# Patient Record
Sex: FEMALE | Race: WHITE | NOT HISPANIC OR LATINO | Employment: OTHER | URBAN - METROPOLITAN AREA
[De-identification: names, ages, dates, MRNs, and addresses within clinical notes are randomized per-mention and may not be internally consistent; named-entity substitution may affect disease eponyms.]

---

## 2017-01-12 ENCOUNTER — TRANSCRIBE ORDERS (OUTPATIENT)
Dept: ADMINISTRATIVE | Facility: HOSPITAL | Age: 57
End: 2017-01-12

## 2017-01-12 DIAGNOSIS — M54.16 LUMBAR RADICULOPATHY: Primary | ICD-10-CM

## 2017-01-18 ENCOUNTER — HOSPITAL ENCOUNTER (OUTPATIENT)
Dept: RADIOLOGY | Facility: HOSPITAL | Age: 57
Discharge: HOME | End: 2017-01-18
Attending: ORTHOPAEDIC SURGERY
Payer: COMMERCIAL

## 2017-01-18 DIAGNOSIS — M54.16 LUMBAR RADICULOPATHY: ICD-10-CM

## 2017-01-18 PROCEDURE — 72158 MRI LUMBAR SPINE W/O & W/DYE: CPT

## 2017-01-18 PROCEDURE — A9585 GADOBUTROL INJECTION: HCPCS | Performed by: ORTHOPAEDIC SURGERY

## 2017-01-18 RX ADMIN — GADOBUTROL 7 ML: 604.72 INJECTION INTRAVENOUS at 17:51

## 2017-04-05 ENCOUNTER — TRANSCRIBE ORDERS (OUTPATIENT)
Dept: ADMINISTRATIVE | Facility: HOSPITAL | Age: 57
End: 2017-04-05

## 2017-04-05 ENCOUNTER — ALLSCRIPTS OFFICE VISIT (OUTPATIENT)
Dept: OTHER | Facility: OTHER | Age: 57
End: 2017-04-05

## 2017-04-05 DIAGNOSIS — G35 MULTIPLE SCLEROSIS (HCC): ICD-10-CM

## 2017-04-05 DIAGNOSIS — G35 MULTIPLE SCLEROSIS (HCC): Primary | ICD-10-CM

## 2017-07-18 ENCOUNTER — HOSPITAL ENCOUNTER (OUTPATIENT)
Dept: RADIOLOGY | Facility: HOSPITAL | Age: 57
Discharge: HOME | End: 2017-07-18
Payer: COMMERCIAL

## 2017-07-18 DIAGNOSIS — G35 MULTIPLE SCLEROSIS (HCC): ICD-10-CM

## 2017-07-18 PROCEDURE — A9585 GADOBUTROL INJECTION: HCPCS | Performed by: RADIOLOGY

## 2017-07-18 PROCEDURE — 70553 MRI BRAIN STEM W/O & W/DYE: CPT

## 2017-07-18 RX ADMIN — GADOBUTROL 7 ML: 604.72 INJECTION INTRAVENOUS at 17:20

## 2017-07-20 ENCOUNTER — GENERIC CONVERSION - ENCOUNTER (OUTPATIENT)
Dept: OTHER | Facility: OTHER | Age: 57
End: 2017-07-20

## 2017-08-10 ENCOUNTER — GENERIC CONVERSION - ENCOUNTER (OUTPATIENT)
Dept: OTHER | Facility: OTHER | Age: 57
End: 2017-08-10

## 2017-10-09 ENCOUNTER — GENERIC CONVERSION - ENCOUNTER (OUTPATIENT)
Dept: OTHER | Facility: OTHER | Age: 57
End: 2017-10-09

## 2017-10-19 ENCOUNTER — ALLSCRIPTS OFFICE VISIT (OUTPATIENT)
Dept: OTHER | Facility: OTHER | Age: 57
End: 2017-10-19

## 2017-10-19 ENCOUNTER — HOSPITAL ENCOUNTER (EMERGENCY)
Facility: HOSPITAL | Age: 57
Discharge: HOME | End: 2017-10-19
Attending: EMERGENCY MEDICINE | Admitting: EMERGENCY MEDICINE
Payer: COMMERCIAL

## 2017-10-19 VITALS
RESPIRATION RATE: 16 BRPM | WEIGHT: 155 LBS | HEIGHT: 66 IN | HEART RATE: 100 BPM | BODY MASS INDEX: 24.91 KG/M2 | TEMPERATURE: 99 F | OXYGEN SATURATION: 98 % | SYSTOLIC BLOOD PRESSURE: 180 MMHG | DIASTOLIC BLOOD PRESSURE: 108 MMHG

## 2017-10-19 DIAGNOSIS — R60.0 LOCALIZED EDEMA: ICD-10-CM

## 2017-10-19 DIAGNOSIS — R94.31 ABNORMAL EKG: ICD-10-CM

## 2017-10-19 DIAGNOSIS — I10 HTN (HYPERTENSION): Primary | ICD-10-CM

## 2017-10-19 DIAGNOSIS — G35 MULTIPLE SCLEROSIS (HCC): ICD-10-CM

## 2017-10-19 DIAGNOSIS — I10 ESSENTIAL (PRIMARY) HYPERTENSION: ICD-10-CM

## 2017-10-19 LAB
ANION GAP SERPL CALCULATED.3IONS-SCNC: 9 MMOL/L (ref 4–13)
APTT PPP: 27 SECONDS (ref 24–33)
BASOPHILS # BLD AUTO: 0 THOUSANDS/ΜL (ref 0–0.1)
BASOPHILS NFR BLD AUTO: 0 % (ref 0–1)
BUN SERPL-MCNC: 24 MG/DL (ref 5–25)
CALCIUM SERPL-MCNC: 9.2 MG/DL (ref 8.3–10.1)
CHLORIDE SERPL-SCNC: 105 MMOL/L (ref 100–108)
CO2 SERPL-SCNC: 27 MMOL/L (ref 21–32)
CREAT SERPL-MCNC: 0.66 MG/DL (ref 0.6–1.3)
EOSINOPHIL # BLD AUTO: 0 THOUSAND/ΜL (ref 0–0.61)
EOSINOPHIL NFR BLD AUTO: 0 % (ref 0–6)
ERYTHROCYTE [DISTWIDTH] IN BLOOD BY AUTOMATED COUNT: 13.5 % (ref 11.6–15.1)
GFR SERPL CREATININE-BSD FRML MDRD: 99 ML/MIN/1.73SQ M
GLUCOSE SERPL-MCNC: 158 MG/DL (ref 65–140)
HCT VFR BLD AUTO: 45.9 % (ref 37–47)
HGB BLD-MCNC: 15.2 G/DL (ref 12–16)
INR PPP: 1.02 (ref 0.86–1.16)
LG PLATELETS BLD QL SMEAR: PRESENT
LYMPHOCYTES # BLD AUTO: 1.4 THOUSANDS/ΜL (ref 0.6–4.47)
LYMPHOCYTES NFR BLD AUTO: 24 % (ref 14–44)
MCH RBC QN AUTO: 29.8 PG (ref 27–31)
MCHC RBC AUTO-ENTMCNC: 33.2 G/DL (ref 31.4–37.4)
MCV RBC AUTO: 90 FL (ref 82–98)
MONOCYTES # BLD AUTO: 0.5 THOUSAND/ΜL (ref 0.17–1.22)
MONOCYTES NFR BLD AUTO: 8 % (ref 4–12)
NEUTROPHILS # BLD AUTO: 4 THOUSANDS/ΜL (ref 1.85–7.62)
NEUTS SEG NFR BLD AUTO: 68 % (ref 43–75)
NRBC BLD AUTO-RTO: 0 /100 WBCS
PLATELET # BLD AUTO: 96 THOUSANDS/UL (ref 130–400)
PLATELET BLD QL SMEAR: ABNORMAL
PMV BLD AUTO: 11.1 FL (ref 8.9–12.7)
POTASSIUM SERPL-SCNC: 3.5 MMOL/L (ref 3.5–5.3)
PROTHROMBIN TIME: 10.7 SECONDS (ref 9.4–11.7)
RBC # BLD AUTO: 5.1 MILLION/UL (ref 4.2–5.4)
SODIUM SERPL-SCNC: 141 MMOL/L (ref 136–145)
TROPONIN I SERPL-MCNC: 0.02 NG/ML
WBC # BLD AUTO: 6 THOUSAND/UL (ref 4.8–10.8)

## 2017-10-19 PROCEDURE — 96361 HYDRATE IV INFUSION ADD-ON: CPT

## 2017-10-19 PROCEDURE — 84484 ASSAY OF TROPONIN QUANT: CPT | Performed by: EMERGENCY MEDICINE

## 2017-10-19 PROCEDURE — 99284 EMERGENCY DEPT VISIT MOD MDM: CPT

## 2017-10-19 PROCEDURE — 80048 BASIC METABOLIC PNL TOTAL CA: CPT | Performed by: EMERGENCY MEDICINE

## 2017-10-19 PROCEDURE — 85025 COMPLETE CBC W/AUTO DIFF WBC: CPT | Performed by: EMERGENCY MEDICINE

## 2017-10-19 PROCEDURE — 36415 COLL VENOUS BLD VENIPUNCTURE: CPT | Performed by: EMERGENCY MEDICINE

## 2017-10-19 PROCEDURE — 85610 PROTHROMBIN TIME: CPT | Performed by: EMERGENCY MEDICINE

## 2017-10-19 PROCEDURE — 93005 ELECTROCARDIOGRAM TRACING: CPT | Performed by: EMERGENCY MEDICINE

## 2017-10-19 PROCEDURE — 85730 THROMBOPLASTIN TIME PARTIAL: CPT | Performed by: EMERGENCY MEDICINE

## 2017-10-19 PROCEDURE — 96374 THER/PROPH/DIAG INJ IV PUSH: CPT

## 2017-10-19 RX ORDER — BACLOFEN 10 MG/1
20 TABLET ORAL 3 TIMES DAILY
COMMUNITY
End: 2018-06-07 | Stop reason: SDUPTHER

## 2017-10-19 RX ORDER — MORPHINE SULFATE 4 MG/ML
4 INJECTION, SOLUTION INTRAMUSCULAR; INTRAVENOUS ONCE
Status: DISCONTINUED | OUTPATIENT
Start: 2017-10-19 | End: 2017-10-19

## 2017-10-19 RX ORDER — MORPHINE SULFATE 2 MG/ML
2 INJECTION, SOLUTION INTRAMUSCULAR; INTRAVENOUS ONCE
Status: COMPLETED | OUTPATIENT
Start: 2017-10-19 | End: 2017-10-19

## 2017-10-19 RX ORDER — AMANTADINE HYDROCHLORIDE 100 MG/1
100 CAPSULE, GELATIN COATED ORAL 2 TIMES DAILY
COMMUNITY
End: 2018-06-07 | Stop reason: SDUPTHER

## 2017-10-19 RX ORDER — OXYCODONE AND ACETAMINOPHEN 10; 325 MG/1; MG/1
1 TABLET ORAL EVERY 8 HOURS PRN
Status: ON HOLD | COMMUNITY
End: 2019-12-13 | Stop reason: SDUPTHER

## 2017-10-19 RX ADMIN — METOPROLOL TARTRATE 25 MG: 25 TABLET ORAL at 17:36

## 2017-10-19 RX ADMIN — SODIUM CHLORIDE 1000 ML: 0.9 INJECTION, SOLUTION INTRAVENOUS at 16:43

## 2017-10-19 RX ADMIN — MORPHINE SULFATE 2 MG: 2 INJECTION, SOLUTION INTRAMUSCULAR; INTRAVENOUS at 16:44

## 2017-10-19 NOTE — DISCHARGE INSTRUCTIONS
Chronic Hypertension, Ambulatory Care   GENERAL INFORMATION:   Chronic hypertension  is a long-term condition in which your blood pressure (BP) is higher than normal  Your BP is the force of your blood moving against the walls of your arteries  Hypertension is a BP of 140/90 or higher  Common symptoms include the following:   · Headache     · Blurred vision    · Chest pain     · Dizziness or weakness     · Trouble breathing     · Nosebleeds  Seek immediate care for the following symptoms:   · Severe headache or vision loss    · Weakness in an arm or leg    · Confusion or difficulty speaking    · Discomfort in your chest that feels like squeezing, pressure, fullness, or pain    · Suddenly feeling lightheaded or trouble breathing    · Pain or discomfort in your back, neck, jaw, stomach, or arm  Treatment for chronic hypertension  may include medicine to lower your BP  You may also need to make lifestyle changes  Take your medicine exactly as directed  Manage chronic hypertension:   · Take your BP at home  Sit and rest for 5 minutes before you take your BP  Extend your arm and support it on a flat surface  Your arm should be at the same level as your heart  Follow the directions that came with your BP monitor  If possible, take at least 2 BP readings each time  Take your BP at least twice a day at the same times each day, such as morning and evening  Keep a log of your BP readings and bring it to your follow-up visits  · Eat less sodium (salt)  Do not add sodium to your food  Limit foods that are high in sodium, such as canned foods, potato chips, and cold cuts  Your healthcare provider may suggest that you follow the [address] [place] [address] Street  The plan is low in sodium, unhealthy fats, and total fat  It is high in potassium, calcium, and fiber  · Exercise regularly  Exercise at least 30 minutes per day, on most days of the week  This will help decrease your BP   Ask your healthcare provider about the best exercise plan for you  · Limit alcohol  Women should limit alcohol to 1 drink a day  Men should limit alcohol to 2 drinks a day  A drink of alcohol is 12 ounces of beer, 5 ounces of wine, or 1½ ounces of liquor  · Do not smoke  If you smoke, it is never too late to quit  Smoking can increase your BP  Smoking also worsens other health conditions you may have that can increase your risk for hypertension  Ask your healthcare provider for information if you need help quitting  Follow up with your healthcare provider as directed: You will need to return to have your BP checked and to have other lab tests done  Write down your questions so you remember to ask them during your visits  CARE AGREEMENT:   You have the right to help plan your care  Learn about your health condition and how it may be treated  Discuss treatment options with your caregivers to decide what care you want to receive  You always have the right to refuse treatment  The above information is an  only  It is not intended as medical advice for individual conditions or treatments  Talk to your doctor, nurse or pharmacist before following any medical regimen to see if it is safe and effective for you  © 2014 [address] is for End User's use only and may not be sold, redistributed or otherwise used for commercial purposes  All illustrations and images included in CareNotes® are the copyrighted property of A D [employer] M [employer]  or Neymar Barrera  Chronic Hypertension   WHAT YOU NEED TO KNOW:   Hypertension is high blood pressure (BP)  Your BP is the force of your blood moving against the walls of your arteries  Normal BP is less than 120/80  Prehypertension is between 120/80 and 139/89  Hypertension is 140/90 or higher  Hypertension causes your BP to get so high that your heart has to work much harder than normal  This can damage your heart   Chronic hypertension is a long-term condition that you can control with a healthy lifestyle or medicines  A controlled blood pressure helps protect your organs, such as your heart, lungs, brain, and kidneys  DISCHARGE INSTRUCTIONS:   Call 911 for any of the following:   · You have discomfort in your chest that feels like squeezing, pressure, fullness, or pain  · You become confused or have difficulty speaking  · You suddenly feel lightheaded or have trouble breathing  · You have pain or discomfort in your back, neck, jaw, stomach, or arm  Return to the emergency department if:   · You have a severe headache or vision loss  · You have weakness in an arm or leg  Contact your healthcare provider if:   · You feel faint, dizzy, confused, or drowsy  · You have been taking your BP medicine and your BP is still higher than your healthcare provider says it should be  · You have questions or concerns about your condition or care  Medicines: You may need any of the following:  · Medicine  may be used to help lower your BP  You may need more than one type of medicine  Take the medicine exactly as directed  · Diuretics  help decrease extra fluid that collects in your body  This will help lower your BP  You may urinate more often while you take this medicine  · Cholesterol medicine  helps lower your cholesterol level  A low cholesterol level helps prevent heart disease and makes it easier to control your blood pressure  · Take your medicine as directed  Contact your healthcare provider if you think your medicine is not helping or if you have side effects  Tell him or her if you are allergic to any medicine  Keep a list of the medicines, vitamins, and herbs you take  Include the amounts, and when and why you take them  Bring the list or the pill bottles to follow-up visits  Carry your medicine list with you in case of an emergency  Follow up with your healthcare provider as directed:   You will need to return to have your blood pressure checked and to have other lab tests done  Write down your questions so you remember to ask them during your visits  Manage chronic hypertension:  Talk with your healthcare provider about these and other ways to manage hypertension:  · Take your BP at home  Sit and rest for 5 minutes before you take your BP  Extend your arm and support it on a flat surface  Your arm should be at the same level as your heart  Follow the directions that came with your BP monitor  If possible, take at least 2 BP readings each time  Take your BP at least twice a day at the same times each day, such as morning and evening  Keep a record of your BP readings and bring it to your follow-up visits  Ask your healthcare provider what your blood pressure should be  · Limit sodium (salt) as directed  Too much sodium can affect your fluid balance  Check labels to find low-sodium or no-salt-added foods  Some low-sodium foods use potassium salts for flavor  Too much potassium can also cause health problems  Your healthcare provider will tell you how much sodium and potassium are safe for you to have in a day  He or she may recommend that you limit sodium to 2,300 mg a day  · Follow the meal plan recommended by your healthcare provider  A dietitian or your provider can give you more information on low-sodium plans or the DASH (Dietary Approaches to Stop Hypertension) eating plan  The DASH plan is low in sodium, unhealthy fats, and total fat  It is high in potassium, calcium, and fiber  · Exercise to maintain a healthy weight  Exercise at least 30 minutes per day, on most days of the week  This will help decrease your blood pressure  Ask about the best exercise plan for you  · Decrease stress  This may help lower your BP  Learn ways to relax, such as deep breathing or listening to music  · Limit alcohol  Women should limit alcohol to 1 drink a day  Men should limit alcohol to 2 drinks a day   A drink of alcohol is 12 ounces of beer, 5 ounces of wine, or 1½ ounces of liquor  · Do not smoke  Nicotine and other chemicals in cigarettes and cigars can increase your BP and also cause lung damage  Ask your healthcare provider for information if you currently smoke and need help to quit  E-cigarettes or smokeless tobacco still contain nicotine  Talk to your healthcare provider before you use these products  © 2017 [address] Information is for End User's use only and may not be sold, redistributed or otherwise used for commercial purposes  All illustrations and images included in CareNotes® are the copyrighted property of [employer]  or Neymar Barrera  The above information is an  only  It is not intended as medical advice for individual conditions or treatments  Talk to your doctor, nurse or pharmacist before following any medical regimen to see if it is safe and effective for you

## 2017-10-19 NOTE — ED PROVIDER NOTES
History  Chief Complaint   Patient presents with    High Blood Pressure     Was at the neurologist office and blood pressure was 198/112 so neurologist sent patient to the ED       History provided by:  Patient  Hypertension   Severity:  Moderate  Onset quality:  Gradual  Timing:  Constant  Progression:  Worsening  Chronicity:  New  Relieved by:  Nothing  Worsened by:  Nothing  Ineffective treatments:  None tried  Associated symptoms: no abdominal pain, no anxiety, no chest pain, no confusion, no dizziness, no fever, no headaches, no nausea, no peripheral edema, no shortness of breath, no syncope, no tinnitus and no weakness    Associated symptoms comment:  Patient under significant amount of stress over the past several weeks duration  Complaints of pain in the right leg and sciatic area  The patient not on any htn meds  Prior to Admission Medications   Prescriptions Last Dose Informant Patient Reported? Taking? Teriflunomide (AUBAGIO) 14 MG TABS 10/18/2017 at Unknown time  Yes Yes   Sig: Take 14 mg by mouth daily   amantadine (SYMMETREL) 100 mg capsule 10/19/2017 at Unknown time  Yes Yes   Sig: Take 100 mg by mouth 2 (two) times a day   baclofen 10 mg tablet 10/19/2017 at Unknown time  Yes Yes   Sig: Take 20 mg by mouth 3 (three) times a day   oxyCODONE-acetaminophen (PERCOCET)  mg per tablet 10/19/2017 at Unknown time  Yes Yes   Sig: Take 1 tablet by mouth every 8 (eight) hours as needed for moderate pain      Facility-Administered Medications: None       Past Medical History:   Diagnosis Date    Chronic back pain     MS (multiple sclerosis) ([facility] [address] )        Past Surgical History:   Procedure Laterality Date    BACK SURGERY       SECTION      ORTHOPEDIC SURGERY         No family history on file  I have reviewed and agree with the history as documented      Social History   Substance Use Topics    Smoking status: Current Every Day Smoker     Packs/day: 0 50    Smokeless tobacco: Never Used    Alcohol use Yes      Comment: rare        Review of Systems   Constitutional: Negative for chills and fever  HENT: Negative for rhinorrhea, sore throat, tinnitus and trouble swallowing  Eyes: Negative for pain  Respiratory: Negative for cough, shortness of breath, wheezing and stridor  Cardiovascular: Negative for chest pain, leg swelling and syncope  Gastrointestinal: Negative for abdominal pain, diarrhea and nausea  Endocrine: Negative for polyuria  Genitourinary: Negative for dysuria, flank pain and urgency  Musculoskeletal: Negative for joint swelling, myalgias and neck stiffness  Skin: Negative for rash  Allergic/Immunologic: Negative for immunocompromised state  Neurological: Negative for dizziness, syncope, weakness, numbness and headaches  Psychiatric/Behavioral: Negative for confusion and suicidal ideas  The patient is not nervous/anxious  All other systems reviewed and are negative  Physical Exam  ED Triage Vitals [10/19/17 1522]   Temperature Pulse Respirations Blood Pressure SpO2   99 °F (37 2 °C) (!) 110 16 (!) 182/111 98 %      Temp Source Heart Rate Source Patient Position - Orthostatic VS BP Location FiO2 (%)   Tympanic Monitor Sitting Left arm --      Pain Score       Worst Possible Pain           Physical Exam   Constitutional: She is oriented to person, place, and time  She appears well-developed and well-nourished  HENT:   Head: Normocephalic and atraumatic  Eyes: EOM are normal  Pupils are equal, round, and reactive to light  Neck: Normal range of motion  Neck supple  Cardiovascular: Normal rate and regular rhythm  Exam reveals no friction rub  No murmur heard  Pulmonary/Chest: Breath sounds normal  No respiratory distress  She has no wheezes  She has no rales  Abdominal: Soft  Bowel sounds are normal  She exhibits no distension  There is no tenderness  Musculoskeletal: Normal range of motion  She exhibits no edema or tenderness  Neurological: She is alert and oriented to person, place, and time  Skin: Skin is warm  No rash noted  Psychiatric: She has a normal mood and affect  Nursing note and vitals reviewed  ED Medications  Medications   morphine injection 2 mg (2 mg Intravenous Given 10/19/17 1644)   sodium chloride 0 9 % bolus 1,000 mL (0 mL Intravenous Stopped 10/19/17 1741)   metoprolol tartrate (LOPRESSOR) tablet 25 mg (25 mg Oral Given 10/19/17 1736)       Diagnostic Studies  Labs Reviewed   BASIC METABOLIC PANEL - Abnormal        Result Value Ref Range Status    Glucose 158 (*) 65 - 140 mg/dL Final    Comment:   If the patient is fasting, the ADA then defines impaired fasting glucose as > 100 mg/dL and diabetes as > or equal to 123 mg/dL  Specimen collection should occur prior to Sulfasalazine administration due to the potential for falsely depressed results  Specimen collection should occur prior to Sulfapyridine administration due to the potential for falsely elevated results  Sodium 141  136 - 145 mmol/L Final    Potassium 3 5  3 5 - 5 3 mmol/L Final    Chloride 105  100 - 108 mmol/L Final    CO2 27  21 - 32 mmol/L Final    Anion Gap 9  4 - 13 mmol/L Final    BUN 24  5 - 25 mg/dL Final    Creatinine 0 66  0 60 - 1 30 mg/dL Final    Comment: Standardized to IDMS reference method    Calcium 9 2  8 3 - 10 1 mg/dL Final    eGFR 99  ml/min/1 73sq m Final    Narrative:     National Kidney Disease Education Program recommendations are as follows:  GFR calculation is accurate only with a steady state creatinine  Chronic Kidney disease less than 60 ml/min/1 73 sq  meters  Kidney failure less than 15 ml/min/1 73 sq  meters     CBC AND DIFFERENTIAL - Abnormal     Platelets 96 (*) 582 - 400 Thousands/uL Final    WBC 6 00  4 80 - 10 80 Thousand/uL Final    RBC 5 10  4 20 - 5 40 Million/uL Final    Hemoglobin 15 2  12 0 - 16 0 g/dL Final    Hematocrit 45 9  37 0 - 47 0 % Final    MCV 90  82 - 98 fL Final    MCH 29 8  27 0 - 31 0 pg Final    MCHC 33 2  31 4 - 37 4 g/dL Final    RDW 13 5  11 6 - 15 1 % Final    MPV 11 1  8 9 - 12 7 fL Final    nRBC 0  /100 WBCs Final    Neutrophils Relative 68  43 - 75 % Final    Lymphocytes Relative 24  14 - 44 % Final    Monocytes Relative 8  4 - 12 % Final    Eosinophils Relative 0  0 - 6 % Final    Basophils Relative 0  0 - 1 % Final    Neutrophils Absolute 4 00  1 85 - 7 62 Thousands/µL Final    Lymphocytes Absolute 1 40  0 60 - 4 47 Thousands/µL Final    Monocytes Absolute 0 50  0 17 - 1 22 Thousand/µL Final    Eosinophils Absolute 0 00  0 00 - 0 61 Thousand/µL Final    Basophils Absolute 0 00  0 00 - 0 10 Thousands/µL Final   SMEAR REVIEW(PHLEBS DO NOT ORDER) - Abnormal     Platelet Estimate Decreased (*) Adequate Final    Large Platelet Present   Final    Narrative: Few reactive lymphs   PROTIME-INR - Normal    Protime 10 7  9 4 - 11 7 seconds Final    INR 1 02  0 86 - 1 16 Final   APTT - Normal    PTT 27  24 - 33 seconds Final    Narrative: Therapeutic Heparin Range = 60-90 seconds   TROPONIN I - Normal    Troponin I 0 02  <=0 04 ng/mL Final    Narrative:     [employer] Chemistry analyzer 99% cutoff is > 0 04 ng/mL in network labs    o cTnI 99% cutoff is useful only when applied to patients in the clinical setting of myocardial ischemia  o cTnI 99% cutoff should be interpreted in the context of clinical history, ECG findings and possibly cardiac imaging to establish correct diagnosis  o cTnI 99% cutoff may be suggestive but clearly not indicative of a coronary event without the clinical setting of myocardial ischemia  No orders to display       Procedures  ECG 12 Lead Documentation  Date/Time: 10/19/2017 4:13 PM  Performed by: Vidhi Heath  Authorized by: Vidhi Heath     Comments:      Sinus tachycardia possible left atrial enlargement left ventricle hypertrophy were blue coloration abnormality    Abnormal ECG with noted inverted T-waves in 2-3 AVF as well as V4 V5 V6 no ST segment elevation noted  Phone Contacts  ED Phone Contact    ED Course  ED Course                                MDM  Number of Diagnoses or Management Options  Abnormal EKG: new and requires workup  HTN (hypertension): new and requires workup  Diagnosis management comments: This is a 80-year-old female who presents to the emergency department with noted hypertension with no acute signs or symptoms  Her EKG shows abnormalities with ischemia which has been change with old  There is no chest pain no shortness of breath  A troponin was performed which was unremarkable patient also had mother rest of the blood work which was essentially unremarkable as well here in the emergency department the patient has no signs or symptoms except for some mild sciatica discomfort and pain which was treated with opiates  She is stable for outpatient management I would hold off on treating her medications at this point in time and needed follow-up with the family physician however she did have a history of taking metoprolol the past will start her on a low dose of metoprolol and given strict instructions when to return back to the emergency department  Pt re-examined and evaluated after testing and treatment  Spoke with the patient and feeling improved and sxs have resolved  Will discharge home with close f/u with pcp and instructed to return to the ED if sxs worsen or continue  Pt agrees with the plan for discharge and feels comfortable to go home with proper f/u  Advised to return for worsening or additional problems  Diagnostic tests were reviewed and questions answered  Diagnosis, care plan and treatment options were discussed  The patient understand instructions and will follow up as directed           Amount and/or Complexity of Data Reviewed  Clinical lab tests: reviewed and ordered  Tests in the radiology section of CPT®: ordered and reviewed      CritCare Time    Disposition  Final diagnoses:   HTN (hypertension) Abnormal EKG     ED Disposition     ED Disposition Condition Comment    Discharge  Skylar Mobley discharge to home/self care  Condition at discharge: Stable        Follow-up Information     Follow up With Specialties Details Why [address] Family Medicine Call in 2 days If symptoms worsen [address]  [phone]          Discharge Medication List as of 10/19/2017  5:26 PM      START taking these medications    Details   metoprolol tartrate (LOPRESSOR) 25 mg tablet Take 1 tablet by mouth every 12 (twelve) hours for 20 days, Starting Thu 10/19/2017, Until Wed 11/8/2017, Print         CONTINUE these medications which have NOT CHANGED    Details   amantadine (SYMMETREL) 100 mg capsule Take 100 mg by mouth 2 (two) times a day, Historical Med      baclofen 10 mg tablet Take 20 mg by mouth 3 (three) times a day, Historical Med      oxyCODONE-acetaminophen (PERCOCET)  mg per tablet Take 1 tablet by mouth every 8 (eight) hours as needed for moderate pain, Historical Med      Teriflunomide (AUBAGIO) 14 MG TABS Take 14 mg by mouth daily, Historical Med           No discharge procedures on file      ED Provider  Electronically Signed by       Christine Croft DO  10/19/17 8373

## 2017-10-20 NOTE — PROGRESS NOTES
Assessment  1  Multiple sclerosis (340) (G35)   2  Fatigue (780 79) (R53 83)   3  Lumbar spinal stenosis (724 02) (M48 061)   4  Hypertension (401 9) (I10)    Plan  Multiple sclerosis    · LORazepam 0 5 MG Oral Tablet; TAKE 1 TABLET Other 1 TAB PO 30 MIN PRIOR  TO MRI , MAY REPEAT TIMES 1   Rx By: Chanel Jacobo; Dispense: 1 Days ; #:1 Tablet; Refill: 0;For: Multiple sclerosis; UGO = N; Print Rx   · (1) BUN; Status:Active; Requested HARMEET:[id number];    Perform:[facility]; CAV:[id number]; Ordered; For:Multiple sclerosis; Ordered By:Ben Aly;   · (1) CREATININE; Status:Active; Requested BHD:[id number];    Perform:[facility]; LJE:[id number]; Ordered; For:Multiple sclerosis; Ordered By:Ben Aly;   · * MRI CERVICAL SPINE W WO CONTRAST; Status:Need Information - Financial  Authorization; Requested FOP:[id number];    Perform:[facility]; BQZ:[id number]; Last Updated By:Sheela Minor; 10/19/2017 1:55:01 PM;Ordered; For:Multiple sclerosis; Ordered By:Ben Aly;   · * MRI CERVICAL SPINE WO CONTRAST; Status:Voided;    Perform:[facility]; Due:19Oct2018; Ordered; For:Multiple sclerosis; Ordered By:Ben Aly;    Discussion/Summary  Discussion Summary:   Pt here for ms follow uplast seen in aprilwith elevated bp, dbp over 100with no recent primary care eval ie over 3 yrshad updated mri head in july 2017 and comp to nov 2015 with 2 new non enh lesionsalso had updated labs in sept and stable note rev new sxs with increased heaviness of the upper extmri c spine from one yr ago, myelomalacia of c3/4 and cord atrophyupdated mri c spine with and withoutadvised pt to go to ER due to accelerated bp, [place] 3 times [place] any bp meds at this timeconcerns for ich and cva with this bpto go to [facility]  and will be driven by her sister in law     Counseling Documentation With Imm: The patient was counseled regarding diagnostic results,-- instructions for management,-- risk factor reductions,-- prognosis,-- patient and family education,-- risks and benefits of treatment options  total time of encounter was 55 minutes-- and-- greater than 50% minutes was spent counseling  Goals and Barriers: The patient has the current Goals: Go to [facility] for bp evalfor any new sxs  Patient's Capacity to Self-Care: Patient agrees and allows to involve family/caregiver in development of care plan:   Medication SE Review and Pt Understands Tx: Possible side effects of new medications were reviewed with the patient/guardian today  Patient Guardian understands agrees: The treatment plan was reviewed with the patient/guardian  The patient/guardian understands and agrees with the treatment plan      Chief Complaint  Chief Complaint Free Text Note Form: Patient presents today for a neurological follow up for MS  History of Present Illness  HPI: 64year old female here for MS follow up, last seen in April 2017  Patient was diagnosed with MS in 2010 with overall progressive decline since time of diagnosis, particularly affecting patients walking  Patient previously very healthy and active in ADLs  Patient was originally diagnosed and followed by Dr [facility]  Patient has been on Ampyra and Baclofen to help with her spasticity and walking  No meds have seemed to be very beneficial  She is off Ampyra  Patient had been on Betaseron since 2010  Patient initially started walking with a cane, then a rolling walker, and now needs a wheelchair for any distance and for the majority of the day  Patient worked up until 2011  Patient started Aubagio in July 2015  She self-discontinued Betaseron due to tired of injections  She has overall tolerated Aubagio well, no issues  Pt notes no new issues with the med  pt notes no hair thinning  pt with elevated bp, dbp over 100  initial dbp 112  pt notes no ha or fever or chills  no n or v  no change in speech or swallowing  biggest issue with fatigue and the heat   also big issue with increased heaviness of the upper arms  with no recent primary care eval ie over 3 yrs  pt had updated mri head in july 2017 and comp to nov 2015 with 2 new non enh lesions  rev imaging in detail with pt at appt  pt also had updated labs in sept and stable  note rev new sxs with increased heaviness of the upper ext  rev mri c spine from one yr ago, myelomalacia of c3/4 and cord atrophy  pt notes worsening of the arm heaviness any as the day goes on  pt needs updated mri c spine with and without  due to elevated bp, pt was strongly advised pt to go to ER due to accelerated bp, checked 3 times here in office  pt not on any bp meds at this time  per family practice notes from 2015, pt was being followed for htn  rev concerns for ich and cva with this bp  pt was very adamant about not going to the er  however i again encouraged her as I have no pcp to call and not sure how long her pressure has been elevated  to go to [facility] er and will be driven by her sister in law  family in agreement about pt going to the er  will have nursing call pt after appt to see how she is doing  also would hold on provigil till seen by a pcp  This med has overall been helpful, but she is still tired later in the afternoon  Her biggest complaint continues to be weakness and spasticity  Her arms get extremely tired, especially later in the day, and she can barely lift them  re rev MRI c-spine 11/19/16 Chronic myelomalacia and severe cord atrophy at C3-C4 possibly likely to the history of multiple sclerosis  No new signal abnormality or abnormal enhancement  No findings to suggest active demyelination at this time  Moderate multilevel spondylosis most pronounced at C5-C6, stable  No new disc herniation  discussed the significance of myelomalacia  also discussed the degree of atrophy noted  Patient also continues to see ortho/pain management (Dr Susan Lennon) for lower back pain and had updated MRI lumbar spine in January 2017   New lumbar degenerative disc disease at L2-3 with a broad-based left foraminal and lateral disc protrusion resulting in mild compression and displacement of the exiting nerve  Moderate left foraminal narrowing  Stable degenerative disc disease at L3-4 and L4-5  Moderate bilateral foraminal narrowing and canal stenosis at L3-4 is stable  Moderately severe right foraminal narrowing at L4-5 is stable  Patient has undergone previous surgery at these levels with posterior decompression and mild epidural fibrosis posterior to the thecal sac  Her last labs were done on 3/6/17  Normal CBC and hepatic function panel  Denies changes in bowel or bladder, speech or swallowing  No vision changes, vertigo, falls  extneded appt due to review of new ms sxs as well as addressing accelerated bp  Review of Systems  ros rev with pt at appt   Neurological ROS:   Constitutional: no fever, no chills, no recent weight gain, no recent weight loss, no complaints of feeling tired, no changes in appetite  HEENT:  no sinus problems, not feeling congested, no blurred vision, no dryness of the eyes, no eye pain, no hearing loss, no tinnitus, no mouth sores, no sore throat, no hoarseness, no dysphagia, no masses, no bleeding  Cardiovascular:  no chest pain or pressure, no palpitations present, the heart rate was not rapid or irregular, no swelling in the arms or legs, no poor circulation  Respiratory:  no unusual or persistant cough, no shortness of breath with or without exertion  Gastrointestinal:  no nausea, no vomiting, no diarrhea, no abdominal pain, no changes in bowel habits, no melena, no loss of bowel control  Genitourinary: feelings of urinary urgency  Musculoskeletal: immobility or loss of function--   no arthralgias, no myalgias, no immobility or loss of function, no head/neck/back pain, no pain while walking  Integumentary  no masses, no rash, no skin lesions, no livedo reticularis     Psychiatric:  no anxiety, no depression, no mood swings, no psychiatric hospitalizations, no sleep problems  Endocrine  no unusual weight loss or gain, no excessive urination, no excessive thirst, no hair loss or gain, no hot or cold intolerance, no menstrual period change or irregularity, no loss of sexual ability or drive, no erection difficulty, no nipple discharge  Hematologic/Lymphatic:  no unusual bleeding, no tendency for easy bruising, no clotting skin or lumps  Neurological General:  no headache, no nausea or vomiting, no lightheadedness, no convulsions, no blackouts, no syncope, no trauma, no photopsia, no increased sleepiness, no trouble falling asleep, no snoring, no awakening at night  Neurological Mental Status:  no confusion, no mood swings, no alteration or loss of consciousness, no difficulty expressing/understanding speech, no memory problems  Neurological Cranial Nerves:  no blurry or double vision, no loss of vision, no face drooping, no facial numbness or weakness, no taste or smell loss/changes, no hearing loss or ringing, no vertigo or dizziness, no dysphagia, no slurred speech  Neurological Motor findings include: cramping(pre/post exercise)  Neurological Coordination: unsteadiness  Neurological Sensory: numbness  Neurological Gait: difficulty walking  Active Problems  1  Abnormal cytology (792 9) (R89 6)   2  Depression (311) (F32 9)   3  Encounter for screening for cardiovascular disorders (V81 2) (Z13 6)   4  Encounter for screening mammogram for malignant neoplasm of breast (V76 12)   (Z12 31)   5  Fatigue (780 79) (R53 83)   6  Follow-up exam (V67 9) (Z09)   7  Hypertension (401 9) (I10)   8  Lumbar spinal stenosis (724 02) (M48 061)   9  Multiple sclerosis (340) (G35)   10  Multiple thyroid nodules (241 1) (E04 2)   11  Muscle spasm (728 85) (M62 838)   12  Need for 23-polyvalent pneumococcal polysaccharide vaccine (V03 82) (Z23)   13  Screen for colon cancer (V76 51) (Z12 11)   14   Screening for malignant neoplasm of breast (V76 10) (Z12 31)   15  Skin abnormality (757 9) (L98 9)   16  Thyroid nodule (241 0) (E04 1)   17  Vitamin D deficiency (268 9) (E55 9)   18  Well adult on routine health check (V70 0) (Z00 00)    Past Medical History  1  Depression (311) (F32 9)   2  History of H/O laminectomy (V45 89) (Z98 890)   3  History of High cholesterol (272 0) (E78 00)   4  History of abscess of lung (V12 69) (Z87 09)   5  Hypertension (401 9) (I10)   6  Multiple sclerosis (340) (G35)   7  History of Right lower lobe pneumonia (486) (J18 1)   8  History of S/P discectomy (V45 89) (T38 285)  Active Problems And Past Medical History Reviewed: The active problems and past medical history were reviewed and updated today  Surgical History  1  History of Back Surgery   2  History of  Section   3  History of Knee Arthroscopy (Therapeutic)   4  History of Treatment Of The Right Ankle  Surgical History Reviewed: The surgical history was reviewed and updated today  Family History  Mother    1  Family history of osteoporosis (V17 81) (Z82 62)  Father    2  Family history of    3  Family history of myocardial infarction (V17 3) (Z82 49)  Family History Reviewed: The family history was reviewed and updated today  Social History   · Denied: History of Alcohol use   · Caffeine use (V49 89) (F15 90)   · Current every day smoker (305 1) (F17 200)  Social History Reviewed: The social history was reviewed and updated today  Current Meds   1  Amantadine HCl - 100 MG Oral Capsule; TAKE 1 CAPSULE TWICE DAILY; Therapy: 88ZRU0452 to (Evaluate:30Lql2804)  Requested for: 20ZRM1587; Last   Rx:90Lmj1216 Ordered   2  Aubagio 14 MG Oral Tablet; 1 tab by mouth daily; Therapy: 72YJT9161 to (Last Rx:2017)  Requested for: 70OJL5106 Ordered   3  Baclofen 10 MG Oral Tablet; Take 2 tablets three times a day; Therapy: 20RCB2299 to (Last Rx:52Cyt9648)  Requested for: 04Iuw7441 Ordered   4  LORazepam 0 5 MG Oral Tablet; TAKE 1 TABLET Other 1 TAB PO 30 MIN PRIOR TO MRI ,   MAY REPEAT TIMES 1;   Therapy: 79IQF3448 to (Evaluate:74Kbr4441); Last Rx:03Cpn6656 Ordered   5  LORazepam 0 5 MG Oral Tablet; TAKE 1 TABLET Other 1 TAB PO 30 MIN PRIOR TO MRI ,   MAY REPEAT TIMES 1;   Therapy: 85HBL3055 to (Evaluate:65Ljl5065); Last Rx:27Fgs8620 Ordered   6  LORazepam 0 5 MG Oral Tablet; TAKE 1 TABLET Other 1 TAB PO 30 MIN PRIOR TO MRI ,   MAY REPEAT TIMES 1;   Therapy: 65DMO5734 to (Evaluate:19Esc2380); Last Rx:17Plt8753 Ordered   7  Modafinil 200 MG Oral Tablet; TAKE 1 TABLET valentina bid; Therapy: 75NVG9248 to (Evaluate:97Qvw8129); Last Rx:20Hcm3111 Ordered  Medication List Reviewed: The medication list was reviewed and updated today  Allergies  1  No Known Drug Allergies    Vitals  Signs   Recorded: 73VHM1239 01:08PM   Heart Rate: 96  Respiration: 16  Height Unobtainable: Yes  Weight Unobtainable: Yes    Physical Exam    Constitutional   General appearance: No acute distress, well appearing and well nourished  -- pos distal pulses lillian  Eyes   Ophthalmoscopic examination: Vision is grossly normal  Gross visual field testing by confrontation shows no abnormalities  EOMI in both eyes  Conjunctivae clear  Eyelids normal palpebral fissures equal  Orbits exhibit normal position  No discharge from the eyes  PERRL  Musculoskeletal   Gait and station: Abnormal  -- in wheelchair  Muscle strength: Abnormal     Motor Strength:  the patient is left hand dominant  Strength examination:   Biceps strength was 4+/5 on the right side-- and-- 4+/5 on the left side  Triceps strength was 4+/5 on the right side-- and-- 4+/5 on the left side  Shoulder flexion was 4+/5 on the right side-- and-- 4+/5 on the left side  Foot Plantar Flexion: 1/5 on the right side and 1/5 on the left side  Foot Dorsiflexion: 1/5 on the right side and 1/5 on the left side  Knee Flexion: 3/5 on the right side and 3/5 on the left side  Hip Flexion: 1/5 on the right side and 1/5 on the left side  -- easy fatigability of the arms with MMT  Muscle tone: No atrophy, abnormal movements, flaccidity, cogwheeling or spasticity  Neurologic   Orientation to person, place, and time: Normal     Recent and remote memory: Demonstrates normal memory  Attention span and concentration: Normal thought process and attention span  Language: Names objects, able to repeat phrases and speaks spontaneously  Fund of knowledge: Normal vocabulary with appropriate knowledge of current events and past history  2nd cranial nerve: Normal   Visual Fields: peripheral vision was full to confrontation bilaterally  pupils equal in size, round, reactive to light, with normal accommodation-- disc flat    3rd, 4th, and 6th cranial nerves: Normal   extraocular movements intact   5th cranial nerve: Normal     7th cranial nerve: Normal     8th cranial nerve: Normal     9th cranial nerve: Normal     11th cranial nerve: Normal     12th cranial nerve: Normal     Sensation: Abnormal  -- decreased vibrations bilateral lowers  Reflexes: Abnormal  -- increased lowers  Coordination: Abnormal   Coordination: past-pointing present  Judgment and insight: Normal     Mood and affect: Normal   Mood and Affect: appropriate mood-- and-- appropriate affect  Results/Data  Diagnostic Studies Reviewed: I personally reviewed the films/images/results in the office today  My interpretation follows  Diagnostic Review see hpi          Signatures   Electronically signed by : MALISSA Acuna ; Oct 19 2017  3:49PM EST                       (Co-author)

## 2017-10-26 ENCOUNTER — GENERIC CONVERSION - ENCOUNTER (OUTPATIENT)
Dept: OTHER | Facility: OTHER | Age: 57
End: 2017-10-26

## 2017-10-27 LAB
ATRIAL RATE: 107 BPM
P AXIS: 65 DEGREES
PR INTERVAL: 146 MS
QRS AXIS: 84 DEGREES
QRSD INTERVAL: 96 MS
QT INTERVAL: 380 MS
QTC INTERVAL: 507 MS
T WAVE AXIS: 263 DEGREES
VENTRICULAR RATE: 107 BPM

## 2017-11-10 ENCOUNTER — GENERIC CONVERSION - ENCOUNTER (OUTPATIENT)
Dept: OTHER | Facility: OTHER | Age: 57
End: 2017-11-10

## 2017-11-30 ENCOUNTER — GENERIC CONVERSION - ENCOUNTER (OUTPATIENT)
Dept: OTHER | Facility: OTHER | Age: 57
End: 2017-11-30

## 2017-11-30 LAB
BASOPHILS # BLD AUTO: 0 %
BASOPHILS # BLD AUTO: 0 X10E3/UL (ref 0–0.2)
DEPRECATED RDW RBC AUTO: 13.9 % (ref 12.3–15.4)
EOSINOPHIL # BLD AUTO: 0.1 X10E3/UL (ref 0–0.4)
EOSINOPHIL # BLD AUTO: 2 %
HCT VFR BLD AUTO: 46.4 % (ref 34–46.6)
HGB BLD-MCNC: 15.1 G/DL (ref 11.1–15.9)
IMM.GRANULOCYTES (CD4/8) (HISTORICAL): 0 %
IMM.GRANULOCYTES (CD4/8) (HISTORICAL): 0 X10E3/UL (ref 0–0.1)
LYMPHOCYTES # BLD AUTO: 1.8 X10E3/UL (ref 0.7–3.1)
LYMPHOCYTES # BLD AUTO: 36 %
MCH RBC QN AUTO: 29.5 PG (ref 26.6–33)
MCHC RBC AUTO-ENTMCNC: 32.5 G/DL (ref 31.5–35.7)
MCV RBC AUTO: 91 FL (ref 79–97)
MONOCYTES # BLD AUTO: 0.5 X10E3/UL (ref 0.1–0.9)
MONOCYTES (HISTORICAL): 9 %
NEUTROPHILS # BLD AUTO: 2.7 X10E3/UL (ref 1.4–7)
NEUTROPHILS # BLD AUTO: 53 %
PLATELET # BLD AUTO: 123 X10E3/UL (ref 150–379)
RBC (HISTORICAL): 5.12 X10E6/UL (ref 3.77–5.28)
WBC # BLD AUTO: 5.1 X10E3/UL (ref 3.4–10.8)

## 2017-12-01 ENCOUNTER — GENERIC CONVERSION - ENCOUNTER (OUTPATIENT)
Dept: OTHER | Facility: OTHER | Age: 57
End: 2017-12-01

## 2017-12-01 LAB
A/G RATIO (HISTORICAL): 1.4 (ref 1.2–2.2)
ALBUMIN SERPL BCP-MCNC: 4.1 G/DL (ref 3.5–5.5)
ALP SERPL-CCNC: 103 IU/L (ref 39–117)
ALT SERPL W P-5'-P-CCNC: 11 IU/L (ref 0–32)
AST SERPL W P-5'-P-CCNC: 15 IU/L (ref 0–40)
BILIRUB SERPL-MCNC: 0.2 MG/DL (ref 0–1.2)
BUN SERPL-MCNC: 18 MG/DL (ref 6–24)
BUN/CREA RATIO (HISTORICAL): 28 (ref 9–23)
CALCIUM SERPL-MCNC: 9.2 MG/DL (ref 8.7–10.2)
CHLORIDE SERPL-SCNC: 96 MMOL/L (ref 96–106)
CO2 SERPL-SCNC: 30 MMOL/L (ref 18–29)
CREAT SERPL-MCNC: 0.65 MG/DL (ref 0.57–1)
EGFR AFRICAN AMERICAN (HISTORICAL): 114 ML/MIN/1.73
EGFR-AMERICAN CALC (HISTORICAL): 99 ML/MIN/1.73
GLUCOSE SERPL-MCNC: 66 MG/DL (ref 65–99)
POTASSIUM SERPL-SCNC: 4 MMOL/L (ref 3.5–5.2)
SODIUM SERPL-SCNC: 139 MMOL/L (ref 134–144)
TOT. GLOBULIN, SERUM (HISTORICAL): 3 G/DL (ref 1.5–4.5)
TOTAL PROTEIN (HISTORICAL): 7.1 G/DL (ref 6–8.5)
TSH SERPL DL<=0.05 MIU/L-ACNC: 2.03 UIU/ML (ref 0.45–4.5)

## 2017-12-08 ENCOUNTER — ALLSCRIPTS OFFICE VISIT (OUTPATIENT)
Dept: OTHER | Facility: OTHER | Age: 57
End: 2017-12-08

## 2017-12-08 DIAGNOSIS — Z12.31 ENCOUNTER FOR SCREENING MAMMOGRAM FOR MALIGNANT NEOPLASM OF BREAST: ICD-10-CM

## 2017-12-08 DIAGNOSIS — I51.7 CARDIOMEGALY: ICD-10-CM

## 2017-12-08 DIAGNOSIS — G35 MULTIPLE SCLEROSIS (HCC): ICD-10-CM

## 2017-12-08 DIAGNOSIS — I10 ESSENTIAL (PRIMARY) HYPERTENSION: ICD-10-CM

## 2017-12-08 DIAGNOSIS — R60.0 LOCALIZED EDEMA: ICD-10-CM

## 2017-12-08 DIAGNOSIS — D69.6 THROMBOCYTOPENIA (HCC): ICD-10-CM

## 2018-01-13 VITALS — DIASTOLIC BLOOD PRESSURE: 111 MMHG | HEART RATE: 114 BPM | RESPIRATION RATE: 16 BRPM | SYSTOLIC BLOOD PRESSURE: 179 MMHG

## 2018-01-13 NOTE — RESULT NOTES
Verified Results  * MRI BRAIN MS WO AND W CONTRAST [id number] 04:54PM Gabe Picking Order Number: [id number]    - Patient Instructions: To schedule this appointment, please contact Central Scheduling at [phone]  Test Name Result Flag Reference   MRI BRAIN MS WO AND W CONTRAST (Report)     This is a summary report  The complete report is available in the patient's medical record  If you cannot access the medical record, please contact the sending organization for a detailed fax or copy  MRI BRAIN WITH AND WITHOUT CONTRAST     INDICATION: Six-month follow-up for multiple sclerosis  COMPARISON: November 3, 2015     TECHNIQUE: Sagittal T1, axial T2, axial FLAIR, axial T1  [place], Sagittal FLAIR CUBE   Axial and sagittal [id number]            IV Contrast: 7 mL of Gadobutrol injection (SINGLE-DOSE)      IMAGE QUALITY: Diagnostic  FINDINGS:     BRAIN PARENCHYMA: T2 and FLAIR foci subcortical, cortical, juxtacortical and periventricular regions compatible with the provided history of multiple sclerosis are again noted  There are several new lesions identified which are isointense on the T1 and   nonenhancing likely subacute  There are several hypointense T1 lesions indicating chronicity also noted which are similar to the prior study  No diffusion-positive lesions  There is no discrete mass, mass effect or midline shift  Brainstem and cerebellum demonstrate normal signal  There is no intracranial hemorrhage  There is no evidence of acute infarction  Postcontrast imaging of the brain demonstrates no abnormal enhancement  VENTRICLES: Normal      SELLA AND PITUITARY GLAND: Normal      ORBITS: Normal      PARANASAL SINUSES: Normal      VASCULATURE: Evaluation of the major intracranial vasculature demonstrates appropriate flow voids       CALVARIUM AND SKULL BASE: Normal      EXTRACRANIAL SOFT TISSUES: Normal        IMPRESSION:     There are several new demyelinating lesions identified in the known setting of multiple sclerosis located in the subcortical right frontoparietal junction, and right basal ganglia/external capsule region  No enhancing or diffusion-positive lesions    noted         Workstation performed: [id number]     Signed by:   Philly Hinojosa DO   7/20/17

## 2018-01-14 VITALS — RESPIRATION RATE: 16 BRPM | HEART RATE: 96 BPM

## 2018-01-22 VITALS
TEMPERATURE: 96.8 F | RESPIRATION RATE: 16 BRPM | DIASTOLIC BLOOD PRESSURE: 110 MMHG | HEART RATE: 86 BPM | SYSTOLIC BLOOD PRESSURE: 194 MMHG

## 2018-01-23 VITALS
SYSTOLIC BLOOD PRESSURE: 124 MMHG | BODY MASS INDEX: 25.71 KG/M2 | TEMPERATURE: 97.2 F | WEIGHT: 160 LBS | DIASTOLIC BLOOD PRESSURE: 80 MMHG | HEART RATE: 84 BPM | HEIGHT: 66 IN | RESPIRATION RATE: 18 BRPM

## 2018-01-23 NOTE — RESULT NOTES
Discussion/Summary   platelet count slightly low will follow with a cbc in three months     Verified Results  (1) COMPREHENSIVE METABOLIC PANEL [id number] 12:07MT Sintia Killer courtesy copy of this report has been sent to  [phone]  Test Name Result Flag Reference   Glucose, Serum 66 mg/dL  65-99   BUN 18 mg/dL  6-24   Creatinine, Serum 0 65 mg/dL  0 57-1 00   BUN/Creatinine Ratio 28 H 9-23   Sodium, Serum 139 mmol/L  134-144   Potassium, Serum 4 0 mmol/L  3 5-5 2   Chloride, Serum 96 mmol/L     Carbon Dioxide, Total 30 mmol/L H 18-29   Calcium, Serum 9 2 mg/dL  8 7-10 2   Protein, Total, Serum 7 1 g/dL  6 0-8 5   Albumin, Serum 4 1 g/dL  3 5-5 5   Globulin, Total 3 0 g/dL  1 5-4 5   A/G Ratio 1 4  1 2-2 2   Bilirubin, Total 0 2 mg/dL  0 0-1 2   Alkaline Phosphatase, S 103 IU/L     AST (SGOT) 15 IU/L  0-40   ALT (SGPT) 11 IU/L  0-32   eGFR If NonAfricn Am 99 mL/min/1 73  >59   eGFR If Africn Am 114 mL/min/1 73  >59     (1) TSH [id number] 11:30AM [facility]     Test Name Result Flag Reference   TSH 2 030 uIU/mL  0 450-4 500     (1) CBC/PLT/DIFF [id number] 11:30AM [facility]     Test Name Result Flag Reference   WBC 5 1 x10E3/uL  3 4-10 8   RBC 5 12 x10E6/uL  3 77-5 28   Hemoglobin 15 1 g/dL  11 1-15 9   **Effective December 4, 2017 the reference interval**                   for Hemoglobin MALES only will be changing to: Males 13-15 years: 12 6 - 17 7                                         Males   >15 years: 13 0 - 17 7   Hematocrit 46 4 %  34 0-46  6   MCV 91 fL  79-97   MCH 29 5 pg  26 6-33 0   MCHC 32 5 g/dL  31 5-35 7   RDW 13 9 %  12 3-15 4   Platelets 771 O85Z9/LY L 150-379   Neutrophils 53 %  Not Estab  Lymphs 36 %  Not Estab  Monocytes 9 %  Not Estab  Eos 2 %  Not Estab  Basos 0 %  Not Estab     Neutrophils (Absolute) 2 7 x10E3/uL  1 4-7 0   Lymphs (Absolute) 1 8 x10E3/uL  0 7-3 1   Monocytes(Absolute) 0 5 x10E3/uL  0 1-0 9 Eos (Absolute) 0 1 x10E3/uL  0 0-0 4   Baso (Absolute) 0 0 x10E3/uL  0 0-0 2   Immature Granulocytes 0 %  Not Estab  Immature Grans (Abs) 0 0 x10E3/uL  0 0-0 1       Plan  Acquired thrombocytopenia    · (1) CBC/PLT/DIFF; Status:Active;  Requested YTF:[id number];

## 2018-02-01 DIAGNOSIS — G35 MULTIPLE SCLEROSIS (HCC): Primary | ICD-10-CM

## 2018-02-01 RX ORDER — BACLOFEN 10 MG/1
20 TABLET ORAL 3 TIMES DAILY
Qty: 540 TABLET | Refills: 0 | Status: SHIPPED | OUTPATIENT
Start: 2018-02-01 | End: 2018-06-14 | Stop reason: SDUPTHER

## 2018-03-01 DIAGNOSIS — D69.6 THROMBOCYTOPENIA (HCC): ICD-10-CM

## 2018-03-14 DIAGNOSIS — G35 MULTIPLE SCLEROSIS (HCC): Primary | ICD-10-CM

## 2018-03-14 NOTE — TELEPHONE ENCOUNTER
[address] called, stated they need a new Rx because patient is new to them      [address] [facility]  [phone] phone  [phone] fax

## 2018-05-02 DIAGNOSIS — G35 MULTIPLE SCLEROSIS (HCC): Primary | ICD-10-CM

## 2018-05-02 RX ORDER — AMANTADINE HYDROCHLORIDE 100 MG/1
100 CAPSULE, GELATIN COATED ORAL 2 TIMES DAILY
Qty: 60 CAPSULE | Refills: 3 | Status: SHIPPED | OUTPATIENT
Start: 2018-05-02 | End: 2018-07-30 | Stop reason: SDUPTHER

## 2018-05-10 DIAGNOSIS — I10 ESSENTIAL HYPERTENSION: Primary | ICD-10-CM

## 2018-05-10 PROBLEM — I51.7 VENTRICULAR HYPERTROPHY: Status: ACTIVE | Noted: 2017-12-08

## 2018-05-10 PROBLEM — D69.6 THROMBOCYTOPENIA (HCC): Status: ACTIVE | Noted: 2017-12-08

## 2018-06-06 DIAGNOSIS — G35 MULTIPLE SCLEROSIS (HCC): ICD-10-CM

## 2018-06-06 DIAGNOSIS — I10 ESSENTIAL HYPERTENSION: ICD-10-CM

## 2018-06-07 ENCOUNTER — OFFICE VISIT (OUTPATIENT)
Dept: NEUROLOGY | Facility: CLINIC | Age: 58
End: 2018-06-07
Payer: COMMERCIAL

## 2018-06-07 VITALS — HEART RATE: 76 BPM | DIASTOLIC BLOOD PRESSURE: 90 MMHG | SYSTOLIC BLOOD PRESSURE: 142 MMHG | RESPIRATION RATE: 15 BRPM

## 2018-06-07 DIAGNOSIS — I10 ESSENTIAL HYPERTENSION: Primary | ICD-10-CM

## 2018-06-07 DIAGNOSIS — G35 MULTIPLE SCLEROSIS (HCC): Primary | ICD-10-CM

## 2018-06-07 PROCEDURE — 99214 OFFICE O/P EST MOD 30 MIN: CPT | Performed by: PSYCHIATRY & NEUROLOGY

## 2018-06-07 RX ORDER — TELMISARTAN AND HYDROCHLORTHIAZIDE 80; 12.5 MG/1; MG/1
TABLET ORAL DAILY
COMMUNITY
Start: 2017-11-10 | End: 2018-06-07 | Stop reason: SDUPTHER

## 2018-06-07 RX ORDER — LORAZEPAM 0.5 MG/1
TABLET ORAL
COMMUNITY
Start: 2016-10-19 | End: 2019-06-22 | Stop reason: HOSPADM

## 2018-06-07 RX ORDER — TELMISARTAN AND HYDROCHLORTHIAZIDE 80; 12.5 MG/1; MG/1
1 TABLET ORAL DAILY
Qty: 90 TABLET | Refills: 1 | Status: SHIPPED | OUTPATIENT
Start: 2018-06-07 | End: 2018-09-06 | Stop reason: SDUPTHER

## 2018-06-07 NOTE — PROGRESS NOTES
Patient ID: Carmela Gary is a 62 y o  female  Assessment/Plan:    Multiple sclerosis ([address] )  Pt here for neuro follow up  Pt did go to the er at timing of our last visit due to accelerated htn  Pt did have still elevated bp at time of presentation and treated with bp medicaiton, ekg etc  Pt is now on metoprolol and seeing her pcp  bp today ok  Pt ms wise overall going well  Biggest issue at this time is that pts insurance changed last month and now [place] is not on formulary per this plan  I will notify our nursing team to call [facility] [place] for an appeal of this decision  Pt was only given a temporary supply for one month and letter is dated may 23    Will ask traci to investigate  Pt on med due to known jcv positivity and previously on betaseron  Pt wishes an oral medication,  And [place] has had no cases of pml  Pt has done very well both clinically and radiographically on the aubagio since she started 3 yrs ago  It is against medical advice for pt to be changed off her current imd med due to her stability  Changing med in this scenario would subject her to risk of breakthru lesions  Pt will also file an appeal on her own behalf as well  No hospitalizations  No fever or chills  Pt notes she is getting home lab draws set up by [facility]  However I am not getting the labs sent here to [facility]  Last home lab draw was may 24th 2018  I did find thru [facility] labs from feb 2018 with slightly elevated alt 51 but nl ast of 33  Will need to have nursing check where the labs are going and why not receiving here at [facility] since I am the rxer  Pt gets called night before her labs  Pt thinks it is thru [facility] and pt has had this set up for yrs  Concern about no interchange to epic  Will update lfts labs this month  Pt is interested in going back on modafanil, stopped by insurance  However I have not renewed due to her bp at last visit  If pt gets pcp clearance, I will re initiate the modafanil       Diagnoses and all orders for this visit:    Multiple sclerosis ([facility] [address] )  -     CBC and differential; Future  -     Hepatic function panel; Future    Other orders  -     Discontinue: telmisartan-hydrochlorothiazide (MICARDIS HCT) 80-12 5 MG per tablet; Take by mouth daily  -     LORazepam (ATIVAN) 0 5 mg tablet; Take by mouth           Subjective:    HPI  HPI: 62year old female here for MS follow up, last seen in Oct 2017  Patient was diagnosed with MS in 2010 with overall progressive decline since time of diagnosis, particularly affecting patients walking  Patient previously very healthy and active in ADLs  Patient was originally diagnosed and followed by Dr Sunni Villagomez  Patient has been on Ampyra and Baclofen to help with her spasticity and walking  No meds have seemed to be very beneficial  She is off Ampyra  Patient had been on Betaseron since 2010  Patient initially started walking with a cane, then a rolling walker, and now needs a wheelchair for any distance and for the majority of the day  Patient worked up until 2011  Patient started Aubagio in July 2015  She self-discontinued Betaseron due to tired of injections  She has overall tolerated Aubagio well, no issues  Pt notes no new issues with the med  pt notes no hair thinning      At timing of last visit, pt was sent to er due to accelerated htn  Pt did have still elevated bp at time of presentation and treated with bp medicaiton, ekg etc   [facility]  Pt is now on metoprolol and seeing her pcp  Pt is following closely with her pcp  bp today ok,  Since last visit overall pt is doing well  No new ms sxs while on aubagio  no ms hospitalizations  Biggest issue at this time is that pts insurance changed last month and now [place] is not on formulary per this plan,  I will notify our nursing team to call [employer] for an appeal of this decision  Pt was only given a temporary supply for one month and letter is dated may 23 w ill ask traci to investigate  Pt on med due to known jcv positivity and previously on betaseron  Pt not able to tolerate injections with very little sq fat and diff with self admininstration of med  Pt wishes to remains on oral medication,  And [place] has had no cases of pml  Pt has done very well both clinically and radiographically on the aubagio since she started 3 yrs ago  It is against medical advice for pt to be changed off her current imd med due to her stability  Changing med in this scenario would subject her to risk of breakthru lesions  Pt will also file an appeal on her own behalf as well  No hospitalizations  No fever or chills  Pt notes she is getting home lab draws set up by [facility]  However I am not getting the labs sent here to [facility]  Last home lab draw was may 24th 2018  I did find thru [facility] labs from feb 2018 with slightly elevated alt 51 but nl ast of 33  Will need to have nursing check where the labs are going and why not receiving here at [facility] since I am the rxer  Pt gets called night before her labs  Pt thinks it is thru [employer] felicia and pt has had this set up for yrs  Concern about no interconnection to epic  Will update lfts labs this month  Pt is interested in going back on modafanil, stopped by insurance  However I have not renewed due to her bp at last visit  If pt gets pcp clearance, I will re initiate the modafanil  No falls or trips  No change in bowel or bladder  No change in vision  No loss of vision  No diplopia  no change in speech or swallowing  biggest issue with fatigue and the heat  also big issue with increased heaviness of the upper arms  with no recent primary care eval ie over 3 yrs  re rev last mri head in july 2017 and comp to nov 2015 with 2 new non enh lesions  rev imaging in detail with pt at appt  pt also had updated labs in sept and stable  note rev new sxs with increased heaviness of the upper ext   rev mri c spine from one yr ago, myelomalacia of c3/4 and cord atrophy  re rev MRI c-spine 11/19/16 Chronic myelomalacia and severe cord atrophy at C3-C4 possibly likely to the history of multiple sclerosis  No new signal abnormality or abnormal enhancement  No findings to suggest active demyelination at this time  Moderate multilevel spondylosis most pronounced at C5-C6, stable  No new disc herniation  discussed the significance of myelomalacia  also discussed the degree of atrophy noted  Patient also continues to see ortho/pain management (Dr Tom Elizalde) for lower back pain and had updated MRI lumbar spine in January 2017  New lumbar degenerative disc disease at L2-3 with a broad-based left foraminal and lateral disc protrusion resulting in mild compression and displacement of the exiting nerve  Moderate left foraminal narrowing  Stable degenerative disc disease at L3-4 and L4-5  Moderate bilateral foraminal narrowing and canal stenosis at L3-4 is stable  Moderately severe right foraminal narrowing at L4-5 is stable  Patient has undergone previous surgery at these levels with posterior decompression and mild epidural fibrosis posterior to the thecal sac       Total time spent today 35 minutes  Greater than 50% of total time was spent with the patient and / or family counseling and / or coordination of care        The following portions of the patient's history were reviewed and updated as appropriate: allergies, current medications, past family history, past medical history, past social history, past surgical history and problem list          Objective:    Blood pressure 142/90, pulse 76, resp  rate 15, not currently breastfeeding  Physical Exam   Constitutional: She appears well-developed and well-nourished  Eyes: EOM are normal  Pupils are equal, round, and reactive to light  Cardiovascular: Intact distal pulses  Psychiatric: Her speech is normal        Neurological Exam    Mental Status  The patient is alert and oriented to person, place, time, and situation   Her recent and remote memory are normal  Her speech is normal  Her language is fluent with no aphasia  She has normal attention span and concentration  She has a normal fund of knowledge  Cranial Nerves    CN II: The patient's visual acuity and visual fields are normal   CN III, IV, VI: The patient's pupils are equally round and reactive to light and ocular movements are normal   CN V: The patient has normal facial sensation  CN VII:  The patient has symmetric facial movement  CN VIII:  The patient's hearing is normal   CN IX, X: The patient has symmetric palate movement and normal gag reflex  CN XI: The patient's shoulder shrug strength is normal   CN XII: The patient's tongue is midline without atrophy or fasciculations  Motor  The patient has normal muscle bulk throughout  Her overall muscle tone is increased throughout  Specifically, the tone is normal in the right arm, normal in the left arm, increased in the right leg, increased in the left leg  Biceps strength was 4+/5 on the right side-- and-- 4+/5 on the left side  Triceps strength was 4+/5 on the right side-- and-- 4+/5 on the left side  Shoulder flexion was 4+/5 on the right side-- and-- 4+/5 on the left side  Foot Plantar Flexion: 1/5 on the right side and 1/5 on the left side  Foot Dorsiflexion: 1/5 on the right side and 1/5 on the left side  Knee Flexion: 3/5 on the right side and 3/5 on the left side  Hip Flexion: 1/5 on the right side and 1/5 on the left side  -- easy fatigability of the arms      Sensory    Dec vib lillian lowers     Reflexes  Increased in the lowers lillian     Gait and Coordination   She has abnormal right finger to nose and abnormal left finger to nose coordination  Wheelchair bound         ROS:    Review of Systems   Constitutional: Positive for fatigue  Negative for appetite change and fever  HENT: Negative  Negative for hearing loss, tinnitus, trouble swallowing and voice change  Eyes: Negative    Negative for photophobia and pain  Respiratory: Negative  Negative for shortness of breath  Cardiovascular: Negative  Negative for palpitations  Gastrointestinal: Negative  Negative for nausea and vomiting  Endocrine: Negative  Negative for cold intolerance and heat intolerance  Genitourinary: Negative  Negative for dysuria, frequency and urgency  Musculoskeletal: Positive for back pain  Negative for myalgias and neck pain  Skin: Negative  Negative for rash  Neurological: Positive for weakness  Negative for dizziness, tremors, seizures, syncope, facial asymmetry, speech difficulty, light-headedness, numbness and headaches  Hematological: Negative  Does not bruise/bleed easily  Psychiatric/Behavioral: Negative  Negative for confusion, hallucinations and sleep disturbance

## 2018-06-07 NOTE — PATIENT INSTRUCTIONS
Multiple sclerosis ([facility] [address] )  Pt here for neuro follow up  Pt did go to the er at timing of our last visit due to accelerated htn  Pt did have still elevated bp at time of presentation and treated with bp medicaiton, ekg etc  Pt is now on metoprolol and seeing her pcp  bp today ok  Pt ms wise overall going well  Biggest issue at this time is that pts insurance changed last month and now [place] is not on formulary per this plan  I will notify our nursing team to call [employer] for an appeal of this decision  Pt was only given a temporary supply for one month and letter is dated may 23    Will ask traci to investigate  Pt on med due to known jcv positivity and previously on betaseron  Pt wishes an oral medication,  And [place] has had no cases of pml  Pt has done very well both clinically and radiographically on the aubagio since she started 3 yrs ago  It is against medical advice for pt to be changed off her current imd med due to her stability  Changing med in this scenario would subject her to risk of breakthru lesions  Pt will also file an appeal on her own behalf as well  No hospitalizations  No fever or chills  Pt notes she is getting home lab draws set up by Don[facility] Brun[facility]  However I am not getting the labs sent here to [facility]  Last home lab draw was may 24th 2018  I did find thru [facility] labs from feb 2018 with slightly elevated alt 51 but nl ast of 33  Will need to have nursing check where the labs are going and why not receiving here at [facility] since I am the rxer  Pt gets called night before her labs  Pt thinks it is thru [facility] and pt has had this set up for yrs  Concern about no interchange to epic  Will update lfts labs this month  Pt is interested in going back on modafanil, stopped by insurance  However I have not renewed due to her bp at last visit  If pt gets pcp clearance, I will re initiate the modafanil

## 2018-06-08 ENCOUNTER — TELEPHONE (OUTPATIENT)
Dept: NEUROLOGY | Facility: CLINIC | Age: 58
End: 2018-06-08

## 2018-06-08 DIAGNOSIS — I10 ESSENTIAL HYPERTENSION: ICD-10-CM

## 2018-06-08 NOTE — TELEPHONE ENCOUNTER
traci, 2 things ( see below)     Pt in office yesterday  Biggest issue at this time is that pts insurance changed last month and now [place] is not on formulary per this plan  Can you please call [employer] for an appeal of this decision  Pt was only given a temporary supply for one month and letter is dated may 23  Severiano Graves Pt on aubagio due to known jcv positivity and previously on betaseron  Pt wishes an oral medication,  And [place] has had no cases of pml  She has very little sq fat and unable to give herself injections due to decreased dexterity of the upper ext  Pt has done very well both clinically and radiographically on the aubagio since she started 3 yrs agoIt is against medical advice for pt to be changed off her current imd med due to her stability  Changing med in this scenario would subject to breakthru lesions in a setting of stability  ALSO, Pt notes she has been   getting home lab draws set up by [facility] for several yrs as pt is home bound  Professional technicians at  come out to do her labs  However I am not getting the labs sent here to [facility]  Last home lab draw was may 24th 2018  No report in computer  I did find thru [employer] sect[facility] labs from feb 2018 with slightly elevated alt 51 but nl ast of 33  Will need to have nursing check where the labs are going and why not receiving here at [facility] since I am the rxer  Pt gets called night before her labs  Pt thinks it is thru [employer] felicia and pt has had this set up for yrs  Concern about no connection to epic  Please see where labs are going and get these results for us to review and correct this process  Pt is happy with the home draw and agency, but need the results sent to us to make sure she is safe on med  I am on pto today  If any rxs needed today, send to tiesha

## 2018-06-11 DIAGNOSIS — G35 MULTIPLE SCLEROSIS (HCC): ICD-10-CM

## 2018-06-11 NOTE — TELEPHONE ENCOUNTER
Received notification via [facility] that Payam Brown has been approved and that we will be receiving faxed letter  Looks like you already sent Rx to [facility] earlier today  Lab results for 5/24 are now in media folder

## 2018-06-12 DIAGNOSIS — I10 ESSENTIAL HYPERTENSION: ICD-10-CM

## 2018-06-14 DIAGNOSIS — G35 MULTIPLE SCLEROSIS (HCC): ICD-10-CM

## 2018-06-14 RX ORDER — BACLOFEN 10 MG/1
20 TABLET ORAL 3 TIMES DAILY
Qty: 540 TABLET | Refills: 0 | Status: SHIPPED | OUTPATIENT
Start: 2018-06-14 | End: 2018-09-10 | Stop reason: SDUPTHER

## 2018-07-30 DIAGNOSIS — G35 MULTIPLE SCLEROSIS (HCC): ICD-10-CM

## 2018-07-30 RX ORDER — AMANTADINE HYDROCHLORIDE 100 MG/1
CAPSULE, GELATIN COATED ORAL
Qty: 60 CAPSULE | Refills: 3 | Status: SHIPPED | OUTPATIENT
Start: 2018-07-30 | End: 2018-11-27 | Stop reason: SDUPTHER

## 2018-08-30 ENCOUNTER — TELEPHONE (OUTPATIENT)
Dept: NEUROLOGY | Facility: CLINIC | Age: 58
End: 2018-08-30

## 2018-08-30 NOTE — TELEPHONE ENCOUNTER
Pt calling for a refill of amantadine  Pt made aware that there is refills of the medication  She states her label says no refills  I spoke with pharmacy to confirm that there are refills  Made pt aware

## 2018-09-06 DIAGNOSIS — I10 ESSENTIAL HYPERTENSION: ICD-10-CM

## 2018-09-07 RX ORDER — TELMISARTAN AND HYDROCHLORTHIAZIDE 80; 12.5 MG/1; MG/1
1 TABLET ORAL DAILY
Qty: 90 TABLET | Refills: 1 | Status: SHIPPED | OUTPATIENT
Start: 2018-09-07 | End: 2019-03-05 | Stop reason: SDUPTHER

## 2018-09-10 DIAGNOSIS — G35 MULTIPLE SCLEROSIS (HCC): ICD-10-CM

## 2018-09-10 DIAGNOSIS — I10 ESSENTIAL HYPERTENSION: ICD-10-CM

## 2018-09-10 RX ORDER — BACLOFEN 10 MG/1
20 TABLET ORAL 3 TIMES DAILY
Qty: 540 TABLET | Refills: 0 | Status: SHIPPED | OUTPATIENT
Start: 2018-09-10 | End: 2018-12-05 | Stop reason: SDUPTHER

## 2018-09-25 DIAGNOSIS — G35 MULTIPLE SCLEROSIS (HCC): ICD-10-CM

## 2018-09-26 RX ORDER — RENAGEL 800 MG/1
TABLET ORAL
Qty: 28 TABLET | Refills: 2 | Status: SHIPPED | OUTPATIENT
Start: 2018-09-26 | End: 2018-12-15 | Stop reason: SDUPTHER

## 2018-10-15 ENCOUNTER — TELEPHONE (OUTPATIENT)
Dept: NEUROLOGY | Facility: CLINIC | Age: 58
End: 2018-10-15

## 2018-10-16 ENCOUNTER — TELEPHONE (OUTPATIENT)
Dept: NEUROLOGY | Facility: CLINIC | Age: 58
End: 2018-10-16

## 2018-10-17 ENCOUNTER — TELEPHONE (OUTPATIENT)
Dept: NEUROLOGY | Facility: CLINIC | Age: 58
End: 2018-10-17

## 2018-10-18 ENCOUNTER — TELEPHONE (OUTPATIENT)
Dept: NEUROLOGY | Facility: CLINIC | Age: 58
End: 2018-10-18

## 2018-10-29 ENCOUNTER — OFFICE VISIT (OUTPATIENT)
Dept: NEUROLOGY | Facility: CLINIC | Age: 58
End: 2018-10-29
Payer: COMMERCIAL

## 2018-10-29 VITALS
DIASTOLIC BLOOD PRESSURE: 100 MMHG | WEIGHT: 165 LBS | HEART RATE: 68 BPM | SYSTOLIC BLOOD PRESSURE: 150 MMHG | BODY MASS INDEX: 26.52 KG/M2 | HEIGHT: 66 IN

## 2018-10-29 DIAGNOSIS — G35 MULTIPLE SCLEROSIS (HCC): Primary | ICD-10-CM

## 2018-10-29 DIAGNOSIS — I10 ESSENTIAL HYPERTENSION: ICD-10-CM

## 2018-10-29 PROCEDURE — 99214 OFFICE O/P EST MOD 30 MIN: CPT | Performed by: PHYSICIAN ASSISTANT

## 2018-10-29 RX ORDER — MELOXICAM 15 MG/1
15 TABLET ORAL DAILY
Refills: 0 | COMMUNITY
Start: 2018-10-26 | End: 2020-05-21 | Stop reason: HOSPADM

## 2018-10-29 NOTE — ASSESSMENT & PLAN NOTE
Patient remains on Aubagio and tolerating well  She gets in home lab draws every other month  Last labs done 9/21/18 with normal CBC and LFTs  She had last MRI brain done in July 2017 which did show 2 new, but non-enhancing lesions since 2015  She had just started the Aubagio in 2015, so possible those lesions came in transition from [facility] to [place]  Will update MRI brain to ensure stability at this time  No new symptoms to report  She remains on amantadine for fatigue  She remains on baclofen for spasticity  Exam is stable today  She will follow up in 6 months or sooner if needed  She was encouraged to call for any new or worsening symptoms

## 2018-10-29 NOTE — ASSESSMENT & PLAN NOTE
Blood pressure elevated today  She says she gets nervous when someone in the office takes her BP  She is taking her meds  No headache, vision changes, etc     Advised she call PCP today when she gets home and make an appt  She will do this

## 2018-10-29 NOTE — PATIENT INSTRUCTIONS
Continue Aubagio  Continue getting in-home lab draws every other month  Will update MRI brain before next visit  Follow up in April or sooner if needed    Call for any new or worsening symptoms  Make sure you schedule an appt with your PCP and get a flu shot

## 2018-10-29 NOTE — PROGRESS NOTES
Patient ID: Marita Barajas is a 62 y o  female  Assessment/Plan:    Multiple sclerosis ([facility] )  Patient remains on Aubagio and tolerating well  She gets in home lab draws every other month  Last labs done 9/21/18 with normal CBC and LFTs  She had last MRI brain done in July 2017 which did show 2 new, but non-enhancing lesions since 2015  She had just started the Aubagio in 2015, so possible those lesions came in transition from [facility] to [place]  Will update MRI brain to ensure stability at this time  No new symptoms to report  She remains on amantadine for fatigue  She remains on baclofen for spasticity  Exam is stable today  She will follow up in 6 months or sooner if needed  She was encouraged to call for any new or worsening symptoms  Essential hypertension  Blood pressure elevated today  She says she gets nervous when someone in the office takes her BP  She is taking her meds  No headache, vision changes, etc     Advised she call PCP today when she gets home and make an appt  She will do this  Diagnoses and all orders for this visit:    Multiple sclerosis ([facility] )  -     MRI brain MS wo and w contrast; Future    Essential hypertension    Other orders  -     meloxicam (MOBIC) 15 mg tablet; Take 15 mg by mouth daily             Subjective:    HPI    62year old female here for MS follow up, last seen in June 2018  Patient was diagnosed with MS in 2010 with overall progressive decline since time of diagnosis, particularly affecting patients walking  Patient previously very healthy and active in ADLs  Patient was originally diagnosed and followed by Dr Brien Tariq  Patient has been on Ampyra and Baclofen to help with her spasticity and walking  No meds have seemed to be very beneficial  She is off Ampyra  Patient had been on Betaseron since 2010  Patient initially started walking with a cane, then a rolling walker, and now needs a wheelchair for any distance and for the majority of the day  Patient worked up until 2011  Patient started Aubagio in July 2015  She self-discontinued Betaseron due to tired of injections  She has overall tolerated Aubagio well, no issues  Last MRI brain completed 7/2017 with 2 new, non-enh lesions since 2015  Today, patient reports she is overall doing well  She remains on Aubagio, no issues  Last labs completed 9/21/18 with normal CBC and LFTs  She gets labs draws in-home every other month  She denies any new symptoms today  Patient denies vision changes, vertigo  No changes in bowel or bladder, speech or swallowing  No trips or falls  The following portions of the patient's history were reviewed and updated as appropriate: current medications, past family history, past medical history, past social history, past surgical history and problem list          Objective:    Blood pressure (!) 180/94, pulse 68, height 5' 6" (1 676 m), weight 74 8 kg (165 lb)    Physical Exam   Constitutional: She appears well-developed and well-nourished  HENT:   Head: Normocephalic and atraumatic  Eyes: Pupils are equal, round, and reactive to light  EOM are normal    Cardiovascular: Intact distal pulses  Neurological:   Reflex Scores:       Patellar reflexes are 3+ on the right side and 3+ on the left side  Skin: Skin is warm and dry  Psychiatric: She has a normal mood and affect  Her speech is normal        Neurological Exam  Mental Status   Oriented to person, place, time and situation  Recent and remote memory are intact  Speech is normal  Language is fluent with no aphasia  Attention and concentration are normal     Cranial Nerves  CN II: Visual fields full to confrontation  CN III, IV, VI: Extraocular movements intact bilaterally  Pupils equal round and reactive to light bilaterally  CN V: Facial sensation is normal   CN VII: Full and symmetric facial movement  CN VIII: Hearing is normal   CN IX, X: Palate elevates symmetrically  Normal gag reflex    CN XI: Shoulder shrug strength is normal   CN XII: Tongue midline without atrophy or fasciculations  Motor   Normal muscle tone  Right                     Left   Shoulder abduction               4+                          4+  Elbow flexion                         5-                          5-  Elbow extension                    5-                          5-  Hip flexion                              1                          1  Dorsiflexion                            1                          1    Sensory  Decreased vibratory sensation in lower extremities bilaterally   Reflexes  Deep tendon reflexes are 2+ and symmetric except as noted  Right                      Left  Patellar                                3+                         3+    Coordination  Dysmetria on FTN bilaterally  Gait  Wheelchair bound  ROS:    Review of Systems   Constitutional: Positive for fatigue  Negative for appetite change and fever  HENT: Negative  Negative for hearing loss, tinnitus, trouble swallowing and voice change  Eyes: Negative  Negative for photophobia and pain  Respiratory: Negative  Negative for shortness of breath  Cardiovascular: Negative  Negative for palpitations  Gastrointestinal: Negative  Negative for nausea and vomiting  Endocrine: Negative  Negative for cold intolerance and heat intolerance  Genitourinary: Negative  Negative for dysuria, frequency and urgency  Musculoskeletal: Positive for back pain  Negative for myalgias and neck pain  Skin: Negative  Negative for rash  Allergic/Immunologic: Negative  Neurological: Negative  Negative for dizziness, tremors, seizures, syncope, facial asymmetry, speech difficulty, weakness, light-headedness, numbness and headaches  Clumsiness, difficulty walking   Hematological: Negative  Does not bruise/bleed easily     Psychiatric/Behavioral: Negative  Negative for confusion, hallucinations and sleep disturbance       I personally reviewed the ROS

## 2018-11-21 ENCOUNTER — TELEPHONE (OUTPATIENT)
Dept: NEUROLOGY | Facility: CLINIC | Age: 58
End: 2018-11-21

## 2018-11-27 DIAGNOSIS — G35 MULTIPLE SCLEROSIS (HCC): ICD-10-CM

## 2018-11-27 RX ORDER — AMANTADINE HYDROCHLORIDE 100 MG/1
CAPSULE, GELATIN COATED ORAL
Qty: 60 CAPSULE | Refills: 3 | Status: SHIPPED | OUTPATIENT
Start: 2018-11-27 | End: 2019-03-27 | Stop reason: SDUPTHER

## 2018-12-05 DIAGNOSIS — G35 MULTIPLE SCLEROSIS (HCC): ICD-10-CM

## 2018-12-05 RX ORDER — BACLOFEN 10 MG/1
20 TABLET ORAL 3 TIMES DAILY
Qty: 540 TABLET | Refills: 0 | Status: SHIPPED | OUTPATIENT
Start: 2018-12-05 | End: 2019-03-05 | Stop reason: SDUPTHER

## 2018-12-06 DIAGNOSIS — I10 ESSENTIAL HYPERTENSION: ICD-10-CM

## 2018-12-15 DIAGNOSIS — G35 MULTIPLE SCLEROSIS (HCC): ICD-10-CM

## 2018-12-16 RX ORDER — RENAGEL 800 MG/1
TABLET ORAL
Qty: 28 TABLET | Refills: 5 | Status: SHIPPED | OUTPATIENT
Start: 2018-12-16 | End: 2019-06-04 | Stop reason: SDUPTHER

## 2018-12-28 ENCOUNTER — TELEPHONE (OUTPATIENT)
Dept: NEUROLOGY | Facility: CLINIC | Age: 58
End: 2018-12-28

## 2018-12-28 NOTE — TELEPHONE ENCOUNTER
Patient called requesting refill for Amantadine  I informed patient that she should still have refills available at [address] [place] [address] Drive  She states she will call pharmacy

## 2019-01-21 ENCOUNTER — DOCUMENTATION (OUTPATIENT)
Dept: NEUROLOGY | Facility: CLINIC | Age: 59
End: 2019-01-21

## 2019-01-21 NOTE — PROGRESS NOTES
Received fax from [employer] to renew patient's lab standing order  Placed in Dr Everett Snider folder to be signed

## 2019-01-28 ENCOUNTER — TELEPHONE (OUTPATIENT)
Dept: NEUROLOGY | Facility: CLINIC | Age: 59
End: 2019-01-28

## 2019-01-28 NOTE — TELEPHONE ENCOUNTER
LMOM for patient to return call     ----- Message from Sabrina Cagle MD sent at 1/25/2019  9:55 AM EST -----  Let pt know labs ok except for low platlets of 129  Not sure why she would have this, as not related to her ms meds   Please send labs to pcp and rec follow up with her pcp

## 2019-03-05 DIAGNOSIS — I10 ESSENTIAL HYPERTENSION: ICD-10-CM

## 2019-03-05 DIAGNOSIS — G35 MULTIPLE SCLEROSIS (HCC): ICD-10-CM

## 2019-03-05 RX ORDER — TELMISARTAN AND HYDROCHLORTHIAZIDE 80; 12.5 MG/1; MG/1
1 TABLET ORAL DAILY
Qty: 90 TABLET | Refills: 1 | Status: SHIPPED | OUTPATIENT
Start: 2019-03-05 | End: 2019-05-29 | Stop reason: SDUPTHER

## 2019-03-05 RX ORDER — BACLOFEN 10 MG/1
20 TABLET ORAL 3 TIMES DAILY
Qty: 540 TABLET | Refills: 0 | Status: SHIPPED | OUTPATIENT
Start: 2019-03-05 | End: 2019-05-23 | Stop reason: SDUPTHER

## 2019-03-27 ENCOUNTER — TELEPHONE (OUTPATIENT)
Dept: NEUROLOGY | Facility: CLINIC | Age: 59
End: 2019-03-27

## 2019-03-27 DIAGNOSIS — G35 MULTIPLE SCLEROSIS (HCC): ICD-10-CM

## 2019-03-27 RX ORDER — AMANTADINE HYDROCHLORIDE 100 MG/1
CAPSULE, GELATIN COATED ORAL
Qty: 60 CAPSULE | Refills: 3 | Status: SHIPPED | OUTPATIENT
Start: 2019-03-27 | End: 2019-04-29 | Stop reason: SDUPTHER

## 2019-03-27 NOTE — TELEPHONE ENCOUNTER
LMOM regarding blood work results  Did also state that patient needs to follow up with PCP in regards to platelets being low  Will also mail lab work results to address on file

## 2019-03-27 NOTE — TELEPHONE ENCOUNTER
Notes recorded by Peng Hough PA-C on 3/22/2019 at 12:05 PM EDT  Pls let patient know platelets are low again   We had called 2 months ago with the same   Not related to her MS meds   Please ensure she has followed up with PCP for this and make sure PCP will have a copy of labs   Thanks

## 2019-03-27 NOTE — TELEPHONE ENCOUNTER
[facility] @[phone] and spoke to Jacqueline Almonte [address]  62  in regards to Riley Talley   I informed Margie to please reach out to Riley Talley to schedule appointment due to blood work results  I also informed her that results are in patient chart

## 2019-04-08 ENCOUNTER — HOSPITAL ENCOUNTER (OUTPATIENT)
Dept: RADIOLOGY | Facility: HOSPITAL | Age: 59
Discharge: HOME | End: 2019-04-08
Payer: COMMERCIAL

## 2019-04-08 DIAGNOSIS — G35 MULTIPLE SCLEROSIS (HCC): ICD-10-CM

## 2019-04-08 PROCEDURE — 70553 MRI BRAIN STEM W/O & W/DYE: CPT

## 2019-04-08 PROCEDURE — A9577 INJ MULTIHANCE: HCPCS | Performed by: PHYSICIAN ASSISTANT

## 2019-04-08 RX ADMIN — GADOBENATE DIMEGLUMINE 15 ML: 529 INJECTION, SOLUTION INTRAVENOUS at 15:55

## 2019-04-22 ENCOUNTER — OFFICE VISIT (OUTPATIENT)
Dept: FAMILY MEDICINE CLINIC | Facility: CLINIC | Age: 59
End: 2019-04-22
Payer: COMMERCIAL

## 2019-04-22 VITALS
SYSTOLIC BLOOD PRESSURE: 128 MMHG | TEMPERATURE: 96.8 F | RESPIRATION RATE: 16 BRPM | DIASTOLIC BLOOD PRESSURE: 82 MMHG | HEART RATE: 76 BPM

## 2019-04-22 DIAGNOSIS — I10 ESSENTIAL HYPERTENSION: ICD-10-CM

## 2019-04-22 DIAGNOSIS — Z12.11 SCREEN FOR COLON CANCER: ICD-10-CM

## 2019-04-22 DIAGNOSIS — Z12.39 SCREENING FOR BREAST CANCER: ICD-10-CM

## 2019-04-22 DIAGNOSIS — D69.6 THROMBOCYTOPENIA (HCC): Primary | ICD-10-CM

## 2019-04-22 DIAGNOSIS — G35 MULTIPLE SCLEROSIS (HCC): ICD-10-CM

## 2019-04-22 PROCEDURE — 3074F SYST BP LT 130 MM HG: CPT | Performed by: FAMILY MEDICINE

## 2019-04-22 PROCEDURE — 3079F DIAST BP 80-89 MM HG: CPT | Performed by: FAMILY MEDICINE

## 2019-04-22 PROCEDURE — 3725F SCREEN DEPRESSION PERFORMED: CPT | Performed by: FAMILY MEDICINE

## 2019-04-22 PROCEDURE — 99213 OFFICE O/P EST LOW 20 MIN: CPT | Performed by: FAMILY MEDICINE

## 2019-04-29 ENCOUNTER — OFFICE VISIT (OUTPATIENT)
Dept: NEUROLOGY | Facility: CLINIC | Age: 59
End: 2019-04-29
Payer: COMMERCIAL

## 2019-04-29 VITALS — HEART RATE: 73 BPM | DIASTOLIC BLOOD PRESSURE: 96 MMHG | SYSTOLIC BLOOD PRESSURE: 156 MMHG | RESPIRATION RATE: 12 BRPM

## 2019-04-29 DIAGNOSIS — G35 MULTIPLE SCLEROSIS (HCC): Primary | ICD-10-CM

## 2019-04-29 PROCEDURE — 99214 OFFICE O/P EST MOD 30 MIN: CPT | Performed by: PHYSICIAN ASSISTANT

## 2019-04-29 RX ORDER — AMANTADINE HYDROCHLORIDE 100 MG/1
100 CAPSULE, GELATIN COATED ORAL 2 TIMES DAILY
Qty: 60 CAPSULE | Refills: 5 | Status: SHIPPED | OUTPATIENT
Start: 2019-04-29 | End: 2019-10-07 | Stop reason: SDUPTHER

## 2019-05-21 ENCOUNTER — PATIENT MESSAGE (OUTPATIENT)
Dept: NEUROLOGY | Facility: CLINIC | Age: 59
End: 2019-05-21

## 2019-05-23 DIAGNOSIS — G35 MULTIPLE SCLEROSIS (HCC): ICD-10-CM

## 2019-05-23 RX ORDER — BACLOFEN 10 MG/1
20 TABLET ORAL 3 TIMES DAILY
Qty: 540 TABLET | Refills: 0 | Status: SHIPPED | OUTPATIENT
Start: 2019-05-23 | End: 2019-08-26 | Stop reason: SDUPTHER

## 2019-05-28 ENCOUNTER — TELEPHONE (OUTPATIENT)
Dept: NEUROLOGY | Facility: CLINIC | Age: 59
End: 2019-05-28

## 2019-05-29 DIAGNOSIS — I10 ESSENTIAL HYPERTENSION: ICD-10-CM

## 2019-05-29 RX ORDER — TELMISARTAN AND HYDROCHLORTHIAZIDE 80; 12.5 MG/1; MG/1
1 TABLET ORAL DAILY
Qty: 90 TABLET | Refills: 1 | Status: SHIPPED | OUTPATIENT
Start: 2019-05-29 | End: 2019-09-03 | Stop reason: SDUPTHER

## 2019-06-04 DIAGNOSIS — G35 MULTIPLE SCLEROSIS (HCC): ICD-10-CM

## 2019-06-04 RX ORDER — RENAGEL 800 MG/1
TABLET ORAL
Qty: 28 TABLET | Refills: 3 | Status: SHIPPED | OUTPATIENT
Start: 2019-06-04 | End: 2019-07-11 | Stop reason: SDUPTHER

## 2019-06-06 ENCOUNTER — TELEPHONE (OUTPATIENT)
Dept: NEUROLOGY | Facility: CLINIC | Age: 59
End: 2019-06-06

## 2019-06-16 ENCOUNTER — HOSPITAL ENCOUNTER (INPATIENT)
Facility: HOSPITAL | Age: 59
LOS: 6 days | Discharge: HOME HEALTH | DRG: 058 | End: 2019-06-22
Attending: EMERGENCY MEDICINE | Admitting: FAMILY MEDICINE
Payer: COMMERCIAL

## 2019-06-16 DIAGNOSIS — I10 ESSENTIAL HYPERTENSION: ICD-10-CM

## 2019-06-16 DIAGNOSIS — N17.9 AKI (ACUTE KIDNEY INJURY) (HCC): ICD-10-CM

## 2019-06-16 DIAGNOSIS — R44.3 HALLUCINATIONS: ICD-10-CM

## 2019-06-16 DIAGNOSIS — R52 GENERALIZED BODY ACHES: Primary | ICD-10-CM

## 2019-06-16 DIAGNOSIS — E86.0 DEHYDRATION: ICD-10-CM

## 2019-06-16 DIAGNOSIS — G35 MULTIPLE SCLEROSIS EXACERBATION (HCC): ICD-10-CM

## 2019-06-16 PROBLEM — F17.200 SMOKER: Status: ACTIVE | Noted: 2019-06-16

## 2019-06-16 PROBLEM — M25.552 BILATERAL HIP PAIN: Status: ACTIVE | Noted: 2019-06-16

## 2019-06-16 PROBLEM — M25.551 BILATERAL HIP PAIN: Status: ACTIVE | Noted: 2019-06-16

## 2019-06-16 PROBLEM — D72.829 LEUKOCYTOSIS: Status: ACTIVE | Noted: 2019-06-16

## 2019-06-16 LAB
ALBUMIN SERPL BCP-MCNC: 3.1 G/DL (ref 3.5–5)
ALP SERPL-CCNC: 116 U/L (ref 46–116)
ALT SERPL W P-5'-P-CCNC: 14 U/L (ref 12–78)
ANION GAP SERPL CALCULATED.3IONS-SCNC: 14 MMOL/L (ref 4–13)
APTT PPP: 27 SECONDS (ref 26–38)
AST SERPL W P-5'-P-CCNC: 7 U/L (ref 5–45)
BACTERIA UR QL AUTO: ABNORMAL /HPF
BASOPHILS # BLD AUTO: 0.05 THOUSANDS/ΜL (ref 0–0.1)
BASOPHILS NFR BLD AUTO: 0 % (ref 0–1)
BILIRUB SERPL-MCNC: 0.5 MG/DL (ref 0.2–1)
BILIRUB UR QL STRIP: ABNORMAL
BUN SERPL-MCNC: 58 MG/DL (ref 5–25)
CALCIUM SERPL-MCNC: 9.3 MG/DL (ref 8.3–10.1)
CHLORIDE SERPL-SCNC: 97 MMOL/L (ref 100–108)
CK MB SERPL-MCNC: 1.5 NG/ML (ref 0–5)
CK MB SERPL-MCNC: <1 % (ref 0–2.5)
CK SERPL-CCNC: 177 U/L (ref 26–192)
CLARITY UR: CLEAR
CO2 SERPL-SCNC: 26 MMOL/L (ref 21–32)
COLOR UR: ABNORMAL
CREAT SERPL-MCNC: 2.11 MG/DL (ref 0.6–1.3)
EOSINOPHIL # BLD AUTO: 0.03 THOUSAND/ΜL (ref 0–0.61)
EOSINOPHIL NFR BLD AUTO: 0 % (ref 0–6)
ERYTHROCYTE [DISTWIDTH] IN BLOOD BY AUTOMATED COUNT: 13.1 % (ref 11.6–15.1)
GFR SERPL CREATININE-BSD FRML MDRD: 25 ML/MIN/1.73SQ M
GLUCOSE SERPL-MCNC: 265 MG/DL (ref 65–140)
GLUCOSE UR STRIP-MCNC: NEGATIVE MG/DL
HCT VFR BLD AUTO: 42.8 % (ref 34.8–46.1)
HGB BLD-MCNC: 14 G/DL (ref 11.5–15.4)
HGB UR QL STRIP.AUTO: NEGATIVE
HYALINE CASTS #/AREA URNS LPF: ABNORMAL /LPF
IMM GRANULOCYTES # BLD AUTO: 0.05 THOUSAND/UL (ref 0–0.2)
IMM GRANULOCYTES NFR BLD AUTO: 0 % (ref 0–2)
INR PPP: 0.97 (ref 0.86–1.16)
KETONES UR STRIP-MCNC: ABNORMAL MG/DL
LEUKOCYTE ESTERASE UR QL STRIP: NEGATIVE
LYMPHOCYTES # BLD AUTO: 1.54 THOUSANDS/ΜL (ref 0.6–4.47)
LYMPHOCYTES NFR BLD AUTO: 13 % (ref 14–44)
MAGNESIUM SERPL-MCNC: 2.3 MG/DL (ref 1.6–2.6)
MCH RBC QN AUTO: 29.4 PG (ref 26.8–34.3)
MCHC RBC AUTO-ENTMCNC: 32.7 G/DL (ref 31.4–37.4)
MCV RBC AUTO: 90 FL (ref 82–98)
MONOCYTES # BLD AUTO: 0.8 THOUSAND/ΜL (ref 0.17–1.22)
MONOCYTES NFR BLD AUTO: 7 % (ref 4–12)
NEUTROPHILS # BLD AUTO: 9.74 THOUSANDS/ΜL (ref 1.85–7.62)
NEUTS SEG NFR BLD AUTO: 80 % (ref 43–75)
NITRITE UR QL STRIP: NEGATIVE
NON-SQ EPI CELLS URNS QL MICRO: ABNORMAL /HPF
NRBC BLD AUTO-RTO: 0 /100 WBCS
PH UR STRIP.AUTO: 5.5 [PH]
PHOSPHATE SERPL-MCNC: 4.2 MG/DL (ref 2.7–4.5)
PLATELET # BLD AUTO: 200 THOUSANDS/UL (ref 149–390)
PMV BLD AUTO: 13.5 FL (ref 8.9–12.7)
POTASSIUM SERPL-SCNC: 3.6 MMOL/L (ref 3.5–5.3)
PROT SERPL-MCNC: 8.4 G/DL (ref 6.4–8.2)
PROT UR STRIP-MCNC: ABNORMAL MG/DL
PROTHROMBIN TIME: 10.2 SECONDS (ref 9.4–11.7)
RBC # BLD AUTO: 4.77 MILLION/UL (ref 3.81–5.12)
RBC #/AREA URNS AUTO: ABNORMAL /HPF
SODIUM SERPL-SCNC: 137 MMOL/L (ref 136–145)
SP GR UR STRIP.AUTO: 1.02 (ref 1–1.03)
UROBILINOGEN UR QL STRIP.AUTO: 0.2 E.U./DL
WBC # BLD AUTO: 12.21 THOUSAND/UL (ref 4.31–10.16)
WBC #/AREA URNS AUTO: ABNORMAL /HPF

## 2019-06-16 PROCEDURE — 82553 CREATINE MB FRACTION: CPT | Performed by: EMERGENCY MEDICINE

## 2019-06-16 PROCEDURE — 87081 CULTURE SCREEN ONLY: CPT | Performed by: FAMILY MEDICINE

## 2019-06-16 PROCEDURE — 36415 COLL VENOUS BLD VENIPUNCTURE: CPT | Performed by: EMERGENCY MEDICINE

## 2019-06-16 PROCEDURE — 85025 COMPLETE CBC W/AUTO DIFF WBC: CPT | Performed by: EMERGENCY MEDICINE

## 2019-06-16 PROCEDURE — 83735 ASSAY OF MAGNESIUM: CPT | Performed by: EMERGENCY MEDICINE

## 2019-06-16 PROCEDURE — 96361 HYDRATE IV INFUSION ADD-ON: CPT

## 2019-06-16 PROCEDURE — 85730 THROMBOPLASTIN TIME PARTIAL: CPT | Performed by: EMERGENCY MEDICINE

## 2019-06-16 PROCEDURE — 96374 THER/PROPH/DIAG INJ IV PUSH: CPT

## 2019-06-16 PROCEDURE — 80053 COMPREHEN METABOLIC PANEL: CPT | Performed by: EMERGENCY MEDICINE

## 2019-06-16 PROCEDURE — 99223 1ST HOSP IP/OBS HIGH 75: CPT | Performed by: FAMILY MEDICINE

## 2019-06-16 PROCEDURE — 99284 EMERGENCY DEPT VISIT MOD MDM: CPT

## 2019-06-16 PROCEDURE — 85610 PROTHROMBIN TIME: CPT | Performed by: EMERGENCY MEDICINE

## 2019-06-16 PROCEDURE — 81001 URINALYSIS AUTO W/SCOPE: CPT | Performed by: EMERGENCY MEDICINE

## 2019-06-16 PROCEDURE — 84100 ASSAY OF PHOSPHORUS: CPT | Performed by: EMERGENCY MEDICINE

## 2019-06-16 PROCEDURE — 82550 ASSAY OF CK (CPK): CPT | Performed by: EMERGENCY MEDICINE

## 2019-06-16 RX ORDER — METHYLPREDNISOLONE SODIUM SUCCINATE 125 MG/2ML
125 INJECTION, POWDER, LYOPHILIZED, FOR SOLUTION INTRAMUSCULAR; INTRAVENOUS ONCE
Status: COMPLETED | OUTPATIENT
Start: 2019-06-16 | End: 2019-06-16

## 2019-06-16 RX ORDER — HEPARIN SODIUM 5000 [USP'U]/ML
5000 INJECTION, SOLUTION INTRAVENOUS; SUBCUTANEOUS EVERY 8 HOURS SCHEDULED
Status: DISCONTINUED | OUTPATIENT
Start: 2019-06-16 | End: 2019-06-22 | Stop reason: HOSPADM

## 2019-06-16 RX ORDER — SODIUM CHLORIDE 9 MG/ML
100 INJECTION, SOLUTION INTRAVENOUS CONTINUOUS
Status: DISCONTINUED | OUTPATIENT
Start: 2019-06-16 | End: 2019-06-18

## 2019-06-16 RX ORDER — AMANTADINE HYDROCHLORIDE 100 MG/1
100 CAPSULE, GELATIN COATED ORAL 2 TIMES DAILY
Status: DISCONTINUED | OUTPATIENT
Start: 2019-06-16 | End: 2019-06-22 | Stop reason: HOSPADM

## 2019-06-16 RX ORDER — ACETAMINOPHEN 325 MG/1
650 TABLET ORAL EVERY 6 HOURS SCHEDULED
Status: DISCONTINUED | OUTPATIENT
Start: 2019-06-16 | End: 2019-06-20

## 2019-06-16 RX ORDER — ONDANSETRON 2 MG/ML
4 INJECTION INTRAMUSCULAR; INTRAVENOUS EVERY 6 HOURS PRN
Status: DISCONTINUED | OUTPATIENT
Start: 2019-06-16 | End: 2019-06-22 | Stop reason: HOSPADM

## 2019-06-16 RX ADMIN — SODIUM CHLORIDE 1000 ML: 0.9 INJECTION, SOLUTION INTRAVENOUS at 18:45

## 2019-06-16 RX ADMIN — HEPARIN SODIUM 5000 UNITS: 5000 INJECTION INTRAVENOUS; SUBCUTANEOUS at 23:13

## 2019-06-16 RX ADMIN — AMANTADINE 100 MG: 100 CAPSULE ORAL at 23:12

## 2019-06-16 RX ADMIN — ACETAMINOPHEN 650 MG: 325 TABLET ORAL at 23:11

## 2019-06-16 RX ADMIN — METHYLPREDNISOLONE SODIUM SUCCINATE 125 MG: 125 INJECTION, POWDER, FOR SOLUTION INTRAMUSCULAR; INTRAVENOUS at 18:45

## 2019-06-16 RX ADMIN — SODIUM CHLORIDE 875 MG: 0.9 INJECTION, SOLUTION INTRAVENOUS at 23:14

## 2019-06-17 ENCOUNTER — APPOINTMENT (INPATIENT)
Dept: RADIOLOGY | Facility: HOSPITAL | Age: 59
DRG: 058 | End: 2019-06-17
Payer: COMMERCIAL

## 2019-06-17 LAB
ANION GAP SERPL CALCULATED.3IONS-SCNC: 13 MMOL/L (ref 4–13)
BUN SERPL-MCNC: 51 MG/DL (ref 5–25)
CALCIUM SERPL-MCNC: 8.8 MG/DL (ref 8.3–10.1)
CHLORIDE SERPL-SCNC: 102 MMOL/L (ref 100–108)
CO2 SERPL-SCNC: 23 MMOL/L (ref 21–32)
CREAT SERPL-MCNC: 1.26 MG/DL (ref 0.6–1.3)
ERYTHROCYTE [DISTWIDTH] IN BLOOD BY AUTOMATED COUNT: 13 % (ref 11.6–15.1)
GFR SERPL CREATININE-BSD FRML MDRD: 47 ML/MIN/1.73SQ M
GLUCOSE SERPL-MCNC: 185 MG/DL (ref 65–140)
HCT VFR BLD AUTO: 38.2 % (ref 34.8–46.1)
HGB BLD-MCNC: 12.2 G/DL (ref 11.5–15.4)
MCH RBC QN AUTO: 28.7 PG (ref 26.8–34.3)
MCHC RBC AUTO-ENTMCNC: 31.9 G/DL (ref 31.4–37.4)
MCV RBC AUTO: 90 FL (ref 82–98)
PLATELET # BLD AUTO: 154 THOUSANDS/UL (ref 149–390)
PMV BLD AUTO: 13.5 FL (ref 8.9–12.7)
POTASSIUM SERPL-SCNC: 3.8 MMOL/L (ref 3.5–5.3)
RBC # BLD AUTO: 4.25 MILLION/UL (ref 3.81–5.12)
SODIUM SERPL-SCNC: 138 MMOL/L (ref 136–145)
WBC # BLD AUTO: 7.7 THOUSAND/UL (ref 4.31–10.16)

## 2019-06-17 PROCEDURE — G8988 SELF CARE GOAL STATUS: HCPCS

## 2019-06-17 PROCEDURE — 97535 SELF CARE MNGMENT TRAINING: CPT

## 2019-06-17 PROCEDURE — 99232 SBSQ HOSP IP/OBS MODERATE 35: CPT | Performed by: NURSE PRACTITIONER

## 2019-06-17 PROCEDURE — 97530 THERAPEUTIC ACTIVITIES: CPT

## 2019-06-17 PROCEDURE — 97163 PT EVAL HIGH COMPLEX 45 MIN: CPT

## 2019-06-17 PROCEDURE — 94664 DEMO&/EVAL PT USE INHALER: CPT

## 2019-06-17 PROCEDURE — 97167 OT EVAL HIGH COMPLEX 60 MIN: CPT

## 2019-06-17 PROCEDURE — G8987 SELF CARE CURRENT STATUS: HCPCS

## 2019-06-17 PROCEDURE — G8978 MOBILITY CURRENT STATUS: HCPCS

## 2019-06-17 PROCEDURE — 73521 X-RAY EXAM HIPS BI 2 VIEWS: CPT

## 2019-06-17 PROCEDURE — 99222 1ST HOSP IP/OBS MODERATE 55: CPT | Performed by: PSYCHIATRY & NEUROLOGY

## 2019-06-17 PROCEDURE — 94668 MNPJ CHEST WALL SBSQ: CPT

## 2019-06-17 PROCEDURE — 85027 COMPLETE CBC AUTOMATED: CPT | Performed by: FAMILY MEDICINE

## 2019-06-17 PROCEDURE — G8979 MOBILITY GOAL STATUS: HCPCS

## 2019-06-17 PROCEDURE — 80048 BASIC METABOLIC PNL TOTAL CA: CPT | Performed by: FAMILY MEDICINE

## 2019-06-17 PROCEDURE — 71045 X-RAY EXAM CHEST 1 VIEW: CPT

## 2019-06-17 RX ORDER — GUAIFENESIN/DEXTROMETHORPHAN 100-10MG/5
10 SYRUP ORAL EVERY 4 HOURS PRN
Status: DISCONTINUED | OUTPATIENT
Start: 2019-06-17 | End: 2019-06-22 | Stop reason: HOSPADM

## 2019-06-17 RX ADMIN — METOPROLOL TARTRATE 25 MG: 25 TABLET, FILM COATED ORAL at 21:55

## 2019-06-17 RX ADMIN — SODIUM CHLORIDE 1000 MG: 0.9 INJECTION, SOLUTION INTRAVENOUS at 10:12

## 2019-06-17 RX ADMIN — AMANTADINE 100 MG: 100 CAPSULE ORAL at 17:31

## 2019-06-17 RX ADMIN — SODIUM CHLORIDE 100 ML/HR: 0.9 INJECTION, SOLUTION INTRAVENOUS at 00:41

## 2019-06-17 RX ADMIN — SODIUM CHLORIDE 100 ML/HR: 0.9 INJECTION, SOLUTION INTRAVENOUS at 23:48

## 2019-06-17 RX ADMIN — ACETAMINOPHEN 650 MG: 325 TABLET ORAL at 06:18

## 2019-06-17 RX ADMIN — AMANTADINE 100 MG: 100 CAPSULE ORAL at 09:11

## 2019-06-17 RX ADMIN — ACETAMINOPHEN 650 MG: 325 TABLET ORAL at 17:31

## 2019-06-17 RX ADMIN — HEPARIN SODIUM 5000 UNITS: 5000 INJECTION INTRAVENOUS; SUBCUTANEOUS at 14:58

## 2019-06-17 RX ADMIN — ACETAMINOPHEN 650 MG: 325 TABLET ORAL at 12:20

## 2019-06-17 RX ADMIN — ACETAMINOPHEN 650 MG: 325 TABLET ORAL at 23:51

## 2019-06-17 RX ADMIN — HEPARIN SODIUM 5000 UNITS: 5000 INJECTION INTRAVENOUS; SUBCUTANEOUS at 21:55

## 2019-06-17 RX ADMIN — HEPARIN SODIUM 5000 UNITS: 5000 INJECTION INTRAVENOUS; SUBCUTANEOUS at 06:18

## 2019-06-17 RX ADMIN — METOPROLOL TARTRATE 25 MG: 25 TABLET, FILM COATED ORAL at 09:14

## 2019-06-18 LAB
ANION GAP SERPL CALCULATED.3IONS-SCNC: 12 MMOL/L (ref 4–13)
BASOPHILS # BLD AUTO: 0.01 THOUSANDS/ΜL (ref 0–0.1)
BASOPHILS NFR BLD AUTO: 0 % (ref 0–1)
BUN SERPL-MCNC: 41 MG/DL (ref 5–25)
CALCIUM SERPL-MCNC: 8.6 MG/DL (ref 8.3–10.1)
CHLORIDE SERPL-SCNC: 106 MMOL/L (ref 100–108)
CO2 SERPL-SCNC: 23 MMOL/L (ref 21–32)
CREAT SERPL-MCNC: 1.1 MG/DL (ref 0.6–1.3)
EOSINOPHIL # BLD AUTO: 0 THOUSAND/ΜL (ref 0–0.61)
EOSINOPHIL NFR BLD AUTO: 0 % (ref 0–6)
ERYTHROCYTE [DISTWIDTH] IN BLOOD BY AUTOMATED COUNT: 13.2 % (ref 11.6–15.1)
GFR SERPL CREATININE-BSD FRML MDRD: 55 ML/MIN/1.73SQ M
GLUCOSE SERPL-MCNC: 134 MG/DL (ref 65–140)
HCT VFR BLD AUTO: 34 % (ref 34.8–46.1)
HGB BLD-MCNC: 10.9 G/DL (ref 11.5–15.4)
IMM GRANULOCYTES # BLD AUTO: 0.11 THOUSAND/UL (ref 0–0.2)
IMM GRANULOCYTES NFR BLD AUTO: 1 % (ref 0–2)
LYMPHOCYTES # BLD AUTO: 0.94 THOUSANDS/ΜL (ref 0.6–4.47)
LYMPHOCYTES NFR BLD AUTO: 9 % (ref 14–44)
MCH RBC QN AUTO: 29 PG (ref 26.8–34.3)
MCHC RBC AUTO-ENTMCNC: 32.1 G/DL (ref 31.4–37.4)
MCV RBC AUTO: 90 FL (ref 82–98)
MONOCYTES # BLD AUTO: 0.34 THOUSAND/ΜL (ref 0.17–1.22)
MONOCYTES NFR BLD AUTO: 3 % (ref 4–12)
MRSA NOSE QL CULT: NORMAL
NEUTROPHILS # BLD AUTO: 9.36 THOUSANDS/ΜL (ref 1.85–7.62)
NEUTS SEG NFR BLD AUTO: 87 % (ref 43–75)
NRBC BLD AUTO-RTO: 0 /100 WBCS
PLATELET # BLD AUTO: 157 THOUSANDS/UL (ref 149–390)
PMV BLD AUTO: 13 FL (ref 8.9–12.7)
POTASSIUM SERPL-SCNC: 3.9 MMOL/L (ref 3.5–5.3)
RBC # BLD AUTO: 3.76 MILLION/UL (ref 3.81–5.12)
SODIUM SERPL-SCNC: 141 MMOL/L (ref 136–145)
WBC # BLD AUTO: 10.76 THOUSAND/UL (ref 4.31–10.16)

## 2019-06-18 PROCEDURE — 80048 BASIC METABOLIC PNL TOTAL CA: CPT | Performed by: NURSE PRACTITIONER

## 2019-06-18 PROCEDURE — 85025 COMPLETE CBC W/AUTO DIFF WBC: CPT | Performed by: NURSE PRACTITIONER

## 2019-06-18 PROCEDURE — 99232 SBSQ HOSP IP/OBS MODERATE 35: CPT | Performed by: NURSE PRACTITIONER

## 2019-06-18 RX ORDER — METOPROLOL TARTRATE 5 MG/5ML
5 INJECTION INTRAVENOUS ONCE AS NEEDED
Status: COMPLETED | OUTPATIENT
Start: 2019-06-18 | End: 2019-06-18

## 2019-06-18 RX ORDER — BACLOFEN 10 MG/1
20 TABLET ORAL 3 TIMES DAILY
Status: DISCONTINUED | OUTPATIENT
Start: 2019-06-18 | End: 2019-06-22 | Stop reason: HOSPADM

## 2019-06-18 RX ORDER — HYDROCHLOROTHIAZIDE 12.5 MG/1
12.5 TABLET ORAL DAILY
Status: DISCONTINUED | OUTPATIENT
Start: 2019-06-18 | End: 2019-06-20

## 2019-06-18 RX ORDER — OXYCODONE HYDROCHLORIDE AND ACETAMINOPHEN 5; 325 MG/1; MG/1
2 TABLET ORAL ONCE AS NEEDED
Status: COMPLETED | OUTPATIENT
Start: 2019-06-18 | End: 2019-06-18

## 2019-06-18 RX ORDER — LOSARTAN POTASSIUM 50 MG/1
100 TABLET ORAL DAILY
Status: DISCONTINUED | OUTPATIENT
Start: 2019-06-18 | End: 2019-06-22 | Stop reason: HOSPADM

## 2019-06-18 RX ORDER — HYDRALAZINE HYDROCHLORIDE 20 MG/ML
5 INJECTION INTRAMUSCULAR; INTRAVENOUS ONCE AS NEEDED
Status: COMPLETED | OUTPATIENT
Start: 2019-06-18 | End: 2019-06-18

## 2019-06-18 RX ADMIN — HEPARIN SODIUM 5000 UNITS: 5000 INJECTION INTRAVENOUS; SUBCUTANEOUS at 21:07

## 2019-06-18 RX ADMIN — HYDRALAZINE HYDROCHLORIDE 5 MG: 20 INJECTION INTRAMUSCULAR; INTRAVENOUS at 05:57

## 2019-06-18 RX ADMIN — HEPARIN SODIUM 5000 UNITS: 5000 INJECTION INTRAVENOUS; SUBCUTANEOUS at 16:41

## 2019-06-18 RX ADMIN — BACLOFEN 20 MG: 10 TABLET ORAL at 12:02

## 2019-06-18 RX ADMIN — BACLOFEN 20 MG: 10 TABLET ORAL at 16:41

## 2019-06-18 RX ADMIN — METOPROLOL TARTRATE 5 MG: 5 INJECTION, SOLUTION INTRAVENOUS at 23:30

## 2019-06-18 RX ADMIN — AMANTADINE 100 MG: 100 CAPSULE ORAL at 09:40

## 2019-06-18 RX ADMIN — HYDROCHLOROTHIAZIDE 12.5 MG: 12.5 TABLET ORAL at 09:39

## 2019-06-18 RX ADMIN — METOPROLOL TARTRATE 5 MG: 5 INJECTION, SOLUTION INTRAVENOUS at 03:57

## 2019-06-18 RX ADMIN — LOSARTAN POTASSIUM 100 MG: 50 TABLET, FILM COATED ORAL at 12:02

## 2019-06-18 RX ADMIN — ACETAMINOPHEN 650 MG: 325 TABLET ORAL at 17:40

## 2019-06-18 RX ADMIN — ACETAMINOPHEN 650 MG: 325 TABLET ORAL at 12:02

## 2019-06-18 RX ADMIN — OXYCODONE HYDROCHLORIDE AND ACETAMINOPHEN 2 TABLET: 5; 325 TABLET ORAL at 02:24

## 2019-06-18 RX ADMIN — BACLOFEN 20 MG: 10 TABLET ORAL at 21:07

## 2019-06-18 RX ADMIN — AMANTADINE 100 MG: 100 CAPSULE ORAL at 17:40

## 2019-06-18 RX ADMIN — METOPROLOL TARTRATE 25 MG: 25 TABLET, FILM COATED ORAL at 09:39

## 2019-06-18 RX ADMIN — METOPROLOL TARTRATE 25 MG: 25 TABLET, FILM COATED ORAL at 21:07

## 2019-06-18 RX ADMIN — GUAIFENESIN AND DEXTROMETHORPHAN 10 ML: 100; 10 SYRUP ORAL at 03:57

## 2019-06-18 RX ADMIN — HEPARIN SODIUM 5000 UNITS: 5000 INJECTION INTRAVENOUS; SUBCUTANEOUS at 05:56

## 2019-06-18 RX ADMIN — ACETAMINOPHEN 650 MG: 325 TABLET ORAL at 23:30

## 2019-06-18 RX ADMIN — ACETAMINOPHEN 650 MG: 325 TABLET ORAL at 05:56

## 2019-06-19 PROBLEM — R44.3 HALLUCINATIONS: Status: ACTIVE | Noted: 2019-06-19

## 2019-06-19 LAB
ANION GAP SERPL CALCULATED.3IONS-SCNC: 11 MMOL/L (ref 4–13)
BASOPHILS # BLD AUTO: 0 THOUSANDS/ΜL (ref 0–0.1)
BASOPHILS NFR BLD AUTO: 0 % (ref 0–1)
BUN SERPL-MCNC: 26 MG/DL (ref 5–25)
CALCIUM SERPL-MCNC: 9 MG/DL (ref 8.3–10.1)
CHLORIDE SERPL-SCNC: 104 MMOL/L (ref 100–108)
CO2 SERPL-SCNC: 26 MMOL/L (ref 21–32)
CREAT SERPL-MCNC: 0.86 MG/DL (ref 0.6–1.3)
EOSINOPHIL # BLD AUTO: 0 THOUSAND/ΜL (ref 0–0.61)
EOSINOPHIL NFR BLD AUTO: 0 % (ref 0–6)
ERYTHROCYTE [DISTWIDTH] IN BLOOD BY AUTOMATED COUNT: 13.2 % (ref 11.6–15.1)
GFR SERPL CREATININE-BSD FRML MDRD: 75 ML/MIN/1.73SQ M
GLUCOSE SERPL-MCNC: 94 MG/DL (ref 65–140)
HCT VFR BLD AUTO: 38 % (ref 34.8–46.1)
HGB BLD-MCNC: 12.5 G/DL (ref 11.5–15.4)
IMM GRANULOCYTES # BLD AUTO: 0.1 THOUSAND/UL (ref 0–0.2)
IMM GRANULOCYTES NFR BLD AUTO: 1 % (ref 0–2)
LYMPHOCYTES # BLD AUTO: 2.19 THOUSANDS/ΜL (ref 0.6–4.47)
LYMPHOCYTES NFR BLD AUTO: 18 % (ref 14–44)
MCH RBC QN AUTO: 29.2 PG (ref 26.8–34.3)
MCHC RBC AUTO-ENTMCNC: 32.9 G/DL (ref 31.4–37.4)
MCV RBC AUTO: 89 FL (ref 82–98)
MONOCYTES # BLD AUTO: 0.74 THOUSAND/ΜL (ref 0.17–1.22)
MONOCYTES NFR BLD AUTO: 6 % (ref 4–12)
NEUTROPHILS # BLD AUTO: 9.11 THOUSANDS/ΜL (ref 1.85–7.62)
NEUTS SEG NFR BLD AUTO: 75 % (ref 43–75)
NRBC BLD AUTO-RTO: 0 /100 WBCS
PLATELET # BLD AUTO: 159 THOUSANDS/UL (ref 149–390)
PMV BLD AUTO: 13.3 FL (ref 8.9–12.7)
POTASSIUM SERPL-SCNC: 3.5 MMOL/L (ref 3.5–5.3)
RBC # BLD AUTO: 4.28 MILLION/UL (ref 3.81–5.12)
SODIUM SERPL-SCNC: 141 MMOL/L (ref 136–145)
WBC # BLD AUTO: 12.14 THOUSAND/UL (ref 4.31–10.16)

## 2019-06-19 PROCEDURE — 97110 THERAPEUTIC EXERCISES: CPT

## 2019-06-19 PROCEDURE — 97535 SELF CARE MNGMENT TRAINING: CPT

## 2019-06-19 PROCEDURE — 99222 1ST HOSP IP/OBS MODERATE 55: CPT | Performed by: PSYCHIATRY & NEUROLOGY

## 2019-06-19 PROCEDURE — 80048 BASIC METABOLIC PNL TOTAL CA: CPT | Performed by: NURSE PRACTITIONER

## 2019-06-19 PROCEDURE — 85025 COMPLETE CBC W/AUTO DIFF WBC: CPT | Performed by: NURSE PRACTITIONER

## 2019-06-19 PROCEDURE — 99232 SBSQ HOSP IP/OBS MODERATE 35: CPT | Performed by: NURSE PRACTITIONER

## 2019-06-19 PROCEDURE — 97530 THERAPEUTIC ACTIVITIES: CPT

## 2019-06-19 RX ORDER — RISPERIDONE 1 MG/ML
1 SOLUTION ORAL EVERY 4 HOURS PRN
Status: DISCONTINUED | OUTPATIENT
Start: 2019-06-19 | End: 2019-06-19 | Stop reason: CLARIF

## 2019-06-19 RX ORDER — HYDRALAZINE HYDROCHLORIDE 20 MG/ML
10 INJECTION INTRAMUSCULAR; INTRAVENOUS ONCE AS NEEDED
Status: COMPLETED | OUTPATIENT
Start: 2019-06-19 | End: 2019-06-19

## 2019-06-19 RX ORDER — RISPERIDONE 1 MG/1
1 TABLET, FILM COATED ORAL EVERY 4 HOURS PRN
Status: DISCONTINUED | OUTPATIENT
Start: 2019-06-19 | End: 2019-06-22 | Stop reason: HOSPADM

## 2019-06-19 RX ADMIN — BACLOFEN 20 MG: 10 TABLET ORAL at 17:15

## 2019-06-19 RX ADMIN — METOPROLOL TARTRATE 25 MG: 25 TABLET, FILM COATED ORAL at 10:26

## 2019-06-19 RX ADMIN — BACLOFEN 20 MG: 10 TABLET ORAL at 10:26

## 2019-06-19 RX ADMIN — ACETAMINOPHEN 650 MG: 325 TABLET ORAL at 12:58

## 2019-06-19 RX ADMIN — LOSARTAN POTASSIUM 100 MG: 50 TABLET, FILM COATED ORAL at 10:25

## 2019-06-19 RX ADMIN — RISPERIDONE 1 MG: 1 TABLET ORAL at 21:46

## 2019-06-19 RX ADMIN — HEPARIN SODIUM 5000 UNITS: 5000 INJECTION INTRAVENOUS; SUBCUTANEOUS at 05:30

## 2019-06-19 RX ADMIN — BACLOFEN 20 MG: 10 TABLET ORAL at 21:46

## 2019-06-19 RX ADMIN — ACETAMINOPHEN 650 MG: 325 TABLET ORAL at 05:28

## 2019-06-19 RX ADMIN — HEPARIN SODIUM 5000 UNITS: 5000 INJECTION INTRAVENOUS; SUBCUTANEOUS at 21:46

## 2019-06-19 RX ADMIN — HEPARIN SODIUM 5000 UNITS: 5000 INJECTION INTRAVENOUS; SUBCUTANEOUS at 15:00

## 2019-06-19 RX ADMIN — METOPROLOL TARTRATE 25 MG: 25 TABLET, FILM COATED ORAL at 21:46

## 2019-06-19 RX ADMIN — ACETAMINOPHEN 650 MG: 325 TABLET ORAL at 17:15

## 2019-06-19 RX ADMIN — AMANTADINE 100 MG: 100 CAPSULE ORAL at 10:27

## 2019-06-19 RX ADMIN — HYDROCHLOROTHIAZIDE 12.5 MG: 12.5 TABLET ORAL at 10:26

## 2019-06-19 RX ADMIN — AMANTADINE 100 MG: 100 CAPSULE ORAL at 17:15

## 2019-06-19 RX ADMIN — HYDRALAZINE HYDROCHLORIDE 10 MG: 20 INJECTION INTRAMUSCULAR; INTRAVENOUS at 00:57

## 2019-06-20 ENCOUNTER — APPOINTMENT (INPATIENT)
Dept: NON INVASIVE DIAGNOSTICS | Facility: HOSPITAL | Age: 59
DRG: 058 | End: 2019-06-20
Payer: COMMERCIAL

## 2019-06-20 PROBLEM — G93.40 ENCEPHALOPATHY ACUTE: Status: ACTIVE | Noted: 2019-06-19

## 2019-06-20 PROBLEM — R00.0 SINUS TACHYCARDIA: Status: ACTIVE | Noted: 2019-06-20

## 2019-06-20 LAB
ANION GAP SERPL CALCULATED.3IONS-SCNC: 9 MMOL/L (ref 4–13)
ATRIAL RATE: 160 BPM
ATRIAL RATE: 161 BPM
BACTERIA UR QL AUTO: ABNORMAL /HPF
BASOPHILS # BLD AUTO: 0.01 THOUSANDS/ΜL (ref 0–0.1)
BASOPHILS NFR BLD AUTO: 0 % (ref 0–1)
BILIRUB UR QL STRIP: NEGATIVE
BUN SERPL-MCNC: 23 MG/DL (ref 5–25)
CALCIUM SERPL-MCNC: 9.1 MG/DL (ref 8.3–10.1)
CHLORIDE SERPL-SCNC: 103 MMOL/L (ref 100–108)
CLARITY UR: CLEAR
CO2 SERPL-SCNC: 26 MMOL/L (ref 21–32)
COLOR UR: YELLOW
CREAT SERPL-MCNC: 0.81 MG/DL (ref 0.6–1.3)
EOSINOPHIL # BLD AUTO: 0.01 THOUSAND/ΜL (ref 0–0.61)
EOSINOPHIL NFR BLD AUTO: 0 % (ref 0–6)
ERYTHROCYTE [DISTWIDTH] IN BLOOD BY AUTOMATED COUNT: 13.4 % (ref 11.6–15.1)
GFR SERPL CREATININE-BSD FRML MDRD: 80 ML/MIN/1.73SQ M
GLUCOSE SERPL-MCNC: 80 MG/DL (ref 65–140)
GLUCOSE UR STRIP-MCNC: NEGATIVE MG/DL
HCT VFR BLD AUTO: 39.4 % (ref 34.8–46.1)
HGB BLD-MCNC: 12.8 G/DL (ref 11.5–15.4)
HGB UR QL STRIP.AUTO: NEGATIVE
IMM GRANULOCYTES # BLD AUTO: 0.06 THOUSAND/UL (ref 0–0.2)
IMM GRANULOCYTES NFR BLD AUTO: 1 % (ref 0–2)
KETONES UR STRIP-MCNC: NEGATIVE MG/DL
LACTATE SERPL-SCNC: 1.7 MMOL/L (ref 0.5–2)
LACTATE SERPL-SCNC: 2.8 MMOL/L (ref 0.5–2)
LEUKOCYTE ESTERASE UR QL STRIP: NEGATIVE
LYMPHOCYTES # BLD AUTO: 3.08 THOUSANDS/ΜL (ref 0.6–4.47)
LYMPHOCYTES NFR BLD AUTO: 38 % (ref 14–44)
MAGNESIUM SERPL-MCNC: 1.9 MG/DL (ref 1.6–2.6)
MCH RBC QN AUTO: 29.1 PG (ref 26.8–34.3)
MCHC RBC AUTO-ENTMCNC: 32.5 G/DL (ref 31.4–37.4)
MCV RBC AUTO: 90 FL (ref 82–98)
MONOCYTES # BLD AUTO: 0.46 THOUSAND/ΜL (ref 0.17–1.22)
MONOCYTES NFR BLD AUTO: 6 % (ref 4–12)
NEUTROPHILS # BLD AUTO: 4.5 THOUSANDS/ΜL (ref 1.85–7.62)
NEUTS SEG NFR BLD AUTO: 55 % (ref 43–75)
NITRITE UR QL STRIP: NEGATIVE
NON-SQ EPI CELLS URNS QL MICRO: ABNORMAL /HPF
NRBC BLD AUTO-RTO: 0 /100 WBCS
P AXIS: 48 DEGREES
P AXIS: 55 DEGREES
PH UR STRIP.AUTO: 8 [PH]
PLATELET # BLD AUTO: 132 THOUSANDS/UL (ref 149–390)
PMV BLD AUTO: 13.4 FL (ref 8.9–12.7)
POTASSIUM SERPL-SCNC: 3.5 MMOL/L (ref 3.5–5.3)
PR INTERVAL: 112 MS
PR INTERVAL: 124 MS
PROCALCITONIN SERPL-MCNC: 0.17 NG/ML
PROT UR STRIP-MCNC: NEGATIVE MG/DL
QRS AXIS: 65 DEGREES
QRS AXIS: 67 DEGREES
QRSD INTERVAL: 68 MS
QRSD INTERVAL: 70 MS
QT INTERVAL: 252 MS
QT INTERVAL: 254 MS
QTC INTERVAL: 411 MS
QTC INTERVAL: 415 MS
RBC # BLD AUTO: 4.4 MILLION/UL (ref 3.81–5.12)
RBC #/AREA URNS AUTO: ABNORMAL /HPF
SODIUM SERPL-SCNC: 138 MMOL/L (ref 136–145)
SP GR UR STRIP.AUTO: 1.01 (ref 1–1.03)
T WAVE AXIS: 88 DEGREES
T WAVE AXIS: 88 DEGREES
UROBILINOGEN UR QL STRIP.AUTO: 0.2 E.U./DL
VENTRICULAR RATE: 160 BPM
VENTRICULAR RATE: 161 BPM
WBC # BLD AUTO: 8.12 THOUSAND/UL (ref 4.31–10.16)
WBC #/AREA URNS AUTO: ABNORMAL /HPF

## 2019-06-20 PROCEDURE — 93010 ELECTROCARDIOGRAM REPORT: CPT | Performed by: INTERNAL MEDICINE

## 2019-06-20 PROCEDURE — 81001 URINALYSIS AUTO W/SCOPE: CPT | Performed by: NURSE PRACTITIONER

## 2019-06-20 PROCEDURE — 93005 ELECTROCARDIOGRAM TRACING: CPT

## 2019-06-20 PROCEDURE — 85025 COMPLETE CBC W/AUTO DIFF WBC: CPT | Performed by: NURSE PRACTITIONER

## 2019-06-20 PROCEDURE — 83605 ASSAY OF LACTIC ACID: CPT | Performed by: NURSE PRACTITIONER

## 2019-06-20 PROCEDURE — 99232 SBSQ HOSP IP/OBS MODERATE 35: CPT | Performed by: NURSE PRACTITIONER

## 2019-06-20 PROCEDURE — 84145 PROCALCITONIN (PCT): CPT | Performed by: NURSE PRACTITIONER

## 2019-06-20 PROCEDURE — 93306 TTE W/DOPPLER COMPLETE: CPT

## 2019-06-20 PROCEDURE — 83735 ASSAY OF MAGNESIUM: CPT | Performed by: NURSE PRACTITIONER

## 2019-06-20 PROCEDURE — 80048 BASIC METABOLIC PNL TOTAL CA: CPT | Performed by: NURSE PRACTITIONER

## 2019-06-20 PROCEDURE — 94668 MNPJ CHEST WALL SBSQ: CPT

## 2019-06-20 RX ORDER — POTASSIUM CHLORIDE 20 MEQ/1
40 TABLET, EXTENDED RELEASE ORAL ONCE
Status: COMPLETED | OUTPATIENT
Start: 2019-06-20 | End: 2019-06-20

## 2019-06-20 RX ORDER — SODIUM CHLORIDE, SODIUM LACTATE, POTASSIUM CHLORIDE, CALCIUM CHLORIDE 600; 310; 30; 20 MG/100ML; MG/100ML; MG/100ML; MG/100ML
50 INJECTION, SOLUTION INTRAVENOUS CONTINUOUS
Status: DISCONTINUED | OUTPATIENT
Start: 2019-06-20 | End: 2019-06-21

## 2019-06-20 RX ORDER — MAGNESIUM SULFATE 1 G/100ML
1 INJECTION INTRAVENOUS ONCE
Status: COMPLETED | OUTPATIENT
Start: 2019-06-20 | End: 2019-06-20

## 2019-06-20 RX ORDER — ACETAMINOPHEN 325 MG/1
650 TABLET ORAL EVERY 6 HOURS PRN
Status: DISCONTINUED | OUTPATIENT
Start: 2019-06-20 | End: 2019-06-21

## 2019-06-20 RX ORDER — LORAZEPAM 0.5 MG/1
0.5 TABLET ORAL ONCE
Status: COMPLETED | OUTPATIENT
Start: 2019-06-20 | End: 2019-06-20

## 2019-06-20 RX ADMIN — ACETAMINOPHEN 650 MG: 325 TABLET ORAL at 00:11

## 2019-06-20 RX ADMIN — HEPARIN SODIUM 5000 UNITS: 5000 INJECTION INTRAVENOUS; SUBCUTANEOUS at 06:23

## 2019-06-20 RX ADMIN — SODIUM CHLORIDE, SODIUM LACTATE, POTASSIUM CHLORIDE, AND CALCIUM CHLORIDE 75 ML/HR: .6; .31; .03; .02 INJECTION, SOLUTION INTRAVENOUS at 16:00

## 2019-06-20 RX ADMIN — HYDROCHLOROTHIAZIDE 12.5 MG: 12.5 TABLET ORAL at 08:57

## 2019-06-20 RX ADMIN — SODIUM CHLORIDE 500 ML: 0.9 INJECTION, SOLUTION INTRAVENOUS at 09:42

## 2019-06-20 RX ADMIN — METOPROLOL TARTRATE 25 MG: 25 TABLET, FILM COATED ORAL at 22:45

## 2019-06-20 RX ADMIN — SODIUM CHLORIDE, SODIUM LACTATE, POTASSIUM CHLORIDE, AND CALCIUM CHLORIDE 500 ML: .6; .31; .03; .02 INJECTION, SOLUTION INTRAVENOUS at 11:30

## 2019-06-20 RX ADMIN — POTASSIUM CHLORIDE 40 MEQ: 1500 TABLET, EXTENDED RELEASE ORAL at 09:43

## 2019-06-20 RX ADMIN — HEPARIN SODIUM 5000 UNITS: 5000 INJECTION INTRAVENOUS; SUBCUTANEOUS at 14:46

## 2019-06-20 RX ADMIN — ACETAMINOPHEN 650 MG: 325 TABLET ORAL at 06:22

## 2019-06-20 RX ADMIN — BACLOFEN 20 MG: 10 TABLET ORAL at 16:00

## 2019-06-20 RX ADMIN — METOPROLOL TARTRATE 25 MG: 25 TABLET, FILM COATED ORAL at 08:58

## 2019-06-20 RX ADMIN — BACLOFEN 20 MG: 10 TABLET ORAL at 08:58

## 2019-06-20 RX ADMIN — LORAZEPAM 0.5 MG: 0.5 TABLET ORAL at 10:37

## 2019-06-20 RX ADMIN — AMANTADINE 100 MG: 100 CAPSULE ORAL at 08:58

## 2019-06-20 RX ADMIN — HEPARIN SODIUM 5000 UNITS: 5000 INJECTION INTRAVENOUS; SUBCUTANEOUS at 22:45

## 2019-06-20 RX ADMIN — SODIUM CHLORIDE, SODIUM LACTATE, POTASSIUM CHLORIDE, AND CALCIUM CHLORIDE 75 ML/HR: .6; .31; .03; .02 INJECTION, SOLUTION INTRAVENOUS at 21:42

## 2019-06-20 RX ADMIN — ACETAMINOPHEN 650 MG: 325 TABLET ORAL at 12:31

## 2019-06-20 RX ADMIN — BACLOFEN 20 MG: 10 TABLET ORAL at 22:45

## 2019-06-20 RX ADMIN — MAGNESIUM SULFATE HEPTAHYDRATE 1 G: 1 INJECTION, SOLUTION INTRAVENOUS at 09:45

## 2019-06-20 RX ADMIN — LOSARTAN POTASSIUM 100 MG: 50 TABLET, FILM COATED ORAL at 08:58

## 2019-06-20 RX ADMIN — AMANTADINE 100 MG: 100 CAPSULE ORAL at 17:21

## 2019-06-21 PROBLEM — R79.89 ELEVATED LFTS: Status: ACTIVE | Noted: 2019-06-21

## 2019-06-21 PROBLEM — F11.20 OPIATE DEPENDENCE (HCC): Status: ACTIVE | Noted: 2019-06-21

## 2019-06-21 LAB
ALBUMIN SERPL BCP-MCNC: 2.5 G/DL (ref 3.5–5)
ALP SERPL-CCNC: 98 U/L (ref 46–116)
ALT SERPL W P-5'-P-CCNC: 107 U/L (ref 12–78)
ANION GAP SERPL CALCULATED.3IONS-SCNC: 10 MMOL/L (ref 4–13)
APAP SERPL-MCNC: <2 UG/ML (ref 10–20)
AST SERPL W P-5'-P-CCNC: 86 U/L (ref 5–45)
BILIRUB SERPL-MCNC: 0.3 MG/DL (ref 0.2–1)
BUN SERPL-MCNC: 25 MG/DL (ref 5–25)
CALCIUM SERPL-MCNC: 8.7 MG/DL (ref 8.3–10.1)
CHLORIDE SERPL-SCNC: 107 MMOL/L (ref 100–108)
CO2 SERPL-SCNC: 24 MMOL/L (ref 21–32)
CREAT SERPL-MCNC: 0.72 MG/DL (ref 0.6–1.3)
ERYTHROCYTE [DISTWIDTH] IN BLOOD BY AUTOMATED COUNT: 13.6 % (ref 11.6–15.1)
GFR SERPL CREATININE-BSD FRML MDRD: 93 ML/MIN/1.73SQ M
GLUCOSE SERPL-MCNC: 87 MG/DL (ref 65–140)
HCT VFR BLD AUTO: 37.2 % (ref 34.8–46.1)
HGB BLD-MCNC: 12 G/DL (ref 11.5–15.4)
MAGNESIUM SERPL-MCNC: 1.9 MG/DL (ref 1.6–2.6)
MCH RBC QN AUTO: 29.1 PG (ref 26.8–34.3)
MCHC RBC AUTO-ENTMCNC: 32.3 G/DL (ref 31.4–37.4)
MCV RBC AUTO: 90 FL (ref 82–98)
PLATELET # BLD AUTO: 119 THOUSANDS/UL (ref 149–390)
PMV BLD AUTO: 13.2 FL (ref 8.9–12.7)
POTASSIUM SERPL-SCNC: 4.3 MMOL/L (ref 3.5–5.3)
PROCALCITONIN SERPL-MCNC: 0.12 NG/ML
PROT SERPL-MCNC: 6.6 G/DL (ref 6.4–8.2)
RBC # BLD AUTO: 4.13 MILLION/UL (ref 3.81–5.12)
SODIUM SERPL-SCNC: 141 MMOL/L (ref 136–145)
WBC # BLD AUTO: 7.98 THOUSAND/UL (ref 4.31–10.16)

## 2019-06-21 PROCEDURE — 80053 COMPREHEN METABOLIC PANEL: CPT | Performed by: NURSE PRACTITIONER

## 2019-06-21 PROCEDURE — 97530 THERAPEUTIC ACTIVITIES: CPT

## 2019-06-21 PROCEDURE — 97110 THERAPEUTIC EXERCISES: CPT

## 2019-06-21 PROCEDURE — 80329 ANALGESICS NON-OPIOID 1 OR 2: CPT | Performed by: NURSE PRACTITIONER

## 2019-06-21 PROCEDURE — G0480 DRUG TEST DEF 1-7 CLASSES: HCPCS | Performed by: NURSE PRACTITIONER

## 2019-06-21 PROCEDURE — 93306 TTE W/DOPPLER COMPLETE: CPT | Performed by: INTERNAL MEDICINE

## 2019-06-21 PROCEDURE — 85027 COMPLETE CBC AUTOMATED: CPT | Performed by: NURSE PRACTITIONER

## 2019-06-21 PROCEDURE — 84145 PROCALCITONIN (PCT): CPT | Performed by: NURSE PRACTITIONER

## 2019-06-21 PROCEDURE — 83735 ASSAY OF MAGNESIUM: CPT | Performed by: NURSE PRACTITIONER

## 2019-06-21 PROCEDURE — 99232 SBSQ HOSP IP/OBS MODERATE 35: CPT | Performed by: NURSE PRACTITIONER

## 2019-06-21 PROCEDURE — 97535 SELF CARE MNGMENT TRAINING: CPT

## 2019-06-21 RX ORDER — OXYCODONE HYDROCHLORIDE AND ACETAMINOPHEN 5; 325 MG/1; MG/1
1 TABLET ORAL 3 TIMES DAILY
Status: DISCONTINUED | OUTPATIENT
Start: 2019-06-21 | End: 2019-06-21

## 2019-06-21 RX ORDER — OXYCODONE HYDROCHLORIDE 10 MG/1
10 TABLET ORAL ONCE
Status: COMPLETED | OUTPATIENT
Start: 2019-06-21 | End: 2019-06-21

## 2019-06-21 RX ORDER — OXYCODONE HYDROCHLORIDE 5 MG/1
5 TABLET ORAL 3 TIMES DAILY
Status: DISCONTINUED | OUTPATIENT
Start: 2019-06-21 | End: 2019-06-22 | Stop reason: HOSPADM

## 2019-06-21 RX ORDER — METOPROLOL TARTRATE 50 MG/1
50 TABLET, FILM COATED ORAL EVERY 12 HOURS SCHEDULED
Status: DISCONTINUED | OUTPATIENT
Start: 2019-06-21 | End: 2019-06-22 | Stop reason: HOSPADM

## 2019-06-21 RX ADMIN — OXYCODONE HYDROCHLORIDE 5 MG: 5 TABLET ORAL at 21:33

## 2019-06-21 RX ADMIN — HEPARIN SODIUM 5000 UNITS: 5000 INJECTION INTRAVENOUS; SUBCUTANEOUS at 21:33

## 2019-06-21 RX ADMIN — OXYCODONE HYDROCHLORIDE 10 MG: 10 TABLET ORAL at 10:08

## 2019-06-21 RX ADMIN — BACLOFEN 20 MG: 10 TABLET ORAL at 20:19

## 2019-06-21 RX ADMIN — AMANTADINE 100 MG: 100 CAPSULE ORAL at 08:58

## 2019-06-21 RX ADMIN — HEPARIN SODIUM 5000 UNITS: 5000 INJECTION INTRAVENOUS; SUBCUTANEOUS at 15:26

## 2019-06-21 RX ADMIN — METOPROLOL TARTRATE 50 MG: 50 TABLET, FILM COATED ORAL at 20:19

## 2019-06-21 RX ADMIN — BACLOFEN 20 MG: 10 TABLET ORAL at 08:58

## 2019-06-21 RX ADMIN — BACLOFEN 20 MG: 10 TABLET ORAL at 15:35

## 2019-06-21 RX ADMIN — METOPROLOL TARTRATE 25 MG: 25 TABLET, FILM COATED ORAL at 08:57

## 2019-06-21 RX ADMIN — AMANTADINE 100 MG: 100 CAPSULE ORAL at 17:09

## 2019-06-21 RX ADMIN — HEPARIN SODIUM 5000 UNITS: 5000 INJECTION INTRAVENOUS; SUBCUTANEOUS at 06:18

## 2019-06-21 RX ADMIN — OXYCODONE HYDROCHLORIDE 5 MG: 5 TABLET ORAL at 17:07

## 2019-06-21 RX ADMIN — LOSARTAN POTASSIUM 100 MG: 50 TABLET, FILM COATED ORAL at 08:57

## 2019-06-22 VITALS
HEIGHT: 66 IN | RESPIRATION RATE: 18 BRPM | HEART RATE: 97 BPM | TEMPERATURE: 98.1 F | OXYGEN SATURATION: 95 % | BODY MASS INDEX: 24.06 KG/M2 | DIASTOLIC BLOOD PRESSURE: 90 MMHG | SYSTOLIC BLOOD PRESSURE: 150 MMHG | WEIGHT: 149.69 LBS

## 2019-06-22 LAB
ALBUMIN SERPL BCP-MCNC: 2.6 G/DL (ref 3.5–5)
ALP SERPL-CCNC: 93 U/L (ref 46–116)
ALT SERPL W P-5'-P-CCNC: 111 U/L (ref 12–78)
ANION GAP SERPL CALCULATED.3IONS-SCNC: 10 MMOL/L (ref 4–13)
AST SERPL W P-5'-P-CCNC: 46 U/L (ref 5–45)
BILIRUB SERPL-MCNC: 0.3 MG/DL (ref 0.2–1)
BUN SERPL-MCNC: 24 MG/DL (ref 5–25)
CALCIUM SERPL-MCNC: 8.9 MG/DL (ref 8.3–10.1)
CHLORIDE SERPL-SCNC: 106 MMOL/L (ref 100–108)
CO2 SERPL-SCNC: 24 MMOL/L (ref 21–32)
CREAT SERPL-MCNC: 0.7 MG/DL (ref 0.6–1.3)
ERYTHROCYTE [DISTWIDTH] IN BLOOD BY AUTOMATED COUNT: 13.6 % (ref 11.6–15.1)
GFR SERPL CREATININE-BSD FRML MDRD: 96 ML/MIN/1.73SQ M
GLUCOSE SERPL-MCNC: 84 MG/DL (ref 65–140)
HCT VFR BLD AUTO: 36.4 % (ref 34.8–46.1)
HGB BLD-MCNC: 11.7 G/DL (ref 11.5–15.4)
MCH RBC QN AUTO: 29.3 PG (ref 26.8–34.3)
MCHC RBC AUTO-ENTMCNC: 32.1 G/DL (ref 31.4–37.4)
MCV RBC AUTO: 91 FL (ref 82–98)
PLATELET # BLD AUTO: 118 THOUSANDS/UL (ref 149–390)
PMV BLD AUTO: 13.5 FL (ref 8.9–12.7)
POTASSIUM SERPL-SCNC: 4.2 MMOL/L (ref 3.5–5.3)
PROT SERPL-MCNC: 6.4 G/DL (ref 6.4–8.2)
RBC # BLD AUTO: 4 MILLION/UL (ref 3.81–5.12)
SODIUM SERPL-SCNC: 140 MMOL/L (ref 136–145)
WBC # BLD AUTO: 9.24 THOUSAND/UL (ref 4.31–10.16)

## 2019-06-22 PROCEDURE — 99239 HOSP IP/OBS DSCHRG MGMT >30: CPT | Performed by: NURSE PRACTITIONER

## 2019-06-22 PROCEDURE — 85027 COMPLETE CBC AUTOMATED: CPT | Performed by: NURSE PRACTITIONER

## 2019-06-22 PROCEDURE — 80053 COMPREHEN METABOLIC PANEL: CPT | Performed by: NURSE PRACTITIONER

## 2019-06-22 RX ORDER — METOPROLOL TARTRATE 50 MG/1
50 TABLET, FILM COATED ORAL EVERY 12 HOURS SCHEDULED
Qty: 60 TABLET | Refills: 0 | Status: SHIPPED | OUTPATIENT
Start: 2019-06-22 | End: 2019-08-01 | Stop reason: SDUPTHER

## 2019-06-22 RX ADMIN — BACLOFEN 20 MG: 10 TABLET ORAL at 09:25

## 2019-06-22 RX ADMIN — METOPROLOL TARTRATE 50 MG: 50 TABLET, FILM COATED ORAL at 09:25

## 2019-06-22 RX ADMIN — LOSARTAN POTASSIUM 100 MG: 50 TABLET, FILM COATED ORAL at 09:25

## 2019-06-22 RX ADMIN — OXYCODONE HYDROCHLORIDE 5 MG: 5 TABLET ORAL at 09:32

## 2019-06-22 RX ADMIN — AMANTADINE 100 MG: 100 CAPSULE ORAL at 09:25

## 2019-06-22 RX ADMIN — HEPARIN SODIUM 5000 UNITS: 5000 INJECTION INTRAVENOUS; SUBCUTANEOUS at 05:00

## 2019-06-24 ENCOUNTER — TRANSITIONAL CARE MANAGEMENT (OUTPATIENT)
Dept: FAMILY MEDICINE CLINIC | Facility: CLINIC | Age: 59
End: 2019-06-24

## 2019-07-05 ENCOUNTER — TELEPHONE (OUTPATIENT)
Dept: FAMILY MEDICINE CLINIC | Facility: CLINIC | Age: 59
End: 2019-07-05

## 2019-07-05 ENCOUNTER — OFFICE VISIT (OUTPATIENT)
Dept: FAMILY MEDICINE CLINIC | Facility: CLINIC | Age: 59
End: 2019-07-05
Payer: COMMERCIAL

## 2019-07-05 VITALS
TEMPERATURE: 97.9 F | BODY MASS INDEX: 24.16 KG/M2 | SYSTOLIC BLOOD PRESSURE: 112 MMHG | DIASTOLIC BLOOD PRESSURE: 82 MMHG | HEART RATE: 72 BPM | RESPIRATION RATE: 16 BRPM | HEIGHT: 66 IN

## 2019-07-05 DIAGNOSIS — R11.0 NAUSEA: ICD-10-CM

## 2019-07-05 DIAGNOSIS — I10 ESSENTIAL HYPERTENSION: Primary | ICD-10-CM

## 2019-07-05 DIAGNOSIS — R79.89 ELEVATED LFTS: ICD-10-CM

## 2019-07-05 DIAGNOSIS — D72.829 LEUKOCYTOSIS, UNSPECIFIED TYPE: ICD-10-CM

## 2019-07-05 DIAGNOSIS — G35 MULTIPLE SCLEROSIS EXACERBATION (HCC): ICD-10-CM

## 2019-07-05 DIAGNOSIS — F11.20 UNCOMPLICATED OPIOID DEPENDENCE (HCC): ICD-10-CM

## 2019-07-05 DIAGNOSIS — Z11.59 NEED FOR HEPATITIS C SCREENING TEST: ICD-10-CM

## 2019-07-05 DIAGNOSIS — D69.6 THROMBOCYTOPENIA (HCC): ICD-10-CM

## 2019-07-05 DIAGNOSIS — N17.9 AKI (ACUTE KIDNEY INJURY) (HCC): ICD-10-CM

## 2019-07-05 PROCEDURE — 99495 TRANSJ CARE MGMT MOD F2F 14D: CPT | Performed by: FAMILY MEDICINE

## 2019-07-05 RX ORDER — ESOMEPRAZOLE MAGNESIUM 40 MG/1
40 CAPSULE, DELAYED RELEASE ORAL
Qty: 30 CAPSULE | Refills: 5 | Status: SHIPPED | OUTPATIENT
Start: 2019-07-05 | End: 2020-04-07

## 2019-07-05 NOTE — PROGRESS NOTES
Assessment/Plan:    Problem List Items Addressed This Visit        Cardiovascular and Mediastinum    Essential hypertension - Primary    Relevant Orders    Comprehensive metabolic panel    Lipid Panel with Direct LDL reflex    CBC       Nervous and Auditory    Multiple sclerosis exacerbation (HCC)    Relevant Orders    Comprehensive metabolic panel    Lipid Panel with Direct LDL reflex    CBC       Genitourinary    RESOLVED: ARBEN (acute kidney injury) ([facility] [address] )    Relevant Orders    Comprehensive metabolic panel    Lipid Panel with Direct LDL reflex    CBC       Other    Thrombocytopenia (HCC)    Relevant Orders    Comprehensive metabolic panel    Lipid Panel with Direct LDL reflex    CBC    Leukocytosis    Relevant Orders    Comprehensive metabolic panel    Lipid Panel with Direct LDL reflex    CBC    Opiate dependence (HCC)    Relevant Orders    Comprehensive metabolic panel    Lipid Panel with Direct LDL reflex    CBC    Elevated LFTs    Relevant Orders    Comprehensive metabolic panel    Lipid Panel with Direct LDL reflex    CBC      Other Visit Diagnoses     Nausea        Relevant Medications    esomeprazole (NEXIUM) 40 MG capsule    Other Relevant Orders    Comprehensive metabolic panel    Lipid Panel with Direct LDL reflex    CBC    Need for hepatitis C screening test        Relevant Orders    Hepatitis C antibody      The nausea sounds more to me like reflux than affect of the BB  Pt will call in 2-3 weeks to inform me how this is working for her  Seems like GERD  but given history gastroparesis,     BMI Counseling: Body mass index is 24 16 kg/m²  Discussed the patient's BMI with her  The BMI is above average  BMI counseling and education was provided to the patient  Nutrition recommendations include reducing portion sizes  There are no Patient Instructions on file for this visit  Return in about 6 months (around 1/5/2020) for Awv  Subjective:      Patient ID: Katlyn Newman is a 62 y o  female  Chief Complaint   Patient presents with    Transition of Care Management     Jayro Suarez       Pt is here for a TCM for a hospital stay  Nurses TCm note appreciated    Pt states she is feeling well since discharge  Pt states her metoprolol was upped from 25 to 50 and states that dose is making her have nausea and vomiting  Pt states this am she had light breakfast and then it came up 4 hrs later  Pt has a burning sensation in her stomach  This started since discharge - and the BB was the only change    The admission was for M S  And things are improving  Opioids were not chnaged        The following portions of the patient's history were reviewed and updated as appropriate: allergies, current medications, past family history, past medical history, past social history, past surgical history and problem list     Review of Systems   Constitutional: Negative  Negative for activity change, appetite change, chills, diaphoresis and fatigue  HENT: Negative  Negative for dental problem, ear pain, sinus pressure and sore throat  Eyes: Negative  Negative for photophobia, pain, discharge, redness, itching and visual disturbance  Respiratory: Negative for apnea and chest tightness  Cardiovascular: Negative  Negative for chest pain, palpitations and leg swelling  Gastrointestinal: Negative  Negative for abdominal distention, abdominal pain, constipation and diarrhea  Endocrine: Negative  Negative for cold intolerance and heat intolerance  Genitourinary: Negative  Negative for difficulty urinating and dyspareunia  Musculoskeletal: Positive for myalgias  Negative for arthralgias and back pain  Skin: Negative  Allergic/Immunologic: Negative for environmental allergies  Neurological: Negative  Negative for dizziness  Muscle weakness   Psychiatric/Behavioral: Negative  Negative for agitation           Current Outpatient Medications   Medication Sig Dispense Refill    amantadine (SYMMETREL) 100 mg capsule Take 1 capsule (100 mg total) by mouth 2 (two) times a day 60 capsule 5    AUBAGIO 14 MG TABS TAKE 1 TABLET BY MOUTH EVERY DAY 28 tablet 3    baclofen 10 mg tablet Take 2 tablets (20 mg total) by mouth 3 (three) times a day 540 tablet 0    meloxicam (MOBIC) 15 mg tablet Take 15 mg by mouth daily    0    metoprolol tartrate (LOPRESSOR) 50 mg tablet Take 1 tablet (50 mg total) by mouth every 12 (twelve) hours 60 tablet 0    oxyCODONE-acetaminophen (PERCOCET)  mg per tablet Take 1 tablet by mouth every 8 (eight) hours as needed for moderate pain      telmisartan-hydrochlorothiazide (MICARDIS HCT) 80-12 5 MG per tablet TAKE 1 TABLET BY MOUTH DAILY 90 tablet 1    esomeprazole (NEXIUM) 40 MG capsule Take 1 capsule (40 mg total) by mouth daily before breakfast 30 capsule 5     No current facility-administered medications for this visit  Objective:    /82   Pulse 72   Temp 97 9 °F (36 6 °C)   Resp 16   Ht 5' 6" (1 676 m)   BMI 24 16 kg/m²        Physical Exam   Constitutional: She appears well-developed and well-nourished  No distress  HENT:   Head: Normocephalic and atraumatic  Right Ear: External ear normal    Left Ear: External ear normal    Nose: Nose normal    Mouth/Throat: Oropharynx is clear and moist  No oropharyngeal exudate  Eyes: Pupils are equal, round, and reactive to light  EOM are normal  Right eye exhibits no discharge  Left eye exhibits no discharge  No scleral icterus  Neck: No thyromegaly present  Cardiovascular: Normal rate and normal heart sounds  No murmur heard  Pulmonary/Chest: Effort normal and breath sounds normal  No respiratory distress  She has no wheezes  Abdominal: Soft  Bowel sounds are normal  She exhibits no distension and no mass  There is no tenderness  There is no rebound and no guarding  Musculoskeletal: Normal range of motion  In wheelchair   Neurological: She is alert  She displays normal reflexes  Coordination normal    Skin: Skin is warm and dry  No rash noted  She is not diaphoretic  No erythema  Psychiatric: She has a normal mood and affect  Her behavior is normal    Nursing note and vitals reviewed  TCM Call (since 6/4/2019)     Date and time call was made  6/24/2019 10:36 AM    Hospital care reviewed  Records reviewed    Patient was hospitialized at  [address] [place] [address]    Date of Admission  06/16/19    Date of discharge  06/22/19    Diagnosis  Multiple sclerosis exacerbation     Disposition  Home    Were the patients medications reviewed and updated  Yes    Current Symptoms  Fatigue States that she has been tired since being hospitalized and not sleeping great- she knows to call us any time with any questions or concerns and any worsening fatigue    Fatigue severity  Moderate      TCM Call (since 6/4/2019)     Post hospital issues  None    Should patient be enrolled in anticoag monitoring? No    Scheduled for follow up? Yes    Patients specialists  Neurologist    Neurologist name  Dr Eleanor Hussein- she will call to make her appt- Manages M S     Did you obtain your prescribed medications  Yes    Do you need help managing your prescriptions or medications  No    Is transportation to your appointment needed  Yes    Specify why  She is w/c bound    I have advised the patient to call PCP with any new or worsening symptoms  [place] or Significiant other    Support System  Family    The type of support provided  Physical; Emotional    Do you have social support  Yes, as much as I need    Are you recieving any outpatient services  No    Are you recieving home care services  Yes    Types of home care services  Nurse visit; Home PT OT at home    Have you fallen in the last 12 months  No    Interperter language line needed  No    Counseling  Patient    Counseling topics  Activities of daily living;  Importance of RX compliance; patient and family education; instructions for management; Risk factor reduction; Home health agency benefits    Comments  Yifan Bernal is doing ok  She is feeling tired and states that she did not sleep well in the hospital and not at home either since she came home  She will make her appt with Neurology  She knows to call us at any time with any questions or concerns/any worsening fatigue, etc  She is aware to stop the Ativan as per discharge instructions JMoylDayne Sanchez Noss, DO

## 2019-07-05 NOTE — TELEPHONE ENCOUNTER
Faxed lab orders to professional technicians as per patients requset for home draw    Phone- [phone]  E-fax- [phone]  Fax [phone]

## 2019-07-11 DIAGNOSIS — G35 MULTIPLE SCLEROSIS (HCC): ICD-10-CM

## 2019-07-11 NOTE — TELEPHONE ENCOUNTER
Patient stated her insurance just changed  Now has [facility] and is required to use new specialty pharmacy, [employer] [facility]  [facility] is [facility]  Patient reports she only has enough medication until Tuesday  Patient unsure if it requires a PA      [employer]:  ID [id number]  Bin [id number]  [facility] [address] [facility] [place]

## 2019-07-16 DIAGNOSIS — G35 MULTIPLE SCLEROSIS (HCC): ICD-10-CM

## 2019-07-16 NOTE — TELEPHONE ENCOUNTER
Received a fax form [employer] stating that they have a new ndc for 30 tabs of aubagio  They are requesting a new script with qty of 30    Script entered for [address] [facility] [place] [address]

## 2019-07-19 LAB
ALBUMIN SERPL-MCNC: 4.6 G/DL (ref 3.5–5.5)
ALBUMIN/GLOB SERPL: 1.5 {RATIO} (ref 1.2–2.2)
ALP SERPL-CCNC: 105 IU/L (ref 39–117)
ALT SERPL-CCNC: 17 IU/L (ref 0–32)
AST SERPL-CCNC: 14 IU/L (ref 0–40)
BILIRUB SERPL-MCNC: 0.2 MG/DL (ref 0–1.2)
BUN SERPL-MCNC: 25 MG/DL (ref 6–24)
BUN/CREAT SERPL: 25 (ref 9–23)
CALCIUM SERPL-MCNC: 9.9 MG/DL (ref 8.7–10.2)
CHLORIDE SERPL-SCNC: 100 MMOL/L (ref 96–106)
CO2 SERPL-SCNC: 24 MMOL/L (ref 20–29)
CREAT SERPL-MCNC: 1.02 MG/DL (ref 0.57–1)
GLOBULIN SER-MCNC: 3 G/DL (ref 1.5–4.5)
GLUCOSE SERPL-MCNC: 85 MG/DL (ref 65–99)
HCV AB S/CO SERPL IA: <0.1 S/CO RATIO (ref 0–0.9)
POTASSIUM SERPL-SCNC: 4.4 MMOL/L (ref 3.5–5.2)
PROT SERPL-MCNC: 7.6 G/DL (ref 6–8.5)
SL AMB EGFR AFRICAN AMERICAN: 70 ML/MIN/1.73
SL AMB EGFR NON AFRICAN AMERICAN: 61 ML/MIN/1.73
SODIUM SERPL-SCNC: 141 MMOL/L (ref 134–144)

## 2019-07-29 ENCOUNTER — TELEPHONE (OUTPATIENT)
Dept: NEUROLOGY | Facility: CLINIC | Age: 59
End: 2019-07-29

## 2019-07-29 NOTE — TELEPHONE ENCOUNTER
----- Message from Dallin Staley MD sent at 7/29/2019  6:18 AM EDT -----  Please let pt know her platlets still on low side with most recent labs on 7/25  Pt with plts of 134  This was noted on rachels last note and pcp had referred pt to heme  Please send most recent labs to pcp and heme and please make sure pt has followed up with heme consutlation recommendations

## 2019-08-01 DIAGNOSIS — I10 ESSENTIAL HYPERTENSION: ICD-10-CM

## 2019-08-01 RX ORDER — METOPROLOL TARTRATE 50 MG/1
50 TABLET, FILM COATED ORAL EVERY 12 HOURS SCHEDULED
Qty: 60 TABLET | Refills: 0 | Status: SHIPPED | OUTPATIENT
Start: 2019-08-01 | End: 2019-09-03 | Stop reason: SDUPTHER

## 2019-08-26 DIAGNOSIS — G35 MULTIPLE SCLEROSIS (HCC): ICD-10-CM

## 2019-08-26 RX ORDER — BACLOFEN 10 MG/1
20 TABLET ORAL 3 TIMES DAILY
Qty: 540 TABLET | Refills: 0 | Status: SHIPPED | OUTPATIENT
Start: 2019-08-26 | End: 2019-11-19 | Stop reason: SDUPTHER

## 2019-09-03 DIAGNOSIS — I10 ESSENTIAL HYPERTENSION: ICD-10-CM

## 2019-09-03 RX ORDER — METOPROLOL TARTRATE 50 MG/1
50 TABLET, FILM COATED ORAL EVERY 12 HOURS SCHEDULED
Qty: 60 TABLET | Refills: 0 | Status: SHIPPED | OUTPATIENT
Start: 2019-09-03 | End: 2019-10-07 | Stop reason: SDUPTHER

## 2019-09-03 RX ORDER — TELMISARTAN AND HYDROCHLORTHIAZIDE 80; 12.5 MG/1; MG/1
1 TABLET ORAL DAILY
Qty: 90 TABLET | Refills: 0 | Status: SHIPPED | OUTPATIENT
Start: 2019-09-03 | End: 2020-01-06

## 2019-09-27 ENCOUNTER — DOCUMENTATION (OUTPATIENT)
Dept: OTHER | Facility: HOSPITAL | Age: 59
End: 2019-09-27

## 2019-10-07 DIAGNOSIS — I10 ESSENTIAL HYPERTENSION: ICD-10-CM

## 2019-10-07 DIAGNOSIS — G35 MULTIPLE SCLEROSIS (HCC): ICD-10-CM

## 2019-10-07 RX ORDER — AMANTADINE HYDROCHLORIDE 100 MG/1
CAPSULE, GELATIN COATED ORAL
Qty: 60 CAPSULE | Refills: 5 | Status: SHIPPED | OUTPATIENT
Start: 2019-10-07 | End: 2019-11-08 | Stop reason: SDUPTHER

## 2019-10-07 RX ORDER — METOPROLOL TARTRATE 50 MG/1
50 TABLET, FILM COATED ORAL EVERY 12 HOURS SCHEDULED
Qty: 60 TABLET | Refills: 0 | Status: SHIPPED | OUTPATIENT
Start: 2019-10-07 | End: 2019-11-08 | Stop reason: SDUPTHER

## 2019-11-08 DIAGNOSIS — I10 ESSENTIAL HYPERTENSION: ICD-10-CM

## 2019-11-08 DIAGNOSIS — G35 MULTIPLE SCLEROSIS (HCC): ICD-10-CM

## 2019-11-08 RX ORDER — METOPROLOL TARTRATE 50 MG/1
50 TABLET, FILM COATED ORAL EVERY 12 HOURS SCHEDULED
Qty: 60 TABLET | Refills: 1 | Status: SHIPPED | OUTPATIENT
Start: 2019-11-08 | End: 2020-03-02 | Stop reason: SDUPTHER

## 2019-11-08 RX ORDER — AMANTADINE HYDROCHLORIDE 100 MG/1
100 CAPSULE, GELATIN COATED ORAL 2 TIMES DAILY
Qty: 60 CAPSULE | Refills: 3 | Status: SHIPPED | OUTPATIENT
Start: 2019-11-08 | End: 2020-03-06 | Stop reason: SDUPTHER

## 2019-11-11 ENCOUNTER — TELEPHONE (OUTPATIENT)
Dept: NEUROLOGY | Facility: CLINIC | Age: 59
End: 2019-11-11

## 2019-11-11 NOTE — TELEPHONE ENCOUNTER
Left message for patient to call the office to reschedule the appointment with Dr Rolan Coleman on 12/3/19  Patient can be scheduled with Edie Vega on 12/2/19

## 2019-11-19 DIAGNOSIS — G35 MULTIPLE SCLEROSIS (HCC): ICD-10-CM

## 2019-11-19 RX ORDER — BACLOFEN 10 MG/1
20 TABLET ORAL 3 TIMES DAILY
Qty: 540 TABLET | Refills: 0 | Status: SHIPPED | OUTPATIENT
Start: 2019-11-19 | End: 2020-04-22 | Stop reason: SDUPTHER

## 2019-11-19 NOTE — TELEPHONE ENCOUNTER
pt's mom called and states that pt needs refill of aubagio sent to [facility] specialty pharm and [facility]aclofen to [facility]    scripts entered for auth

## 2019-12-08 ENCOUNTER — HOSPITAL ENCOUNTER (INPATIENT)
Facility: HOSPITAL | Age: 59
LOS: 5 days | Discharge: NURSING, REHAB OR LONG-TERM CARE | DRG: 682 | End: 2019-12-13
Attending: EMERGENCY MEDICINE | Admitting: FAMILY MEDICINE
Payer: COMMERCIAL

## 2019-12-08 DIAGNOSIS — N17.9 ACUTE KIDNEY INJURY (HCC): ICD-10-CM

## 2019-12-08 DIAGNOSIS — R53.1 WEAKNESS: Primary | ICD-10-CM

## 2019-12-08 DIAGNOSIS — G35 MULTIPLE SCLEROSIS (HCC): ICD-10-CM

## 2019-12-08 DIAGNOSIS — R77.8 ELEVATED TROPONIN: ICD-10-CM

## 2019-12-08 DIAGNOSIS — I21.4 NSTEMI (NON-ST ELEVATED MYOCARDIAL INFARCTION) (HCC): ICD-10-CM

## 2019-12-08 DIAGNOSIS — R41.82 ALTERED MENTAL STATUS, UNSPECIFIED ALTERED MENTAL STATUS TYPE: ICD-10-CM

## 2019-12-08 DIAGNOSIS — F11.20 UNCOMPLICATED OPIOID DEPENDENCE (HCC): ICD-10-CM

## 2019-12-08 DIAGNOSIS — R26.2 AMBULATORY DYSFUNCTION: ICD-10-CM

## 2019-12-08 DIAGNOSIS — E86.0 DEHYDRATION: ICD-10-CM

## 2019-12-08 PROBLEM — R79.89 ELEVATED TROPONIN: Status: ACTIVE | Noted: 2019-12-08

## 2019-12-08 LAB
ALBUMIN SERPL BCP-MCNC: 3.5 G/DL (ref 3.5–5)
ALP SERPL-CCNC: 106 U/L (ref 46–116)
ALT SERPL W P-5'-P-CCNC: 32 U/L (ref 12–78)
ANION GAP SERPL CALCULATED.3IONS-SCNC: 9 MMOL/L (ref 4–13)
APTT PPP: 27 SECONDS (ref 23–37)
AST SERPL W P-5'-P-CCNC: 21 U/L (ref 5–45)
BACTERIA UR QL AUTO: ABNORMAL /HPF
BASOPHILS # BLD AUTO: 0.03 THOUSANDS/ΜL (ref 0–0.1)
BASOPHILS NFR BLD AUTO: 1 % (ref 0–1)
BILIRUB SERPL-MCNC: 0.4 MG/DL (ref 0.2–1)
BILIRUB UR QL STRIP: NEGATIVE
BUN SERPL-MCNC: 54 MG/DL (ref 5–25)
CALCIUM SERPL-MCNC: 9.9 MG/DL (ref 8.3–10.1)
CHLORIDE SERPL-SCNC: 103 MMOL/L (ref 100–108)
CK MB SERPL-MCNC: 1.4 % (ref 0–2.5)
CK MB SERPL-MCNC: 5 NG/ML (ref 0–5)
CK SERPL-CCNC: 359 U/L (ref 26–192)
CLARITY UR: CLEAR
CO2 SERPL-SCNC: 30 MMOL/L (ref 21–32)
COLOR UR: YELLOW
CREAT SERPL-MCNC: 2.02 MG/DL (ref 0.6–1.3)
EOSINOPHIL # BLD AUTO: 0.1 THOUSAND/ΜL (ref 0–0.61)
EOSINOPHIL NFR BLD AUTO: 2 % (ref 0–6)
ERYTHROCYTE [DISTWIDTH] IN BLOOD BY AUTOMATED COUNT: 14.6 % (ref 11.6–15.1)
GFR SERPL CREATININE-BSD FRML MDRD: 26 ML/MIN/1.73SQ M
GLUCOSE SERPL-MCNC: 107 MG/DL (ref 65–140)
GLUCOSE UR STRIP-MCNC: NEGATIVE MG/DL
HCT VFR BLD AUTO: 35.9 % (ref 34.8–46.1)
HGB BLD-MCNC: 11.4 G/DL (ref 11.5–15.4)
HGB UR QL STRIP.AUTO: ABNORMAL
HYALINE CASTS #/AREA URNS LPF: ABNORMAL /LPF
IMM GRANULOCYTES # BLD AUTO: 0.01 THOUSAND/UL (ref 0–0.2)
IMM GRANULOCYTES NFR BLD AUTO: 0 % (ref 0–2)
INR PPP: 1.02 (ref 0.91–1.09)
KETONES UR STRIP-MCNC: NEGATIVE MG/DL
LACTATE SERPL-SCNC: 1 MMOL/L (ref 0.5–2)
LEUKOCYTE ESTERASE UR QL STRIP: NEGATIVE
LYMPHOCYTES # BLD AUTO: 1.49 THOUSANDS/ΜL (ref 0.6–4.47)
LYMPHOCYTES NFR BLD AUTO: 28 % (ref 14–44)
MCH RBC QN AUTO: 28.4 PG (ref 26.8–34.3)
MCHC RBC AUTO-ENTMCNC: 31.8 G/DL (ref 31.4–37.4)
MCV RBC AUTO: 89 FL (ref 82–98)
MONOCYTES # BLD AUTO: 0.56 THOUSAND/ΜL (ref 0.17–1.22)
MONOCYTES NFR BLD AUTO: 11 % (ref 4–12)
NEUTROPHILS # BLD AUTO: 3.06 THOUSANDS/ΜL (ref 1.85–7.62)
NEUTS SEG NFR BLD AUTO: 58 % (ref 43–75)
NITRITE UR QL STRIP: NEGATIVE
NON-SQ EPI CELLS URNS QL MICRO: ABNORMAL /HPF
NRBC BLD AUTO-RTO: 0 /100 WBCS
PH UR STRIP.AUTO: 5.5 [PH]
PLATELET # BLD AUTO: 112 THOUSANDS/UL (ref 149–390)
PLATELET # BLD AUTO: 127 THOUSANDS/UL (ref 149–390)
PMV BLD AUTO: 14.1 FL (ref 8.9–12.7)
POTASSIUM SERPL-SCNC: 3.6 MMOL/L (ref 3.5–5.3)
PROT SERPL-MCNC: 8.1 G/DL (ref 6.4–8.2)
PROT UR STRIP-MCNC: ABNORMAL MG/DL
PROTHROMBIN TIME: 11 SECONDS (ref 9.8–12)
RBC # BLD AUTO: 4.02 MILLION/UL (ref 3.81–5.12)
RBC #/AREA URNS AUTO: ABNORMAL /HPF
SODIUM SERPL-SCNC: 142 MMOL/L (ref 136–145)
SP GR UR STRIP.AUTO: 1.02 (ref 1–1.03)
TROPONIN I SERPL-MCNC: 0.07 NG/ML
TROPONIN I SERPL-MCNC: 0.09 NG/ML
UROBILINOGEN UR QL STRIP.AUTO: 0.2 E.U./DL
WBC # BLD AUTO: 5.25 THOUSAND/UL (ref 4.31–10.16)
WBC #/AREA URNS AUTO: ABNORMAL /HPF

## 2019-12-08 PROCEDURE — 85730 THROMBOPLASTIN TIME PARTIAL: CPT | Performed by: EMERGENCY MEDICINE

## 2019-12-08 PROCEDURE — 36415 COLL VENOUS BLD VENIPUNCTURE: CPT | Performed by: EMERGENCY MEDICINE

## 2019-12-08 PROCEDURE — 81001 URINALYSIS AUTO W/SCOPE: CPT | Performed by: EMERGENCY MEDICINE

## 2019-12-08 PROCEDURE — G0008 ADMIN INFLUENZA VIRUS VAC: HCPCS | Performed by: FAMILY MEDICINE

## 2019-12-08 PROCEDURE — 85049 AUTOMATED PLATELET COUNT: CPT | Performed by: FAMILY MEDICINE

## 2019-12-08 PROCEDURE — 84484 ASSAY OF TROPONIN QUANT: CPT | Performed by: FAMILY MEDICINE

## 2019-12-08 PROCEDURE — 87086 URINE CULTURE/COLONY COUNT: CPT | Performed by: EMERGENCY MEDICINE

## 2019-12-08 PROCEDURE — 96360 HYDRATION IV INFUSION INIT: CPT

## 2019-12-08 PROCEDURE — 99223 1ST HOSP IP/OBS HIGH 75: CPT | Performed by: FAMILY MEDICINE

## 2019-12-08 PROCEDURE — 87040 BLOOD CULTURE FOR BACTERIA: CPT | Performed by: EMERGENCY MEDICINE

## 2019-12-08 PROCEDURE — 82550 ASSAY OF CK (CPK): CPT | Performed by: FAMILY MEDICINE

## 2019-12-08 PROCEDURE — 93005 ELECTROCARDIOGRAM TRACING: CPT

## 2019-12-08 PROCEDURE — 90682 RIV4 VACC RECOMBINANT DNA IM: CPT | Performed by: FAMILY MEDICINE

## 2019-12-08 PROCEDURE — 99285 EMERGENCY DEPT VISIT HI MDM: CPT

## 2019-12-08 PROCEDURE — 83605 ASSAY OF LACTIC ACID: CPT | Performed by: EMERGENCY MEDICINE

## 2019-12-08 PROCEDURE — 87081 CULTURE SCREEN ONLY: CPT | Performed by: FAMILY MEDICINE

## 2019-12-08 PROCEDURE — 82553 CREATINE MB FRACTION: CPT | Performed by: FAMILY MEDICINE

## 2019-12-08 PROCEDURE — 85025 COMPLETE CBC W/AUTO DIFF WBC: CPT | Performed by: EMERGENCY MEDICINE

## 2019-12-08 PROCEDURE — 80053 COMPREHEN METABOLIC PANEL: CPT | Performed by: EMERGENCY MEDICINE

## 2019-12-08 PROCEDURE — 85610 PROTHROMBIN TIME: CPT | Performed by: EMERGENCY MEDICINE

## 2019-12-08 PROCEDURE — 84484 ASSAY OF TROPONIN QUANT: CPT | Performed by: EMERGENCY MEDICINE

## 2019-12-08 RX ORDER — PANTOPRAZOLE SODIUM 40 MG/1
40 TABLET, DELAYED RELEASE ORAL
Status: DISCONTINUED | OUTPATIENT
Start: 2019-12-09 | End: 2019-12-13 | Stop reason: HOSPADM

## 2019-12-08 RX ORDER — HEPARIN SODIUM 5000 [USP'U]/ML
5000 INJECTION, SOLUTION INTRAVENOUS; SUBCUTANEOUS EVERY 8 HOURS SCHEDULED
Status: DISCONTINUED | OUTPATIENT
Start: 2019-12-08 | End: 2019-12-13 | Stop reason: HOSPADM

## 2019-12-08 RX ORDER — AMANTADINE HYDROCHLORIDE 100 MG/1
100 CAPSULE, GELATIN COATED ORAL 2 TIMES DAILY
Status: DISCONTINUED | OUTPATIENT
Start: 2019-12-08 | End: 2019-12-08 | Stop reason: SDUPTHER

## 2019-12-08 RX ORDER — BACLOFEN 10 MG/1
20 TABLET ORAL 3 TIMES DAILY
Status: DISCONTINUED | OUTPATIENT
Start: 2019-12-08 | End: 2019-12-13 | Stop reason: HOSPADM

## 2019-12-08 RX ORDER — AMANTADINE HYDROCHLORIDE 100 MG/1
100 CAPSULE, GELATIN COATED ORAL EVERY OTHER DAY
Status: DISCONTINUED | OUTPATIENT
Start: 2019-12-09 | End: 2019-12-13 | Stop reason: HOSPADM

## 2019-12-08 RX ORDER — OXYCODONE HYDROCHLORIDE AND ACETAMINOPHEN 5; 325 MG/1; MG/1
2 TABLET ORAL EVERY 8 HOURS PRN
Status: DISCONTINUED | OUTPATIENT
Start: 2019-12-08 | End: 2019-12-13 | Stop reason: HOSPADM

## 2019-12-08 RX ORDER — OXYCODONE HYDROCHLORIDE AND ACETAMINOPHEN 5; 325 MG/1; MG/1
2 TABLET ORAL EVERY 8 HOURS PRN
Status: DISCONTINUED | OUTPATIENT
Start: 2019-12-08 | End: 2019-12-08

## 2019-12-08 RX ORDER — ACETAMINOPHEN 325 MG/1
650 TABLET ORAL EVERY 6 HOURS PRN
Status: DISCONTINUED | OUTPATIENT
Start: 2019-12-08 | End: 2019-12-13 | Stop reason: HOSPADM

## 2019-12-08 RX ORDER — ONDANSETRON 2 MG/ML
4 INJECTION INTRAMUSCULAR; INTRAVENOUS EVERY 6 HOURS PRN
Status: DISCONTINUED | OUTPATIENT
Start: 2019-12-08 | End: 2019-12-13 | Stop reason: HOSPADM

## 2019-12-08 RX ORDER — SODIUM CHLORIDE 450 MG/100ML
75 INJECTION, SOLUTION INTRAVENOUS CONTINUOUS
Status: DISCONTINUED | OUTPATIENT
Start: 2019-12-08 | End: 2019-12-13 | Stop reason: HOSPADM

## 2019-12-08 RX ORDER — METOPROLOL TARTRATE 50 MG/1
50 TABLET, FILM COATED ORAL EVERY 12 HOURS SCHEDULED
Status: DISCONTINUED | OUTPATIENT
Start: 2019-12-08 | End: 2019-12-13 | Stop reason: HOSPADM

## 2019-12-08 RX ADMIN — OXYCODONE HYDROCHLORIDE AND ACETAMINOPHEN 2 TABLET: 5; 325 TABLET ORAL at 20:19

## 2019-12-08 RX ADMIN — SODIUM CHLORIDE 75 ML/HR: 0.45 INJECTION, SOLUTION INTRAVENOUS at 20:01

## 2019-12-08 RX ADMIN — INFLUENZA A VIRUS A/BRISBANE/02/2018 (H1N1) RECOMBINANT HEMAGGLUTININ ANTIGEN, INFLUENZA A VIRUS A/KANSAS/14/2017 (H3N2) RECOMBINANT HEMAGGLUTININ ANTIGEN, INFLUENZA B VIRUS B/PHUKET/3073/2013 RECOMBINANT HEMAGGLUTININ ANTIGEN, AND INFLUENZA B VIRUS B/MARYLAND/15/2016 RECOMBINANT HEMAGGLUTININ ANTIGEN 0.5 ML: 45; 45; 45; 45 INJECTION INTRAMUSCULAR at 20:26

## 2019-12-08 RX ADMIN — METOPROLOL TARTRATE 50 MG: 50 TABLET, FILM COATED ORAL at 20:20

## 2019-12-08 RX ADMIN — HEPARIN SODIUM 5000 UNITS: 5000 INJECTION INTRAVENOUS; SUBCUTANEOUS at 23:00

## 2019-12-08 RX ADMIN — SODIUM CHLORIDE 1000 ML: 0.9 INJECTION, SOLUTION INTRAVENOUS at 16:27

## 2019-12-08 RX ADMIN — BACLOFEN 20 MG: 10 TABLET ORAL at 20:20

## 2019-12-08 NOTE — ED PROVIDER NOTES
History  Chief Complaint   Patient presents with    Weakness - Generalized     patient c/o generalized weakness x about one month, not getting out of bed, family states decreased oral intake, and severe right hip pain that patient states is her chronic sciatica   Hip Pain     62 yo female with h/o MS c/o generalized weakness x one month  C/o severe right hip pain with h/o sciatica, has been a chronic problem  No fall or injury  Having trouble getting out of bed due to weakness  Usually can get herself into wheelchair and get around her house but she hasn't really been out of bed for a month   + not eating or drinking as well as usual   No chest pain, sob, vomiting, diarrhea, cough, cold symptoms  Stools not dark or discolored  History provided by:  Patient   used: No        Prior to Admission Medications   Prescriptions Last Dose Informant Patient Reported? Taking?    Teriflunomide (AUBAGIO) 14 MG TABS   No Yes   Sig: Take 1 tablet (14 mg total) by mouth daily   amantadine (SYMMETREL) 100 mg capsule   No Yes   Sig: Take 1 capsule (100 mg total) by mouth 2 (two) times a day   baclofen 10 mg tablet   No Yes   Sig: Take 2 tablets (20 mg total) by mouth 3 (three) times a day   esomeprazole (NEXIUM) 40 MG capsule   No Yes   Sig: Take 1 capsule (40 mg total) by mouth daily before breakfast   meloxicam (MOBIC) 15 mg tablet  Self Yes Yes   Sig: Take 15 mg by mouth daily     metoprolol tartrate (LOPRESSOR) 50 mg tablet   No Yes   Sig: Take 1 tablet (50 mg total) by mouth every 12 (twelve) hours   oxyCODONE-acetaminophen (PERCOCET)  mg per tablet  Self Yes Yes   Sig: Take 1 tablet by mouth every 8 (eight) hours as needed for moderate pain   telmisartan-hydrochlorothiazide (MICARDIS HCT) 80-12 5 MG per tablet   No Yes   Sig: Take 1 tablet by mouth daily      Facility-Administered Medications: None       Past Medical History:   Diagnosis Date    Chronic back pain     Depression     last assessed 06/10/14    High cholesterol     Hypertension     last assessed 17    MS (multiple sclerosis) ([facility] [address] )     last assessed 17    RLL pneumonia ([facility] )     last assessed 06/10/14       Past Surgical History:   Procedure Laterality Date    ANKLE SURGERY Right     Treatment of Ankle  last assessed 06/10/14   [place] [facility] BACK SURGERY      for spinal stenosis and sciatica     SECTION      last assessed 06/10/14    KNEE ARTHROSCOPY      last assessed 06/10/14    LAMINECTOMY      last assessed 06/10/14    ORTHOPEDIC SURGERY      OTHER SURGICAL HISTORY      discectomy       Family History   Problem Relation Age of Onset    Osteoporosis Mother     Heart attack Father     Mental illness Neg Hx      I have reviewed and agree with the history as documented  Social History     Tobacco Use    Smoking status: Former Smoker     Packs/day: 0 00    Smokeless tobacco: Never Used    Tobacco comment: about 4 cigs daily as per allscripts   Substance Use Topics    Alcohol use: Yes     Comment: rare // no alcohol use as per allscripts    Drug use: No        Review of Systems   Constitutional: Positive for activity change and appetite change  Negative for fever  HENT: Negative  Negative for congestion  Eyes: Negative  Respiratory: Negative  Negative for cough and shortness of breath  Cardiovascular: Negative  Negative for chest pain  Gastrointestinal: Negative  Negative for abdominal pain, diarrhea, nausea and vomiting  Genitourinary: Negative  Negative for dysuria and flank pain  Musculoskeletal: Negative  Negative for back pain and myalgias  Skin: Negative  Negative for rash and wound  Neurological: Positive for weakness  Negative for dizziness and headaches  Hematological: Does not bruise/bleed easily  Psychiatric/Behavioral: Negative  All other systems reviewed and are negative        Physical Exam  Physical Exam   Constitutional: She is oriented to person, place, and time  She appears well-developed  No distress  Hypothermic tympanic, recheck oral 97 7; appears weak and ill   HENT:   Head: Normocephalic and atraumatic  Mm dry   Eyes: Pupils are equal, round, and reactive to light  Conjunctivae are normal    Neck: Normal range of motion  Neck supple  Cardiovascular: Normal rate, regular rhythm, normal heart sounds and intact distal pulses  No murmur heard  Pulmonary/Chest: Effort normal and breath sounds normal  No respiratory distress  Abdominal: Soft  Bowel sounds are normal  She exhibits no distension  There is no tenderness  Musculoskeletal: Normal range of motion  She exhibits edema  She exhibits no deformity  No motor function lower ext, can  and hold up arms to some degree   Neurological: She is alert and oriented to person, place, and time  No cranial nerve deficit  She exhibits abnormal muscle tone  Skin: Skin is warm and dry  No rash noted  She is not diaphoretic  No erythema  There is pallor  Psychiatric: She has a normal mood and affect  Her behavior is normal    Nursing note and vitals reviewed        Vital Signs  ED Triage Vitals [12/08/19 1612]   Temperature Pulse Respirations Blood Pressure SpO2   (!) 96 5 °F (35 8 °C) 82 18 151/78 95 %      Temp Source Heart Rate Source Patient Position - Orthostatic VS BP Location FiO2 (%)   Tympanic Monitor Lying Right arm --      Pain Score       Worst Possible Pain           Vitals:    12/08/19 1612   BP: 151/78   Pulse: 82   Patient Position - Orthostatic VS: Lying         Visual Acuity      ED Medications  Medications   sodium chloride 0 9 % bolus 1,000 mL (1,000 mL Intravenous New Bag 12/8/19 1627)       Diagnostic Studies  Results Reviewed     Procedure Component Value Units Date/Time    UA (URINE) with reflex to Scope [[id number]]  (Abnormal) Collected:  12/08/19 1707    Lab Status:  Final result Specimen:  Urine, Straight Cath Updated:  12/08/19 1714     Color, UA Yellow     Clarity, UA Clear Specific Gravity, UA 1 025     pH, UA 5 5     Leukocytes, UA Negative     Nitrite, UA Negative     Protein, UA Trace mg/dl      Glucose, UA Negative mg/dl      Ketones, UA Negative mg/dl      Urobilinogen, UA 0 2 E U /dl      Bilirubin, UA Negative     Blood, UA Trace-Intact    Urine Microscopic [[id number]] Collected:  12/08/19 1707    Lab Status: In process Specimen:  Urine, Straight Cath Updated:  12/08/19 1714    Urine culture [[id number]] Collected:  12/08/19 1707    Lab Status: In process Specimen:  Urine, Other Updated:  12/08/19 1710    Lactic acid, plasma [[id number]]  (Normal) Collected:  12/08/19 1619    Lab Status:  Final result Specimen:  Blood from Arm, Right Updated:  12/08/19 1653     LACTIC ACID 1 0 mmol/L     Narrative:       Result may be elevated if tourniquet was used during collection      Troponin I [[id number]]  (Abnormal) Collected:  12/08/19 1619    Lab Status:  Final result Specimen:  Blood from Arm, Right Updated:  12/08/19 1652     Troponin I 0 07 ng/mL     Comprehensive metabolic panel [[id number]]  (Abnormal) Collected:  12/08/19 1619    Lab Status:  Final result Specimen:  Blood from Arm, Right Updated:  12/08/19 1647     Sodium 142 mmol/L      Potassium 3 6 mmol/L      Chloride 103 mmol/L      CO2 30 mmol/L      ANION GAP 9 mmol/L      BUN 54 mg/dL      Creatinine 2 02 mg/dL      Glucose 107 mg/dL      Calcium 9 9 mg/dL      AST 21 U/L      ALT 32 U/L      Alkaline Phosphatase 106 U/L      Total Protein 8 1 g/dL      Albumin 3 5 g/dL      Total Bilirubin 0 40 mg/dL      eGFR 26 ml/min/1 73sq m     Narrative:       Stephanie guidelines for Chronic Kidney Disease (CKD):     Stage 1 with normal or high GFR (GFR > 90 mL/min/1 73 square meters)    Stage 2 Mild CKD (GFR = 60-89 mL/min/1 73 square meters)    Stage 3A Moderate CKD (GFR = 45-59 mL/min/1 73 square meters)    Stage 3B Moderate CKD (GFR = 30-44 mL/min/1 73 square meters)    Stage 4 Severe CKD (GFR = 15-29 mL/min/1 73 square meters)    Stage 5 End Stage CKD (GFR <15 mL/min/1 73 square meters)  Note: GFR calculation is accurate only with a steady state creatinine    Protime-INR [[id number]]  (Normal) Collected:  12/08/19 1619    Lab Status:  Final result Specimen:  Blood from Arm, Right Updated:  12/08/19 1644     Protime 11 0 seconds      INR 1 02    APTT [[id number]]  (Normal) Collected:  12/08/19 1619    Lab Status:  Final result Specimen:  Blood from Arm, Right Updated:  12/08/19 1644     PTT 27 seconds     CBC and differential [[id number]]  (Abnormal) Collected:  12/08/19 1619    Lab Status:  Final result Specimen:  Blood from Arm, Right Updated:  12/08/19 1634     WBC 5 25 Thousand/uL      RBC 4 02 Million/uL      Hemoglobin 11 4 g/dL      Hematocrit 35 9 %      MCV 89 fL      MCH 28 4 pg      MCHC 31 8 g/dL      RDW 14 6 %      Platelets 475 Thousands/uL      nRBC 0 /100 WBCs      Neutrophils Relative 58 %      Immat GRANS % 0 %      Lymphocytes Relative 28 %      Monocytes Relative 11 %      Eosinophils Relative 2 %      Basophils Relative 1 %      Neutrophils Absolute 3 06 Thousands/µL      Immature Grans Absolute 0 01 Thousand/uL      Lymphocytes Absolute 1 49 Thousands/µL      Monocytes Absolute 0 56 Thousand/µL      Eosinophils Absolute 0 10 Thousand/µL      Basophils Absolute 0 03 Thousands/µL     Narrative: This is an appended report  These results have been appended to a previously verified report  Blood culture #2 [[id number]] Collected:  12/08/19 1625    Lab Status: In process Specimen:  Blood from Arm, Left Updated:  12/08/19 1629    Blood culture #1 [[id number]] Collected:  12/08/19 1619    Lab Status:   In process Specimen:  Blood from Arm, Right Updated:  12/08/19 1626                 No orders to display              Procedures  ECG 12 Lead Documentation Only  Date/Time: 12/8/2019 4:38 PM  Performed by: Kelly Vazquez MD  Authorized by: Kelly Vazquez MD     Indications / Diagnosis: Weakness  ECG reviewed by me, the ED Provider: yes    Patient location:  ED  Previous ECG:     Previous ECG:  Compared to current    Similarity:  Changes noted  Interpretation:     Interpretation: abnormal    Rate:     ECG rate:  70    ECG rate assessment: normal    Rhythm:     Rhythm: sinus rhythm    Ectopy:     Ectopy: none    QRS:     QRS axis:  Normal  Conduction:     Conduction: normal    ST segments:     ST segments:  Abnormal    Depression:  V4, V5 and V6  T waves:     T waves: inverted      Inverted:  I and aVL             ED Course                               MDM  Number of Diagnoses or Management Options  Acute kidney injury [facility]):   Dehydration:   Elevated troponin:   Weakness:   Diagnosis management comments: Will admit for ARBEN, elevated troponin with nonspecific EKG changes  Getting IVF  Disposition  Final diagnoses:   Weakness   Dehydration   Acute kidney injury (HCC)   Elevated troponin   Ambulatory dysfunction     Time reflects when diagnosis was documented in both MDM as applicable and the Disposition within this note     Time User Action Codes Description Comment    34/8/7199  0:86 PM Cleveland Jose A Add [E02 1] Weakness     93/4/2959  9:98 PM Samuel Climes Add [Q51 6] Dehydration     59/7/1218  8:92 PM Gera Jose A Add [F49 2] Acute kidney injury ([facility] [address] )     20/7/8227  5:40 PM Samuel Climes Add [Y86 77] Elevated troponin     31/3/7118  8:68 PM Samuel Climes Add [L98 9] Ambulatory dysfunction       ED Disposition     ED Disposition Condition Date/Time Comment    Admit Stable Sun Dec 8, 2019  5:21 PM Case was discussed with **Dr Robinson Fitch* and the patient's admission status was agreed to be Admission Status: inpatient status to the service of Dr Phylicia Marks*   Follow-up Information    None         Patient's Medications   Discharge Prescriptions    No medications on file     No discharge procedures on file      ED Provider  Electronically Signed by           Hortensia Dawson MD  12/08/19 2900

## 2019-12-09 ENCOUNTER — TELEPHONE (OUTPATIENT)
Dept: NEUROLOGY | Facility: CLINIC | Age: 59
End: 2019-12-09

## 2019-12-09 PROBLEM — I21.4 NSTEMI (NON-ST ELEVATED MYOCARDIAL INFARCTION) (HCC): Status: ACTIVE | Noted: 2019-12-08

## 2019-12-09 LAB
ANION GAP SERPL CALCULATED.3IONS-SCNC: 9 MMOL/L (ref 4–13)
ATRIAL RATE: 70 BPM
BACTERIA UR CULT: NORMAL
BUN SERPL-MCNC: 46 MG/DL (ref 5–25)
CALCIUM SERPL-MCNC: 8.5 MG/DL (ref 8.3–10.1)
CHLORIDE SERPL-SCNC: 106 MMOL/L (ref 100–108)
CHOLEST SERPL-MCNC: 207 MG/DL (ref 50–200)
CO2 SERPL-SCNC: 25 MMOL/L (ref 21–32)
CREAT SERPL-MCNC: 1.7 MG/DL (ref 0.6–1.3)
ERYTHROCYTE [DISTWIDTH] IN BLOOD BY AUTOMATED COUNT: 15.1 % (ref 11.6–15.1)
GFR SERPL CREATININE-BSD FRML MDRD: 33 ML/MIN/1.73SQ M
GLUCOSE SERPL-MCNC: 68 MG/DL (ref 65–140)
HCT VFR BLD AUTO: 32.2 % (ref 34.8–46.1)
HDLC SERPL-MCNC: 37 MG/DL
HGB BLD-MCNC: 9.6 G/DL (ref 11.5–15.4)
LDLC SERPL CALC-MCNC: 141 MG/DL (ref 0–100)
MAGNESIUM SERPL-MCNC: 1.7 MG/DL (ref 1.6–2.6)
MCH RBC QN AUTO: 28.7 PG (ref 26.8–34.3)
MCHC RBC AUTO-ENTMCNC: 29.8 G/DL (ref 31.4–37.4)
MCV RBC AUTO: 93 FL (ref 82–98)
P AXIS: 67 DEGREES
PLATELET # BLD AUTO: 109 THOUSANDS/UL (ref 149–390)
POTASSIUM SERPL-SCNC: 3.5 MMOL/L (ref 3.5–5.3)
PR INTERVAL: 154 MS
QRS AXIS: 85 DEGREES
QRSD INTERVAL: 76 MS
QT INTERVAL: 418 MS
QTC INTERVAL: 451 MS
RBC # BLD AUTO: 3.34 MILLION/UL (ref 3.81–5.12)
SODIUM SERPL-SCNC: 140 MMOL/L (ref 136–145)
T WAVE AXIS: 107 DEGREES
TRIGL SERPL-MCNC: 144 MG/DL
TROPONIN I SERPL-MCNC: 0.11 NG/ML
VENTRICULAR RATE: 70 BPM
WBC # BLD AUTO: 5.45 THOUSAND/UL (ref 4.31–10.16)

## 2019-12-09 PROCEDURE — 84484 ASSAY OF TROPONIN QUANT: CPT | Performed by: FAMILY MEDICINE

## 2019-12-09 PROCEDURE — 97167 OT EVAL HIGH COMPLEX 60 MIN: CPT

## 2019-12-09 PROCEDURE — G8988 SELF CARE GOAL STATUS: HCPCS

## 2019-12-09 PROCEDURE — G8987 SELF CARE CURRENT STATUS: HCPCS

## 2019-12-09 PROCEDURE — 99222 1ST HOSP IP/OBS MODERATE 55: CPT | Performed by: INTERNAL MEDICINE

## 2019-12-09 PROCEDURE — 99232 SBSQ HOSP IP/OBS MODERATE 35: CPT | Performed by: FAMILY MEDICINE

## 2019-12-09 PROCEDURE — 80061 LIPID PANEL: CPT | Performed by: STUDENT IN AN ORGANIZED HEALTH CARE EDUCATION/TRAINING PROGRAM

## 2019-12-09 PROCEDURE — 93010 ELECTROCARDIOGRAM REPORT: CPT | Performed by: INTERNAL MEDICINE

## 2019-12-09 PROCEDURE — G8979 MOBILITY GOAL STATUS: HCPCS

## 2019-12-09 PROCEDURE — G8978 MOBILITY CURRENT STATUS: HCPCS

## 2019-12-09 PROCEDURE — 80048 BASIC METABOLIC PNL TOTAL CA: CPT | Performed by: FAMILY MEDICINE

## 2019-12-09 PROCEDURE — 84484 ASSAY OF TROPONIN QUANT: CPT | Performed by: NURSE PRACTITIONER

## 2019-12-09 PROCEDURE — 85027 COMPLETE CBC AUTOMATED: CPT | Performed by: FAMILY MEDICINE

## 2019-12-09 PROCEDURE — 97110 THERAPEUTIC EXERCISES: CPT

## 2019-12-09 PROCEDURE — 83735 ASSAY OF MAGNESIUM: CPT | Performed by: FAMILY MEDICINE

## 2019-12-09 PROCEDURE — 97163 PT EVAL HIGH COMPLEX 45 MIN: CPT

## 2019-12-09 RX ORDER — POTASSIUM CHLORIDE 20 MEQ/1
20 TABLET, EXTENDED RELEASE ORAL ONCE
Status: COMPLETED | OUTPATIENT
Start: 2019-12-09 | End: 2019-12-09

## 2019-12-09 RX ADMIN — BACLOFEN 20 MG: 10 TABLET ORAL at 15:27

## 2019-12-09 RX ADMIN — PANTOPRAZOLE SODIUM 40 MG: 40 TABLET, DELAYED RELEASE ORAL at 05:33

## 2019-12-09 RX ADMIN — BACLOFEN 20 MG: 10 TABLET ORAL at 21:32

## 2019-12-09 RX ADMIN — SODIUM CHLORIDE 75 ML/HR: 0.45 INJECTION, SOLUTION INTRAVENOUS at 10:17

## 2019-12-09 RX ADMIN — HEPARIN SODIUM 5000 UNITS: 5000 INJECTION INTRAVENOUS; SUBCUTANEOUS at 21:32

## 2019-12-09 RX ADMIN — POTASSIUM CHLORIDE 20 MEQ: 1500 TABLET, EXTENDED RELEASE ORAL at 10:09

## 2019-12-09 RX ADMIN — BACLOFEN 20 MG: 10 TABLET ORAL at 10:01

## 2019-12-09 RX ADMIN — METOPROLOL TARTRATE 50 MG: 50 TABLET, FILM COATED ORAL at 10:02

## 2019-12-09 RX ADMIN — METOPROLOL TARTRATE 50 MG: 50 TABLET, FILM COATED ORAL at 21:32

## 2019-12-09 RX ADMIN — OXYCODONE HYDROCHLORIDE AND ACETAMINOPHEN 2 TABLET: 5; 325 TABLET ORAL at 10:12

## 2019-12-09 RX ADMIN — HEPARIN SODIUM 5000 UNITS: 5000 INJECTION INTRAVENOUS; SUBCUTANEOUS at 05:33

## 2019-12-09 RX ADMIN — HEPARIN SODIUM 5000 UNITS: 5000 INJECTION INTRAVENOUS; SUBCUTANEOUS at 15:00

## 2019-12-09 RX ADMIN — AMANTADINE 100 MG: 100 CAPSULE ORAL at 11:20

## 2019-12-09 NOTE — CONSULTS
Consultation - Cardiology   [facility]     Lorenzo Mobley 61 y o  female MRN: [id number]  : 1960  Unit/Bed#: [address] [place] [address] Encounter: [id number]      Assessment & Plan     1  NSTEMI- troponin 0 09> 0 11> 0 11>0  11  EKG with T-wave inversions  Will obtain stress test   2  Grade 1 diastolic dysfunction - 15/43/8295 echo revealed ejection fraction 55%, mild concentric hypertrophy, grade 1 diastolic dysfunction, mild mitral valve regurgitation, mild pulmonic valve regurgitation  3  Hypertension- over last 24 hours, systolic blood pressures ranging from 303-256, diastolic blood pressures ranging from 65-86  Lopressor 50 mg p o  q 12h   4  ARBEN-  creatinine upon admission 2 02, down trending to 1 70  Baseline creatinine appears to be around 0 8-1   1/2 NS at 75 cc/hr  Patient has had recent decreased appetite /intake, component of dehydration  Avoid nephrotoxic agents  5  Hyperlipidemia - not currently on statin, follow-up lipid panel  6  Multiple sclerosis - teriflunomide 40 mg p o  q d , baclofen 20 mg p o  t i d , amantadine 100 mg p o  q every other day  Patient last hospitalized 19 to 19 for MS flare  Sinus tachycardia noted during that hospital stay  Patient upcoming appointment with neurologist on   Patient states she has not yet communicated recent increased weakness to neurologist   PT/OT  7  Thrombocytopenia- platelets today [phone], downtrending from yesterday 112,000  Patient currently receiving subcu heparin, continue to closely monitor platelet count and discontinue as appropriate  8  Chronic back pain- status post back surgery, Percocet 5-325 mg 2 tablets p o  q 8h p r n  Thank you for your consultation      Physician Requesting Consult: Jimmye Boast, DO    Reason for Consult / Principal Problem: NSTMEI          Inpatient consult to Cardiology     Performed by  Carolyn Jacobs DO     Authorized by Jimmye Boast, DO              HPI: Lorenzo Galvanashly Mobley is a 61y o  year old female with past medical history of multiple sclerosis, grade 1 diastolic dysfunction, hypertension, hyperlipidemia, and chronic back pain who presents with increased generalized weakness progressively worsening x1 month  Patient lives with 2 sons, younger son is caregiver  Patient was diagnosed with multiple sclerosis in 2009  Patient states she has been primarily wheelchair-bound since of about 60 years ago  Patient states she has not attempted to take steps "in a while"  Patient reports she is able to rise into chair lift at home and "all of a sudden I couldn't"  Patient's aunt is present with patient who also contributes to history  And reports that son stated patient seemed "loopy"  Aunt states patient was "probably delirious because she was dehydrated"  Patient has had decreased appetite in the last month as well  She denies any difficulty swallowing  Patient denies any current chest pain  Patient denies any chest pain at home with exertion or rest   Patient denies any shortness of breath  Patient denies abdominal pain, fever, chills, dysuria, hematochezia, melena, lightheadedness, dizziness, syncope, headache, or visual disturbance  She admits to weakness especially in bilateral upper and lower extremities  She admits to chronic back pain with sciatica  Patient reports that father had MI at 52years old, passed away at 80years old  Admits to tobacco use during teenage years, about 1 pack per day, quit last year       Review of Systems     As noted in HPI    Historical Information   Past Medical History:   Diagnosis Date    Chronic back pain     Depression     last assessed 06/10/14    High cholesterol     Hypertension     last assessed 12/08/17    [facility] )     last assessed 12/08/17    [facility]     last assessed 06/10/14     Past Surgical History:   Procedure Laterality Date    ANKLE SURGERY Right     Treatment of Ankle  last assessed 06/10/14    BACK SURGERY      for spinal stenosis and sciatica     SECTION      last assessed 06/10/14    KNEE ARTHROSCOPY      last assessed 06/10/14    LAMINECTOMY      last assessed 06/10/14    ORTHOPEDIC SURGERY      OTHER SURGICAL HISTORY      discectomy     Social History     Substance and Sexual Activity   Alcohol Use Yes    Comment: rare // no alcohol use as per allscripts     Social History     Substance and Sexual Activity   Drug Use No     Social History     Tobacco Use   Smoking Status Former Smoker    Packs/day: 0 00   Smokeless Tobacco Never Used   Tobacco Comment    about 4 cigs daily as per allscripts     Family History:   Family History   Problem Relation Age of Onset    Osteoporosis Mother     Heart attack Father     Mental illness Neg Hx        Meds/Allergies    PTA meds:    Medications Prior to Admission   Medication    amantadine (SYMMETREL) 100 mg capsule    baclofen 10 mg tablet    esomeprazole (NEXIUM) 40 MG capsule    meloxicam (MOBIC) 15 mg tablet    metoprolol tartrate (LOPRESSOR) 50 mg tablet    oxyCODONE-acetaminophen (PERCOCET)  mg per tablet    telmisartan-hydrochlorothiazide (MICARDIS HCT) 80-12 5 MG per tablet    Teriflunomide (AUBAGIO) 14 MG TABS      No Known Allergies    Current Facility-Administered Medications:     acetaminophen (TYLENOL) tablet 650 mg, 650 mg, Oral, Q6H PRN, Mary Anne Revankar, DO    amantadine (SYMMETREL) capsule 100 mg, 100 mg, Oral, Every Other Day, RACHAEL Pepper    baclofen tablet 20 mg, 20 mg, Oral, TID, Mary Anne Revankar, DO, 20 mg at 19 1001    heparin (porcine) subcutaneous injection 5,000 Units, 5,000 Units, Subcutaneous, Q8H [facility], 5,000 Units at 19 0533 **AND** [COMPLETED] Platelet count, , , Once, Mary Anne Revankar, DO    metoprolol tartrate (LOPRESSOR) tablet 50 mg, 50 mg, Oral, Q12H [facility], Mary Anne Revankar, DO, 50 mg at 19 1002    ondansetron (ZOFRAN) injection 4 mg, 4 mg, Intravenous, Q6H PRN, Mary Anne Revankar, DO    oxyCODONE-acetaminophen (PERCOCET) 5-325 mg per tablet 2 tablet, 2 tablet, Oral, Q8H PRN, RACHAEL Preciado, 2 tablet at 12/08/19 2019    pantoprazole (PROTONIX) EC tablet 40 mg, 40 mg, Oral, Early Morning, Mary Anne Revankar, DO, 40 mg at 12/09/19 0533    potassium chloride (K-DUR,KLOR-CON) CR tablet 20 mEq, 20 mEq, Oral, Once, Mary Anne Revankar, DO    sodium chloride infusion 0 45 %, 75 mL/hr, Intravenous, Continuous, Mary Anne Revankar, DO, Last Rate: 75 mL/hr at 12/08/19 2001, 75 mL/hr at 12/08/19 2001    Teriflunomide TABS 14 mg, 1 tablet, Oral, Daily, Mary Anne Revankar, DO    VTE Pharmacologic Prophylaxis:   Heparin    Objective:   Vitals: Blood pressure 117/65, pulse 88, temperature 97 6 °F (36 4 °C), temperature source Temporal, resp  rate 20, height 5' 6" (1 676 m), weight 60 3 kg (133 lb), SpO2 96 %, not currently breastfeeding  Body mass index is 21 47 kg/m²  BP Readings from Last 3 Encounters:   12/09/19 117/65   07/05/19 112/82   06/22/19 150/90     Orthostatic Blood Pressures      Most Recent Value   Blood Pressure  117/65 filed at 12/09/2019 0800   Patient Position - Orthostatic VS  Lying filed at 12/09/2019 0800          Intake/Output Summary (Last 24 hours) at 12/9/2019 1005  Last data filed at 12/9/2019 0410  Gross per 24 hour   Intake 1000 ml   Output 720 ml   Net 280 ml       Invasive Devices     Peripheral Intravenous Line            Peripheral IV 12/08/19 Right Antecubital less than 1 day                  Physical Exam:   Physical Exam   Constitutional: She is oriented to person, place, and time  She is cooperative  No distress  HENT:   Head: Normocephalic and atraumatic  Mouth/Throat: Oropharynx is clear and moist    Eyes: Pupils are equal, round, and reactive to light  Conjunctivae and EOM are normal    Neck: Neck supple  Cardiovascular: Normal rate, regular rhythm, normal heart sounds and intact distal pulses     Pulmonary/Chest: Effort normal and breath sounds normal  No respiratory distress  Abdominal: Soft  Bowel sounds are normal  There is no tenderness  Musculoskeletal:   Decreased muscle strength bilateral upper and lower extremities  Notable decreased muscle strength left upper extremity compared to right upper extremity  Lymphadenopathy:     She has no cervical adenopathy  Neurological: She is alert and oriented to person, place, and time  Skin: Skin is warm and dry  She is not diaphoretic  There is pallor  Psychiatric: Her behavior is normal  Thought content normal        Labs:   Troponins:   Results from last 7 days   Lab Units 12/09/19  0814 12/09/19  0422 12/09/19  0016  12/08/19  1619   CK TOTAL U/L  --   --   --   --  359*   TROPONIN I ng/mL 0 11* 0 11* 0 11*   < > 0 07*   CK MB INDEX %  --   --   --   --  1 4    < > = values in this interval not displayed         CBC with diff:   Results from last 7 days   Lab Units 12/09/19 0422 12/08/19  2039 12/08/19  1619   WBC Thousand/uL 5 45  --  5 25   HEMOGLOBIN g/dL 9 6*  --  11 4*   HEMATOCRIT % 32 2*  --  35 9   MCV fL 93  --  89   PLATELETS Thousands/uL 109* 112* 127*   MCH pg 28 7  --  28 4   MCHC g/dL 29 8*  --  31 8   RDW % 15 1  --  14 6   MPV fL  --  14 1*  --    NRBC AUTO /100 WBCs  --   --  0       CMP:   Results from last 7 days   Lab Units 12/09/19 0422 12/08/19  1619   SODIUM mmol/L 140 142   POTASSIUM mmol/L 3 5 3 6   CHLORIDE mmol/L 106 103   CO2 mmol/L 25 30   ANION GAP mmol/L 9 9   BUN mg/dL 46* 54*   CREATININE mg/dL 1 70* 2 02*   CALCIUM mg/dL 8 5 9 9   AST U/L  --  21   ALT U/L  --  32   ALK PHOS U/L  --  106   TOTAL PROTEIN g/dL  --  8 1   ALBUMIN g/dL  --  3 5   TOTAL BILIRUBIN mg/dL  --  0 40   EGFR ml/min/1 73sq m 33 26   GLUCOSE RANDOM mg/dL 68 107       Magnesium:   Results from last 7 days   Lab Units 12/09/19  0422   MAGNESIUM mg/dL 1 7     Coags:   Results from last 7 days   Lab Units 12/08/19  1619   PTT seconds 27   INR  1 02     TSH:      Lipid Profile:     Lipid Profile:   No results found for: CHOLESTEROL, HDL, LDLCALC, TRIG  Hgb A1c:     NT-proBNP: No results for input(s): NTBNP in the last 72 hours  Imaging & Testing   Cardiac testing:   Results for orders placed during the hospital encounter of 19   Echo complete with contrast if indicated    Jimmy [address]  [place] [address]  [phone]    Transthoracic Echocardiogram  2D, M-mode, Doppler, and Color Doppler    Study date:  2019    Patient: Esvin Irizarry  MR number: [id number]  Account number: [de-identified]  : 1960  Age: 62 years  Gender: Female  Status: Inpatient  Location: Bedside  Height: 66 in  Weight: 148 7 lb  BP: 135/ 77 mmHg    Indications: Tachycardia    Diagnoses: R00 0 - Tachycardia, unspecified    Sonographer:  Valentin Mortimer, RCS  Primary Physician:  Janey Ng DO  Referring Physician:  Ashli Jernigan MD  Group:  [facility]  Interpreting Physician:  Vamsi Horta DO    SUMMARY    LEFT VENTRICLE:  Systolic function was normal by visual assessment  Ejection fraction was estimated to be 55 %  There were no regional wall motion abnormalities  There was mild concentric hypertrophy  Doppler parameters were consistent with abnormal left ventricular relaxation (grade 1 diastolic dysfunction)  MITRAL VALVE:  There was mild regurgitation  AORTIC VALVE:  There was no evidence for stenosis  There was no regurgitation  TRICUSPID VALVE:  There was no significant regurgitation  The tricuspid jet envelope definition was inadequate for estimation of RV systolic pressure  PULMONIC VALVE:  There was mild regurgitation  HISTORY: PRIOR HISTORY: HTN, Multiple Sclerosis, Hypercholesterol, Depression, Back Pain    PROCEDURE: The procedure was performed at the bedside  This was a routine study  The transthoracic approach was used   The study included complete 2D imaging, M-mode, complete spectral Doppler, and color Doppler  The heart rate was 110 bpm,  at the start of the study  Echocardiographic views were limited due to restricted patient mobility, poor patient compliance, poor acoustic window availability, and lung interference  This was a technically difficult study  LEFT VENTRICLE: Size was normal  Systolic function was normal by visual assessment  Ejection fraction was estimated to be 55 %  There were no regional wall motion abnormalities  There was mild concentric hypertrophy  No evidence of apical  thrombus  DOPPLER: Doppler parameters were consistent with abnormal left ventricular relaxation (grade 1 diastolic dysfunction)  RIGHT VENTRICLE: The size was normal  Systolic function was normal  Wall thickness was normal  DOPPLER: Systolic pressure was within the normal range  LEFT ATRIUM: The atrium was mildly dilated  RIGHT ATRIUM: Size was normal     MITRAL VALVE: Valve structure was normal  There was normal leaflet separation  No echocardiographic evidence for prolapse  Not well visualized  DOPPLER: The transmitral velocity was within the normal range  There was no evidence for  stenosis  There was mild regurgitation  AORTIC VALVE: Leaflets exhibited normal thickness, normal cuspal separation, and sclerosis  The valve was not well visualized  DOPPLER: Transaortic velocity was within the normal range  There was no evidence for stenosis  There was no  regurgitation  TRICUSPID VALVE: Not well visualized  The valve structure was normal  There was normal leaflet separation  DOPPLER: The transtricuspid velocity was within the normal range  There was no evidence for stenosis  There was no significant  regurgitation  The tricuspid jet envelope definition was inadequate for estimation of RV systolic pressure  PULMONIC VALVE: Leaflets exhibited normal thickness, no calcification, and normal cuspal separation  DOPPLER: The transpulmonic velocity was within the normal range   There was mild regurgitation  PERICARDIUM: There was no thickening  There was no pericardial effusion  The pericardium was normal in appearance  AORTA: The root exhibited normal size  SYSTEMIC VEINS: IVC: The inferior vena cava was normal in size  PULMONARY ARTERY: The size was normal  The morphology appeared normal     SYSTEM MEASUREMENT TABLES    2D mode  AoR Diam 2D: 3 2 cm  LA Diam (2D): 2 1 cm  LA/Ao (2D): 0 66  FS (2D Teich): 30 5 %  IVSd (2D): 1 01 cm  LVDEV: 40 3 cmï¾³  LVESV: 16 4 cmï¾³  LVIDd(2D): 3 18 cm  LVISd (2D): 2 21 cm  LVPWd (2D): 1 cm  SV (Teich): 23 9 cmï¾³    Apical four chamber  LVEF A4C: 56 %    Unspecified Scan Mode  MV Peak A Luis: 970 mm/s  MV Peak E Luis  Mean: 754 mm/s  MVA (PHT): 3 24 cmï¾²  PHT: 68 ms  RA Area: 13 6 cmï¾²  RA Volume: 30 3 cmï¾³  TAPSE: 1 9 cm    [employer] Accredited Echocardiography [facility]    Prepared and electronically signed by    Skylar Rocha DO  Signed 21-Jun-2019 15:39:15       No results found for this or any previous visit  Imaging: I have personally reviewed pertinent reports  No results found  EKG/ Monitor: Personally reviewed  Code Status: Level 1 - Full Code  Advance Directive and Living Will:      POLST:        Valerie Gambino DO  PGY-2      "This note has been constructed using a voice recognition system  Therefore there may be syntax, spelling, and/or grammatical errors   Please call if you have any questions  "

## 2019-12-09 NOTE — H&P
History and Physical - [facility]    Patient Information: Norberto Cuevas 61 y o  female MRN: [id number]  Unit/Bed#: [address] [facility] Encounter: [id number]  Admitting Physician: Ofelia Monique DO  PCP: Jeromy Taveras DO  Date of Admission:  12/08/19        Hospital Problem List:     Principal Problem:    ARBEN (acute kidney injury) ([facility] )  Active Problems:    Elevated troponin    Multiple sclerosis ([place] [address] )    Generalized weakness    Essential hypertension    Thrombocytopenia ([facility] )    Opiate dependence ([place] [address] )      Assessment/Plan:    * ARBEN (acute kidney injury) ([place] [address] )  Assessment & Plan  Likely pre renal in nature as patient with decreased p o  Intake and not taking enough medications + Micardis-HCT  Admit patient for further management  Place patient on IV hydration  Repeat lab work in a m  Check PVR x1  Avoid hypotension  Check total CK level  Creatinine fails to improve continues to worsen, consider Nephrology eval    Elevated troponin  Assessment & Plan  Possibly from kidney dysfunction  Trend troponins  Monitor on tele  Patient denies any chest pain    Generalized weakness  Assessment & Plan  Likely due to dehydration, underlying MS, deconditioning  PT/OT while here  UA not convincing for UTI    Multiple sclerosis [facility])  Assessment & Plan  Continue amantadine, baclofen,Teriflunomide (patient made aware that family needs to bring this medication)  PT/OT while here  Patient has an appointment scheduled with her neurologist on December 12    Opiate dependence [facility])  Assessment & Plan  Patient with chronic bilateral hip pain  Continue Percocet p r n  Thrombocytopenia [facility])  Assessment & Plan  Patient with chronic thrombocytopenia  Monitor counts    Essential hypertension  Assessment & Plan  Continue Lopressor    Monitor blood pressures  Holding Micardis-HCT due to ARBEN          VTE Prophylaxis: Heparin  / sequential compression device   Code Status: Level 1 - Full Code    Anticipated Length of Stay: Patient will be admitted on an Inpatient basis with an anticipated length of stay of atleast 2 midnights  Justification for Hospital Stay:  Acute kidney injury, elevated troponin    Total Time for Visit, including Counseling / Coordination of Care: 45 minutes  Greater than 50% of this total time spent on direct patient counseling and coordination of care  Chief Complaint:     Weakness - Generalized (patient c/o generalized weakness x about one month, not getting out of bed, family states decreased oral intake, and severe right hip pain that patient states is her chronic sciatica ) and Hip Pain    of Present Illness:    Elle Rangel is a 61 y o  female who presents with complaints of worsening generalized weakness over the last month  Patient has history of MS  As per and who was present at bedside patient has not been eating much and does not drink enough fluids  Patient has had difficulty ambulating due to weakness  In the ER patient was noted to have Duane  Review of Systems:    Review of Systems   Constitutional: Positive for appetite change  Negative for chills and fever  HENT: Negative for congestion, sore throat and trouble swallowing  Eyes: Negative for photophobia and visual disturbance  Respiratory: Negative for chest tightness and shortness of breath  Cardiovascular: Positive for leg swelling  Negative for chest pain  Gastrointestinal: Negative for abdominal pain, blood in stool, diarrhea, nausea and vomiting  Genitourinary: Negative for dysuria, frequency and hematuria  Musculoskeletal:        (+) hip pain   Skin: Negative for wound  Neurological: Positive for weakness  Negative for dizziness, syncope, speech difficulty, light-headedness and headaches  Hematological: Does not bruise/bleed easily  Psychiatric/Behavioral: Negative for agitation         Past Medical and Surgical History:     Past Medical History:   Diagnosis Date    Chronic back pain     Depression     last assessed 06/10/14    High cholesterol     Hypertension     last assessed 17    [facility]) [facility] )     last assessed 17    RLL pneumonia ([facility] )     last assessed 06/10/14       Past Surgical History:   Procedure Laterality Date    ANKLE SURGERY Right     Treatment of Ankle  last assessed 06/10/14   Junius Ruiz BACK SURGERY      for spinal stenosis and sciatica     SECTION      last assessed 06/10/14    KNEE ARTHROSCOPY      last assessed 06/10/14    LAMINECTOMY      last assessed 06/10/14    ORTHOPEDIC SURGERY      OTHER SURGICAL HISTORY      discectomy       Meds/Allergies:    PTA meds:   Prior to Admission Medications   Prescriptions Last Dose Informant Patient Reported? Taking?    Teriflunomide (AUBAGIO) 14 MG TABS   No Yes   Sig: Take 1 tablet (14 mg total) by mouth daily   amantadine (SYMMETREL) 100 mg capsule   No Yes   Sig: Take 1 capsule (100 mg total) by mouth 2 (two) times a day   baclofen 10 mg tablet   No Yes   Sig: Take 2 tablets (20 mg total) by mouth 3 (three) times a day   esomeprazole (NEXIUM) 40 MG capsule   No Yes   Sig: Take 1 capsule (40 mg total) by mouth daily before breakfast   meloxicam (MOBIC) 15 mg tablet  Self Yes Yes   Sig: Take 15 mg by mouth daily     metoprolol tartrate (LOPRESSOR) 50 mg tablet   No Yes   Sig: Take 1 tablet (50 mg total) by mouth every 12 (twelve) hours   oxyCODONE-acetaminophen (PERCOCET)  mg per tablet  Self Yes Yes   Sig: Take 1 tablet by mouth every 8 (eight) hours as needed for moderate pain   telmisartan-hydrochlorothiazide (MICARDIS HCT) 80-12 5 MG per tablet   No Yes   Sig: Take 1 tablet by mouth daily      Facility-Administered Medications: None       Allergies: No Known Allergies  History:     Marital Status:      Substance Use History:   Social History     Substance and Sexual Activity   Alcohol Use Yes    Comment: rare // no alcohol use as per allscripts     Social History     Tobacco Use   Smoking Status Former Smoker    Packs/day: 0 00   Smokeless Tobacco Never Used   Tobacco Comment    about 4 cigs daily as per allscripts     Social History     Substance and Sexual Activity   Drug Use No       Family History:    Family History   Problem Relation Age of Onset    Osteoporosis Mother     Heart attack Father     Mental illness Neg Hx        Physical Exam:     Vitals:   Blood Pressure: 138/86 (12/08/19 1832)  Pulse: 83 (12/08/19 1832)  Temperature: 97 5 °F (36 4 °C) (12/08/19 1832)  Temp Source: Tympanic (12/08/19 1832)  Respirations: 18 (12/08/19 1832)  Height: 5' 6" (167 6 cm) (12/08/19 1832)  Weight - Scale: 60 3 kg (133 lb) (12/08/19 1832)  SpO2: 96 % (12/08/19 1832)    Physical Exam   Constitutional: She is oriented to person, place, and time  No distress  HENT:   Head: Normocephalic and atraumatic  Eyes: EOM are normal  Right eye exhibits no discharge  Left eye exhibits no discharge  No scleral icterus  Cardiovascular: Normal rate and regular rhythm  Pulmonary/Chest: Effort normal and breath sounds normal  No respiratory distress  She has no wheezes  She has no rales  Abdominal: Soft  Bowel sounds are normal  She exhibits no distension  There is no tenderness  Musculoskeletal: She exhibits edema (B/L L  E - at baseline as per patient)  Neurological: She is alert and oriented to person, place, and time  Unable to lift her lower extremities off the bed bilaterally   Skin: She is not diaphoretic  Lab Results: I have personally reviewed pertinent reports        Results from last 7 days   Lab Units 12/08/19  1619   WBC Thousand/uL 5 25   HEMOGLOBIN g/dL 11 4*   HEMATOCRIT % 35 9   PLATELETS Thousands/uL 127*   NEUTROS PCT % 58   LYMPHS PCT % 28   MONOS PCT % 11   EOS PCT % 2     Results from last 7 days   Lab Units 12/08/19  1619   POTASSIUM mmol/L 3 6   CHLORIDE mmol/L 103   CO2 mmol/L 30   BUN mg/dL 54*   CREATININE mg/dL 2 02*   CALCIUM mg/dL 9 9   ALK PHOS U/L 106   ALT U/L 32   AST U/L 21 Results from last 7 days   Lab Units 12/08/19  1619   INR  1 02       Imaging: I have personally reviewed pertinent reports  No results found  No orders to display       EKG, Pathology, and Other Studies Reviewed on Admission:   · EKG shows sinus rhythm with ST depression V4-V6 and inverted T-wave    Allscripts/EPIC Records Reviewed: Yes     ** Please Note: "This note has been constructed using a voice recognition system  Therefore there may be syntax, spelling, and/or grammatical errors   Please call if you have any questions  "**

## 2019-12-09 NOTE — OCCUPATIONAL THERAPY NOTE
OT Evaluation       12/09/19 1430   Note Type   Note type Eval only   Restrictions/Precautions   Other Precautions Bed Alarm; Fall Risk; Chair Alarm;Pain   Pain Assessment   Pain Assessment 0-10   Pain Score 8   Pain Type Chronic pain   Pain Location Back; Hip   Pain Orientation Right  (sciatica)   Home Living   Type of Home House   Home Layout Two level;Bed/bath upstairs; Ramped entrance  (Chair lift to second floor)   Bathroom Shower/Tub Walk-in shower   Bathroom Toilet Standard   Bathroom Equipment Grab bars in shower; Shower chair   Home Equipment Wheelchair-manual;Walker;Cane  (Rollator)   Prior Function   Level of [place] Needs assistance with ADLs and functional mobility; Needs assistance with IADLs   Lives With Family  (2 sons)   Receives Help From Family   ADL Assistance Needs assistance   IADLs Needs assistance   Falls in the last 6 months 1 to 4   Comments Patient reports she has needed assist for all ADLs and transfers to/from wheelchair, bathroom from her son  Prior to that patient was independent at wheelchair level, able to transfer indepndently to/from chair/bed/toilet without assist  Patient has her groceries delivered   ADL   Eating Assistance 3  Moderate Assistance   Grooming Assistance 3  Moderate Assistance   [address] 1  Total Assistance   [facility] [address] 1  Total Assistance   [address] 1  Total Assistance    [facility] 1  Total [address]  1  Total Assistance   Bed Mobility   Rolling R 2  Maximal assistance   Additional items Assist x 1   Rolling L 2  Maximal assistance   Additional items Assist x 1   Transfers   Sit to Stand Unable to assess   Functional Mobility   Additional items   (Not assessed; patient is non-ambulatory at baseline)   Activity Tolerance   Activity Tolerance Patient limited by pain; Patient limited by fatigue  (Limited by significant weakness)   RUE Assessment   RUE Assessment X   RUE Overall AROM   R Shoulder Flexion Minimally limited, AROM 0-100*  (Significant weakness; approx  3+/5 elbow/wrist/hand)   LUE Assessment   LUE Assessment X  (Significant weakness; approx  3+/5 elbow/wrist/hand)   LUE Overall AROM   L Shoulder Flexion AROM significantly impaired, AROM approx  0-45*   Cognition   Overall Cognitive Status WFL   Arousal/Participation Alert; Cooperative   Orientation Level Oriented X4   Following Commands Follows all commands and directions without difficulty   Assessment   Limitation Decreased ADL status; Decreased UE ROM; Decreased UE strength;Decreased endurance;Decreased self-care trans;Decreased high-level ADLs   Prognosis Good   Assessment Patient evaluated by Occupational Therapy  Patient admitted with ARBEN (acute kidney injury) ([facility] [address] )  The patients occupational profile, medical and therapy history includes a extensive additional review of physical, cognitive, or psychosocial history related to current functional performance  Comorbidities affecting functional mobility and ADLS include: MS, HTN, opiate dependence, NSTEMI, lumbar spinal stenosis  Prior to admission, patient was requiring assist for functional mobility with wheelchair, requiring assist for ADLS and requiring assist for IADLS  The evaluation identifies the following performance deficits: weakness, impaired balance, decreased endurance, increased fall risk, new onset of impairment of functional mobility, decreased ADLS, decreased IADLS, pain, decreased activity tolerance, decreased safety awareness and decreased strength, that result in activity limitations and/or participation restrictions  This evaluation requires clinical decision making of high complexity, because the patient presents with comorbidites that affect occupational performance and required significant modification of tasks or assistance with consideration of multiple treatment options    The Barthel Index was used as a functional outcome tool presenting with a score of 25, indicating marked limitations of functional mobility and ADLS  Patient will benefit from skilled Occupational Therapy services to address above deficits and facilitate a safe return to prior level of function  Goals   Patient Goals To go back home   STG Time Frame   (1-7 days)   Short Term Goal  Goals established to promote patient goal of going home:  Patient will increase bed mobility to max assist in preparation for ADLS and transfers; Patient will increase stand pivot transfer to and from bedside commode with no assistive device with mod assist of 2 to increase performance with ADLS and to use a toilet; Patient will tolerate 5 minutes of UE ROM/strengthening to increase general activity tolerance and performance in ADLS/IADLS; Patient will improve functional activity tolerance to 5 minutes of sustained functional tasks to increase participation in basic self-care and decrease assistance level;  Patient will be able to to verbalize understanding and perform energy conservation/proper body mechanics during ADLS and functional mobility at least 50% of the time with moderate cueing to decrease signs of fatigue and increase stamina to return to prior level of function; Patient will increase static/dynamic sitting balance to poor+ to improve the ability to sit at edge of bed or on a chair for ADLS;     LTG Time Frame   (8-14 days)   Long Term Goal Patient will increase bed mobility to mod assist in preparation for ADLS and transfers;  Patient will increase stand pivot transfer to and from bedside commode with no assistive device with mod assist to increase performance with ADLS and to use a toilet; Patient will tolerate 10 minutes of UE ROM/strengthening to increase general activity tolerance and performance in ADLS/IADLS; Patient will improve functional activity tolerance to 10 minutes of sustained functional tasks to increase participation in basic self-care and decrease assistance level;  Patient will be able to to verbalize understanding and perform energy conservation/proper body mechanics during ADLS and functional mobility at least 75% of the time with minimalcueing to decrease signs of fatigue and increase stamina to return to prior level of function; Patient will increase static/dynamic sitting balance to fair- to improve the ability to sit at edge of bed or on a chair for ADLS   Functional Transfer Goals   Pt Will Transfer To Bedside Commode   (STG mod assist x 2, LTG mod assist)   ADL Goals   Pt Will Perform Eating   (STG min assist, LTG supervision)   Pt Will Perform Grooming   (STG min assist, LTG supervision)   Pt Will Perform Bathing   (STG max assist, LTG mod assist)   Pt Will Perform UE Dressing   (STG max assist, LTG mod assist)   Pt Will Perform LE Dressing   (STG max assist, LTG mod assist)   Pt Will Perform Toileting   (STG max assist, LTG mod assist)   Plan   Treatment Interventions ADL retraining;Functional transfer training;UE strengthening/ROM; Endurance training;Patient/family training;Equipment evaluation/education; Compensatory technique education;Continued evaluation; Energy conservation   Goal Expiration Date 12/23/19   Recommendation   OT Discharge Recommendation Short Term Rehab   Barthel Index   Feeding 5   Bathing 0   Grooming Score 0   Dressing Score 0   Bladder Score 10   Bowels Score 10   Toilet Use Score 0   Transfers (Bed/Chair) Score 0   Mobility (Level Surface) Score 0   Stairs Score 0   Barthel Index Score 22   Licensure   [place] License Number  Esperanza Cal, OTR/L [id number]

## 2019-12-09 NOTE — ASSESSMENT & PLAN NOTE
Possibly from kidney dysfunction  Troponins peaked at 0 11  Patient underwent nuclear stress test which was normal  Echo from June 2019 showed EF of 55% with grade 1 diastolic dysfunction

## 2019-12-09 NOTE — UTILIZATION REVIEW
Initial Clinical Review    Admission: Date/Time/Statement: Inpatient Admission Orders (From admission, onward)     Ordered        12/08/19 1723  Inpatient Admission (expected length of stay for this patient Order details is greater than two midnights)  Once                   Orders Placed This Encounter   Procedures    Inpatient Admission (expected length of stay for this patient Order details is greater than two midnights)     Standing Status:   Standing     Number of Occurrences:   1     Order Specific Question:   Admitting Physician     Answer:   Chante Wilkins [[id number]]     Order Specific Question:   Level of Care     Answer:   Med Surg [[id number]]     Order Specific Question:   Estimated length of stay     Answer:   More than 2 Midnights     Order Specific Question:   Certification     Answer:   I certify that inpatient services are medically necessary for this patient for a duration of greater than two midnights  See H&P and MD Progress Notes for additional information about the patient's course of treatment  ED Arrival Information     Expected Arrival Acuity Means of Arrival Escorted By Service Admission Type    - 12/8/2019 16:01 Urgent Ambulance [facility] Urgent    Arrival Complaint    Weakness, hip pain        Chief Complaint   Patient presents with    Weakness - Generalized     patient c/o generalized weakness x about one month, not getting out of bed, family states decreased oral intake, and severe right hip pain that patient states is her chronic sciatica   Hip Pain     Assessment/Plan: 60 yo to ED from home with h/o MS generalized weakness x 1 month and positive for hip pain  Per family pt not getting out of the bed and decreased oral intake   Pt has difficulty ambulating because of weakness  In ED noted edema b/l lower extremity , baseline as per patient  Unable to lift her lower extremities off the bed b/l   Workup found ARBEN with bun/cr 54/2 02 ,thrombocytopenia plt 127 EKG shows sinus rhythm with ST depression V4-V6 and inverted T-wave  tyler likely pre renal in nature as pt  With decreased po intake and not taking enough medication + micardis-HCT  Pt  Admitted inpatient admission for tyler elevated tropon in ,MS flare hold off on steroids for now and continue ivf  Chronic thrombocytopenia monitor counts  Continue ivf ,check post void residual ,ck level ,monitor on tele   Per Cardiology consult  NSTEMI troponin highest 0 11 with t wave inversions  Will obtain stress tet  Grade 1 diastolic dysfunction on echo 6/19 ef 55%     ED Triage Vitals [12/08/19 1612]   Temperature Pulse Respirations Blood Pressure SpO2   97 7 °F (36 5 °C) 82 18 151/78 95 %      Temp Source Heart Rate Source Patient Position - Orthostatic VS BP Location FiO2 (%)   Oral Monitor Lying Right arm --      Pain Score       Worst Possible Pain        Wt Readings from Last 1 Encounters:   12/08/19 60 3 kg (133 lb)     Additional Vital Signs:   12/09/19 0424  97 6 °F (36 4 °C)  66  18  141/86  94 %  None (Room air)  Lying   12/09/19 0044  97 7 °F (36 5 °C)  64  16  119/77  94 %    Lying   12/08/19 1832  97 5 °F (36 4 °C)  83  18  138/86  96 %           Pertinent Labs/Diagnostic Test Results:   Results from last 7 days   Lab Units 12/09/19  0422 12/08/19  2039 12/08/19  1619   WBC Thousand/uL 5 45  --  5 25   HEMOGLOBIN g/dL 9 6*  --  11 4*   HEMATOCRIT % 32 2*  --  35 9   PLATELETS Thousands/uL 109* 112* 127*   NEUTROS ABS Thousands/µL  --   --  3 06     Results from last 7 days   Lab Units 12/09/19  0422 12/08/19  1619   SODIUM mmol/L 140 142   POTASSIUM mmol/L 3 5 3 6   CHLORIDE mmol/L 106 103   CO2 mmol/L 25 30   ANION GAP mmol/L 9 9   BUN mg/dL 46* 54*   CREATININE mg/dL 1 70* 2 02*   EGFR ml/min/1 73sq m 33 26   CALCIUM mg/dL 8 5 9 9   MAGNESIUM mg/dL 1 7  --      Results from last 7 days   Lab Units 12/08/19  1619   AST U/L 21   ALT U/L 32   ALK PHOS U/L 106   TOTAL PROTEIN g/dL 8 1   ALBUMIN g/dL 3 5   TOTAL BILIRUBIN mg/dL 0 40     Results from last 7 days   Lab Units 12/09/19  0422 12/08/19  1619   GLUCOSE RANDOM mg/dL 68 107     Results from last 7 days   Lab Units 12/08/19  1619   CK TOTAL U/L 359*   CK MB INDEX % 1 4   CK MB ng/mL 5 0     Results from last 7 days   Lab Units 12/09/19  0814 12/09/19  0422 12/09/19  0016 12/08/19  2039 12/08/19  1619   TROPONIN I ng/mL 0 11* 0 11* 0 11* 0 09* 0 07*     Results from last 7 days   Lab Units 12/08/19  1619   PROTIME seconds 11 0   INR  1 02   PTT seconds 27     Results from last 7 days   Lab Units 12/08/19  1619   LACTIC ACID mmol/L 1 0     Results from last 7 days   Lab Units 12/08/19  1707   CLARITY UA  Clear   COLOR UA  Yellow   SPEC GRAV UA  1 025   PH UA  5 5   GLUCOSE UA mg/dl Negative   KETONES UA mg/dl Negative   BLOOD UA  Trace-Intact*   PROTEIN UA mg/dl Trace*   NITRITE UA  Negative   BILIRUBIN UA  Negative   UROBILINOGEN UA E U /dl 0 2   LEUKOCYTES UA  Negative   WBC UA /hpf 2-4*   RBC UA /hpf 0-1*   BACTERIA UA /hpf Occasional   EPITHELIAL CELLS WET PREP /hpf Occasional     Results from last 7 days   Lab Units 12/08/19  1625 12/08/19  1619   BLOOD CULTURE  Received in Microbiology Lab  Culture in Progress  Received in Microbiology Lab  Culture in Progress         ED Treatment:   Medication Administration from 12/08/2019 1601 to 12/08/2019 1832       Date/Time Order Dose Route Action Action by Comments     12/08/2019 1821 sodium chloride 0 9 % bolus 1,000 mL 0 mL Intravenous Stopped Trent Sim RN      12/08/2019 1627 sodium chloride 0 9 % bolus 1,000 mL 1,000 mL Intravenous New Bag Bharath Pacheco RN         Past Medical History:   Diagnosis Date    Chronic back pain     Depression     last assessed 06/10/14    High cholesterol     Hypertension     last assessed 12/08/17    MS (multiple sclerosis) ([facility] [address] )     last assessed 12/08/17    RLL pneumonia ([facility] [address] )     last assessed 06/10/14     Present on Admission:   ARBEN (acute kidney injury) ([facility] [address] )   Essential hypertension   Opiate dependence (HCC)   Thrombocytopenia (HCC)   Multiple sclerosis ([facility] [address] )   NSTEMI (non-ST elevated myocardial infarction) (HCC)   Generalized weakness      Admitting Diagnosis: Dehydration [E86 0]  Hip pain [M25 559]  Weakness [R53 1]  Elevated troponin [R79 89]  Acute kidney injury ([facility] [address] ) [N17 9]  Ambulatory dysfunction [R26 2]  Age/Sex: 61 y o  female  Admission Orders:  Inpatient admit  Tele mon  pvr  Straight cath    Scheduled Medications:    Medications:  amantadine 100 mg Oral Every Other Day   baclofen 20 mg Oral TID   heparin (porcine) 5,000 Units Subcutaneous Q8H [address]   metoprolol tartrate 50 mg Oral Q12H HOLLI   pantoprazole 40 mg Oral Early Morning   Teriflunomide 1 tablet Oral Daily     Continuous IV Infusions:    sodium chloride 75 mL/hr Intravenous Continuous     PRN Meds:    acetaminophen 650 mg Oral Q6H PRN   ondansetron 4 mg Intravenous Q6H PRN   oxyCODONE-acetaminophen 2 tablet Oral Q8H PRN       IP CONSULT TO CARDIOLOGY    Network Utilization Review Department  [email or web address]  ATTENTION: Please call with any questions or concerns to [phone] and carefully listen to the prompts so that you are directed to the right person  All voicemails are confidential   Leonard Birch all requests for admission clinical reviews, approved or denied determinations and any other requests to dedicated fax number below belonging to the campus where the patient is receiving treatment   List of dedicated fax numbers for the Facilities:  FACILITY NAME UR FAX NUMBER   ADMISSION DENIALS (Administrative/Medical Necessity) [phone]   PARENT CHILD HEALTH (Maternity/NICU/Pediatrics) [phone]   Deneise Notice [phone]   Rosetta Hunter [address] Antonio Dickinson [place] [address] [phone]   [address] [phone]   [address] [place] [address] [phone]

## 2019-12-09 NOTE — ASSESSMENT & PLAN NOTE
Likely pre renal in nature as patient with decreased p o  Intake and not taking enough fluids + Micardis-HCT use at home  Continue patient on IV hydration  Creatinine is slowly trending down  Repeat lab work in a m   ml  Avoid hypotension  total CK level 359     Await BMP and CK

## 2019-12-09 NOTE — ASSESSMENT & PLAN NOTE
Continue Lopressor  Monitor blood pressures  Holding Micardis-HCT due to ARBEN  Patient's blood pressure is slightly elevated this afternoon

## 2019-12-09 NOTE — ASSESSMENT & PLAN NOTE
Continue amantadine, baclofen,Teriflunomide (patient made aware that family needs to bring this medication)  PT/OT while here  Patient has an appointment scheduled with her neurologist on December 12    Doubtful MS flare

## 2019-12-09 NOTE — PROGRESS NOTES
[facility] Progress Note  Patient: Altagracia Cooper 61 y o  female   MRN: [id number]  PCP: Skylar Ibarra DO  Unit/Bed#: [address] [place] [address] Encounter: [id number]  Date Of Visit: 12/09/19    Problem List:    Principal Problem:    ARBEN (acute kidney injury) ([facility] )  Active Problems:    NSTEMI (non-ST elevated myocardial infarction) ([place] [address] )    Multiple sclerosis ([place] [address] )    Generalized weakness    Essential hypertension    Thrombocytopenia ([place] [address] )    Opiate dependence ([place] [address] )      Assessment & Plan:    * ARBEN (acute kidney injury) ([place] [address] )  Assessment & Plan  Likely pre renal in nature as patient with decreased p o  Intake and not taking enough fluids + Micardis-HCT use at home  Continue patient on IV hydration  Creatinine is slowly trending down  Repeat lab work in a m   ml  Avoid hypotension  total CK level 359  Repeat total CK in a m  If Creatinine fails to improve or continues to worsen, consider Nephrology eval    NSTEMI (non-ST elevated myocardial infarction) [facility])  Assessment & Plan  Possibly from kidney dysfunction  Troponins peaked at 0 11  Monitor on tele  Patient denies any chest pain  Echo from June 2019 showed EF of 55% with grade 1 diastolic dysfunction  Patient has never had a stress test before  Cardiology input as patient may need further workup    Generalized weakness  Assessment & Plan  Likely due to dehydration, underlying MS, deconditioning  PT/OT while here  UA not convincing for UTI  On prior admission patient and weakness as well and initially there was concern for MS exacerbation but patient was seen by Neurology and Solu-Medrol was discontinued and patient improved with IV fluids  Hold off on steroids for now    Multiple sclerosis [facility])  Assessment & Plan  Continue amantadine, baclofen,Teriflunomide (patient made aware that family needs to bring this medication)  PT/OT while here  Patient has an appointment scheduled with her neurologist on December 12      Opiate dependence [facility])  Assessment & Plan  Patient with chronic bilateral hip pain  Continue Percocet p r n  Thrombocytopenia [facility])  Assessment & Plan  Patient with chronic thrombocytopenia  Monitor counts  If platelet count continues to worsen, consider discontinuing heparin    Essential hypertension  Assessment & Plan  Continue Lopressor  Monitor blood pressures  Holding Micardis-HCT due to ARBEN  VTE Pharmacologic Prophylaxis:   Pharmacologic: Heparin  Mechanical VTE Prophylaxis in Place: Yes    Patient Centered Rounds: I have performed bedside rounds with nursing staff today  Discussions with Specialists or Other Care Team Provider: yes    Education and Discussions with Family / Patient: yes    Time Spent for Care: 30 minutes  More than 50% of total time spent on counseling and coordination of care as described above  Current Length of Stay: 1 day(s)    Current Patient Status: Inpatient   Certification Statement: The patient will continue to require additional inpatient hospital stay due to Kidney injury requiring IV fluids, elevated troponin requiring Cardiology input    Discharge Plan: Pending    Code Status: Level 1 - Full Code      Subjective:   Patient states she feels somewhat better today  Still feels weak    Objective:     Vitals:   Temp (24hrs), Av 6 °F (36 4 °C), Min:97 5 °F (36 4 °C), Max:97 7 °F (36 5 °C)    Temp:  [97 5 °F (36 4 °C)-97 7 °F (36 5 °C)] 97 6 °F (36 4 °C)  HR:  [64-88] 88  Resp:  [16-20] 20  BP: (117-151)/(65-86) 117/65  SpO2:  [94 %-96 %] 96 %  Body mass index is 21 47 kg/m²  Input and Output Summary (last 24 hours): Intake/Output Summary (Last 24 hours) at 2019 1005  Last data filed at 2019 0410  Gross per 24 hour   Intake 1000 ml   Output 720 ml   Net 280 ml       Physical Exam:     Physical Exam   Constitutional: She is oriented to person, place, and time  No distress  HENT:   Head: Normocephalic and atraumatic     Eyes: EOM are normal  Right eye exhibits no discharge  Left eye exhibits no discharge  No scleral icterus  Cardiovascular: Normal rate and regular rhythm  Pulmonary/Chest: Effort normal and breath sounds normal  No respiratory distress  She has no wheezes  She has no rales  Abdominal: Soft  Bowel sounds are normal  She exhibits no distension  There is no tenderness  Musculoskeletal: She exhibits edema (B/L L  E - at baseline as per patient)  Neurological: She is alert and oriented to person, place, and time  No cranial nerve deficit  Weakness noted in bilateral lower extremities  Patient unable to lift her legs off the bed   Skin: She is not diaphoretic  There is pallor  Additional Data:     Labs:    Results from last 7 days   Lab Units 12/09/19  0422  12/08/19  1619   WBC Thousand/uL 5 45  --  5 25   HEMOGLOBIN g/dL 9 6*  --  11 4*   HEMATOCRIT % 32 2*  --  35 9   PLATELETS Thousands/uL 109*   < > 127*   NEUTROS PCT %  --   --  58   LYMPHS PCT %  --   --  28   MONOS PCT %  --   --  11   EOS PCT %  --   --  2    < > = values in this interval not displayed  Results from last 7 days   Lab Units 12/09/19  0422 12/08/19  1619   POTASSIUM mmol/L 3 5 3 6   CHLORIDE mmol/L 106 103   CO2 mmol/L 25 30   BUN mg/dL 46* 54*   CREATININE mg/dL 1 70* 2 02*   CALCIUM mg/dL 8 5 9 9   ALK PHOS U/L  --  106   ALT U/L  --  32   AST U/L  --  21     Results from last 7 days   Lab Units 12/08/19  1619   INR  1 02       * I Have Reviewed All Lab Data Listed Above  * Additional Pertinent Lab Tests Reviewed: [address] Admission Reviewed      Imaging:  Imaging Reports Reviewed Today Include: N/A  Imaging Personally Reviewed by Myself Includes:  N/A    Recent Cultures (last 7 days):     Results from last 7 days   Lab Units 12/08/19  1625 12/08/19  1619   BLOOD CULTURE  Received in Microbiology Lab  Culture in Progress  Received in Microbiology Lab  Culture in Progress         Last 24 Hours Medication List:     Current Facility-Administered Medications:  acetaminophen 650 mg Oral Q6H PRN Mary Anne Revankar, DO    amantadine 100 mg Oral Every Other Day Stormy Remington, CRNP    baclofen 20 mg Oral TID Mary Anne Revankar, DO    heparin (porcine) 5,000 Units Subcutaneous Q8H [address] Mary Anne Revankar, DO    metoprolol tartrate 50 mg Oral Q12H [address] Mary Anne Revankar, DO    ondansetron 4 mg Intravenous Q6H PRN Mary Anne Revankar, DO    oxyCODONE-acetaminophen 2 tablet Oral Q8H PRN Stormy Remington, CRNP    pantoprazole 40 mg Oral Early Morning Mary Anne Revankar, DO    potassium chloride 20 mEq Oral Once Mary Anne Revankar, DO    sodium chloride 75 mL/hr Intravenous Continuous Mary Anne Revankar, DO Last Rate: 75 mL/hr (12/08/19 2001)   Teriflunomide 1 tablet Oral Daily Mary Anne Revankar, DO           Today, Patient Was Seen By: Jimmye Boast, DO    ** Please Note: "This note has been constructed using a voice recognition system  Therefore there may be syntax, spelling, and/or grammatical errors   Please call if you have any questions  "**

## 2019-12-09 NOTE — PLAN OF CARE
Problem: Potential for Falls  Goal: Patient will remain free of falls  Description  INTERVENTIONS:  - Assess patient frequently for physical needs  -  Identify cognitive and physical deficits and behaviors that affect risk of falls  -  [place] fall precautions as indicated by assessment   - Educate patient/family on patient safety including physical limitations  - Instruct patient to call for assistance with activity based on assessment  - Modify environment to reduce risk of injury  - Consider OT/PT consult to assist with strengthening/mobility  Outcome: Progressing  Note:   Using call bell PRN     Problem: Prexisting or High Potential for Compromised Skin Integrity  Goal: Skin integrity is maintained or improved  Description  INTERVENTIONS:  - Identify patients at risk for skin breakdown  - Assess and monitor skin integrity  - Assess and monitor nutrition and hydration status  - Monitor labs   - Assess for incontinence   - Turn and reposition patient  - Assist with mobility/ambulation  - Relieve pressure over bony prominences  - Avoid friction and shearing  - Provide appropriate hygiene as needed including keeping skin clean and dry  - Evaluate need for skin moisturizer/barrier cream  - Collaborate with interdisciplinary team   - Patient/family teaching  - Consider wound care consult   Outcome: Progressing  Note:   Skin integrity maintained, turning regular intervals       Problem: GENITOURINARY - ADULT  Goal: Maintains or returns to baseline urinary function  Description  INTERVENTIONS:  - Assess urinary function  - Encourage oral fluids to ensure adequate hydration if ordered  - Administer IV fluids as ordered to ensure adequate hydration  - Administer ordered medications as needed  - Offer frequent toileting  - Follow urinary retention protocol if ordered  Outcome: Progressing  Note:   Using bedpan - continent     Problem: Nutrition/Hydration-ADULT  Goal: Nutrient/Hydration intake appropriate for improving, restoring or maintaining nutritional needs  Description  Monitor and assess patient's nutrition/hydration status for malnutrition  Collaborate with interdisciplinary team and initiate plan and interventions as ordered  Monitor patient's weight and dietary intake as ordered or per policy  Utilize nutrition screening tool and intervene as necessary  Determine patient's food preferences and provide high-protein, high-caloric foods as appropriate       INTERVENTIONS:  - Monitor oral intake, urinary output, labs, and treatment plans  - Assess nutrition and hydration status and recommend course of action  - Evaluate amount of meals eaten  - Assist patient with eating if necessary   - Allow adequate time for meals  - Recommend/ encourage appropriate diets, oral nutritional supplements, and vitamin/mineral supplements  - Order, calculate, and assess calorie counts as needed  - Recommend, monitor, and adjust tube feedings and TPN/PPN based on assessed needs  - Assess need for intravenous fluids  - Provide specific nutrition/hydration education as appropriate  - Include patient/family/caregiver in decisions related to nutrition  Outcome: Progressing  Note:   Dec appetite, well hydrated

## 2019-12-09 NOTE — PLAN OF CARE
Problem: Potential for Falls  Goal: Patient will remain free of falls  Description  INTERVENTIONS:  - Assess patient frequently for physical needs  -  Identify cognitive and physical deficits and behaviors that affect risk of falls    -  [place] fall precautions as indicated by assessment   - Educate patient/family on patient safety including physical limitations  - Instruct patient to call for assistance with activity based on assessment  - Modify environment to reduce risk of injury  - Consider OT/PT consult to assist with strengthening/mobility  Outcome: Progressing     Problem: Prexisting or High Potential for Compromised Skin Integrity  Goal: Skin integrity is maintained or improved  Description  INTERVENTIONS:  - Identify patients at risk for skin breakdown  - Assess and monitor skin integrity  - Assess and monitor nutrition and hydration status  - Monitor labs   - Assess for incontinence   - Turn and reposition patient  - Assist with mobility/ambulation  - Relieve pressure over bony prominences  - Avoid friction and shearing  - Provide appropriate hygiene as needed including keeping skin clean and dry  - Evaluate need for skin moisturizer/barrier cream  - Collaborate with interdisciplinary team   - Patient/family teaching  - Consider wound care consult   Outcome: Progressing     Problem: GENITOURINARY - ADULT  Goal: Maintains or returns to baseline urinary function  Description  INTERVENTIONS:  - Assess urinary function  - Encourage oral fluids to ensure adequate hydration if ordered  - Administer IV fluids as ordered to ensure adequate hydration  - Administer ordered medications as needed  - Offer frequent toileting  - Follow urinary retention protocol if ordered  Outcome: Progressing     Problem: Nutrition/Hydration-ADULT  Goal: Nutrient/Hydration intake appropriate for improving, restoring or maintaining nutritional needs  Description  Monitor and assess patient's nutrition/hydration status for malnutrition  Collaborate with interdisciplinary team and initiate plan and interventions as ordered  Monitor patient's weight and dietary intake as ordered or per policy  Utilize nutrition screening tool and intervene as necessary  Determine patient's food preferences and provide high-protein, high-caloric foods as appropriate       INTERVENTIONS:  - Monitor oral intake, urinary output, labs, and treatment plans  - Assess nutrition and hydration status and recommend course of action  - Evaluate amount of meals eaten  - Assist patient with eating if necessary   - Allow adequate time for meals  - Recommend/ encourage appropriate diets, oral nutritional supplements, and vitamin/mineral supplements  - Order, calculate, and assess calorie counts as needed  - Recommend, monitor, and adjust tube feedings and TPN/PPN based on assessed needs  - Assess need for intravenous fluids  - Provide specific nutrition/hydration education as appropriate  - Include patient/family/caregiver in decisions related to nutrition  Outcome: Progressing

## 2019-12-09 NOTE — PROGRESS NOTES
12/09/19 [address] of Residence [place]    Patient Information   Mental Status Alert   Primary Caregiver Family   Support System Immediate family   Legal Information   Advance Directives Living will;Power of  for health care   Advance Directives Status Copy on chart   Activities of Daily Living Prior to Admission   Functional Status Minimum assistance   Assistive Device Wheelchair   Living Arrangement House   Ambulation Minimum assistance   Means of Transportation   Means of Transport to Appts: Family     LOS/Readmit/MBP: LOS is 1 day, pt is not a MBP or a readmission  Lives with her sons  in single level and has a ramp to enter  BR on same level  Pt has a WC for use in the home for DME and has no oxygen or neb  Pt has no HHC at this time, has had [facility] in the past and would like them back  PCP: Dr Genna Ng  Pt has advanced directives, copy provided during last admit  Pt does not drive, family is involved in her care  Pharmacy is [facility], pt notes no issues with prescription plan    Pt would like [facility] services upon DC to work with her on transfers and strengthening  Found value in in the last time but thinks she should have had them just a bit longer  [facility] referred for DC needs

## 2019-12-09 NOTE — PHYSICAL THERAPY NOTE
PT EVALUATION       12/09/19 1059   Note Type   Note type Eval/Treat   Pain Assessment   Pain Assessment 0-10   Pain Score Worst Possible Pain   Pain Type Chronic pain   Pain Location Back;Buttocks; Hip   Pain Orientation Right  ("sciatic pain")   Home Living   Type of Home House   Home Layout Two level;Bed/bath upstairs; Ramped entrance  (chair lift to 2nd floor)   Bathroom Shower/Tub Walk-in shower   Bathroom Equipment Shower chair;Grab bars in [facility] 19  (RW;rollator)   Prior Function   Level of [place] Needs assistance with ADLs and functional mobility; Needs assistance with IADLs   Lives With Family  (2 sons;one son available to assist pt)   Receives Help From Family   ADL Assistance Needs assistance   IADLs Needs assistance   Falls in the last 6 months 1 to 4   Comments For the last one month, pt has required increased assist from her son;prior to the last month, pt could trans independent and function from a w/c level;pt is non-ambulatory  Pt normally has her groceries delivered  Pt does not drive  Restrictions/Precautions   Other Precautions Fall Risk;Bed Alarm; Chair Alarm;Pain   General   Additional Pertinent History Pt adm with worsening generalized weakness x 1 month     Family/Caregiver Present Yes  (pt's aunt)   Cognition   Overall Cognitive Status WFL   Arousal/Participation Cooperative   Orientation Level Oriented X4   Following Commands Follows all commands and directions without difficulty   RLE Assessment   RLE Assessment WFL  (prom)   Strength RLE   R Hip Flexion 1/5   R Hip Extension 1/5   R Hip ABduction 1/5   R Hip ADduction 2-/5   R Knee Flexion 1/5   R Knee Extension 1/5   R Ankle Dorsiflexion 1/5   R Ankle Plantar Flexion 1/5   LLE Assessment   LLE Assessment WFL  (prom)   Strength LLE   L Hip Flexion 1/5   L Hip Extension 1/5   L Hip ABduction 1/5   L Hip ADduction 2-/5   L Knee Flexion 1/5   L Knee Extension 1/5   L Ankle Dorsiflexion 0/5   L Ankle Plantar Flexion 0/5   Light Touch   RLE Light Touch Grossly intact   LLE Light Touch Grossly intact   Bed Mobility   Rolling R 2  Maximal assistance   Additional items Assist x 1;Verbal cues   Rolling L 2  Maximal assistance   Additional items Assist x 1;Verbal cues   Transfers   Sit to Stand Unable to assess   Stand to Sit Unable to assess   Stand pivot Unable to assess   Ambulation/Elevation   Gait pattern   (pt non-ambulatory)   Activity Tolerance   Activity Tolerance Patient limited by fatigue;Patient limited by pain  (significant weakness)   Assessment   Prognosis Good   Problem List Decreased strength;Decreased range of motion;Decreased endurance; Impaired balance;Decreased mobility; Decreased coordination;Decreased safety awareness;Pain   Assessment Patient seen for Physical Therapy evaluation  Patient admitted with ARBEN (acute kidney injury) ([facility] [address] )  Comorbidities affecting patient's physical performance include: MS, HTN, opiate dependence, NSTEMI, lumbar spinal stenosis  Personal factors affecting patient at time of initial evaluation include: lives in two story house, inability to perform dynamic tasks in community, limited home support, positive fall history, inability to perform ADLS, inability to perform IADLS  and inability to live alone  Prior to admission, patient was requiring assist for functional mobility with w/c, requiring assist for ADLS, requiring assist for IADLS, living with sons in a two level home with ramp steps to enter and is wheelchair bound  Please find objective findings from Physical Therapy assessment regarding body systems outlined above with impairments and limitations including weakness, decreased ROM, impaired balance, decreased endurance, impaired coordination, gait deviations, pain, decreased activity tolerance, decreased functional mobility tolerance, decreased safety awareness and fall risk    The Barthel Index was used as a functional outcome tool presenting with a score of 25 today indicating marked limitations of functional mobility and ADLS  Patient's clinical presentation is currently unstable/unpredictable as seen in patient's presentation of vital sign response, changing level of pain, increased fall risk, new onset of impairment of functional mobility, decreased endurance and new onset of weakness  Pt would benefit from continued Physical Therapy treatment to address deficits as defined above and maximize level of functional mobility  As demonstrated by objective findings, the assigned level of complexity for this evaluation is high  Goals   Patient Goals "to go home"   STG Expiration Date 12/16/19   Short Term Goal #1 rolling L & R - max/mod A; SPT - max/mod A + 2; bed mob - mod A   Short Term Goal #2 pt will sit OOB in w/c x 1-2 hrs; (S) sitting balance - F   LTG Expiration Date 12/23/19   Long Term Goal #1 bed mob - I; SPT - I   Long Term Goal #2 pt will sit OOB in w/c x 4 hrs; (S) & (D) sitting balance - F+ for safe mobility/ADLs   Plan   Treatment/Interventions ADL retraining;Functional transfer training;LE strengthening/ROM; Therapeutic exercise; Endurance training;Patient/family training;Equipment eval/education; Bed mobility;Gait training; Compensatory technique education   PT Frequency 5x/wk   Recommendation   Recommendation Short-term skilled PT  (pt refusing - "I'm not going to rehab")   Equipment Recommended   (pt has a w/c, chair lift, RW/Rollator, cane)   Barthel Index   Feeding 5   Bathing 0   Grooming Score 0   Dressing Score 0   Bladder Score 10   Bowels Score 10   Toilet Use Score 0   Transfers (Bed/Chair) Score 0   Mobility (Level Surface) Score 0   Stairs Score 0   Barthel Index Score 22   Licensure   [place] License Number  [id number] [place] [address] [id number]     Time In:1041  Time [id number]:[id number]  Total Time: 18 mins      S:  Pt reports RLE "sciatica" pain  O:  Pt is max assist for supine to sit and reverse   Pt sat on EOB x 6 mins with max/mod A to brief periods of S for sitting balance EOB  Pt returned to supine with max A and is dependent of two for reposition in bed  A:  Pt will benefit from cont skilled PT services to increase pt's strength, balance, endurance and mobility  P:  Cont per PT POC  DCP - short term skilled PT recommended   Pt refusing STR - "I'm not going to rehab "    Nicolas Coronado, [place]   [id number]

## 2019-12-10 ENCOUNTER — TELEPHONE (OUTPATIENT)
Dept: NEUROLOGY | Facility: CLINIC | Age: 59
End: 2019-12-10

## 2019-12-10 ENCOUNTER — APPOINTMENT (INPATIENT)
Dept: NON INVASIVE DIAGNOSTICS | Facility: HOSPITAL | Age: 59
DRG: 682 | End: 2019-12-10
Payer: COMMERCIAL

## 2019-12-10 ENCOUNTER — APPOINTMENT (INPATIENT)
Dept: RADIOLOGY | Facility: HOSPITAL | Age: 59
DRG: 682 | End: 2019-12-10
Payer: COMMERCIAL

## 2019-12-10 PROBLEM — I21.A1 TYPE 2 MYOCARDIAL INFARCTION (HCC): Status: ACTIVE | Noted: 2019-12-08

## 2019-12-10 PROBLEM — R41.82 ALTERED MENTAL STATUS: Status: ACTIVE | Noted: 2019-12-10

## 2019-12-10 LAB
ANION GAP SERPL CALCULATED.3IONS-SCNC: 10 MMOL/L (ref 4–13)
BUN SERPL-MCNC: 31 MG/DL (ref 5–25)
CALCIUM SERPL-MCNC: 8.6 MG/DL (ref 8.3–10.1)
CHLORIDE SERPL-SCNC: 106 MMOL/L (ref 100–108)
CO2 SERPL-SCNC: 25 MMOL/L (ref 21–32)
CREAT SERPL-MCNC: 1.28 MG/DL (ref 0.6–1.3)
GFR SERPL CREATININE-BSD FRML MDRD: 46 ML/MIN/1.73SQ M
GLUCOSE SERPL-MCNC: 128 MG/DL (ref 65–140)
MRSA NOSE QL CULT: NORMAL
POTASSIUM SERPL-SCNC: 4.2 MMOL/L (ref 3.5–5.3)
SODIUM SERPL-SCNC: 141 MMOL/L (ref 136–145)

## 2019-12-10 PROCEDURE — A9502 TC99M TETROFOSMIN: HCPCS

## 2019-12-10 PROCEDURE — 80048 BASIC METABOLIC PNL TOTAL CA: CPT | Performed by: INTERNAL MEDICINE

## 2019-12-10 PROCEDURE — 93017 CV STRESS TEST TRACING ONLY: CPT

## 2019-12-10 PROCEDURE — 78452 HT MUSCLE IMAGE SPECT MULT: CPT | Performed by: INTERNAL MEDICINE

## 2019-12-10 PROCEDURE — 93018 CV STRESS TEST I&R ONLY: CPT | Performed by: INTERNAL MEDICINE

## 2019-12-10 PROCEDURE — 99232 SBSQ HOSP IP/OBS MODERATE 35: CPT | Performed by: INTERNAL MEDICINE

## 2019-12-10 PROCEDURE — 78452 HT MUSCLE IMAGE SPECT MULT: CPT

## 2019-12-10 PROCEDURE — 93016 CV STRESS TEST SUPVJ ONLY: CPT | Performed by: INTERNAL MEDICINE

## 2019-12-10 RX ADMIN — HEPARIN SODIUM 5000 UNITS: 5000 INJECTION INTRAVENOUS; SUBCUTANEOUS at 05:27

## 2019-12-10 RX ADMIN — REGADENOSON 0.4 MG: 0.08 INJECTION, SOLUTION INTRAVENOUS at 10:06

## 2019-12-10 RX ADMIN — SODIUM CHLORIDE 75 ML/HR: 0.45 INJECTION, SOLUTION INTRAVENOUS at 23:31

## 2019-12-10 RX ADMIN — HEPARIN SODIUM 5000 UNITS: 5000 INJECTION INTRAVENOUS; SUBCUTANEOUS at 17:27

## 2019-12-10 RX ADMIN — HEPARIN SODIUM 5000 UNITS: 5000 INJECTION INTRAVENOUS; SUBCUTANEOUS at 22:53

## 2019-12-10 RX ADMIN — BACLOFEN 20 MG: 10 TABLET ORAL at 17:27

## 2019-12-10 RX ADMIN — METOPROLOL TARTRATE 50 MG: 50 TABLET, FILM COATED ORAL at 08:58

## 2019-12-10 RX ADMIN — BACLOFEN 20 MG: 10 TABLET ORAL at 08:58

## 2019-12-10 RX ADMIN — OXYCODONE HYDROCHLORIDE AND ACETAMINOPHEN 2 TABLET: 5; 325 TABLET ORAL at 09:30

## 2019-12-10 RX ADMIN — OXYCODONE HYDROCHLORIDE AND ACETAMINOPHEN 2 TABLET: 5; 325 TABLET ORAL at 01:34

## 2019-12-10 RX ADMIN — SODIUM CHLORIDE 75 ML/HR: 0.45 INJECTION, SOLUTION INTRAVENOUS at 01:42

## 2019-12-10 RX ADMIN — METOPROLOL TARTRATE 50 MG: 50 TABLET, FILM COATED ORAL at 22:52

## 2019-12-10 RX ADMIN — PANTOPRAZOLE SODIUM 40 MG: 40 TABLET, DELAYED RELEASE ORAL at 05:27

## 2019-12-10 RX ADMIN — BACLOFEN 20 MG: 10 TABLET ORAL at 22:53

## 2019-12-10 NOTE — TELEPHONE ENCOUNTER
----- Message from Darvin Barker sent at 12/10/2019  2:05 PM EST -----  Good Afternoon Henrietta Chau Cheng's Sister would like for you to call her  Elder Xiaoman is in the hospital and she would like to discuss some issues with you #[phone]  Please return call      Thank Bridger 3  Neurology [address] [place] [address]

## 2019-12-10 NOTE — PROGRESS NOTES
Progress Note - Cardiology   Anjana Mobley 61 y o  female MRN: [id number]  Unit/Bed#: [address] [place] [address] Encounter: [id number]    Assessment/Plan:  Troponin elevation secondary to dehydration elevated kidney function  Her nuclear stress test shows no evidence of inducible ischemia  Hypertension/ARF/dehydration- continue with home blood pressure management  Discontinue hydrochlorothiazide  Continue telmisartan + metoprolol 50 mg    Subjective/Objective   Agitated overnight  Denied having chest pain    Objective:     Vitals: BP (!) 176/87 (BP Location: Left arm)   Pulse 84   Temp 98 2 °F (36 8 °C) (Tympanic)   Resp 18   Ht 5' 6" (1 676 m)   Wt 60 3 kg (133 lb)   SpO2 95%   BMI 21 47 kg/m²   Vitals:    12/08/19 1832   Weight: 60 3 kg (133 lb)     Orthostatic Blood Pressures      Most Recent Value   Blood Pressure  (!) 176/87 filed at 12/10/2019 1546   Patient Position - Orthostatic VS  Lying filed at 12/10/2019 1546            Intake/Output Summary (Last 24 hours) at 12/10/2019 1604  Last data filed at 12/10/2019 0142  Gross per 24 hour   Intake 1000 ml   Output    Net 1000 ml       Invasive Devices     Peripheral Intravenous Line            Peripheral IV 12/08/19 Right Antecubital 1 day                Review of Systems: reviewed    Physical Exam   Constitutional: She is oriented to person, place, and time  No distress  HENT:   Head: Normocephalic and atraumatic  Right Ear: External ear normal    Left Ear: External ear normal    Nose: Nose normal    Mouth/Throat: No oropharyngeal exudate  Eyes: Pupils are equal, round, and reactive to light  Conjunctivae are normal  Right eye exhibits no discharge  Left eye exhibits no discharge  No scleral icterus  Neck: Normal range of motion  No JVD present  No tracheal deviation present  No thyromegaly present  Cardiovascular: Normal rate, regular rhythm and intact distal pulses  Exam reveals gallop  Exam reveals no friction rub     No murmur heard   Pulmonary/Chest: Effort normal and breath sounds normal  No stridor  No respiratory distress  She has no wheezes  She has no rales  She exhibits no tenderness  Abdominal: Soft  Bowel sounds are normal  She exhibits distension  She exhibits no mass  There is no tenderness  There is no rebound and no guarding  Genitourinary:   Genitourinary Comments: No CVA tenderness   Musculoskeletal: She exhibits deformity  She exhibits no edema or tenderness  Neurological: She is alert and oriented to person, place, and time  She displays normal reflexes  She exhibits normal muscle tone  Skin: Skin is warm and dry  No rash noted  She is not diaphoretic  No erythema  Psychiatric: She has a normal mood and affect  Her behavior is normal  Judgment and thought content normal    Nursing note and vitals reviewed  Lab Results:   I have personally reviewed pertinent lab results      CBC with diff:   Results from last 7 days   Lab Units 12/09/19 0422 12/08/19 2039   WBC Thousand/uL 5 45  --    RBC Million/uL 3 34*  --    HEMOGLOBIN g/dL 9 6*  --    HEMATOCRIT % 32 2*  --    MCV fL 93  --    MCH pg 28 7  --    MCHC g/dL 29 8*  --    RDW % 15 1  --    MPV fL  --  14 1*   PLATELETS Thousands/uL 109* 112*     CMP:   Results from last 7 days   Lab Units 12/09/19  0422 12/08/19  1619   SODIUM mmol/L 140 142   POTASSIUM mmol/L 3 5 3 6   CHLORIDE mmol/L 106 103   CO2 mmol/L 25 30   BUN mg/dL 46* 54*   CREATININE mg/dL 1 70* 2 02*   CALCIUM mg/dL 8 5 9 9   AST U/L  --  21   ALT U/L  --  32   ALK PHOS U/L  --  106   EGFR ml/min/1 73sq m 33 26     Troponin:   0   Lab Value Date/Time    TROPONINI 0 11 (H) 12/09/2019 0814    TROPONINI 0 11 (H) 12/09/2019 0422    TROPONINI 0 11 (H) 12/09/2019 0016    TROPONINI 0 09 (H) 12/08/2019 2039    TROPONINI 0 07 (H) 12/08/2019 1619    TROPONINI 0 02 10/19/2017 1642     BNP:   Results from last 7 days   Lab Units 12/09/19  0422   POTASSIUM mmol/L 3 5   CHLORIDE mmol/L 106   CO2 mmol/L 25 BUN mg/dL 46*   CREATININE mg/dL 1 70*   CALCIUM mg/dL 8 5   EGFR ml/min/1 73sq m 33     Coags:   Results from last 7 days   Lab Units 12/08/19  1619   PTT seconds 27   INR  1 02     TSH:     Imaging: I have personally reviewed pertinent reports  EKG: reviewed  VTE Pharmacologic Prophylaxis: Sequential compression device (Venodyne)   VTE Mechanical Prophylaxis: sequential compression device    Counseling / Coordination of Care  Total time spent today 40 minutes  Greater than 50% of total time was spent with the patient and / or family counseling and / or coordination of care   A description of the counseling / coordination of care: htn

## 2019-12-10 NOTE — ASSESSMENT & PLAN NOTE
Patient was having hallucinations on was seeing people in the room  Vital signs were all stable  Follow-up BMP  Likely secondary to toxic metabolic encephalopathy from acute kidney injury and dehydration  Patient had similar symptoms during last hospitalization

## 2019-12-10 NOTE — PROGRESS NOTES
Progress Note - Rosy Lynn 1960, 61 y o  female MRN: [id number]    Unit/Bed#: [address] [place] [address] Encounter: [id number]    Primary Care Provider: Chas Hutchinson DO   Date and time admitted to hospital: 12/8/2019  4:08 PM        * ARBEN (acute kidney injury) [facility])  Assessment & Plan  Likely pre renal in nature as patient with decreased p o  Intake and not taking enough fluids + Micardis-HCT use at home  Continue patient on IV hydration  Creatinine is slowly trending down  Repeat lab work in a m   ml  Avoid hypotension  total CK level 359  Await BMP and CK    Generalized weakness  Assessment & Plan  Likely due to dehydration, underlying MS, deconditioning  PT/OT while here  UA not convincing for UTI  On prior admission patient and weakness as well and initially there was concern for MS exacerbation but patient was seen by Neurology and Solu-Medrol was discontinued and patient improved with IV fluids  Hold off on steroids for now    Altered mental status  Assessment & Plan  Patient was having hallucinations on was seeing people in the room  Vital signs were all stable  Follow-up BMP  Likely secondary to toxic metabolic encephalopathy from acute kidney injury and dehydration  Patient had similar symptoms during last hospitalization    Type 2 myocardial infarction [facility])  Assessment & Plan  Possibly from kidney dysfunction  Troponins peaked at 0 11  Patient underwent nuclear stress test which was normal  Echo from June 2019 showed EF of 55% with grade 1 diastolic dysfunction  Essential hypertension  Assessment & Plan  Continue Lopressor  Monitor blood pressures  Holding Micardis-HCT due to ARBEN  Patient's blood pressure is slightly elevated this afternoon      Multiple sclerosis ([facility] [address] )  Assessment & Plan  Continue amantadine, baclofen,Teriflunomide (patient made aware that family needs to bring this medication)  PT/OT while here  Patient has an appointment scheduled with her neurologist on December 12   Doubtful MS flare    Thrombocytopenia ([facility] [address] )  Assessment & Plan  Patient with chronic thrombocytopenia  Monitor counts  Continue to monitor platelet count    Opiate dependence ([facility] [address] )  Assessment & Plan  Patient with chronic bilateral hip pain  Continue Percocet p r n  VTE Pharmacologic Prophylaxis:   Pharmacologic: Heparin  Mechanical VTE Prophylaxis in Place: Yes    Patient Centered Rounds: I have performed bedside rounds with nursing staff today  Discussions with Specialists or Other Care Team Provider: No  Education and Discussions with Family / Patient:Yes  Time Spent for Care: 45 minutes  More than 50% of total time spent on counseling and coordination of care as described above  Current Length of Stay: 2 day(s)  Current Patient Status: Inpatient     Discharge Plan: Home    Code Status: Level 1 - Full Code      Subjective:   Patient denies any chest pain, shortness of breath, abdominal pain  Patient has improved weakness  As per RN, patient was seen people in the room last night  This morning patient was fine and again this afternoon patient is confused and is having hallucinations      Objective:     Vitals:   Temp (24hrs), Av 7 °F (36 5 °C), Min:97 °F (36 1 °C), Max:98 2 °F (36 8 °C)    Temp:  [97 °F (36 1 °C)-98 2 °F (36 8 °C)] 98 2 °F (36 8 °C)  HR:  [66-84] 84  Resp:  [18-20] 18  BP: (114-176)/() 176/87  SpO2:  [95 %] 95 %  Body mass index is 21 47 kg/m²  Input and Output Summary (last 24 hours): Intake/Output Summary (Last 24 hours) at 12/10/2019 1610  Last data filed at 12/10/2019 0142  Gross per 24 hour   Intake 1000 ml   Output    Net 1000 ml        Physical Exam:     Physical Exam   Constitutional: No distress  HENT:   Head: Normocephalic and atraumatic  Nose: Nose normal    Eyes: Pupils are equal, round, and reactive to light  Conjunctivae are normal    Neck: Normal range of motion  Neck supple     Cardiovascular: Normal rate, regular rhythm and normal heart sounds  Pulmonary/Chest: Effort normal and breath sounds normal  No respiratory distress  She has no wheezes  She has no rales  Abdominal: Soft  Bowel sounds are normal  She exhibits no distension  There is no tenderness  There is no rebound and no guarding  Musculoskeletal: She exhibits edema  Neurological: She is alert  No cranial nerve deficit  Skin: Skin is warm and dry  No rash noted  Additional Data:     Labs:    Results from last 7 days   Lab Units 12/09/19  0422 12/08/19  2039 12/08/19  1619   WBC Thousand/uL 5 45  --  5 25   HEMOGLOBIN g/dL 9 6*  --  11 4*   HEMATOCRIT % 32 2*  --  35 9   PLATELETS Thousands/uL 109* 112* 127*   NEUTROS PCT %  --   --  58     Results from last 7 days   Lab Units 12/09/19  0422 12/08/19  1619   SODIUM mmol/L 140 142   POTASSIUM mmol/L 3 5 3 6   CHLORIDE mmol/L 106 103   CO2 mmol/L 25 30   BUN mg/dL 46* 54*   CREATININE mg/dL 1 70* 2 02*   CALCIUM mg/dL 8 5 9 9   TOTAL BILIRUBIN mg/dL  --  0 40   ALK PHOS U/L  --  106   ALT U/L  --  32   AST U/L  --  21     Results from last 7 days   Lab Units 12/08/19  1619   INR  1 02     Results from last 7 days   Lab Units 12/09/19  0814 12/09/19  0422 12/09/19  0016   TROPONIN I ng/mL 0 11* 0 11* 0 11*     No results found for: HGBA1C      Results from last 7 days   Lab Units 12/08/19  1619   LACTIC ACID mmol/L 1 0       * I Have Reviewed All Lab Data Listed Above  * Additional Pertinent Lab Tests Reviewed:  [facility]    Imaging:     No orders to display     Imaging Reports Reviewed by myself    Cultures:   Blood Culture:   Lab Results   Component Value Date    BLOODCX No Growth at 24 hrs  12/08/2019    BLOODCX No Growth at 24 hrs  12/08/2019     Urine Culture:   Lab Results   Component Value Date    URINECX No Growth <1000 cfu/mL 12/08/2019     Sputum Culture: No components found for: SPUTUMCX  Wound Culture: No results found for: WOUNDCULT    Last 24 Hours Medication List: Current Facility-Administered Medications:  acetaminophen 650 mg Oral Q6H PRN Mary Anne Revankar, DO    amantadine 100 mg Oral Every Other Day RACHAEL Cadet    baclofen 20 mg Oral TID Mary Anne Revankar, DO    heparin (porcine) 5,000 Units Subcutaneous Q8H [address] Mary Anne Revankar, DO    metoprolol tartrate 50 mg Oral Q12H [address] Mary Anne Revankar, DO    ondansetron 4 mg Intravenous Q6H PRN Mary Anne Revankar, DO    oxyCODONE-acetaminophen 2 tablet Oral Q8H PRN RACHAEL Cadet    pantoprazole 40 mg Oral Early Morning Mary Anne Revankar, DO    sodium chloride 75 mL/hr Intravenous Continuous Mary Anne Revankar, DO Last Rate: 75 mL/hr (12/10/19 0142)   Teriflunomide 1 tablet Oral Daily Mary Anne Revankar, DO         Today, Patient Was Seen By: Cristy Montes MD    ** Please Note: Dragon 360 Dictation voice to text software may have been used in the creation of this document   **

## 2019-12-10 NOTE — TELEPHONE ENCOUNTER
nicki called back  made her aware  verbalized understanding  she states that she just has general questions regarding servies after she is discharged and for the next 20 years  she is not looking to get information  states that she can't even bring food to her mouth  i did advise that she speak to the  at the hospital also regarding her concerns  she states that she will but would still like to speak to you directly

## 2019-12-10 NOTE — TELEPHONE ENCOUNTER
I cannot speak with her due to not on communication consent  Patient had appt scheduled with me tomorrow but obviously cancelled due to inpatient status  Best would be to schedule appt after she is discharged    Agree with discussing services with  in the hospital

## 2019-12-10 NOTE — PLAN OF CARE
Problem: Potential for Falls  Goal: Patient will remain free of falls  Description  INTERVENTIONS:  - Assess patient frequently for physical needs  -  Identify cognitive and physical deficits and behaviors that affect risk of falls    -  [place] fall precautions as indicated by assessment   - Educate patient/family on patient safety including physical limitations  - Instruct patient to call for assistance with activity based on assessment  - Modify environment to reduce risk of injury  - Consider OT/PT consult to assist with strengthening/mobility  Outcome: Progressing     Problem: Prexisting or High Potential for Compromised Skin Integrity  Goal: Skin integrity is maintained or improved  Description  INTERVENTIONS:  - Identify patients at risk for skin breakdown  - Assess and monitor skin integrity  - Assess and monitor nutrition and hydration status  - Monitor labs   - Assess for incontinence   - Turn and reposition patient  - Assist with mobility/ambulation  - Relieve pressure over bony prominences  - Avoid friction and shearing  - Provide appropriate hygiene as needed including keeping skin clean and dry  - Evaluate need for skin moisturizer/barrier cream  - Collaborate with interdisciplinary team   - Patient/family teaching  - Consider wound care consult   Outcome: Progressing     Problem: GENITOURINARY - ADULT  Goal: Maintains or returns to baseline urinary function  Description  INTERVENTIONS:  - Assess urinary function  - Encourage oral fluids to ensure adequate hydration if ordered  - Administer IV fluids as ordered to ensure adequate hydration  - Administer ordered medications as needed  - Offer frequent toileting  - Follow urinary retention protocol if ordered  Outcome: Progressing     Problem: Nutrition/Hydration-ADULT  Goal: Nutrient/Hydration intake appropriate for improving, restoring or maintaining nutritional needs  Description  Monitor and assess patient's nutrition/hydration status for malnutrition  Collaborate with interdisciplinary team and initiate plan and interventions as ordered  Monitor patient's weight and dietary intake as ordered or per policy  Utilize nutrition screening tool and intervene as necessary  Determine patient's food preferences and provide high-protein, high-caloric foods as appropriate       INTERVENTIONS:  - Monitor oral intake, urinary output, labs, and treatment plans  - Assess nutrition and hydration status and recommend course of action  - Evaluate amount of meals eaten  - Assist patient with eating if necessary   - Allow adequate time for meals  - Recommend/ encourage appropriate diets, oral nutritional supplements, and vitamin/mineral supplements  - Order, calculate, and assess calorie counts as needed  - Assess need for intravenous fluids  - Provide specific nutrition/hydration education as appropriate  - Include patient/family/caregiver in decisions related to nutrition   Outcome: Progressing

## 2019-12-10 NOTE — PLAN OF CARE
Problem: Potential for Falls  Goal: Patient will remain free of falls  Description  INTERVENTIONS:  - Assess patient frequently for physical needs  -  Identify cognitive and physical deficits and behaviors that affect risk of falls  -  [place] fall precautions as indicated by assessment   - Educate patient/family on patient safety including physical limitations  - Instruct patient to call for assistance with activity based on assessment  - Modify environment to reduce risk of injury  - Consider OT/PT consult to assist with strengthening/mobility  12/9/2019 1937 by Ashley Calderon RN  Note:   Pt had remained free of falls, using call bell when able - d/t MS unable to find larkin if hidden under blankets  12/9/2019 1936 by Ashley Calderon RN  Outcome: Progressing  12/9/2019 1056 by Ashley Calderon RN  Outcome: Progressing  Note:   Using call bell PRN     Problem: Prexisting or High Potential for Compromised Skin Integrity  Goal: Skin integrity is maintained or improved  Description  INTERVENTIONS:  - Identify patients at risk for skin breakdown  - Assess and monitor skin integrity  - Assess and monitor nutrition and hydration status  - Monitor labs   - Assess for incontinence   - Turn and reposition patient  - Assist with mobility/ambulation  - Relieve pressure over bony prominences  - Avoid friction and shearing  - Provide appropriate hygiene as needed including keeping skin clean and dry  - Evaluate need for skin moisturizer/barrier cream  - Collaborate with interdisciplinary team   - Patient/family teaching  - Consider wound care consult   12/9/2019 1937 by Ashley Calderon RN  Note:   Skin integrity maintained  12/9/2019 1936 by Ashley Calderon RN  Outcome: Progressing  12/9/2019 1056 by Ashley Calderon RN  Outcome: Progressing  Note:   Skin integrity maintained, turning regular intervals       Problem: GENITOURINARY - ADULT  Goal: Maintains or returns to baseline urinary function  Description  INTERVENTIONS:  - Assess urinary function  - Encourage oral fluids to ensure adequate hydration if ordered  - Administer IV fluids as ordered to ensure adequate hydration  - Administer ordered medications as needed  - Offer frequent toileting  - Follow urinary retention protocol if ordered  12/9/2019 1937 by Crescencio Sharma RN  Note:   Maintaining current urinary status  No improvement/no progression  12/9/2019 1936 by Crescencio Sharma RN  Outcome: Progressing  12/9/2019 1056 by Crescencio Sharma RN  Outcome: Progressing  Note:   Using bedpan - continent     Problem: Nutrition/Hydration-ADULT  Goal: Nutrient/Hydration intake appropriate for improving, restoring or maintaining nutritional needs  Description  Monitor and assess patient's nutrition/hydration status for malnutrition  Collaborate with interdisciplinary team and initiate plan and interventions as ordered  Monitor patient's weight and dietary intake as ordered or per policy  Utilize nutrition screening tool and intervene as necessary  Determine patient's food preferences and provide high-protein, high-caloric foods as appropriate       INTERVENTIONS:  - Monitor oral intake, urinary output, labs, and treatment plans  - Assess nutrition and hydration status and recommend course of action  - Evaluate amount of meals eaten  - Assist patient with eating if necessary   - Allow adequate time for meals  - Recommend/ encourage appropriate diets, oral nutritional supplements, and vitamin/mineral supplements  - Order, calculate, and assess calorie counts as needed  - Assess need for intravenous fluids  - Provide specific nutrition/hydration education as appropriate  - Include patient/family/caregiver in decisions related to nutrition   12/9/2019 1936 by Crescencio Sharma RN  Outcome: Progressing  12/9/2019 1056 by Crescencio Sharma RN  Outcome: Progressing  Note:   Dec appetite, well hydrated

## 2019-12-10 NOTE — PLAN OF CARE
Problem: Potential for Falls  Goal: Patient will remain free of falls  Description  INTERVENTIONS:  - Assess patient frequently for physical needs  -  Identify cognitive and physical deficits and behaviors that affect risk of falls  -  [place] fall precautions as indicated by assessment   - Educate patient/family on patient safety including physical limitations  - Instruct patient to call for assistance with activity based on assessment  - Modify environment to reduce risk of injury  - Consider OT/PT consult to assist with strengthening/mobility  Outcome: Progressing  Note:   Bed rest     Problem: Prexisting or High Potential for Compromised Skin Integrity  Goal: Skin integrity is maintained or improved  Description  INTERVENTIONS:  - Identify patients at risk for skin breakdown  - Assess and monitor skin integrity  - Assess and monitor nutrition and hydration status  - Monitor labs   - Assess for incontinence   - Turn and reposition patient  - Assist with mobility/ambulation  - Relieve pressure over bony prominences  - Avoid friction and shearing  - Provide appropriate hygiene as needed including keeping skin clean and dry  - Evaluate need for skin moisturizer/barrier cream  - Collaborate with interdisciplinary team   - Patient/family teaching  - Consider wound care consult   Outcome: Progressing  Note:   No new or worsening redness to sacral area     Problem: GENITOURINARY - ADULT  Goal: Maintains or returns to baseline urinary function  Description  INTERVENTIONS:  - Assess urinary function  - Encourage oral fluids to ensure adequate hydration if ordered  - Administer IV fluids as ordered to ensure adequate hydration  - Administer ordered medications as needed  - Offer frequent toileting  - Follow urinary retention protocol if ordered  Outcome: Progressing  Note:   Pt remains incontinent of urine and stool   Frequent assessment of changing pt     Problem: Nutrition/Hydration-ADULT  Goal: Nutrient/Hydration intake appropriate for improving, restoring or maintaining nutritional needs  Description  Monitor and assess patient's nutrition/hydration status for malnutrition  Collaborate with interdisciplinary team and initiate plan and interventions as ordered  Monitor patient's weight and dietary intake as ordered or per policy  Utilize nutrition screening tool and intervene as necessary  Determine patient's food preferences and provide high-protein, high-caloric foods as appropriate       INTERVENTIONS:  - Monitor oral intake, urinary output, labs, and treatment plans  - Assess nutrition and hydration status and recommend course of action  - Evaluate amount of meals eaten  - Assist patient with eating if necessary   - Allow adequate time for meals  - Recommend/ encourage appropriate diets, oral nutritional supplements, and vitamin/mineral supplements  - Order, calculate, and assess calorie counts as needed  - Assess need for intravenous fluids  - Provide specific nutrition/hydration education as appropriate  - Include patient/family/caregiver in decisions related to nutrition   Outcome: Progressing  Note:   Pt has fair to adequate appetite

## 2019-12-10 NOTE — TELEPHONE ENCOUNTER
Please look into this    There is no name for patient's sister, I am unsure if she is on communication consent to speak with her etc

## 2019-12-10 NOTE — TELEPHONE ENCOUNTER
Called patient - I was going to inform her we received a call from sister Mariposa Mike and she would like us to call her back, but since her name was not on consent we needed her permission  No answer  Left voicemail requesting a return call  Jose Carlos Peguero back with intent to inform her her name is not on communication consent, we can listen to what she had to say but not provide any information regarding patient  No answer  Message left   Voicemail states it is the mailbox of "Dalia Chávez"

## 2019-12-11 ENCOUNTER — APPOINTMENT (INPATIENT)
Dept: RADIOLOGY | Facility: HOSPITAL | Age: 59
DRG: 682 | End: 2019-12-11
Payer: COMMERCIAL

## 2019-12-11 LAB
AMMONIA PLAS-SCNC: <10 UMOL/L (ref 11–35)
ARTERIAL PATENCY WRIST A: YES
BASE EXCESS BLDA CALC-SCNC: 0.5 MMOL/L
BODY TEMPERATURE: 98.8 DEGREES FEHRENHEIT
CK SERPL-CCNC: 109 U/L (ref 26–192)
HCO3 BLDA-SCNC: 23.6 MMOL/L (ref 22–28)
NON VENT ROOM AIR: ABNORMAL %
O2 CT BLDA-SCNC: 14.3 ML/DL (ref 16–23)
OXYHGB MFR BLDA: 97 % (ref 94–97)
PCO2 BLDA: 32.7 MM HG (ref 36–44)
PCO2 TEMP ADJ BLDA: 32.8 MM HG (ref 36–44)
PH BLD: 7.47 [PH] (ref 7.35–7.45)
PH BLDA: 7.48 [PH] (ref 7.35–7.45)
PO2 BLD: 101.2 MM HG (ref 75–129)
PO2 BLDA: 100.6 MM HG (ref 75–129)
SPECIMEN SOURCE: ABNORMAL

## 2019-12-11 PROCEDURE — 99222 1ST HOSP IP/OBS MODERATE 55: CPT | Performed by: PSYCHIATRY & NEUROLOGY

## 2019-12-11 PROCEDURE — 82140 ASSAY OF AMMONIA: CPT | Performed by: INTERNAL MEDICINE

## 2019-12-11 PROCEDURE — 99232 SBSQ HOSP IP/OBS MODERATE 35: CPT | Performed by: INTERNAL MEDICINE

## 2019-12-11 PROCEDURE — 36600 WITHDRAWAL OF ARTERIAL BLOOD: CPT

## 2019-12-11 PROCEDURE — 70450 CT HEAD/BRAIN W/O DYE: CPT

## 2019-12-11 PROCEDURE — 82550 ASSAY OF CK (CPK): CPT | Performed by: INTERNAL MEDICINE

## 2019-12-11 PROCEDURE — 82805 BLOOD GASES W/O2 SATURATION: CPT | Performed by: INTERNAL MEDICINE

## 2019-12-11 RX ORDER — ALPRAZOLAM 0.25 MG/1
0.25 TABLET ORAL ONCE
Status: COMPLETED | OUTPATIENT
Start: 2019-12-11 | End: 2019-12-11

## 2019-12-11 RX ADMIN — AMANTADINE 100 MG: 100 CAPSULE ORAL at 10:21

## 2019-12-11 RX ADMIN — METOPROLOL TARTRATE 50 MG: 50 TABLET, FILM COATED ORAL at 22:06

## 2019-12-11 RX ADMIN — METOPROLOL TARTRATE 50 MG: 50 TABLET, FILM COATED ORAL at 10:21

## 2019-12-11 RX ADMIN — BACLOFEN 20 MG: 10 TABLET ORAL at 10:20

## 2019-12-11 RX ADMIN — SODIUM CHLORIDE 75 ML/HR: 0.45 INJECTION, SOLUTION INTRAVENOUS at 16:24

## 2019-12-11 RX ADMIN — BACLOFEN 20 MG: 10 TABLET ORAL at 22:06

## 2019-12-11 RX ADMIN — OXYCODONE HYDROCHLORIDE AND ACETAMINOPHEN 2 TABLET: 5; 325 TABLET ORAL at 16:23

## 2019-12-11 RX ADMIN — OXYCODONE HYDROCHLORIDE AND ACETAMINOPHEN 2 TABLET: 5; 325 TABLET ORAL at 01:44

## 2019-12-11 RX ADMIN — ALPRAZOLAM 0.25 MG: 0.25 TABLET ORAL at 00:41

## 2019-12-11 RX ADMIN — BACLOFEN 20 MG: 10 TABLET ORAL at 15:13

## 2019-12-11 RX ADMIN — HEPARIN SODIUM 5000 UNITS: 5000 INJECTION INTRAVENOUS; SUBCUTANEOUS at 22:06

## 2019-12-11 RX ADMIN — HEPARIN SODIUM 5000 UNITS: 5000 INJECTION INTRAVENOUS; SUBCUTANEOUS at 15:13

## 2019-12-11 NOTE — ASSESSMENT & PLAN NOTE
Continue Lopressor  Monitor blood pressures  Holding Micardis-HCT due to ARBEN    Blood pressure is moderately controlled

## 2019-12-11 NOTE — PHYSICAL THERAPY NOTE
Attempted to see pt for PT, however CNA trying to draw blood   Will follow  Moose Hopper PT  [id number]

## 2019-12-11 NOTE — CONSULTS
[facility]   Neurology Initial Consult    Skylar Espinosa is a 61 y o  female  [address] [place]          Information obtained from:   Chief Complaint   Patient presents with    Weakness - Generalized     patient c/o generalized weakness x about one month, not getting out of bed, family states decreased oral intake, and severe right hip pain that patient states is her chronic sciatica   Hip Pain         Assessment/Plan:    1  ARBEN  2  Visual hallucinations  3  Dehydration  4  Multiple Sclerosis     Patient did have similar presentation in June when she was dehydrated and had ARBEN  Would supportively manage  She doesn't need solumedrol  It can make her hallucinations worse  Discussed that these could be side effects of amantadine or psychiatric symptom that can be seen in MS patients  Will defer use of amantadine upto her MS specialist, Dr Carlito Gamino and her team    Can consider psych consult   F/u with her neurologist           HPI:  Elle Rangel is a 60 yo F with PMH of HTN, MS presents with cc of generalized weakness  She presented 3 days ago with cc of right hip pain, weakness and visual hallucinations  She says there have been people at her home that she doesn't want around  She was oriented during my encounter with him  She hasn't been eating or drinking well due to being bed bound recently  She's wheelchair bound due to [address]  Patient had similar presentation in June from dehydration, ARBEN  She also stated that amantadine is not helping anymore for her fatigue  Modafinil was helping previously, she states            Past Medical History:   Diagnosis Date    Chronic back pain     Depression     last assessed 06/10/14    High cholesterol     Hypertension     last assessed 12/08/17    MS (multiple sclerosis) ([facility] [address] )     last assessed 12/08/17    RLL pneumonia ([facility] [address] )     last assessed 06/10/14       Past Surgical History:   Procedure Laterality Date    ANKLE SURGERY Right     Treatment of Ankle  last assessed 06/10/14   [facility]ERY      for spinal stenosis and sciatica     SECTION      last assessed 06/10/14    KNEE ARTHROSCOPY      last assessed 06/10/14    LAMINECTOMY      last assessed 06/10/14    ORTHOPEDIC SURGERY      OTHER SURGICAL HISTORY      discectomy       No Known Allergies      Current Facility-Administered Medications:     acetaminophen (TYLENOL) tablet 650 mg, 650 mg, Oral, Q6H PRN, Mary Anne Revankar, DO    amantadine (SYMMETREL) capsule 100 mg, 100 mg, Oral, Every Other Day, Richie Mcgregor, CRNP, 100 mg at 19 1021    baclofen tablet 20 mg, 20 mg, Oral, TID, Mary Anne Revankar, DO, 20 mg at 19 1513    heparin (porcine) subcutaneous injection 5,000 Units, 5,000 Units, Subcutaneous, Q8H [address], 5,000 Units at 19 1513 **AND** [COMPLETED] Platelet count, , , Once, Mary Anne Revankar, DO    metoprolol tartrate (LOPRESSOR) tablet 50 mg, 50 mg, Oral, Q12H [address], Mary Anne Revankar, DO, 50 mg at 19 1021    ondansetron (ZOFRAN) injection 4 mg, 4 mg, Intravenous, Q6H PRN, Mary Anne Revankar, DO    oxyCODONE-acetaminophen (PERCOCET) 5-325 mg per tablet 2 tablet, 2 tablet, Oral, Q8H PRN, Richie Mcgregor, CRNP, 2 tablet at 19 1623    pantoprazole (PROTONIX) EC tablet 40 mg, 40 mg, Oral, Early Morning, Mary Anne Revankar, DO, 40 mg at 12/10/19 0527    sodium chloride infusion 0 45 %, 75 mL/hr, Intravenous, Continuous, Mary Anne Revankar, DO, Last Rate: 75 mL/hr at 19 1624, 75 mL/hr at 19 1624    Teriflunomide TABS 14 mg, 1 tablet, Oral, Daily, Mary Anne Revankar, DO, 14 mg at 19 1020    Social History     Socioeconomic History    Marital status:      Spouse name: Not on file    Number of children: Not on file    Years of education: Not on file    Highest education level: Not on file   Occupational History    Not on file   Social Needs    Financial resource strain: Not on file    Food insecurity:     Worry: Not on file     Inability: Not on file    Transportation needs:     Medical: Not on file     Non-medical: Not on file   Tobacco Use    Smoking status: Former Smoker     Packs/day: 0 00    Smokeless tobacco: Never Used    Tobacco comment: about 4 cigs daily as per allscripts   Substance and Sexual Activity    Alcohol use: Yes     Comment: rare // no alcohol use as per allscripts    Drug use: No    Sexual activity: Not on file   Lifestyle    Physical activity:     Days per week: Not on file     Minutes per session: Not on file    Stress: Not on file   Relationships    Social connections:     Talks on phone: Not on file     Gets together: Not on file     Attends Buddhist service: Not on file     Active member of club or organization: Not on file     Attends meetings of clubs or organizations: Not on file     Relationship status: Not on file    Intimate partner violence:     Fear of current or ex partner: Not on file     Emotionally abused: Not on file     Physically abused: Not on file     Forced sexual activity: Not on file   Other Topics Concern    Not on file   Social History Narrative    Caffeine use       Family History   Problem Relation Age of Onset    Osteoporosis Mother     Heart attack Father     Mental illness Neg Hx          Review of systems:  Please see HPI for positive symptoms  No fever, no chills, no weight change  Ocular: No drainage, no blurred vision  HEENT:  No sore throat, earache, or congestion  No neck pain  COR:  No chest pain  No palpitations  Lungs:  no sob, wheezing,  GI:  no  nausea, no vomiting, no diarrhea, no constipation, no anorexia  :  No dysuria, frequency, or urgency  No hematuria  Musculoskeletal:  No joint pain or swelling or edema  Skin:  No rash or itching  Psychiatric:  + visual hallucinations, + depression  Endocrine:  No polyuria or polydipsia      Physical examination:  Vitals:    12/11/19 1628   BP: 163/95   Pulse: 80   Resp: 20   Temp: 98 8 °F (37 1 °C)   SpO2: 96%       GENERAL APPEARANCE:  The patient is alert, oriented  He is in no acute distress  HEENT:  Head is normocephalic  The sinuses are otherwise nontender  Pupils are equal and reactive  NECK:  Supple without lymphadenopathy  HEART:  Regular rate and rhythm  LUNGS:  clear to auscultation  No crackles or wheezes are heard  ABDOMEN:  Soft, nontender, nondistended with good bowel sounds heard  EXTREMITIES:  Without cyanosis, clubbing or edema  Mental status: The patient is alert, attentive, and oriented  Speech is clear and fluent, good repetition, comprehension, and naming  she recalls 1/3 objects at 5 minutes  Cranial nerves:  CN II: Visual fields are full to confrontation  Fundoscopic exam is normal with sharp discs and no vascular changes    Pupils are 3 mm and  reactive to light  CN III, IV, VI: At primary gaze, there is no eye deviation  CN V: Facial sensation is intact to pinprick in all 3 divisions bilaterally  Corneal responses are intact  CN VII: Face is symmetric with normal eye closure and smile  CN VIII: Hearing is normal to rubbing fingers  CN IX, X: Palate elevates symmetrically  Phonation is normal   CN XI: Head turning and shoulder shrug are intact  CN XII: Tongue is midline with normal movements and no atrophy  Motor: There is no pronator drift of out-stretched arms  Muscle bulk and tone are normal      Muscle exam:  LE: proximal 1/5, distal 0/5  UE: 4/5      Reflexes   RJ BJ TJ KJ AJ Plantars [place]   Right 2+ 2+ 2+ 2+ 0 Downgoing Not present   Left 2+ 2+ 2+ 2+ 0 Downgoing Not present     Sensory:  Grossly normal    Coordination:  Rapid alternating movements and fine finger movements are intact  There is no dysmetria on finger-to-nose and heel-knee-shin  There are no abnormal or extraneous movements  Romberg randi    Gait/Stance:  randi    Lab Results   Component Value Date    WBC 5 45 12/09/2019    HGB 9 6 (L) 12/09/2019    HCT 32 2 (L) 12/09/2019    MCV 93 12/09/2019     (L) 12/09/2019     No results found for: HGBA1C  Lab Results   Component Value Date    ALT 32 12/08/2019    AST 21 12/08/2019    ALKPHOS 106 12/08/2019    BILITOT 0 2 11/30/2017     Lab Results   Component Value Date    GLUCOSE 66 11/30/2017    CALCIUM 8 6 12/10/2019     11/30/2017    K 4 2 12/10/2019    CO2 25 12/10/2019     12/10/2019    BUN 31 (H) 12/10/2019    CREATININE 1 28 12/10/2019         Radiology          Review of reports and notes reveal:    Independent Interpretation of images or specimens:  Ct Head Wo Contrast    Result Date: 12/11/2019  No acute intracranial abnormality  Thank you for this consult  Total time of encounter: 60 min  More than 50% of time was spent in counseling and coordination of care of patient  MALISSA Loya    [facility]  [address]  [address]

## 2019-12-11 NOTE — ASSESSMENT & PLAN NOTE
Likely pre renal in nature as patient with decreased p o  Intake and not taking enough fluids + Micardis-HCT use at home  Continue patient on IV hydration  Creatinine is slowly trending down  Repeat lab work in a m   ml  Avoid hypotension  total CK level 359 which normalized to 109  Creatinine improved and is close to baseline

## 2019-12-11 NOTE — ASSESSMENT & PLAN NOTE
Patient was having hallucinations on was seeing people in the room  Vital signs were all stable  Patient continues to have hallucinations-patient reported that her neighbor killed the CT and she watched the neighbor kill her catheterization    As per my discussion with PT and other nursing who took care of her before, patient did have hallucinations during prior hospitalization to  Ammonia level was normal and ABG showed a pH of 7 47 with pCO2 of 32, PO2 of 100 and bicarbonate level of 23 on room air  Follow-up CT scan of the brain  Neurology was consulted

## 2019-12-11 NOTE — PROGRESS NOTES
Progress Note - Carolina Voss 1960, 61 y o  female MRN: [id number]    Unit/Bed#: [address] [place] [address] Encounter: [id number]    Primary Care Provider: Gilberto Tidwell DO   Date and time admitted to hospital: 12/8/2019  4:08 PM        * ARBEN (acute kidney injury) [facility])  Assessment & Plan  Likely pre renal in nature as patient with decreased p o  Intake and not taking enough fluids + Micardis-HCT use at home  Continue patient on IV hydration  Creatinine is slowly trending down  Repeat lab work in a m   ml  Avoid hypotension  total CK level 359 which normalized to 109  Creatinine improved and is close to baseline  Altered mental status  Assessment & Plan  Patient was having hallucinations on was seeing people in the room  Vital signs were all stable  Patient continues to have hallucinations-patient reported that her neighbor killed the CT and she watched the neighbor kill her catheterization  As per my discussion with PT and other nursing who took care of her before, patient did have hallucinations during prior hospitalization to  Ammonia level was normal and ABG showed a pH of 7 47 with pCO2 of 32, PO2 of 100 and bicarbonate level of 23 on room air  Follow-up CT scan of the brain  Neurology was consulted    Type 2 myocardial infarction [facility])  Assessment & Plan  Possibly from kidney dysfunction  Troponins peaked at 0 11  Patient underwent nuclear stress test which was normal  Echo from June 2019 showed EF of 55% with grade 1 diastolic dysfunction  Essential hypertension  Assessment & Plan  Continue Lopressor  Monitor blood pressures  Holding Micardis-HCT due to ARBEN  Blood pressure is moderately controlled    Multiple sclerosis (HCC)  Assessment & Plan  Continue amantadine, baclofen,Teriflunomide (patient made aware that family needs to bring this medication)  PT/OT while here  Neurology was consulted here    Thrombocytopenia [facility])  Assessment & Plan  Patient with chronic thrombocytopenia  Monitor counts  Continue to monitor platelet count    Opiate dependence ([facility] [address] )  Assessment & Plan  Patient with chronic bilateral hip pain  Continue Percocet p r n  VTE Pharmacologic Prophylaxis:   Pharmacologic: Heparin  Mechanical VTE Prophylaxis in Place: Yes    Patient Centered Rounds: I have performed bedside rounds with nursing staff today  Discussions with Specialists or Other Care Team Provider: Yes  Education and Discussions with Family / Patient:Yes  Time Spent for Care: 45 minutes  More than 50% of total time spent on counseling and coordination of care as described above  Current Length of Stay: 3 day(s)  Current Patient Status: Inpatient     Discharge Plan:  Pending    Code Status: Level 1 - Full Code      Subjective:   Patient was very confused and was hallucinating per R N  Patient denies any chest pain, shortness of breath, abdominal pain and says states that she has improved weakness  Patient does say that she is upset because her neighbor killed her cat and she watched it herself  Objective:     Vitals:   Temp (24hrs), Av 8 °F (37 1 °C), Min:98 2 °F (36 8 °C), Max:99 7 °F (37 6 °C)    Temp:  [98 2 °F (36 8 °C)-99 7 °F (37 6 °C)] 98 8 °F (37 1 °C)  HR:  [84-96] 89  Resp:  [18] 18  BP: (145-176)/(78-92) 162/92  SpO2:  [95 %-96 %] 96 %  Body mass index is 21 47 kg/m²  Input and Output Summary (last 24 hours): Intake/Output Summary (Last 24 hours) at 2019 1533  Last data filed at 2019 1501  Gross per 24 hour   Intake 1490 ml   Output 600 ml   Net 890 ml        Physical Exam:     Physical Exam   Constitutional: No distress  HENT:   Head: Normocephalic and atraumatic  Nose: Nose normal    Eyes: Pupils are equal, round, and reactive to light  Conjunctivae are normal    Neck: Normal range of motion  Neck supple  Cardiovascular: Normal rate, regular rhythm and normal heart sounds     Pulmonary/Chest: Effort normal and breath sounds normal  No respiratory distress  She has no wheezes  She has no rales  Abdominal: Soft  Bowel sounds are normal  She exhibits no distension  There is no tenderness  There is no rebound and no guarding  Musculoskeletal: She exhibits no edema  Neurological: She is alert  No cranial nerve deficit  Skin: Skin is warm and dry  No rash noted  Additional Data:     Labs:    Results from last 7 days   Lab Units 12/09/19  0422 12/08/19  2039 12/08/19  1619   WBC Thousand/uL 5 45  --  5 25   HEMOGLOBIN g/dL 9 6*  --  11 4*   HEMATOCRIT % 32 2*  --  35 9   PLATELETS Thousands/uL 109* 112* 127*   NEUTROS PCT %  --   --  58     Results from last 7 days   Lab Units 12/10/19  1619 12/09/19  0422 12/08/19  1619   SODIUM mmol/L 141 140 142   POTASSIUM mmol/L 4 2 3 5 3 6   CHLORIDE mmol/L 106 106 103   CO2 mmol/L 25 25 30   BUN mg/dL 31* 46* 54*   CREATININE mg/dL 1 28 1 70* 2 02*   CALCIUM mg/dL 8 6 8 5 9 9   TOTAL BILIRUBIN mg/dL  --   --  0 40   ALK PHOS U/L  --   --  106   ALT U/L  --   --  32   AST U/L  --   --  21     Results from last 7 days   Lab Units 12/08/19  1619   INR  1 02     Results from last 7 days   Lab Units 12/09/19  0814 12/09/19  0422 12/09/19  0016   TROPONIN I ng/mL 0 11* 0 11* 0 11*     No results found for: HGBA1C      Results from last 7 days   Lab Units 12/08/19  1619   LACTIC ACID mmol/L 1 0       * I Have Reviewed All Lab Data Listed Above  * Additional Pertinent Lab Tests Reviewed: [facility]    Imaging:     CT head wo contrast   Final Result by Deanna Escamilla MD (12/11 1223)      No acute intracranial abnormality                    Workstation performed: [id number]           Imaging Reports Reviewed by myself    Cultures:   Blood Culture:   Lab Results   Component Value Date    BLOODCX No Growth at 48 hrs  12/08/2019    BLOODCX No Growth at 48 hrs  12/08/2019     Urine Culture:   Lab Results   Component Value Date    URINECX No Growth <1000 cfu/mL 12/08/2019     Sputum Culture: No components found for: SPUTUMCX  Wound Culture: No results found for: WOUNDCULT    Last 24 Hours Medication List:     Current Facility-Administered Medications:  acetaminophen 650 mg Oral Q6H PRN Mary Anne Revankar, DO    amantadine 100 mg Oral Every Other Day RACHAEL Bo    baclofen 20 mg Oral TID Mary Anne Revankar, DO    heparin (porcine) 5,000 Units Subcutaneous Q8H [address] Mary Anne Revankar, DO    metoprolol tartrate 50 mg Oral Q12H [address] Mary Anne Revankar, DO    ondansetron 4 mg Intravenous Q6H PRN Mary Anne Revankar, DO    oxyCODONE-acetaminophen 2 tablet Oral Q8H PRN RACHAEL Bo    pantoprazole 40 mg Oral Early Morning Mary Anne Revankar, DO    sodium chloride 75 mL/hr Intravenous Continuous Mary Anne Revankar, DO Last Rate: 75 mL/hr (12/10/19 2331)   Teriflunomide 1 tablet Oral Daily Mary Anne Revankar, DO         Today, Patient Was Seen By: Oni Grossman MD    ** Please Note: Dragon 360 Dictation voice to text software may have been used in the creation of this document   **

## 2019-12-11 NOTE — ASSESSMENT & PLAN NOTE
Continue amantadine, baclofen,Teriflunomide (patient made aware that family needs to bring this medication)  PT/OT while here  Neurology was consulted here

## 2019-12-12 LAB
ANION GAP SERPL CALCULATED.3IONS-SCNC: 13 MMOL/L (ref 4–13)
BUN SERPL-MCNC: 17 MG/DL (ref 5–25)
CALCIUM SERPL-MCNC: 9.4 MG/DL (ref 8.3–10.1)
CHEST PAIN STATEMENT: NORMAL
CHLORIDE SERPL-SCNC: 105 MMOL/L (ref 100–108)
CO2 SERPL-SCNC: 21 MMOL/L (ref 21–32)
CREAT SERPL-MCNC: 1.08 MG/DL (ref 0.6–1.3)
ERYTHROCYTE [DISTWIDTH] IN BLOOD BY AUTOMATED COUNT: 15.2 % (ref 11.6–15.1)
GFR SERPL CREATININE-BSD FRML MDRD: 56 ML/MIN/1.73SQ M
GLUCOSE SERPL-MCNC: 81 MG/DL (ref 65–140)
HCT VFR BLD AUTO: 33.7 % (ref 34.8–46.1)
HGB BLD-MCNC: 10.9 G/DL (ref 11.5–15.4)
MAX DIASTOLIC BP: 82 MMHG
MAX HEART RATE: 126 BPM
MAX PREDICTED HEART RATE: 161 BPM
MAX. SYSTOLIC BP: 140 MMHG
MCH RBC QN AUTO: 29.5 PG (ref 26.8–34.3)
MCHC RBC AUTO-ENTMCNC: 32.3 G/DL (ref 31.4–37.4)
MCV RBC AUTO: 91 FL (ref 82–98)
PLATELET # BLD AUTO: 109 THOUSANDS/UL (ref 149–390)
POTASSIUM SERPL-SCNC: 4 MMOL/L (ref 3.5–5.3)
PROTOCOL NAME: NORMAL
RBC # BLD AUTO: 3.7 MILLION/UL (ref 3.81–5.12)
REASON FOR TERMINATION: NORMAL
SODIUM SERPL-SCNC: 139 MMOL/L (ref 136–145)
TARGET HR FORMULA: NORMAL
TEST INDICATION: NORMAL
TIME IN EXERCISE PHASE: NORMAL
WBC # BLD AUTO: 6.64 THOUSAND/UL (ref 4.31–10.16)

## 2019-12-12 PROCEDURE — 99232 SBSQ HOSP IP/OBS MODERATE 35: CPT | Performed by: INTERNAL MEDICINE

## 2019-12-12 PROCEDURE — 97535 SELF CARE MNGMENT TRAINING: CPT

## 2019-12-12 PROCEDURE — 85027 COMPLETE CBC AUTOMATED: CPT | Performed by: INTERNAL MEDICINE

## 2019-12-12 PROCEDURE — 97110 THERAPEUTIC EXERCISES: CPT

## 2019-12-12 PROCEDURE — 80048 BASIC METABOLIC PNL TOTAL CA: CPT | Performed by: INTERNAL MEDICINE

## 2019-12-12 RX ORDER — ESCITALOPRAM OXALATE 10 MG/1
10 TABLET ORAL DAILY
Status: DISCONTINUED | OUTPATIENT
Start: 2019-12-12 | End: 2019-12-13 | Stop reason: HOSPADM

## 2019-12-12 RX ORDER — RISPERIDONE 0.25 MG/1
0.25 TABLET, FILM COATED ORAL 2 TIMES DAILY
Status: DISCONTINUED | OUTPATIENT
Start: 2019-12-12 | End: 2019-12-13

## 2019-12-12 RX ADMIN — PANTOPRAZOLE SODIUM 40 MG: 40 TABLET, DELAYED RELEASE ORAL at 06:21

## 2019-12-12 RX ADMIN — BACLOFEN 20 MG: 10 TABLET ORAL at 09:05

## 2019-12-12 RX ADMIN — BACLOFEN 20 MG: 10 TABLET ORAL at 17:31

## 2019-12-12 RX ADMIN — HEPARIN SODIUM 5000 UNITS: 5000 INJECTION INTRAVENOUS; SUBCUTANEOUS at 21:37

## 2019-12-12 RX ADMIN — RISPERIDONE 0.25 MG: 0.25 TABLET ORAL at 21:37

## 2019-12-12 RX ADMIN — HEPARIN SODIUM 5000 UNITS: 5000 INJECTION INTRAVENOUS; SUBCUTANEOUS at 06:21

## 2019-12-12 RX ADMIN — SODIUM CHLORIDE 75 ML/HR: 0.45 INJECTION, SOLUTION INTRAVENOUS at 04:52

## 2019-12-12 RX ADMIN — BACLOFEN 20 MG: 10 TABLET ORAL at 21:37

## 2019-12-12 RX ADMIN — METOPROLOL TARTRATE 50 MG: 50 TABLET, FILM COATED ORAL at 09:03

## 2019-12-12 RX ADMIN — OXYCODONE HYDROCHLORIDE AND ACETAMINOPHEN 2 TABLET: 5; 325 TABLET ORAL at 21:51

## 2019-12-12 RX ADMIN — OXYCODONE HYDROCHLORIDE AND ACETAMINOPHEN 2 TABLET: 5; 325 TABLET ORAL at 09:57

## 2019-12-12 RX ADMIN — RISPERIDONE 0.25 MG: 0.25 TABLET ORAL at 14:03

## 2019-12-12 RX ADMIN — HEPARIN SODIUM 5000 UNITS: 5000 INJECTION INTRAVENOUS; SUBCUTANEOUS at 14:03

## 2019-12-12 RX ADMIN — OXYCODONE HYDROCHLORIDE AND ACETAMINOPHEN 2 TABLET: 5; 325 TABLET ORAL at 01:41

## 2019-12-12 RX ADMIN — ESCITALOPRAM OXALATE 10 MG: 10 TABLET ORAL at 14:03

## 2019-12-12 RX ADMIN — SODIUM CHLORIDE 75 ML/HR: 0.45 INJECTION, SOLUTION INTRAVENOUS at 19:33

## 2019-12-12 RX ADMIN — METOPROLOL TARTRATE 50 MG: 50 TABLET, FILM COATED ORAL at 21:37

## 2019-12-12 NOTE — PHYSICAL THERAPY NOTE
PT TREATMENT     12/12/19 1150   Pain Assessment   Pain Assessment Dhaliwal-Baker FACES   Dhaliwal-Baker FACES Pain Rating 4   Pain Location Hip   Pain Orientation Right   Restrictions/Precautions   Other Precautions Chair Alarm; Bed Alarm;Pain; Fall Risk   General   Chart Reviewed Yes   Cognition   Overall Cognitive Status WFL   Subjective   Subjective "My legs are so weak"   Bed Mobility   Rolling R 2  Maximal assistance   Rolling L 2  Maximal assistance   Transfers   Additional Comments Pt declined sitting at the edge of the bed  Exercises   Neuro re-ed x10 each UE/LE AAROM/PROM;  shoulder elevation, elbow flex/ext, pron/sup, wrist ext, gripping, thumb circumduction, hip and knee flex/ext, hip abd/add, hip IR/ER, quad sets, glut sets, DF/PF   Assessment   Prognosis Good   Problem List Decreased strength;Decreased range of motion; Impaired balance;Decreased mobility;Pain   Assessment Pt is profoundly weak LE>UE  Encouraged pt to perform quad and glut sets every hour and perform UE ROM as able  Discussed STR and its benefits  Plan   Treatment/Interventions ADL retraining;Functional transfer training;LE strengthening/ROM; Therapeutic exercise; Endurance training;Bed mobility; Equipment eval/education;Patient/family training   Progress Progressing toward goals   PT Frequency 5x/wk   Recommendation   Recommendation Short-term skilled PT   Licensure   [place] License Number  Yanci Willoughby PT [id number]

## 2019-12-12 NOTE — ASSESSMENT & PLAN NOTE
Continue amantadine, baclofen,Teriflunomide (patient made aware that family needs to bring this medication)  PT/OT  Patient to be discharged to short-term rehabilitation for continued PT  No need for IV Solu-Medrol at the current time

## 2019-12-12 NOTE — OCCUPATIONAL THERAPY NOTE
OT Treatment Note       12/12/19 4715   Restrictions/Precautions   Other Precautions Chair Alarm; Bed Alarm; Fall Risk   Pain Assessment   Pain Assessment 0-10   Pain Score 4   Pain Location Hip   Pain Orientation Right   Hospital Pain Intervention(s) Repositioned   ADL   Where Assessed Supine, bed   Eating Assistance 4  Minimal Assistance   Eating Deficit   (Eating ice cream from cup with spoon)   Grooming Assistance 2  Maximal Assistance   Grooming Deficit Wash/dry face; Teeth care;Brushing hair   Grooming Comments Hair combing: able to grasp comb and bring to head, increased assist needed to reach back of head and for thoroughness  Face washing, able to bring hands to face to wash with washcloth, increased time required  Tooth brushing: required assist to put toothpaste on brush and to hold cup to rinse/spit, completed with max assist  Increased time needed to complete grooming tasks due to fatigue  Patient with decreased active shoulder flexion limiting able to reach top of head during hair combing task, also limited by diffuse weakness   UB Bathing Assistance 2  Maximal Assistance   LB Bathing Assistance 1  Total Assistance   UB Dressing Assistance 2  Maximal Assistance   LB Dressing Assistance 1  Total [address]  1  Total Assistance   Bed Mobility   Additional Comments Repositioned in bed with total assist   Activity Tolerance   Activity Tolerance Patient limited by fatigue   Assessment   Assessment Patient agreeable to participate in OT treatment and motivated to increase independence in ADL tasks  Patient continues to require max to total assist for ADLs, min assist for self feeding  Patient is limited by diffuse weakness and fatigue due to MS  Patient will benefit from continued skilled inpatient OT services to maximize functional independence and prepare for safe discharge   Continue OT per POC   Recommendation   OT Discharge Recommendation [facility] Number  JEANETH RiosR/L [id number]

## 2019-12-12 NOTE — ASSESSMENT & PLAN NOTE
Patient was having hallucinations on was seeing people in the room  Vital signs were all stable  Patient continues to have hallucinations  As per my discussion with PT and other nursing who took care of her before, patient did have hallucinations during prior hospitalization too  Ammonia level was normal and ABG showed a pH of 7 47 with pCO2 of 32, PO2 of 100 and bicarbonate level of 23 on room air  CT scan of the brain was unremarkable  Neurology was consulted-amantadine can cause hallucinations  Patient to follow up her primary MS specialist regarding the medication    Psychiatry was consulted and patient was started on Lexapro and Risperdal

## 2019-12-12 NOTE — SOCIAL WORK
Pt was to be ready for d/c today  Spoke to pt, is now willing to d/c to STR  Was at [facility] in the past, now chooses [facility]  Referral made  Will follow

## 2019-12-12 NOTE — PLAN OF CARE
Problem: OCCUPATIONAL THERAPY ADULT  Goal: Performs self-care activities at highest level of function for planned discharge setting  See evaluation for individualized goals  Outcome: Progressing  Note:   Limitation: Decreased ADL status, Decreased UE ROM, Decreased UE strength, Decreased endurance, Decreased self-care trans, Decreased high-level ADLs  Prognosis: Good  Assessment: Patient agreeable to participate in OT treatment and motivated to increase independence in ADL tasks  Patient continues to require max to total assist for ADLs, min assist for self feeding  Patient is limited by diffuse weakness and fatigue due to MS  Patient will benefit from continued skilled inpatient OT services to maximize functional independence and prepare for safe discharge   Continue OT per POC     OT Discharge Recommendation: Short Term Rehab

## 2019-12-12 NOTE — ASSESSMENT & PLAN NOTE
Likely pre renal in nature as patient with decreased p o  Intake and not taking enough fluids + Micardis-HCT use at home  Continue patient on IV hydration  Creatinine is slowly trending down  Repeat lab work in a m   ml  Avoid hypotension    total CK level 359 which normalized to 109  Creatinine is normal and is at baseline

## 2019-12-12 NOTE — ASSESSMENT & PLAN NOTE
Likely due to dehydration, underlying MS, deconditioning  PT/OT while here  UA not convincing for UTI  On prior admission patient and weakness as well and initially there was concern for MS exacerbation but patient was seen by Neurology and Solu-Medrol was discontinued and patient improved with IV fluids  Patient was seen in consultation with Neurology as patient had some visual hallucination  Patient does not need IV Solu-Medrol at the current time  As per Neurology, amantadine can cause hallucinations    Follow up outpatient with primary neurologist

## 2019-12-12 NOTE — PLAN OF CARE
Problem: Potential for Falls  Goal: Patient will remain free of falls  Description  INTERVENTIONS:  - Assess patient frequently for physical needs  -  Identify cognitive and physical deficits and behaviors that affect risk of falls    -  [place] fall precautions as indicated by assessment   - Educate patient/family on patient safety including physical limitations  - Instruct patient to call for assistance with activity based on assessment  - Modify environment to reduce risk of injury  - Consider OT/PT consult to assist with strengthening/mobility  Outcome: Progressing     Problem: Prexisting or High Potential for Compromised Skin Integrity  Goal: Skin integrity is maintained or improved  Description  INTERVENTIONS:  - Identify patients at risk for skin breakdown  - Assess and monitor skin integrity  - Assess and monitor nutrition and hydration status  - Monitor labs   - Assess for incontinence   - Turn and reposition patient  - Assist with mobility/ambulation  - Relieve pressure over bony prominences  - Avoid friction and shearing  - Provide appropriate hygiene as needed including keeping skin clean and dry  - Evaluate need for skin moisturizer/barrier cream  - Collaborate with interdisciplinary team   - Patient/family teaching  - Consider wound care consult   Outcome: Progressing     Problem: GENITOURINARY - ADULT  Goal: Maintains or returns to baseline urinary function  Description  INTERVENTIONS:  - Assess urinary function  - Encourage oral fluids to ensure adequate hydration if ordered  - Administer IV fluids as ordered to ensure adequate hydration  - Administer ordered medications as needed  - Offer frequent toileting  - Follow urinary retention protocol if ordered  Outcome: Progressing     Problem: Nutrition/Hydration-ADULT  Goal: Nutrient/Hydration intake appropriate for improving, restoring or maintaining nutritional needs  Description  Monitor and assess patient's nutrition/hydration status for malnutrition  Collaborate with interdisciplinary team and initiate plan and interventions as ordered  Monitor patient's weight and dietary intake as ordered or per policy  Utilize nutrition screening tool and intervene as necessary  Determine patient's food preferences and provide high-protein, high-caloric foods as appropriate       INTERVENTIONS:  - Monitor oral intake, urinary output, labs, and treatment plans  - Assess nutrition and hydration status and recommend course of action  - Evaluate amount of meals eaten  - Assist patient with eating if necessary   - Allow adequate time for meals  - Recommend/ encourage appropriate diets, oral nutritional supplements, and vitamin/mineral supplements  - Order, calculate, and assess calorie counts as needed  - Assess need for intravenous fluids  - Provide specific nutrition/hydration education as appropriate  - Include patient/family/caregiver in decisions related to nutrition   Outcome: Progressing

## 2019-12-12 NOTE — SOCIAL WORK
Called to [facility] for pre-cert  Ref#[id number]  Faxed requested documentation to [facility], await call back

## 2019-12-12 NOTE — CONSULTS
Consultation - Jv Bergman 61 y o  female MRN: [id number]    Unit/Bed#: [address] [place] [address] Encounter: [id number]      Identifying Data:  61years old white female is admitted at [facility] on December the 08/2019 with complaining of generalized weakness psychiatric consultation is asked for depression and hallucinations  Patient examined spoke with the nurse history physical medications labs reviewed and noted  Drugs and alcohol level was not done and patient denies abusing drugs or alcohol  Patient is cooperative and she was able to give out the basic background information regarding her hallucinations  Patient reports that she started hallucinating since June 2019 she described that people are talking in the next room about her and they are putting her down like''she is stupid''patient became paranoid and she felt like they are trying to hurt her at 1 point she had to call 911 in June after the hallucination  She feels like they are real and it is scary so far patient has no treatment with the hallucination  Patient is under the treatment for multiple sclerosis she has been disabled since 8 years since she was diagnosed having a multiple sclerosis  Patient also reports that she is  she has 2 sons and she had longstanding relationship with a boyfriend for 16 years but he broke up in February 2019 she took it hard and she has been depressed she reports that she is sleeping a lot she has no energy she lost her appetite and she lost 10-15 lb since June 2019 she cannot relax and she is stressed and anxious hallucination is bothering her  After the discussion she agreed to take medications for the depression and hallucinations  Patient lives with son   Both sons are supportive to her    Patient used to smoke patient denies abusing alcohol or drugs she has no history of drug and alcohol abuse patient was working as a  for [employer] but she has been disabled since 6 years  Patient is suffering from chronic back pain depression hypertension high cholesterol multiple sclerosis history of pneumonia she had history of right ankle surgery back surgery  section knee arthroscopic surgery laminectomy I reviewed her medications at home  No known allergies    Chief Complaints:  Hallucination and depression    Family History:  Patient denies  Family History   Problem Relation Age of Onset    Osteoporosis Mother     Heart attack Father     Mental illness Neg Hx        Legal History:  Patient denies    Mental Status Exam:  61years old white female is alert awake oriented x3 cooperative affect flat withdrawn psychomotor retardation speech slow and low in tone she looks older than stated age  Memory intact  Patient is hallucinating as described above she feels depressed sad sleeps too much poor appetite she lost weight  Patient denies suicidal or homicidal ideations  Paranoid guarded she feels scared and fearful she feels like people are talking about her and they are trying to hurt her at 1 time she had to call 911 in 2019 in response to the hallucination and paranoia  Poor concentration insight and judgments are adequate no delusion elucidated  History of Present Illness     HPI: Norma Colorado is a 61y o  year old female who presents with generalized weakness    Consults      Historical Information   Past Psychiatric History:  Patient reports being depressed since boyfriend broke up with her in 2019 then she became sick and she was in the hospital in 2019 she started hallucinating she has been hearing voices as described above off and on but she never went for help she has not tried any medications she has not seen a psychiatrist   Patient has no history before this  Patient denies psychiatric admissions  Patient denies suicide attempts in the past   Patient denies history of drug and alcohol abuse    Patient denies legal problems in the past  Currently patient is not under psychiatric care and she is not on psychotropic medications    Past Medical History:   Diagnosis Date    Chronic back pain     Depression     last assessed 06/10/14    High cholesterol     Hypertension     last assessed 17    MS (multiple sclerosis) ([facility] [address] )     last assessed 17    RLL pneumonia ([facility] [address] )     last assessed 06/10/14     Past Surgical History:   Procedure Laterality Date    ANKLE SURGERY Right     Treatment of Ankle  last assessed 06/10/14   [facility] BACK SURGERY      for spinal stenosis and sciatica     SECTION      last assessed 06/10/14    KNEE ARTHROSCOPY      last assessed 06/10/14    LAMINECTOMY      last assessed 06/10/14    ORTHOPEDIC SURGERY      OTHER SURGICAL HISTORY      discectomy     Social History   Social History     Substance and Sexual Activity   Alcohol Use Yes    Comment: rare // no alcohol use as per allscripts     Social History     Substance and Sexual Activity   Drug Use No     Social History     Tobacco Use   Smoking Status Former Smoker    Packs/day: 0 00   Smokeless Tobacco Never Used   Tobacco Comment    about 4 cigs daily as per allscripts       Meds/Allergies   current meds:   Current Facility-Administered Medications   Medication Dose Route Frequency    acetaminophen (TYLENOL) tablet 650 mg  650 mg Oral Q6H PRN    amantadine (SYMMETREL) capsule 100 mg  100 mg Oral Every Other Day    baclofen tablet 20 mg  20 mg Oral TID    heparin (porcine) subcutaneous injection 5,000 Units  5,000 Units Subcutaneous Q8H [facility]    metoprolol tartrate (LOPRESSOR) tablet 50 mg  50 mg Oral Q12H [facility]    ondansetron (ZOFRAN) injection 4 mg  4 mg Intravenous Q6H PRN    oxyCODONE-acetaminophen (PERCOCET) 5-325 mg per tablet 2 tablet  2 tablet Oral Q8H PRN    pantoprazole (PROTONIX) EC tablet 40 mg  40 mg Oral Early Morning    sodium chloride infusion 0 45 %  75 mL/hr Intravenous Continuous    Teriflunomide TABS 14 mg  1 tablet Oral Daily and PTA meds:    Medications Prior to Admission   Medication    amantadine (SYMMETREL) 100 mg capsule    baclofen 10 mg tablet    esomeprazole (NEXIUM) 40 MG capsule    meloxicam (MOBIC) 15 mg tablet    metoprolol tartrate (LOPRESSOR) 50 mg tablet    oxyCODONE-acetaminophen (PERCOCET)  mg per tablet    telmisartan-hydrochlorothiazide (MICARDIS HCT) 80-12 5 MG per tablet    Teriflunomide (AUBAGIO) 14 MG TABS     No Known Allergies    Objective   Vitals: Blood pressure (!) 182/98, pulse 77, temperature 98 5 °F (36 9 °C), temperature source Oral, resp  rate 20, height 5' 6" (1 676 m), weight 60 3 kg (133 lb), SpO2 95 %, not currently breastfeeding  Routine Lab Results:   Admission on 12/08/2019   Component Date Value Ref Range Status    Sodium 12/08/2019 142  136 - 145 mmol/L Final    Potassium 12/08/2019 3 6  3 5 - 5 3 mmol/L Final    Chloride 12/08/2019 103  100 - 108 mmol/L Final    CO2 12/08/2019 30  21 - 32 mmol/L Final    ANION GAP 12/08/2019 9  4 - 13 mmol/L Final    BUN 12/08/2019 54* 5 - 25 mg/dL Final    Creatinine 12/08/2019 2 02* 0 60 - 1 30 mg/dL Final    Standardized to IDMS reference method    Glucose 12/08/2019 107  65 - 140 mg/dL Final      If the patient is fasting, the ADA then defines impaired fasting glucose as > 100 mg/dL and diabetes as > or equal to 123 mg/dL  Specimen collection should occur prior to Sulfasalazine administration due to the potential for falsely depressed results  Specimen collection should occur prior to Sulfapyridine administration due to the potential for falsely elevated results   Calcium 12/08/2019 9 9  8 3 - 10 1 mg/dL Final    AST 12/08/2019 21  5 - 45 U/L Final      Specimen collection should occur prior to Sulfasalazine administration due to the potential for falsely depressed results       ALT 12/08/2019 32  12 - 78 U/L Final      Specimen collection should occur prior to Sulfasalazine administration due to the potential for falsely depressed results       Alkaline Phosphatase 12/08/2019 106  46 - 116 U/L Final    Total Protein 12/08/2019 8 1  6 4 - 8 2 g/dL Final    Albumin 12/08/2019 3 5  3 5 - 5 0 g/dL Final    Total Bilirubin 12/08/2019 0 40  0 20 - 1 00 mg/dL Final    eGFR 12/08/2019 26  ml/min/1 73sq m Final    WBC 12/08/2019 5 25  4 31 - 10 16 Thousand/uL Final    RBC 12/08/2019 4 02  3 81 - 5 12 Million/uL Final    Hemoglobin 12/08/2019 11 4* 11 5 - 15 4 g/dL Final    Hematocrit 12/08/2019 35 9  34 8 - 46 1 % Final    MCV 12/08/2019 89  82 - 98 fL Final    MCH 12/08/2019 28 4  26 8 - 34 3 pg Final    MCHC 12/08/2019 31 8  31 4 - 37 4 g/dL Final    RDW 12/08/2019 14 6  11 6 - 15 1 % Final    Platelets 34/09/9030 127* 149 - 390 Thousands/uL Final    nRBC 12/08/2019 0  /100 WBCs Final    Neutrophils Relative 12/08/2019 58  43 - 75 % Final    Immat GRANS % 12/08/2019 0  0 - 2 % Final    Lymphocytes Relative 12/08/2019 28  14 - 44 % Final    Monocytes Relative 12/08/2019 11  4 - 12 % Final    Eosinophils Relative 12/08/2019 2  0 - 6 % Final    Basophils Relative 12/08/2019 1  0 - 1 % Final    Neutrophils Absolute 12/08/2019 3 06  1 85 - 7 62 Thousands/µL Final    Immature Grans Absolute 12/08/2019 0 01  0 00 - 0 20 Thousand/uL Final    Lymphocytes Absolute 12/08/2019 1 49  0 60 - 4 47 Thousands/µL Final    Monocytes Absolute 12/08/2019 0 56  0 17 - 1 22 Thousand/µL Final    Eosinophils Absolute 12/08/2019 0 10  0 00 - 0 61 Thousand/µL Final    Basophils Absolute 12/08/2019 0 03  0 00 - 0 10 Thousands/µL Final    Protime 12/08/2019 11 0  9 8 - 12 0 seconds Final    INR 12/08/2019 1 02  0 91 - 1 09 Final    PTT 12/08/2019 27  23 - 37 seconds Final    Therapeutic Heparin Range =  60-90 seconds    Troponin I 12/08/2019 0 07* <=0 04 ng/mL Final      [employer] Chemistry analyzer 99% cutoff is > 0 04 ng/mL in network labs     o cTnI 99% cutoff is useful only when applied to patients in the clinical setting of myocardial ischemia   o cTnI 99% cutoff should be interpreted in the context of clinical history, ECG findings and possibly cardiac imaging to establish correct diagnosis  o cTnI 99% cutoff may be suggestive but clearly not indicative of a coronary event without the clinical setting of myocardial ischemia  Results indicate test should be repeated on new specimen collected within 4-6 hours of the original    Color, UA 12/08/2019 Yellow   Final    Clarity, UA 12/08/2019 Clear   Final    Specific Gravity, UA 12/08/2019 1 025  1 000 - 1 030 Final    pH, UA 12/08/2019 5 5  5 0, 5 5, 6 0, 6 5, 7 0, 7 5, 8 0, 8 5, 9 0 Final    Leukocytes, UA 12/08/2019 Negative  Negative Final    Nitrite, UA 12/08/2019 Negative  Negative Final    Protein, UA 12/08/2019 Trace* Negative mg/dl Final    Glucose, UA 12/08/2019 Negative  Negative mg/dl Final    Ketones, UA 12/08/2019 Negative  Negative mg/dl Final    Urobilinogen, UA 12/08/2019 0 2  0 2, 1 0 E U /dl E U /dl Final    Bilirubin, UA 12/08/2019 Negative  Negative Final    Blood, UA 12/08/2019 Trace-Intact* Negative Final    Urine Culture 12/08/2019 No Growth <1000 cfu/mL   Final    Blood Culture 12/08/2019 No Growth at 72 hrs  Preliminary    Blood Culture 12/08/2019 No Growth at 72 hrs     Preliminary    LACTIC ACID 12/08/2019 1 0  0 5 - 2 0 mmol/L Final    RBC, UA 12/08/2019 0-1* None Seen, 0-5 /hpf Final    WBC, UA 12/08/2019 2-4* None Seen, 0-5, 5-55, 5-65 /hpf Final    Epithelial Cells 12/08/2019 Occasional  None Seen, Occasional /hpf Final    Bacteria, UA 12/08/2019 Occasional  None Seen, Occasional /hpf Final    Hyaline Casts, UA 12/08/2019 1-2* (none) /lpf Final    MRSA Culture Only 12/08/2019 No Methicillin Resistant Staphlyococcus aureus (MRSA) isolated   Final    Platelets 97/80/9322 112* 149 - 390 Thousands/uL Final    MPV 12/08/2019 14 1* 8 9 - 12 7 fL Final    Total CK 12/08/2019 359* 26 - 192 U/L Final    Troponin I 12/08/2019 0 09* <=0 04 ng/mL Final      [employer] Chemistry analyzer 99% cutoff is > 0 04 ng/mL in network labs     o cTnI 99% cutoff is useful only when applied to patients in the clinical setting of myocardial ischemia   o cTnI 99% cutoff should be interpreted in the context of clinical history, ECG findings and possibly cardiac imaging to establish correct diagnosis  o cTnI 99% cutoff may be suggestive but clearly not indicative of a coronary event without the clinical setting of myocardial ischemia  Results indicate test should be repeated on new specimen collected within 4-6 hours of the original    Troponin I 12/09/2019 0 11* <=0 04 ng/mL Final      [employer] Chemistry analyzer 99% cutoff is > 0 04 ng/mL in network labs     o cTnI 99% cutoff is useful only when applied to patients in the clinical setting of myocardial ischemia   o cTnI 99% cutoff should be interpreted in the context of clinical history, ECG findings and possibly cardiac imaging to establish correct diagnosis  o cTnI 99% cutoff may be suggestive but clearly not indicative of a coronary event without the clinical setting of myocardial ischemia  Results indicate test should be repeated on new specimen collected within 4-6 hours of the original    CK-MB Index 12/08/2019 1 4  0 0 - 2 5 % Final    CK-MB 12/08/2019 5 0  0 0 - 5 0 ng/mL Final    Troponin I 12/09/2019 0 11* <=0 04 ng/mL Final      [employer] Chemistry analyzer 99% cutoff is > 0 04 ng/mL in network labs     o cTnI 99% cutoff is useful only when applied to patients in the clinical setting of myocardial ischemia   o cTnI 99% cutoff should be interpreted in the context of clinical history, ECG findings and possibly cardiac imaging to establish correct diagnosis  o cTnI 99% cutoff may be suggestive but clearly not indicative of a coronary event without the clinical setting of myocardial ischemia      Results indicate test should be repeated on new specimen collected within 4-6 hours of the original    Sodium 12/09/2019 140  136 - 145 mmol/L Final    Potassium 12/09/2019 3 5  3 5 - 5 3 mmol/L Final    Chloride 12/09/2019 106  100 - 108 mmol/L Final    CO2 12/09/2019 25  21 - 32 mmol/L Final    ANION GAP 12/09/2019 9  4 - 13 mmol/L Final    BUN 12/09/2019 46* 5 - 25 mg/dL Final    Creatinine 12/09/2019 1 70* 0 60 - 1 30 mg/dL Final    Standardized to IDMS reference method    Glucose 12/09/2019 68  65 - 140 mg/dL Final      If the patient is fasting, the ADA then defines impaired fasting glucose as > 100 mg/dL and diabetes as > or equal to 123 mg/dL  Specimen collection should occur prior to Sulfasalazine administration due to the potential for falsely depressed results  Specimen collection should occur prior to Sulfapyridine administration due to the potential for falsely elevated results   Calcium 12/09/2019 8 5  8 3 - 10 1 mg/dL Final    eGFR 12/09/2019 33  ml/min/1 73sq m Final    Magnesium 12/09/2019 1 7  1 6 - 2 6 mg/dL Final    WBC 12/09/2019 5 45  4 31 - 10 16 Thousand/uL Final    RBC 12/09/2019 3 34* 3 81 - 5 12 Million/uL Final    Hemoglobin 12/09/2019 9 6* 11 5 - 15 4 g/dL Final    Hematocrit 12/09/2019 32 2* 34 8 - 46 1 % Final    MCV 12/09/2019 93  82 - 98 fL Final    Comment: Results verified by repeat                             This is an appended report  These results have been appended to a previously preliminary verified report      MCH 12/09/2019 28 7  26 8 - 34 3 pg Final    MCHC 12/09/2019 29 8* 31 4 - 37 4 g/dL Final    RDW 12/09/2019 15 1  11 6 - 15 1 % Final    Platelets 62/01/3592 109* 149 - 390 Thousands/uL Final    Results verified by repeat    Troponin I 12/09/2019 0 11* <=0 04 ng/mL Final      [employer] Chemistry analyzer 99% cutoff is > 0 04 ng/mL in network labs     o cTnI 99% cutoff is useful only when applied to patients in the clinical setting of myocardial ischemia   o cTnI 99% cutoff should be interpreted in the context of clinical history, ECG findings and possibly cardiac imaging to establish correct diagnosis  o cTnI 99% cutoff may be suggestive but clearly not indicative of a coronary event without the clinical setting of myocardial ischemia  Results indicate test should be repeated on new specimen collected within 4-6 hours of the original    Ventricular Rate 12/08/2019 70  BPM Final    Atrial Rate 12/08/2019 70  BPM Final    MA Interval 12/08/2019 154  ms Final    QRSD Interval 12/08/2019 76  ms Final    QT Interval 12/08/2019 418  ms Final    QTC Interval 12/08/2019 451  ms Final    P Axis 12/08/2019 67  degrees Final    QRS Axis 12/08/2019 85  degrees Final    T Wave [place] 12/08/2019 107  degrees Final    Cholesterol 12/09/2019 207* 50 - 200 mg/dL Final      Cholesterol:       Desirable         <200 mg/dl       Borderline         200-239 mg/dl       High              >239           Triglycerides 12/09/2019 144  <=150 mg/dL Final      Triglyceride:     Normal          <150 mg/dl     Borderline High 150-199 mg/dl     High            200-499 mg/dl        Very High       >499 mg/dl    Specimen collection should occur prior to N-Acetylcysteine or Metamizole administration due to the potential for falsely depressed results   HDL, Direct 12/09/2019 37* >=40 mg/dL Final      HDL Cholesterol:       Low     <41 mg/dL  Specimen collection should occur prior to Metamizole administration due to the potential for falsley depressed results   LDL Calculated 12/09/2019 141* 0 - 100 mg/dL Final      LDL Cholesterol:     Optimal           <100 mg/dl     Near Optimal      100-129 mg/dl     Above Optimal       Borderline High 130-159 mg/dl       High            160-189 mg/dl       Very High       >189 mg/dl         This screening LDL is a calculated result  It does not have the accuracy of the Direct Measured LDL in the monitoring of patients with hyperlipidemia and/or statin therapy  Direct Measure LDL (JSG461) must be ordered separately in these patients      Sodium 12/10/2019 141  136 - 145 mmol/L Final    Potassium 12/10/2019 4 2  3 5 - 5 3 mmol/L Final    Chloride 12/10/2019 106  100 - 108 mmol/L Final    CO2 12/10/2019 25  21 - 32 mmol/L Final    ANION GAP 12/10/2019 10  4 - 13 mmol/L Final    BUN 12/10/2019 31* 5 - 25 mg/dL Final    Creatinine 12/10/2019 1 28  0 60 - 1 30 mg/dL Final    Standardized to IDMS reference method    Glucose 12/10/2019 128  65 - 140 mg/dL Final      If the patient is fasting, the ADA then defines impaired fasting glucose as > 100 mg/dL and diabetes as > or equal to 123 mg/dL  Specimen collection should occur prior to Sulfasalazine administration due to the potential for falsely depressed results  Specimen collection should occur prior to Sulfapyridine administration due to the potential for falsely elevated results   Calcium 12/10/2019 8 6  8 3 - 10 1 mg/dL Final    eGFR 12/10/2019 46  ml/min/1 73sq m Final    Total CK 12/11/2019 109  26 - 192 U/L Final    pH, Arterial 12/11/2019 7 477* 7 350 - 7 450 Final    PH ART TC 12/11/2019 7 475* 7 350 - 7 450 Final    pCO2, Arterial 12/11/2019 32 7* 36 0 - 44 0 mm Hg Final    PCO2 (TC) Arterial 12/11/2019 32 8* 36 0 - 44 0 mm Hg Final    pO2, Arterial 12/11/2019 100 6  75 0 - 129 0 mm Hg Final    PO2 (TC) Arterial 12/11/2019 101 2  75 0 - 129 0 mm Hg Final    HCO3, Arterial 12/11/2019 23 6  22 0 - 28 0 mmol/L Final    Base Excess, Arterial 12/11/2019 0 5  mmol/L Final    O2 Content, Arterial 12/11/2019 14 3* 16 0 - 23 0 mL/dL Final    O2 HGB,Arterial  12/11/2019 97 0  94 0 - 97 0 % Final    SOURCE 12/11/2019 Radial, Right   Final    RADHA TEST 12/11/2019 Yes   Final    Temperature 12/11/2019 98 8  Degrees Fehrenheit Final    ROOM AIR FIO2 12/11/2019 21%  % Final    Ammonia 12/11/2019 <10* 11 - 35 umol/L Final      Specimen collection should occur prior to Sulfapyridine administration due to the potential for falsely depressed results       WBC 12/12/2019 6 64  4 31 - 10 16 Thousand/uL Final    RBC 12/12/2019 3 70* 3 81 - 5 12 Million/uL Final    Hemoglobin 12/12/2019 10 9* 11 5 - 15 4 g/dL Final    Hematocrit 12/12/2019 33 7* 34 8 - 46 1 % Final    MCV 12/12/2019 91  82 - 98 fL Final    MCH 12/12/2019 29 5  26 8 - 34 3 pg Final    MCHC 12/12/2019 32 3  31 4 - 37 4 g/dL Final    RDW 12/12/2019 15 2* 11 6 - 15 1 % Final    Platelets 38/20/2188 109* 149 - 390 Thousands/uL Final    Sodium 12/12/2019 139  136 - 145 mmol/L Final    Potassium 12/12/2019 4 0  3 5 - 5 3 mmol/L Final    Chloride 12/12/2019 105  100 - 108 mmol/L Final    CO2 12/12/2019 21  21 - 32 mmol/L Final    ANION GAP 12/12/2019 13  4 - 13 mmol/L Final    BUN 12/12/2019 17  5 - 25 mg/dL Final    Creatinine 12/12/2019 1 08  0 60 - 1 30 mg/dL Final    Standardized to IDMS reference method    Glucose 12/12/2019 81  65 - 140 mg/dL Final      If the patient is fasting, the ADA then defines impaired fasting glucose as > 100 mg/dL and diabetes as > or equal to 123 mg/dL  Specimen collection should occur prior to Sulfasalazine administration due to the potential for falsely depressed results  Specimen collection should occur prior to Sulfapyridine administration due to the potential for falsely elevated results   Calcium 12/12/2019 9 4  8 3 - 10 1 mg/dL Final    eGFR 12/12/2019 56  ml/min/1 73sq m Final    Protocol Name 12/10/2019 [facility]   Final    Time In Exercise Phase 12/10/2019 00:01:00   Final    MAX   SYSTOLIC BP 75/94/2006 365  mmHg Final    Max Diastolic Bp 21/72/7911 82  mmHg Final    Max Heart Rate 12/10/2019 126  BPM Final    Max Predicted Heart Rate 12/10/2019 161  BPM Final    Reason for Termination 12/10/2019 Protocol Complete   Final    Test Indication 12/10/2019 hcc   Final    Target Hr Formular 12/10/2019 (220 - Age)*100%   Final    Chest Pain Statement 12/10/2019 none   Final         Diagnosis:  Major depression single episode with psychosis  Anxiety    Plan:  Lexapro 10 mg daily  Risperdal 0 25 mg p  O  B i d  Psychotherapy  Psychiatric follow-up recommended after the discharge  Discussed with the nurse  I will follow up during the hospital stay        Araceli Burns MD

## 2019-12-12 NOTE — PROGRESS NOTES
Progress Note - Jhoana Harry 1960, 61 y o  female MRN: [id number]    Unit/Bed#: [address] [place] [address] Encounter: [id number]    Primary Care Provider: Maria Alejandra Schultz DO   Date and time admitted to hospital: 12/8/2019  4:08 PM        * ARBEN (acute kidney injury) [facility])  Assessment & Plan  Likely pre renal in nature as patient with decreased p o  Intake and not taking enough fluids + Micardis-HCT use at home  Continue patient on IV hydration  Creatinine is slowly trending down  Repeat lab work in a m   ml  Avoid hypotension  total CK level 359 which normalized to 109  Creatinine is normal and is at baseline    Generalized weakness  Assessment & Plan  Likely due to dehydration, underlying MS, deconditioning  PT/OT while here  UA not convincing for UTI  On prior admission patient and weakness as well and initially there was concern for MS exacerbation but patient was seen by Neurology and Solu-Medrol was discontinued and patient improved with IV fluids  Patient was seen in consultation with Neurology as patient had some visual hallucination  Patient does not need IV Solu-Medrol at the current time  As per Neurology, amantadine can cause hallucinations  Follow up outpatient with primary neurologist      Altered mental status  Assessment & Plan  Patient was having hallucinations on was seeing people in the room  Vital signs were all stable  Patient continues to have hallucinations  As per my discussion with PT and other nursing who took care of her before, patient did have hallucinations during prior hospitalization too  Ammonia level was normal and ABG showed a pH of 7 47 with pCO2 of 32, PO2 of 100 and bicarbonate level of 23 on room air  CT scan of the brain was unremarkable  Neurology was consulted-amantadine can cause hallucinations  Patient to follow up her primary MS specialist regarding the medication    Psychiatry was consulted and patient was started on Lexapro and Risperdal    Type 2 myocardial infarction [facility])  Assessment & Plan  Possibly from kidney dysfunction  Troponins peaked at 0 11  Patient underwent nuclear stress test which was normal  Echo from 2019 showed EF of 55% with grade 1 diastolic dysfunction  Essential hypertension  Assessment & Plan  Continue Lopressor  Monitor blood pressures  Holding Micardis-HCT due to ARBEN  Blood pressure is moderately controlled    Multiple sclerosis (HCC)  Assessment & Plan  Continue amantadine, baclofen,Teriflunomide (patient made aware that family needs to bring this medication)  PT/OT while here  No need for IV Solu-Medrol at the current time  Thrombocytopenia [facility])  Assessment & Plan  Patient with chronic thrombocytopenia  Monitor counts  Continue to monitor platelet count    Opiate dependence ([address] )  Assessment & Plan  Patient with chronic bilateral hip pain  Continue Percocet p r n  VTE Pharmacologic Prophylaxis:   Pharmacologic: Heparin  Mechanical VTE Prophylaxis in Place: No    Patient Centered Rounds: I have performed bedside rounds with nursing staff today  Discussions with Specialists or Other Care Team Provider: Yes  Education and Discussions with Family / Patient:Yes  Time Spent for Care: 30 minutes  More than 50% of total time spent on counseling and coordination of care as described above  Current Length of Stay: 4 day(s)  Current Patient Status: Inpatient     Discharge Plan: STR    Code Status: Level 1 - Full Code      Subjective:   Patient is feeling better today  Patient does not have any hallucinations today  Patient's weakness is slightly better  Objective:     Vitals:   Temp (24hrs), Av 3 °F (36 8 °C), Min:97 2 °F (36 2 °C), Max:98 9 °F (37 2 °C)    Temp:  [97 2 °F (36 2 °C)-98 9 °F (37 2 °C)] 98 4 °F (36 9 °C)  HR:  [77-85] 80  Resp:  [20] 20  BP: (114-182)/() 114/68  SpO2:  [95 %-96 %] 95 %  Body mass index is 21 47 kg/m²  Input and Output Summary (last 24 hours):      Intake/Output Summary (Last 24 hours) at 12/12/2019 1745  Last data filed at 12/12/2019 0900  Gross per 24 hour   Intake 1385 ml   Output 300 ml   Net 1085 ml        Physical Exam:     Physical Exam   Constitutional: No distress  HENT:   Head: Normocephalic and atraumatic  Nose: Nose normal    Eyes: Pupils are equal, round, and reactive to light  Conjunctivae are normal    Neck: Normal range of motion  Neck supple  Cardiovascular: Normal rate, regular rhythm and normal heart sounds  Pulmonary/Chest: Effort normal and breath sounds normal  No respiratory distress  She has no wheezes  She has no rales  Abdominal: Soft  Bowel sounds are normal  She exhibits no distension  There is no tenderness  There is no rebound and no guarding  Musculoskeletal: She exhibits no edema  Neurological: She is alert  No cranial nerve deficit  Skin: Skin is warm and dry  No rash noted         Additional Data:     Labs:    Results from last 7 days   Lab Units 12/12/19  0451 12/09/19  0422 12/08/19  2039 12/08/19  1619   WBC Thousand/uL 6 64 5 45  --  5 25   HEMOGLOBIN g/dL 10 9* 9 6*  --  11 4*   HEMATOCRIT % 33 7* 32 2*  --  35 9   PLATELETS Thousands/uL 109* 109* 112* 127*   NEUTROS PCT %  --   --   --  58     Results from last 7 days   Lab Units 12/12/19  0451 12/10/19  1619 12/09/19 0422 12/08/19  1619   SODIUM mmol/L 139 141 140 142   POTASSIUM mmol/L 4 0 4 2 3 5 3 6   CHLORIDE mmol/L 105 106 106 103   CO2 mmol/L 21 25 25 30   BUN mg/dL 17 31* 46* 54*   CREATININE mg/dL 1 08 1 28 1 70* 2 02*   CALCIUM mg/dL 9 4 8 6 8 5 9 9   TOTAL BILIRUBIN mg/dL  --   --   --  0 40   ALK PHOS U/L  --   --   --  106   ALT U/L  --   --   --  32   AST U/L  --   --   --  21     Results from last 7 days   Lab Units 12/08/19  1619   INR  1 02     Results from last 7 days   Lab Units 12/09/19  0814 12/09/19 0422 12/09/19  0016   TROPONIN I ng/mL 0 11* 0 11* 0 11*     No results found for: HGBA1C      Results from last 7 days   Lab Units 12/08/19  1619   LACTIC ACID mmol/L 1 0       * I Have Reviewed All Lab Data Listed Above  * Additional Pertinent Lab Tests Reviewed: [address] Admission Reviewed    Imaging:     CT head wo contrast   Final Result by Justin Aldana MD (12/11 1223)      No acute intracranial abnormality  Workstation performed: [id number]           Imaging Reports Reviewed by myself    Cultures:   Blood Culture:   Lab Results   Component Value Date    BLOODCX No Growth at 72 hrs  12/08/2019    BLOODCX No Growth at 72 hrs  12/08/2019     Urine Culture:   Lab Results   Component Value Date    URINECX No Growth <1000 cfu/mL 12/08/2019     Sputum Culture: No components found for: SPUTUMCX  Wound Culture: No results found for: WOUNDCULT    Last 24 Hours Medication List:     Current Facility-Administered Medications:  acetaminophen 650 mg Oral Q6H PRN Mary Anne Revankar, DO    amantadine 100 mg Oral Every Other Day Areatha Mullet, CRNP    baclofen 20 mg Oral TID Mary Anne Revankar, DO    escitalopram 10 mg Oral Daily Zuri Brady MD    heparin (porcine) 5,000 Units Subcutaneous Q8H [address] Mary Anne Revankar, DO    metoprolol tartrate 50 mg Oral Q12H [address] Mary Anne Revankar, DO    ondansetron 4 mg Intravenous Q6H PRN Mary Anne Revankar, DO    oxyCODONE-acetaminophen 2 tablet Oral Q8H PRN Areatha Mullet, CRNP    pantoprazole 40 mg Oral Early Morning Mary Anne Revankar, DO    risperiDONE 0 25 mg Oral BID Zuri Brady MD    sodium chloride 75 mL/hr Intravenous Continuous Mary Anne Revankar, DO Last Rate: 75 mL/hr (12/12/19 0452)   Teriflunomide 1 tablet Oral Daily Mary Anne Revankar, DO         Today, Patient Was Seen By: Adriana Jones MD    ** Please Note: Dragon 360 Dictation voice to text software may have been used in the creation of this document   **

## 2019-12-13 VITALS
WEIGHT: 133 LBS | SYSTOLIC BLOOD PRESSURE: 182 MMHG | HEIGHT: 66 IN | TEMPERATURE: 98.8 F | OXYGEN SATURATION: 95 % | BODY MASS INDEX: 21.38 KG/M2 | RESPIRATION RATE: 18 BRPM | DIASTOLIC BLOOD PRESSURE: 90 MMHG | HEART RATE: 100 BPM

## 2019-12-13 LAB
ANION GAP SERPL CALCULATED.3IONS-SCNC: 9 MMOL/L (ref 4–13)
BACTERIA BLD CULT: NORMAL
BACTERIA BLD CULT: NORMAL
BUN SERPL-MCNC: 20 MG/DL (ref 5–25)
CALCIUM SERPL-MCNC: 9.6 MG/DL (ref 8.3–10.1)
CHLORIDE SERPL-SCNC: 105 MMOL/L (ref 100–108)
CO2 SERPL-SCNC: 24 MMOL/L (ref 21–32)
CREAT SERPL-MCNC: 1.16 MG/DL (ref 0.6–1.3)
GFR SERPL CREATININE-BSD FRML MDRD: 52 ML/MIN/1.73SQ M
GLUCOSE SERPL-MCNC: 113 MG/DL (ref 65–140)
POTASSIUM SERPL-SCNC: 4.1 MMOL/L (ref 3.5–5.3)
SODIUM SERPL-SCNC: 138 MMOL/L (ref 136–145)

## 2019-12-13 PROCEDURE — 97110 THERAPEUTIC EXERCISES: CPT

## 2019-12-13 PROCEDURE — 80048 BASIC METABOLIC PNL TOTAL CA: CPT | Performed by: FAMILY MEDICINE

## 2019-12-13 PROCEDURE — 99239 HOSP IP/OBS DSCHRG MGMT >30: CPT | Performed by: INTERNAL MEDICINE

## 2019-12-13 RX ORDER — ESCITALOPRAM OXALATE 10 MG/1
10 TABLET ORAL DAILY
Refills: 0
Start: 2019-12-14 | End: 2020-04-07

## 2019-12-13 RX ORDER — OXYCODONE AND ACETAMINOPHEN 10; 325 MG/1; MG/1
1 TABLET ORAL EVERY 8 HOURS PRN
Qty: 10 TABLET | Refills: 0 | Status: SHIPPED | OUTPATIENT
Start: 2019-12-13 | End: 2019-12-23

## 2019-12-13 RX ORDER — RISPERIDONE 0.25 MG/1
0.5 TABLET, FILM COATED ORAL 2 TIMES DAILY
Status: DISCONTINUED | OUTPATIENT
Start: 2019-12-13 | End: 2019-12-13 | Stop reason: HOSPADM

## 2019-12-13 RX ORDER — RISPERIDONE 0.5 MG/1
0.5 TABLET, FILM COATED ORAL 2 TIMES DAILY
Refills: 0
Start: 2019-12-13 | End: 2020-04-07

## 2019-12-13 RX ADMIN — BACLOFEN 20 MG: 10 TABLET ORAL at 09:28

## 2019-12-13 RX ADMIN — HEPARIN SODIUM 5000 UNITS: 5000 INJECTION INTRAVENOUS; SUBCUTANEOUS at 06:22

## 2019-12-13 RX ADMIN — OXYCODONE HYDROCHLORIDE AND ACETAMINOPHEN 2 TABLET: 5; 325 TABLET ORAL at 06:27

## 2019-12-13 RX ADMIN — RISPERIDONE 0.5 MG: 0.25 TABLET ORAL at 17:13

## 2019-12-13 RX ADMIN — BACLOFEN 20 MG: 10 TABLET ORAL at 16:25

## 2019-12-13 RX ADMIN — RISPERIDONE 0.25 MG: 0.25 TABLET ORAL at 09:28

## 2019-12-13 RX ADMIN — ESCITALOPRAM OXALATE 10 MG: 10 TABLET ORAL at 09:28

## 2019-12-13 RX ADMIN — SODIUM CHLORIDE 75 ML/HR: 0.45 INJECTION, SOLUTION INTRAVENOUS at 09:40

## 2019-12-13 RX ADMIN — HEPARIN SODIUM 5000 UNITS: 5000 INJECTION INTRAVENOUS; SUBCUTANEOUS at 13:55

## 2019-12-13 RX ADMIN — AMANTADINE 100 MG: 100 CAPSULE ORAL at 09:28

## 2019-12-13 RX ADMIN — METOPROLOL TARTRATE 50 MG: 50 TABLET, FILM COATED ORAL at 09:28

## 2019-12-13 RX ADMIN — PANTOPRAZOLE SODIUM 40 MG: 40 TABLET, DELAYED RELEASE ORAL at 06:22

## 2019-12-13 NOTE — SOCIAL WORK
Rec'd call from [facility] dispatch, [address] [facility] to  pt around 6PM today  No other d/c needs  Pt will notify her family  Nurse aware

## 2019-12-13 NOTE — PROGRESS NOTES
Patient examined spoke with the nurse patient is alert awake cooperative happy to see me patient reports that she still had an episode of hallucinations last night she is happy with the medications no side effects of medications noted  Patient is depressed but not suicidal   Patient gets anxiety and become fearful  After the discussion she agreed to increase her Risperdal to help with the hallucinations  I will continue Lexapro the same  No behavior problem reported or noted  Patient denies suicidal ideations  Therapy done with good response  I will increase Risperdal to 0 5 mg p o  B i d  And follow-up

## 2019-12-13 NOTE — OCCUPATIONAL THERAPY NOTE
OT Evaluation       12/13/19 2580   Restrictions/Precautions   Other Precautions Bed Alarm; Chair Alarm; Fall Risk   Pain Assessment   Pain Assessment No/denies pain   Pain Score No Pain   ADL   Grooming Comments Pt declined grooming tasks at this time   Bed Mobility   Additional Comments Repositioning in bed with dependent assist; pt deferred bed mobility/transfers due to fatigue   Therapeutic Exercise - ROM   UE-ROM Yes   ROM- Right Upper Extremities   R Shoulder AAROM; Flexion;ABduction; External rotation; Internal rotation   R Elbow AROM;AAROM;Elbow flexion;Elbow extension   R Wrist AROM; Wrist flexion;Wrist extension   R Hand AROM; Thumb; Index finger; Long finger;Ring finger;Little finger   R Position Supine  (Hob Elevated approx 60 degrees)   R Weight/Reps/Sets 10 reps x 3   ROM - Left Upper Extremities    L Shoulder AAROM; Flexion;ABduction; External rotation; Internal rotation   L Elbow PROM;AROM;Elbow flexion;Elbow extension   L Wrist AROM; Wrist flexion;Wrist extension   L Hand AROM; Thumb; Index finger; Long finger;Ring finger;Little finger   L Position Supine  (Hob Elevated approx 60 degrees)   L Weight/Reps/Sets 10 reps x 3   Cognition   Overall Cognitive Status WFL   Activity Tolerance   Activity Tolerance Patient limited by fatigue   Assessment   Assessment Patient received supine in bed and agreeable to OT tx  Patient deferred ADLs and mobility due to fatigue, agreeable to supine ther-ex  Complete BUE ther-ex as described above  At end of session patient repositioned in bed to comfort, all needs met   Continue OT per POC   Recommendation   OT Discharge Recommendation [id number] [facility] License Number  [place], [place] [id number]

## 2019-12-13 NOTE — SOCIAL WORK
Rec'd several calls from [place] today  One call was a physician denial that was called by Dr Dex Varner  Auth then approved  Auth number is [id number] for 7 days starting today  Next review date is 12/20  Is for level 2 rehab  Called to [place] dispatch to set up bls transport

## 2019-12-13 NOTE — [place] UNIVERSAL TRANSFER FORM
[place] UN[place] FOR[place]  (ALL ITEMS MUST BE COMPLETED)    1  TRANSFER FROM: [address] [place] [address] Road    2  DATE OF TRANSFER: 12/13/2019                        TIME OF TRANSFER: 1800    3  PATIENT NAME: Skylar Mobley L      YOB: 1960                             GENDER: female    4  LANGUAGE:   English    5  PHYSICIAN NAME:  Yashira Ulrich MD                   PHONE: [phone]    6  CODE STATUS: Level 1 - Full Code        Out of Hospital DNR Attached: No    7  :                                      :  Extended Emergency Contact Information  Primary Emergency Contact: Emily Priest  Home Phone: [phone]  Relation: Mother  Secondary Emergency Contact: Foster Estella  Mobile Phone: [phone]  Relation: Sister In-Law           Health Care Representative/Proxy:  No           Legal Guardian:  No             NAME OF:           HEALTH CARE REPRESENTATIVE/PROXY:                                         OR           LEGAL GUARDIAN, IF NOT :                                               PHONE:  (Day)           (Night)                        (Cell)    8  REASON FOR TRANSFER: (Must include brief medical history and recent changes in physical function or cognition ) stable for discharge to [facility]            V/S: BP (!) 182/90 (BP Location: Right arm)   Pulse 100   Temp 98 8 °F (37 1 °C) (Oral)   Resp 18   Ht 5' 6" (1 676 m)   Wt 60 3 kg (133 lb)   SpO2 95%   BMI 21 47 kg/m²           PAIN: None    9  PRIMARY DIAGNOSIS: ARBEN (acute kidney injury) ([facility] [address] )      Secondary Diagnosis:         Pacemaker: No      Internal Defib: No          Mental Health Diagnosis (if Applicable):    10  RESTRAINTS: No     11  RESPIRATORY NEEDS: None    12  ISOLATION/PRECAUTION: None    13  ALLERGY: Patient has no known allergies  14  SENSORY:       Vision Glasses, Hearing Good  and Speech Clear    15   SKIN CONDITION: No Wounds    16  DIET: Special (describe)Cardiac    17  IV ACCESS: None    18  PERSONAL ITEMS SENT WITH PATIENT: Glasses and OtherMedications, cell phone Clothes    23  ATTACHED DOCUMENTS: MUST ATTACH CURRENT MEDICATION INFORMATION Face Sheet, Medication Reconciliation, Diagnostic Studies, Labs, Discharge Summary, PT Note, OT Note and HX/PE    20  AT RISK ALERTS:Falls        HARM TO: N/A    21  WEIGHT BEARING STATUS:         Left Leg: None        Right Leg: None    22  MENTAL STATUS:Alert, Oriented and Forgetful    23  FUNCTION:        Walk: Not Able        Transfer: Not Able        Toilet: Not Able        Feed: With Help    24  IMMUNIZATIONS/SCREENING:     Immunization History   Administered Date(s) Administered    Influenza, recombinant, quadrivalent,injectable, preservative free 12/08/2019    Pneumococcal Polysaccharide PPV23 06/10/2014       25  BOWEL: Continent and Date Last BM12/12/19    32  BLADDER: Incontinent    27   SENDING FACILITY CONTACT: Khadra Nolen RN                  Title: RN        Unit: 4N        Phone: [phone]          REC'G FACILITY CONTACT (if known):        Title:        Unit:         Phone:         FORM PREFILLED BY (if applicable)       Title:       Unit:        Phone:         FORM COMPLETED BY Trace Baer RN      Title:       Phone:

## 2019-12-13 NOTE — PHYSICAL THERAPY NOTE
PT TREATMENT     12/13/19 0376   Pain Assessment   Pain Assessment 0-10   Pain Score Worst Possible Pain   Pain Type Chronic pain   Pain Location   ("sciatica")   Pain Orientation Right   Restrictions/Precautions   Other Precautions Fall Risk;Pain;Bed Alarm; Chair Alarm   General   Chart Reviewed Yes   Family/Caregiver Present No   Subjective   Subjective "The PT worked me out good yesterday, today I just want to stretch"   Activity Tolerance   Activity Tolerance Patient limited by pain; Patient limited by fatigue  (weakness)   Exercises   Hamstring Stretch 5 reps; Supine  (gentle, prolonged stretching)   Heel Cord Stretch 5 reps  (gentle, prolonged stretching)   Assessment   Assessment Pt with fair tolerance to LE hamstring and gastroc stretching due to pain  Encouraged pt to sit EOB with PT but pt declined due to sciatica pain  Pt will cont to benefit from skilled PT services to increase pt's strength and mobility  Plan   Treatment/Interventions ADL retraining;Functional transfer training;LE strengthening/ROM; Therapeutic exercise; Endurance training;Patient/family training;Equipment eval/education; Bed mobility;Gait training; Compensatory technique education   PT Frequency 5x/wk   Recommendation   Recommendation Short-term skilled PT   Licensure   [place] License Number  [address] [place] [address] [id number]

## 2019-12-13 NOTE — PROGRESS NOTES
Pt's family members are visiting at the bedside, the patient is prepared for discharge to [facility]  The patient is seen having epistaxis and was given a cool wet washcloth to achieve hemostasis  The patient stated that this "happens when the air is dry"  Hemostasis achieved, pt offers no other complaints

## 2019-12-13 NOTE — TRANSPORTATION MEDICAL NECESSITY
Section I - General Information    Name of Patient: Megan Howard                 : 1960    Medicare #: [id number]  Transport Date: 19 (PCS is valid for round trips on this date and for all repetitive trips in the 60-day range as noted below )  Origin: [address] [place] [address] [address]: [address]  Is the pt's stay covered under Medicare Part A (PPS/DRG)   [x]     Closest appropriate facility? If no, why is transport to more distant facility required? Yes  If hospice pt, is this transport related to pt's terminal illness? NA       Section II - Medical Necessity Questionnaire  Ambulance transportation is medically necessary only if other means of transport are contraindicated or would be potentially harmful to the patient  To meet this requirement, the patient must either be "bed confined" or suffer from a condition such that transport by means other than ambulance is contraindicated by the patient's condition  The following questions must be answered by the medical professional signing below for this form to be valid:    1)  Describe the MEDICAL CONDITION (physical and/or mental) of this patient AT [address] [place] [address] that requires the patient to be transported in an ambulance and why transport by other means is contraindicated by the patient's condition: Pt has been bed bound  Unable to tolerate ride in w/c unattended  2) Is the patient "bed confined" as defined below? Yes  To be "be confined" the patient must satisfy all three of the following conditions: (1) unable to get up from bed without Assistance; AND (2) unable to ambulate; AND (3) unable to sit in a chair or wheelchair  3) Can this patient safely be transported by car or wheelchair van (i e , seated during transport without a medical attendant or monitoring)?    No    4) In addition to completing questions 1-3 above, please check any of the following conditions that apply*:   *Note: supporting documentation for any boxes checked must be maintained in the patient's medical records  If hosp-hosp transfer, describe services needed at 2nd facility not available at 1st facility? Moderate/severe pain on movement   Medical attendant required   Unable to tolerate seated position for time needed to transport       Section III - Signature of Physician or Healthcare Professional  I certify that the above information is true and correct based on my evaluation of this patient, and represent that the patient requires transport by ambulance and that other forms of transport are contraindicated  I understand that this information will be used by the Centers for Medicare and Medicaid Services (CMS) to support the determination of medical necessity for ambulance services, and I represent that I have personal knowledge of the patient's condition at time of transport  []  If this box is checked, I also certify that the patient is physically or mentally incapable of signing the ambulance service's claim and that the institution with which I am affiliated has furnished care, services, or assistance to the patient  My signature below is made on behalf of the patient pursuant to 42 CFR §424 36(b)(4)  In accordance with 42 CFR §424 37, the specific reason(s) that the patient is physically or mentally incapable of signing the claim form is as follows: [facility] of Physician* or Healthcare Professional______________________________________________________________  Signature Date 12/13/19 (For scheduled repetitive transports, this form is not valid for transports performed more than 60 days after this date)    Printed Name & Credentials of Physician or Healthcare Professional (MD, , RN, etc )_David Stacy RN_____________________  *Form must be signed by patient's attending physician for scheduled, repetitive transports   For non-repetitive, unscheduled ambulance transports, if unable to obtain the signature of the attending physician, any of the following may sign (choose appropriate option below)  [] Physician Assistant []  Clinical Nurse Specialist [x]  Registered Nurse  []  Nurse Practitioner  [x] Discharge Planner

## 2019-12-14 NOTE — DISCHARGE SUMMARY
Discharge- Alecia Mclain 1960, 61 y o  female MRN: [id number]    Unit/Bed#: [address] [place] [address] Encounter: [id number]    Primary Care Provider: Bean Mccauley DO   Date and time admitted to hospital: 12/8/2019  4:08 PM        * ARBEN (acute kidney injury) [facility])  Assessment & Plan  Likely pre renal in nature as patient with decreased p o  Intake and not taking enough fluids + Micardis-HCT use at home  Continue patient on IV hydration  Creatinine is slowly trending down  Repeat lab work in a m   ml  Avoid hypotension  total CK level 359 which normalized to 109  Creatinine is normal and is at baseline    Generalized weakness  Assessment & Plan  Likely due to dehydration, underlying MS, deconditioning  PT/OT while here  UA not convincing for UTI  On prior admission patient and weakness as well and initially there was concern for MS exacerbation but patient was seen by Neurology and Solu-Medrol was discontinued and patient improved with IV fluids  Patient was seen in consultation with Neurology as patient had some visual hallucination  Patient does not need IV Solu-Medrol at the current time  As per Neurology, amantadine can cause hallucinations  Follow up outpatient with primary neurologist   Patient will be discharged to short-term rehabilitation      Altered mental status  Assessment & Plan  Patient was having hallucinations on was seeing people in the room  Vital signs were all stable  Patient continues to have hallucinations  As per my discussion with PT and other nursing who took care of her before, patient did have hallucinations during prior hospitalization too  Ammonia level was normal and ABG showed a pH of 7 47 with pCO2 of 32, PO2 of 100 and bicarbonate level of 23 on room air  CT scan of the brain was unremarkable  Neurology was consulted-amantadine can cause hallucinations  Patient to follow up her primary MS specialist regarding the medication    Psychiatry was consulted and patient was started on Lexapro and Risperdal which will be continued for now    Type 2 myocardial infarction [facility])  Assessment & Plan  Possibly from kidney dysfunction  Troponins peaked at 0 11  Patient underwent nuclear stress test which was normal  Echo from June 2019 showed EF of 55% with grade 1 diastolic dysfunction  Essential hypertension  Assessment & Plan  Continue Lopressor  Monitor blood pressures  Resume Micardis/hydrochlorothiazide which was on hold initially due to acute kidney injury      Thrombocytopenia [facility])  Assessment & Plan  Patient with chronic thrombocytopenia  Monitor counts  Platelet count has been stable    Opiate dependence [facility])  Assessment & Plan  Patient with chronic bilateral hip pain  Continue Percocet p r n        Discharging Physician / Practitioner: Gregor Castellanos MD  PCP: Jorge Martinez DO  Admission Date: 12/8/2019  Discharge Date: 12/13/19    Reason for Admission: Weakness - Generalized (patient c/o generalized weakness x about one month, not getting out of bed, family states decreased oral intake, and severe right hip pain that patient states is her chronic sciatica ) and Hip Pain        Resolved Problems  Date Reviewed: 12/13/2019    None          Consultations During Hospital Stay:  IP CONSULT TO CARDIOLOGY  IP CONSULT TO NEUROLOGY  IP CONSULT TO PSYCHIATRY    Procedures Performed:     · Nuclear stress test was normal    Significant Findings / Test Results:     ·   Results from last 7 days   Lab Units 12/12/19  0451 12/09/19  0422 12/08/19  2039 12/08/19  1619   WBC Thousand/uL 6 64 5 45  --  5 25   HEMOGLOBIN g/dL 10 9* 9 6*  --  11 4*   PLATELETS Thousands/uL 109* 109* 112* 127*     Results from last 7 days   Lab Units 12/13/19  0632 12/12/19  0451 12/10/19  1619  12/08/19  1619   SODIUM mmol/L 138 139 141   < > 142   POTASSIUM mmol/L 4 1 4 0 4 2   < > 3 6   CHLORIDE mmol/L 105 105 106   < > 103   CO2 mmol/L 24 21 25   < > 30   BUN mg/dL 20 17 31*   < > 54*   CREATININE mg/dL 1  16 1 08 1 28   < > 2 02*   CALCIUM mg/dL 9 6 9 4 8 6   < > 9 9   TOTAL BILIRUBIN mg/dL  --   --   --   --  0 40   ALK PHOS U/L  --   --   --   --  106   ALT U/L  --   --   --   --  32   AST U/L  --   --   --   --  21    < > = values in this interval not displayed  Results from last 7 days   Lab Units 12/08/19  1619   INR  1 02     Results from last 7 days   Lab Units 12/09/19  0814 12/09/19  0422 12/09/19  0016   TROPONIN I ng/mL 0 11* 0 11* 0 11*     No results found for: HGBA1C      Results from last 7 days   Lab Units 12/08/19  1619   LACTIC ACID mmol/L 1 0     Blood Culture:   Lab Results   Component Value Date    BLOODCX No Growth After 4 Days  12/08/2019    BLOODCX No Growth After 4 Days  12/08/2019     Urine Culture:   Lab Results   Component Value Date    URINECX No Growth <1000 cfu/mL 12/08/2019     Sputum Culture: No components found for: SPUTUMCX  Wound Culture: No results found for: WOUNDCULT     CT head wo contrast   Final Result by Rose Quarles MD (12/11 1223)      No acute intracranial abnormality  Workstation performed: [id number]                Outpatient Tests Requested:  · Follow-up with primary neurologist    Complications:  None    Reason for Admission:   Chief Complaint   Patient presents with    Weakness - Generalized     patient c/o generalized weakness x about one month, not getting out of bed, family states decreased oral intake, and severe right hip pain that patient states is her chronic sciatica   Hip Pain       Hospital Course:     Per HPI: Hamida Jimenez is a 61 y o  female patient with a PMH of multiple sclerosis, hypertension, hyperlipidemia, depression who originally presented to the hospital on 12/8/2019 due to generalized weakness  In the ED patient was noted to have acute kidney injury  Patient was started on IV fluids and her Arb/hydrochlorothiazide combination was held  Patient's creatinine continued to improve    Patient also noted to have mildly elevated troponin which continued remained flat  Later patient underwent nuclear stress test which was normal   Patient's creatinine normalized with IV hydration and patient's weakness has improved  Patient also started having hallucinations and patient was seen in consultation with Neurology and Psychiatry  As per Neurology amantadine can cause hallucination but did not want to discontinue the same as it was prescribed by her primary neurologist for her MS  Patient was also seen by Psychiatry and was started on Lexapro and Risperdal   Patient was also seen by physical therapy who recommended short-term rehabilitation and patient will be discharged to short-term rehabilitation    Hospital Course:    Please see above list of diagnoses and related plan for additional information  Condition at Discharge: stable       Discharge Day Visit / Exam:     Subjective:  Patient denies any chest pain, shortness of breath, abdominal pain, nausea or vomiting  Patient did have a bowel movement yesterday  Patient otherwise denies any headache or dizziness  Weakness is improving  Vitals: Blood Pressure: (!) 182/90 (12/13/19 1613)  Pulse: 100 (12/13/19 1613)  Temperature: 98 8 °F (37 1 °C) (12/13/19 1613)  Temp Source: Oral (12/13/19 1613)  Respirations: 18 (12/13/19 1613)  Height: 5' 6" (167 6 cm) (12/08/19 1832)  Weight - Scale: 60 3 kg (133 lb) (12/08/19 1832)  SpO2: 95 % (12/13/19 1613)  Exam:   Physical Exam   Constitutional: No distress  HENT:   Head: Normocephalic and atraumatic  Nose: Nose normal    Eyes: Pupils are equal, round, and reactive to light  Conjunctivae are normal    Neck: Normal range of motion  Neck supple  Cardiovascular: Normal rate, regular rhythm and normal heart sounds  Pulmonary/Chest: Effort normal and breath sounds normal  No respiratory distress  She has no wheezes  She has no rales  Abdominal: Soft  Bowel sounds are normal  She exhibits no distension  There is no tenderness   There is no rebound and no guarding  Musculoskeletal: She exhibits no edema  Neurological: She is alert  No cranial nerve deficit  Skin: Skin is warm and dry  No rash noted  Discharge instructions/Information to patient and family:   See after visit summary for information provided to patient and family  Provisions for Follow-Up Care:  See after visit summary for information related to follow-up care and any pertinent home health orders  Disposition:     [facility] at [facility]    Planned Readmission: No     Discharge Statement:  I spent 35 minutes discharging the patient  This time was spent on the day of discharge  I had direct contact with the patient on the day of discharge  Greater than 50% of the total time was spent examining patient, answering all patient questions, arranging and discussing plan of care with patient as well as directly providing post-discharge instructions  Additional time then spent on discharge activities  Discharge Medications:  See after visit summary for reconciled discharge medications provided to patient and family        ** Please Note: This note has been constructed using a voice recognition system **

## 2019-12-14 NOTE — NURSING NOTE
The patient is discharged to [facility] for STR, accompanied by [facility]  The patient is in no distress, condition is stable, pt's family members are seen visiting at the bedside and aware

## 2019-12-14 NOTE — ASSESSMENT & PLAN NOTE
Likely due to dehydration, underlying MS, deconditioning  PT/OT while here  UA not convincing for UTI  On prior admission patient and weakness as well and initially there was concern for MS exacerbation but patient was seen by Neurology and Solu-Medrol was discontinued and patient improved with IV fluids  Patient was seen in consultation with Neurology as patient had some visual hallucination  Patient does not need IV Solu-Medrol at the current time  As per Neurology, amantadine can cause hallucinations    Follow up outpatient with primary neurologist   Patient will be discharged to short-term rehabilitation

## 2019-12-14 NOTE — NURSING NOTE
[facility] arrived to  patient  Pt 's family at bedside to follow  Paperwork given to transporter

## 2019-12-14 NOTE — ASSESSMENT & PLAN NOTE
Patient was having hallucinations on was seeing people in the room  Vital signs were all stable  Patient continues to have hallucinations  As per my discussion with PT and other nursing who took care of her before, patient did have hallucinations during prior hospitalization too  Ammonia level was normal and ABG showed a pH of 7 47 with pCO2 of 32, PO2 of 100 and bicarbonate level of 23 on room air  CT scan of the brain was unremarkable  Neurology was consulted-amantadine can cause hallucinations  Patient to follow up her primary MS specialist regarding the medication    Psychiatry was consulted and patient was started on Lexapro and Risperdal which will be continued for now

## 2019-12-16 NOTE — UTILIZATION REVIEW
Notification of Discharge  This is a Notification of Discharge from our facility [address]  Please be advised that this patient has been discharge from our facility  Below you will find the admission and discharge date and time including the patients disposition  PRESENTATION DATE: 12/8/2019  4:08 PM  OBS ADMISSION DATE:   IP ADMISSION DATE: 12/8/19 1723   DISCHARGE DATE: 12/13/2019  7:13 PM  DISPOSITION: Non [facility] SN[facility]U/SNU Non [facility] SNF/TCU/SNU   Admission Orders listed below:  Admission Orders (From admission, onward)     Ordered        12/08/19 1723  Inpatient Admission (expected length of stay for this patient Order details is greater than two midnights)  Once                   Please contact the UR Department if additional information is required to close this patient's authorization/case  Ana Looneymari  [facility] Utilization Review Department  Main: [phone] x carefully listen to the prompts  All voicemails are confidential   [email or web address]  Send all requests for admission clinical reviews, approved or denied determinations and any other requests to dedicated fax number below belonging to the campus where the patient is receiving treatment   List of dedicated fax numbers:  [address] DENIALS (Administrative/Medical Necessity) [phone]   [address] (Maternity/NICU/Pediatrics) [phone]   Ivto Congress [phone]   Tino Okrosemarie [phone]   Manasa Vizcaino [phone]   [address] [facility] [address] [facility] [phone]   Nishant Crawford [phone] [address] [facility]  [address] [facility] [address] [facility] [phone]

## 2020-01-06 ENCOUNTER — OFFICE VISIT (OUTPATIENT)
Dept: FAMILY MEDICINE CLINIC | Facility: CLINIC | Age: 60
End: 2020-01-06
Payer: COMMERCIAL

## 2020-01-06 VITALS
HEART RATE: 64 BPM | TEMPERATURE: 97.8 F | SYSTOLIC BLOOD PRESSURE: 100 MMHG | DIASTOLIC BLOOD PRESSURE: 60 MMHG | RESPIRATION RATE: 16 BRPM

## 2020-01-06 DIAGNOSIS — R53.1 GENERALIZED WEAKNESS: ICD-10-CM

## 2020-01-06 DIAGNOSIS — D69.6 THROMBOCYTOPENIA (HCC): ICD-10-CM

## 2020-01-06 DIAGNOSIS — G35 MULTIPLE SCLEROSIS (HCC): ICD-10-CM

## 2020-01-06 DIAGNOSIS — N17.9 AKI (ACUTE KIDNEY INJURY) (HCC): ICD-10-CM

## 2020-01-06 DIAGNOSIS — R11.0 NAUSEA: ICD-10-CM

## 2020-01-06 DIAGNOSIS — F11.20 UNCOMPLICATED OPIOID DEPENDENCE (HCC): ICD-10-CM

## 2020-01-06 DIAGNOSIS — I10 ESSENTIAL HYPERTENSION: Primary | ICD-10-CM

## 2020-01-06 DIAGNOSIS — R79.89 ELEVATED LFTS: ICD-10-CM

## 2020-01-06 DIAGNOSIS — D72.829 LEUKOCYTOSIS, UNSPECIFIED TYPE: ICD-10-CM

## 2020-01-06 PROBLEM — D64.9 ANEMIA: Status: ACTIVE | Noted: 2020-01-06

## 2020-01-06 PROBLEM — G93.40 ENCEPHALOPATHY ACUTE: Status: RESOLVED | Noted: 2019-06-19 | Resolved: 2020-01-06

## 2020-01-06 PROBLEM — R41.82 ALTERED MENTAL STATUS: Status: RESOLVED | Noted: 2019-12-10 | Resolved: 2020-01-06

## 2020-01-06 PROCEDURE — 99214 OFFICE O/P EST MOD 30 MIN: CPT | Performed by: FAMILY MEDICINE

## 2020-01-06 PROCEDURE — 3725F SCREEN DEPRESSION PERFORMED: CPT | Performed by: FAMILY MEDICINE

## 2020-01-06 PROCEDURE — 3066F NEPHROPATHY DOC TX: CPT | Performed by: FAMILY MEDICINE

## 2020-01-06 PROCEDURE — 3078F DIAST BP <80 MM HG: CPT | Performed by: FAMILY MEDICINE

## 2020-01-06 PROCEDURE — 3074F SYST BP LT 130 MM HG: CPT | Performed by: FAMILY MEDICINE

## 2020-01-06 RX ORDER — TELMISARTAN 80 MG/1
80 TABLET ORAL DAILY
Start: 2020-01-06 | End: 2020-04-07

## 2020-01-06 NOTE — ASSESSMENT & PLAN NOTE
Pt sees neurology - last appt as per pt was July    Had appt in dec but she went to hospital, has not been resched yet    They will make the follow up appt

## 2020-01-06 NOTE — PROGRESS NOTES
Assessment/Plan:    1  Essential hypertension  Assessment & Plan:  D/c diuretic    Orders:  -     CBC; Future  -     Comprehensive metabolic panel; Future  -     Lipid Panel with Direct LDL reflex; Future  -     TSH, 3rd generation; Future  -     telmisartan (MICARDIS) 80 MG tablet; Take 1 tablet (80 mg total) by mouth daily    2  ARBEN (acute kidney injury) [facility])  Assessment & Plan:  Resolved on last labs    Orders:  -     CBC; Future  -     Comprehensive metabolic panel; Future  -     Lipid Panel with Direct LDL reflex; Future  -     TSH, 3rd generation; Future    3  Elevated LFTs  -     CBC; Future  -     Comprehensive metabolic panel; Future  -     Lipid Panel with Direct LDL reflex; Future  -     TSH, 3rd generation; Future    4  Generalized weakness  -     CBC; Future  -     Comprehensive metabolic panel; Future  -     Lipid Panel with Direct LDL reflex; Future  -     TSH, 3rd generation; Future    5  Leukocytosis, unspecified type  Assessment & Plan:  Normalized on last CBC    Orders:  -     CBC; Future  -     Comprehensive metabolic panel; Future  -     Lipid Panel with Direct LDL reflex; Future  -     TSH, 3rd generation; Future    6  Thrombocytopenia ([facility] [address] )  -     Ambulatory referral to Hematology / Oncology; Future  -     CBC; Future  -     Comprehensive metabolic panel; Future  -     Lipid Panel with Direct LDL reflex; Future  -     TSH, 3rd generation; Future    7  Multiple sclerosis [facility])  Assessment & Plan:  Pt sees neurology - last appt as per pt was July  Had appt in dec but she went to hospital, has not been resched yet    They will make the follow up appt    Orders:  -     CBC; Future  -     Comprehensive metabolic panel; Future  -     Lipid Panel with Direct LDL reflex; Future  -     TSH, 3rd generation; Future    8  Uncomplicated opioid dependence ([facility] [address] )  -     CBC; Future  -     Comprehensive metabolic panel; Future  -     Lipid Panel with Direct LDL reflex;  Future  -     TSH, 3rd generation; Future    9  Nausea        Seems like we have a month left on nexium and then pt will stop    There are no Patient Instructions on file for this visit  Return in about 3 months (around 4/6/2020) for f/u bp  Subjective:      Patient ID: Bertha Croft is a 61 y o  female  Chief Complaint   Patient presents with    Follow-up     Sara Swenson        Pt is here today for a 6 month follow up  Pt was recently in the hospital - dehydration - ARBEN  Pt was sent to the care center after discharge - is still at [facility] - pt unsure when she will be released    Pt's altereed mental status as well as dehydration has cleared    Pt's sister is asking if the pt can be on physical therapy on an out pt basis forever  Pt does her best when she is doing physical therapy and they would like to limit the pt's chnaces of being readmitted  PT has generalized weakness, is in a wheel chair all the time and is home bound other than medical appt    Pt states the lexapro seems to be working fine for her      The following portions of the patient's history were reviewed and updated as appropriate: allergies, current medications, past family history, past medical history, past social history, past surgical history and problem list     Review of Systems   Constitutional: Negative  Negative for activity change, appetite change, chills, diaphoresis and fatigue  HENT: Negative  Negative for dental problem, ear pain, sinus pressure and sore throat  Eyes: Negative  Negative for photophobia, pain, discharge, redness, itching and visual disturbance  Respiratory: Negative for apnea and chest tightness  Cardiovascular: Negative  Negative for chest pain, palpitations and leg swelling  Gastrointestinal: Negative  Negative for abdominal distention, abdominal pain, constipation and diarrhea  Endocrine: Negative  Negative for cold intolerance and heat intolerance  Genitourinary: Negative    Negative for difficulty urinating and dyspareunia  Musculoskeletal: Positive for back pain, gait problem and myalgias  Negative for arthralgias  Skin: Negative  Allergic/Immunologic: Negative for environmental allergies  Neurological: Negative for dizziness  Psychiatric/Behavioral: Negative  Negative for agitation  Current Outpatient Medications   Medication Sig Dispense Refill    amantadine (SYMMETREL) 100 mg capsule Take 1 capsule (100 mg total) by mouth 2 (two) times a day (Patient taking differently: Take 100 mg by mouth daily at bedtime ) 60 capsule 3    baclofen 10 mg tablet Take 2 tablets (20 mg total) by mouth 3 (three) times a day 540 tablet 0    escitalopram (LEXAPRO) 10 mg tablet Take 1 tablet (10 mg total) by mouth daily  0    esomeprazole (NEXIUM) 40 MG capsule Take 1 capsule (40 mg total) by mouth daily before breakfast 30 capsule 5    meloxicam (MOBIC) 15 mg tablet Take 15 mg by mouth daily    0    metoprolol tartrate (LOPRESSOR) 50 mg tablet Take 1 tablet (50 mg total) by mouth every 12 (twelve) hours 60 tablet 1    risperiDONE (RisperDAL) 0 5 mg tablet Take 1 tablet (0 5 mg total) by mouth 2 (two) times a day  0    Teriflunomide (AUBAGIO) 14 MG TABS Take 1 tablet (14 mg total) by mouth daily 30 tablet 3    telmisartan (MICARDIS) 80 MG tablet Take 1 tablet (80 mg total) by mouth daily       No current facility-administered medications for this visit  Objective:    /60   Pulse 64   Temp 97 8 °F (36 6 °C)   Resp 16        Physical Exam   Constitutional: She appears well-developed and well-nourished  No distress  HENT:   Head: Normocephalic and atraumatic  Right Ear: External ear normal    Left Ear: External ear normal    Nose: Nose normal    Mouth/Throat: Oropharynx is clear and moist  No oropharyngeal exudate  Eyes: Pupils are equal, round, and reactive to light  EOM are normal  Right eye exhibits no discharge  Left eye exhibits no discharge  No scleral icterus     Neck: No thyromegaly present  Cardiovascular: Normal rate and normal heart sounds  No murmur heard  Pulmonary/Chest: Effort normal and breath sounds normal  No respiratory distress  She has no wheezes  Abdominal: Soft  Bowel sounds are normal  She exhibits no distension and no mass  There is no tenderness  There is no rebound and no guarding  Musculoskeletal: Normal range of motion  Generalized weakness any lower ext  Wheelchair bound   Neurological: She is alert  She displays normal reflexes  Coordination normal    Skin: Skin is warm and dry  No rash noted  She is not diaphoretic  No erythema  Psychiatric: She has a normal mood and affect  Her behavior is normal    Nursing note and vitals reviewed             Recent Results (from the past 2016 hour(s))   Comprehensive metabolic panel    Collection Time: 12/08/19  4:19 PM   Result Value Ref Range    Sodium 142 136 - 145 mmol/L    Potassium 3 6 3 5 - 5 3 mmol/L    Chloride 103 100 - 108 mmol/L    CO2 30 21 - 32 mmol/L    ANION GAP 9 4 - 13 mmol/L    BUN 54 (H) 5 - 25 mg/dL    Creatinine 2 02 (H) 0 60 - 1 30 mg/dL    Glucose 107 65 - 140 mg/dL    Calcium 9 9 8 3 - 10 1 mg/dL    AST 21 5 - 45 U/L    ALT 32 12 - 78 U/L    Alkaline Phosphatase 106 46 - 116 U/L    Total Protein 8 1 6 4 - 8 2 g/dL    Albumin 3 5 3 5 - 5 0 g/dL    Total Bilirubin 0 40 0 20 - 1 00 mg/dL    eGFR 26 ml/min/1 73sq m   CBC and differential    Collection Time: 12/08/19  4:19 PM   Result Value Ref Range    WBC 5 25 4 31 - 10 16 Thousand/uL    RBC 4 02 3 81 - 5 12 Million/uL    Hemoglobin 11 4 (L) 11 5 - 15 4 g/dL    Hematocrit 35 9 34 8 - 46 1 %    MCV 89 82 - 98 fL    MCH 28 4 26 8 - 34 3 pg    MCHC 31 8 31 4 - 37 4 g/dL    RDW 14 6 11 6 - 15 1 %    Platelets 892 (L) 846 - 390 Thousands/uL    nRBC 0 /100 WBCs    Neutrophils Relative 58 43 - 75 %    Immat GRANS % 0 0 - 2 %    Lymphocytes Relative 28 14 - 44 %    Monocytes Relative 11 4 - 12 %    Eosinophils Relative 2 0 - 6 %    Basophils Relative 1 0 - 1 %    Neutrophils Absolute 3 06 1 85 - 7 62 Thousands/µL    Immature Grans Absolute 0 01 0 00 - 0 20 Thousand/uL    Lymphocytes Absolute 1 49 0 60 - 4 47 Thousands/µL    Monocytes Absolute 0 56 0 17 - 1 22 Thousand/µL    Eosinophils Absolute 0 10 0 00 - 0 61 Thousand/µL    Basophils Absolute 0 03 0 00 - 0 10 Thousands/µL   Protime-INR    Collection Time: 12/08/19  4:19 PM   Result Value Ref Range    Protime 11 0 9 8 - 12 0 seconds    INR 1 02 0 91 - 1 09   APTT    Collection Time: 12/08/19  4:19 PM   Result Value Ref Range    PTT 27 23 - 37 seconds   Troponin I    Collection Time: 12/08/19  4:19 PM   Result Value Ref Range    Troponin I 0 07 (H) <=0 04 ng/mL   Blood culture #1    Collection Time: 12/08/19  4:19 PM   Result Value Ref Range    Blood Culture No Growth After 5 Days  Lactic acid, plasma    Collection Time: 12/08/19  4:19 PM   Result Value Ref Range    LACTIC ACID 1 0 0 5 - 2 0 mmol/L   CK    Collection Time: 12/08/19  4:19 PM   Result Value Ref Range    Total  (H) 26 - 192 U/L   CKMB    Collection Time: 12/08/19  4:19 PM   Result Value Ref Range    CK-MB Index 1 4 0 0 - 2 5 %    CK-MB 5 0 0 0 - 5 0 ng/mL   Blood culture #2    Collection Time: 12/08/19  4:25 PM   Result Value Ref Range    Blood Culture No Growth After 5 Days      ECG 12 lead    Collection Time: 12/08/19  4:39 PM   Result Value Ref Range    Ventricular Rate 70 BPM    Atrial Rate 70 BPM    KY Interval 154 ms    QRSD Interval 76 ms    QT Interval 418 ms    QTC Interval 451 ms    P Halls 67 degrees    QRS Axis 85 degrees    T Wave Axis 107 degrees   UA (URINE) with reflex to Scope    Collection Time: 12/08/19  5:07 PM   Result Value Ref Range    Color, UA Yellow     Clarity, UA Clear     Specific Gravity, UA 1 025 1 000 - 1 030    pH, UA 5 5 5 0, 5 5, 6 0, 6 5, 7 0, 7 5, 8 0, 8 5, 9 0    Leukocytes, UA Negative Negative    Nitrite, UA Negative Negative    Protein, UA Trace (A) Negative mg/dl    Glucose, UA Negative Negative mg/dl Ketones, UA Negative Negative mg/dl    Urobilinogen, UA 0 2 0 2, 1 0 E U /dl E U /dl    Bilirubin, UA Negative Negative    Blood, UA Trace-Intact (A) Negative   Urine culture    Collection Time: 12/08/19  5:07 PM   Result Value Ref Range    Urine Culture No Growth <1000 cfu/mL    Urine Microscopic    Collection Time: 12/08/19  5:07 PM   Result Value Ref Range    RBC, UA 0-1 (A) None Seen, 0-5 /hpf    WBC, UA 2-4 (A) None Seen, 0-5, 5-55, 5-65 /hpf    Epithelial Cells Occasional None Seen, Occasional /hpf    Bacteria, UA Occasional None Seen, Occasional /hpf    Hyaline Casts, UA 1-2 (A) (none) /lpf   Platelet count    Collection Time: 12/08/19  8:39 PM   Result Value Ref Range    Platelets 912 (L) 001 - 390 Thousands/uL    MPV 14 1 (H) 8 9 - 12 7 fL   Troponin I    Collection Time: 12/08/19  8:39 PM   Result Value Ref Range    Troponin I 0 09 (H) <=0 04 ng/mL   MRSA culture    Collection Time: 12/08/19  8:40 PM   Result Value Ref Range    MRSA Culture Only       No Methicillin Resistant Staphlyococcus aureus (MRSA) isolated   Troponin I    Collection Time: 12/09/19 12:16 AM   Result Value Ref Range    Troponin I 0 11 (H) <=0 04 ng/mL   Troponin I    Collection Time: 12/09/19  4:22 AM   Result Value Ref Range    Troponin I 0 11 (H) <=0 04 ng/mL   Basic metabolic panel    Collection Time: 12/09/19  4:22 AM   Result Value Ref Range    Sodium 140 136 - 145 mmol/L    Potassium 3 5 3 5 - 5 3 mmol/L    Chloride 106 100 - 108 mmol/L    CO2 25 21 - 32 mmol/L    ANION GAP 9 4 - 13 mmol/L    BUN 46 (H) 5 - 25 mg/dL    Creatinine 1 70 (H) 0 60 - 1 30 mg/dL    Glucose 68 65 - 140 mg/dL    Calcium 8 5 8 3 - 10 1 mg/dL    eGFR 33 ml/min/1 73sq m   Magnesium    Collection Time: 12/09/19  4:22 AM   Result Value Ref Range    Magnesium 1 7 1 6 - 2 6 mg/dL   CBC (With Platelets)    Collection Time: 12/09/19  4:22 AM   Result Value Ref Range    WBC 5 45 4 31 - 10 16 Thousand/uL    RBC 3 34 (L) 3 81 - 5 12 Million/uL    Hemoglobin 9 6 (L) 11 5 - 15 4 g/dL    Hematocrit 32 2 (L) 34 8 - 46 1 %    MCV 93 82 - 98 fL    MCH 28 7 26 8 - 34 3 pg    MCHC 29 8 (L) 31 4 - 37 4 g/dL    RDW 15 1 11 6 - 15 1 %    Platelets 925 (L) 617 - 390 Thousands/uL   Lipid Panel with Direct LDL reflex    Collection Time: 12/09/19  4:22 AM   Result Value Ref Range    Cholesterol 207 (H) 50 - 200 mg/dL    Triglycerides 144 <=150 mg/dL    HDL, Direct 37 (L) >=40 mg/dL    LDL Calculated 141 (H) 0 - 100 mg/dL   Troponin I    Collection Time: 12/09/19  8:14 AM   Result Value Ref Range    Troponin I 0 11 (H) <=0 04 ng/mL   Stress strip    Collection Time: 12/10/19 10:05 AM   Result Value Ref Range    Protocol Name LEXISCAN     Time In Exercise Phase 00:01:00     MAX   SYSTOLIC  mmHg    Max Diastolic Bp 82 mmHg    Max Heart Rate 126 BPM    Max Predicted Heart Rate 161 BPM    Reason for Termination Protocol Complete     Test Indication hcc     Target Hr Formular (220 - Age)*100%     Arrhy During Ex      ECG Interp Before Ex      ECG Interp during Ex      Ex Summary Comment      Chest Pain Statement none     Overall Hr Response To Exercise      Overall BP Response To Exercise     Basic metabolic panel    Collection Time: 12/10/19  4:19 PM   Result Value Ref Range    Sodium 141 136 - 145 mmol/L    Potassium 4 2 3 5 - 5 3 mmol/L    Chloride 106 100 - 108 mmol/L    CO2 25 21 - 32 mmol/L    ANION GAP 10 4 - 13 mmol/L    BUN 31 (H) 5 - 25 mg/dL    Creatinine 1 28 0 60 - 1 30 mg/dL    Glucose 128 65 - 140 mg/dL    Calcium 8 6 8 3 - 10 1 mg/dL    eGFR 46 ml/min/1 73sq m   CK    Collection Time: 12/11/19  1:53 PM   Result Value Ref Range    Total  26 - 192 U/L   Ammonia    Collection Time: 12/11/19  1:53 PM   Result Value Ref Range    Ammonia <10 (L) 11 - 35 umol/L   Blood gas, arterial    Collection Time: 12/11/19  2:12 PM   Result Value Ref Range    pH, Arterial 7 477 (H) 7 350 - 7 450    PH ART TC 7 475 (H) 7 350 - 7 450    pCO2, Arterial 32 7 (L) 36 0 - 44 0 mm Hg    PCO2 (TC) Arterial 32 8 (L) 36 0 - 44 0 mm Hg    pO2, Arterial 100 6 75 0 - 129 0 mm Hg    PO2 (TC) Arterial 101 2 75 0 - 129 0 mm Hg    HCO3, Arterial 23 6 22 0 - 28 0 mmol/L    Base Excess, Arterial 0 5 mmol/L    O2 Content, Arterial 14 3 (L) 16 0 - 23 0 mL/dL    O2 HGB,Arterial  97 0 94 0 - 97 0 %    SOURCE Radial, Right     RADHA TEST Yes     Temperature 98 8 Degrees Fehrenheit    ROOM AIR FIO2 21% %   CBC    Collection Time: 12/12/19  4:51 AM   Result Value Ref Range    WBC 6 64 4 31 - 10 16 Thousand/uL    RBC 3 70 (L) 3 81 - 5 12 Million/uL    Hemoglobin 10 9 (L) 11 5 - 15 4 g/dL    Hematocrit 33 7 (L) 34 8 - 46 1 %    MCV 91 82 - 98 fL    MCH 29 5 26 8 - 34 3 pg    MCHC 32 3 31 4 - 37 4 g/dL    RDW 15 2 (H) 11 6 - 15 1 %    Platelets 327 (L) 838 - 390 Thousands/uL   Basic metabolic panel    Collection Time: 12/12/19  4:51 AM   Result Value Ref Range    Sodium 139 136 - 145 mmol/L    Potassium 4 0 3 5 - 5 3 mmol/L    Chloride 105 100 - 108 mmol/L    CO2 21 21 - 32 mmol/L    ANION GAP 13 4 - 13 mmol/L    BUN 17 5 - 25 mg/dL    Creatinine 1 08 0 60 - 1 30 mg/dL    Glucose 81 65 - 140 mg/dL    Calcium 9 4 8 3 - 10 1 mg/dL    eGFR 56 ml/min/1 73sq m   Basic metabolic panel    Collection Time: 12/13/19  6:32 AM   Result Value Ref Range    Sodium 138 136 - 145 mmol/L    Potassium 4 1 3 5 - 5 3 mmol/L    Chloride 105 100 - 108 mmol/L    CO2 24 21 - 32 mmol/L    ANION GAP 9 4 - 13 mmol/L    BUN 20 5 - 25 mg/dL    Creatinine 1 16 0 60 - 1 30 mg/dL    Glucose 113 65 - 140 mg/dL    Calcium 9 6 8 3 - 10 1 mg/dL    eGFR 52 ml/min/1 73sq m         Pillo Leija DO

## 2020-01-13 ENCOUNTER — DOCUMENTATION (OUTPATIENT)
Dept: NEUROLOGY | Facility: CLINIC | Age: 60
End: 2020-01-13

## 2020-01-14 ENCOUNTER — HOSPITAL ENCOUNTER (OUTPATIENT)
Dept: RADIOLOGY | Facility: HOSPITAL | Age: 60
Discharge: HOME | End: 2020-01-14
Payer: COMMERCIAL

## 2020-01-14 ENCOUNTER — TRANSCRIBE ORDERS (OUTPATIENT)
Dept: ADMINISTRATIVE | Facility: HOSPITAL | Age: 60
End: 2020-01-14

## 2020-01-14 DIAGNOSIS — R41.82 ALTERED MENTAL STATUS, UNSPECIFIED ALTERED MENTAL STATUS TYPE: ICD-10-CM

## 2020-01-14 DIAGNOSIS — R41.82 ALTERED MENTAL STATUS, UNSPECIFIED ALTERED MENTAL STATUS TYPE: Primary | ICD-10-CM

## 2020-01-14 PROCEDURE — 70450 CT HEAD/BRAIN W/O DYE: CPT

## 2020-01-14 NOTE — PROGRESS NOTES
PA Case: [id number], Status: Approved, Coverage Starts on: 12/30/2019, Coverage Ends on: 12/31/2020

## 2020-01-17 ENCOUNTER — OFFICE VISIT (OUTPATIENT)
Dept: NEUROLOGY | Facility: CLINIC | Age: 60
End: 2020-01-17
Payer: COMMERCIAL

## 2020-01-17 ENCOUNTER — TELEPHONE (OUTPATIENT)
Dept: NEUROLOGY | Facility: CLINIC | Age: 60
End: 2020-01-17

## 2020-01-17 VITALS — HEART RATE: 76 BPM | SYSTOLIC BLOOD PRESSURE: 138 MMHG | DIASTOLIC BLOOD PRESSURE: 93 MMHG

## 2020-01-17 DIAGNOSIS — G35 MULTIPLE SCLEROSIS (HCC): ICD-10-CM

## 2020-01-17 DIAGNOSIS — R53.1 GENERALIZED WEAKNESS: Primary | ICD-10-CM

## 2020-01-17 DIAGNOSIS — I10 ESSENTIAL HYPERTENSION: ICD-10-CM

## 2020-01-17 DIAGNOSIS — D69.6 THROMBOCYTOPENIA (HCC): ICD-10-CM

## 2020-01-17 PROCEDURE — 1036F TOBACCO NON-USER: CPT | Performed by: PSYCHIATRY & NEUROLOGY

## 2020-01-17 PROCEDURE — 3080F DIAST BP >= 90 MM HG: CPT | Performed by: PSYCHIATRY & NEUROLOGY

## 2020-01-17 PROCEDURE — 1111F DSCHRG MED/CURRENT MED MERGE: CPT | Performed by: PSYCHIATRY & NEUROLOGY

## 2020-01-17 PROCEDURE — 3075F SYST BP GE 130 - 139MM HG: CPT | Performed by: PSYCHIATRY & NEUROLOGY

## 2020-01-17 PROCEDURE — 99214 OFFICE O/P EST MOD 30 MIN: CPT | Performed by: PSYCHIATRY & NEUROLOGY

## 2020-01-17 RX ORDER — BACLOFEN 5 MG/1
2.5 TABLET ORAL 3 TIMES DAILY
COMMUNITY
End: 2020-04-22 | Stop reason: ALTCHOICE

## 2020-01-17 RX ORDER — LOSARTAN POTASSIUM 100 MG/1
100 TABLET ORAL DAILY
COMMUNITY
End: 2020-03-02

## 2020-01-17 RX ORDER — OMEPRAZOLE 20 MG/1
20 CAPSULE, DELAYED RELEASE ORAL DAILY
COMMUNITY
End: 2020-04-07

## 2020-01-17 RX ORDER — SACCHAROMYCES BOULARDII 250 MG
250 CAPSULE ORAL 2 TIMES DAILY
COMMUNITY
End: 2020-04-07

## 2020-01-17 NOTE — PROGRESS NOTES
Patient ID: Elle Rangel is a 61 y o  female  Assessment/Plan:     Diagnoses and all orders for this visit:    Generalized weakness    Multiple sclerosis (HCC)    Essential hypertension    Thrombocytopenia (HCC)    Other orders  -     Baclofen 5 MG TABS; Take 2 5 mg by mouth 3 (three) times a day  -     losartan (COZAAR) 100 MG tablet; Take 100 mg by mouth daily  -     omeprazole (PriLOSEC) 20 mg delayed release capsule; Take 20 mg by mouth daily  -     saccharomyces boulardii (FLORASTOR) 250 mg capsule; Take 250 mg by mouth 2 (two) times a day FOR 7 DAYS     The patient is here as a hospital follow-up for generalized weakness secondary to ARBEN, dehydration, and NOT thought to be a pseudo flare of MS  She had a similar presentation in June  It was noted that amantadine may be contributing to her encephalopathic episodes, however low suspicion that amantadine is doing this because she has been on this for a long time without side effects  We had a discussion about how to prevent this in the future including adequately hydrating with water and electrolytes throughout the day, we also decreased amantadine to q h s  Only  Moving forward if she has this symptom again without notable dehydration or ARBEN, we could discontinue amantadine  Also consider psychiatric evaluation  She has an appointment in April with Paulo Garcia PA-C  The patient would like to discuss going back on modafinil as this worked wonderful in the past for fatigue  Platelets have been trending down over time  Will monitor while on Aubagio per MS team discretion  The patient should not hesitate to call me prior to her follow up with any questions or concerns  The patient was instructed to urgently call 911 or present to the nearest emergency room with any new or worsening neurological deficits  Subjective:    HPI    Ms  Elle Rangel is here for neurological follow-up after hospital admission for generalized weakness        The patient would like her first contact to be: Kathryn Spencer ([phone] home; [phone] cell)  The patient was seen by Dr Sierra Marin at [facility] on 12/11/2019  She had a chief complaint of generalized weakness   Also noted to have hallucinations at home  "She presented 3 days ago with cc of right hip pain, weakness and visual hallucinations  She says there have been people at her home that she doesn't want around " At home she states she was not eating or drinking well and having a hard time moving around, basically bed-bound  She is currently wheelchair-bound due to weakness with MS  When she was seen in the hospital she was noted to be oriented per Dr Sierra Marin  Per Dr Sierra Marin, the "patient had similar presentation in June from dehydration, ARBEN  She also stated that amantadine is not helping anymore for her fatigue  Modafinil was helping previously, she states "    The patient reports to me today that in May or June she had auditory hallucinations  She heard a voice in the domingo in her home  She thought she heard people talking in the walls  Around the time of Thanksgiving 2019 she started seeing people outside her window in her yd doing "weird things" such as a group of people doing yd work, but they were all doing the same thing at once  There was a group of people singing like a chorus  There was a group of people mowing the lawn in sync  She was not fearful of the hallucinations as long as she did not look out the window  At the time she knew it was bizarre  In December while she was in the hospital she called her sister-in-law from the hospital because she thought she was in a high school  She also thought she was in a hotel at 1 point when she went to [facility] rehab  During therapy she was told by a therapist that she started picking "Cristhian lights" up off the floor but she does not have memory of that  She was told that she "blacked out" for a second or 2      A running seem seems to be dehydration as the cause for her auditory and visual hallucinations, per the patient's opinion  She was taken off a diuretic recently    She continues at rehab currently and does not know when she is supposed to go back home  The following portions of the patient's history were reviewed and updated as appropriate:   She  has a past medical history of Chronic back pain, Depression, High cholesterol, Hypertension, MS (multiple sclerosis) ([facility] ), and RLL pneumonia ([place] [address] )  She   Patient Active Problem List    Diagnosis Date Noted    Anemia 2020    Type 2 myocardial infarction ([place] [address] ) 2019    Generalized weakness 2019    Opiate dependence ([place] [address] ) 2019    Elevated LFTs 2019    Sinus tachycardia 2019    Bilateral hip pain 2019    Leukocytosis 2019    Smoker 2019    Thrombocytopenia ([place] [address] ) 2017    Ventricular hypertrophy 2017    Vitamin D deficiency 2015    Multiple thyroid nodules 2015    Essential hypertension 06/10/2014    Lumbar spinal stenosis 06/10/2014    Multiple sclerosis ([place] [address] ) 06/10/2014     She  has a past surgical history that includes orthopedic surgery; Back surgery;  section; Laminectomy; Other surgical history; Knee arthroscopy; and Ankle surgery (Right)  Her family history includes Heart attack in her father; Osteoporosis in her mother  She  reports that she has quit smoking  She smoked 0 00 packs per day  She has never used smokeless tobacco  She reports that she drinks alcohol  She reports that she does not use drugs    Current Outpatient Medications   Medication Sig Dispense Refill    amantadine (SYMMETREL) 100 mg capsule Take 1 capsule (100 mg total) by mouth 2 (two) times a day (Patient taking differently: Take 100 mg by mouth daily at bedtime ) 60 capsule 3    Baclofen 5 MG TABS Take 2 5 mg by mouth 3 (three) times a day      escitalopram (LEXAPRO) 10 mg tablet Take 1 tablet (10 mg total) by mouth daily  0    losartan (COZAAR) 100 MG tablet Take 100 mg by mouth daily      metoprolol tartrate (LOPRESSOR) 50 mg tablet Take 1 tablet (50 mg total) by mouth every 12 (twelve) hours 60 tablet 1    omeprazole (PriLOSEC) 20 mg delayed release capsule Take 20 mg by mouth daily      risperiDONE (RisperDAL) 0 5 mg tablet Take 1 tablet (0 5 mg total) by mouth 2 (two) times a day  0    saccharomyces boulardii (FLORASTOR) 250 mg capsule Take 250 mg by mouth 2 (two) times a day FOR 7 DAYS      telmisartan (MICARDIS) 80 MG tablet Take 1 tablet (80 mg total) by mouth daily      Teriflunomide (AUBAGIO) 14 MG TABS Take 1 tablet (14 mg total) by mouth daily 30 tablet 3    baclofen 10 mg tablet Take 2 tablets (20 mg total) by mouth 3 (three) times a day (Patient not taking: Reported on 1/17/2020) 540 tablet 0    esomeprazole (NEXIUM) 40 MG capsule Take 1 capsule (40 mg total) by mouth daily before breakfast (Patient not taking: Reported on 1/17/2020) 30 capsule 5    meloxicam (MOBIC) 15 mg tablet Take 15 mg by mouth daily   0     No current facility-administered medications for this visit  She has No Known Allergies  Winter Given amntadine-   I will discuss with Dr Tiara Golden the modafinil    Order home health/ or care giver         Objective:    Blood pressure 138/93, pulse 76, not currently breastfeeding  Physical Exam    Neurological Exam  Vital signs reviewed  Well developed, well nourished  The patient is pleasant, and she is oriented x4  She has adequate knowledge insight  No dysarthria  Head: Normocephalic, atraumatic  Neck: Neck flexors 5/5  CN 2-12: intact and symmetric, including EOMs which are normal b/l and PERRL  MSK:   Generalized weakness throughout, nonfocal   Hip flexors and DF are 1-2/5 bilaterally, shoulders are 4/5 bilaterally  She has more pain in the right lower extremity radiating from the back  ROM normal x all 4 extr  No pronator drift    Reflexes: brisk t/o but non focal   Coordination: Nml x4 extr  Gait:   Deferred due to weakness  She is resting in a wheelchair  She does have low back pain for which we placed ice under her right buttock and this provided relief  ROS:    Review of Systems   Constitutional: Negative  Negative for appetite change and fever  HENT: Negative  Negative for hearing loss, tinnitus, trouble swallowing and voice change  Eyes: Negative  Negative for photophobia and pain  Respiratory: Negative  Negative for shortness of breath  Cardiovascular: Negative  Negative for palpitations  Gastrointestinal: Negative  Negative for nausea and vomiting  Endocrine: Negative  Negative for cold intolerance and heat intolerance  Genitourinary: Negative  Negative for dysuria, frequency and urgency  Musculoskeletal: Negative  Negative for myalgias and neck pain  Skin: Negative  Negative for rash  Neurological: Positive for weakness  Negative for dizziness, tremors, seizures, syncope, facial asymmetry, speech difficulty, light-headedness, numbness and headaches  Hematological: Negative  Does not bruise/bleed easily  Psychiatric/Behavioral: Negative  Negative for confusion, hallucinations and sleep disturbance  The following portions of the patient's history were reviewed and updated as appropriate: allergies, current medications/ medication history, past family history, past medical history, past social history, past surgical history and problem list     Review of systems was reviewed and otherwise unremarkable from a neurological perspective

## 2020-01-17 NOTE — TELEPHONE ENCOUNTER
Edwin Moran from [facility] calling to inform you that the patient recently has been confused and hallucinating  States that this generally happens at night, but just wanted to make you aware  Will be sending an up to date med list with patient for review

## 2020-02-28 ENCOUNTER — TELEPHONE (OUTPATIENT)
Dept: FAMILY MEDICINE CLINIC | Facility: CLINIC | Age: 60
End: 2020-02-28

## 2020-02-29 ENCOUNTER — TELEPHONE (OUTPATIENT)
Dept: FAMILY MEDICINE CLINIC | Facility: CLINIC | Age: 60
End: 2020-02-29

## 2020-02-29 NOTE — TELEPHONE ENCOUNTER
Martín Payan calling stating patient's blood pressure is still 180/100 but no symptoms  He stated that he is going to recheck the blood pressure in an hour and give us a call back        Martín Payan number is [phone]    Thank You

## 2020-02-29 NOTE — TELEPHONE ENCOUNTER
John Chisholm stated that patient has BP of 180/100 time taken was 8:30am however at this time patient has not taken her medication       patient took medication around 8:40  Patient is asymptomatic   patient is homebound     Was told by ramírez to recheck after 15 min to make sure that blood pressure has come down  Destiny Calderon MA

## 2020-02-29 NOTE — TELEPHONE ENCOUNTER
Called alvin and there was no answer [facility] requesting a call back  Called patient and she stated that Xiomara Alas was going back in one hr to check her BP  I told patient that when he does go back if he does not call us back infront of her I stated that she should call us with the reading    Javier Thapa MA

## 2020-03-01 ENCOUNTER — TELEPHONE (OUTPATIENT)
Dept: OTHER | Facility: OTHER | Age: 60
End: 2020-03-01

## 2020-03-01 NOTE — TELEPHONE ENCOUNTER
Dr Vishnu Palacios , patient bp is elevated  There is some confusion about medications as your last note states telmisartan but patient taking losartan and metoprolol  I advised to give extra dose of metoprolol today and she will be following up with you in office tomorrow at 2:15 pm  I advised her to bring medications with her to clarify  Advised to go to ER for worsening today and if noticed any stroke like symptoms  I personally talked to the patient on the phone too and was alert, awake, oriented X 3    RACHAEL Field

## 2020-03-01 NOTE — TELEPHONE ENCOUNTER
PT called and stated that 190/110 and heart rate 68  Denies any headche, chest pain, dizziness and stroke like symptoms  PT rechecked BP again and was 180/110 and pulse manual for one minute is 70  Patient already took losartan 100 mg today and metoprolol 50 mg and advised to give metoprolol 50 mg extra now  Advised patient to follow up with Dr Jareth Freitas tomorrow at 2:15 pm and advised to bring all medication bottles with to  e office   RACHAEL Velasquez

## 2020-03-02 ENCOUNTER — OFFICE VISIT (OUTPATIENT)
Dept: FAMILY MEDICINE CLINIC | Facility: CLINIC | Age: 60
End: 2020-03-02
Payer: COMMERCIAL

## 2020-03-02 VITALS
TEMPERATURE: 98 F | SYSTOLIC BLOOD PRESSURE: 110 MMHG | RESPIRATION RATE: 18 BRPM | HEART RATE: 74 BPM | DIASTOLIC BLOOD PRESSURE: 70 MMHG | OXYGEN SATURATION: 98 %

## 2020-03-02 DIAGNOSIS — I10 ESSENTIAL HYPERTENSION: Primary | ICD-10-CM

## 2020-03-02 PROCEDURE — 3074F SYST BP LT 130 MM HG: CPT | Performed by: FAMILY MEDICINE

## 2020-03-02 PROCEDURE — 1036F TOBACCO NON-USER: CPT | Performed by: FAMILY MEDICINE

## 2020-03-02 PROCEDURE — 3078F DIAST BP <80 MM HG: CPT | Performed by: FAMILY MEDICINE

## 2020-03-02 PROCEDURE — 99213 OFFICE O/P EST LOW 20 MIN: CPT | Performed by: FAMILY MEDICINE

## 2020-03-02 RX ORDER — METOPROLOL TARTRATE 50 MG/1
50 TABLET, FILM COATED ORAL EVERY 12 HOURS SCHEDULED
Qty: 180 TABLET | Refills: 1 | Status: SHIPPED | OUTPATIENT
Start: 2020-03-02 | End: 2020-05-21 | Stop reason: HOSPADM

## 2020-03-02 RX ORDER — TELMISARTAN AND HYDROCHLORTHIAZIDE 40; 12.5 MG/1; MG/1
1 TABLET ORAL DAILY
Qty: 90 TABLET | Refills: 1 | Status: SHIPPED | OUTPATIENT
Start: 2020-03-02 | End: 2020-05-21 | Stop reason: HOSPADM

## 2020-03-02 NOTE — ASSESSMENT & PLAN NOTE
BP is very good in the office and all her meds are on board  With dominik ramos further it seems that she is supposed to be on an Arb  Juan Calderon - it does not sound like she is on it  When I retook her BP it was 160/90    Does not look like she is on Losartan from the [facility] or the micards we had her on prior

## 2020-03-02 NOTE — PROGRESS NOTES
Assessment/Plan:    1  Essential hypertension  Assessment & Plan:  BP is very good in the office and all her meds are on board  With dominik pt further it seems that she is supposed to be on an Arb  Marla Curiel - it does not sound like she is on it  When I retook her BP it was 160/90  Does not look like she is on Losartan from the [facility] or the micards we had her on prior    Orders:  -     telmisartan-hydrochlorothiazide (MICARDIS HCT) 40-12 5 MG per tablet; Take 1 tablet by mouth daily  -     metoprolol tartrate (LOPRESSOR) 50 mg tablet; Take 1 tablet (50 mg total) by mouth every 12 (twelve) hours          There are no Patient Instructions on file for this visit  Return in about 4 weeks (around 3/30/2020) for 4 weeks AWV and bp check  Subjective:      Patient ID: Torres Fung is a 61 y o  female  Chief Complaint   Patient presents with    Follow-up     Blood pressure check  [id number]       Pt is for a same day appt for BP follow up    Pt states she has about a week out of the care center  Pt states PT/OT told her her BP was off over the weekend  Pt states she was told it was 170/110 and 190/110  Pt states she takes her metoprolol at 9 the PT/Ot took the BP at 830 - 30 min prior to taking her meds    Pt has all her emds on board as we speak    Pt's feet are also swollen      The following portions of the patient's history were reviewed and updated as appropriate: allergies, current medications, past family history, past medical history, past social history, past surgical history and problem list     Review of Systems   Constitutional: Negative  Negative for activity change, appetite change, chills, diaphoresis and fatigue  HENT: Negative  Negative for dental problem, ear pain, sinus pressure and sore throat  Eyes: Negative  Negative for photophobia, pain, discharge, redness, itching and visual disturbance  Respiratory: Negative for apnea and chest tightness  Cardiovascular: Negative  Negative for chest pain, palpitations and leg swelling  Gastrointestinal: Negative  Negative for abdominal distention, abdominal pain, constipation and diarrhea  Endocrine: Negative  Negative for cold intolerance and heat intolerance  Genitourinary: Negative  Negative for difficulty urinating and dyspareunia  Musculoskeletal: Negative  Negative for arthralgias and back pain  Skin: Negative  Allergic/Immunologic: Negative for environmental allergies  Neurological: Positive for weakness  Negative for dizziness  In wheelchair   Psychiatric/Behavioral: Negative  Negative for agitation           Current Outpatient Medications   Medication Sig Dispense Refill    amantadine (SYMMETREL) 100 mg capsule Take 1 capsule (100 mg total) by mouth 2 (two) times a day (Patient taking differently: Take 100 mg by mouth daily at bedtime ) 60 capsule 3    baclofen 10 mg tablet Take 2 tablets (20 mg total) by mouth 3 (three) times a day 540 tablet 0    escitalopram (LEXAPRO) 10 mg tablet Take 1 tablet (10 mg total) by mouth daily  0    metoprolol tartrate (LOPRESSOR) 50 mg tablet Take 1 tablet (50 mg total) by mouth every 12 (twelve) hours 180 tablet 1    omeprazole (PriLOSEC) 20 mg delayed release capsule Take 20 mg by mouth daily      risperiDONE (RisperDAL) 0 5 mg tablet Take 1 tablet (0 5 mg total) by mouth 2 (two) times a day  0    saccharomyces boulardii (FLORASTOR) 250 mg capsule Take 250 mg by mouth 2 (two) times a day FOR 7 DAYS      telmisartan (MICARDIS) 80 MG tablet Take 1 tablet (80 mg total) by mouth daily      Teriflunomide (AUBAGIO) 14 MG TABS Take 1 tablet (14 mg total) by mouth daily 30 tablet 3    Baclofen 5 MG TABS Take 2 5 mg by mouth 3 (three) times a day      esomeprazole (NEXIUM) 40 MG capsule Take 1 capsule (40 mg total) by mouth daily before breakfast (Patient not taking: Reported on 1/17/2020) 30 capsule 5    meloxicam (MOBIC) 15 mg tablet Take 15 mg by mouth daily   0    telmisartan-hydrochlorothiazide (MICARDIS HCT) 40-12 5 MG per tablet Take 1 tablet by mouth daily 90 tablet 1     No current facility-administered medications for this visit  Objective:    /70   Pulse 74   Temp 98 °F (36 7 °C)   Resp 18   SpO2 98%        Physical Exam   Constitutional: She appears well-developed and well-nourished  No distress  HENT:   Head: Normocephalic and atraumatic  Right Ear: External ear normal    Left Ear: External ear normal    Nose: Nose normal    Mouth/Throat: Oropharynx is clear and moist  No oropharyngeal exudate  Eyes: Pupils are equal, round, and reactive to light  EOM are normal  Right eye exhibits no discharge  Left eye exhibits no discharge  No scleral icterus  Neck: No thyromegaly present  Cardiovascular: Normal rate and normal heart sounds  No murmur heard  Pulmonary/Chest: Effort normal and breath sounds normal  No respiratory distress  She has no wheezes  Abdominal: Soft  Bowel sounds are normal  She exhibits no distension and no mass  There is no tenderness  There is no rebound and no guarding  Musculoskeletal: Normal range of motion  She exhibits edema  In wheelchair   Neurological: She is alert  She displays normal reflexes  Coordination normal    Skin: Skin is warm and dry  No rash noted  She is not diaphoretic  No erythema  Psychiatric: She has a normal mood and affect  Her behavior is normal    Nursing note and vitals reviewed               Joaquina Caopne DO

## 2020-03-03 ENCOUNTER — TELEPHONE (OUTPATIENT)
Dept: FAMILY MEDICINE CLINIC | Facility: CLINIC | Age: 60
End: 2020-03-03

## 2020-03-06 ENCOUNTER — TELEPHONE (OUTPATIENT)
Dept: FAMILY MEDICINE CLINIC | Facility: CLINIC | Age: 60
End: 2020-03-06

## 2020-03-06 DIAGNOSIS — G35 MULTIPLE SCLEROSIS (HCC): ICD-10-CM

## 2020-03-06 RX ORDER — AMANTADINE HYDROCHLORIDE 100 MG/1
100 CAPSULE, GELATIN COATED ORAL 2 TIMES DAILY
Qty: 60 CAPSULE | Refills: 0 | Status: SHIPPED | OUTPATIENT
Start: 2020-03-06 | End: 2020-04-05 | Stop reason: SDUPTHER

## 2020-03-06 NOTE — TELEPHONE ENCOUNTER
[facility] and [facility] requesting a call back from [facility] [id number]  [place], [place]

## 2020-03-06 NOTE — TELEPHONE ENCOUNTER
----- Message from Rosalee Barker DO sent at 3/2/2020  2:26 PM EST -----  Regarding: labs  Pt did get labs at [facility] that I ordered before she was discharged    We need for computer can we try to get

## 2020-04-02 DIAGNOSIS — G35 MULTIPLE SCLEROSIS (HCC): ICD-10-CM

## 2020-04-05 DIAGNOSIS — G35 MULTIPLE SCLEROSIS (HCC): ICD-10-CM

## 2020-04-06 RX ORDER — AMANTADINE HYDROCHLORIDE 100 MG/1
100 CAPSULE, GELATIN COATED ORAL 2 TIMES DAILY
Qty: 60 CAPSULE | Refills: 0 | Status: SHIPPED | OUTPATIENT
Start: 2020-04-06 | End: 2020-04-22 | Stop reason: SDUPTHER

## 2020-04-07 ENCOUNTER — TELEMEDICINE (OUTPATIENT)
Dept: FAMILY MEDICINE CLINIC | Facility: CLINIC | Age: 60
End: 2020-04-07
Payer: COMMERCIAL

## 2020-04-07 VITALS
DIASTOLIC BLOOD PRESSURE: 70 MMHG | WEIGHT: 127 LBS | BODY MASS INDEX: 20.41 KG/M2 | SYSTOLIC BLOOD PRESSURE: 110 MMHG | HEIGHT: 66 IN

## 2020-04-07 DIAGNOSIS — I10 ESSENTIAL HYPERTENSION: Primary | ICD-10-CM

## 2020-04-07 PROCEDURE — G2012 BRIEF CHECK IN BY MD/QHP: HCPCS | Performed by: FAMILY MEDICINE

## 2020-04-07 RX ORDER — TELMISARTAN AND HYDROCHLORTHIAZIDE 40; 12.5 MG/1; MG/1
1 TABLET ORAL DAILY
COMMUNITY
Start: 2020-03-02 | End: 2020-04-07

## 2020-04-20 ENCOUNTER — TELEPHONE (OUTPATIENT)
Dept: NEUROLOGY | Facility: CLINIC | Age: 60
End: 2020-04-20

## 2020-04-20 ENCOUNTER — TELEPHONE (OUTPATIENT)
Dept: FAMILY MEDICINE CLINIC | Facility: CLINIC | Age: 60
End: 2020-04-20

## 2020-04-22 ENCOUNTER — TELEMEDICINE (OUTPATIENT)
Dept: NEUROLOGY | Facility: CLINIC | Age: 60
End: 2020-04-22
Payer: COMMERCIAL

## 2020-04-22 DIAGNOSIS — E55.9 VITAMIN D DEFICIENCY: Primary | ICD-10-CM

## 2020-04-22 DIAGNOSIS — G35 MULTIPLE SCLEROSIS (HCC): ICD-10-CM

## 2020-04-22 PROCEDURE — 99442 PR PHYS/QHP TELEPHONE EVALUATION 11-20 MIN: CPT | Performed by: PHYSICIAN ASSISTANT

## 2020-04-22 RX ORDER — AMANTADINE HYDROCHLORIDE 100 MG/1
100 CAPSULE, GELATIN COATED ORAL 2 TIMES DAILY
Qty: 60 CAPSULE | Refills: 3 | Status: SHIPPED | OUTPATIENT
Start: 2020-04-22 | End: 2021-02-17

## 2020-04-22 RX ORDER — BACLOFEN 10 MG/1
20 TABLET ORAL 3 TIMES DAILY
Qty: 540 TABLET | Refills: 0 | Status: SHIPPED | OUTPATIENT
Start: 2020-04-22 | End: 2020-11-19 | Stop reason: SDUPTHER

## 2020-04-22 RX ORDER — GABAPENTIN 300 MG/1
300 CAPSULE ORAL
Qty: 30 CAPSULE | Refills: 1 | Status: SHIPPED | OUTPATIENT
Start: 2020-04-22 | End: 2020-05-21 | Stop reason: HOSPADM

## 2020-04-23 RX ORDER — LORAZEPAM 0.5 MG/1
TABLET ORAL
Qty: 4 TABLET | Refills: 0 | Status: SHIPPED | OUTPATIENT
Start: 2020-04-23

## 2020-04-28 ENCOUNTER — TELEPHONE (OUTPATIENT)
Dept: FAMILY MEDICINE CLINIC | Facility: CLINIC | Age: 60
End: 2020-04-28

## 2020-05-04 ENCOUNTER — HOSPITAL ENCOUNTER (INPATIENT)
Facility: HOSPITAL | Age: 60
LOS: 17 days | Discharge: NURSING, REHAB OR LONG-TERM CARE | DRG: 239 | End: 2020-05-21
Attending: INTERNAL MEDICINE | Admitting: INTERNAL MEDICINE
Payer: COMMERCIAL

## 2020-05-04 ENCOUNTER — TELEPHONE (OUTPATIENT)
Dept: FAMILY MEDICINE CLINIC | Facility: CLINIC | Age: 60
End: 2020-05-04

## 2020-05-04 ENCOUNTER — HOSPITAL ENCOUNTER (EMERGENCY)
Facility: HOSPITAL | Age: 60
End: 2020-05-04
Attending: EMERGENCY MEDICINE
Payer: COMMERCIAL

## 2020-05-04 ENCOUNTER — APPOINTMENT (EMERGENCY)
Dept: RADIOLOGY | Facility: HOSPITAL | Age: 60
End: 2020-05-04
Attending: EMERGENCY MEDICINE
Payer: COMMERCIAL

## 2020-05-04 VITALS
OXYGEN SATURATION: 94 % | TEMPERATURE: 99.4 F | BODY MASS INDEX: 23.48 KG/M2 | DIASTOLIC BLOOD PRESSURE: 74 MMHG | RESPIRATION RATE: 18 BRPM | HEART RATE: 91 BPM | WEIGHT: 145.5 LBS | SYSTOLIC BLOOD PRESSURE: 117 MMHG

## 2020-05-04 DIAGNOSIS — I99.8 ISCHEMIC LEG: Primary | ICD-10-CM

## 2020-05-04 DIAGNOSIS — Z89.619 S/P AKA (ABOVE KNEE AMPUTATION) (HCC): ICD-10-CM

## 2020-05-04 DIAGNOSIS — I99.8 LIMB ISCHEMIA: ICD-10-CM

## 2020-05-04 DIAGNOSIS — I70.201 POPLITEAL ARTERY STENOSIS, RIGHT (HCC): Primary | ICD-10-CM

## 2020-05-04 DIAGNOSIS — R50.9 FEVER: ICD-10-CM

## 2020-05-04 DIAGNOSIS — I21.A1 TYPE 2 MYOCARDIAL INFARCTION (HCC): ICD-10-CM

## 2020-05-04 LAB
ALBUMIN SERPL BCP-MCNC: 2.7 G/DL (ref 3.5–5)
ALP SERPL-CCNC: 150 U/L (ref 46–116)
ALT SERPL W P-5'-P-CCNC: 32 U/L (ref 12–78)
ANION GAP SERPL CALCULATED.3IONS-SCNC: 13 MMOL/L (ref 4–13)
APTT PPP: 20 SECONDS (ref 23–37)
APTT PPP: 61 SECONDS (ref 23–37)
AST SERPL W P-5'-P-CCNC: 31 U/L (ref 5–45)
ATRIAL RATE: 95 BPM
BASOPHILS # BLD AUTO: 0.03 THOUSANDS/ΜL (ref 0–0.1)
BASOPHILS NFR BLD AUTO: 0 % (ref 0–1)
BILIRUB SERPL-MCNC: 0.4 MG/DL (ref 0.2–1)
BUN SERPL-MCNC: 25 MG/DL (ref 5–25)
CALCIUM SERPL-MCNC: 10 MG/DL (ref 8.3–10.1)
CHLORIDE SERPL-SCNC: 95 MMOL/L (ref 100–108)
CO2 SERPL-SCNC: 28 MMOL/L (ref 21–32)
CREAT SERPL-MCNC: 1.41 MG/DL (ref 0.6–1.3)
EOSINOPHIL # BLD AUTO: 0.04 THOUSAND/ΜL (ref 0–0.61)
EOSINOPHIL NFR BLD AUTO: 0 % (ref 0–6)
ERYTHROCYTE [DISTWIDTH] IN BLOOD BY AUTOMATED COUNT: 13.9 % (ref 11.6–15.1)
GFR SERPL CREATININE-BSD FRML MDRD: 41 ML/MIN/1.73SQ M
GLUCOSE SERPL-MCNC: 146 MG/DL (ref 65–140)
HCT VFR BLD AUTO: 32.6 % (ref 34.8–46.1)
HGB BLD-MCNC: 10.4 G/DL (ref 11.5–15.4)
IMM GRANULOCYTES # BLD AUTO: 0.04 THOUSAND/UL (ref 0–0.2)
IMM GRANULOCYTES NFR BLD AUTO: 0 % (ref 0–2)
INR PPP: 1.05 (ref 0.84–1.19)
LACTATE SERPL-SCNC: 1.1 MMOL/L (ref 0.5–2)
LACTATE SERPL-SCNC: 2.1 MMOL/L (ref 0.5–2)
LYMPHOCYTES # BLD AUTO: 1.54 THOUSANDS/ΜL (ref 0.6–4.47)
LYMPHOCYTES NFR BLD AUTO: 16 % (ref 14–44)
MCH RBC QN AUTO: 28.7 PG (ref 26.8–34.3)
MCHC RBC AUTO-ENTMCNC: 31.9 G/DL (ref 31.4–37.4)
MCV RBC AUTO: 90 FL (ref 82–98)
MONOCYTES # BLD AUTO: 0.61 THOUSAND/ΜL (ref 0.17–1.22)
MONOCYTES NFR BLD AUTO: 6 % (ref 4–12)
NEUTROPHILS # BLD AUTO: 7.44 THOUSANDS/ΜL (ref 1.85–7.62)
NEUTS SEG NFR BLD AUTO: 78 % (ref 43–75)
NRBC BLD AUTO-RTO: 0 /100 WBCS
P AXIS: 56 DEGREES
PLATELET # BLD AUTO: 280 THOUSANDS/UL (ref 149–390)
PMV BLD AUTO: 12.3 FL (ref 8.9–12.7)
POTASSIUM SERPL-SCNC: 4.7 MMOL/L (ref 3.5–5.3)
PR INTERVAL: 144 MS
PROT SERPL-MCNC: 9.1 G/DL (ref 6.4–8.2)
PROTHROMBIN TIME: 14.1 SECONDS (ref 11.6–14.5)
QRS AXIS: 71 DEGREES
QRSD INTERVAL: 78 MS
QT INTERVAL: 382 MS
QTC INTERVAL: 480 MS
RBC # BLD AUTO: 3.62 MILLION/UL (ref 3.81–5.12)
SODIUM SERPL-SCNC: 136 MMOL/L (ref 136–145)
T WAVE AXIS: 90 DEGREES
VENTRICULAR RATE: 95 BPM
WBC # BLD AUTO: 9.7 THOUSAND/UL (ref 4.31–10.16)

## 2020-05-04 PROCEDURE — NC001 PR NO CHARGE: Performed by: PHYSICIAN ASSISTANT

## 2020-05-04 PROCEDURE — 93010 ELECTROCARDIOGRAM REPORT: CPT | Performed by: INTERNAL MEDICINE

## 2020-05-04 PROCEDURE — 85730 THROMBOPLASTIN TIME PARTIAL: CPT | Performed by: INTERNAL MEDICINE

## 2020-05-04 PROCEDURE — 93926 LOWER EXTREMITY STUDY: CPT | Performed by: SURGERY

## 2020-05-04 PROCEDURE — 99223 1ST HOSP IP/OBS HIGH 75: CPT | Performed by: INTERNAL MEDICINE

## 2020-05-04 PROCEDURE — 99291 CRITICAL CARE FIRST HOUR: CPT

## 2020-05-04 PROCEDURE — 96366 THER/PROPH/DIAG IV INF ADDON: CPT

## 2020-05-04 PROCEDURE — 96374 THER/PROPH/DIAG INJ IV PUSH: CPT

## 2020-05-04 PROCEDURE — 85730 THROMBOPLASTIN TIME PARTIAL: CPT | Performed by: EMERGENCY MEDICINE

## 2020-05-04 PROCEDURE — 85025 COMPLETE CBC W/AUTO DIFF WBC: CPT | Performed by: EMERGENCY MEDICINE

## 2020-05-04 PROCEDURE — 36415 COLL VENOUS BLD VENIPUNCTURE: CPT | Performed by: EMERGENCY MEDICINE

## 2020-05-04 PROCEDURE — 96361 HYDRATE IV INFUSION ADD-ON: CPT

## 2020-05-04 PROCEDURE — 93922 UPR/L XTREMITY ART 2 LEVELS: CPT | Performed by: SURGERY

## 2020-05-04 PROCEDURE — 93005 ELECTROCARDIOGRAM TRACING: CPT

## 2020-05-04 PROCEDURE — 96365 THER/PROPH/DIAG IV INF INIT: CPT

## 2020-05-04 PROCEDURE — 96375 TX/PRO/DX INJ NEW DRUG ADDON: CPT

## 2020-05-04 PROCEDURE — 93926 LOWER EXTREMITY STUDY: CPT

## 2020-05-04 PROCEDURE — 83605 ASSAY OF LACTIC ACID: CPT | Performed by: EMERGENCY MEDICINE

## 2020-05-04 PROCEDURE — 80053 COMPREHEN METABOLIC PANEL: CPT | Performed by: EMERGENCY MEDICINE

## 2020-05-04 PROCEDURE — 99285 EMERGENCY DEPT VISIT HI MDM: CPT | Performed by: EMERGENCY MEDICINE

## 2020-05-04 PROCEDURE — 85610 PROTHROMBIN TIME: CPT | Performed by: EMERGENCY MEDICINE

## 2020-05-04 RX ORDER — HYDROMORPHONE HCL/PF 1 MG/ML
0.5 SYRINGE (ML) INJECTION EVERY 4 HOURS PRN
Status: DISCONTINUED | OUTPATIENT
Start: 2020-05-04 | End: 2020-05-05

## 2020-05-04 RX ORDER — BACLOFEN 10 MG/1
20 TABLET ORAL 3 TIMES DAILY
Status: DISCONTINUED | OUTPATIENT
Start: 2020-05-04 | End: 2020-05-21 | Stop reason: HOSPADM

## 2020-05-04 RX ORDER — BACLOFEN 10 MG/1
10 TABLET ORAL ONCE
Status: COMPLETED | OUTPATIENT
Start: 2020-05-04 | End: 2020-05-04

## 2020-05-04 RX ORDER — METOPROLOL TARTRATE 50 MG/1
50 TABLET, FILM COATED ORAL ONCE
Status: COMPLETED | OUTPATIENT
Start: 2020-05-04 | End: 2020-05-04

## 2020-05-04 RX ORDER — HEPARIN SODIUM 10000 [USP'U]/100ML
3-30 INJECTION, SOLUTION INTRAVENOUS
Status: DISCONTINUED | OUTPATIENT
Start: 2020-05-04 | End: 2020-05-05

## 2020-05-04 RX ORDER — METOPROLOL TARTRATE 50 MG/1
50 TABLET, FILM COATED ORAL EVERY 12 HOURS SCHEDULED
Status: DISCONTINUED | OUTPATIENT
Start: 2020-05-05 | End: 2020-05-05

## 2020-05-04 RX ORDER — OXYCODONE HYDROCHLORIDE 10 MG/1
10 TABLET ORAL EVERY 4 HOURS PRN
Status: DISCONTINUED | OUTPATIENT
Start: 2020-05-04 | End: 2020-05-08

## 2020-05-04 RX ORDER — HEPARIN SODIUM 1000 [USP'U]/ML
4400 INJECTION, SOLUTION INTRAVENOUS; SUBCUTANEOUS ONCE
Status: COMPLETED | OUTPATIENT
Start: 2020-05-04 | End: 2020-05-04

## 2020-05-04 RX ORDER — HEPARIN SODIUM 1000 [USP'U]/ML
2600 INJECTION, SOLUTION INTRAVENOUS; SUBCUTANEOUS
Status: DISCONTINUED | OUTPATIENT
Start: 2020-05-04 | End: 2020-05-05

## 2020-05-04 RX ORDER — AMANTADINE HYDROCHLORIDE 100 MG/1
100 CAPSULE, GELATIN COATED ORAL 2 TIMES DAILY
Status: DISCONTINUED | OUTPATIENT
Start: 2020-05-05 | End: 2020-05-21 | Stop reason: HOSPADM

## 2020-05-04 RX ORDER — HEPARIN SODIUM 1000 [USP'U]/ML
4400 INJECTION, SOLUTION INTRAVENOUS; SUBCUTANEOUS
Status: DISCONTINUED | OUTPATIENT
Start: 2020-05-04 | End: 2020-05-04 | Stop reason: HOSPADM

## 2020-05-04 RX ORDER — ACETAMINOPHEN 325 MG/1
650 TABLET ORAL EVERY 6 HOURS PRN
Status: DISCONTINUED | OUTPATIENT
Start: 2020-05-04 | End: 2020-05-08

## 2020-05-04 RX ORDER — OXYCODONE HYDROCHLORIDE 5 MG/1
5 TABLET ORAL EVERY 4 HOURS PRN
Status: DISCONTINUED | OUTPATIENT
Start: 2020-05-04 | End: 2020-05-08

## 2020-05-04 RX ORDER — HEPARIN SODIUM 1000 [USP'U]/ML
2200 INJECTION, SOLUTION INTRAVENOUS; SUBCUTANEOUS
Status: DISCONTINUED | OUTPATIENT
Start: 2020-05-04 | End: 2020-05-04 | Stop reason: HOSPADM

## 2020-05-04 RX ORDER — HEPARIN SODIUM 10000 [USP'U]/100ML
3-30 INJECTION, SOLUTION INTRAVENOUS
Status: DISCONTINUED | OUTPATIENT
Start: 2020-05-04 | End: 2020-05-04 | Stop reason: HOSPADM

## 2020-05-04 RX ORDER — AMANTADINE HYDROCHLORIDE 100 MG/1
100 CAPSULE, GELATIN COATED ORAL ONCE
Status: COMPLETED | OUTPATIENT
Start: 2020-05-04 | End: 2020-05-04

## 2020-05-04 RX ORDER — SODIUM CHLORIDE 9 MG/ML
75 INJECTION, SOLUTION INTRAVENOUS CONTINUOUS
Status: DISCONTINUED | OUTPATIENT
Start: 2020-05-04 | End: 2020-05-05

## 2020-05-04 RX ORDER — GABAPENTIN 300 MG/1
300 CAPSULE ORAL ONCE
Status: COMPLETED | OUTPATIENT
Start: 2020-05-04 | End: 2020-05-04

## 2020-05-04 RX ORDER — HYDROMORPHONE HCL/PF 1 MG/ML
1 SYRINGE (ML) INJECTION ONCE
Status: COMPLETED | OUTPATIENT
Start: 2020-05-04 | End: 2020-05-04

## 2020-05-04 RX ORDER — HEPARIN SODIUM 1000 [USP'U]/ML
5200 INJECTION, SOLUTION INTRAVENOUS; SUBCUTANEOUS
Status: DISCONTINUED | OUTPATIENT
Start: 2020-05-04 | End: 2020-05-05

## 2020-05-04 RX ORDER — GABAPENTIN 300 MG/1
300 CAPSULE ORAL
Status: DISCONTINUED | OUTPATIENT
Start: 2020-05-04 | End: 2020-05-06

## 2020-05-04 RX ADMIN — GABAPENTIN 300 MG: 300 CAPSULE ORAL at 21:51

## 2020-05-04 RX ADMIN — HEPARIN SODIUM 4400 UNITS: 1000 INJECTION, SOLUTION INTRAVENOUS; SUBCUTANEOUS at 16:39

## 2020-05-04 RX ADMIN — BACLOFEN 20 MG: 10 TABLET ORAL at 21:51

## 2020-05-04 RX ADMIN — AMANTADINE 100 MG: 100 CAPSULE ORAL at 17:40

## 2020-05-04 RX ADMIN — METOPROLOL TARTRATE 50 MG: 50 TABLET, FILM COATED ORAL at 17:52

## 2020-05-04 RX ADMIN — SODIUM CHLORIDE 1000 ML: 0.9 INJECTION, SOLUTION INTRAVENOUS at 14:42

## 2020-05-04 RX ADMIN — HYDROMORPHONE HYDROCHLORIDE 1 MG: 1 INJECTION, SOLUTION INTRAMUSCULAR; INTRAVENOUS; SUBCUTANEOUS at 18:30

## 2020-05-04 RX ADMIN — SODIUM CHLORIDE 100 ML/HR: 0.9 INJECTION, SOLUTION INTRAVENOUS at 21:51

## 2020-05-04 RX ADMIN — MORPHINE SULFATE 2 MG: 2 INJECTION, SOLUTION INTRAMUSCULAR; INTRAVENOUS at 16:56

## 2020-05-04 RX ADMIN — HEPARIN SODIUM AND DEXTROSE 18 UNITS/KG/HR: 10000; 5 INJECTION INTRAVENOUS at 16:46

## 2020-05-04 RX ADMIN — BACLOFEN 10 MG: 10 TABLET ORAL at 17:40

## 2020-05-04 RX ADMIN — HEPARIN SODIUM AND DEXTROSE 18 UNITS/KG/HR: 10000; 5 INJECTION INTRAVENOUS at 21:54

## 2020-05-04 RX ADMIN — GABAPENTIN 300 MG: 300 CAPSULE ORAL at 17:52

## 2020-05-05 ENCOUNTER — ANESTHESIA (INPATIENT)
Dept: PERIOP | Facility: HOSPITAL | Age: 60
DRG: 239 | End: 2020-05-05
Payer: COMMERCIAL

## 2020-05-05 ENCOUNTER — ANESTHESIA EVENT (INPATIENT)
Dept: PERIOP | Facility: HOSPITAL | Age: 60
DRG: 239 | End: 2020-05-05
Payer: COMMERCIAL

## 2020-05-05 PROBLEM — R50.9 FEVER: Status: ACTIVE | Noted: 2020-05-05

## 2020-05-05 LAB
ABO GROUP BLD: NORMAL
ABO GROUP BLD: NORMAL
ANION GAP SERPL CALCULATED.3IONS-SCNC: 7 MMOL/L (ref 4–13)
APTT PPP: 26 SECONDS (ref 23–37)
APTT PPP: 30 SECONDS (ref 23–37)
APTT PPP: 48 SECONDS (ref 23–37)
BLD GP AB SCN SERPL QL: NEGATIVE
BUN SERPL-MCNC: 21 MG/DL (ref 5–25)
CALCIUM SERPL-MCNC: 9.5 MG/DL (ref 8.3–10.1)
CHLORIDE SERPL-SCNC: 107 MMOL/L (ref 100–108)
CO2 SERPL-SCNC: 22 MMOL/L (ref 21–32)
CREAT SERPL-MCNC: 0.86 MG/DL (ref 0.6–1.3)
ERYTHROCYTE [DISTWIDTH] IN BLOOD BY AUTOMATED COUNT: 14.2 % (ref 11.6–15.1)
ERYTHROCYTE [DISTWIDTH] IN BLOOD BY AUTOMATED COUNT: 14.3 % (ref 11.6–15.1)
GFR SERPL CREATININE-BSD FRML MDRD: 74 ML/MIN/1.73SQ M
GLUCOSE SERPL-MCNC: 82 MG/DL (ref 65–140)
HCT VFR BLD AUTO: 25.8 % (ref 34.8–46.1)
HCT VFR BLD AUTO: 27.6 % (ref 34.8–46.1)
HGB BLD-MCNC: 8 G/DL (ref 11.5–15.4)
HGB BLD-MCNC: 8.5 G/DL (ref 11.5–15.4)
INR PPP: 1.08 (ref 0.84–1.19)
MCH RBC QN AUTO: 28 PG (ref 26.8–34.3)
MCH RBC QN AUTO: 28.5 PG (ref 26.8–34.3)
MCHC RBC AUTO-ENTMCNC: 30.8 G/DL (ref 31.4–37.4)
MCHC RBC AUTO-ENTMCNC: 31 G/DL (ref 31.4–37.4)
MCV RBC AUTO: 91 FL (ref 82–98)
MCV RBC AUTO: 92 FL (ref 82–98)
PLATELET # BLD AUTO: 231 THOUSANDS/UL (ref 149–390)
PLATELET # BLD AUTO: 234 THOUSANDS/UL (ref 149–390)
PMV BLD AUTO: 11.8 FL (ref 8.9–12.7)
PMV BLD AUTO: 12.1 FL (ref 8.9–12.7)
POTASSIUM SERPL-SCNC: 3.6 MMOL/L (ref 3.5–5.3)
PROTHROMBIN TIME: 13.6 SECONDS (ref 11.6–14.5)
RBC # BLD AUTO: 2.81 MILLION/UL (ref 3.81–5.12)
RBC # BLD AUTO: 3.04 MILLION/UL (ref 3.81–5.12)
RH BLD: POSITIVE
RH BLD: POSITIVE
SODIUM SERPL-SCNC: 136 MMOL/L (ref 136–145)
SPECIMEN EXPIRATION DATE: NORMAL
WBC # BLD AUTO: 8.03 THOUSAND/UL (ref 4.31–10.16)
WBC # BLD AUTO: 8.31 THOUSAND/UL (ref 4.31–10.16)

## 2020-05-05 PROCEDURE — 86850 RBC ANTIBODY SCREEN: CPT | Performed by: SURGERY

## 2020-05-05 PROCEDURE — 0Y6C0Z1 DETACHMENT AT RIGHT UPPER LEG, HIGH, OPEN APPROACH: ICD-10-PCS | Performed by: SURGERY

## 2020-05-05 PROCEDURE — 85027 COMPLETE CBC AUTOMATED: CPT | Performed by: PHYSICIAN ASSISTANT

## 2020-05-05 PROCEDURE — 99232 SBSQ HOSP IP/OBS MODERATE 35: CPT | Performed by: PHYSICIAN ASSISTANT

## 2020-05-05 PROCEDURE — 86920 COMPATIBILITY TEST SPIN: CPT

## 2020-05-05 PROCEDURE — 27590 AMPUTATE LEG AT THIGH: CPT | Performed by: SURGERY

## 2020-05-05 PROCEDURE — 86901 BLOOD TYPING SEROLOGIC RH(D): CPT | Performed by: SURGERY

## 2020-05-05 PROCEDURE — 80048 BASIC METABOLIC PNL TOTAL CA: CPT | Performed by: PHYSICIAN ASSISTANT

## 2020-05-05 PROCEDURE — 99223 1ST HOSP IP/OBS HIGH 75: CPT | Performed by: INTERNAL MEDICINE

## 2020-05-05 PROCEDURE — 99223 1ST HOSP IP/OBS HIGH 75: CPT | Performed by: SURGERY

## 2020-05-05 PROCEDURE — NC001 PR NO CHARGE: Performed by: SURGERY

## 2020-05-05 PROCEDURE — 86900 BLOOD TYPING SEROLOGIC ABO: CPT | Performed by: SURGERY

## 2020-05-05 PROCEDURE — 85027 COMPLETE CBC AUTOMATED: CPT | Performed by: STUDENT IN AN ORGANIZED HEALTH CARE EDUCATION/TRAINING PROGRAM

## 2020-05-05 PROCEDURE — 85730 THROMBOPLASTIN TIME PARTIAL: CPT | Performed by: INTERNAL MEDICINE

## 2020-05-05 PROCEDURE — 88300 SURGICAL PATH GROSS: CPT | Performed by: PATHOLOGY

## 2020-05-05 PROCEDURE — 85610 PROTHROMBIN TIME: CPT | Performed by: STUDENT IN AN ORGANIZED HEALTH CARE EDUCATION/TRAINING PROGRAM

## 2020-05-05 PROCEDURE — G9197 ORDER FOR CEPH: HCPCS | Performed by: SURGERY

## 2020-05-05 PROCEDURE — 85730 THROMBOPLASTIN TIME PARTIAL: CPT | Performed by: STUDENT IN AN ORGANIZED HEALTH CARE EDUCATION/TRAINING PROGRAM

## 2020-05-05 RX ORDER — ALBUMIN, HUMAN INJ 5% 5 %
SOLUTION INTRAVENOUS CONTINUOUS PRN
Status: DISCONTINUED | OUTPATIENT
Start: 2020-05-05 | End: 2020-05-05 | Stop reason: SURG

## 2020-05-05 RX ORDER — DEXAMETHASONE SODIUM PHOSPHATE 10 MG/ML
INJECTION, SOLUTION INTRAMUSCULAR; INTRAVENOUS AS NEEDED
Status: DISCONTINUED | OUTPATIENT
Start: 2020-05-05 | End: 2020-05-05 | Stop reason: SURG

## 2020-05-05 RX ORDER — ONDANSETRON 2 MG/ML
INJECTION INTRAMUSCULAR; INTRAVENOUS AS NEEDED
Status: DISCONTINUED | OUTPATIENT
Start: 2020-05-05 | End: 2020-05-05 | Stop reason: SURG

## 2020-05-05 RX ORDER — KETAMINE HCL IN NACL, ISO-OSM 100MG/10ML
SYRINGE (ML) INJECTION AS NEEDED
Status: DISCONTINUED | OUTPATIENT
Start: 2020-05-05 | End: 2020-05-05 | Stop reason: SURG

## 2020-05-05 RX ORDER — PROPOFOL 10 MG/ML
INJECTION, EMULSION INTRAVENOUS AS NEEDED
Status: DISCONTINUED | OUTPATIENT
Start: 2020-05-05 | End: 2020-05-05 | Stop reason: SURG

## 2020-05-05 RX ORDER — SODIUM CHLORIDE 9 MG/ML
50 INJECTION, SOLUTION INTRAVENOUS CONTINUOUS
Status: DISCONTINUED | OUTPATIENT
Start: 2020-05-05 | End: 2020-05-06

## 2020-05-05 RX ORDER — HYDROMORPHONE HCL/PF 1 MG/ML
0.5 SYRINGE (ML) INJECTION
Status: DISCONTINUED | OUTPATIENT
Start: 2020-05-05 | End: 2020-05-05

## 2020-05-05 RX ORDER — BUPIVACAINE HYDROCHLORIDE 5 MG/ML
INJECTION, SOLUTION PERINEURAL AS NEEDED
Status: DISCONTINUED | OUTPATIENT
Start: 2020-05-05 | End: 2020-05-05 | Stop reason: HOSPADM

## 2020-05-05 RX ORDER — DOCUSATE SODIUM 100 MG/1
100 CAPSULE, LIQUID FILLED ORAL 2 TIMES DAILY
Status: DISCONTINUED | OUTPATIENT
Start: 2020-05-05 | End: 2020-05-21 | Stop reason: HOSPADM

## 2020-05-05 RX ORDER — FENTANYL CITRATE/PF 50 MCG/ML
25 SYRINGE (ML) INJECTION
Status: DISCONTINUED | OUTPATIENT
Start: 2020-05-05 | End: 2020-05-05

## 2020-05-05 RX ORDER — SODIUM CHLORIDE 9 MG/ML
INJECTION, SOLUTION INTRAVENOUS CONTINUOUS PRN
Status: DISCONTINUED | OUTPATIENT
Start: 2020-05-05 | End: 2020-05-05 | Stop reason: SURG

## 2020-05-05 RX ORDER — HEPARIN SODIUM 10000 [USP'U]/100ML
3-30 INJECTION, SOLUTION INTRAVENOUS
Status: DISCONTINUED | OUTPATIENT
Start: 2020-05-05 | End: 2020-05-10

## 2020-05-05 RX ORDER — HEPARIN SODIUM 10000 [USP'U]/100ML
3-30 INJECTION, SOLUTION INTRAVENOUS
Status: DISCONTINUED | OUTPATIENT
Start: 2020-05-05 | End: 2020-05-05

## 2020-05-05 RX ORDER — CEFAZOLIN SODIUM 1 G/3ML
INJECTION, POWDER, FOR SOLUTION INTRAMUSCULAR; INTRAVENOUS AS NEEDED
Status: DISCONTINUED | OUTPATIENT
Start: 2020-05-05 | End: 2020-05-05 | Stop reason: SURG

## 2020-05-05 RX ORDER — HEPARIN SODIUM 1000 [USP'U]/ML
5200 INJECTION, SOLUTION INTRAVENOUS; SUBCUTANEOUS
Status: DISCONTINUED | OUTPATIENT
Start: 2020-05-05 | End: 2020-05-10

## 2020-05-05 RX ORDER — HEPARIN SODIUM 1000 [USP'U]/ML
5200 INJECTION, SOLUTION INTRAVENOUS; SUBCUTANEOUS
Status: DISCONTINUED | OUTPATIENT
Start: 2020-05-05 | End: 2020-05-05

## 2020-05-05 RX ORDER — HYDROMORPHONE HCL/PF 1 MG/ML
1 SYRINGE (ML) INJECTION EVERY 4 HOURS PRN
Status: DISCONTINUED | OUTPATIENT
Start: 2020-05-05 | End: 2020-05-08

## 2020-05-05 RX ORDER — SODIUM CHLORIDE, SODIUM LACTATE, POTASSIUM CHLORIDE, CALCIUM CHLORIDE 600; 310; 30; 20 MG/100ML; MG/100ML; MG/100ML; MG/100ML
100 INJECTION, SOLUTION INTRAVENOUS CONTINUOUS
Status: DISCONTINUED | OUTPATIENT
Start: 2020-05-05 | End: 2020-05-06

## 2020-05-05 RX ORDER — HEPARIN SODIUM 1000 [USP'U]/ML
2600 INJECTION, SOLUTION INTRAVENOUS; SUBCUTANEOUS
Status: DISCONTINUED | OUTPATIENT
Start: 2020-05-05 | End: 2020-05-05

## 2020-05-05 RX ORDER — LIDOCAINE HYDROCHLORIDE 10 MG/ML
INJECTION, SOLUTION EPIDURAL; INFILTRATION; INTRACAUDAL; PERINEURAL AS NEEDED
Status: DISCONTINUED | OUTPATIENT
Start: 2020-05-05 | End: 2020-05-05 | Stop reason: SURG

## 2020-05-05 RX ORDER — ONDANSETRON 2 MG/ML
4 INJECTION INTRAMUSCULAR; INTRAVENOUS ONCE AS NEEDED
Status: DISCONTINUED | OUTPATIENT
Start: 2020-05-05 | End: 2020-05-05

## 2020-05-05 RX ORDER — ALBUTEROL SULFATE 2.5 MG/3ML
2.5 SOLUTION RESPIRATORY (INHALATION) EVERY 4 HOURS PRN
Status: DISCONTINUED | OUTPATIENT
Start: 2020-05-05 | End: 2020-05-05

## 2020-05-05 RX ORDER — FENTANYL CITRATE 50 UG/ML
INJECTION, SOLUTION INTRAMUSCULAR; INTRAVENOUS AS NEEDED
Status: DISCONTINUED | OUTPATIENT
Start: 2020-05-05 | End: 2020-05-05 | Stop reason: SURG

## 2020-05-05 RX ORDER — SUCCINYLCHOLINE/SOD CL,ISO/PF 100 MG/5ML
SYRINGE (ML) INTRAVENOUS AS NEEDED
Status: DISCONTINUED | OUTPATIENT
Start: 2020-05-05 | End: 2020-05-05 | Stop reason: SURG

## 2020-05-05 RX ORDER — MIDAZOLAM HYDROCHLORIDE 2 MG/2ML
INJECTION, SOLUTION INTRAMUSCULAR; INTRAVENOUS AS NEEDED
Status: DISCONTINUED | OUTPATIENT
Start: 2020-05-05 | End: 2020-05-05 | Stop reason: SURG

## 2020-05-05 RX ORDER — HEPARIN SODIUM 1000 [USP'U]/ML
2600 INJECTION, SOLUTION INTRAVENOUS; SUBCUTANEOUS
Status: DISCONTINUED | OUTPATIENT
Start: 2020-05-05 | End: 2020-05-10

## 2020-05-05 RX ORDER — SODIUM CHLORIDE, SODIUM LACTATE, POTASSIUM CHLORIDE, CALCIUM CHLORIDE 600; 310; 30; 20 MG/100ML; MG/100ML; MG/100ML; MG/100ML
INJECTION, SOLUTION INTRAVENOUS CONTINUOUS PRN
Status: DISCONTINUED | OUTPATIENT
Start: 2020-05-05 | End: 2020-05-05

## 2020-05-05 RX ORDER — METOPROLOL TARTRATE 50 MG/1
50 TABLET, FILM COATED ORAL EVERY 12 HOURS SCHEDULED
Status: DISCONTINUED | OUTPATIENT
Start: 2020-05-05 | End: 2020-05-11

## 2020-05-05 RX ADMIN — PHENYLEPHRINE HYDROCHLORIDE 200 MCG: 10 INJECTION INTRAVENOUS at 14:08

## 2020-05-05 RX ADMIN — OXYCODONE HYDROCHLORIDE 10 MG: 10 TABLET ORAL at 21:01

## 2020-05-05 RX ADMIN — HEPARIN SODIUM AND DEXTROSE 18 UNITS/KG/HR: 10000; 5 INJECTION INTRAVENOUS at 20:57

## 2020-05-05 RX ADMIN — BACLOFEN 20 MG: 10 TABLET ORAL at 21:00

## 2020-05-05 RX ADMIN — PHENYLEPHRINE HYDROCHLORIDE 100 MCG: 10 INJECTION INTRAVENOUS at 14:05

## 2020-05-05 RX ADMIN — CEFAZOLIN 1000 MG: 1 INJECTION, POWDER, FOR SOLUTION INTRAVENOUS at 14:05

## 2020-05-05 RX ADMIN — FENTANYL CITRATE 25 MCG: 50 INJECTION INTRAMUSCULAR; INTRAVENOUS at 15:45

## 2020-05-05 RX ADMIN — BACLOFEN 20 MG: 10 TABLET ORAL at 16:51

## 2020-05-05 RX ADMIN — Medication 100 MG: at 14:02

## 2020-05-05 RX ADMIN — FENTANYL CITRATE 25 MCG: 50 INJECTION INTRAMUSCULAR; INTRAVENOUS at 15:41

## 2020-05-05 RX ADMIN — DEXAMETHASONE SODIUM PHOSPHATE 10 MG: 10 INJECTION, SOLUTION INTRAMUSCULAR; INTRAVENOUS at 14:19

## 2020-05-05 RX ADMIN — HYDROMORPHONE HYDROCHLORIDE 0.5 MG: 1 INJECTION, SOLUTION INTRAMUSCULAR; INTRAVENOUS; SUBCUTANEOUS at 10:21

## 2020-05-05 RX ADMIN — OXYCODONE HYDROCHLORIDE 10 MG: 10 TABLET ORAL at 06:21

## 2020-05-05 RX ADMIN — OXYCODONE HYDROCHLORIDE 10 MG: 10 TABLET ORAL at 16:43

## 2020-05-05 RX ADMIN — FENTANYL CITRATE 50 MCG: 50 INJECTION, SOLUTION INTRAMUSCULAR; INTRAVENOUS at 14:16

## 2020-05-05 RX ADMIN — SODIUM CHLORIDE 50 ML/HR: 0.9 INJECTION, SOLUTION INTRAVENOUS at 16:04

## 2020-05-05 RX ADMIN — HYDROMORPHONE HYDROCHLORIDE 1 MG: 1 INJECTION, SOLUTION INTRAMUSCULAR; INTRAVENOUS; SUBCUTANEOUS at 17:17

## 2020-05-05 RX ADMIN — PROPOFOL 50 MG: 10 INJECTION, EMULSION INTRAVENOUS at 14:43

## 2020-05-05 RX ADMIN — FENTANYL CITRATE 50 MCG: 50 INJECTION, SOLUTION INTRAMUSCULAR; INTRAVENOUS at 15:27

## 2020-05-05 RX ADMIN — FENTANYL CITRATE 50 MCG: 50 INJECTION, SOLUTION INTRAMUSCULAR; INTRAVENOUS at 14:43

## 2020-05-05 RX ADMIN — OXYCODONE HYDROCHLORIDE 5 MG: 5 TABLET ORAL at 02:16

## 2020-05-05 RX ADMIN — SODIUM CHLORIDE: 9 INJECTION, SOLUTION INTRAVENOUS at 14:03

## 2020-05-05 RX ADMIN — Medication 25 MG: at 14:37

## 2020-05-05 RX ADMIN — FENTANYL CITRATE 25 MCG: 50 INJECTION INTRAMUSCULAR; INTRAVENOUS at 15:48

## 2020-05-05 RX ADMIN — MIDAZOLAM 2 MG: 1 INJECTION INTRAMUSCULAR; INTRAVENOUS at 13:47

## 2020-05-05 RX ADMIN — FENTANYL CITRATE 25 MCG: 50 INJECTION INTRAMUSCULAR; INTRAVENOUS at 15:52

## 2020-05-05 RX ADMIN — SODIUM CHLORIDE, SODIUM LACTATE, POTASSIUM CHLORIDE, AND CALCIUM CHLORIDE: .6; .31; .03; .02 INJECTION, SOLUTION INTRAVENOUS at 13:31

## 2020-05-05 RX ADMIN — AMANTADINE HYDROCHLORIDE 100 MG: 100 CAPSULE, LIQUID FILLED ORAL at 17:18

## 2020-05-05 RX ADMIN — ALBUMIN (HUMAN): 12.5 SOLUTION INTRAVENOUS at 14:30

## 2020-05-05 RX ADMIN — PROPOFOL 150 MG: 10 INJECTION, EMULSION INTRAVENOUS at 14:02

## 2020-05-05 RX ADMIN — ALBUMIN (HUMAN): 12.5 SOLUTION INTRAVENOUS at 14:05

## 2020-05-05 RX ADMIN — FENTANYL CITRATE 50 MCG: 50 INJECTION, SOLUTION INTRAMUSCULAR; INTRAVENOUS at 14:00

## 2020-05-05 RX ADMIN — GABAPENTIN 300 MG: 300 CAPSULE ORAL at 21:00

## 2020-05-05 RX ADMIN — PHENYLEPHRINE HYDROCHLORIDE 100 MCG: 10 INJECTION INTRAVENOUS at 14:01

## 2020-05-05 RX ADMIN — ONDANSETRON 4 MG: 2 INJECTION INTRAMUSCULAR; INTRAVENOUS at 15:12

## 2020-05-05 RX ADMIN — PHENYLEPHRINE HYDROCHLORIDE 50 MCG/MIN: 10 INJECTION INTRAVENOUS at 14:08

## 2020-05-05 RX ADMIN — LIDOCAINE HYDROCHLORIDE 50 MG: 10 INJECTION, SOLUTION EPIDURAL; INFILTRATION; INTRACAUDAL; PERINEURAL at 14:02

## 2020-05-05 RX ADMIN — PHENYLEPHRINE HYDROCHLORIDE 100 MCG: 10 INJECTION INTRAVENOUS at 15:00

## 2020-05-06 ENCOUNTER — APPOINTMENT (INPATIENT)
Dept: NON INVASIVE DIAGNOSTICS | Facility: HOSPITAL | Age: 60
DRG: 239 | End: 2020-05-06
Payer: COMMERCIAL

## 2020-05-06 PROBLEM — R50.9 FEVER: Status: RESOLVED | Noted: 2020-05-05 | Resolved: 2020-05-06

## 2020-05-06 LAB
ABO GROUP BLD BPU: NORMAL
ABO GROUP BLD BPU: NORMAL
ANION GAP SERPL CALCULATED.3IONS-SCNC: 6 MMOL/L (ref 4–13)
APTT PPP: 57 SECONDS (ref 23–37)
APTT PPP: 59 SECONDS (ref 23–37)
BPU ID: NORMAL
BPU ID: NORMAL
BUN SERPL-MCNC: 19 MG/DL (ref 5–25)
CALCIUM SERPL-MCNC: 8.7 MG/DL (ref 8.3–10.1)
CHLORIDE SERPL-SCNC: 109 MMOL/L (ref 100–108)
CO2 SERPL-SCNC: 24 MMOL/L (ref 21–32)
CREAT SERPL-MCNC: 0.79 MG/DL (ref 0.6–1.3)
CROSSMATCH: NORMAL
CROSSMATCH: NORMAL
ERYTHROCYTE [DISTWIDTH] IN BLOOD BY AUTOMATED COUNT: 14.2 % (ref 11.6–15.1)
GFR SERPL CREATININE-BSD FRML MDRD: 82 ML/MIN/1.73SQ M
GLUCOSE SERPL-MCNC: 186 MG/DL (ref 65–140)
HCT VFR BLD AUTO: 26.2 % (ref 34.8–46.1)
HGB BLD-MCNC: 8.2 G/DL (ref 11.5–15.4)
MCH RBC QN AUTO: 28.1 PG (ref 26.8–34.3)
MCHC RBC AUTO-ENTMCNC: 31.3 G/DL (ref 31.4–37.4)
MCV RBC AUTO: 90 FL (ref 82–98)
PLATELET # BLD AUTO: 243 THOUSANDS/UL (ref 149–390)
PMV BLD AUTO: 12.3 FL (ref 8.9–12.7)
POTASSIUM SERPL-SCNC: 4.4 MMOL/L (ref 3.5–5.3)
RBC # BLD AUTO: 2.92 MILLION/UL (ref 3.81–5.12)
SODIUM SERPL-SCNC: 139 MMOL/L (ref 136–145)
UNIT DISPENSE STATUS: NORMAL
UNIT DISPENSE STATUS: NORMAL
UNIT PRODUCT CODE: NORMAL
UNIT PRODUCT CODE: NORMAL
UNIT RH: NORMAL
UNIT RH: NORMAL
WBC # BLD AUTO: 6.51 THOUSAND/UL (ref 4.31–10.16)

## 2020-05-06 PROCEDURE — 85027 COMPLETE CBC AUTOMATED: CPT | Performed by: INTERNAL MEDICINE

## 2020-05-06 PROCEDURE — 99232 SBSQ HOSP IP/OBS MODERATE 35: CPT | Performed by: INTERNAL MEDICINE

## 2020-05-06 PROCEDURE — 93306 TTE W/DOPPLER COMPLETE: CPT

## 2020-05-06 PROCEDURE — 99232 SBSQ HOSP IP/OBS MODERATE 35: CPT | Performed by: PHYSICIAN ASSISTANT

## 2020-05-06 PROCEDURE — 99024 POSTOP FOLLOW-UP VISIT: CPT | Performed by: SURGERY

## 2020-05-06 PROCEDURE — 99221 1ST HOSP IP/OBS SF/LOW 40: CPT | Performed by: INTERNAL MEDICINE

## 2020-05-06 PROCEDURE — 93306 TTE W/DOPPLER COMPLETE: CPT | Performed by: INTERNAL MEDICINE

## 2020-05-06 PROCEDURE — 85730 THROMBOPLASTIN TIME PARTIAL: CPT | Performed by: SURGERY

## 2020-05-06 PROCEDURE — 80048 BASIC METABOLIC PNL TOTAL CA: CPT | Performed by: INTERNAL MEDICINE

## 2020-05-06 RX ORDER — GABAPENTIN 100 MG/1
100 CAPSULE ORAL 2 TIMES DAILY
Status: DISCONTINUED | OUTPATIENT
Start: 2020-05-06 | End: 2020-05-09

## 2020-05-06 RX ORDER — GABAPENTIN 300 MG/1
300 CAPSULE ORAL
Status: DISCONTINUED | OUTPATIENT
Start: 2020-05-06 | End: 2020-05-09

## 2020-05-06 RX ORDER — GABAPENTIN 300 MG/1
300 CAPSULE ORAL 2 TIMES DAILY
Status: DISCONTINUED | OUTPATIENT
Start: 2020-05-06 | End: 2020-05-06

## 2020-05-06 RX ADMIN — HEPARIN SODIUM 2600 UNITS: 1000 INJECTION INTRAVENOUS; SUBCUTANEOUS at 05:15

## 2020-05-06 RX ADMIN — BACLOFEN 20 MG: 10 TABLET ORAL at 21:17

## 2020-05-06 RX ADMIN — METOPROLOL TARTRATE 50 MG: 50 TABLET, FILM COATED ORAL at 21:17

## 2020-05-06 RX ADMIN — HEPARIN SODIUM AND DEXTROSE 20 UNITS/KG/HR: 10000; 5 INJECTION INTRAVENOUS at 16:44

## 2020-05-06 RX ADMIN — HYDROMORPHONE HYDROCHLORIDE 1 MG: 1 INJECTION, SOLUTION INTRAMUSCULAR; INTRAVENOUS; SUBCUTANEOUS at 10:15

## 2020-05-06 RX ADMIN — GABAPENTIN 100 MG: 100 CAPSULE ORAL at 17:23

## 2020-05-06 RX ADMIN — OXYCODONE HYDROCHLORIDE 10 MG: 10 TABLET ORAL at 07:59

## 2020-05-06 RX ADMIN — AMANTADINE HYDROCHLORIDE 100 MG: 100 CAPSULE, LIQUID FILLED ORAL at 17:23

## 2020-05-06 RX ADMIN — DOCUSATE SODIUM 100 MG: 100 CAPSULE, LIQUID FILLED ORAL at 17:23

## 2020-05-06 RX ADMIN — AMANTADINE HYDROCHLORIDE 100 MG: 100 CAPSULE, LIQUID FILLED ORAL at 08:01

## 2020-05-06 RX ADMIN — OXYCODONE HYDROCHLORIDE 10 MG: 10 TABLET ORAL at 23:42

## 2020-05-06 RX ADMIN — DOCUSATE SODIUM 100 MG: 100 CAPSULE, LIQUID FILLED ORAL at 08:00

## 2020-05-06 RX ADMIN — GABAPENTIN 300 MG: 300 CAPSULE ORAL at 21:17

## 2020-05-06 RX ADMIN — BACLOFEN 20 MG: 10 TABLET ORAL at 08:00

## 2020-05-06 RX ADMIN — METOPROLOL TARTRATE 50 MG: 50 TABLET, FILM COATED ORAL at 08:00

## 2020-05-06 RX ADMIN — BACLOFEN 20 MG: 10 TABLET ORAL at 17:23

## 2020-05-07 ENCOUNTER — APPOINTMENT (INPATIENT)
Dept: RADIOLOGY | Facility: HOSPITAL | Age: 60
DRG: 239 | End: 2020-05-07
Payer: COMMERCIAL

## 2020-05-07 LAB
ANION GAP SERPL CALCULATED.3IONS-SCNC: 7 MMOL/L (ref 4–13)
APTT PPP: 57 SECONDS (ref 23–37)
APTT PPP: 67 SECONDS (ref 23–37)
APTT PPP: 71 SECONDS (ref 23–37)
BUN SERPL-MCNC: 17 MG/DL (ref 5–25)
CALCIUM SERPL-MCNC: 9.2 MG/DL (ref 8.3–10.1)
CHLORIDE SERPL-SCNC: 108 MMOL/L (ref 100–108)
CO2 SERPL-SCNC: 24 MMOL/L (ref 21–32)
CREAT SERPL-MCNC: 0.75 MG/DL (ref 0.6–1.3)
ERYTHROCYTE [DISTWIDTH] IN BLOOD BY AUTOMATED COUNT: 14.2 % (ref 11.6–15.1)
GFR SERPL CREATININE-BSD FRML MDRD: 88 ML/MIN/1.73SQ M
GLUCOSE SERPL-MCNC: 96 MG/DL (ref 65–140)
HCT VFR BLD AUTO: 25.6 % (ref 34.8–46.1)
HGB BLD-MCNC: 7.9 G/DL (ref 11.5–15.4)
MCH RBC QN AUTO: 27.4 PG (ref 26.8–34.3)
MCHC RBC AUTO-ENTMCNC: 30.9 G/DL (ref 31.4–37.4)
MCV RBC AUTO: 89 FL (ref 82–98)
PLATELET # BLD AUTO: 261 THOUSANDS/UL (ref 149–390)
PMV BLD AUTO: 11.9 FL (ref 8.9–12.7)
POTASSIUM SERPL-SCNC: 4 MMOL/L (ref 3.5–5.3)
RBC # BLD AUTO: 2.88 MILLION/UL (ref 3.81–5.12)
SODIUM SERPL-SCNC: 139 MMOL/L (ref 136–145)
WBC # BLD AUTO: 9.36 THOUSAND/UL (ref 4.31–10.16)

## 2020-05-07 PROCEDURE — 99232 SBSQ HOSP IP/OBS MODERATE 35: CPT | Performed by: PHYSICIAN ASSISTANT

## 2020-05-07 PROCEDURE — 85730 THROMBOPLASTIN TIME PARTIAL: CPT | Performed by: INTERNAL MEDICINE

## 2020-05-07 PROCEDURE — 80048 BASIC METABOLIC PNL TOTAL CA: CPT | Performed by: PHYSICIAN ASSISTANT

## 2020-05-07 PROCEDURE — 74174 CTA ABD&PLVS W/CONTRAST: CPT

## 2020-05-07 PROCEDURE — 71275 CT ANGIOGRAPHY CHEST: CPT

## 2020-05-07 PROCEDURE — 85027 COMPLETE CBC AUTOMATED: CPT | Performed by: PHYSICIAN ASSISTANT

## 2020-05-07 PROCEDURE — 99024 POSTOP FOLLOW-UP VISIT: CPT | Performed by: PHYSICIAN ASSISTANT

## 2020-05-07 PROCEDURE — 99232 SBSQ HOSP IP/OBS MODERATE 35: CPT | Performed by: INTERNAL MEDICINE

## 2020-05-07 RX ORDER — HYDROCHLOROTHIAZIDE 12.5 MG/1
12.5 TABLET ORAL DAILY
Status: DISCONTINUED | OUTPATIENT
Start: 2020-05-07 | End: 2020-05-17

## 2020-05-07 RX ADMIN — GABAPENTIN 100 MG: 100 CAPSULE ORAL at 08:12

## 2020-05-07 RX ADMIN — IOHEXOL 100 ML: 350 INJECTION, SOLUTION INTRAVENOUS at 12:11

## 2020-05-07 RX ADMIN — METOPROLOL TARTRATE 50 MG: 50 TABLET, FILM COATED ORAL at 08:13

## 2020-05-07 RX ADMIN — OXYCODONE HYDROCHLORIDE 10 MG: 10 TABLET ORAL at 22:47

## 2020-05-07 RX ADMIN — GABAPENTIN 100 MG: 100 CAPSULE ORAL at 17:16

## 2020-05-07 RX ADMIN — GABAPENTIN 300 MG: 300 CAPSULE ORAL at 22:03

## 2020-05-07 RX ADMIN — OXYCODONE HYDROCHLORIDE 10 MG: 10 TABLET ORAL at 07:41

## 2020-05-07 RX ADMIN — HYDROMORPHONE HYDROCHLORIDE 1 MG: 1 INJECTION, SOLUTION INTRAMUSCULAR; INTRAVENOUS; SUBCUTANEOUS at 23:48

## 2020-05-07 RX ADMIN — BACLOFEN 20 MG: 10 TABLET ORAL at 17:16

## 2020-05-07 RX ADMIN — DOCUSATE SODIUM 100 MG: 100 CAPSULE, LIQUID FILLED ORAL at 17:17

## 2020-05-07 RX ADMIN — AMANTADINE HYDROCHLORIDE 100 MG: 100 CAPSULE, LIQUID FILLED ORAL at 08:13

## 2020-05-07 RX ADMIN — HYDROCHLOROTHIAZIDE 12.5 MG: 12.5 TABLET ORAL at 12:22

## 2020-05-07 RX ADMIN — OXYCODONE HYDROCHLORIDE 10 MG: 10 TABLET ORAL at 12:24

## 2020-05-07 RX ADMIN — OXYCODONE HYDROCHLORIDE 10 MG: 10 TABLET ORAL at 18:17

## 2020-05-07 RX ADMIN — DOCUSATE SODIUM 100 MG: 100 CAPSULE, LIQUID FILLED ORAL at 08:12

## 2020-05-07 RX ADMIN — AMANTADINE HYDROCHLORIDE 100 MG: 100 CAPSULE, LIQUID FILLED ORAL at 17:17

## 2020-05-07 RX ADMIN — HEPARIN SODIUM 2600 UNITS: 1000 INJECTION INTRAVENOUS; SUBCUTANEOUS at 13:20

## 2020-05-07 RX ADMIN — BACLOFEN 20 MG: 10 TABLET ORAL at 22:02

## 2020-05-07 RX ADMIN — BACLOFEN 20 MG: 10 TABLET ORAL at 08:12

## 2020-05-08 PROBLEM — Z89.611 S/P AKA (ABOVE KNEE AMPUTATION) UNILATERAL, RIGHT (HCC): Status: ACTIVE | Noted: 2020-05-08

## 2020-05-08 LAB
ANION GAP SERPL CALCULATED.3IONS-SCNC: 5 MMOL/L (ref 4–13)
APTT PPP: 64 SECONDS (ref 23–37)
APTT PPP: 65 SECONDS (ref 23–37)
BASOPHILS # BLD AUTO: 0.04 THOUSANDS/ΜL (ref 0–0.1)
BASOPHILS NFR BLD AUTO: 0 % (ref 0–1)
BUN SERPL-MCNC: 16 MG/DL (ref 5–25)
CALCIUM SERPL-MCNC: 9.4 MG/DL (ref 8.3–10.1)
CHLORIDE SERPL-SCNC: 102 MMOL/L (ref 100–108)
CO2 SERPL-SCNC: 26 MMOL/L (ref 21–32)
CREAT SERPL-MCNC: 0.8 MG/DL (ref 0.6–1.3)
EOSINOPHIL # BLD AUTO: 0.04 THOUSAND/ΜL (ref 0–0.61)
EOSINOPHIL NFR BLD AUTO: 0 % (ref 0–6)
ERYTHROCYTE [DISTWIDTH] IN BLOOD BY AUTOMATED COUNT: 14.5 % (ref 11.6–15.1)
GFR SERPL CREATININE-BSD FRML MDRD: 81 ML/MIN/1.73SQ M
GLUCOSE SERPL-MCNC: 102 MG/DL (ref 65–140)
HCT VFR BLD AUTO: 27.1 % (ref 34.8–46.1)
HGB BLD-MCNC: 8.2 G/DL (ref 11.5–15.4)
IMM GRANULOCYTES # BLD AUTO: 0.06 THOUSAND/UL (ref 0–0.2)
IMM GRANULOCYTES NFR BLD AUTO: 1 % (ref 0–2)
LYMPHOCYTES # BLD AUTO: 3.05 THOUSANDS/ΜL (ref 0.6–4.47)
LYMPHOCYTES NFR BLD AUTO: 34 % (ref 14–44)
MCH RBC QN AUTO: 27.2 PG (ref 26.8–34.3)
MCHC RBC AUTO-ENTMCNC: 30.3 G/DL (ref 31.4–37.4)
MCV RBC AUTO: 90 FL (ref 82–98)
MONOCYTES # BLD AUTO: 0.75 THOUSAND/ΜL (ref 0.17–1.22)
MONOCYTES NFR BLD AUTO: 8 % (ref 4–12)
NEUTROPHILS # BLD AUTO: 4.99 THOUSANDS/ΜL (ref 1.85–7.62)
NEUTS SEG NFR BLD AUTO: 57 % (ref 43–75)
NRBC BLD AUTO-RTO: 0 /100 WBCS
PLATELET # BLD AUTO: 283 THOUSANDS/UL (ref 149–390)
PMV BLD AUTO: 11.6 FL (ref 8.9–12.7)
POTASSIUM SERPL-SCNC: 4.3 MMOL/L (ref 3.5–5.3)
RBC # BLD AUTO: 3.02 MILLION/UL (ref 3.81–5.12)
SODIUM SERPL-SCNC: 133 MMOL/L (ref 136–145)
WBC # BLD AUTO: 8.93 THOUSAND/UL (ref 4.31–10.16)

## 2020-05-08 PROCEDURE — 99232 SBSQ HOSP IP/OBS MODERATE 35: CPT | Performed by: PHYSICIAN ASSISTANT

## 2020-05-08 PROCEDURE — 80048 BASIC METABOLIC PNL TOTAL CA: CPT | Performed by: PHYSICIAN ASSISTANT

## 2020-05-08 PROCEDURE — 99024 POSTOP FOLLOW-UP VISIT: CPT | Performed by: SURGERY

## 2020-05-08 PROCEDURE — 99223 1ST HOSP IP/OBS HIGH 75: CPT | Performed by: PHYSICAL MEDICINE & REHABILITATION

## 2020-05-08 PROCEDURE — 97163 PT EVAL HIGH COMPLEX 45 MIN: CPT

## 2020-05-08 PROCEDURE — 97167 OT EVAL HIGH COMPLEX 60 MIN: CPT

## 2020-05-08 PROCEDURE — 85025 COMPLETE CBC W/AUTO DIFF WBC: CPT | Performed by: PHYSICIAN ASSISTANT

## 2020-05-08 PROCEDURE — 85730 THROMBOPLASTIN TIME PARTIAL: CPT | Performed by: INTERNAL MEDICINE

## 2020-05-08 RX ORDER — SODIUM CHLORIDE, SODIUM GLUCONATE, SODIUM ACETATE, POTASSIUM CHLORIDE, MAGNESIUM CHLORIDE, SODIUM PHOSPHATE, DIBASIC, AND POTASSIUM PHOSPHATE .53; .5; .37; .037; .03; .012; .00082 G/100ML; G/100ML; G/100ML; G/100ML; G/100ML; G/100ML; G/100ML
500 INJECTION, SOLUTION INTRAVENOUS ONCE
Status: COMPLETED | OUTPATIENT
Start: 2020-05-08 | End: 2020-05-09

## 2020-05-08 RX ORDER — HYDROMORPHONE HCL/PF 1 MG/ML
0.5 SYRINGE (ML) INJECTION EVERY 4 HOURS PRN
Status: DISCONTINUED | OUTPATIENT
Start: 2020-05-08 | End: 2020-05-10

## 2020-05-08 RX ORDER — OXYCODONE HYDROCHLORIDE 5 MG/1
5 TABLET ORAL EVERY 6 HOURS PRN
Status: DISCONTINUED | OUTPATIENT
Start: 2020-05-08 | End: 2020-05-21 | Stop reason: HOSPADM

## 2020-05-08 RX ORDER — ACETAMINOPHEN 325 MG/1
975 TABLET ORAL EVERY 8 HOURS SCHEDULED
Status: DISCONTINUED | OUTPATIENT
Start: 2020-05-08 | End: 2020-05-21 | Stop reason: HOSPADM

## 2020-05-08 RX ORDER — OXYCODONE HYDROCHLORIDE 10 MG/1
10 TABLET ORAL EVERY 6 HOURS PRN
Status: DISCONTINUED | OUTPATIENT
Start: 2020-05-08 | End: 2020-05-21 | Stop reason: HOSPADM

## 2020-05-08 RX ADMIN — AMANTADINE HYDROCHLORIDE 100 MG: 100 CAPSULE, LIQUID FILLED ORAL at 17:35

## 2020-05-08 RX ADMIN — BACLOFEN 20 MG: 10 TABLET ORAL at 09:45

## 2020-05-08 RX ADMIN — ACETAMINOPHEN 975 MG: 325 TABLET ORAL at 22:03

## 2020-05-08 RX ADMIN — ACETAMINOPHEN 650 MG: 325 TABLET ORAL at 12:04

## 2020-05-08 RX ADMIN — HYDROMORPHONE HYDROCHLORIDE 1 MG: 1 INJECTION, SOLUTION INTRAMUSCULAR; INTRAVENOUS; SUBCUTANEOUS at 03:58

## 2020-05-08 RX ADMIN — SODIUM CHLORIDE, SODIUM GLUCONATE, SODIUM ACETATE, POTASSIUM CHLORIDE, MAGNESIUM CHLORIDE, SODIUM PHOSPHATE, DIBASIC, AND POTASSIUM PHOSPHATE 500 ML: .53; .5; .37; .037; .03; .012; .00082 INJECTION, SOLUTION INTRAVENOUS at 22:40

## 2020-05-08 RX ADMIN — BACLOFEN 20 MG: 10 TABLET ORAL at 22:03

## 2020-05-08 RX ADMIN — OXYCODONE HYDROCHLORIDE 10 MG: 10 TABLET ORAL at 18:25

## 2020-05-08 RX ADMIN — GABAPENTIN 300 MG: 300 CAPSULE ORAL at 22:03

## 2020-05-08 RX ADMIN — OXYCODONE HYDROCHLORIDE 10 MG: 10 TABLET ORAL at 06:45

## 2020-05-08 RX ADMIN — BACLOFEN 20 MG: 10 TABLET ORAL at 17:34

## 2020-05-08 RX ADMIN — OXYCODONE HYDROCHLORIDE 5 MG: 5 TABLET ORAL at 17:41

## 2020-05-08 RX ADMIN — DOCUSATE SODIUM 100 MG: 100 CAPSULE, LIQUID FILLED ORAL at 17:34

## 2020-05-08 RX ADMIN — DOCUSATE SODIUM 100 MG: 100 CAPSULE, LIQUID FILLED ORAL at 09:45

## 2020-05-08 RX ADMIN — HEPARIN SODIUM AND DEXTROSE 24 UNITS/KG/HR: 10000; 5 INJECTION INTRAVENOUS at 23:13

## 2020-05-08 RX ADMIN — GABAPENTIN 100 MG: 100 CAPSULE ORAL at 17:34

## 2020-05-08 RX ADMIN — AMANTADINE HYDROCHLORIDE 100 MG: 100 CAPSULE, LIQUID FILLED ORAL at 09:46

## 2020-05-08 RX ADMIN — OXYCODONE HYDROCHLORIDE 10 MG: 10 TABLET ORAL at 02:51

## 2020-05-08 RX ADMIN — HEPARIN SODIUM AND DEXTROSE 24 UNITS/KG/HR: 10000; 5 INJECTION INTRAVENOUS at 05:58

## 2020-05-08 RX ADMIN — GABAPENTIN 100 MG: 100 CAPSULE ORAL at 09:45

## 2020-05-09 LAB
ANION GAP SERPL CALCULATED.3IONS-SCNC: 7 MMOL/L (ref 4–13)
APTT PPP: 50 SECONDS (ref 23–37)
APTT PPP: 62 SECONDS (ref 23–37)
APTT PPP: >210 SECONDS (ref 23–37)
BASOPHILS # BLD AUTO: 0.04 THOUSANDS/ΜL (ref 0–0.1)
BASOPHILS NFR BLD AUTO: 1 % (ref 0–1)
BUN SERPL-MCNC: 21 MG/DL (ref 5–25)
CALCIUM SERPL-MCNC: 9.3 MG/DL (ref 8.3–10.1)
CHLORIDE SERPL-SCNC: 100 MMOL/L (ref 100–108)
CO2 SERPL-SCNC: 26 MMOL/L (ref 21–32)
CREAT SERPL-MCNC: 0.87 MG/DL (ref 0.6–1.3)
EOSINOPHIL # BLD AUTO: 0.09 THOUSAND/ΜL (ref 0–0.61)
EOSINOPHIL NFR BLD AUTO: 1 % (ref 0–6)
ERYTHROCYTE [DISTWIDTH] IN BLOOD BY AUTOMATED COUNT: 14.3 % (ref 11.6–15.1)
GFR SERPL CREATININE-BSD FRML MDRD: 73 ML/MIN/1.73SQ M
GLUCOSE SERPL-MCNC: 205 MG/DL (ref 65–140)
HCT VFR BLD AUTO: 26.6 % (ref 34.8–46.1)
HGB BLD-MCNC: 8.1 G/DL (ref 11.5–15.4)
IMM GRANULOCYTES # BLD AUTO: 0.12 THOUSAND/UL (ref 0–0.2)
IMM GRANULOCYTES NFR BLD AUTO: 1 % (ref 0–2)
LYMPHOCYTES # BLD AUTO: 2.55 THOUSANDS/ΜL (ref 0.6–4.47)
LYMPHOCYTES NFR BLD AUTO: 29 % (ref 14–44)
MCH RBC QN AUTO: 27.7 PG (ref 26.8–34.3)
MCHC RBC AUTO-ENTMCNC: 30.5 G/DL (ref 31.4–37.4)
MCV RBC AUTO: 91 FL (ref 82–98)
MONOCYTES # BLD AUTO: 0.71 THOUSAND/ΜL (ref 0.17–1.22)
MONOCYTES NFR BLD AUTO: 8 % (ref 4–12)
NEUTROPHILS # BLD AUTO: 5.3 THOUSANDS/ΜL (ref 1.85–7.62)
NEUTS SEG NFR BLD AUTO: 60 % (ref 43–75)
NRBC BLD AUTO-RTO: 0 /100 WBCS
PLATELET # BLD AUTO: 250 THOUSANDS/UL (ref 149–390)
PMV BLD AUTO: 11.9 FL (ref 8.9–12.7)
POTASSIUM SERPL-SCNC: 3.8 MMOL/L (ref 3.5–5.3)
RBC # BLD AUTO: 2.92 MILLION/UL (ref 3.81–5.12)
SODIUM SERPL-SCNC: 133 MMOL/L (ref 136–145)
WBC # BLD AUTO: 8.81 THOUSAND/UL (ref 4.31–10.16)

## 2020-05-09 PROCEDURE — 99232 SBSQ HOSP IP/OBS MODERATE 35: CPT | Performed by: PHYSICIAN ASSISTANT

## 2020-05-09 PROCEDURE — 80048 BASIC METABOLIC PNL TOTAL CA: CPT | Performed by: PHYSICIAN ASSISTANT

## 2020-05-09 PROCEDURE — 85025 COMPLETE CBC W/AUTO DIFF WBC: CPT | Performed by: PHYSICIAN ASSISTANT

## 2020-05-09 PROCEDURE — 85730 THROMBOPLASTIN TIME PARTIAL: CPT | Performed by: PHYSICIAN ASSISTANT

## 2020-05-09 PROCEDURE — 85730 THROMBOPLASTIN TIME PARTIAL: CPT | Performed by: INTERNAL MEDICINE

## 2020-05-09 RX ORDER — GABAPENTIN 300 MG/1
300 CAPSULE ORAL 3 TIMES DAILY
Status: DISCONTINUED | OUTPATIENT
Start: 2020-05-09 | End: 2020-05-21 | Stop reason: HOSPADM

## 2020-05-09 RX ADMIN — GABAPENTIN 100 MG: 100 CAPSULE ORAL at 09:27

## 2020-05-09 RX ADMIN — HYDROMORPHONE HYDROCHLORIDE 0.5 MG: 1 INJECTION, SOLUTION INTRAMUSCULAR; INTRAVENOUS; SUBCUTANEOUS at 19:50

## 2020-05-09 RX ADMIN — GABAPENTIN 300 MG: 300 CAPSULE ORAL at 21:28

## 2020-05-09 RX ADMIN — ACETAMINOPHEN 975 MG: 325 TABLET ORAL at 21:28

## 2020-05-09 RX ADMIN — ACETAMINOPHEN 975 MG: 325 TABLET ORAL at 05:35

## 2020-05-09 RX ADMIN — BACLOFEN 20 MG: 10 TABLET ORAL at 09:28

## 2020-05-09 RX ADMIN — OXYCODONE HYDROCHLORIDE 10 MG: 10 TABLET ORAL at 18:34

## 2020-05-09 RX ADMIN — BACLOFEN 20 MG: 10 TABLET ORAL at 21:27

## 2020-05-09 RX ADMIN — HYDROCHLOROTHIAZIDE 12.5 MG: 12.5 TABLET ORAL at 09:31

## 2020-05-09 RX ADMIN — OXYCODONE HYDROCHLORIDE 10 MG: 10 TABLET ORAL at 11:50

## 2020-05-09 RX ADMIN — HEPARIN SODIUM AND DEXTROSE 21 UNITS/KG/HR: 10000; 5 INJECTION INTRAVENOUS at 19:34

## 2020-05-09 RX ADMIN — HEPARIN SODIUM 2600 UNITS: 1000 INJECTION INTRAVENOUS; SUBCUTANEOUS at 22:52

## 2020-05-09 RX ADMIN — GABAPENTIN 300 MG: 300 CAPSULE ORAL at 18:34

## 2020-05-09 RX ADMIN — AMANTADINE HYDROCHLORIDE 100 MG: 100 CAPSULE, LIQUID FILLED ORAL at 18:35

## 2020-05-09 RX ADMIN — AMANTADINE HYDROCHLORIDE 100 MG: 100 CAPSULE, LIQUID FILLED ORAL at 09:33

## 2020-05-09 RX ADMIN — BACLOFEN 20 MG: 10 TABLET ORAL at 18:34

## 2020-05-10 LAB
ANION GAP SERPL CALCULATED.3IONS-SCNC: 6 MMOL/L (ref 4–13)
APTT PPP: 70 SECONDS (ref 23–37)
APTT PPP: 82 SECONDS (ref 23–37)
BASOPHILS # BLD AUTO: 0.03 THOUSANDS/ΜL (ref 0–0.1)
BASOPHILS NFR BLD AUTO: 0 % (ref 0–1)
BUN SERPL-MCNC: 23 MG/DL (ref 5–25)
CALCIUM SERPL-MCNC: 9.6 MG/DL (ref 8.3–10.1)
CHLORIDE SERPL-SCNC: 104 MMOL/L (ref 100–108)
CO2 SERPL-SCNC: 27 MMOL/L (ref 21–32)
CREAT SERPL-MCNC: 0.92 MG/DL (ref 0.6–1.3)
EOSINOPHIL # BLD AUTO: 0.2 THOUSAND/ΜL (ref 0–0.61)
EOSINOPHIL NFR BLD AUTO: 3 % (ref 0–6)
ERYTHROCYTE [DISTWIDTH] IN BLOOD BY AUTOMATED COUNT: 14.5 % (ref 11.6–15.1)
GFR SERPL CREATININE-BSD FRML MDRD: 68 ML/MIN/1.73SQ M
GLUCOSE SERPL-MCNC: 98 MG/DL (ref 65–140)
HCT VFR BLD AUTO: 25.9 % (ref 34.8–46.1)
HGB BLD-MCNC: 8 G/DL (ref 11.5–15.4)
IMM GRANULOCYTES # BLD AUTO: 0.09 THOUSAND/UL (ref 0–0.2)
IMM GRANULOCYTES NFR BLD AUTO: 1 % (ref 0–2)
LYMPHOCYTES # BLD AUTO: 2.44 THOUSANDS/ΜL (ref 0.6–4.47)
LYMPHOCYTES NFR BLD AUTO: 30 % (ref 14–44)
MCH RBC QN AUTO: 27.5 PG (ref 26.8–34.3)
MCHC RBC AUTO-ENTMCNC: 30.9 G/DL (ref 31.4–37.4)
MCV RBC AUTO: 89 FL (ref 82–98)
MONOCYTES # BLD AUTO: 0.64 THOUSAND/ΜL (ref 0.17–1.22)
MONOCYTES NFR BLD AUTO: 8 % (ref 4–12)
NEUTROPHILS # BLD AUTO: 4.73 THOUSANDS/ΜL (ref 1.85–7.62)
NEUTS SEG NFR BLD AUTO: 58 % (ref 43–75)
NRBC BLD AUTO-RTO: 0 /100 WBCS
PLATELET # BLD AUTO: 248 THOUSANDS/UL (ref 149–390)
PMV BLD AUTO: 11.5 FL (ref 8.9–12.7)
POTASSIUM SERPL-SCNC: 3.9 MMOL/L (ref 3.5–5.3)
RBC # BLD AUTO: 2.91 MILLION/UL (ref 3.81–5.12)
SODIUM SERPL-SCNC: 137 MMOL/L (ref 136–145)
WBC # BLD AUTO: 8.13 THOUSAND/UL (ref 4.31–10.16)

## 2020-05-10 PROCEDURE — 99232 SBSQ HOSP IP/OBS MODERATE 35: CPT | Performed by: PHYSICIAN ASSISTANT

## 2020-05-10 PROCEDURE — 80048 BASIC METABOLIC PNL TOTAL CA: CPT | Performed by: PHYSICIAN ASSISTANT

## 2020-05-10 PROCEDURE — 85730 THROMBOPLASTIN TIME PARTIAL: CPT | Performed by: INTERNAL MEDICINE

## 2020-05-10 PROCEDURE — 85025 COMPLETE CBC W/AUTO DIFF WBC: CPT | Performed by: PHYSICIAN ASSISTANT

## 2020-05-10 RX ORDER — HYDROMORPHONE HCL/PF 1 MG/ML
0.2 SYRINGE (ML) INJECTION EVERY 4 HOURS PRN
Status: DISCONTINUED | OUTPATIENT
Start: 2020-05-10 | End: 2020-05-14

## 2020-05-10 RX ADMIN — ACETAMINOPHEN 975 MG: 325 TABLET ORAL at 05:42

## 2020-05-10 RX ADMIN — RIVAROXABAN 15 MG: 15 TABLET, FILM COATED ORAL at 17:40

## 2020-05-10 RX ADMIN — GABAPENTIN 300 MG: 300 CAPSULE ORAL at 21:30

## 2020-05-10 RX ADMIN — GABAPENTIN 300 MG: 300 CAPSULE ORAL at 08:37

## 2020-05-10 RX ADMIN — ACETAMINOPHEN 975 MG: 325 TABLET ORAL at 21:30

## 2020-05-10 RX ADMIN — OXYCODONE HYDROCHLORIDE 10 MG: 10 TABLET ORAL at 08:39

## 2020-05-10 RX ADMIN — AMANTADINE HYDROCHLORIDE 100 MG: 100 CAPSULE, LIQUID FILLED ORAL at 08:39

## 2020-05-10 RX ADMIN — AMANTADINE HYDROCHLORIDE 100 MG: 100 CAPSULE, LIQUID FILLED ORAL at 18:08

## 2020-05-10 RX ADMIN — OXYCODONE HYDROCHLORIDE 10 MG: 10 TABLET ORAL at 17:39

## 2020-05-10 RX ADMIN — BACLOFEN 20 MG: 10 TABLET ORAL at 17:39

## 2020-05-10 RX ADMIN — OXYCODONE HYDROCHLORIDE 10 MG: 10 TABLET ORAL at 21:30

## 2020-05-10 RX ADMIN — BACLOFEN 20 MG: 10 TABLET ORAL at 08:47

## 2020-05-10 RX ADMIN — GABAPENTIN 300 MG: 300 CAPSULE ORAL at 17:39

## 2020-05-10 RX ADMIN — HYDROMORPHONE HYDROCHLORIDE 0.5 MG: 1 INJECTION, SOLUTION INTRAMUSCULAR; INTRAVENOUS; SUBCUTANEOUS at 11:24

## 2020-05-10 RX ADMIN — BACLOFEN 20 MG: 10 TABLET ORAL at 21:30

## 2020-05-11 ENCOUNTER — ANESTHESIA EVENT (INPATIENT)
Dept: ANESTHESIOLOGY | Facility: HOSPITAL | Age: 60
DRG: 239 | End: 2020-05-11
Payer: COMMERCIAL

## 2020-05-11 ENCOUNTER — ANESTHESIA (INPATIENT)
Dept: ANESTHESIOLOGY | Facility: HOSPITAL | Age: 60
DRG: 239 | End: 2020-05-11
Payer: COMMERCIAL

## 2020-05-11 ENCOUNTER — APPOINTMENT (INPATIENT)
Dept: SURGERY | Facility: HOSPITAL | Age: 60
DRG: 239 | End: 2020-05-11
Payer: COMMERCIAL

## 2020-05-11 LAB
ANION GAP SERPL CALCULATED.3IONS-SCNC: 7 MMOL/L (ref 4–13)
BUN SERPL-MCNC: 29 MG/DL (ref 5–25)
CALCIUM SERPL-MCNC: 9.6 MG/DL (ref 8.3–10.1)
CHLORIDE SERPL-SCNC: 105 MMOL/L (ref 100–108)
CO2 SERPL-SCNC: 25 MMOL/L (ref 21–32)
CREAT SERPL-MCNC: 1.03 MG/DL (ref 0.6–1.3)
ERYTHROCYTE [DISTWIDTH] IN BLOOD BY AUTOMATED COUNT: 14.7 % (ref 11.6–15.1)
GFR SERPL CREATININE-BSD FRML MDRD: 60 ML/MIN/1.73SQ M
GLUCOSE SERPL-MCNC: 88 MG/DL (ref 65–140)
HCT VFR BLD AUTO: 27.5 % (ref 34.8–46.1)
HGB BLD-MCNC: 8.4 G/DL (ref 11.5–15.4)
MCH RBC QN AUTO: 27.8 PG (ref 26.8–34.3)
MCHC RBC AUTO-ENTMCNC: 30.5 G/DL (ref 31.4–37.4)
MCV RBC AUTO: 91 FL (ref 82–98)
PLATELET # BLD AUTO: 250 THOUSANDS/UL (ref 149–390)
PMV BLD AUTO: 12.3 FL (ref 8.9–12.7)
POTASSIUM SERPL-SCNC: 4.2 MMOL/L (ref 3.5–5.3)
RBC # BLD AUTO: 3.02 MILLION/UL (ref 3.81–5.12)
SODIUM SERPL-SCNC: 137 MMOL/L (ref 136–145)
WBC # BLD AUTO: 7.83 THOUSAND/UL (ref 4.31–10.16)

## 2020-05-11 PROCEDURE — 99233 SBSQ HOSP IP/OBS HIGH 50: CPT | Performed by: NURSE PRACTITIONER

## 2020-05-11 PROCEDURE — C9290 INJ, BUPIVACAINE LIPOSOME: HCPCS | Performed by: ANESTHESIOLOGY

## 2020-05-11 PROCEDURE — 99223 1ST HOSP IP/OBS HIGH 75: CPT | Performed by: ANESTHESIOLOGY

## 2020-05-11 PROCEDURE — 83550 IRON BINDING TEST: CPT | Performed by: PHYSICIAN ASSISTANT

## 2020-05-11 PROCEDURE — 99024 POSTOP FOLLOW-UP VISIT: CPT | Performed by: SURGERY

## 2020-05-11 PROCEDURE — 82746 ASSAY OF FOLIC ACID SERUM: CPT | Performed by: PHYSICIAN ASSISTANT

## 2020-05-11 PROCEDURE — 85027 COMPLETE CBC AUTOMATED: CPT | Performed by: PHYSICIAN ASSISTANT

## 2020-05-11 PROCEDURE — 82728 ASSAY OF FERRITIN: CPT | Performed by: PHYSICIAN ASSISTANT

## 2020-05-11 PROCEDURE — 83540 ASSAY OF IRON: CPT | Performed by: PHYSICIAN ASSISTANT

## 2020-05-11 PROCEDURE — 80048 BASIC METABOLIC PNL TOTAL CA: CPT | Performed by: PHYSICIAN ASSISTANT

## 2020-05-11 RX ORDER — BUPIVACAINE HYDROCHLORIDE 2.5 MG/ML
INJECTION, SOLUTION EPIDURAL; INFILTRATION; INTRACAUDAL
Status: COMPLETED | OUTPATIENT
Start: 2020-05-11 | End: 2020-05-11

## 2020-05-11 RX ORDER — FENTANYL CITRATE 50 UG/ML
INJECTION, SOLUTION INTRAMUSCULAR; INTRAVENOUS AS NEEDED
Status: DISCONTINUED | OUTPATIENT
Start: 2020-05-11 | End: 2020-05-11 | Stop reason: HOSPADM

## 2020-05-11 RX ORDER — MIDAZOLAM HYDROCHLORIDE 2 MG/2ML
INJECTION, SOLUTION INTRAMUSCULAR; INTRAVENOUS AS NEEDED
Status: DISCONTINUED | OUTPATIENT
Start: 2020-05-11 | End: 2020-05-11 | Stop reason: HOSPADM

## 2020-05-11 RX ADMIN — DOCUSATE SODIUM 100 MG: 100 CAPSULE, LIQUID FILLED ORAL at 08:43

## 2020-05-11 RX ADMIN — HYDROMORPHONE HYDROCHLORIDE 0.2 MG: 1 INJECTION, SOLUTION INTRAMUSCULAR; INTRAVENOUS; SUBCUTANEOUS at 12:17

## 2020-05-11 RX ADMIN — BUPIVACAINE HYDROCHLORIDE 15 ML: 2.5 INJECTION, SOLUTION EPIDURAL; INFILTRATION; INTRACAUDAL at 15:03

## 2020-05-11 RX ADMIN — OXYCODONE HYDROCHLORIDE 10 MG: 10 TABLET ORAL at 16:45

## 2020-05-11 RX ADMIN — GABAPENTIN 300 MG: 300 CAPSULE ORAL at 16:45

## 2020-05-11 RX ADMIN — RIVAROXABAN 15 MG: 15 TABLET, FILM COATED ORAL at 12:18

## 2020-05-11 RX ADMIN — GABAPENTIN 300 MG: 300 CAPSULE ORAL at 08:45

## 2020-05-11 RX ADMIN — FENTANYL CITRATE 25 MCG: 50 INJECTION, SOLUTION INTRAMUSCULAR; INTRAVENOUS at 14:49

## 2020-05-11 RX ADMIN — ACETAMINOPHEN 975 MG: 325 TABLET ORAL at 16:45

## 2020-05-11 RX ADMIN — BUPIVACAINE 10 ML: 13.3 INJECTION, SUSPENSION, LIPOSOMAL INFILTRATION at 15:03

## 2020-05-11 RX ADMIN — BUPIVACAINE 10 ML: 13.3 INJECTION, SUSPENSION, LIPOSOMAL INFILTRATION at 14:53

## 2020-05-11 RX ADMIN — BACLOFEN 20 MG: 10 TABLET ORAL at 16:45

## 2020-05-11 RX ADMIN — ACETAMINOPHEN 975 MG: 325 TABLET ORAL at 06:38

## 2020-05-11 RX ADMIN — MIDAZOLAM 1 MG: 1 INJECTION INTRAMUSCULAR; INTRAVENOUS at 14:49

## 2020-05-11 RX ADMIN — BACLOFEN 20 MG: 10 TABLET ORAL at 21:20

## 2020-05-11 RX ADMIN — AMANTADINE HYDROCHLORIDE 100 MG: 100 CAPSULE, LIQUID FILLED ORAL at 08:51

## 2020-05-11 RX ADMIN — BACLOFEN 20 MG: 10 TABLET ORAL at 08:43

## 2020-05-11 RX ADMIN — BUPIVACAINE HYDROCHLORIDE 10 ML: 2.5 INJECTION, SOLUTION EPIDURAL; INFILTRATION; INTRACAUDAL at 14:53

## 2020-05-11 RX ADMIN — OXYCODONE HYDROCHLORIDE 10 MG: 10 TABLET ORAL at 09:09

## 2020-05-11 RX ADMIN — RIVAROXABAN 15 MG: 15 TABLET, FILM COATED ORAL at 21:19

## 2020-05-11 RX ADMIN — GABAPENTIN 300 MG: 300 CAPSULE ORAL at 21:20

## 2020-05-11 RX ADMIN — ACETAMINOPHEN 975 MG: 325 TABLET ORAL at 23:34

## 2020-05-11 RX ADMIN — DOCUSATE SODIUM 100 MG: 100 CAPSULE, LIQUID FILLED ORAL at 17:05

## 2020-05-11 RX ADMIN — AMANTADINE HYDROCHLORIDE 100 MG: 100 CAPSULE, LIQUID FILLED ORAL at 17:05

## 2020-05-12 LAB
FERRITIN SERPL-MCNC: 227 NG/ML (ref 8–388)
FOLATE SERPL-MCNC: 7.3 NG/ML (ref 3.1–17.5)
IRON SATN MFR SERPL: 10 %
IRON SERPL-MCNC: 36 UG/DL (ref 50–170)
TIBC SERPL-MCNC: 373 UG/DL (ref 250–450)
VIT B12 SERPL-MCNC: 270 PG/ML (ref 100–900)

## 2020-05-12 PROCEDURE — 99232 SBSQ HOSP IP/OBS MODERATE 35: CPT | Performed by: PHYSICIAN ASSISTANT

## 2020-05-12 PROCEDURE — 82607 VITAMIN B-12: CPT | Performed by: PHYSICIAN ASSISTANT

## 2020-05-12 PROCEDURE — 99232 SBSQ HOSP IP/OBS MODERATE 35: CPT | Performed by: ANESTHESIOLOGY

## 2020-05-12 RX ADMIN — GABAPENTIN 300 MG: 300 CAPSULE ORAL at 21:19

## 2020-05-12 RX ADMIN — DOCUSATE SODIUM 100 MG: 100 CAPSULE, LIQUID FILLED ORAL at 17:34

## 2020-05-12 RX ADMIN — BACLOFEN 20 MG: 10 TABLET ORAL at 17:00

## 2020-05-12 RX ADMIN — OXYCODONE HYDROCHLORIDE 10 MG: 10 TABLET ORAL at 20:05

## 2020-05-12 RX ADMIN — HYDROCHLOROTHIAZIDE 12.5 MG: 12.5 TABLET ORAL at 09:50

## 2020-05-12 RX ADMIN — METOPROLOL TARTRATE 25 MG: 25 TABLET, FILM COATED ORAL at 09:50

## 2020-05-12 RX ADMIN — BACLOFEN 20 MG: 10 TABLET ORAL at 21:19

## 2020-05-12 RX ADMIN — AMANTADINE HYDROCHLORIDE 100 MG: 100 CAPSULE, LIQUID FILLED ORAL at 11:39

## 2020-05-12 RX ADMIN — GABAPENTIN 300 MG: 300 CAPSULE ORAL at 17:00

## 2020-05-12 RX ADMIN — BACLOFEN 20 MG: 10 TABLET ORAL at 09:45

## 2020-05-12 RX ADMIN — RIVAROXABAN 15 MG: 15 TABLET, FILM COATED ORAL at 09:57

## 2020-05-12 RX ADMIN — ACETAMINOPHEN 975 MG: 325 TABLET ORAL at 14:49

## 2020-05-12 RX ADMIN — GABAPENTIN 300 MG: 300 CAPSULE ORAL at 09:46

## 2020-05-12 RX ADMIN — HYDROMORPHONE HYDROCHLORIDE 0.2 MG: 1 INJECTION, SOLUTION INTRAMUSCULAR; INTRAVENOUS; SUBCUTANEOUS at 09:45

## 2020-05-12 RX ADMIN — ACETAMINOPHEN 975 MG: 325 TABLET ORAL at 21:19

## 2020-05-12 RX ADMIN — AMANTADINE HYDROCHLORIDE 100 MG: 100 CAPSULE, LIQUID FILLED ORAL at 17:33

## 2020-05-12 RX ADMIN — ACETAMINOPHEN 975 MG: 325 TABLET ORAL at 05:38

## 2020-05-12 RX ADMIN — OXYCODONE HYDROCHLORIDE 10 MG: 10 TABLET ORAL at 05:38

## 2020-05-12 RX ADMIN — RIVAROXABAN 15 MG: 15 TABLET, FILM COATED ORAL at 17:00

## 2020-05-12 RX ADMIN — DOCUSATE SODIUM 100 MG: 100 CAPSULE, LIQUID FILLED ORAL at 09:45

## 2020-05-13 LAB
ANION GAP SERPL CALCULATED.3IONS-SCNC: 7 MMOL/L (ref 4–13)
BASOPHILS # BLD AUTO: 0.03 THOUSANDS/ΜL (ref 0–0.1)
BASOPHILS NFR BLD AUTO: 0 % (ref 0–1)
BUN SERPL-MCNC: 25 MG/DL (ref 5–25)
CALCIUM SERPL-MCNC: 9.7 MG/DL (ref 8.3–10.1)
CHLORIDE SERPL-SCNC: 103 MMOL/L (ref 100–108)
CO2 SERPL-SCNC: 26 MMOL/L (ref 21–32)
CREAT SERPL-MCNC: 0.9 MG/DL (ref 0.6–1.3)
EOSINOPHIL # BLD AUTO: 0.12 THOUSAND/ΜL (ref 0–0.61)
EOSINOPHIL NFR BLD AUTO: 1 % (ref 0–6)
ERYTHROCYTE [DISTWIDTH] IN BLOOD BY AUTOMATED COUNT: 14.7 % (ref 11.6–15.1)
GFR SERPL CREATININE-BSD FRML MDRD: 70 ML/MIN/1.73SQ M
GLUCOSE SERPL-MCNC: 80 MG/DL (ref 65–140)
HCT VFR BLD AUTO: 27 % (ref 34.8–46.1)
HGB BLD-MCNC: 8.3 G/DL (ref 11.5–15.4)
IMM GRANULOCYTES # BLD AUTO: 0.11 THOUSAND/UL (ref 0–0.2)
IMM GRANULOCYTES NFR BLD AUTO: 1 % (ref 0–2)
LYMPHOCYTES # BLD AUTO: 1.89 THOUSANDS/ΜL (ref 0.6–4.47)
LYMPHOCYTES NFR BLD AUTO: 23 % (ref 14–44)
MCH RBC QN AUTO: 27.8 PG (ref 26.8–34.3)
MCHC RBC AUTO-ENTMCNC: 30.7 G/DL (ref 31.4–37.4)
MCV RBC AUTO: 90 FL (ref 82–98)
MONOCYTES # BLD AUTO: 0.8 THOUSAND/ΜL (ref 0.17–1.22)
MONOCYTES NFR BLD AUTO: 10 % (ref 4–12)
NEUTROPHILS # BLD AUTO: 5.4 THOUSANDS/ΜL (ref 1.85–7.62)
NEUTS SEG NFR BLD AUTO: 65 % (ref 43–75)
NRBC BLD AUTO-RTO: 0 /100 WBCS
PLATELET # BLD AUTO: 219 THOUSANDS/UL (ref 149–390)
PMV BLD AUTO: 12.1 FL (ref 8.9–12.7)
POTASSIUM SERPL-SCNC: 4 MMOL/L (ref 3.5–5.3)
RBC # BLD AUTO: 2.99 MILLION/UL (ref 3.81–5.12)
SODIUM SERPL-SCNC: 136 MMOL/L (ref 136–145)
WBC # BLD AUTO: 8.35 THOUSAND/UL (ref 4.31–10.16)

## 2020-05-13 PROCEDURE — 99232 SBSQ HOSP IP/OBS MODERATE 35: CPT | Performed by: PHYSICIAN ASSISTANT

## 2020-05-13 PROCEDURE — 97530 THERAPEUTIC ACTIVITIES: CPT

## 2020-05-13 PROCEDURE — 80048 BASIC METABOLIC PNL TOTAL CA: CPT | Performed by: PHYSICIAN ASSISTANT

## 2020-05-13 PROCEDURE — 85025 COMPLETE CBC W/AUTO DIFF WBC: CPT | Performed by: PHYSICIAN ASSISTANT

## 2020-05-13 RX ADMIN — RIVAROXABAN 15 MG: 15 TABLET, FILM COATED ORAL at 17:41

## 2020-05-13 RX ADMIN — DOCUSATE SODIUM 100 MG: 100 CAPSULE, LIQUID FILLED ORAL at 17:40

## 2020-05-13 RX ADMIN — GABAPENTIN 300 MG: 300 CAPSULE ORAL at 22:38

## 2020-05-13 RX ADMIN — OXYCODONE HYDROCHLORIDE 10 MG: 10 TABLET ORAL at 19:48

## 2020-05-13 RX ADMIN — BACLOFEN 20 MG: 10 TABLET ORAL at 17:40

## 2020-05-13 RX ADMIN — RIVAROXABAN 15 MG: 15 TABLET, FILM COATED ORAL at 08:11

## 2020-05-13 RX ADMIN — ACETAMINOPHEN 975 MG: 325 TABLET ORAL at 22:35

## 2020-05-13 RX ADMIN — BACLOFEN 20 MG: 10 TABLET ORAL at 08:10

## 2020-05-13 RX ADMIN — GABAPENTIN 300 MG: 300 CAPSULE ORAL at 08:10

## 2020-05-13 RX ADMIN — GABAPENTIN 300 MG: 300 CAPSULE ORAL at 17:40

## 2020-05-13 RX ADMIN — OXYCODONE HYDROCHLORIDE 10 MG: 10 TABLET ORAL at 11:22

## 2020-05-13 RX ADMIN — BACLOFEN 20 MG: 10 TABLET ORAL at 22:35

## 2020-05-13 RX ADMIN — AMANTADINE HYDROCHLORIDE 100 MG: 100 CAPSULE, LIQUID FILLED ORAL at 08:11

## 2020-05-13 RX ADMIN — ACETAMINOPHEN 975 MG: 325 TABLET ORAL at 05:25

## 2020-05-13 RX ADMIN — AMANTADINE HYDROCHLORIDE 100 MG: 100 CAPSULE, LIQUID FILLED ORAL at 17:41

## 2020-05-13 RX ADMIN — METOPROLOL TARTRATE 25 MG: 25 TABLET, FILM COATED ORAL at 08:10

## 2020-05-13 RX ADMIN — HYDROCHLOROTHIAZIDE 12.5 MG: 12.5 TABLET ORAL at 08:10

## 2020-05-13 RX ADMIN — ACETAMINOPHEN 975 MG: 325 TABLET ORAL at 13:21

## 2020-05-13 RX ADMIN — DOCUSATE SODIUM 100 MG: 100 CAPSULE, LIQUID FILLED ORAL at 08:10

## 2020-05-14 PROBLEM — G92.9 TOXIC ENCEPHALOPATHY: Status: ACTIVE | Noted: 2020-05-14

## 2020-05-14 PROCEDURE — 99232 SBSQ HOSP IP/OBS MODERATE 35: CPT | Performed by: PHYSICIAN ASSISTANT

## 2020-05-14 RX ORDER — POLYETHYLENE GLYCOL 3350 17 G/17G
17 POWDER, FOR SOLUTION ORAL DAILY PRN
Status: DISCONTINUED | OUTPATIENT
Start: 2020-05-14 | End: 2020-05-21 | Stop reason: HOSPADM

## 2020-05-14 RX ORDER — FERROUS SULFATE 325(65) MG
325 TABLET ORAL
Status: DISCONTINUED | OUTPATIENT
Start: 2020-05-15 | End: 2020-05-21 | Stop reason: HOSPADM

## 2020-05-14 RX ORDER — SENNOSIDES 8.6 MG
1 TABLET ORAL
Status: DISCONTINUED | OUTPATIENT
Start: 2020-05-14 | End: 2020-05-21 | Stop reason: HOSPADM

## 2020-05-14 RX ADMIN — BACLOFEN 20 MG: 10 TABLET ORAL at 08:54

## 2020-05-14 RX ADMIN — DOCUSATE SODIUM 100 MG: 100 CAPSULE, LIQUID FILLED ORAL at 08:54

## 2020-05-14 RX ADMIN — OXYCODONE HYDROCHLORIDE 10 MG: 10 TABLET ORAL at 06:47

## 2020-05-14 RX ADMIN — ACETAMINOPHEN 975 MG: 325 TABLET ORAL at 14:34

## 2020-05-14 RX ADMIN — OXYCODONE HYDROCHLORIDE 10 MG: 10 TABLET ORAL at 14:35

## 2020-05-14 RX ADMIN — GABAPENTIN 300 MG: 300 CAPSULE ORAL at 08:54

## 2020-05-14 RX ADMIN — HYDROCHLOROTHIAZIDE 12.5 MG: 12.5 TABLET ORAL at 08:54

## 2020-05-14 RX ADMIN — GABAPENTIN 300 MG: 300 CAPSULE ORAL at 22:02

## 2020-05-14 RX ADMIN — BACLOFEN 20 MG: 10 TABLET ORAL at 17:13

## 2020-05-14 RX ADMIN — ACETAMINOPHEN 975 MG: 325 TABLET ORAL at 22:01

## 2020-05-14 RX ADMIN — ACETAMINOPHEN 975 MG: 325 TABLET ORAL at 06:43

## 2020-05-14 RX ADMIN — OXYCODONE HYDROCHLORIDE 10 MG: 10 TABLET ORAL at 22:02

## 2020-05-14 RX ADMIN — GABAPENTIN 300 MG: 300 CAPSULE ORAL at 17:13

## 2020-05-14 RX ADMIN — SENNOSIDES 8.6 MG: 8.6 TABLET, FILM COATED ORAL at 22:02

## 2020-05-14 RX ADMIN — BACLOFEN 20 MG: 10 TABLET ORAL at 22:02

## 2020-05-14 RX ADMIN — RIVAROXABAN 15 MG: 15 TABLET, FILM COATED ORAL at 08:55

## 2020-05-14 RX ADMIN — AMANTADINE HYDROCHLORIDE 100 MG: 100 CAPSULE, LIQUID FILLED ORAL at 11:42

## 2020-05-14 RX ADMIN — RIVAROXABAN 15 MG: 15 TABLET, FILM COATED ORAL at 17:14

## 2020-05-14 RX ADMIN — AMANTADINE HYDROCHLORIDE 100 MG: 100 CAPSULE, LIQUID FILLED ORAL at 17:14

## 2020-05-14 RX ADMIN — DOCUSATE SODIUM 100 MG: 100 CAPSULE, LIQUID FILLED ORAL at 17:13

## 2020-05-15 LAB
ANION GAP SERPL CALCULATED.3IONS-SCNC: 8 MMOL/L (ref 4–13)
BUN SERPL-MCNC: 31 MG/DL (ref 5–25)
CALCIUM SERPL-MCNC: 9.7 MG/DL (ref 8.3–10.1)
CHLORIDE SERPL-SCNC: 101 MMOL/L (ref 100–108)
CO2 SERPL-SCNC: 25 MMOL/L (ref 21–32)
CREAT SERPL-MCNC: 0.84 MG/DL (ref 0.6–1.3)
ERYTHROCYTE [DISTWIDTH] IN BLOOD BY AUTOMATED COUNT: 14.7 % (ref 11.6–15.1)
GFR SERPL CREATININE-BSD FRML MDRD: 76 ML/MIN/1.73SQ M
GLUCOSE SERPL-MCNC: 91 MG/DL (ref 65–140)
HCT VFR BLD AUTO: 27.6 % (ref 34.8–46.1)
HGB BLD-MCNC: 8.4 G/DL (ref 11.5–15.4)
MCH RBC QN AUTO: 27.4 PG (ref 26.8–34.3)
MCHC RBC AUTO-ENTMCNC: 30.4 G/DL (ref 31.4–37.4)
MCV RBC AUTO: 90 FL (ref 82–98)
PLATELET # BLD AUTO: 233 THOUSANDS/UL (ref 149–390)
PMV BLD AUTO: 12.4 FL (ref 8.9–12.7)
POTASSIUM SERPL-SCNC: 4 MMOL/L (ref 3.5–5.3)
RBC # BLD AUTO: 3.07 MILLION/UL (ref 3.81–5.12)
SODIUM SERPL-SCNC: 134 MMOL/L (ref 136–145)
WBC # BLD AUTO: 7.58 THOUSAND/UL (ref 4.31–10.16)

## 2020-05-15 PROCEDURE — 85027 COMPLETE CBC AUTOMATED: CPT | Performed by: PHYSICIAN ASSISTANT

## 2020-05-15 PROCEDURE — 97530 THERAPEUTIC ACTIVITIES: CPT

## 2020-05-15 PROCEDURE — 99232 SBSQ HOSP IP/OBS MODERATE 35: CPT | Performed by: PHYSICIAN ASSISTANT

## 2020-05-15 PROCEDURE — 80048 BASIC METABOLIC PNL TOTAL CA: CPT | Performed by: PHYSICIAN ASSISTANT

## 2020-05-15 RX ADMIN — DOCUSATE SODIUM 100 MG: 100 CAPSULE, LIQUID FILLED ORAL at 08:35

## 2020-05-15 RX ADMIN — DOCUSATE SODIUM 100 MG: 100 CAPSULE, LIQUID FILLED ORAL at 17:43

## 2020-05-15 RX ADMIN — OXYCODONE HYDROCHLORIDE 10 MG: 10 TABLET ORAL at 20:51

## 2020-05-15 RX ADMIN — GABAPENTIN 300 MG: 300 CAPSULE ORAL at 08:34

## 2020-05-15 RX ADMIN — FERROUS SULFATE TAB 325 MG (65 MG ELEMENTAL FE) 325 MG: 325 (65 FE) TAB at 08:35

## 2020-05-15 RX ADMIN — ACETAMINOPHEN 975 MG: 325 TABLET ORAL at 21:17

## 2020-05-15 RX ADMIN — RIVAROXABAN 15 MG: 15 TABLET, FILM COATED ORAL at 17:44

## 2020-05-15 RX ADMIN — AMANTADINE HYDROCHLORIDE 100 MG: 100 CAPSULE, LIQUID FILLED ORAL at 17:44

## 2020-05-15 RX ADMIN — AMANTADINE HYDROCHLORIDE 100 MG: 100 CAPSULE, LIQUID FILLED ORAL at 08:35

## 2020-05-15 RX ADMIN — RIVAROXABAN 15 MG: 15 TABLET, FILM COATED ORAL at 08:36

## 2020-05-15 RX ADMIN — SENNOSIDES 8.6 MG: 8.6 TABLET, FILM COATED ORAL at 21:18

## 2020-05-15 RX ADMIN — GABAPENTIN 300 MG: 300 CAPSULE ORAL at 17:43

## 2020-05-15 RX ADMIN — BACLOFEN 20 MG: 10 TABLET ORAL at 17:43

## 2020-05-15 RX ADMIN — GABAPENTIN 300 MG: 300 CAPSULE ORAL at 21:17

## 2020-05-15 RX ADMIN — ACETAMINOPHEN 975 MG: 325 TABLET ORAL at 05:53

## 2020-05-15 RX ADMIN — BACLOFEN 20 MG: 10 TABLET ORAL at 21:17

## 2020-05-15 RX ADMIN — BACLOFEN 20 MG: 10 TABLET ORAL at 08:34

## 2020-05-15 RX ADMIN — ACETAMINOPHEN 975 MG: 325 TABLET ORAL at 14:40

## 2020-05-15 RX ADMIN — OXYCODONE HYDROCHLORIDE 5 MG: 5 TABLET ORAL at 08:52

## 2020-05-16 PROBLEM — R32 INCONTINENCE OF URINE IN FEMALE: Status: ACTIVE | Noted: 2020-05-16

## 2020-05-16 LAB — GLUCOSE SERPL-MCNC: 112 MG/DL (ref 65–140)

## 2020-05-16 PROCEDURE — 99232 SBSQ HOSP IP/OBS MODERATE 35: CPT | Performed by: PHYSICIAN ASSISTANT

## 2020-05-16 PROCEDURE — 82948 REAGENT STRIP/BLOOD GLUCOSE: CPT

## 2020-05-16 RX ADMIN — SENNOSIDES 8.6 MG: 8.6 TABLET, FILM COATED ORAL at 21:25

## 2020-05-16 RX ADMIN — RIVAROXABAN 15 MG: 15 TABLET, FILM COATED ORAL at 19:14

## 2020-05-16 RX ADMIN — BACLOFEN 20 MG: 10 TABLET ORAL at 21:25

## 2020-05-16 RX ADMIN — AMANTADINE HYDROCHLORIDE 100 MG: 100 CAPSULE, LIQUID FILLED ORAL at 17:38

## 2020-05-16 RX ADMIN — GABAPENTIN 300 MG: 300 CAPSULE ORAL at 21:24

## 2020-05-16 RX ADMIN — OXYCODONE HYDROCHLORIDE 10 MG: 10 TABLET ORAL at 02:55

## 2020-05-16 RX ADMIN — AMANTADINE HYDROCHLORIDE 100 MG: 100 CAPSULE, LIQUID FILLED ORAL at 08:16

## 2020-05-16 RX ADMIN — FERROUS SULFATE TAB 325 MG (65 MG ELEMENTAL FE) 325 MG: 325 (65 FE) TAB at 07:50

## 2020-05-16 RX ADMIN — METOPROLOL TARTRATE 25 MG: 25 TABLET, FILM COATED ORAL at 09:32

## 2020-05-16 RX ADMIN — ACETAMINOPHEN 975 MG: 325 TABLET ORAL at 13:21

## 2020-05-16 RX ADMIN — RIVAROXABAN 15 MG: 15 TABLET, FILM COATED ORAL at 07:50

## 2020-05-16 RX ADMIN — DOCUSATE SODIUM 100 MG: 100 CAPSULE, LIQUID FILLED ORAL at 08:16

## 2020-05-16 RX ADMIN — BACLOFEN 20 MG: 10 TABLET ORAL at 08:15

## 2020-05-16 RX ADMIN — BACLOFEN 20 MG: 10 TABLET ORAL at 17:38

## 2020-05-16 RX ADMIN — DOCUSATE SODIUM 100 MG: 100 CAPSULE, LIQUID FILLED ORAL at 17:38

## 2020-05-16 RX ADMIN — OXYCODONE HYDROCHLORIDE 10 MG: 10 TABLET ORAL at 21:25

## 2020-05-16 RX ADMIN — GABAPENTIN 300 MG: 300 CAPSULE ORAL at 08:16

## 2020-05-16 RX ADMIN — GABAPENTIN 300 MG: 300 CAPSULE ORAL at 17:38

## 2020-05-16 RX ADMIN — OXYCODONE HYDROCHLORIDE 5 MG: 5 TABLET ORAL at 13:22

## 2020-05-16 RX ADMIN — ACETAMINOPHEN 975 MG: 325 TABLET ORAL at 05:08

## 2020-05-16 RX ADMIN — HYDROCHLOROTHIAZIDE 12.5 MG: 12.5 TABLET ORAL at 09:32

## 2020-05-16 RX ADMIN — ACETAMINOPHEN 975 MG: 325 TABLET ORAL at 21:24

## 2020-05-17 PROCEDURE — 99232 SBSQ HOSP IP/OBS MODERATE 35: CPT | Performed by: PHYSICIAN ASSISTANT

## 2020-05-17 RX ADMIN — FERROUS SULFATE TAB 325 MG (65 MG ELEMENTAL FE) 325 MG: 325 (65 FE) TAB at 08:48

## 2020-05-17 RX ADMIN — OXYCODONE HYDROCHLORIDE 10 MG: 10 TABLET ORAL at 22:00

## 2020-05-17 RX ADMIN — AMANTADINE HYDROCHLORIDE 100 MG: 100 CAPSULE, LIQUID FILLED ORAL at 08:48

## 2020-05-17 RX ADMIN — BACLOFEN 20 MG: 10 TABLET ORAL at 08:48

## 2020-05-17 RX ADMIN — AMANTADINE HYDROCHLORIDE 100 MG: 100 CAPSULE, LIQUID FILLED ORAL at 16:32

## 2020-05-17 RX ADMIN — GABAPENTIN 300 MG: 300 CAPSULE ORAL at 08:47

## 2020-05-17 RX ADMIN — RIVAROXABAN 15 MG: 15 TABLET, FILM COATED ORAL at 08:47

## 2020-05-17 RX ADMIN — METOPROLOL TARTRATE 25 MG: 25 TABLET, FILM COATED ORAL at 08:48

## 2020-05-17 RX ADMIN — ACETAMINOPHEN 975 MG: 325 TABLET ORAL at 22:00

## 2020-05-17 RX ADMIN — HYDROCHLOROTHIAZIDE 12.5 MG: 12.5 TABLET ORAL at 08:47

## 2020-05-17 RX ADMIN — DOCUSATE SODIUM 100 MG: 100 CAPSULE, LIQUID FILLED ORAL at 08:47

## 2020-05-17 RX ADMIN — ACETAMINOPHEN 975 MG: 325 TABLET ORAL at 07:09

## 2020-05-17 RX ADMIN — BACLOFEN 20 MG: 10 TABLET ORAL at 16:31

## 2020-05-17 RX ADMIN — GABAPENTIN 300 MG: 300 CAPSULE ORAL at 16:31

## 2020-05-17 RX ADMIN — OXYCODONE HYDROCHLORIDE 5 MG: 5 TABLET ORAL at 13:03

## 2020-05-17 RX ADMIN — BACLOFEN 20 MG: 10 TABLET ORAL at 22:00

## 2020-05-17 RX ADMIN — SENNOSIDES 8.6 MG: 8.6 TABLET, FILM COATED ORAL at 22:00

## 2020-05-17 RX ADMIN — RIVAROXABAN 15 MG: 15 TABLET, FILM COATED ORAL at 16:32

## 2020-05-17 RX ADMIN — ACETAMINOPHEN 975 MG: 325 TABLET ORAL at 13:02

## 2020-05-17 RX ADMIN — GABAPENTIN 300 MG: 300 CAPSULE ORAL at 22:00

## 2020-05-17 RX ADMIN — DOCUSATE SODIUM 100 MG: 100 CAPSULE, LIQUID FILLED ORAL at 16:32

## 2020-05-18 LAB
ANION GAP SERPL CALCULATED.3IONS-SCNC: 6 MMOL/L (ref 4–13)
BUN SERPL-MCNC: 27 MG/DL (ref 5–25)
CALCIUM SERPL-MCNC: 9.3 MG/DL (ref 8.3–10.1)
CHLORIDE SERPL-SCNC: 103 MMOL/L (ref 100–108)
CO2 SERPL-SCNC: 29 MMOL/L (ref 21–32)
CREAT SERPL-MCNC: 0.8 MG/DL (ref 0.6–1.3)
ERYTHROCYTE [DISTWIDTH] IN BLOOD BY AUTOMATED COUNT: 14.7 % (ref 11.6–15.1)
GFR SERPL CREATININE-BSD FRML MDRD: 81 ML/MIN/1.73SQ M
GLUCOSE SERPL-MCNC: 88 MG/DL (ref 65–140)
HCT VFR BLD AUTO: 27.6 % (ref 34.8–46.1)
HGB BLD-MCNC: 8.3 G/DL (ref 11.5–15.4)
MCH RBC QN AUTO: 27.3 PG (ref 26.8–34.3)
MCHC RBC AUTO-ENTMCNC: 30.1 G/DL (ref 31.4–37.4)
MCV RBC AUTO: 91 FL (ref 82–98)
PLATELET # BLD AUTO: 204 THOUSANDS/UL (ref 149–390)
PMV BLD AUTO: 12.2 FL (ref 8.9–12.7)
POTASSIUM SERPL-SCNC: 3.8 MMOL/L (ref 3.5–5.3)
RBC # BLD AUTO: 3.04 MILLION/UL (ref 3.81–5.12)
SARS-COV-2 RNA RESP QL NAA+PROBE: NEGATIVE
SODIUM SERPL-SCNC: 138 MMOL/L (ref 136–145)
WBC # BLD AUTO: 5.85 THOUSAND/UL (ref 4.31–10.16)

## 2020-05-18 PROCEDURE — 99232 SBSQ HOSP IP/OBS MODERATE 35: CPT | Performed by: PHYSICIAN ASSISTANT

## 2020-05-18 PROCEDURE — 85027 COMPLETE CBC AUTOMATED: CPT | Performed by: PHYSICIAN ASSISTANT

## 2020-05-18 PROCEDURE — 80048 BASIC METABOLIC PNL TOTAL CA: CPT | Performed by: PHYSICIAN ASSISTANT

## 2020-05-18 PROCEDURE — U0003 INFECTIOUS AGENT DETECTION BY NUCLEIC ACID (DNA OR RNA); SEVERE ACUTE RESPIRATORY SYNDROME CORONAVIRUS 2 (SARS-COV-2) (CORONAVIRUS DISEASE [COVID-19]), AMPLIFIED PROBE TECHNIQUE, MAKING USE OF HIGH THROUGHPUT TECHNOLOGIES AS DESCRIBED BY CMS-2020-01-R: HCPCS | Performed by: PHYSICIAN ASSISTANT

## 2020-05-18 RX ADMIN — RIVAROXABAN 15 MG: 15 TABLET, FILM COATED ORAL at 08:16

## 2020-05-18 RX ADMIN — BACLOFEN 20 MG: 10 TABLET ORAL at 15:08

## 2020-05-18 RX ADMIN — BACLOFEN 20 MG: 10 TABLET ORAL at 21:19

## 2020-05-18 RX ADMIN — AMANTADINE HYDROCHLORIDE 100 MG: 100 CAPSULE, LIQUID FILLED ORAL at 08:15

## 2020-05-18 RX ADMIN — FERROUS SULFATE TAB 325 MG (65 MG ELEMENTAL FE) 325 MG: 325 (65 FE) TAB at 08:14

## 2020-05-18 RX ADMIN — ACETAMINOPHEN 975 MG: 325 TABLET ORAL at 15:07

## 2020-05-18 RX ADMIN — DOCUSATE SODIUM 100 MG: 100 CAPSULE, LIQUID FILLED ORAL at 17:11

## 2020-05-18 RX ADMIN — GABAPENTIN 300 MG: 300 CAPSULE ORAL at 15:08

## 2020-05-18 RX ADMIN — ACETAMINOPHEN 975 MG: 325 TABLET ORAL at 04:39

## 2020-05-18 RX ADMIN — METOPROLOL TARTRATE 25 MG: 25 TABLET, FILM COATED ORAL at 08:14

## 2020-05-18 RX ADMIN — BACLOFEN 20 MG: 10 TABLET ORAL at 08:14

## 2020-05-18 RX ADMIN — GABAPENTIN 300 MG: 300 CAPSULE ORAL at 08:14

## 2020-05-18 RX ADMIN — AMANTADINE HYDROCHLORIDE 100 MG: 100 CAPSULE, LIQUID FILLED ORAL at 17:12

## 2020-05-18 RX ADMIN — DOCUSATE SODIUM 100 MG: 100 CAPSULE, LIQUID FILLED ORAL at 08:14

## 2020-05-18 RX ADMIN — ACETAMINOPHEN 975 MG: 325 TABLET ORAL at 21:18

## 2020-05-18 RX ADMIN — GABAPENTIN 300 MG: 300 CAPSULE ORAL at 21:19

## 2020-05-18 RX ADMIN — RIVAROXABAN 15 MG: 15 TABLET, FILM COATED ORAL at 17:12

## 2020-05-18 RX ADMIN — OXYCODONE HYDROCHLORIDE 10 MG: 10 TABLET ORAL at 21:19

## 2020-05-19 PROCEDURE — 99232 SBSQ HOSP IP/OBS MODERATE 35: CPT | Performed by: PHYSICIAN ASSISTANT

## 2020-05-19 RX ADMIN — BACLOFEN 20 MG: 10 TABLET ORAL at 15:51

## 2020-05-19 RX ADMIN — DOCUSATE SODIUM 100 MG: 100 CAPSULE, LIQUID FILLED ORAL at 08:17

## 2020-05-19 RX ADMIN — GABAPENTIN 300 MG: 300 CAPSULE ORAL at 15:51

## 2020-05-19 RX ADMIN — RIVAROXABAN 15 MG: 15 TABLET, FILM COATED ORAL at 15:51

## 2020-05-19 RX ADMIN — GABAPENTIN 300 MG: 300 CAPSULE ORAL at 08:17

## 2020-05-19 RX ADMIN — SENNOSIDES 8.6 MG: 8.6 TABLET, FILM COATED ORAL at 21:26

## 2020-05-19 RX ADMIN — METOPROLOL TARTRATE 25 MG: 25 TABLET, FILM COATED ORAL at 08:17

## 2020-05-19 RX ADMIN — BACLOFEN 20 MG: 10 TABLET ORAL at 21:26

## 2020-05-19 RX ADMIN — AMANTADINE HYDROCHLORIDE 100 MG: 100 CAPSULE, LIQUID FILLED ORAL at 08:18

## 2020-05-19 RX ADMIN — AMANTADINE HYDROCHLORIDE 100 MG: 100 CAPSULE, LIQUID FILLED ORAL at 17:42

## 2020-05-19 RX ADMIN — ACETAMINOPHEN 975 MG: 325 TABLET ORAL at 21:26

## 2020-05-19 RX ADMIN — ACETAMINOPHEN 975 MG: 325 TABLET ORAL at 05:00

## 2020-05-19 RX ADMIN — METOPROLOL TARTRATE 25 MG: 25 TABLET, FILM COATED ORAL at 21:26

## 2020-05-19 RX ADMIN — DOCUSATE SODIUM 100 MG: 100 CAPSULE, LIQUID FILLED ORAL at 17:42

## 2020-05-19 RX ADMIN — FERROUS SULFATE TAB 325 MG (65 MG ELEMENTAL FE) 325 MG: 325 (65 FE) TAB at 08:17

## 2020-05-19 RX ADMIN — RIVAROXABAN 15 MG: 15 TABLET, FILM COATED ORAL at 08:23

## 2020-05-19 RX ADMIN — BACLOFEN 20 MG: 10 TABLET ORAL at 08:17

## 2020-05-19 RX ADMIN — ACETAMINOPHEN 975 MG: 325 TABLET ORAL at 15:52

## 2020-05-19 RX ADMIN — GABAPENTIN 300 MG: 300 CAPSULE ORAL at 21:26

## 2020-05-20 PROCEDURE — 97530 THERAPEUTIC ACTIVITIES: CPT

## 2020-05-20 PROCEDURE — 99232 SBSQ HOSP IP/OBS MODERATE 35: CPT | Performed by: INTERNAL MEDICINE

## 2020-05-20 RX ADMIN — BACLOFEN 20 MG: 10 TABLET ORAL at 18:37

## 2020-05-20 RX ADMIN — BACLOFEN 20 MG: 10 TABLET ORAL at 21:59

## 2020-05-20 RX ADMIN — GABAPENTIN 300 MG: 300 CAPSULE ORAL at 18:37

## 2020-05-20 RX ADMIN — DOCUSATE SODIUM 100 MG: 100 CAPSULE, LIQUID FILLED ORAL at 08:36

## 2020-05-20 RX ADMIN — BACLOFEN 20 MG: 10 TABLET ORAL at 08:36

## 2020-05-20 RX ADMIN — RIVAROXABAN 15 MG: 15 TABLET, FILM COATED ORAL at 18:20

## 2020-05-20 RX ADMIN — GABAPENTIN 300 MG: 300 CAPSULE ORAL at 08:36

## 2020-05-20 RX ADMIN — ACETAMINOPHEN 975 MG: 325 TABLET ORAL at 21:58

## 2020-05-20 RX ADMIN — AMANTADINE HYDROCHLORIDE 100 MG: 100 CAPSULE, LIQUID FILLED ORAL at 08:36

## 2020-05-20 RX ADMIN — GABAPENTIN 300 MG: 300 CAPSULE ORAL at 21:59

## 2020-05-20 RX ADMIN — ACETAMINOPHEN 975 MG: 325 TABLET ORAL at 05:39

## 2020-05-20 RX ADMIN — ACETAMINOPHEN 975 MG: 325 TABLET ORAL at 14:21

## 2020-05-20 RX ADMIN — DOCUSATE SODIUM 100 MG: 100 CAPSULE, LIQUID FILLED ORAL at 18:20

## 2020-05-20 RX ADMIN — METOPROLOL TARTRATE 25 MG: 25 TABLET, FILM COATED ORAL at 22:03

## 2020-05-20 RX ADMIN — SENNOSIDES 8.6 MG: 8.6 TABLET, FILM COATED ORAL at 22:00

## 2020-05-20 RX ADMIN — METOPROLOL TARTRATE 25 MG: 25 TABLET, FILM COATED ORAL at 08:35

## 2020-05-20 RX ADMIN — FERROUS SULFATE TAB 325 MG (65 MG ELEMENTAL FE) 325 MG: 325 (65 FE) TAB at 07:51

## 2020-05-20 RX ADMIN — RIVAROXABAN 15 MG: 15 TABLET, FILM COATED ORAL at 07:52

## 2020-05-20 RX ADMIN — AMANTADINE HYDROCHLORIDE 100 MG: 100 CAPSULE, LIQUID FILLED ORAL at 18:20

## 2020-05-21 VITALS
RESPIRATION RATE: 17 BRPM | HEIGHT: 66 IN | TEMPERATURE: 97.4 F | BODY MASS INDEX: 23.38 KG/M2 | HEART RATE: 105 BPM | DIASTOLIC BLOOD PRESSURE: 86 MMHG | OXYGEN SATURATION: 95 % | SYSTOLIC BLOOD PRESSURE: 125 MMHG | WEIGHT: 145.5 LBS

## 2020-05-21 LAB
ANION GAP SERPL CALCULATED.3IONS-SCNC: 6 MMOL/L (ref 4–13)
BASOPHILS # BLD AUTO: 0.04 THOUSANDS/ΜL (ref 0–0.1)
BASOPHILS NFR BLD AUTO: 1 % (ref 0–1)
BUN SERPL-MCNC: 28 MG/DL (ref 5–25)
CALCIUM SERPL-MCNC: 9.4 MG/DL (ref 8.3–10.1)
CHLORIDE SERPL-SCNC: 106 MMOL/L (ref 100–108)
CO2 SERPL-SCNC: 27 MMOL/L (ref 21–32)
CREAT SERPL-MCNC: 0.77 MG/DL (ref 0.6–1.3)
EOSINOPHIL # BLD AUTO: 0.1 THOUSAND/ΜL (ref 0–0.61)
EOSINOPHIL NFR BLD AUTO: 1 % (ref 0–6)
ERYTHROCYTE [DISTWIDTH] IN BLOOD BY AUTOMATED COUNT: 15.3 % (ref 11.6–15.1)
GFR SERPL CREATININE-BSD FRML MDRD: 85 ML/MIN/1.73SQ M
GLUCOSE SERPL-MCNC: 86 MG/DL (ref 65–140)
HCT VFR BLD AUTO: 28.2 % (ref 34.8–46.1)
HGB BLD-MCNC: 8.7 G/DL (ref 11.5–15.4)
IMM GRANULOCYTES # BLD AUTO: 0.03 THOUSAND/UL (ref 0–0.2)
IMM GRANULOCYTES NFR BLD AUTO: 0 % (ref 0–2)
LYMPHOCYTES # BLD AUTO: 2.27 THOUSANDS/ΜL (ref 0.6–4.47)
LYMPHOCYTES NFR BLD AUTO: 32 % (ref 14–44)
MCH RBC QN AUTO: 27.7 PG (ref 26.8–34.3)
MCHC RBC AUTO-ENTMCNC: 30.9 G/DL (ref 31.4–37.4)
MCV RBC AUTO: 90 FL (ref 82–98)
MONOCYTES # BLD AUTO: 0.49 THOUSAND/ΜL (ref 0.17–1.22)
MONOCYTES NFR BLD AUTO: 7 % (ref 4–12)
NEUTROPHILS # BLD AUTO: 4.26 THOUSANDS/ΜL (ref 1.85–7.62)
NEUTS SEG NFR BLD AUTO: 59 % (ref 43–75)
NRBC BLD AUTO-RTO: 0 /100 WBCS
PLATELET # BLD AUTO: 245 THOUSANDS/UL (ref 149–390)
PMV BLD AUTO: 12.1 FL (ref 8.9–12.7)
POTASSIUM SERPL-SCNC: 4.1 MMOL/L (ref 3.5–5.3)
RBC # BLD AUTO: 3.14 MILLION/UL (ref 3.81–5.12)
SODIUM SERPL-SCNC: 139 MMOL/L (ref 136–145)
WBC # BLD AUTO: 7.19 THOUSAND/UL (ref 4.31–10.16)

## 2020-05-21 PROCEDURE — 80048 BASIC METABOLIC PNL TOTAL CA: CPT | Performed by: INTERNAL MEDICINE

## 2020-05-21 PROCEDURE — 99239 HOSP IP/OBS DSCHRG MGMT >30: CPT | Performed by: INTERNAL MEDICINE

## 2020-05-21 PROCEDURE — 85025 COMPLETE CBC W/AUTO DIFF WBC: CPT | Performed by: INTERNAL MEDICINE

## 2020-05-21 RX ORDER — DOCUSATE SODIUM 100 MG/1
100 CAPSULE, LIQUID FILLED ORAL 2 TIMES DAILY
Qty: 10 CAPSULE | Refills: 0
Start: 2020-05-21 | End: 2020-08-18

## 2020-05-21 RX ORDER — OXYCODONE HYDROCHLORIDE 5 MG/1
5 TABLET ORAL EVERY 6 HOURS PRN
Qty: 30 TABLET | Refills: 0 | Status: SHIPPED | OUTPATIENT
Start: 2020-05-21 | End: 2020-05-31

## 2020-05-21 RX ORDER — POLYETHYLENE GLYCOL 3350 17 G/17G
17 POWDER, FOR SOLUTION ORAL DAILY
Qty: 14 EACH | Refills: 0
Start: 2020-05-21 | End: 2020-08-18

## 2020-05-21 RX ORDER — GABAPENTIN 300 MG/1
300 CAPSULE ORAL 3 TIMES DAILY
Refills: 0
Start: 2020-05-21 | End: 2020-09-14

## 2020-05-21 RX ORDER — ACETAMINOPHEN 325 MG/1
975 TABLET ORAL EVERY 8 HOURS SCHEDULED
Qty: 30 TABLET | Refills: 0
Start: 2020-05-21 | End: 2021-11-10

## 2020-05-21 RX ORDER — FERROUS SULFATE 325(65) MG
325 TABLET ORAL
Refills: 0
Start: 2020-05-22 | End: 2020-08-18

## 2020-05-21 RX ORDER — SENNOSIDES 8.6 MG
1 TABLET ORAL
Qty: 120 EACH | Refills: 0
Start: 2020-05-21 | End: 2020-08-18

## 2020-05-21 RX ORDER — OXYCODONE HYDROCHLORIDE 10 MG/1
10 TABLET ORAL EVERY 6 HOURS PRN
Qty: 30 TABLET | Refills: 0 | Status: SHIPPED | OUTPATIENT
Start: 2020-05-21 | End: 2020-05-31

## 2020-05-21 RX ADMIN — BACLOFEN 20 MG: 10 TABLET ORAL at 10:36

## 2020-05-21 RX ADMIN — ACETAMINOPHEN 975 MG: 325 TABLET ORAL at 05:00

## 2020-05-21 RX ADMIN — GABAPENTIN 300 MG: 300 CAPSULE ORAL at 10:36

## 2020-05-21 RX ADMIN — METOPROLOL TARTRATE 25 MG: 25 TABLET, FILM COATED ORAL at 10:37

## 2020-05-21 RX ADMIN — AMANTADINE HYDROCHLORIDE 100 MG: 100 CAPSULE, LIQUID FILLED ORAL at 10:38

## 2020-05-21 RX ADMIN — FERROUS SULFATE TAB 325 MG (65 MG ELEMENTAL FE) 325 MG: 325 (65 FE) TAB at 10:36

## 2020-05-21 RX ADMIN — RIVAROXABAN 15 MG: 15 TABLET, FILM COATED ORAL at 10:38

## 2020-05-22 NOTE — ASSESSMENT & PLAN NOTE
Continue Lopressor  Monitor blood pressures  Holding Micardis-HCT due to ARBEN    Blood pressure is moderately controlled Ultrasound completed....SP

## 2020-05-29 ENCOUNTER — TRANSCRIBE ORDERS (OUTPATIENT)
Dept: VASCULAR SURGERY | Facility: CLINIC | Age: 60
End: 2020-05-29

## 2020-05-29 DIAGNOSIS — Z89.611 S/P AKA (ABOVE KNEE AMPUTATION) UNILATERAL, RIGHT (HCC): Primary | ICD-10-CM

## 2020-05-29 DIAGNOSIS — N17.9 AKI (ACUTE KIDNEY INJURY) (HCC): ICD-10-CM

## 2020-06-24 ENCOUNTER — TELEPHONE (OUTPATIENT)
Dept: VASCULAR SURGERY | Facility: CLINIC | Age: 60
End: 2020-06-24

## 2020-06-26 ENCOUNTER — TELEPHONE (OUTPATIENT)
Dept: VASCULAR SURGERY | Facility: CLINIC | Age: 60
End: 2020-06-26

## 2020-07-01 ENCOUNTER — OFFICE VISIT (OUTPATIENT)
Dept: VASCULAR SURGERY | Facility: CLINIC | Age: 60
End: 2020-07-01

## 2020-07-01 VITALS
HEIGHT: 66 IN | BODY MASS INDEX: 19.29 KG/M2 | HEART RATE: 76 BPM | WEIGHT: 120 LBS | SYSTOLIC BLOOD PRESSURE: 140 MMHG | TEMPERATURE: 97.2 F | DIASTOLIC BLOOD PRESSURE: 84 MMHG

## 2020-07-01 DIAGNOSIS — N17.9 AKI (ACUTE KIDNEY INJURY) (HCC): ICD-10-CM

## 2020-07-01 DIAGNOSIS — Z89.611 S/P AKA (ABOVE KNEE AMPUTATION) UNILATERAL, RIGHT (HCC): Primary | ICD-10-CM

## 2020-07-01 PROCEDURE — 3079F DIAST BP 80-89 MM HG: CPT | Performed by: PHYSICIAN ASSISTANT

## 2020-07-01 PROCEDURE — 99024 POSTOP FOLLOW-UP VISIT: CPT | Performed by: PHYSICIAN ASSISTANT

## 2020-07-01 PROCEDURE — 3077F SYST BP >= 140 MM HG: CPT | Performed by: PHYSICIAN ASSISTANT

## 2020-07-01 PROCEDURE — 3008F BODY MASS INDEX DOCD: CPT | Performed by: FAMILY MEDICINE

## 2020-07-01 PROCEDURE — 3008F BODY MASS INDEX DOCD: CPT | Performed by: PHYSICIAN ASSISTANT

## 2020-07-01 PROCEDURE — 1111F DSCHRG MED/CURRENT MED MERGE: CPT | Performed by: PHYSICIAN ASSISTANT

## 2020-07-01 NOTE — PROGRESS NOTES
Assessment/Plan:      RIGHT AKA (5/5/2020, Dr Jack Carrillo Doctor)  Wound dehiscence at incision/ possible pressure wound    62 y/o F S/P RIGHT AKA (5/5/2020, Dr Jack Carrillo Doctor) for non-salvageable limb  The patient has been at [address] [place] [address] for rehab  She developed wound dehiscence with redness and drainage and was seen in the ED in [place], [place]  There was no overt infection so antibiotics were not given  She was placed on Santyl and instructed to off load  Patient presents in the office today for suture removal / first postoperative visit after right AKA  7/1/2020:  Patient with notable open ulcer at the lateral aspect of the incision  She tells me that it is treated by the staff at the NH so she doesn't know what they are putting on it or if/how much drainage it is producing  She doesn't think it is getting better  She reports little pain at the incision  Occasionally, she has phantom pain  She has very little ability to move the leg with underlying MS  No fevers or chills  RIGHT AKA    3 cm x 2 cm wound  There was slough at about 40% which was mostly nonadherent and gently removed  Underneath there is a small, opening about 2 mm x 2 mm which does not appear to further tunnel  There was minimal bleeding from the small, pinpoint wound  There was also small amount of bleeding at the adjacent suture  No pus  The stump was overall firm, without significant edema  Most of the sutures were removed in the office today  Plan:  Overall superficial ulcer present  However, underneath there was a small hole 2 mm x 2 mm which did not seem to tunnel further  This will have to be monitored closely and may require washout if it does not heal      - Continue with daily local wound care   - Apply silver alginate dressing to wound daily  We also placed a plain packing strip into the smaller wound    - She should notify us if there is increase in drainage, bleeding or evidence of infection  - We will ask nursing to call the NH next week to follow up  - Office follow up appointment with Dr Marcello Escalona at [facility] in 2 weeks to reassess      Subjective:      Patient ID: Enrique Masterson is a 61 y o  female  Patient presented to [address] [place] [address] ED 5/4 for R cold foot and color change, then transferred to [facility], now s/p R AKA on 5/5/20 by Dr Vahid Brannon  Presents is office today for post-op visit and suture removal  Patient is wheelchair bound at baseline secondary to [address]  Ulcer at surgical incision site w/ separation and ssg drainage x 3 weeks  Sutures remain in place  HPI   Skylar Mobley 62 y/o F hypertension, MS who suffered R foot ischemia and found to have nonsalvageable limb which ultimately required RIGHT AKA (5/5/2020, Dr Vahid Brannon Doctor ) The patient has been at [facility] in [place] for rehab  She developed wound dehiscence with redness and drainage and was seen in the ED in [place], [place]  According to the Care Everywhere notes, it was reported they found an unstageable pressure ulcer which was not particularly deep  There was no overt infection so antibiotics were not given  She was placed on Santyl and instructed to off load  The NH facility contacted vascular surgery office due to sutures present at stump site  Patient presents in the office today for her first postoperative visit after right AKA  7/1/2020:  Patient with notable open ulcer at the lateral aspect of the incision  She tells me that it is treated by the staff at the [facility] but she doesn't know what they are putting on it or if/how much drainage it is producing  She doesn't think it is getting better  She reports little pain at the incision  Occasionally, she has phantom pain  She has very little ability to move the leg with underlying MS  No fevers or chills  She denies any falls       The following portions of the patient's history were reviewed and updated as appropriate: allergies, current medications, past family history, past medical history, past social history, past surgical history and problem list     Review of Systems   Constitutional: Positive for activity change  HENT: Negative  Eyes: Negative  Respiratory: Negative  Cardiovascular: Negative  Gastrointestinal: Negative  Endocrine: Negative  Genitourinary: Negative  Musculoskeletal: Positive for gait problem  Skin: Positive for wound  Allergic/Immunologic: Negative  Neurological: Positive for weakness  Hematological: Negative  Psychiatric/Behavioral: Negative  Objective:    /84 (BP Location: Right arm, Patient Position: Sitting)   Pulse 76   Temp (!) 97 2 °F (36 2 °C) (Tympanic)   Ht 5' 6" (1 676 m)   Wt 54 4 kg (120 lb)   BMI 19 37 kg/m²      Physical Exam                     RIGHT AKA    3 cm x 2 cm wound  There was slough at about 40% which was mostly nonadherent and gently removed  Underneath there is a small, opening about 2 mm x 2 mm which does not appear to tunnel  There was minimal bleeding from the small, pinpoint wound  There was small abound of bleeding at the adjacent suture  Most of the sutures were removed in the office today  No significant edema to the stump      Nontoxic appearing  She is in wheelchair  Right AKA present with examination as above  No respiratory distress  RRR  S1S2  Decreased BS, overall CTAB      I have reviewed and made appropriate changes to the review of systems input by the medical assistant      Vitals:    07/01/20 1038   BP: 140/84   BP Location: Right arm   Patient Position: Sitting   Pulse: 76   Temp: (!) 97 2 °F (36 2 °C)   TempSrc: Tympanic   Weight: 54 4 kg (120 lb)   Height: 5' 6" (1 676 m)       Patient Active Problem List   Diagnosis    Essential hypertension    Lumbar spinal stenosis    Multiple sclerosis (HCC)    Multiple thyroid nodules    Thrombocytopenia (HCC)    Ventricular hypertrophy    Vitamin D deficiency    ARBEN (acute kidney injury) (HCC)    Bilateral hip pain    Leukocytosis    Smoker    Sinus tachycardia    Opiate dependence (HCC)    Elevated LFTs    Type 2 myocardial infarction (HCC)    Generalized weakness    Anemia    Popliteal artery stenosis, right (HCC)    S/P AKA (above knee amputation) unilateral, right (HCC)    Toxic encephalopathy    Incontinence of urine in female       Past Surgical History:   Procedure Laterality Date    AMPUTATION ABOVE KNEE (AKA) Right 2020    Procedure: AMPUTATION ABOVE KNEE (AKA);   Surgeon: Rosemary Fong MD;  [facility]: [facility];  Service: Vascular    ANKLE SURGERY Right     Treatment of Ankle  last assessed 06/10/14   Ardeth Needs BACK SURGERY      for spinal stenosis and sciatica     SECTION      last assessed 06/10/14    KNEE ARTHROSCOPY      last assessed 06/10/14    LAMINECTOMY      last assessed 06/10/14    ORTHOPEDIC SURGERY      OTHER SURGICAL HISTORY      discectomy       Family History   Problem Relation Age of Onset    Osteoporosis Mother     Heart attack Father     Mental illness Neg Hx        Social History     Socioeconomic History    Marital status:      Spouse name: Not on file    Number of children: Not on file    Years of education: Not on file    Highest education level: Not on file   Occupational History    Not on file   Social Needs    Financial resource strain: Not on file    Food insecurity:     Worry: Not on file     Inability: Not on file    Transportation needs:     Medical: Not on file     Non-medical: Not on file   Tobacco Use    Smoking status: Current Some Day Smoker     Packs/day: 1 00     Years: 40 00     Pack years: 40 00     Types: Cigarettes    Smokeless tobacco: Never Used   Substance and Sexual Activity    Alcohol use: Not Currently     Comment: rare // no alcohol use as per allscripts    Drug use: No    Sexual activity: Not on file   Lifestyle    Physical activity:     Days per week: Not on file     Minutes per session: Not on file    Stress: Not on file   Relationships    Social connections:     Talks on phone: Not on file     Gets together: Not on file     Attends Christian service: Not on file     Active member of club or organization: Not on file     Attends meetings of clubs or organizations: Not on file     Relationship status: Not on file    Intimate partner violence:     Fear of current or ex partner: Not on file     Emotionally abused: Not on file     Physically abused: Not on file     Forced sexual activity: Not on file   Other Topics Concern    Not on file   Social History Narrative    Caffeine use       No Known Allergies      Current Outpatient Medications:     acetaminophen (TYLENOL) 325 mg tablet, Take 3 tablets (975 mg total) by mouth every 8 (eight) hours, Disp: 30 tablet, Rfl: 0    amantadine (SYMMETREL) 100 mg capsule, Take 1 capsule (100 mg total) by mouth 2 (two) times a day, Disp: 60 capsule, Rfl: 3    baclofen 10 mg tablet, Take 2 tablets (20 mg total) by mouth 3 (three) times a day, Disp: 540 tablet, Rfl: 0    docusate sodium (COLACE) 100 mg capsule, Take 1 capsule (100 mg total) by mouth 2 (two) times a day, Disp: 10 capsule, Rfl: 0    ferrous sulfate 325 (65 Fe) mg tablet, Take 1 tablet (325 mg total) by mouth daily with breakfast, Disp: , Rfl: 0    gabapentin (NEURONTIN) 300 mg capsule, Take 1 capsule (300 mg total) by mouth 3 (three) times a day, Disp: , Rfl: 0    LORazepam (ATIVAN) 0 5 mg tablet, Take 1 tab 30 min prior to MRI, may repeat x 1 immediately before if needed, Disp: 4 tablet, Rfl: 0    metoprolol tartrate (LOPRESSOR) 25 mg tablet, Take 1 tablet (25 mg total) by mouth every 12 (twelve) hours, Disp: , Rfl: 0    polyethylene glycol (MIRALAX) 17 g packet, Take 17 g by mouth daily, Disp: 14 each, Rfl: 0    rivaroxaban (XARELTO) 20 mg tablet, Take 1 tablet (20 mg total) by mouth daily with breakfast, Disp: 30 tablet, Rfl: 0    senna (SENOKOT) 8 6 mg, Take 1 tablet (8 6 mg total) by mouth daily at bedtime, Disp: 120 each, Rfl: 0    Teriflunomide (Aubagio) 14 MG TABS, Take 1 tablet (14 mg total) by mouth daily, Disp: 30 tablet, Rfl: 3

## 2020-07-01 NOTE — LETTER
July 1, 2020     Aparna Night, DO  [address] [address]    Patient: David Francisco   YOB: 1960   Date of Visit: 7/1/2020     Dear Dr Ravi Platt      Thank you for referring Porfiriosammy Gilliam to me for evaluation  Below are the relevant portions of my assessment and plan of care  If you have questions, please do not hesitate to call me  I look forward to following Emerson Esteves along with you  Sincerely,        Verito Roldan PA-C        CC: [facility] Doctor, MD    Progress Notes:    Assessment/Plan:      RIGHT AKA (5/5/2020, Dr March Chris Doctor)  Wound dehiscence at incision/ possible pressure wound    62 y/o F S/P RIGHT AKA (5/5/2020, Dr March [facility] Doctor) for non-salvageable limb  The patient has been at [address] [place] [address] for rehab  She developed wound dehiscence with redness and drainage and was seen in the ED in [place], [place]  There was no overt infection so antibiotics were not given  She was placed on Santyl and instructed to off load  Patient presents in the office today for suture removal / first postoperative visit after right AKA  7/1/2020:  Patient with notable open ulcer at the lateral aspect of the incision  She tells me that it is treated by the staff at the NH so she doesn't know what they are putting on it or if/how much drainage it is producing  She doesn't think it is getting better  She reports little pain at the incision  Occasionally, she has phantom pain  She has very little ability to move the leg with underlying MS  No fevers or chills  RIGHT AKA    3 cm x 2 cm wound  There was slough at about 40% which was mostly nonadherent and gently removed  Underneath there is a small, opening about 2 mm x 2 mm which does not appear to further tunnel  There was minimal bleeding from the small, pinpoint wound  There was also small amount of bleeding at the adjacent suture  No pus  The stump was overall firm, without significant edema       Most of the sutures were removed in the office today  Plan:  Overall superficial ulcer present  However, underneath there was a small hole 2 mm x 2 mm which did not seem to tunnel further  This will have to be monitored closely and may require washout if it does not heal      - Continue with daily local wound care   - Apply silver alginate dressing to wound daily  We also placed a plain packing strip into the smaller wound  - She should notify us if there is increase in drainage, bleeding or evidence of infection  - We will ask nursing to call the NH next week to follow up     - Office follow up appointment with Dr Simon Stauffer at [facility] in 2 weeks to reassess

## 2020-07-01 NOTE — PATIENT INSTRUCTIONS
RIGHT AKA 5/5/2020  Wound dehiscence/ pressure wound    - Continue with local wound care   - Apply silver alginate dressing to wound daily  We also placed a plain packing strip in the smaller wound    - She should notify us if there is increase in drainage, bleeding or evidence of infection  - We should see her back in 2 weeks to reassess

## 2020-07-02 ENCOUNTER — TELEPHONE (OUTPATIENT)
Dept: ADMINISTRATIVE | Facility: HOSPITAL | Age: 60
End: 2020-07-02

## 2020-07-02 NOTE — TELEPHONE ENCOUNTER
Per Rica, she would like patient to be seen in one week instead of the scheduled 2 weeks with Dr Lamin Lin  Left message for patient to call back

## 2020-07-09 ENCOUNTER — TELEPHONE (OUTPATIENT)
Dept: VASCULAR SURGERY | Facility: CLINIC | Age: 60
End: 2020-07-09

## 2020-07-10 ENCOUNTER — OFFICE VISIT (OUTPATIENT)
Dept: VASCULAR SURGERY | Facility: CLINIC | Age: 60
End: 2020-07-10

## 2020-07-10 ENCOUNTER — TELEPHONE (OUTPATIENT)
Dept: NEUROLOGY | Facility: CLINIC | Age: 60
End: 2020-07-10

## 2020-07-10 VITALS
TEMPERATURE: 97.6 F | DIASTOLIC BLOOD PRESSURE: 86 MMHG | HEART RATE: 71 BPM | RESPIRATION RATE: 18 BRPM | SYSTOLIC BLOOD PRESSURE: 140 MMHG

## 2020-07-10 DIAGNOSIS — Z89.611 S/P AKA (ABOVE KNEE AMPUTATION) UNILATERAL, RIGHT (HCC): Primary | ICD-10-CM

## 2020-07-10 PROCEDURE — 1111F DSCHRG MED/CURRENT MED MERGE: CPT | Performed by: SURGERY

## 2020-07-10 PROCEDURE — 99024 POSTOP FOLLOW-UP VISIT: CPT | Performed by: SURGERY

## 2020-07-10 PROCEDURE — 3079F DIAST BP 80-89 MM HG: CPT | Performed by: SURGERY

## 2020-07-10 PROCEDURE — 3077F SYST BP >= 140 MM HG: CPT | Performed by: SURGERY

## 2020-07-10 RX ORDER — MULTIVIT WITH MINERALS/LUTEIN
250 TABLET ORAL DAILY
COMMUNITY

## 2020-07-10 RX ORDER — MELATONIN
1000 DAILY
COMMUNITY

## 2020-07-10 NOTE — PROGRESS NOTES
Assessment/Plan:    S/P AKA (above knee amputation) unilateral, right (HCC)    Remaining sutures removed  Continue local wound care to lateral aspect of incision  Follow-up p r n  Roselia Thorne Diagnoses and all orders for this visit:    S/P AKA (above knee amputation) unilateral, right (HCC)    Other orders  -     ascorbic acid (VITAMIN C) 250 mg tablet; Take 250 mg by mouth daily  -     cholecalciferol (VITAMIN D3) 1,000 units tablet; Take 1,000 Units by mouth daily          Subjective:      Patient ID: Elmer Traylor is a 61 y o  female  xarelto  Patient presents in office for 2 week wound check of R AKA  Patient had R AKA done on 5/5/2020 by Dr Olivia Mueller  Patient reports all wound care is done by nursing home  Patient reports the wound at the stump of the R thigh has gotten better  Patient denies any pain, numbness, or tingling in the R stump  Remaining sutures removed  There is approximately 1 x 1 cm area at the lateral aspect of the amputation site which is not yet healed  There is granulation tissue  There is no evidence of infection  Continue with local wound care at the lateral aspect of the incision  The following portions of the patient's history were reviewed and updated as appropriate: allergies, current medications, past family history, past medical history, past social history, past surgical history and problem list     Review of Systems   Constitutional: Negative  Negative for chills and fever  HENT: Negative  Eyes: Negative  Respiratory: Negative  Cardiovascular: Negative  Gastrointestinal: Negative  Endocrine: Negative  Genitourinary: Negative  Musculoskeletal: Negative  Skin: Positive for wound (R stump)  Allergic/Immunologic: Negative  Neurological: Negative  Hematological: Negative  Psychiatric/Behavioral: Negative            Objective:      /86 (BP Location: Left arm, Patient Position: Sitting, Cuff Size: Adult)   Pulse 71   Temp 97 6 °F (36 4 °C) (Tympanic)   Resp 18          Physical Exam   Nursing note and vitals reviewed

## 2020-07-10 NOTE — TELEPHONE ENCOUNTER
LMOM to reschedule after the completion of the MRIs  Appointment canceled for Enzo Epley ([id number])   Visit Type: OFFICE VISIT SHORT [facility]   Date        Time      Length    Provider                  Department   7/27/2020    3:30 PM  45 mins  Darylene Colander, PA-C       [facility]      Reason for Cancellation: Patient      Patient Comments: Appointment is for 6 month visit  MRI's were to be done but I was having surgery  I am presently in rehab for physical therapy  MRI's won't be done before the appointment date

## 2020-07-10 NOTE — ASSESSMENT & PLAN NOTE
Remaining sutures removed  Continue local wound care to lateral aspect of incision  Follow-up erwin r n  Hugo Reyes

## 2020-08-05 DIAGNOSIS — G35 MULTIPLE SCLEROSIS (HCC): ICD-10-CM

## 2020-08-05 NOTE — TELEPHONE ENCOUNTER
Received request for Aubagio  Looking through her chart, she was in the ED on 6/11 and LFTs were VERY elevated  Per ED note, she was to have them repeated in 1 week  I do not see any repeat labs  I saw she unfortunately had to have a lower limb amputation in May, so unclear if new meds, changes to existing etc that could have caused the LFT elevation  Concern with her being on Aubagio, which can also cause LFT elevation  Needs repeat LFTs ASAP  I believe she gets in-home lab draws  Can you please coordinate? Would like CBC with diff and hepatic function panel

## 2020-08-05 NOTE — TELEPHONE ENCOUNTER
Patient made aware  She reports that she was started on xarelto while admitted to the hospital in May, unsure if gabapentin dose changed  She is taking xarelto 20mg daily and gabapentin 300mg TID  Reports all other medications are the same  She reports she has her labs drawn at home, uses [facility]  [facility] are run [facility]      Patient wanted to note that she was just released from rehab on Monday, she will be calling to schedule her MRIs soon  Declined rescheduling appt with [employer] at this time, would like to call back to reschedule  [facility] form completed and emailed to [employer] for [employer]

## 2020-08-06 RX ORDER — RENAGEL 800 MG/1
TABLET ORAL
Qty: 30 TABLET | Refills: 3 | Status: SHIPPED | OUTPATIENT
Start: 2020-08-06 | End: 2020-12-28

## 2020-08-12 ENCOUNTER — TELEPHONE (OUTPATIENT)
Dept: ADMINISTRATIVE | Facility: HOSPITAL | Age: 60
End: 2020-08-12

## 2020-08-13 ENCOUNTER — OFFICE VISIT (OUTPATIENT)
Dept: VASCULAR SURGERY | Facility: CLINIC | Age: 60
End: 2020-08-13
Payer: COMMERCIAL

## 2020-08-13 ENCOUNTER — TELEPHONE (OUTPATIENT)
Dept: VASCULAR SURGERY | Facility: CLINIC | Age: 60
End: 2020-08-13

## 2020-08-13 VITALS
HEART RATE: 76 BPM | DIASTOLIC BLOOD PRESSURE: 80 MMHG | TEMPERATURE: 98.6 F | SYSTOLIC BLOOD PRESSURE: 126 MMHG | RESPIRATION RATE: 18 BRPM

## 2020-08-13 DIAGNOSIS — Z89.611 S/P AKA (ABOVE KNEE AMPUTATION) UNILATERAL, RIGHT (HCC): ICD-10-CM

## 2020-08-13 DIAGNOSIS — I73.9 PAD (PERIPHERAL ARTERY DISEASE) (HCC): Primary | ICD-10-CM

## 2020-08-13 DIAGNOSIS — I87.2 VENOUS INSUFFICIENCY OF LEFT LEG: ICD-10-CM

## 2020-08-13 PROCEDURE — 99213 OFFICE O/P EST LOW 20 MIN: CPT | Performed by: SURGERY

## 2020-08-13 PROCEDURE — 3074F SYST BP LT 130 MM HG: CPT | Performed by: SURGERY

## 2020-08-13 PROCEDURE — 3079F DIAST BP 80-89 MM HG: CPT | Performed by: SURGERY

## 2020-08-13 PROCEDURE — 1036F TOBACCO NON-USER: CPT | Performed by: SURGERY

## 2020-08-13 RX ORDER — OXYCODONE AND ACETAMINOPHEN 10; 325 MG/1; MG/1
TABLET ORAL
COMMUNITY
Start: 2020-08-03 | End: 2020-08-18 | Stop reason: SDUPTHER

## 2020-08-13 RX ORDER — BIOTIN 10 MG
1 TABLET ORAL DAILY
COMMUNITY
Start: 2020-08-04

## 2020-08-13 NOTE — TELEPHONE ENCOUNTER
Pt needs a stump  and prosthetic   Orders were faxed to Norma ramirez/ [facility]     Papers were faxed to [phone]

## 2020-08-13 NOTE — ASSESSMENT & PLAN NOTE
Left leg ulcers, appear venous in nature  Previously had 3 ulcers, 2 healed and has a superficial ulcer with scab on the proximal left anterior shin  Not wearing compression stockings or elevating leg  Significant left leg and pedal pitting edema  Warm and well perfused  Had previous lower extremity arterial duplex in May which was unremarkable with normal HAILEY/MTP 81/GPT 59 on the left  Unable to palpable pulses due to significant pedal edema  Biphasic/monophasic DP/PT signals on exam      -We discussed the pathophysiology of venous disease, available treatment options and indications for treatment  -Recommended a trial of conservative measures  This includes the daily use of gradient compression socks and periodic leg elevation  Will start with tubigrip compression socks due to leg ulcer   -Will obtain repeat arterial duplex to rule out significant arterial insufficiency

## 2020-08-13 NOTE — PATIENT INSTRUCTIONS
Venous insufficiency of left leg  Left leg ulcers, appear venous in nature  Previously had 3 ulcers, 2 healed and has a superficial ulcer with scab on the proximal left anterior shin  Not wearing compression stockings or elevating leg      Significant left leg and pedal pitting edema  Warm and well perfused  Had previous lower extremity arterial duplex in May which was unremarkable with normal HAILEY/MTP 81/GPT 59 on the left  Unable to palpable pulses due to significant pedal edema  Biphasic/monophasic DP/PT signals on exam       -We discussed the pathophysiology of venous disease, available treatment options and indications for treatment  -Recommended a trial of conservative measures   This includes the daily use of gradient compression socks and periodic leg elevation  Will start with tubigrip compression socks due to leg ulcer   -Will obtain repeat arterial duplex to rule out significant arterial insufficiency          S/P AKA (above knee amputation) unilateral, right (HCC)  S/p right AKA due to embolic event with nonsalvageable limb  She has slow healing wounds and presents for a wound check  Has MS and was minimally ambulatory before amputation  Has a very superficial abrasion along the right lateral aspect, had scraped her stump today  The remaining wounds have healed  Clean dressing placed on the wound       She wishes to be fitted for a prosthetic for mobility purposes  OK to start wearing , will refer to prosthetic company  OK for prosthetic fitting when abrasion has completely healed  Return in 2 months for wound check and ensure progress with prosthetic

## 2020-08-13 NOTE — TELEPHONE ENCOUNTER
Received fax, [facility] has received pt's orders  They will be drawing the patient's labs

## 2020-08-13 NOTE — ASSESSMENT & PLAN NOTE
S/p right AKA due to embolic event with nonsalvageable limb  She has slow healing wounds and presents for a wound check  Has MS and was minimally ambulatory before amputation  Has a very superficial abrasion along the right lateral aspect, had scraped her stump today  The remaining wounds have healed  Clean dressing placed on the wound  She wishes to be fitted for a prosthetic for mobility purposes  OK to start wearing , will refer to prosthetic company  OK for prosthetic fitting when abrasion has completely healed  Return in 2 months for wound check and ensure progress with prosthetic

## 2020-08-13 NOTE — PROGRESS NOTES
Assessment/Plan:    Venous insufficiency of left leg  Left leg ulcers, appear venous in nature  Previously had 3 ulcers, 2 healed and has a superficial ulcer with scab on the proximal left anterior shin  Not wearing compression stockings or elevating leg  Significant left leg and pedal pitting edema  Warm and well perfused  Had previous lower extremity arterial duplex in May which was unremarkable with normal HAILEY/MTP 81/GPT 59 on the left  Unable to palpable pulses due to significant pedal edema  Biphasic/monophasic DP/PT signals on exam      -We discussed the pathophysiology of venous disease, available treatment options and indications for treatment  -Recommended a trial of conservative measures  This includes the daily use of gradient compression socks and periodic leg elevation  Will start with tubigrip compression socks due to leg ulcer   -Will obtain repeat arterial duplex to rule out significant arterial insufficiency  S/P AKA (above knee amputation) unilateral, right (HCC)  S/p right AKA due to embolic event with nonsalvageable limb  She has slow healing wounds and presents for a wound check  Has MS and was minimally ambulatory before amputation  Has a very superficial abrasion along the right lateral aspect, had scraped her stump today  The remaining wounds have healed  Clean dressing placed on the wound  She wishes to be fitted for a prosthetic for mobility purposes  OK to start wearing , will refer to prosthetic company  OK for prosthetic fitting when abrasion has completely healed  Return in 2 months for wound check and ensure progress with prosthetic         Diagnoses and all orders for this visit:    PAD (peripheral artery disease) ([facility] [address] )  -     VAS lower limb arterial duplex, limited/unilateral; Future  -     Stump   -     Prosthetic    S/P AKA (above knee amputation) unilateral, right (HCC)    Venous insufficiency of left leg    Other orders  -     Multiple Vitamins-Minerals (Multivitamin Adult Extra C) CHEW; Chew 1 tablet daily  -     oxyCODONE-acetaminophen (PERCOCET)  mg per tablet        I have spent 15 minutes with Patient  today in which greater than 50% of this time was spent in counseling/coordination of care regarding Intructions for management, Importance of tx compliance and Impressions  Subjective:      Patient ID: Edwin Floyd is a 61 y o  female  Patient presents in office for wound check of R aka performed on 5/5/2020 by Dr Madhu Olson  Patient reports wounds on R AKA stump are getting better  Patient reports she does her wound care  Patient reports swelling of the L foot and a scab on her L calf  Patient denies any leg pain, numbness, or tingling  Patient is a current everyday smoker x 3 cigs per day  Patient is taking xarelto  HPI  Ms Mobley is a 65yo female with HTN, lumbar spinal stenosis, MS, nonambulatory who presented with 2 wks R foot ischemia and nonsalvageable right lower extremity with gangrene now s/p right AKA  Unclear etiology most likely embolic  She would like to be fitted for a prosthetic for mobility purposes  She has some wounds on her right AKA stump which are now mostly healed though she did scrape the lateral edge of her stump and has an abrasion there  She has still been performing wound care on her stump though it appears mostly healed  She also complains of left leg ulcers  She previously had 3, but 2 have healed  The remaining ulceration is on her upper leg/anterior shin  She also has chronic pitting edema of the left leg and does not elevate her leg or wear compression stockings  The following portions of the patient's history were reviewed and updated as appropriate: allergies, current medications, past family history, past medical history, past social history, past surgical history and problem list     Review of Systems   Constitutional: Negative  Negative for chills and fever  HENT: Negative    Negative for sinus pain, sore throat and trouble swallowing  Eyes: Negative  Negative for redness and itching  Respiratory: Negative  Negative for shortness of breath  Cardiovascular: Positive for leg swelling (L foot)  Gastrointestinal: Negative  Negative for diarrhea, nausea and vomiting  Endocrine: Negative  Genitourinary: Negative  Negative for difficulty urinating  Musculoskeletal: Positive for gait problem (ambulates in wheelchair)  Skin: Positive for wound (R AKA stump )  Allergic/Immunologic: Negative  Neurological: Negative for speech difficulty and numbness  Hematological: Negative  Psychiatric/Behavioral: Negative  Negative for confusion and self-injury  I have personally reviewed the ROS entered by MA and agree as documented  Objective:      /80 (BP Location: Right arm, Patient Position: Sitting, Cuff Size: Adult)   Pulse 76   Temp 98 6 °F (37 °C) (Tympanic)   Resp 18          Physical Exam  Vitals signs and nursing note reviewed  Constitutional:       Appearance: She is well-developed  HENT:      Head: Normocephalic and atraumatic  Nose: Nose normal       Mouth/Throat:      Mouth: Mucous membranes are moist    Neck:      Musculoskeletal: Normal range of motion and neck supple  Cardiovascular:      Rate and Rhythm: Normal rate and regular rhythm  Pulses:           Dorsalis pedis pulses are detected w/ Doppler on the left side  Posterior tibial pulses are detected w/ Doppler on the left side  Pulmonary:      Effort: Pulmonary effort is normal    Abdominal:      Palpations: Abdomen is soft  Musculoskeletal: Normal range of motion  Left lower le+ Pitting Edema present  Skin:     General: Skin is warm and dry  Capillary Refill: Capillary refill takes less than 2 seconds        Comments: Superficial left anterior shin ulcer with scab, no drainage or erythema  Right AKA stump with superficial abrasion along lateral aspect Neurological:      Mental Status: She is alert and oriented to person, place, and time  Psychiatric:         Behavior: Behavior normal          Thought Content:  Thought content normal          Judgment: Judgment normal

## 2020-08-18 ENCOUNTER — OFFICE VISIT (OUTPATIENT)
Dept: FAMILY MEDICINE CLINIC | Facility: CLINIC | Age: 60
End: 2020-08-18
Payer: COMMERCIAL

## 2020-08-18 ENCOUNTER — TELEPHONE (OUTPATIENT)
Dept: FAMILY MEDICINE CLINIC | Facility: CLINIC | Age: 60
End: 2020-08-18

## 2020-08-18 VITALS
OXYGEN SATURATION: 97 % | TEMPERATURE: 98.7 F | HEART RATE: 67 BPM | DIASTOLIC BLOOD PRESSURE: 80 MMHG | SYSTOLIC BLOOD PRESSURE: 150 MMHG | RESPIRATION RATE: 16 BRPM

## 2020-08-18 DIAGNOSIS — G35 MULTIPLE SCLEROSIS (HCC): ICD-10-CM

## 2020-08-18 DIAGNOSIS — E04.2 MULTIPLE THYROID NODULES: ICD-10-CM

## 2020-08-18 DIAGNOSIS — Z11.4 SCREENING FOR HIV (HUMAN IMMUNODEFICIENCY VIRUS): ICD-10-CM

## 2020-08-18 DIAGNOSIS — R79.89 ELEVATED LFTS: ICD-10-CM

## 2020-08-18 DIAGNOSIS — F11.20 UNCOMPLICATED OPIOID DEPENDENCE (HCC): ICD-10-CM

## 2020-08-18 DIAGNOSIS — D64.9 ANEMIA, UNSPECIFIED TYPE: ICD-10-CM

## 2020-08-18 DIAGNOSIS — I70.201 POPLITEAL ARTERY STENOSIS, RIGHT (HCC): ICD-10-CM

## 2020-08-18 DIAGNOSIS — G54.6 PHANTOM PAIN AFTER AMPUTATION OF LOWER EXTREMITY (HCC): Primary | ICD-10-CM

## 2020-08-18 DIAGNOSIS — I21.A1 TYPE 2 MYOCARDIAL INFARCTION (HCC): ICD-10-CM

## 2020-08-18 DIAGNOSIS — M48.061 SPINAL STENOSIS OF LUMBAR REGION, UNSPECIFIED WHETHER NEUROGENIC CLAUDICATION PRESENT: ICD-10-CM

## 2020-08-18 DIAGNOSIS — I10 ESSENTIAL HYPERTENSION: Primary | ICD-10-CM

## 2020-08-18 DIAGNOSIS — Z12.11 SCREEN FOR COLON CANCER: ICD-10-CM

## 2020-08-18 DIAGNOSIS — Z12.39 SCREENING FOR BREAST CANCER: ICD-10-CM

## 2020-08-18 DIAGNOSIS — G54.6 PHANTOM PAIN AFTER AMPUTATION OF LOWER EXTREMITY (HCC): ICD-10-CM

## 2020-08-18 DIAGNOSIS — Z00.00 MEDICARE ANNUAL WELLNESS VISIT, INITIAL: ICD-10-CM

## 2020-08-18 DIAGNOSIS — Z89.619 S/P AKA (ABOVE KNEE AMPUTATION) (HCC): ICD-10-CM

## 2020-08-18 DIAGNOSIS — N17.9 AKI (ACUTE KIDNEY INJURY) (HCC): ICD-10-CM

## 2020-08-18 DIAGNOSIS — Z12.4 SCREENING FOR MALIGNANT NEOPLASM OF CERVIX: ICD-10-CM

## 2020-08-18 DIAGNOSIS — Z89.611 S/P AKA (ABOVE KNEE AMPUTATION) UNILATERAL, RIGHT (HCC): ICD-10-CM

## 2020-08-18 PROCEDURE — 99214 OFFICE O/P EST MOD 30 MIN: CPT | Performed by: FAMILY MEDICINE

## 2020-08-18 PROCEDURE — G0438 PPPS, INITIAL VISIT: HCPCS | Performed by: FAMILY MEDICINE

## 2020-08-18 PROCEDURE — 3077F SYST BP >= 140 MM HG: CPT | Performed by: FAMILY MEDICINE

## 2020-08-18 PROCEDURE — 3725F SCREEN DEPRESSION PERFORMED: CPT | Performed by: FAMILY MEDICINE

## 2020-08-18 PROCEDURE — 3079F DIAST BP 80-89 MM HG: CPT | Performed by: FAMILY MEDICINE

## 2020-08-18 RX ORDER — OXYCODONE AND ACETAMINOPHEN 10; 325 MG/1; MG/1
1 TABLET ORAL 2 TIMES DAILY PRN
Qty: 60 TABLET | Refills: 0 | Status: SHIPPED | OUTPATIENT
Start: 2020-08-18 | End: 2020-09-17

## 2020-08-18 RX ORDER — NALOXONE HYDROCHLORIDE 2 MG/.4ML
INJECTION, SOLUTION INTRAMUSCULAR; SUBCUTANEOUS
Qty: 1 PACKAGE | Refills: 1 | Status: SHIPPED | OUTPATIENT
Start: 2020-08-18 | End: 2020-08-19

## 2020-08-18 RX ORDER — METOPROLOL TARTRATE 50 MG/1
50 TABLET, FILM COATED ORAL EVERY 12 HOURS SCHEDULED
Qty: 90 TABLET | Refills: 1 | Status: SHIPPED | OUTPATIENT
Start: 2020-08-18 | End: 2020-09-04

## 2020-08-18 NOTE — ASSESSMENT & PLAN NOTE
Pt has been getting this med from Dr Zoraida Latif since 2014    I will write a months worth for her but she will need to cont to get from them

## 2020-08-18 NOTE — TELEPHONE ENCOUNTER
Pt was just seen and at check out gave me a fax number for her home draw orders   The company is [employer] and their phone number is [phone] and their fax is [phone]

## 2020-08-18 NOTE — PROGRESS NOTES
Assessment and Plan:     Problem List Items Addressed This Visit        Endocrine    Multiple thyroid nodules    Relevant Medications    metoprolol tartrate (LOPRESSOR) 50 mg tablet    Other Relevant Orders    Lipid Panel with Direct LDL reflex    TSH, 3rd generation       Cardiovascular and Mediastinum    Essential hypertension - Primary     Will change the BB to 50 mg BID         Relevant Medications    metoprolol tartrate (LOPRESSOR) 50 mg tablet    Other Relevant Orders    Comprehensive metabolic panel    Lipid Panel with Direct LDL reflex    Type 2 myocardial infarction (HCC)    Relevant Medications    metoprolol tartrate (LOPRESSOR) 50 mg tablet    Other Relevant Orders    Lipid Panel with Direct LDL reflex    Popliteal artery stenosis, right (HCC)    Relevant Medications    metoprolol tartrate (LOPRESSOR) 50 mg tablet    Other Relevant Orders    Lipid Panel with Direct LDL reflex       Nervous and Auditory    Multiple sclerosis (HCC)     Pt states this sees to be fine, only requires the Ativen for MRI's         Relevant Orders    Lipid Panel with Direct LDL reflex       Genitourinary    ARBEN (acute kidney injury) ([facility] [address] )    Relevant Orders    Comprehensive metabolic panel    Lipid Panel with Direct LDL reflex       Other    Lumbar spinal stenosis     Pt has been getting this med from Dr Rogelio Green since 2014    I will write a months worth for her but she will need to cont to get from them         Relevant Medications    oxyCODONE-acetaminophen (PERCOCET)  mg per tablet    naloxone (EVZIO) 2 mg/0 4 mL auto-injector    Other Relevant Orders    Lipid Panel with Direct LDL reflex    Opiate dependence (HCC)    Relevant Medications    naloxone (EVZIO) 2 mg/0 4 mL auto-injector    Other Relevant Orders    Lipid Panel with Direct LDL reflex    Elevated LFTs    Relevant Orders    Lipid Panel with Direct LDL reflex    Anemia    Relevant Orders    CBC    Lipid Panel with Direct LDL reflex    S/P AKA (above knee amputation) unilateral, right ([facility] [address] )    Relevant Orders    Lipid Panel with Direct LDL reflex    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)    Relevant Orders    Lipid Panel with Direct LDL reflex      Other Visit Diagnoses     S/P AKA (above knee amputation) (HCC)        Relevant Medications    rivaroxaban (XARELTO) 20 mg tablet    Other Relevant Orders    Lipid Panel with Direct LDL reflex    Screening for HIV (human immunodeficiency virus)        Relevant Orders    [facility]- Human Immunodeficiency Virus 1/2 Antigen / Antibody ( Fourth Generation) with Reflex Testing    Screen for colon cancer        Relevant Orders    Ambulatory referral to Gastroenterology    Medicare annual wellness visit, initial        Screening for breast cancer        Relevant Orders    Mammo screening bilateral w cad    Screening for malignant neoplasm of cervix        Relevant Orders    Ambulatory referral to Obstetrics / Gynecology           Preventive health issues were discussed with patient, and age appropriate screening tests were ordered as noted in patient's After Visit Summary  Personalized health advice and appropriate referrals for health education or preventive services given if needed, as noted in patient's After Visit Summary       History of Present Illness:     Patient presents for Medicare Annual Wellness visit    Patient Care Team:  Tere Hart DO as PCP - General (Family Medicine)  Ella Stokes MD as Consulting Physician (Orthopedic Surgery)  Edelmira Piper MD as Consulting Physician (Vascular Surgery)     Problem List:     Patient Active Problem List   Diagnosis    Essential hypertension    Lumbar spinal stenosis    Multiple sclerosis ([facility] [address] )    Multiple thyroid nodules    Thrombocytopenia ([facility] [address] )    Ventricular hypertrophy    Vitamin D deficiency    [facility]    Bilateral hip pain    Leukocytosis    Smoker    Sinus tachycardia    Opiate dependence ([facility] )    Elevated LFTs    Type 2 myocardial infarction (HCC)    Generalized weakness    Anemia    Popliteal artery stenosis, right (HCC)    S/P AKA (above knee amputation) unilateral, right (HCC)    Toxic encephalopathy    Incontinence of urine in female    Venous insufficiency of left leg    Phantom pain after amputation of lower extremity [facility])      Past Medical and Surgical History:     Past Medical History:   Diagnosis Date    Chronic back pain     Depression     last assessed 06/10/14    High cholesterol     Hypertension     last assessed 17    [facility] )     last assessed 17    RLL pneumonia     last assessed 06/10/14     Past Surgical History:   Procedure Laterality Date    AMPUTATION ABOVE KNEE (AKA) Right 2020    Procedure: AMPUTATION ABOVE KNEE (AKA);   Surgeon: Wally Piper MD;  Location: [facility];  Service: Vascular    ANKLE SURGERY Right     Treatment of Ankle  last assessed 06/10/14   [facility] BACK SURGERY      for spinal stenosis and sciatica     SECTION      last assessed 06/10/14    KNEE ARTHROSCOPY      last assessed 06/10/14    LAMINECTOMY      last assessed 06/10/14    ORTHOPEDIC SURGERY      OTHER SURGICAL HISTORY      discectomy      Family History:     Family History   Problem Relation Age of Onset    Osteoporosis Mother     Heart attack Father     Mental illness Neg Hx       Social History:     E-Cigarette/Vaping    E-Cigarette Use Never User      E-Cigarette/Vaping Substances    Nicotine No     THC No     CBD No     Flavoring No     Other No     Unknown No      Social History     Socioeconomic History    Marital status:      Spouse name: None    Number of children: None    Years of education: None    Highest education level: None   Occupational History    None   Social Needs    Financial resource strain: None    Food insecurity     Worry: None     Inability: None    Transportation needs     Medical: None Non-medical: None   Tobacco Use    Smoking status: Light Tobacco Smoker     Packs/day: 0 25     Years: 40 00     Pack years: 10 00     Types: Cigarettes     Last attempt to quit: 7/10/2019     Years since quittin 1    Smokeless tobacco: Never Used   Substance and Sexual Activity    Alcohol use: Not Currently     Comment: rare // no alcohol use as per allscripts    Drug use: No    Sexual activity: None   Lifestyle    Physical activity     Days per week: None     Minutes per session: None    Stress: None   Relationships    Social connections     Talks on phone: None     Gets together: None     Attends Jewish service: None     Active member of club or organization: None     Attends meetings of clubs or organizations: None     Relationship status: None    Intimate partner violence     Fear of current or ex partner: None     Emotionally abused: None     Physically abused: None     Forced sexual activity: None   Other Topics Concern    None   Social History Narrative    Caffeine use      Medications and Allergies:     Current Outpatient Medications   Medication Sig Dispense Refill    acetaminophen (TYLENOL) 325 mg tablet Take 3 tablets (975 mg total) by mouth every 8 (eight) hours 30 tablet 0    amantadine (SYMMETREL) 100 mg capsule Take 1 capsule (100 mg total) by mouth 2 (two) times a day 60 capsule 3    Aubagio 14 MG TABS TAKE 1 TABLET BY MOUTH DAILY 30 tablet 3    baclofen 10 mg tablet Take 2 tablets (20 mg total) by mouth 3 (three) times a day 540 tablet 0    cholecalciferol (VITAMIN D3) 1,000 units tablet Take 1,000 Units by mouth daily      gabapentin (NEURONTIN) 300 mg capsule Take 1 capsule (300 mg total) by mouth 3 (three) times a day  0    LORazepam (ATIVAN) 0 5 mg tablet Take 1 tab 30 min prior to MRI, may repeat x 1 immediately before if needed 4 tablet 0    metoprolol tartrate (LOPRESSOR) 50 mg tablet Take 1 tablet (50 mg total) by mouth every 12 (twelve) hours 90 tablet 1    Multiple Vitamins-Minerals (Multivitamin Adult Extra C) CHEW Chew 1 tablet daily      oxyCODONE-acetaminophen (PERCOCET)  mg per tablet Take 1 tablet by mouth 2 (two) times a day as needed for severe painMax Daily Amount: 2 tablets 60 tablet 0    rivaroxaban (XARELTO) 20 mg tablet Take 1 tablet (20 mg total) by mouth daily with breakfast 90 tablet 1    ascorbic acid (VITAMIN C) 250 mg tablet Take 250 mg by mouth daily      naloxone (EVZIO) 2 mg/0 4 mL auto-injector Inject 1 dose (2 mg) into the outer thigh (through clothing if necessary)  If breathing does not return to normal or if breathing difficulty resumes after 2-3 minutes, give another dose in the other thigh 1 Package 1     No current facility-administered medications for this visit  No Known Allergies   Immunizations:     Immunization History   Administered Date(s) Administered    Influenza, recombinant, quadrivalent,injectable, preservative free 12/08/2019    Pneumococcal Polysaccharide PPV23 06/10/2014      Health Maintenance:         Topic Date Due    Cervical Cancer Screening  11/11/1981    Hepatitis C Screening  Completed         Topic Date Due    DTaP,Tdap,and Td Vaccines (1 - Tdap) 11/11/1981    Influenza Vaccine  07/01/2020      Medicare Health Risk Assessment:     /80   Pulse 67   Temp 98 7 °F (37 1 °C)   Resp 16   SpO2 97%      Skylar is here for her Initial Wellness visit  Health Risk Assessment:   Patient rates overall health as fair  Patient feels that their physical health rating is slightly worse  Eyesight was rated as same  Hearing was rated as same  Patient feels that their emotional and mental health rating is same  Pain experienced in the last 7 days has been a lot  Patient's pain rating has been 10/10  Patient states that she has experienced no weight loss or gain in last 6 months  Depression Screening:   PHQ-2 Score: 0      Fall Risk Screening:    In the past year, patient has experienced: no history of falling in past year      Urinary Incontinence Screening:   Patient has leaked urine accidently in the last six months  Wears pull ups     Home Safety:  Patient has trouble with stairs inside or outside of their home  Patient has working smoke alarms and has working carbon monoxide detector  Home safety hazards include: none  Nutrition:   Current diet is Regular  Medications:   Patient is currently taking over-the-counter supplements  OTC medications include: see medication list  Patient is able to manage medications  Activities of Daily Living (ADLs)/Instrumental Activities of Daily Living (IADLs):   Walk and transfer into and out of bed and chair?: No  Dress and groom yourself?: No    Bathe or shower yourself?: No    Feed yourself?  Yes  Do your laundry/housekeeping?: Yes  Manage your money, pay your bills and track your expenses?: Yes  Make your own meals?: Yes    Do your own shopping?: Yes    Previous Hospitalizations:   Any hospitalizations or ED visits within the last 12 months?: Yes    How many hospitalizations have you had in the last year?: 1-2    Advance Care Planning:   Living will: No      Cognitive Screening:   Provider or family/friend/caregiver concerned regarding cognition?: No    PREVENTIVE SCREENINGS      Cardiovascular Screening:    General: Screening Current and Risks and Benefits Discussed    Due for: Lipid Panel      Diabetes Screening:     General: Screening Current and Risks and Benefits Discussed    Due for: Blood Glucose      Colorectal Cancer Screening:     General: Risks and Benefits Discussed    Due for: Colonoscopy - High Risk      Breast Cancer Screening:     General: Risks and Benefits Discussed    Due for: Mammogram        Cervical Cancer Screening:    General: Risks and Benefits Discussed      Osteoporosis Screening:    General: Risks and Benefits Discussed and Patient Declines      Abdominal Aortic Aneurysm (AAA) Screening:        General: Screening Not Indicated Lung Cancer Screening:     General: Screening Not Indicated      Hepatitis C Screening:    General: Screening Current      Torey Figueroa DO

## 2020-08-18 NOTE — TELEPHONE ENCOUNTER
pts insurance will not cover naloxone, please send narcon 4mg/actuation spry which is tier one  [facility]

## 2020-08-18 NOTE — PATIENT INSTRUCTIONS
Medicare Preventive Visit Patient Instructions  Thank you for completing your Welcome to Medicare Visit or Medicare Annual Wellness Visit today  Your next wellness visit will be due in one year (8/18/2021)  The screening/preventive services that you may require over the next 5-10 years are detailed below  Some tests may not apply to you based off risk factors and/or age  Screening tests ordered at today's visit but not completed yet may show as past due  Also, please note that scanned in results may not display below  Preventive Screenings:  Service Recommendations Previous Testing/Comments   Colorectal Cancer Screening  * Colonoscopy    * Fecal Occult Blood Test (FOBT)/Fecal Immunochemical Test (FIT)  * Fecal DNA/Cologuard Test  * Flexible Sigmoidoscopy Age: 54-65 years old   Colonoscopy: every 10 years (may be performed more frequently if at higher risk)  OR  FOBT/FIT: every 1 year  OR  Cologuard: every 3 years  OR  Sigmoidoscopy: every 5 years  Screening may be recommended earlier than age 48 if at higher risk for colorectal cancer  Also, an individualized decision between you and your healthcare provider will decide whether screening between the ages of 74-80 would be appropriate  Colonoscopy: Not on file  FOBT/FIT: Not on file  Cologuard: Not on file  Sigmoidoscopy: Not on file         Breast Cancer Screening Age: 36 years old  Frequency: every 1-2 years  Not required if history of left and right mastectomy Mammogram: 07/30/2015       Cervical Cancer Screening Between the ages of 21-29, pap smear recommended once every 3 years  Between the ages of 33-67, can perform pap smear with HPV co-testing every 5 years     Recommendations may differ for women with a history of total hysterectomy, cervical cancer, or abnormal pap smears in past  Pap Smear: Not on file       Hepatitis C Screening Once for adults born between [facility]  More frequently in patients at high risk for Hepatitis C Hep C Antibody: 07/18/2019    Screening Current   Diabetes Screening 1-2 times per year if you're at risk for diabetes or have pre-diabetes Fasting glucose: No results in last 5 years   A1C: No results in last 5 years    Screening Current   Cholesterol Screening Once every 5 years if you don't have a lipid disorder  May order more often based on risk factors  Lipid panel: 12/09/2019    Screening Current     Other Preventive Screenings Covered by Medicare:  1  Abdominal Aortic Aneurysm (AAA) Screening: covered once if your at risk  You're considered to be at risk if you have a family history of AAA  2  Lung Cancer Screening: covers low dose CT scan once per year if you meet all of the following conditions: (1) Age 50-69; (2) No signs or symptoms of lung cancer; (3) Current smoker or have quit smoking within the last 15 years; (4) You have a tobacco smoking history of at least 30 pack years (packs per day multiplied by number of years you smoked); (5) You get a written order from a healthcare provider  3  Glaucoma Screening: covered annually if you're considered high risk: (1) You have diabetes OR (2) Family history of glaucoma OR (3)  aged 48 and older OR (3)  American aged 72 and older  3  Osteoporosis Screening: covered every 2 years if you meet one of the following conditions: (1) You're estrogen deficient and at risk for osteoporosis based off medical history and other findings; (2) Have a vertebral abnormality; (3) On glucocorticoid therapy for more than 3 months; (4) Have primary hyperparathyroidism; (5) On osteoporosis medications and need to assess response to drug therapy  · Last bone density test (DXA Scan): Not on file  5  HIV Screening: covered annually if you're between the age of 12-76  Also covered annually if you are younger than 13 and older than 72 with risk factors for HIV infection  For pregnant patients, it is covered up to 3 times per pregnancy      Immunizations:  Immunization Recommendations   Influenza Vaccine Annual influenza vaccination during flu season is recommended for all persons aged >= 6 months who do not have contraindications   Pneumococcal Vaccine (Prevnar and Pneumovax)  * Prevnar = PCV13  * Pneumovax = PPSV23   Adults 25-60 years old: 1-3 doses may be recommended based on certain risk factors  Adults 72 years old: Prevnar (PCV13) vaccine recommended followed by Pneumovax (PPSV23) vaccine  If already received PPSV23 since turning 65, then PCV13 recommended at least one year after PPSV23 dose  Hepatitis B Vaccine 3 dose series if at intermediate or high risk (ex: diabetes, end stage renal disease, liver disease)   Tetanus (Td) Vaccine - COST NOT COVERED BY MEDICARE PART B Following completion of primary series, a booster dose should be given every 10 years to maintain immunity against tetanus  Td may also be given as tetanus wound prophylaxis  Tdap Vaccine - COST NOT COVERED BY MEDICARE PART B Recommended at least once for all adults  For pregnant patients, recommended with each pregnancy  Shingles Vaccine (Shingrix) - COST NOT COVERED BY MEDICARE PART B  2 shot series recommended in those aged 48 and above     Health Maintenance Due:      Topic Date Due    Cervical Cancer Screening  11/11/1981    Hepatitis C Screening  Completed     Immunizations Due:      Topic Date Due    DTaP,Tdap,and Td Vaccines (1 - Tdap) 11/11/1981    Influenza Vaccine  07/01/2020     Advance Directives   What are advance directives? Advance directives are legal documents that state your wishes and plans for medical care  These plans are made ahead of time in case you lose your ability to make decisions for yourself  Advance directives can apply to any medical decision, such as the treatments you want, and if you want to donate organs  What are the types of advance directives? There are many types of advance directives, and each state has rules about how to use them   You may choose a combination of any of the following:  · Living will: This is a written record of the treatment you want  You can also choose which treatments you do not want, which to limit, and which to stop at a certain time  This includes surgery, medicine, IV fluid, and tube feedings  · Durable power of  for healthcare [facility]): This is a written record that states who you want to make healthcare choices for you when you are unable to make them for yourself  This person, called a proxy, is usually a family member or a friend  You may choose more than 1 proxy  · Do not resuscitate (DNR) order:  A DNR order is used in case your heart stops beating or you stop breathing  It is a request not to have certain forms of treatment, such as CPR  A DNR order may be included in other types of advance directives  · Medical directive: This covers the care that you want if you are in a coma, near death, or unable to make decisions for yourself  You can list the treatments you want for each condition  Treatment may include pain medicine, surgery, blood transfusions, dialysis, IV or tube feedings, and a ventilator (breathing machine)  · Values history: This document has questions about your views, beliefs, and how you feel and think about life  This information can help others choose the care that you would choose  Why are advance directives important? An advance directive helps you control your care  Although spoken wishes may be used, it is better to have your wishes written down  Spoken wishes can be misunderstood, or not followed  Treatments may be given even if you do not want them  An advance directive may make it easier for your family to make difficult choices about your care  Urinary Incontinence   Urinary incontinence (UI)  is when you lose control of your bladder  UI develops because your bladder cannot store or empty urine properly   The 3 most common types of UI are stress incontinence, urge incontinence, or both   Medicines:   · May be given to help strengthen your bladder control  Report any side effects of medication to your healthcare provider  Do pelvic muscle exercises often:  Your pelvic muscles help you stop urinating  Squeeze these muscles tight for 5 seconds, then relax for 5 seconds  Gradually work up to squeezing for 10 seconds  Do 3 sets of 15 repetitions a day, or as directed  This will help strengthen your pelvic muscles and improve bladder control  Train your bladder:  Go to the bathroom at set times, such as every 2 hours, even if you do not feel the urge to go  You can also try to hold your urine when you feel the urge to go  For example, hold your urine for 5 minutes when you feel the urge to go  As that becomes easier, hold your urine for 10 minutes  Self-care:   · Keep a UI record  Write down how often you leak urine and how much you leak  Make a note of what you were doing when you leaked urine  · Drink liquids as directed  You may need to limit the amount of liquid you drink to help control your urine leakage  Do not drink any liquid right before you go to bed  Limit or do not have drinks that contain caffeine or alcohol  · Prevent constipation  Eat a variety of high-fiber foods  Good examples are high-fiber cereals, beans, vegetables, and whole-grain breads  Walking is the best way to trigger your intestines to have a bowel movement  · Exercise regularly and maintain a healthy weight  Weight loss and exercise will decrease pressure on your bladder and help you control your leakage  · Use a catheter as directed  to help empty your bladder  A catheter is a tiny, plastic tube that is put into your bladder to drain your urine  · Go to behavior therapy as directed  Behavior therapy may be used to help you learn to control your urge to urinate      Cigarette Smoking and Your Health   Risks to your health if you smoke:  Nicotine and other chemicals found in tobacco damage every cell in your body  Even if you are a light smoker, you have an increased risk for cancer, heart disease, and lung disease  If you are pregnant or have diabetes, smoking increases your risk for complications  Benefits to your health if you stop smoking:   · You decrease respiratory symptoms such as coughing, wheezing, and shortness of breath  · You reduce your risk for cancers of the lung, mouth, throat, kidney, bladder, pancreas, stomach, and cervix  If you already have cancer, you increase the benefits of chemotherapy  You also reduce your risk for cancer returning or a second cancer from developing  · You reduce your risk for heart disease, blood clots, heart attack, and stroke  · You reduce your risk for lung infections, and diseases such as pneumonia, asthma, chronic bronchitis, and emphysema  · Your circulation improves  More oxygen can be delivered to your body  If you have diabetes, you lower your risk for complications, such as kidney, artery, and eye diseases  You also lower your risk for nerve damage  Nerve damage can lead to amputations, poor vision, and blindness  · You improve your body's ability to heal and to fight infections  For more information and support to stop smoking:   · Smokefree  gov  Phone: 0- 079 - 318-9255  Web Address: www [employer]  [facility] Rina Russellmarianne 2018 Information is for End User's use only and may not be sold, redistributed or otherwise used for commercial purposes   All illustrations and images included in CareNotes® are the copyrighted property of A D A M , [employer]  or [address] [place] [address] [facility]

## 2020-08-18 NOTE — PROGRESS NOTES
Assessment/Plan:    1  Essential hypertension  Assessment & Plan: Will change the BB to 50 mg BID    Orders:  -     Comprehensive metabolic panel; Future  -     Lipid Panel with Direct LDL reflex; Future    2  ARBEN (acute kidney injury) ([place] [address] )  -     Comprehensive metabolic panel; Future  -     Lipid Panel with Direct LDL reflex; Future    3  S/P AKA (above knee amputation) unilateral, right (HCC)  -     Lipid Panel with Direct LDL reflex; Future    4  Elevated LFTs  -     Lipid Panel with Direct LDL reflex; Future    5  Multiple thyroid nodules  -     Lipid Panel with Direct LDL reflex; Future  -     TSH, 3rd generation; Future    6  Popliteal artery stenosis, right (HCC)  -     metoprolol tartrate (LOPRESSOR) 50 mg tablet; Take 1 tablet (50 mg total) by mouth every 12 (twelve) hours  -     Lipid Panel with Direct LDL reflex; Future    7  Type 2 myocardial infarction (HCC)  -     metoprolol tartrate (LOPRESSOR) 50 mg tablet; Take 1 tablet (50 mg total) by mouth every 12 (twelve) hours  -     Lipid Panel with Direct LDL reflex; Future    8  Multiple sclerosis ([place] [address] )  Assessment & Plan:  Pt states this sees to be fine, [facility]    Orders:  -     Lipid Panel with Direct LDL reflex; Future    9  S/P AKA (above knee amputation) (HCC)  -     rivaroxaban (XARELTO) 20 mg tablet; Take 1 tablet (20 mg total) by mouth daily with breakfast  -     Lipid Panel with Direct LDL reflex; Future    10  Spinal stenosis of lumbar region, unspecified whether neurogenic claudication present  Assessment & Plan:  Pt has been getting this med from Dr Joy Leslie since 2014  I will write a months worth for her but she will need to cont to get from them    Orders:  -     oxyCODONE-acetaminophen (PERCOCET)  mg per tablet; Take 1 tablet by mouth 2 (two) times a day as needed for severe painMax Daily Amount: 2 tablets  -     Lipid Panel with Direct LDL reflex;  Future  -     naloxone (EVZIO) 2 mg/0 4 mL auto-injector; Inject 1 dose (2 mg) into the outer thigh (through clothing if necessary)  If breathing does not return to normal or if breathing difficulty resumes after 2-3 minutes, give another dose in the other thigh    11  Uncomplicated opioid dependence ([address] )  -     Lipid Panel with Direct LDL reflex; Future  -     naloxone (EVZIO) 2 mg/0 4 mL auto-injector; Inject 1 dose (2 mg) into the outer thigh (through clothing if necessary)  If breathing does not return to normal or if breathing difficulty resumes after 2-3 minutes, give another dose in the other thigh    12  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)  -     Lipid Panel with Direct LDL reflex; Future    13  Anemia, unspecified type  -     CBC; Future  -     Lipid Panel with Direct LDL reflex; Future    14  Screening for HIV (human immunodeficiency virus)  -     [facility]- Human Immunodeficiency Virus 1/2 Antigen / Antibody ( Fourth Generation) with Reflex Testing; Future    15  Screen for colon cancer  -     Ambulatory referral to Gastroenterology; Future    16  Medicare annual wellness visit, initial    16  Screening for breast cancer  -     Mammo screening bilateral w cad; Future; Expected date: 08/18/2020    18  Screening for malignant neoplasm of cervix  -     Ambulatory referral to Obstetrics / Gynecology; Future          Patient Instructions       Medicare Preventive Visit Patient Instructions  Thank you for completing your Welcome to Medicare Visit or Medicare Annual Wellness Visit today  Your next wellness visit will be due in one year (8/18/2021)  The screening/preventive services that you may require over the next 5-10 years are detailed below  Some tests may not apply to you based off risk factors and/or age  Screening tests ordered at today's visit but not completed yet may show as past due  Also, please note that scanned in results may not display below    Preventive Screenings:  Service Recommendations Previous Testing/Comments   Colorectal Cancer Screening  * Colonoscopy    * Fecal Occult Blood Test (FOBT)/Fecal Immunochemical Test (FIT)  * Fecal DNA/Cologuard Test  * Flexible Sigmoidoscopy Age: 54-65 years old   Colonoscopy: every 10 years (may be performed more frequently if at higher risk)  OR  FOBT/FIT: every 1 year  OR  Cologuard: every 3 years  OR  Sigmoidoscopy: every 5 years  Screening may be recommended earlier than age 48 if at higher risk for colorectal cancer  Also, an individualized decision between you and your healthcare provider will decide whether screening between the ages of 74-80 would be appropriate  Colonoscopy: Not on file  FOBT/FIT: Not on file  Cologuard: Not on file  Sigmoidoscopy: Not on file         Breast Cancer Screening Age: 36 years old  Frequency: every 1-2 years  Not required if history of left and right mastectomy Mammogram: 07/30/2015       Cervical Cancer Screening Between the ages of 21-29, pap smear recommended once every 3 years  Between the ages of 33-67, can perform pap smear with HPV co-testing every 5 years  Recommendations may differ for women with a history of total hysterectomy, cervical cancer, or abnormal pap smears in past  Pap Smear: Not on file       Hepatitis C Screening Once for adults born between 1945 and 1965  More frequently in patients at high risk for Hepatitis C Hep C Antibody: 07/18/2019    Screening Current   Diabetes Screening 1-2 times per year if you're at risk for diabetes or have pre-diabetes Fasting glucose: No results in last 5 years   A1C: No results in last 5 years    Screening Current   Cholesterol Screening Once every 5 years if you don't have a lipid disorder  May order more often based on risk factors  Lipid panel: 12/09/2019    Screening Current     Other Preventive Screenings Covered by Medicare:  1  Abdominal Aortic Aneurysm (AAA) Screening: covered once if your at risk   You're considered to be at risk if you have a family history of AAA  2  Lung Cancer Screening: covers low dose CT scan once per year if you meet all of the following conditions: (1) Age 50-69; (2) No signs or symptoms of lung cancer; (3) Current smoker or have quit smoking within the last 15 years; (4) You have a tobacco smoking history of at least 30 pack years (packs per day multiplied by number of years you smoked); (5) You get a written order from a healthcare provider  3  Glaucoma Screening: covered annually if you're considered high risk: (1) You have diabetes OR (2) Family history of glaucoma OR (3)  aged 48 and older OR (3)  American aged 72 and older  3  Osteoporosis Screening: covered every 2 years if you meet one of the following conditions: (1) You're estrogen deficient and at risk for osteoporosis based off medical history and other findings; (2) Have a vertebral abnormality; (3) On glucocorticoid therapy for more than 3 months; (4) Have primary hyperparathyroidism; (5) On osteoporosis medications and need to assess response to drug therapy  · Last bone density test (DXA Scan): Not on file  5  HIV Screening: covered annually if you're between the age of 12-76  Also covered annually if you are younger than 13 and older than 72 with risk factors for HIV infection  For pregnant patients, it is covered up to 3 times per pregnancy  Immunizations:  Immunization Recommendations   Influenza Vaccine Annual influenza vaccination during flu season is recommended for all persons aged >= 6 months who do not have contraindications   Pneumococcal Vaccine (Prevnar and Pneumovax)  * Prevnar = PCV13  * Pneumovax = PPSV23   Adults 25-60 years old: 1-3 doses may be recommended based on certain risk factors  Adults 72 years old: Prevnar (PCV13) vaccine recommended followed by Pneumovax (PPSV23) vaccine  If already received PPSV23 since turning 65, then PCV13 recommended at least one year after PPSV23 dose     Hepatitis B Vaccine 3 dose series if at intermediate or high risk (ex: diabetes, end stage renal disease, liver disease)   Tetanus (Td) Vaccine - COST NOT COVERED BY MEDICARE PART B Following completion of primary series, a booster dose should be given every 10 years to maintain immunity against tetanus  Td may also be given as tetanus wound prophylaxis  Tdap Vaccine - COST NOT COVERED BY MEDICARE PART B Recommended at least once for all adults  For pregnant patients, recommended with each pregnancy  Shingles Vaccine (Shingrix) - COST NOT COVERED BY MEDICARE PART B  2 shot series recommended in those aged 48 and above     Health Maintenance Due:      Topic Date Due    Cervical Cancer Screening  11/11/1981    Hepatitis C Screening  Completed     Immunizations Due:      Topic Date Due    DTaP,Tdap,and Td Vaccines (1 - Tdap) 11/11/1981    Influenza Vaccine  07/01/2020     Advance Directives   What are advance directives? Advance directives are legal documents that state your wishes and plans for medical care  These plans are made ahead of time in case you lose your ability to make decisions for yourself  Advance directives can apply to any medical decision, such as the treatments you want, and if you want to donate organs  What are the types of advance directives? There are many types of advance directives, and each state has rules about how to use them  You may choose a combination of any of the following:  · Living will: This is a written record of the treatment you want  You can also choose which treatments you do not want, which to limit, and which to stop at a certain time  This includes surgery, medicine, IV fluid, and tube feedings  · Durable power of  for healthcare [facility]): This is a written record that states who you want to make healthcare choices for you when you are unable to make them for yourself  This person, called a proxy, is usually a family member or a friend  You may choose more than 1 proxy    · Do not resuscitate (DNR) order:  A DNR order is used in case your heart stops beating or you stop breathing  It is a request not to have certain forms of treatment, such as CPR  A DNR order may be included in other types of advance directives  · Medical directive: This covers the care that you want if you are in a coma, near death, or unable to make decisions for yourself  You can list the treatments you want for each condition  Treatment may include pain medicine, surgery, blood transfusions, dialysis, IV or tube feedings, and a ventilator (breathing machine)  · Values history: This document has questions about your views, beliefs, and how you feel and think about life  This information can help others choose the care that you would choose  Why are advance directives important? An advance directive helps you control your care  Although spoken wishes may be used, it is better to have your wishes written down  Spoken wishes can be misunderstood, or not followed  Treatments may be given even if you do not want them  An advance directive may make it easier for your family to make difficult choices about your care  Urinary Incontinence   Urinary incontinence (UI)  is when you lose control of your bladder  UI develops because your bladder cannot store or empty urine properly  The 3 most common types of UI are stress incontinence, urge incontinence, or both  Medicines:   · May be given to help strengthen your bladder control  Report any side effects of medication to your healthcare provider  Do pelvic muscle exercises often:  Your pelvic muscles help you stop urinating  Squeeze these muscles tight for 5 seconds, then relax for 5 seconds  Gradually work up to squeezing for 10 seconds  Do 3 sets of 15 repetitions a day, or as directed  This will help strengthen your pelvic muscles and improve bladder control  Train your bladder:  Go to the bathroom at set times, such as every 2 hours, even if you do not feel the urge to go   You can also try to hold your urine when you feel the urge to go  For example, hold your urine for 5 minutes when you feel the urge to go  As that becomes easier, hold your urine for 10 minutes  Self-care:   · Keep a UI record  Write down how often you leak urine and how much you leak  Make a note of what you were doing when you leaked urine  · Drink liquids as directed  You may need to limit the amount of liquid you drink to help control your urine leakage  Do not drink any liquid right before you go to bed  Limit or do not have drinks that contain caffeine or alcohol  · Prevent constipation  Eat a variety of high-fiber foods  Good examples are high-fiber cereals, beans, vegetables, and whole-grain breads  Walking is the best way to trigger your intestines to have a bowel movement  · Exercise regularly and maintain a healthy weight  Weight loss and exercise will decrease pressure on your bladder and help you control your leakage  · Use a catheter as directed  to help empty your bladder  A catheter is a tiny, plastic tube that is put into your bladder to drain your urine  · Go to behavior therapy as directed  Behavior therapy may be used to help you learn to control your urge to urinate  Cigarette Smoking and Your Health   Risks to your health if you smoke:  Nicotine and other chemicals found in tobacco damage every cell in your body  Even if you are a light smoker, you have an increased risk for cancer, heart disease, and lung disease  If you are pregnant or have diabetes, smoking increases your risk for complications  Benefits to your health if you stop smoking:   · You decrease respiratory symptoms such as coughing, wheezing, and shortness of breath  · You reduce your risk for cancers of the lung, mouth, throat, kidney, bladder, pancreas, stomach, and cervix  If you already have cancer, you increase the benefits of chemotherapy   You also reduce your risk for cancer returning or a second cancer from developing  · You reduce your risk for heart disease, blood clots, heart attack, and stroke  · You reduce your risk for lung infections, and diseases such as pneumonia, asthma, chronic bronchitis, and emphysema  · Your circulation improves  More oxygen can be delivered to your body  If you have diabetes, you lower your risk for complications, such as kidney, artery, and eye diseases  You also lower your risk for nerve damage  Nerve damage can lead to amputations, poor vision, and blindness  · You improve your body's ability to heal and to fight infections  For more information and support to stop smoking:   · [place] [place]  [place]  Phone: 4- 209 - [phone]  Web Address: Partly  [address] Information is for End User's use only and may not be sold, redistributed or otherwise used for commercial purposes  All illustrations and images included in CareNotes® are the copyrighted property of A D A M [employer]  or [address]        Return in about 5 weeks (around 9/22/2020) for Recheck  Subjective:      Patient ID: Corrina Vogel is a 61 y o  female  Chief Complaint   Patient presents with    Follow-up     follow up on HTN [id number]     Medicare Wellness Visit       Pt is here to go over her   Pt just had amputation of AKA rt side  Pt just got out of therapy  Pt was very pleased with therapy    Pt was seen at the [address] [place] [address] last week - pt states they called the prosthetic company - they are in the process    Pt has some pain in her back and states she suffers from phantom pain from the amputation, really bad at night    Pt had an issue with the wound as well but that has healed    [facility] - she no longer sees them and was egttibng them at rehab but she took her last one this am   PT states she takes this for her spinal stenosis and now for phantom pain    Pt has been on this med since 2014v and they have been writing it since then      The following portions of the patient's history were reviewed and updated as appropriate: allergies, current medications, past family history, past medical history, past social history, past surgical history and problem list     Review of Systems   Constitutional: Negative  Negative for activity change, appetite change, chills, diaphoresis and fatigue  HENT: Negative  Negative for dental problem, ear pain, sinus pressure and sore throat  Eyes: Negative  Negative for photophobia, pain, discharge, redness, itching and visual disturbance  Respiratory: Negative for apnea and chest tightness  Cardiovascular: Negative  Negative for chest pain, palpitations and leg swelling  Gastrointestinal: Negative  Negative for abdominal distention, abdominal pain, constipation and diarrhea  Endocrine: Negative  Negative for cold intolerance and heat intolerance  Genitourinary: Negative  Negative for difficulty urinating and dyspareunia  Musculoskeletal: Positive for arthralgias, gait problem and myalgias  Negative for back pain  Phantom pain in rt lower ext   Skin: Negative  Allergic/Immunologic: Negative for environmental allergies  Neurological: Negative for dizziness  Psychiatric/Behavioral: Negative  Negative for agitation           Current Outpatient Medications   Medication Sig Dispense Refill    acetaminophen (TYLENOL) 325 mg tablet Take 3 tablets (975 mg total) by mouth every 8 (eight) hours 30 tablet 0    amantadine (SYMMETREL) 100 mg capsule Take 1 capsule (100 mg total) by mouth 2 (two) times a day 60 capsule 3    Aubagio 14 MG TABS TAKE 1 TABLET BY MOUTH DAILY 30 tablet 3    baclofen 10 mg tablet Take 2 tablets (20 mg total) by mouth 3 (three) times a day 540 tablet 0    cholecalciferol (VITAMIN D3) 1,000 units tablet Take 1,000 Units by mouth daily      gabapentin (NEURONTIN) 300 mg capsule Take 1 capsule (300 mg total) by mouth 3 (three) times a day  0    LORazepam (ATIVAN) 0 5 mg tablet Take 1 tab 30 min prior to MRI, may repeat x 1 immediately before if needed 4 tablet 0    metoprolol tartrate (LOPRESSOR) 50 mg tablet Take 1 tablet (50 mg total) by mouth every 12 (twelve) hours 90 tablet 1    Multiple Vitamins-Minerals (Multivitamin Adult Extra C) CHEW Chew 1 tablet daily      oxyCODONE-acetaminophen (PERCOCET)  mg per tablet Take 1 tablet by mouth 2 (two) times a day as needed for severe painMax Daily Amount: 2 tablets 60 tablet 0    rivaroxaban (XARELTO) 20 mg tablet Take 1 tablet (20 mg total) by mouth daily with breakfast 90 tablet 1    ascorbic acid (VITAMIN C) 250 mg tablet Take 250 mg by mouth daily      naloxone (EVZIO) 2 mg/0 4 mL auto-injector Inject 1 dose (2 mg) into the outer thigh (through clothing if necessary)  If breathing does not return to normal or if breathing difficulty resumes after 2-3 minutes, give another dose in the other thigh 1 Package 1     No current facility-administered medications for this visit  Objective:    /80   Pulse 67   Temp 98 7 °F (37 1 °C)   Resp 16   SpO2 97%        Physical Exam  Vitals signs and nursing note reviewed  Constitutional:       General: She is not in acute distress  Appearance: She is well-developed  She is not diaphoretic  HENT:      Head: Normocephalic and atraumatic  Right Ear: External ear normal       Left Ear: External ear normal       Nose: Nose normal       Mouth/Throat:      Pharynx: No oropharyngeal exudate  Eyes:      General: No scleral icterus  Right eye: No discharge  Left eye: No discharge  Pupils: Pupils are equal, round, and reactive to light  Neck:      Thyroid: No thyromegaly  Cardiovascular:      Rate and Rhythm: Normal rate  Heart sounds: Normal heart sounds  No murmur  Pulmonary:      Effort: Pulmonary effort is normal  No respiratory distress  Breath sounds: Normal breath sounds  No wheezing     Abdominal: General: Bowel sounds are normal  There is no distension  Palpations: Abdomen is soft  There is no mass  Tenderness: There is no abdominal tenderness  There is no guarding or rebound  Musculoskeletal: Normal range of motion  Comments: AKA rt lower ext     Skin:     General: Skin is warm and dry  Findings: No erythema or rash  Neurological:      Mental Status: She is alert        Coordination: Coordination normal       Deep Tendon Reflexes: Reflexes normal    Psychiatric:         Behavior: Behavior normal                 Cheyenne Bruno DO

## 2020-08-19 RX ORDER — NALOXONE HYDROCHLORIDE 4 MG/.1ML
SPRAY NASAL
Qty: 1 EACH | Refills: 1 | Status: SHIPPED | OUTPATIENT
Start: 2020-08-19 | End: 2021-03-30 | Stop reason: ALTCHOICE

## 2020-08-19 NOTE — TELEPHONE ENCOUNTER
Tried to escribe but I Had to print , please put in my folder I will sign when I get in this afternoon

## 2020-08-21 ENCOUNTER — TELEPHONE (OUTPATIENT)
Dept: FAMILY MEDICINE CLINIC | Facility: CLINIC | Age: 60
End: 2020-08-21

## 2020-08-21 NOTE — TELEPHONE ENCOUNTER
Memory Pancoast from OT there with [facility], patient's bp is 160/100, not having any sxs  NO palpitations, chest pain, no headache  Looks as if patient was supposed to start Metoprolol 50mg bid as per office visit with Dr Viet Kuhn 8/18  Patient states she did not realize she was supposed to increase her medication and is still taking the 25mg bid  She will  her new dose and start right away  Patient does have a bp cuff at home that she has to find  Mikki Ribeiro states that he will email the office to have someone come to patient's home on Monday to check her bp  Advised to call office right away and or go to ER for any signs of chest pain, palpitations or headache  Advised to f/u as directed for regular bp f/u  Forwarding to Dr Viet Kuhn for review     Bela Menjivar MA

## 2020-08-26 ENCOUNTER — HOSPITAL ENCOUNTER (OUTPATIENT)
Dept: RADIOLOGY | Facility: HOSPITAL | Age: 60
Discharge: HOME | End: 2020-08-26
Attending: SURGERY
Payer: COMMERCIAL

## 2020-08-26 DIAGNOSIS — I73.9 PAD (PERIPHERAL ARTERY DISEASE) (HCC): ICD-10-CM

## 2020-08-26 PROCEDURE — 93926 LOWER EXTREMITY STUDY: CPT

## 2020-08-27 PROCEDURE — 93926 LOWER EXTREMITY STUDY: CPT | Performed by: SURGERY

## 2020-08-27 PROCEDURE — 93922 UPR/L XTREMITY ART 2 LEVELS: CPT | Performed by: SURGERY

## 2020-08-28 ENCOUNTER — TELEPHONE (OUTPATIENT)
Dept: FAMILY MEDICINE CLINIC | Facility: CLINIC | Age: 60
End: 2020-08-28

## 2020-08-28 ENCOUNTER — TELEPHONE (OUTPATIENT)
Dept: VASCULAR SURGERY | Facility: CLINIC | Age: 60
End: 2020-08-28

## 2020-08-28 NOTE — TELEPHONE ENCOUNTER
I do not think we have all the pages to the pt's labs it says 1/5 and that's all we got    Can we call to get the rest?

## 2020-08-28 NOTE — TELEPHONE ENCOUNTER
Jesus Bhagat from [address] [place] [address] called to inform he was out to see patient today regarding fitting her for prosthetic  He states the patient is not a candidate at this time  Patient has not been able to stand on her own in a couple years  Patient is also not able to transfer by herself  She is completely dependent on son  He states if her situation changes in the future, he will revisit  She will need to be able to stand on her own in order to be measured and fit     Jesus Bhagat asked to relay message to Dr Luis Fernando Valle

## 2020-09-03 ENCOUNTER — TELEPHONE (OUTPATIENT)
Dept: FAMILY MEDICINE CLINIC | Facility: CLINIC | Age: 60
End: 2020-09-03

## 2020-09-03 DIAGNOSIS — G35 MULTIPLE SCLEROSIS (HCC): ICD-10-CM

## 2020-09-03 RX ORDER — AMANTADINE HYDROCHLORIDE 100 MG/1
100 CAPSULE, GELATIN COATED ORAL 2 TIMES DAILY
Qty: 60 CAPSULE | Refills: 0 | Status: CANCELLED | OUTPATIENT
Start: 2020-09-03

## 2020-09-03 NOTE — TELEPHONE ENCOUNTER
Patient states she feels good, no dizziness, no headache, no chest pain, palpitations  She is currently doing her arm exercises  The last time Teddy Hussein was there on Tuesday patient's bp was 156/95, but they did not call because it was still within limits  She states she has been taking her Metoprolol 50mg 1q every 12 hours  Consulted with Dr Jennifer Mtz as Dr Oleg Brewer was with a patient  Patient advised she needed to come in for a bp chk  Patient coming in 9/4/2020 @ 9:15 with Dr Jennifer Mtz due to availability  No further action needed at this time    Radha Mcclure MA

## 2020-09-03 NOTE — TELEPHONE ENCOUNTER
Sandip Lewis, is calling stating patient's blood pressure is 174/109 no symptoms no headaches     TRIAGE

## 2020-09-04 ENCOUNTER — OFFICE VISIT (OUTPATIENT)
Dept: FAMILY MEDICINE CLINIC | Facility: CLINIC | Age: 60
End: 2020-09-04
Payer: COMMERCIAL

## 2020-09-04 VITALS
SYSTOLIC BLOOD PRESSURE: 144 MMHG | TEMPERATURE: 97.6 F | RESPIRATION RATE: 16 BRPM | HEART RATE: 72 BPM | DIASTOLIC BLOOD PRESSURE: 92 MMHG

## 2020-09-04 DIAGNOSIS — I21.A1 TYPE 2 MYOCARDIAL INFARCTION (HCC): ICD-10-CM

## 2020-09-04 DIAGNOSIS — I70.201 POPLITEAL ARTERY STENOSIS, RIGHT (HCC): ICD-10-CM

## 2020-09-04 DIAGNOSIS — I10 ESSENTIAL HYPERTENSION: Primary | ICD-10-CM

## 2020-09-04 PROCEDURE — 99213 OFFICE O/P EST LOW 20 MIN: CPT | Performed by: FAMILY MEDICINE

## 2020-09-04 RX ORDER — METOPROLOL TARTRATE 50 MG/1
100 TABLET, FILM COATED ORAL EVERY 12 HOURS SCHEDULED
Qty: 90 TABLET | Refills: 1
Start: 2020-09-04 | End: 2020-10-26 | Stop reason: SDUPTHER

## 2020-09-04 NOTE — TELEPHONE ENCOUNTER
L/m that an appt needs to be scheduled before we can fill rx  Asked for a c/b to schedule   If patient calls back, please assist in scheduling f/up with Jayy Salinas

## 2020-09-04 NOTE — PROGRESS NOTES
Assessment/Plan:       Diagnoses and all orders for this visit:    Essential Hypertension  Given uncontrolled blood pressures, will increase dose of lopressor  Counseled on trying 100mg in the morning and 50mg in the evening this week- if blood pressures remain elevated on home readings, increase to 100mg BID thereafter  She continues to remain asymptomatic  Pt has visit coming up on 9/22/20 for follow up  Counseled on DASH diet/exercise  Subjective:      Patient ID: Jhon Begum is a 61 y o  female  Hypertension   This is a chronic problem  The current episode started more than 1 year ago  The problem has been gradually worsening since onset  The problem is uncontrolled  Pertinent negatives include no anxiety, blurred vision, chest pain, headaches, malaise/fatigue, neck pain, orthopnea, palpitations, peripheral edema, PND, shortness of breath or sweats  Past treatments include beta blockers  There are no compliance problems  The following portions of the patient's history were reviewed and updated as appropriate: allergies, current medications, past family history, past medical history, past social history, past surgical history and problem list     Review of Systems   Constitutional: Negative for malaise/fatigue  Eyes: Negative for blurred vision  Respiratory: Negative for shortness of breath  Cardiovascular: Negative for chest pain, palpitations, orthopnea and PND  Musculoskeletal: Negative for neck pain  Neurological: Negative for headaches  Objective:      /92   Pulse 72   Temp 97 6 °F (36 4 °C)   Resp 16          Physical Exam  Constitutional:       General: She is not in acute distress  Appearance: She is well-developed  She is not diaphoretic  HENT:      Head: Normocephalic and atraumatic  Neck:      Musculoskeletal: Normal range of motion and neck supple  Cardiovascular:      Rate and Rhythm: Normal rate and regular rhythm        Heart sounds: Normal heart sounds  No murmur  No friction rub  No gallop  Pulmonary:      Effort: Pulmonary effort is normal  No respiratory distress  Breath sounds: Normal breath sounds  No wheezing or rales  Chest:      Chest wall: No tenderness  Musculoskeletal: Normal range of motion  Skin:     General: Skin is warm and dry  Neurological:      Mental Status: She is alert and oriented to person, place, and time  Psychiatric:         Behavior: Behavior normal          Thought Content:  Thought content normal          Judgment: Judgment normal

## 2020-09-13 DIAGNOSIS — I21.A1 TYPE 2 MYOCARDIAL INFARCTION (HCC): ICD-10-CM

## 2020-09-13 DIAGNOSIS — I70.201 POPLITEAL ARTERY STENOSIS, RIGHT (HCC): ICD-10-CM

## 2020-09-14 RX ORDER — GABAPENTIN 300 MG/1
CAPSULE ORAL
Qty: 30 CAPSULE | Refills: 1 | Status: SHIPPED | OUTPATIENT
Start: 2020-09-14 | End: 2020-09-24 | Stop reason: SDUPTHER

## 2020-09-24 ENCOUNTER — OFFICE VISIT (OUTPATIENT)
Dept: FAMILY MEDICINE CLINIC | Facility: CLINIC | Age: 60
End: 2020-09-24
Payer: COMMERCIAL

## 2020-09-24 VITALS
RESPIRATION RATE: 18 BRPM | HEART RATE: 86 BPM | TEMPERATURE: 96.4 F | SYSTOLIC BLOOD PRESSURE: 148 MMHG | DIASTOLIC BLOOD PRESSURE: 102 MMHG

## 2020-09-24 DIAGNOSIS — I10 ESSENTIAL HYPERTENSION: Primary | ICD-10-CM

## 2020-09-24 DIAGNOSIS — N17.9 AKI (ACUTE KIDNEY INJURY) (HCC): ICD-10-CM

## 2020-09-24 DIAGNOSIS — D72.829 LEUKOCYTOSIS, UNSPECIFIED TYPE: ICD-10-CM

## 2020-09-24 DIAGNOSIS — I70.201 POPLITEAL ARTERY STENOSIS, RIGHT (HCC): ICD-10-CM

## 2020-09-24 DIAGNOSIS — R79.89 ELEVATED LFTS: ICD-10-CM

## 2020-09-24 DIAGNOSIS — I21.A1 TYPE 2 MYOCARDIAL INFARCTION (HCC): ICD-10-CM

## 2020-09-24 DIAGNOSIS — D64.9 ANEMIA, UNSPECIFIED TYPE: ICD-10-CM

## 2020-09-24 PROCEDURE — 3080F DIAST BP >= 90 MM HG: CPT | Performed by: FAMILY MEDICINE

## 2020-09-24 PROCEDURE — 99214 OFFICE O/P EST MOD 30 MIN: CPT | Performed by: FAMILY MEDICINE

## 2020-09-24 PROCEDURE — 3077F SYST BP >= 140 MM HG: CPT | Performed by: FAMILY MEDICINE

## 2020-09-24 RX ORDER — AMLODIPINE AND OLMESARTAN MEDOXOMIL 5; 20 MG/1; MG/1
1 TABLET ORAL DAILY
Qty: 30 TABLET | Refills: 1 | Status: SHIPPED | OUTPATIENT
Start: 2020-09-24 | End: 2020-10-23

## 2020-09-24 RX ORDER — GABAPENTIN 300 MG/1
300 CAPSULE ORAL 2 TIMES DAILY
Qty: 180 CAPSULE | Refills: 1 | Status: SHIPPED | OUTPATIENT
Start: 2020-09-24 | End: 2021-03-22 | Stop reason: SDUPTHER

## 2020-09-24 NOTE — PROGRESS NOTES
Pt is here to follow up Assessment/Plan:    1  Essential hypertension  Assessment & Plan: Will add bp medication and follow in three weeks      Orders:  -     amlodipine-olmesartan (Sharan) 5-20 MG; Take 1 tablet by mouth daily    2  Popliteal artery stenosis, right (HCC)  -     gabapentin (NEURONTIN) 300 mg capsule; Take 1 capsule (300 mg total) by mouth 2 (two) times a day    3  Type 2 myocardial infarction (HCC)  -     gabapentin (NEURONTIN) 300 mg capsule; Take 1 capsule (300 mg total) by mouth 2 (two) times a day    4  ARBEN (acute kidney injury) ([facility] [address] )    5  Elevated LFTs    6  Anemia, unspecified type    7  Leukocytosis, unspecified type  Assessment & Plan:  WBC count acceptable on last set of labs        liver functions study acceptable    No anemia on current labs    There are no Patient Instructions on file for this visit  Return in about 3 weeks (around 10/15/2020)  Subjective:      Patient ID: Enrique Masterson is a 61 y o  female  Chief Complaint   Patient presents with    Hypertension     [facility]       Pt is here to follow up her BP  PT states she had arough AM    Pt states she will need gabapentin refilled,  She takes this BId      The following portions of the patient's history were reviewed and updated as appropriate: allergies, current medications, past family history, past medical history, past social history, past surgical history and problem list     Review of Systems   Constitutional: Negative  Negative for activity change, appetite change, chills, diaphoresis and fatigue  HENT: Negative  Negative for dental problem, ear pain, sinus pressure and sore throat  Eyes: Negative  Negative for photophobia, pain, discharge, redness, itching and visual disturbance  Respiratory: Negative for apnea and chest tightness  Cardiovascular: Negative  Negative for chest pain, palpitations and leg swelling  Gastrointestinal: Negative    Negative for abdominal distention, abdominal pain, Event Note constipation and diarrhea  Endocrine: Negative  Negative for cold intolerance and heat intolerance  Genitourinary: Negative  Negative for difficulty urinating and dyspareunia  Musculoskeletal: Negative  Negative for arthralgias and back pain  Skin: Negative  Allergic/Immunologic: Negative for environmental allergies  Neurological: Negative  Negative for dizziness  Psychiatric/Behavioral: Negative  Negative for agitation  Current Outpatient Medications   Medication Sig Dispense Refill    acetaminophen (TYLENOL) 325 mg tablet Take 3 tablets (975 mg total) by mouth every 8 (eight) hours 30 tablet 0    amantadine (SYMMETREL) 100 mg capsule Take 1 capsule (100 mg total) by mouth 2 (two) times a day 60 capsule 3    ascorbic acid (VITAMIN C) 250 mg tablet Take 250 mg by mouth daily      Aubagio 14 MG TABS TAKE 1 TABLET BY MOUTH DAILY 30 tablet 3    baclofen 10 mg tablet Take 2 tablets (20 mg total) by mouth 3 (three) times a day 540 tablet 0    cholecalciferol (VITAMIN D3) 1,000 units tablet Take 1,000 Units by mouth daily      gabapentin (NEURONTIN) 300 mg capsule Take 1 capsule (300 mg total) by mouth 2 (two) times a day 180 capsule 1    LORazepam (ATIVAN) 0 5 mg tablet Take 1 tab 30 min prior to MRI, may repeat x 1 immediately before if needed 4 tablet 0    metoprolol tartrate (LOPRESSOR) 50 mg tablet Take 2 tablets (100 mg total) by mouth every 12 (twelve) hours 90 tablet 1    Multiple Vitamins-Minerals (Multivitamin Adult Extra C) CHEW Chew 1 tablet daily      naloxone (NARCAN) 4 mg/0 1 mL nasal spray Administer 1 spray into a nostril  If breathing does not return to normal or if breathing difficulty resumes after 2-3 minutes, give another dose in the other nostril using a new spray   1 each 1    rivaroxaban (XARELTO) 20 mg tablet Take 1 tablet (20 mg total) by mouth daily with breakfast 90 tablet 1    amlodipine-olmesartan (Sharan) 5-20 MG Take 1 tablet by mouth daily 30 tablet 1 No current facility-administered medications for this visit  Objective:    BP (!) 148/102   Pulse 86   Temp (!) 96 4 °F (35 8 °C)   Resp 18        Physical Exam  Vitals signs and nursing note reviewed  Constitutional:       General: She is not in acute distress  Appearance: She is well-developed  She is not diaphoretic  HENT:      Head: Normocephalic and atraumatic  Right Ear: External ear normal       Left Ear: External ear normal       Nose: Nose normal       Mouth/Throat:      Pharynx: No oropharyngeal exudate  Eyes:      General: No scleral icterus  Right eye: No discharge  Left eye: No discharge  Pupils: Pupils are equal, round, and reactive to light  Neck:      Thyroid: No thyromegaly  Cardiovascular:      Rate and Rhythm: Normal rate  Heart sounds: Normal heart sounds  No murmur  Pulmonary:      Effort: Pulmonary effort is normal  No respiratory distress  Breath sounds: Normal breath sounds  No wheezing  Abdominal:      General: Bowel sounds are normal  There is no distension  Palpations: Abdomen is soft  There is no mass  Tenderness: There is no abdominal tenderness  There is no guarding or rebound  Musculoskeletal: Normal range of motion  Comments: Rt lower ext amputation   Skin:     General: Skin is warm and dry  Findings: No erythema or rash  Neurological:      Mental Status: She is alert        Coordination: Coordination normal       Deep Tendon Reflexes: Reflexes normal    Psychiatric:         Behavior: Behavior normal                 Nila Cardenas DO

## 2020-10-23 DIAGNOSIS — I10 ESSENTIAL HYPERTENSION: ICD-10-CM

## 2020-10-23 RX ORDER — AMLODIPINE AND OLMESARTAN MEDOXOMIL 5; 20 MG/1; MG/1
1 TABLET ORAL DAILY
Qty: 30 TABLET | Refills: 1 | Status: SHIPPED | OUTPATIENT
Start: 2020-10-23 | End: 2020-11-19 | Stop reason: SDUPTHER

## 2020-10-26 DIAGNOSIS — I70.201 POPLITEAL ARTERY STENOSIS, RIGHT (HCC): ICD-10-CM

## 2020-10-26 DIAGNOSIS — I21.A1 TYPE 2 MYOCARDIAL INFARCTION (HCC): ICD-10-CM

## 2020-10-27 RX ORDER — METOPROLOL TARTRATE 50 MG/1
100 TABLET, FILM COATED ORAL EVERY 12 HOURS SCHEDULED
Qty: 90 TABLET | Refills: 0
Start: 2020-10-27 | End: 2021-04-28 | Stop reason: SDUPTHER

## 2020-11-19 DIAGNOSIS — I10 ESSENTIAL HYPERTENSION: ICD-10-CM

## 2020-11-19 DIAGNOSIS — G35 MULTIPLE SCLEROSIS (HCC): ICD-10-CM

## 2020-11-20 RX ORDER — BACLOFEN 10 MG/1
20 TABLET ORAL 3 TIMES DAILY
Qty: 540 TABLET | Refills: 0 | Status: SHIPPED | OUTPATIENT
Start: 2020-11-20 | End: 2021-03-07 | Stop reason: SDUPTHER

## 2020-11-20 RX ORDER — AMLODIPINE AND OLMESARTAN MEDOXOMIL 5; 20 MG/1; MG/1
1 TABLET ORAL DAILY
Qty: 30 TABLET | Refills: 0 | Status: SHIPPED | OUTPATIENT
Start: 2020-11-20 | End: 2020-12-17 | Stop reason: SDUPTHER

## 2020-12-17 DIAGNOSIS — I10 ESSENTIAL HYPERTENSION: ICD-10-CM

## 2020-12-18 RX ORDER — AMLODIPINE AND OLMESARTAN MEDOXOMIL 5; 20 MG/1; MG/1
1 TABLET ORAL DAILY
Qty: 30 TABLET | Refills: 0 | Status: SHIPPED | OUTPATIENT
Start: 2020-12-18 | End: 2021-01-17 | Stop reason: SDUPTHER

## 2020-12-28 DIAGNOSIS — G35 MULTIPLE SCLEROSIS (HCC): ICD-10-CM

## 2020-12-28 RX ORDER — RENAGEL 800 MG/1
TABLET ORAL
Qty: 30 TABLET | Refills: 1 | Status: SHIPPED | OUTPATIENT
Start: 2020-12-28 | End: 2021-03-26

## 2020-12-28 NOTE — TELEPHONE ENCOUNTER
Patient needs f/u visit  Not seen since April 2020  Please schedule her soon with me or Dr Carlito Gamino    Thanks

## 2020-12-30 ENCOUNTER — TELEPHONE (OUTPATIENT)
Dept: NEUROLOGY | Facility: CLINIC | Age: 60
End: 2020-12-30

## 2021-01-17 DIAGNOSIS — I10 ESSENTIAL HYPERTENSION: ICD-10-CM

## 2021-01-18 RX ORDER — AMLODIPINE AND OLMESARTAN MEDOXOMIL 5; 20 MG/1; MG/1
1 TABLET ORAL DAILY
Qty: 30 TABLET | Refills: 0 | Status: SHIPPED | OUTPATIENT
Start: 2021-01-18 | End: 2021-02-17 | Stop reason: SDUPTHER

## 2021-02-17 DIAGNOSIS — G35 MULTIPLE SCLEROSIS (HCC): ICD-10-CM

## 2021-02-17 DIAGNOSIS — I10 ESSENTIAL HYPERTENSION: ICD-10-CM

## 2021-02-17 RX ORDER — AMLODIPINE AND OLMESARTAN MEDOXOMIL 5; 20 MG/1; MG/1
1 TABLET ORAL DAILY
Qty: 30 TABLET | Refills: 0 | Status: SHIPPED | OUTPATIENT
Start: 2021-02-17 | End: 2021-03-18 | Stop reason: SDUPTHER

## 2021-02-17 RX ORDER — AMANTADINE HYDROCHLORIDE 100 MG/1
CAPSULE, GELATIN COATED ORAL
Qty: 60 CAPSULE | Refills: 3 | Status: SHIPPED | OUTPATIENT
Start: 2021-02-17 | End: 2021-03-30 | Stop reason: SDUPTHER

## 2021-02-19 DIAGNOSIS — Z89.619 S/P AKA (ABOVE KNEE AMPUTATION) (HCC): ICD-10-CM

## 2021-02-19 NOTE — ASSESSMENT & PLAN NOTE
Continue Lopressor    Monitor blood pressures  Resume Micardis/hydrochlorothiazide which was on hold initially due to acute kidney injury Administered By (Optional): Fariba Dominguez MD

## 2021-03-01 ENCOUNTER — TELEPHONE (OUTPATIENT)
Dept: FAMILY MEDICINE CLINIC | Facility: CLINIC | Age: 61
End: 2021-03-01

## 2021-03-01 NOTE — TELEPHONE ENCOUNTER
----- Message from Alvin Griffiths LPN sent at 3702  9:36 AM EST -----  Regarding: FW: Non-Urgent Medical Question  Contact: [phone]    ----- Message -----  From: Salma Martin  Sent: 3/1/2021   9:35 AM EST  To: THE [facility]  Subject: Non-Urgent Medical Question                      Good morning:  My name is Migue Macdonald (60), I am a patient of Dr Brian Magaña  I had my leg amputated in May and have recently received my prosthetic leg and would like to begin physical therapy  My previous script has   If a new script could be faxed to Terry Singh at [facility] at [phone] I can begin therapy  Can I get the script faxed?

## 2021-03-07 DIAGNOSIS — G35 MULTIPLE SCLEROSIS (HCC): ICD-10-CM

## 2021-03-08 RX ORDER — BACLOFEN 10 MG/1
20 TABLET ORAL 3 TIMES DAILY
Qty: 180 TABLET | Refills: 0 | Status: SHIPPED | OUTPATIENT
Start: 2021-03-08 | End: 2021-03-30 | Stop reason: SDUPTHER

## 2021-03-08 NOTE — TELEPHONE ENCOUNTER
Has upcoming appt on 3/30  Will refill x 1 month and give more refills at that appt  Not seen since April 2020

## 2021-03-10 DIAGNOSIS — Z23 ENCOUNTER FOR IMMUNIZATION: ICD-10-CM

## 2021-03-11 ENCOUNTER — HOSPITAL ENCOUNTER (OUTPATIENT)
Dept: RADIOLOGY | Facility: HOSPITAL | Age: 61
Discharge: HOME | End: 2021-03-11
Payer: COMMERCIAL

## 2021-03-11 DIAGNOSIS — G35 MULTIPLE SCLEROSIS (HCC): ICD-10-CM

## 2021-03-11 PROCEDURE — A9585 GADOBUTROL INJECTION: HCPCS | Performed by: PHYSICIAN ASSISTANT

## 2021-03-11 PROCEDURE — 72157 MRI CHEST SPINE W/O & W/DYE: CPT

## 2021-03-11 RX ADMIN — GADOBUTROL 6 ML: 604.72 INJECTION INTRAVENOUS at 17:22

## 2021-03-12 ENCOUNTER — HOSPITAL ENCOUNTER (OUTPATIENT)
Dept: RADIOLOGY | Facility: HOSPITAL | Age: 61
Discharge: HOME | End: 2021-03-12
Payer: COMMERCIAL

## 2021-03-12 DIAGNOSIS — G35 MULTIPLE SCLEROSIS (HCC): ICD-10-CM

## 2021-03-12 PROCEDURE — 72156 MRI NECK SPINE W/O & W/DYE: CPT

## 2021-03-12 PROCEDURE — A9585 GADOBUTROL INJECTION: HCPCS | Performed by: PHYSICIAN ASSISTANT

## 2021-03-12 PROCEDURE — 70553 MRI BRAIN STEM W/O & W/DYE: CPT

## 2021-03-12 RX ADMIN — GADOBUTROL 5 ML: 604.72 INJECTION INTRAVENOUS at 16:08

## 2021-03-18 ENCOUNTER — TELEPHONE (OUTPATIENT)
Dept: NEUROLOGY | Facility: CLINIC | Age: 61
End: 2021-03-18

## 2021-03-18 DIAGNOSIS — I10 ESSENTIAL HYPERTENSION: ICD-10-CM

## 2021-03-18 RX ORDER — AMLODIPINE AND OLMESARTAN MEDOXOMIL 5; 20 MG/1; MG/1
1 TABLET ORAL DAILY
Qty: 30 TABLET | Refills: 0 | Status: SHIPPED | OUTPATIENT
Start: 2021-03-18 | End: 2021-04-19 | Stop reason: SDUPTHER

## 2021-03-18 NOTE — TELEPHONE ENCOUNTER
----- Message from Aj Benson PA-C sent at 3/16/2021  1:46 PM EDT -----  Please let patient know MRIs are all stable from a MS standpoint (brain, c-spine and t-spine)  MRI c-spine notes some progression of spondylosis and osteoarthritis at the C4-C5 and C5-C6 levels, possible pinched nerve on the right at C5 and left at C6  Any neck pain with radiation into the arms?   If so, would refer to pain management

## 2021-03-18 NOTE — TELEPHONE ENCOUNTER
Spoke w/patient  Advised of results below  Patient verbalized understanding  Patient reports she has no pain  She is doing well      [address]

## 2021-03-22 DIAGNOSIS — I70.201 POPLITEAL ARTERY STENOSIS, RIGHT (HCC): ICD-10-CM

## 2021-03-22 DIAGNOSIS — I21.A1 TYPE 2 MYOCARDIAL INFARCTION (HCC): ICD-10-CM

## 2021-03-22 RX ORDER — GABAPENTIN 300 MG/1
300 CAPSULE ORAL 2 TIMES DAILY
Qty: 180 CAPSULE | Refills: 0 | Status: SHIPPED | OUTPATIENT
Start: 2021-03-22 | End: 2021-08-11 | Stop reason: SDUPTHER

## 2021-03-23 ENCOUNTER — TELEPHONE (OUTPATIENT)
Dept: NEUROLOGY | Facility: CLINIC | Age: 61
End: 2021-03-23

## 2021-03-23 NOTE — TELEPHONE ENCOUNTER
Left message for patient confirming the appointment with Jason Amador in the [facility] office on 3/30/21 at 3:30 pm   We are asking for a return call to confirm that they will be keeping the appointment  Please discuss and document

## 2021-03-23 NOTE — TELEPHONE ENCOUNTER
Patient returned call and confirmed appt with  for 3/30 @ 3:30 - made sure she was aware office location

## 2021-03-26 DIAGNOSIS — G35 MULTIPLE SCLEROSIS (HCC): ICD-10-CM

## 2021-03-26 RX ORDER — RENAGEL 800 MG/1
TABLET ORAL
Qty: 30 TABLET | Refills: 0 | Status: SHIPPED | OUTPATIENT
Start: 2021-03-26 | End: 2021-05-28

## 2021-03-30 ENCOUNTER — OFFICE VISIT (OUTPATIENT)
Dept: NEUROLOGY | Facility: CLINIC | Age: 61
End: 2021-03-30
Payer: COMMERCIAL

## 2021-03-30 VITALS
WEIGHT: 120 LBS | DIASTOLIC BLOOD PRESSURE: 80 MMHG | SYSTOLIC BLOOD PRESSURE: 120 MMHG | HEART RATE: 72 BPM | HEIGHT: 67 IN | BODY MASS INDEX: 18.83 KG/M2

## 2021-03-30 DIAGNOSIS — G35 MULTIPLE SCLEROSIS (HCC): Primary | ICD-10-CM

## 2021-03-30 DIAGNOSIS — R35.0 URINARY FREQUENCY: ICD-10-CM

## 2021-03-30 PROBLEM — G92.9 TOXIC ENCEPHALOPATHY: Status: RESOLVED | Noted: 2020-05-14 | Resolved: 2021-03-30

## 2021-03-30 PROCEDURE — 3074F SYST BP LT 130 MM HG: CPT | Performed by: PHYSICIAN ASSISTANT

## 2021-03-30 PROCEDURE — 3079F DIAST BP 80-89 MM HG: CPT | Performed by: PHYSICIAN ASSISTANT

## 2021-03-30 PROCEDURE — 99214 OFFICE O/P EST MOD 30 MIN: CPT | Performed by: PHYSICIAN ASSISTANT

## 2021-03-30 PROCEDURE — 3008F BODY MASS INDEX DOCD: CPT | Performed by: PHYSICIAN ASSISTANT

## 2021-03-30 RX ORDER — AMANTADINE HYDROCHLORIDE 100 MG/1
100 CAPSULE, GELATIN COATED ORAL 2 TIMES DAILY
Qty: 180 CAPSULE | Refills: 1 | Status: SHIPPED | OUTPATIENT
Start: 2021-03-30 | End: 2021-07-06

## 2021-03-30 RX ORDER — BACLOFEN 10 MG/1
20 TABLET ORAL 3 TIMES DAILY
Qty: 540 TABLET | Refills: 1 | Status: SHIPPED | OUTPATIENT
Start: 2021-03-30 | End: 2021-07-06

## 2021-03-30 NOTE — PATIENT INSTRUCTIONS
Continue Juana  Will have the lab get back out to you for bloodwork  Continue baclofen and amantadine, as well as gabapentin  Follow up in 4-6 months

## 2021-03-30 NOTE — PROGRESS NOTES
Patient ID: Sundeep Perez is a 61 y o  female  Assessment/Plan:    Multiple sclerosis ([facility] [address] )  Patient being seen after nearly 1 year  She remains quite stable from a MS standpoint  She continues on Aubagio and tolerating well  Labs were last updated in August 2020, due to get them done now  She was previously getting in-home lab draws every other month, but they stopped for some reason  Will send a message to our staff to help coordinate  MRI brain, c-spine and t-spine were all just updated earlier this month and stable  No disease progression noted  Biggest change is that she presented in May 2020 with critical right lower extremity ischemia and required a right AKA  She went to rehab and has done extensive therapy and is doing very well  She tells me she feels the best she has felt in a long time and is proud of her motivation and ability to adapt  She was non-ambulatory prior to the amputation, and actually feels better without the leg  She does have a prosthesis she is just now learning to use; goal is not to walk due to LLE is weak, but will use this for balance and transfers  She is having worsening bladder issues, mainly frequency and nocturia  Referral placed to urology  Spasticity stable on baclofen  Using amantadine for fatigue  Has been placed on gabapentin for neuropathic pain, but not sure she needs it  She may taper off after her next refill runs out (being Rx'd by PCP currently)  Neuro exam is stable  She will return in 4-6 months or sooner if needed  Diagnoses and all orders for this visit:    Multiple sclerosis [facility])  -     Ambulatory referral to Urology; Future  -     amantadine (SYMMETREL) 100 mg capsule; Take 1 capsule (100 mg total) by mouth 2 (two) times a day  -     baclofen 10 mg tablet; Take 2 tablets (20 mg total) by mouth 3 (three) times a day    Urinary frequency  -     Ambulatory referral to Urology;  Future           Subjective:    KEVEN GAGNON Itz is a 61 y o  female who presents for follow up of MS  She was last seen nearly 1 year ago via telemedicine on 4/22/2020  Patient was diagnosed with MS in 2010 with overall progressive decline since time of diagnosis, particularly affecting patients walking  Patient previously very healthy and active in ADLs  Patient was originally diagnosed and followed by Dr Cheyenne García  Patient has been on Ampyra and Baclofen to help with her spasticity and walking  No meds have seemed to be very beneficial  She is off Ampyra  Patient initially started walking with a cane, then a rolling walker, and now needs a wheelchair for any distance and for the majority of the day  Patient had been working until 2011  Patient had been on Betaseron since 2010  She self-discontinued Betaseron due to growing tired of injections  Patient started Aubagio in July 2015  She has overall tolerated Aubagio well, no issues  Last MRI brain completed 4/9/19 which was stable compared to 7/2017  No new lesions or enhancement  She was hospitalized twice in 2019 for pseudo-exacerbations of her MS in the setting of illness, dehydration, ARBEN  Hospitalizations occurred in June 2019 and December 2019  During Dec hospitalization, she was having some hallucinations  She was seeing people outside of her house doing things like yard work  Hallucinations improved with hydration  She has not had any further hallucinations  She went to a rehab facility after her December hospitalization x 3 months and then returned home  Imaging was not updated during her hospitalizations  Unfortunately, following her last visit here, she was hospitalized in May 2020 due to critical right lower limb ischemia which required right AKA on 5/5/20  She went to a nursing home for rehab following her hospital stay  She has followed with vascular since that time and is now on anticoagulation  Patient recently had updated imaging for her MS    MRI brain 3/21/21 with stable burden of demyelinating disease scattered diffusely in the supra and infratentorial parenchyma, compared to 4/8/19  MRI c-spine 3/12/21 with stable demyelinating disease with chronic lesion and myelomalacia at C3-C4, and stable ventral C2 cord level lesion  Progression of spondylosis and osteoarthritis at the C4-C5 and C5-C6 levels  Correlate for right C5 and left C6 radiculitis  Patient was called and denied any neck pain with radiation  MRI t-spine 3/11/21 was motion degraded, but no cord lesions seen  Today, patient reports she is doing really well  She actually feels the best she has felt in a long time  She believes she feels better without her right leg  according to her, it was a burden for her, always felt heavy, "didn't work anyway"  She has done extensive rehab and has learned to be quite independent and is proud of herself  Has great family support  She continues on Aubagio  Last labs are from 8/31/20 with normal CBC and LFTs  The following portions of the patient's history were reviewed and updated as appropriate: current medications, past family history, past medical history, past social history, past surgical history and problem list          Objective:    Blood pressure 120/80, pulse 72, height 5' 7" (1 702 m), weight 54 4 kg (120 lb)    Physical Exam  Constitutional:       Appearance: Normal appearance  HENT:      Head: Normocephalic and atraumatic  Eyes:      Extraocular Movements: EOM normal       Pupils: Pupils are equal, round, and reactive to light  Musculoskeletal:      Comments: Right AKA   Neurological:      Mental Status: She is alert  Deep Tendon Reflexes:      Reflex Scores:       Bicep reflexes are 2+ on the right side and 2+ on the left side  Brachioradialis reflexes are 2+ on the right side and 2+ on the left side  Patellar reflexes are 2+ on the left side  Achilles reflexes are 2+ on the left side    Psychiatric:         Mood and Affect: Mood normal          Speech: Speech normal          Behavior: Behavior normal          Neurological Exam  Mental Status  Alert  Oriented to person, place, time and situation  Speech is normal  Language is fluent with no aphasia  Attention and concentration are normal     Cranial Nerves  CN II: Visual fields full to confrontation  CN III, IV, VI: Extraocular movements intact bilaterally  Pupils equal round and reactive to light bilaterally  CN V: Facial sensation is normal   CN VII: Full and symmetric facial movement  CN VIII: Hearing is normal   CN IX, X: Palate elevates symmetrically  CN XI: Shoulder shrug strength is normal   CN XII: Tongue midline without atrophy or fasciculations  Motor   Normal muscle tone  Right                     Left   Shoulder abduction               5                          4+  Elbow flexion                         5                          5-  Elbow extension                    5                          5-  Hip flexion                              3                          1  Dorsiflexion                                                    1    Sensory  Light touch is normal in upper and lower extremities  Reflexes                                           Right                      Left  Brachioradialis                    2+                         2+  Biceps                                 2+                         2+  Patellar                                                       2+  Achilles                                                       2+    Coordination  Slight dysmetria on FTN, worse on the left   Gait  Non-ambulatory, in a WC         ROS:    Review of Systems   Constitutional: Negative  Negative for appetite change and fever  HENT: Negative  Negative for hearing loss, tinnitus, trouble swallowing and voice change  Eyes: Negative  Negative for photophobia and pain  Respiratory: Negative  Negative for shortness of breath  Cardiovascular: Negative  Negative for palpitations  Gastrointestinal: Negative  Negative for nausea and vomiting  Endocrine: Negative  Negative for cold intolerance  Genitourinary: Negative  Negative for dysuria, frequency and urgency  Musculoskeletal: Negative  Negative for myalgias and neck pain  Skin: Negative  Negative for rash  Neurological: Negative  Negative for dizziness, tremors, seizures, syncope, facial asymmetry, speech difficulty, weakness, light-headedness, numbness and headaches  Hematological: Bruises/bleeds easily (Bleeds easily)  Psychiatric/Behavioral: Negative  Negative for confusion, hallucinations and sleep disturbance       I personally reviewed and updated the ROS as appropriate

## 2021-03-31 ENCOUNTER — TELEPHONE (OUTPATIENT)
Dept: NEUROLOGY | Facility: CLINIC | Age: 61
End: 2021-03-31

## 2021-03-31 NOTE — TELEPHONE ENCOUNTER
Patient was previously getting in-home lab draws through [facility]  Last draw was in Aug 2020 and per patient they just stopped coming  Can you please find out what is needed for them to continue in-home labs for her? Would need them every 3 months  I can place orders in Epic if needed, or not sure if they have a separate form? Thanks!

## 2021-03-31 NOTE — ASSESSMENT & PLAN NOTE
Patient being seen after nearly 1 year  She remains quite stable from a MS standpoint  She continues on Aubagio and tolerating well  Labs were last updated in August 2020, due to get them done now  She was previously getting in-home lab draws every other month, but they stopped for some reason  Will send a message to our staff to help coordinate  MRI brain, c-spine and t-spine were all just updated earlier this month and stable  No disease progression noted  Biggest change is that she presented in May 2020 with critical right lower extremity ischemia and required a right AKA  She went to rehab and has done extensive therapy and is doing very well  She tells me she feels the best she has felt in a long time and is proud of her motivation and ability to adapt  She was non-ambulatory prior to the amputation, and actually feels better without the leg  She does have a prosthesis she is just now learning to use; goal is not to walk due to LLE is weak, but will use this for balance and transfers  She is having worsening bladder issues, mainly frequency and nocturia  Referral placed to urology  Spasticity stable on baclofen  Using amantadine for fatigue  Has been placed on gabapentin for neuropathic pain, but not sure she needs it  She may taper off after her next refill runs out (being Rx'd by PCP currently)  Neuro exam is stable  She will return in 4-6 months or sooner if needed

## 2021-04-01 NOTE — TELEPHONE ENCOUNTER
Lab request form sent to [facility]  She will give it to Corina Palm for her to review and sign  After it is signed Colonel Todd will fax it out and have it scanned in the patient's chart

## 2021-04-01 NOTE — TELEPHONE ENCOUNTER
MSW called [facility] to follow up on this  MSW spoke with a representative who stated that unfortunately the patient's original referral  on 2020  States they can resume services after a new referral is sent       MSW will prep the form and send it to Robson BARRETO

## 2021-04-19 DIAGNOSIS — I10 ESSENTIAL HYPERTENSION: ICD-10-CM

## 2021-04-21 RX ORDER — AMLODIPINE AND OLMESARTAN MEDOXOMIL 5; 20 MG/1; MG/1
1 TABLET ORAL DAILY
Qty: 30 TABLET | Refills: 0 | Status: SHIPPED | OUTPATIENT
Start: 2021-04-21 | End: 2021-04-28 | Stop reason: SDUPTHER

## 2021-04-28 ENCOUNTER — OFFICE VISIT (OUTPATIENT)
Dept: FAMILY MEDICINE CLINIC | Facility: CLINIC | Age: 61
End: 2021-04-28
Payer: COMMERCIAL

## 2021-04-28 VITALS
OXYGEN SATURATION: 99 % | RESPIRATION RATE: 20 BRPM | TEMPERATURE: 97.8 F | DIASTOLIC BLOOD PRESSURE: 70 MMHG | SYSTOLIC BLOOD PRESSURE: 122 MMHG | WEIGHT: 127 LBS | HEIGHT: 67 IN | BODY MASS INDEX: 19.93 KG/M2 | HEART RATE: 86 BPM

## 2021-04-28 DIAGNOSIS — I10 ESSENTIAL HYPERTENSION: Primary | ICD-10-CM

## 2021-04-28 DIAGNOSIS — G54.6 PHANTOM PAIN AFTER AMPUTATION OF LOWER EXTREMITY (HCC): ICD-10-CM

## 2021-04-28 DIAGNOSIS — R10.9 ABDOMINAL CRAMPING: ICD-10-CM

## 2021-04-28 DIAGNOSIS — K52.9 CHRONIC DIARRHEA: ICD-10-CM

## 2021-04-28 DIAGNOSIS — I70.201 POPLITEAL ARTERY STENOSIS, RIGHT (HCC): ICD-10-CM

## 2021-04-28 DIAGNOSIS — G35 MULTIPLE SCLEROSIS (HCC): ICD-10-CM

## 2021-04-28 DIAGNOSIS — Z12.11 SCREEN FOR COLON CANCER: ICD-10-CM

## 2021-04-28 DIAGNOSIS — I21.A1 TYPE 2 MYOCARDIAL INFARCTION (HCC): ICD-10-CM

## 2021-04-28 PROCEDURE — 3074F SYST BP LT 130 MM HG: CPT | Performed by: FAMILY MEDICINE

## 2021-04-28 PROCEDURE — 99214 OFFICE O/P EST MOD 30 MIN: CPT | Performed by: FAMILY MEDICINE

## 2021-04-28 PROCEDURE — 3725F SCREEN DEPRESSION PERFORMED: CPT | Performed by: FAMILY MEDICINE

## 2021-04-28 PROCEDURE — 3008F BODY MASS INDEX DOCD: CPT | Performed by: FAMILY MEDICINE

## 2021-04-28 PROCEDURE — 3078F DIAST BP <80 MM HG: CPT | Performed by: FAMILY MEDICINE

## 2021-04-28 RX ORDER — DICYCLOMINE HYDROCHLORIDE 10 MG/1
10 CAPSULE ORAL
Qty: 90 CAPSULE | Refills: 0 | Status: SHIPPED | OUTPATIENT
Start: 2021-04-28 | End: 2021-06-24 | Stop reason: SDUPTHER

## 2021-04-28 RX ORDER — METOPROLOL TARTRATE 100 MG/1
100 TABLET ORAL EVERY 12 HOURS SCHEDULED
Qty: 180 TABLET | Refills: 1 | Status: SHIPPED | OUTPATIENT
Start: 2021-04-28 | End: 2021-07-26

## 2021-04-28 RX ORDER — AMLODIPINE AND OLMESARTAN MEDOXOMIL 5; 20 MG/1; MG/1
1 TABLET ORAL DAILY
Qty: 90 TABLET | Refills: 1 | Status: SHIPPED | OUTPATIENT
Start: 2021-04-28 | End: 2021-08-11 | Stop reason: SDUPTHER

## 2021-04-28 NOTE — ASSESSMENT & PLAN NOTE
Pt would like to try to cut the gabapentin, we trial at 300 mg qd for now and see how she does>  Pt will let me know

## 2021-04-28 NOTE — PROGRESS NOTES
Assessment/Plan:    1  Essential hypertension  Assessment & Plan:  Excellent on her current meds    Orders:  -     amlodipine-olmesartan (SELENE) 5-20 MG; Take 1 tablet by mouth daily    2  Multiple sclerosis ([facility] [address] )  Assessment & Plan:  Pt sees neurology      3  Popliteal artery stenosis, right (HCC)  -     metoprolol tartrate (LOPRESSOR) 100 mg tablet; Take 1 tablet (100 mg total) by mouth every 12 (twelve) hours    4  Type 2 myocardial infarction (HCC)  -     metoprolol tartrate (LOPRESSOR) 100 mg tablet; Take 1 tablet (100 mg total) by mouth every 12 (twelve) hours    5  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)  Assessment & Plan:  Pt would like to try to cut the gabapentin, we trial at 300 mg qd for now and see how she does>  Pt will let me know  6  Chronic diarrhea  -     Ambulatory referral to Gastroenterology; Future  -     dicyclomine (BENTYL) 10 mg capsule; Take 1 capsule (10 mg total) by mouth 3 (three) times a day before meals    7  Screen for colon cancer  -     Ambulatory referral to Gastroenterology; Future    8  Abdominal cramping  -     dicyclomine (BENTYL) 10 mg capsule; Take 1 capsule (10 mg total) by mouth 3 (three) times a day before meals          There are no Patient Instructions on file for this visit  Return for 3-6 months  Subjective:      Patient ID: Rosa Valdivia is a 61 y o  female  Chief Complaint   Patient presents with    Follow-up     medication review  [facility]       Pt is here for a follow up for med refills  Pt states the selene she was placed on she was supposed to come back to check the BP and she did not  Pt is on it currently with metoprolol    Pt wants to know when she can stop taking the gabapentin  States she does not have any nerve pain but is curious if that is because she is on it  Pt states she thinks she needs to see Liz Gibson - pt states if her stomach start rumbling she may have immidiate diarrhea  This has been th elast year    PT does not have solid stool  The following portions of the patient's history were reviewed and updated as appropriate: allergies, current medications, past family history, past medical history, past social history, past surgical history and problem list     Review of Systems   Constitutional: Negative  Negative for activity change, appetite change, chills, diaphoresis and fatigue  HENT: Negative  Negative for dental problem, ear pain, sinus pressure and sore throat  Eyes: Negative  Negative for photophobia, pain, discharge, redness, itching and visual disturbance  Respiratory: Negative for apnea and chest tightness  Cardiovascular: Negative  Negative for chest pain, palpitations and leg swelling  Gastrointestinal: Positive for abdominal pain and diarrhea  Negative for abdominal distention, blood in stool and constipation  Endocrine: Negative  Negative for cold intolerance and heat intolerance  Genitourinary: Negative  Negative for difficulty urinating and dyspareunia  Musculoskeletal: Negative  Negative for arthralgias and back pain  Skin: Negative  Allergic/Immunologic: Negative for environmental allergies  Neurological: Negative  Negative for dizziness  Psychiatric/Behavioral: Negative  Negative for agitation           Current Outpatient Medications   Medication Sig Dispense Refill    acetaminophen (TYLENOL) 325 mg tablet Take 3 tablets (975 mg total) by mouth every 8 (eight) hours 30 tablet 0    amantadine (SYMMETREL) 100 mg capsule Take 1 capsule (100 mg total) by mouth 2 (two) times a day 180 capsule 1    amlodipine-olmesartan (SELENE) 5-20 MG Take 1 tablet by mouth daily 90 tablet 1    ascorbic acid (VITAMIN C) 250 mg tablet Take 250 mg by mouth daily      Aubagio 14 MG TABS TAKE 1 TABLET BY MOUTH DAILY 30 tablet 0    baclofen 10 mg tablet Take 2 tablets (20 mg total) by mouth 3 (three) times a day 540 tablet 1    cholecalciferol (VITAMIN D3) 1,000 units tablet Take 1,000 Units by mouth daily      gabapentin (NEURONTIN) 300 mg capsule Take 1 capsule (300 mg total) by mouth 2 (two) times a day 180 capsule 0    LORazepam (ATIVAN) 0 5 mg tablet Take 1 tab 30 min prior to MRI, may repeat x 1 immediately before if needed 4 tablet 0    metoprolol tartrate (LOPRESSOR) 100 mg tablet Take 1 tablet (100 mg total) by mouth every 12 (twelve) hours 180 tablet 1    Multiple Vitamins-Minerals (Multivitamin Adult Extra C) CHEW Chew 1 tablet daily      rivaroxaban (XARELTO) 20 mg tablet Take 1 tablet (20 mg total) by mouth daily with breakfast 90 tablet 0    dicyclomine (BENTYL) 10 mg capsule Take 1 capsule (10 mg total) by mouth 3 (three) times a day before meals 90 capsule 0     No current facility-administered medications for this visit  Objective:    /70   Pulse 86   Temp 97 8 °F (36 6 °C)   Resp 20   Ht 5' 7" (1 702 m)   Wt 57 6 kg (127 lb) Comment: WC bound  SpO2 99%   BMI 19 89 kg/m²        Physical Exam  Vitals signs and nursing note reviewed  Constitutional:       General: She is not in acute distress  Appearance: She is well-developed  She is not diaphoretic  HENT:      Head: Normocephalic and atraumatic  Right Ear: External ear normal       Left Ear: External ear normal       Nose: Nose normal       Mouth/Throat:      Pharynx: No oropharyngeal exudate  Eyes:      General: No scleral icterus  Right eye: No discharge  Left eye: No discharge  Pupils: Pupils are equal, round, and reactive to light  Neck:      Thyroid: No thyromegaly  Cardiovascular:      Rate and Rhythm: Normal rate  Heart sounds: Normal heart sounds  No murmur  Pulmonary:      Effort: Pulmonary effort is normal  No respiratory distress  Breath sounds: Normal breath sounds  No wheezing  Abdominal:      General: Bowel sounds are normal  There is no distension  Palpations: Abdomen is soft  There is no mass  Tenderness:  There is no abdominal tenderness  There is no guarding or rebound  Musculoskeletal: Normal range of motion  Comments: Rt amputation  In a wheelchair     Skin:     General: Skin is warm and dry  Findings: No erythema or rash  Neurological:      Mental Status: She is alert        Coordination: Coordination normal       Deep Tendon Reflexes: Reflexes normal    Psychiatric:         Behavior: Behavior normal                 Olegario Aguirre DO

## 2021-05-20 DIAGNOSIS — Z89.619 S/P AKA (ABOVE KNEE AMPUTATION) (HCC): ICD-10-CM

## 2021-05-28 DIAGNOSIS — G35 MULTIPLE SCLEROSIS (HCC): ICD-10-CM

## 2021-05-28 RX ORDER — RENAGEL 800 MG/1
TABLET ORAL
Qty: 30 TABLET | Refills: 0 | Status: SHIPPED | OUTPATIENT
Start: 2021-05-28 | End: 2021-07-28

## 2021-05-28 NOTE — TELEPHONE ENCOUNTER
Patient needs labs done  See prior encounters after our last OV  She was going to have [facility] do an in-home lab draw  Please see what happened with that    I can refill x 1 month, but needs labs done

## 2021-06-01 NOTE — TELEPHONE ENCOUNTER
Looks like form that was scanned in was faxed, but may not have gone through  I refaxed the script and called patient

## 2021-06-24 DIAGNOSIS — R10.9 ABDOMINAL CRAMPING: ICD-10-CM

## 2021-06-24 DIAGNOSIS — K52.9 CHRONIC DIARRHEA: ICD-10-CM

## 2021-06-24 RX ORDER — DICYCLOMINE HYDROCHLORIDE 10 MG/1
10 CAPSULE ORAL
Qty: 90 CAPSULE | Refills: 0 | Status: SHIPPED | OUTPATIENT
Start: 2021-06-24 | End: 2021-09-22 | Stop reason: SDUPTHER

## 2021-07-06 DIAGNOSIS — G35 MULTIPLE SCLEROSIS (HCC): ICD-10-CM

## 2021-07-06 RX ORDER — BACLOFEN 10 MG/1
TABLET ORAL
Qty: 540 TABLET | Refills: 1 | Status: SHIPPED | OUTPATIENT
Start: 2021-07-06 | End: 2022-01-02

## 2021-07-06 RX ORDER — AMANTADINE HYDROCHLORIDE 100 MG/1
CAPSULE, GELATIN COATED ORAL
Qty: 180 CAPSULE | Refills: 1 | Status: SHIPPED | OUTPATIENT
Start: 2021-07-06 | End: 2022-03-28 | Stop reason: SDUPTHER

## 2021-07-26 DIAGNOSIS — I70.201 POPLITEAL ARTERY STENOSIS, RIGHT (HCC): ICD-10-CM

## 2021-07-26 DIAGNOSIS — I21.A1 TYPE 2 MYOCARDIAL INFARCTION (HCC): ICD-10-CM

## 2021-07-26 RX ORDER — METOPROLOL TARTRATE 100 MG/1
TABLET ORAL
Qty: 180 TABLET | Refills: 1 | Status: SHIPPED | OUTPATIENT
Start: 2021-07-26 | End: 2021-10-20 | Stop reason: SDUPTHER

## 2021-08-02 ENCOUNTER — TELEPHONE (OUTPATIENT)
Dept: NEUROLOGY | Facility: CLINIC | Age: 61
End: 2021-08-02

## 2021-08-02 NOTE — TELEPHONE ENCOUNTER
Called and left a voicemail for patient - Please call back to confirm upcoming appointment with Dr. Aly. Provided patient with apt date, time and location. Informed patient that check in is at least 15 minutes prior to apt time.

## 2021-08-10 ENCOUNTER — OFFICE VISIT (OUTPATIENT)
Dept: NEUROLOGY | Facility: CLINIC | Age: 61
End: 2021-08-10
Payer: COMMERCIAL

## 2021-08-10 ENCOUNTER — TELEPHONE (OUTPATIENT)
Dept: NEUROLOGY | Facility: CLINIC | Age: 61
End: 2021-08-10

## 2021-08-10 VITALS
BODY MASS INDEX: 20.89 KG/M2 | HEART RATE: 69 BPM | SYSTOLIC BLOOD PRESSURE: 132 MMHG | DIASTOLIC BLOOD PRESSURE: 90 MMHG | HEIGHT: 66 IN | TEMPERATURE: 96.2 F | WEIGHT: 130 LBS

## 2021-08-10 DIAGNOSIS — G35 MULTIPLE SCLEROSIS (HCC): Primary | ICD-10-CM

## 2021-08-10 PROCEDURE — 99215 OFFICE O/P EST HI 40 MIN: CPT | Performed by: PSYCHIATRY & NEUROLOGY

## 2021-08-10 NOTE — PROGRESS NOTES
Patient ID: Garret Taylor is a 61 y o  female  Assessment/Plan:    Multiple Sclerosis,   Vision problems    She is being followed for MS, currently on Aubagio 14 mg, 1 tablet per day  Patient says she has a "film" in the center of her visual field of her right eye  Started 3 years ago  Worsened recently in the past few months after visit in 3/2021  The center of her visual field is "cloudy" and "unclear"; the blurriness and lack of clarity has become significantly worse in the past few months  Size of the "film" has remained the same  Not exacerbated or alleviated by anything, including eye drops, ointments or temperature changes  She has not seen Optho in a few years  She is having worsening bladder issues, mainly frequency and nocturia  Referral placed to urology, but patient has not followed up  No falls or trips  Patient had her right leg surgically removed 2/2 ischemia in May 2020  Does not use prosthetic leg due to major inconvenience  No difficulty with swallowing, eating, coughing  Labwork drawn in 7/29/21 shows mildly high Alk Phos (131)  MRI brain, c-spine and t-spine were updated 3/2021 and stable  No demyelinating disease progression noted  Progression of spondylosis and osteoarthritis at the C4-C5 and C5-C6 levels  Plan:   · Continue Aubagio  · F/u with Optho  · Consider following up with Urology for bladder issues if they worsen in the future  Patient currently does not want to visit the Urologist  Referral given  · Given arthritic changes and spinal stenosis seen on spinal MRIs, consider evaluation by Neurosurgery  · Follow up in 4-6 months  Spasticity:   · stable on baclofen 10mg 2 tablets 3 times per day  Fatigue, chronic:   · Using amantadine 100 mg 1 capsule twice per day for fatigue  She has decreased fatigue  Patient was taking it once per day before visit with Ms Shell Martinez, but she experienced significant fatigue again and began to re take Amantadine twice per day  Patient considers tapering off now  Neuropathic pain, stable:   · On gabapentin 300 mg 1 tablet per day for neuropathic pain  · Phantom pain in right lower extremity and patient was taking Gabapentin for neuropathy in right lower extremity before surgery (May 2020)  COVID vaccine:   Patient has not been vaccinated yet, but plans to get the vaccine soon  Thank you for involving me in the care of Ms Mobley  If you have any additional questions or would like to discuss further, please feel free to contact me  I have spent 45 minutes with Patient  today in which greater than 50% of this time was spent in counseling/coordination of care regarding Diagnostic results, Prognosis, Risks and benefits of tx options, Intructions for management, Patient and family education, Importance of tx compliance, Risk factor reductions and Impressions  MALISSA Lazaro , PGY-1  Subjective:    Ms Ashly Smith was last seen by FRIEDA Wyman at the [facility] office in March 30, 2021  She is being followed for MS, currently on Aubagio 14 mg, 1 tablet per day  Patient says she has a "film" in the center of her visual field of her right eye  Started 3 years ago  Worsened recently in the past few months after visit in 3/2021  The center of her visual field is "cloudy" and "unclear"; the blurriness and lack of clarity has become significantly worse in the past few months  Size of the "film" has remained the same  Not exacerbated or alleviated by anything, including eye drops, ointments or temperature changes  She is having worsening bladder issues, mainly frequency and nocturia  Referral placed to urology, but patient has not followed up  No falls or trips  No difficulty with swallowing, eating, coughing  Spasticity stable on baclofen 10mg 2 tablets 3 times per day  Using amantadine 100 mg 1 capsule twice per day for fatigue  She has decreased fatigue   Patient was taking it once per day before visit with Ms Sebastien Del Angel, but she experienced significant fatigue again and began to re take Amantadine twice per day  Patient considers tapering off now  Has been placed on gabapentin 300 mg 1 tablet per day for neuropathic pain  She experiences most pain in right foot that was surgically removed in May 2020 (phantom pain)  Labwork drawn in 7/29/21 shows mildly high Alk Phos (131)  MRI brain, c-spine and t-spine were updated 3/2021 and stable  No disease progression noted  · MRI brain 3/21/21 with stable burden of demyelinating disease scattered diffusely in the supra and infratentorial parenchyma, compared to 4/8/19  · MRI c-spine 3/12/21 with stable demyelinating disease with chronic lesion and myelomalacia at C3-C4, and stable ventral C2 cord level lesion  Progression of spondylosis and osteoarthritis at the C4-C5 and C5-C6 levels  Correlate for right C5 and left C6 radiculitis  · MRI t-spine 3/11/21 was motion degraded, but no cord lesions seen  As per chart review and Ms Summers's note from 3/2021:     "Patient was diagnosed with MS in 2010 with overall progressive decline since time of diagnosis, particularly affecting patients walking  Patient previously very healthy and active in ADLs  Patient was originally diagnosed and followed by Dr Uriel Castle  Patient has been on Ampyra and Baclofen to help with her spasticity and walking  No meds have seemed to be very beneficial  She is off Ampyra  Patient initially started walking with a cane, then a rolling walker, and now needs a wheelchair for any distance and for the majority of the day  Patient had been working until 2011  Patient had been on Betaseron since 2010  She self-discontinued Betaseron due to growing tired of injections  Patient started Aubagio in July 2015  She has overall tolerated Aubagio well, no issues  Last MRI brain completed 4/9/19 which was stable compared to 7/2017   No new lesions or enhancement      She was hospitalized twice in 2019 for pseudo-exacerbations of her MS in the setting of illness, dehydration, ARBEN  Hospitalizations occurred in June 2019 and December 2019   During Dec hospitalization, she was having some hallucinations  Jayme Garcia was seeing people outside of her house doing things like yard work   Hallucinations improved with hydration  Jayme Garcia has not had any further hallucinations  Jayme Garcia went to a rehab facility after her December hospitalization x 3 months and then returned home  Priscila Gonzalez was not updated during her hospitalizations        Unfortunately, following her last visit here, she was hospitalized in May 2020 due to critical right lower limb ischemia which required right AKA on 5/5/20  She went to a nursing home for rehab following her hospital stay  She has followed with vascular since that time and is now on anticoagulation  She presented in May 2020 with critical right lower extremity ischemia and required a right AKA  She went to rehab and has done extensive therapy and is doing very well  She tells me she feels the best she has felt in a long time and is proud of her motivation and ability to adapt  She was non-ambulatory prior to the amputation, and actually feels better without the leg  She does have a prosthesis she is just now learning to use; goal is not to walk due to LLE is weak, but will use this for balance and transfers "      The following portions of the patient's history were reviewed and updated as appropriate: current medications, past family history, past medical history, past social history, past surgical history and problem list      Objective:    Blood pressure 132/90, pulse 69, temperature (!) 96 2 °F (35 7 °C), temperature source Temporal, height 5' 6" (1 676 m), weight 59 kg (130 lb), not currently breastfeeding      Physical Exam     Vitals:    08/10/21 1033   BP: 132/90   Pulse: 69   Temp: (!) 96 2 °F (35 7 °C)     General Examination: In no apparent distress, well developed and well nourished, and cooperative  Resting in wheelchair  HEENT: Normocephalic, Atraumatic  Moist mucus membranes  Anicteric  PPC    CVS: Regular rate and rhythm  S1 S2 noted  No audible murmurs  No carotids bruits  Peripheral pulses palpable throughout   Lungs: Clear to auscultation bilaterally  No rales, rhonchi, wheezing  Abdomen: Bowel sounds positive  Non- tender  Non-distended  No organomegaly  Ext: No edema; right foot surgically removed  Psych: Thought content - No VH/AH  No delusions  Though Process - logical    Skin - Bruises noted on right arm, hand (patient says due to dog)  Neurological Exam  Mental Status  Awake and alert  Speech is normal  Language is fluent with no aphasia  Cranial Nerves  CN II: Visual fields full to confrontation  Right funduscopic exam: disc intact  Left funduscopic exam: disc intact  CN III, IV, VI: Extraocular movements intact bilaterally  Normal lids and orbits bilaterally  Pupils equal round and reactive to light bilaterally  CN V: Facial sensation is normal   CN VII:  Right: There is no facial weakness  Left: There is no facial weakness  CN VIII: Hearing is normal   CN IX, X: Palate elevates symmetrically  CN XI:  Right: Sternocleidomastoid strength is normal  Trapezius strength is normal   Left: Sternocleidomastoid strength is normal  Trapezius strength is normal   CN XII: Tongue midline without atrophy or fasciculations  Motor  Normal muscle bulk throughout  No fasciculations present  Normal muscle tone                                               Right                     Left   Shoulder abduction               5                          5   Shoulder adduction               5                          5  Elbow flexion                         5                          5  Elbow extension                    5                          5  Wrist flexion                           5                          5  Wrist extension                      5                          5  Hip flexion                              NA                        3  Hip extension                         NA                        3  Knee flexion                           NA                        3  Knee extension                      NA                        3  Plantarflexion                         NA                        3  Dorsiflexion                            NA                        3     Sensory  Light touch is normal in upper and left lower extremities  Vibration is normal in upper and left lower extremities  Reflexes                                           Right                      Left  Brachioradialis                    2+                         2+  Biceps                                 2+                         2+  Triceps                                2+                         2+  Patellar                                NA                         1+  Achilles                                NA                         1+     Right pathological reflexes: N/A  Left pathological reflexes: Crossed adductor absent  Ankle clonus absent  Coordination  Right: Finger-to-nose normal  Rapid alternating movement normal   Left: Finger-to-nose normal  Rapid alternating movement normal      Gait  Not assessed  ROS:    Review of Systems   Constitutional: Negative  Negative for appetite change and fever  HENT: Negative  Negative for hearing loss, tinnitus, trouble swallowing and voice change  Eyes: Positive for visual disturbance  Negative for photophobia and pain  Fog/Film over right eye that doesn't go away, getting progressively worse and bothersome   Respiratory: Negative  Negative for shortness of breath  Cardiovascular: Negative  Negative for palpitations  Gastrointestinal: Negative  Negative for nausea and vomiting  Endocrine: Negative  Negative for cold intolerance  Genitourinary: Negative  Negative for dysuria, frequency and urgency  Musculoskeletal: Negative  Negative for myalgias and neck pain  Skin: Negative  Negative for rash  Neurological: Negative  Negative for dizziness, tremors, seizures, syncope, facial asymmetry, speech difficulty, weakness, light-headedness, numbness and headaches  Hematological: Negative  Does not bruise/bleed easily  Psychiatric/Behavioral: Negative  Negative for confusion, hallucinations and sleep disturbance  Labs:           Imaging:   · MRI brain 3/21/21 with stable burden of demyelinating disease scattered diffusely in the supra and infratentorial parenchyma, compared to 4/8/19  · MRI c-spine 3/12/21 with stable demyelinating disease with chronic lesion and myelomalacia at C3-C4, and stable ventral C2 cord level lesion  Progression of spondylosis and osteoarthritis at the C4-C5 and C5-C6 levels  Correlate for right C5 and left C6 radiculitis  · MRI t-spine 3/11/21 was motion degraded, but no cord lesions seen

## 2021-08-10 NOTE — TELEPHONE ENCOUNTER
Pt seen in office today  Pt would like to get covid vaccine but she is home bound  MS and right AKA of the right lower ext  Can you please see if this is an option for her  Also pt has been getting home lab draws every other month for her aubagio monitoring  Can you please make sure this is still in place for her  Last labs from July  She will be next due in sept

## 2021-08-11 DIAGNOSIS — I10 ESSENTIAL HYPERTENSION: ICD-10-CM

## 2021-08-11 DIAGNOSIS — I70.201 POPLITEAL ARTERY STENOSIS, RIGHT (HCC): ICD-10-CM

## 2021-08-11 DIAGNOSIS — I21.A1 TYPE 2 MYOCARDIAL INFARCTION (HCC): ICD-10-CM

## 2021-08-11 RX ORDER — AMLODIPINE AND OLMESARTAN MEDOXOMIL 5; 20 MG/1; MG/1
1 TABLET ORAL DAILY
Qty: 90 TABLET | Refills: 1 | Status: SHIPPED | OUTPATIENT
Start: 2021-08-11 | End: 2021-11-12 | Stop reason: SDUPTHER

## 2021-08-11 RX ORDER — GABAPENTIN 300 MG/1
300 CAPSULE ORAL 2 TIMES DAILY
Qty: 180 CAPSULE | Refills: 1 | Status: SHIPPED | OUTPATIENT
Start: 2021-08-11 | End: 2022-01-03

## 2021-08-12 NOTE — TELEPHONE ENCOUNTER
[facility] has an in-home vaccination appointment request form that can be filled out for homebound individuals  The patient will need to complete it in order to request an appointment  The link is located below:   [email or web address]      MSW reviewed the patient's chart and it appears that [facility] completes the labs for the patient  According to their most recent notification (scanned in media on 21) the standing request is not due to  until 2021  MSW will call them to confirm and follow up        [facility]- [phone]

## 2021-08-13 NOTE — TELEPHONE ENCOUNTER
MSW called [facility] but they were closed for the day  Their hours for Friday are 8am to 1pm  MSW will try and reach them again on Monday  MSW then attempted to call the patient to discuss how she can request a vaccine appointment at home  She did not answer the phone  MSW was unable to leave a voicemail because the phone disconnected  There is a note to reach out to Benjamin Reedamee in the patient's chart but MSW could not find a communication consent for her on file  MSW will reach out again next week

## 2021-08-16 NOTE — TELEPHONE ENCOUNTER
I initially ordered the labs for every 3 months (there is an encounter from March with that info), but now Dr Abby Gramajo is saying every other month    No reason for monthly

## 2021-08-16 NOTE — TELEPHONE ENCOUNTER
MSW made a 2nd attempt to contact the patient but she did not answer the phone  MSW left her a VM explaining the reason for the call and asked her to please call MSW back  MSW called [facility] ([phone]) and spoke with a representative  They informed writer that they have the patient scheduled for monthly labs (not bi-monthly)  They stated that the results are sent to Tim Vazquez PA-C  If there is an error in the frequency, the patient can cancel her lab draw for this month  It is scheduled for 8/26/21 and her last lab draw for this year is scheduled for 12/16/2021  Analilia Almanza- can you confirm whether the labs should be monthly or bi-monthly so that I can inform the patient to correct the error if needed?

## 2021-08-16 NOTE — TELEPHONE ENCOUNTER
Noted  MSW called the patient on her cell phone and made her aware that she can change her lab draw frequency to every other month  She expressed understanding  She also informed MSW that she signed up to request an in home vaccination through the [facility] already but has not received a call  MSW asked her if she tried to call and follow up yet but she has not and did not have a phone number to call  MSW provided her with their phone number ([phone])  She stated that she will give them a call  Lastly she asked about referrals for Urology and Opthalmology  MSW informed her that both were added into Epic but that she will likely need to call to schedule her appointment  Patient stated that she will call the [facility] Urology office to schedule an appointment  She did not know a number to call Ophthalmology  MSW looked at the location Dr Kerman Fothergill placed the referral to and noted that it was to [facility]  MSW provided her with their phone number to call and schedule ([phone])  She stated that she will call  She has no further social needs at this time  MSW will be available to assist with any future needs in regard to this patient

## 2021-08-24 DIAGNOSIS — Z89.619 S/P AKA (ABOVE KNEE AMPUTATION) (HCC): ICD-10-CM

## 2021-09-22 DIAGNOSIS — R10.9 ABDOMINAL CRAMPING: ICD-10-CM

## 2021-09-22 DIAGNOSIS — K52.9 CHRONIC DIARRHEA: ICD-10-CM

## 2021-09-23 RX ORDER — DICYCLOMINE HYDROCHLORIDE 10 MG/1
10 CAPSULE ORAL
Qty: 90 CAPSULE | Refills: 0 | Status: SHIPPED | OUTPATIENT
Start: 2021-09-23 | End: 2021-12-19 | Stop reason: SDUPTHER

## 2021-10-01 ENCOUNTER — TELEPHONE (OUTPATIENT)
Dept: FAMILY MEDICINE CLINIC | Facility: CLINIC | Age: 61
End: 2021-10-01

## 2021-10-03 DIAGNOSIS — G35 MULTIPLE SCLEROSIS (HCC): ICD-10-CM

## 2021-10-03 RX ORDER — BACLOFEN 10 MG/1
20 TABLET ORAL 3 TIMES DAILY
Qty: 540 TABLET | Refills: 0 | Status: CANCELLED | OUTPATIENT
Start: 2021-10-03

## 2021-10-03 RX ORDER — AMANTADINE HYDROCHLORIDE 100 MG/1
100 CAPSULE, GELATIN COATED ORAL 2 TIMES DAILY
Qty: 180 CAPSULE | Refills: 0 | Status: CANCELLED | OUTPATIENT
Start: 2021-10-03

## 2021-10-20 DIAGNOSIS — I21.A1 TYPE 2 MYOCARDIAL INFARCTION (HCC): ICD-10-CM

## 2021-10-20 DIAGNOSIS — I70.201 POPLITEAL ARTERY STENOSIS, RIGHT (HCC): ICD-10-CM

## 2021-10-20 RX ORDER — METOPROLOL TARTRATE 100 MG/1
100 TABLET ORAL EVERY 12 HOURS
Qty: 180 TABLET | Refills: 0 | Status: SHIPPED | OUTPATIENT
Start: 2021-10-20 | End: 2022-01-20 | Stop reason: SDUPTHER

## 2021-11-10 ENCOUNTER — TELEPHONE (OUTPATIENT)
Dept: FAMILY MEDICINE CLINIC | Facility: CLINIC | Age: 61
End: 2021-11-10

## 2021-11-10 ENCOUNTER — OFFICE VISIT (OUTPATIENT)
Dept: FAMILY MEDICINE CLINIC | Facility: CLINIC | Age: 61
End: 2021-11-10
Payer: COMMERCIAL

## 2021-11-10 VITALS
SYSTOLIC BLOOD PRESSURE: 130 MMHG | OXYGEN SATURATION: 98 % | HEART RATE: 57 BPM | DIASTOLIC BLOOD PRESSURE: 80 MMHG | TEMPERATURE: 96.9 F | RESPIRATION RATE: 16 BRPM | BODY MASS INDEX: 20.98 KG/M2 | HEIGHT: 66 IN

## 2021-11-10 DIAGNOSIS — Z12.11 SCREEN FOR COLON CANCER: ICD-10-CM

## 2021-11-10 DIAGNOSIS — Z13.6 SCREENING FOR CARDIOVASCULAR CONDITION: ICD-10-CM

## 2021-11-10 DIAGNOSIS — D69.6 THROMBOCYTOPENIA (HCC): ICD-10-CM

## 2021-11-10 DIAGNOSIS — Z00.00 MEDICARE ANNUAL WELLNESS VISIT, SUBSEQUENT: Primary | ICD-10-CM

## 2021-11-10 DIAGNOSIS — Z23 NEED FOR VACCINATION: ICD-10-CM

## 2021-11-10 DIAGNOSIS — E04.2 MULTIPLE THYROID NODULES: ICD-10-CM

## 2021-11-10 DIAGNOSIS — Z12.4 SCREENING FOR CERVICAL CANCER: ICD-10-CM

## 2021-11-10 DIAGNOSIS — I10 ESSENTIAL HYPERTENSION: ICD-10-CM

## 2021-11-10 DIAGNOSIS — Z12.31 SCREENING MAMMOGRAM FOR HIGH-RISK PATIENT: ICD-10-CM

## 2021-11-10 PROCEDURE — 3725F SCREEN DEPRESSION PERFORMED: CPT | Performed by: FAMILY MEDICINE

## 2021-11-10 PROCEDURE — 3079F DIAST BP 80-89 MM HG: CPT | Performed by: FAMILY MEDICINE

## 2021-11-10 PROCEDURE — G0439 PPPS, SUBSEQ VISIT: HCPCS | Performed by: FAMILY MEDICINE

## 2021-11-10 PROCEDURE — 90682 RIV4 VACC RECOMBINANT DNA IM: CPT

## 2021-11-10 PROCEDURE — G0008 ADMIN INFLUENZA VIRUS VAC: HCPCS

## 2021-11-10 PROCEDURE — 3075F SYST BP GE 130 - 139MM HG: CPT | Performed by: FAMILY MEDICINE

## 2021-11-12 DIAGNOSIS — I10 ESSENTIAL HYPERTENSION: ICD-10-CM

## 2021-11-12 RX ORDER — AMLODIPINE AND OLMESARTAN MEDOXOMIL 5; 20 MG/1; MG/1
1 TABLET ORAL DAILY
Qty: 90 TABLET | Refills: 0 | Status: SHIPPED | OUTPATIENT
Start: 2021-11-12 | End: 2021-11-13

## 2021-11-13 DIAGNOSIS — I10 ESSENTIAL HYPERTENSION: ICD-10-CM

## 2021-11-13 RX ORDER — AMLODIPINE AND OLMESARTAN MEDOXOMIL 5; 20 MG/1; MG/1
TABLET ORAL
Qty: 90 TABLET | Refills: 1 | Status: SHIPPED | OUTPATIENT
Start: 2021-11-13 | End: 2022-02-07 | Stop reason: SDUPTHER

## 2021-11-16 DIAGNOSIS — Z89.619 S/P AKA (ABOVE KNEE AMPUTATION) (HCC): ICD-10-CM

## 2021-11-16 RX ORDER — RIVAROXABAN 20 MG/1
TABLET, FILM COATED ORAL
Qty: 90 TABLET | Refills: 0 | Status: SHIPPED | OUTPATIENT
Start: 2021-11-16 | End: 2022-02-07 | Stop reason: SDUPTHER

## 2021-11-19 LAB
ALBUMIN SERPL-MCNC: 4.2 G/DL (ref 3.8–4.8)
ALBUMIN/GLOB SERPL: 1.4 {RATIO} (ref 1.2–2.2)
ALP SERPL-CCNC: 120 IU/L (ref 44–121)
ALT SERPL-CCNC: 30 IU/L (ref 0–32)
AST SERPL-CCNC: 20 IU/L (ref 0–40)
BILIRUB SERPL-MCNC: <0.2 MG/DL (ref 0–1.2)
BUN SERPL-MCNC: 19 MG/DL (ref 8–27)
BUN/CREAT SERPL: 22 (ref 12–28)
CALCIUM SERPL-MCNC: 9.6 MG/DL (ref 8.7–10.3)
CHLORIDE SERPL-SCNC: 106 MMOL/L (ref 96–106)
CHOLEST SERPL-MCNC: 273 MG/DL (ref 100–199)
CO2 SERPL-SCNC: 22 MMOL/L (ref 20–29)
CREAT SERPL-MCNC: 0.85 MG/DL (ref 0.57–1)
GLOBULIN SER-MCNC: 3 G/DL (ref 1.5–4.5)
GLUCOSE SERPL-MCNC: 86 MG/DL (ref 65–99)
HDLC SERPL-MCNC: 48 MG/DL
LDLC SERPL CALC-MCNC: 177 MG/DL (ref 0–99)
MICRODELETION SYND BLD/T FISH: NORMAL
POTASSIUM SERPL-SCNC: 4.1 MMOL/L (ref 3.5–5.2)
PROT SERPL-MCNC: 7.2 G/DL (ref 6–8.5)
SL AMB EGFR AFRICAN AMERICAN: 86 ML/MIN/1.73
SL AMB EGFR NON AFRICAN AMERICAN: 74 ML/MIN/1.73
SODIUM SERPL-SCNC: 143 MMOL/L (ref 134–144)
TRIGL SERPL-MCNC: 250 MG/DL (ref 0–149)
TSH SERPL DL<=0.005 MIU/L-ACNC: 1.11 UIU/ML (ref 0.45–4.5)

## 2021-12-13 ENCOUNTER — VBI (OUTPATIENT)
Dept: ADMINISTRATIVE | Facility: OTHER | Age: 61
End: 2021-12-13

## 2021-12-19 DIAGNOSIS — R10.9 ABDOMINAL CRAMPING: ICD-10-CM

## 2021-12-19 DIAGNOSIS — K52.9 CHRONIC DIARRHEA: ICD-10-CM

## 2021-12-20 RX ORDER — DICYCLOMINE HYDROCHLORIDE 10 MG/1
10 CAPSULE ORAL
Qty: 90 CAPSULE | Refills: 0 | Status: SHIPPED | OUTPATIENT
Start: 2021-12-20 | End: 2022-03-17 | Stop reason: SDUPTHER

## 2022-01-02 DIAGNOSIS — I70.201 POPLITEAL ARTERY STENOSIS, RIGHT (HCC): ICD-10-CM

## 2022-01-02 DIAGNOSIS — I21.A1 TYPE 2 MYOCARDIAL INFARCTION (HCC): ICD-10-CM

## 2022-01-02 DIAGNOSIS — G35 MULTIPLE SCLEROSIS (HCC): ICD-10-CM

## 2022-01-02 RX ORDER — BACLOFEN 10 MG/1
TABLET ORAL
Qty: 540 TABLET | Refills: 1 | Status: SHIPPED | OUTPATIENT
Start: 2022-01-02 | End: 2022-06-27

## 2022-01-03 RX ORDER — GABAPENTIN 300 MG/1
CAPSULE ORAL
Qty: 180 CAPSULE | Refills: 1 | Status: SHIPPED | OUTPATIENT
Start: 2022-01-03 | End: 2022-03-28 | Stop reason: SDUPTHER

## 2022-01-05 ENCOUNTER — TELEPHONE (OUTPATIENT)
Dept: NEUROLOGY | Facility: CLINIC | Age: 62
End: 2022-01-05

## 2022-01-05 NOTE — TELEPHONE ENCOUNTER
[facility]  She reports PA is needed for Aubagio  PA submitted on CMM, Key: [id number]  Awaiting determination

## 2022-01-07 ENCOUNTER — TELEPHONE (OUTPATIENT)
Dept: NEUROLOGY | Facility: CLINIC | Age: 62
End: 2022-01-07

## 2022-01-07 NOTE — TELEPHONE ENCOUNTER
Patient called and switched to VV through My chart and confirmed appt        LVMM to confirm appt and discuss switching to VV   Left direct # for return call

## 2022-01-11 ENCOUNTER — TELEMEDICINE (OUTPATIENT)
Dept: NEUROLOGY | Facility: CLINIC | Age: 62
End: 2022-01-11
Payer: COMMERCIAL

## 2022-01-11 ENCOUNTER — TELEPHONE (OUTPATIENT)
Dept: NEUROLOGY | Facility: CLINIC | Age: 62
End: 2022-01-11

## 2022-01-11 DIAGNOSIS — R25.2 SPASTICITY: ICD-10-CM

## 2022-01-11 DIAGNOSIS — R53.82 CHRONIC FATIGUE: ICD-10-CM

## 2022-01-11 DIAGNOSIS — G35 MULTIPLE SCLEROSIS (HCC): Primary | ICD-10-CM

## 2022-01-11 DIAGNOSIS — G35 MULTIPLE SCLEROSIS (HCC): ICD-10-CM

## 2022-01-11 DIAGNOSIS — G54.6 PHANTOM PAIN AFTER AMPUTATION OF LOWER EXTREMITY (HCC): ICD-10-CM

## 2022-01-11 PROCEDURE — 99442 PR PHYS/QHP TELEPHONE EVALUATION 11-20 MIN: CPT | Performed by: PSYCHIATRY & NEUROLOGY

## 2022-01-11 NOTE — TELEPHONE ENCOUNTER
THE [facility] for patient to call back to [facility] number to go over Dr Mateusz Bennett office visit instructions following her virtual visit today

## 2022-01-11 NOTE — TELEPHONE ENCOUNTER
MSW reviewed the telephone encounter from 21  In August when MSW last worked with the patient she was supposed to contact the [address] [place] [address] to schedule her home vaccination  The patient stated at that time that she already registered by completing the request form  MSW called the [facility] Vaccination phone number at the same phone number provided to the patient back in August ([phone])  MSW spoke with a representative to learn if they have the patient registered and if so, if they have tried to reach out to her  The representative tried to look the patient up in their system and she was not able to be found  She stated if they do not have the patient registered, nobody will reach out to her to schedule her in-home vaccination  The patient can complete the form online or she can call them and do it with a representative  She did mention that they are currently booking out to 3-4 weeks but once registered they can get her scheduled  Also, according to the encounter the patient was enrolled with [facility] for her home lab draws  Her last Lab Draw for the year was scheduled for 2021  MSW called [facility] ([phone]) to confirm that a new order is needed  MSW was able to receive confirmation from the representative that the patients original request  on 21  In order to request a renewal, they just need a new lab order and us to handwrite the frequency in which we would like them to come to her home and do the law draw  MSW printed the new orders and faxed it to them along with the patient's demographics to [phone]  MSW will follow up with [facility] next week to ensure that all was received and that the patient was able to be scheduled  MSW attempted to reach the patient to discuss in-home vaccinations but she did not answer the phone   MSW left her a VM explaining the reason for the call and asked her to please call MSW back

## 2022-01-11 NOTE — TELEPHONE ENCOUNTER
Pt seen virtually today  Pt is home bound  Pt is unsure if her aubagio rx needs to be renewed? Can you please check for her if anything needed on our end

## 2022-01-11 NOTE — PROGRESS NOTES
Virtual Regular Visit    Verification of patient location:    Patient is located in the following state in which I hold an active license [place]      Assessment/Plan:    Problem List Items Addressed This Visit        Nervous and Auditory    Multiple sclerosis ([facility] [address] ) - Primary     Patient w/ multiple sclerosis coming in for routine f/u since last appointment in 08/2021  Labs were reviewed and were mostly stable as compared to previous labs in 07/2021  Patient is compliant w/ daily Aubagio and tolerating well  She has no new complaints this visit and states most of her issues have been stable/longstanding  She did have concern about getting her COVID vaccine administered at home as that was something to f/u w/ the [employer]  In addition, there was concern over her insurance having some recent changes and making sure it covers her aubagio  She requested to resend info for Dr Jennifer Mercedes, ophthalmology, and for urology as she has not follow-up to make appointment  Workup  - Pt last labs on 9/23/21 (prev 7/29/21) with wbc of 7 7 (prev 8 1), abs lymphs 2 6 (prev 2 1), abs neutrophils of 4 5 (prev 5 4) and nl lfts 11/18/21    - No new imaging since MRI brain, c-spine and t-spine were updated 3/2021 and stable  No demyelinating disease progression noted  Progression of spondylosis and osteoarthritis at the C4-C5 and C5-C6 levels         Plan   - f/u in 4-6 months   - referral put in for ophthalmology and urology information and staff informed to help patient make ophthalmology appointment   - patient to f/u w/ [facility] dept to make sure can get home COVID Vaccine and [facility]  will be advised to f/u on this issue   - labs CBC w/ diff and CMP (LFTs) q2 months and to be linked for patient's home blood draws  - patient requested to double check w/ insurance new requirements to ensure Aubagio and insurance are compatible          Relevant Orders    Ambulatory Referral to Urology    Ambulatory Referral to Ophthalmology       Other    Phantom pain after amputation of lower extremity ([address] )      still on gabapentin and has been tolerating well  300mg BID  No recent falls/trips  Chronic fatigue     still taking amantidine and fatigue is about stable and not as tired and is good with 1 nap a day  Spasticity     tolerating baclofen and hasn't had much spasticity since on the baclofen  Reason for visit is   Chief Complaint   Patient presents with    Virtual Regular Visit        Encounter provider Jillene Schlatter, MD    Provider located at [address] [place] [id number]      Recent Visits  No visits were found meeting these conditions  Showing recent visits within past 7 days and meeting all other requirements  Today's Visits  Date Type Provider Dept   01/11/22 Telephone Jillene Schlatter, MD [facility]   01/11/22 Telephone Jillene Schlatter, MD [facility]   01/11/22 Telephone Jillene Schlatter, MD [facility]   01/11/22 Telemedicine Jillene Schlatter, MD [facility]   Showing today's visits and meeting all other requirements  Future Appointments  No visits were found meeting these conditions  Showing future appointments within next 150 days and meeting all other requirements       The patient was identified by name and date of birth  Edwin Floyd was informed that this is a telemedicine visit and that the visit is being conducted through Telephone  My office door was closed  The patient was notified the following individuals were present in the room Dr Marielle Victor  She acknowledged consent and understanding of privacy and security of the video platform  The patient has agreed to participate and understands they can discontinue the visit at any time  Patient is aware this is a billable service       Haile Millererwin is a 64 y o  female w/a PMH of multiple sclerosis, fatigue, neuropathic pain, coming for f/u for her MS   Pt remains on aubagio 14mg and [facility]  Pt last labs on 9/23/21 (prev 7/29/21) with wbc of 7 7 (prev 8 1), abs lymphs 2 6 (prev 2 1), abs neutrophils of 4 5 (prev 5 4) and nl lfts 11/18/21  Patient has no current complaints  However recommended consutlation with NS to review extrinsic cord disease but declined  Bladder issues/frq and nocturia and still going on and has not f/u w/ urology and requested to resend info w/ urology to address these issues  Vision problems that has cloudy and unclear similar to previous visits  She has not follow up w/ Ophthalmology and asked to resend Dr Sumaya vargas to make appointment  Spasticity tolerating baclofen and hasn't had much spasticity since on the baclofen  Chronic Fatigue: still taking amantidine and fatigue is about stable and not as tired and is good with 1 nap a day  Has not been COVID vaccinated and [facility] was supposed to make appointment  Recommended to f/u w/ [facility]  She has difficulty getting out due to being in wheel chair and AKA  Neuropathic pain/phantom pain: still on gabapentin and has been tolerating well  300mg BID  No recent falls/trips  Labs 11/18/21: CMP WNL (alk phos 120, and AST/ALT WNL)  No new imaging since MRI brain, c-spine and t-spine were updated 3/2021 and stable  No demyelinating disease progression noted  Progression of spondylosis and osteoarthritis at the C4-C5 and C5-C6 levels         Past Medical History:   Diagnosis Date    Chronic back pain     Depression     last assessed 06/10/14    High cholesterol     Hypertension     last assessed 12/08/17    MS (multiple sclerosis) ([facility] [address] )     last assessed 12/08/17    RLL pneumonia     last assessed 06/10/14       Past Surgical History:   Procedure Laterality Date    AMPUTATION ABOVE KNEE (AKA) Right 5/5/2020    Procedure: AMPUTATION ABOVE KNEE (AKA); Surgeon: Elena Piper MD;  [facility];  Service: Vascular    ANKLE SURGERY Right     Treatment of Ankle  last assessed 06/10/14   [facility] BACK SURGERY      for spinal stenosis and sciatica     SECTION      last assessed 06/10/14    KNEE ARTHROSCOPY      last assessed 06/10/14    LAMINECTOMY      last assessed 06/10/14    ORTHOPEDIC SURGERY      OTHER SURGICAL HISTORY      discectomy       Current Outpatient Medications   Medication Sig Dispense Refill    amantadine (SYMMETREL) 100 mg capsule TAKE 1 CAPSULE 2 TIMES A  capsule 1    amlodipine-olmesartan (SELENE) 5-20 MG TAKE 1 TABLET DAILY 90 tablet 1    ascorbic acid (VITAMIN C) 250 mg tablet Take 250 mg by mouth daily      Aubagio 14 MG TABS Take 1 tablet (14 mg total) by mouth daily 30 tablet 5    baclofen 10 mg tablet TAKE 2 TABLETS 3 TIMES A  tablet 1    cholecalciferol (VITAMIN D3) 1,000 units tablet Take 1,000 Units by mouth daily      dicyclomine (BENTYL) 10 mg capsule Take 1 capsule (10 mg total) by mouth 3 (three) times a day before meals 90 capsule 0    gabapentin (NEURONTIN) 300 mg capsule TAKE 1 CAPSULE TWO TIMES A  capsule 1    metoprolol tartrate (LOPRESSOR) 100 mg tablet Take 1 tablet (100 mg total) by mouth every 12 (twelve) hours 180 tablet 0    Multiple Vitamins-Minerals (Multivitamin Adult Extra C) CHEW Chew 1 tablet daily      rivaroxaban (XARELTO) 20 mg tablet Take 1 tablet (20 mg total) by mouth daily with breakfast 90 tablet 0    LORazepam (ATIVAN) 0 5 mg tablet Take 1 tab 30 min prior to MRI, may repeat x 1 immediately before if needed 4 tablet 0    Xarelto 20 MG tablet TAKE 1 TABLET (20 MG TOTAL) BY MOUTH DAILY WITH BREAKFAST 90 tablet 0     No current facility-administered medications for this visit  No Known Allergies    Review of Systems   Constitutional: Negative  Negative for appetite change and fever  HENT: Negative    Negative for hearing loss, tinnitus, trouble swallowing and voice change  Eyes: Negative  Negative for photophobia and pain  Respiratory: Negative  Negative for shortness of breath  Cardiovascular: Negative  Negative for palpitations  Gastrointestinal: Negative  Negative for nausea and vomiting  Endocrine: Negative  Negative for cold intolerance  Genitourinary: Negative  Negative for dysuria, frequency and urgency  Musculoskeletal: Negative  Negative for myalgias and neck pain  Skin: Negative  Negative for rash  Neurological: Negative  Negative for dizziness, tremors, seizures, syncope, facial asymmetry, speech difficulty, weakness, light-headedness, numbness and headaches  Hematological: Negative  Does not bruise/bleed easily  Psychiatric/Behavioral: Negative  Negative for confusion, hallucinations and sleep disturbance  Patient states she has no issues to be addressed in this visit  Video Exam    There were no vitals filed for this visit  Physical Exam COULD NOT BE COMPLETED SINCE THIS WAS A PHONE VISIT     I spent 30 minutes with patient today in which greater than 50% of the time was spent in counseling/coordination of care regarding multiple sclerosis    VIRTUAL VISIT DISCLAIMER      Skylar Mobley verbally agrees to participate in [employer]  Pt is aware that [employer] could be limited without vital signs or the ability to perform a full hands-on physical exam  Skylar Mobley understands she or the provider may request at any time to terminate the video visit and request the patient to seek care or treatment in person

## 2022-01-11 NOTE — ASSESSMENT & PLAN NOTE
Patient w/ multiple sclerosis coming in for routine f/u since last appointment in 08/2021  Labs were reviewed and were mostly stable as compared to previous labs in 07/2021  Patient is compliant w/ daily Aubagio and tolerating well  She has no new complaints this visit and states most of her issues have been stable/longstanding  She did have concern about getting her COVID vaccine administered at home as that was something to f/u w/ the [employer]  In addition, there was concern over her insurance having some recent changes and making sure it covers her aubagio  She requested to resend info for Dr Jeri Camarena, ophthalmology, and for urology as she has not follow-up to make appointment  Workup  - Pt last labs on 9/23/21 (prev 7/29/21) with wbc of 7 7 (prev 8 1), abs lymphs 2 6 (prev 2 1), abs neutrophils of 4 5 (prev 5 4) and nl lfts 11/18/21    - No new imaging since MRI brain, c-spine and t-spine were updated 3/2021 and stable  No demyelinating disease progression noted  Progression of spondylosis and osteoarthritis at the C4-C5 and C5-C6 levels         Plan   - f/u in 4-6 months   - referral put in for ophthalmology and urology information and staff informed to help patient make ophthalmology appointment   - patient to f/u w/ [facility] dept to make sure can get home COVID Vaccine and [facility]  will be advised to f/u on this issue   - labs CBC w/ diff and CMP (LFTs) q2 months and to be linked for patient's home blood draws  - patient requested to double check w/ insurance new requirements to ensure Aubagio and insurance are compatible

## 2022-01-11 NOTE — TELEPHONE ENCOUNTER
Pt seen virtually today  Please mail out rxs for urology and optho referral  Please help pt to make an appt with dr Jessica Dee office as she had trouble getting call back for appt at last visit  Also please mail out labs to pt as well    Pt needs follow up appt at [facility] [place] with me in 4-6 months

## 2022-01-11 NOTE — TELEPHONE ENCOUNTER
Pt seen virtually today  Pt still looking for covid vaccine at home  She contacted state as directed in task from aug from [place]  All info taken and county was supposed to follow up, but pt has not heard anything  Pt is home bound and would like covid vax at home  Please also make sure pt is still getting every other month lab draws  Updated rx in emr  Please fax to mobile team for lab rx

## 2022-01-17 NOTE — TELEPHONE ENCOUNTER
MSW did not receive a call back from the patient  MSW called her back this morning to follow up  The patient informed MSW that she did hear from [facility] and she is scheduled to have a visit from this Thursday (1/20/22)  MSW also informed the patient that she was not in the system for [place]'s in home vaccination list therefore they are not able to schedule an in-home vaccination for her  MSW provided her with the phone number to reach them  She stated that she will call this week  MSW will follow up with the patient next week to ensure that she was able to connect with the [facility]'s in-home vaccination service line  MSW will also follow up with them to ensure that the patient is on their list to be scheduled for an appointment

## 2022-01-18 NOTE — TELEPHONE ENCOUNTER
Dercik Pate  I have called Stevenson Murphy on three different occasions to try to help her schedule her appointments  She has not responded back to me  I am mailing her her slip for lab work, referrals, and I scheduled her follow up appointment with you in [place]

## 2022-01-18 NOTE — TELEPHONE ENCOUNTER
I was given a form today from [facility] to renew her monthly lab draw order  Per Dr Liz Fairchild last [address], can now be every other month  Per this form I was given, if any changes need to be made to the order, a new order request form needs to be filled out  Do we have a blank order request form? Thanks!

## 2022-01-18 NOTE — TELEPHONE ENCOUNTER
Called patient to go over after visit instructions from Dr Matthew Rodriguez from 1/11/2022  Left message on phone  Advised will send referrals for Urology and Ophthalmology, lab slip, and appointment card in mail  If she has any questions, advised to call back

## 2022-01-20 DIAGNOSIS — I21.A1 TYPE 2 MYOCARDIAL INFARCTION (HCC): ICD-10-CM

## 2022-01-20 DIAGNOSIS — I70.201 POPLITEAL ARTERY STENOSIS, RIGHT (HCC): ICD-10-CM

## 2022-01-20 RX ORDER — RENAGEL 800 MG/1
1 TABLET ORAL DAILY
Qty: 30 TABLET | Refills: 5 | Status: SHIPPED | OUTPATIENT
Start: 2022-01-20 | End: 2022-07-25

## 2022-01-20 NOTE — TELEPHONE ENCOUNTER
Called patient  Received vm  Left detailed msg advising her we will send in new script for Aubagio 14mg to [facility]  Script pended below  Dr Gale Kelly - please sign script  Thanks!

## 2022-01-21 RX ORDER — METOPROLOL TARTRATE 100 MG/1
100 TABLET ORAL EVERY 12 HOURS
Qty: 180 TABLET | Refills: 0 | Status: SHIPPED | OUTPATIENT
Start: 2022-01-21 | End: 2022-04-21 | Stop reason: SDUPTHER

## 2022-01-24 ENCOUNTER — TELEPHONE (OUTPATIENT)
Dept: NEUROLOGY | Facility: CLINIC | Age: 62
End: 2022-01-24

## 2022-01-24 NOTE — TELEPHONE ENCOUNTER
----- Message from Clint Pepe MD sent at 1/23/2022  7:40 AM EST -----  Let pt know hct and platelets slightly low  Send copy of labs to pcp  The lower platelets mentionned at last appt as well  Pt to follow up with pcp  Also I only see cbc diff results  Pt on aubagio and supposed to be getting both cbc diff and lfts done with her serial home drawn labs  Please see if hepatic panel was drawn  If not, needs to be done this month

## 2022-01-24 NOTE — TELEPHONE ENCOUNTER
GOVIND called the patient to follow up and learn if she was able to connect with [place]'s in-home vaccination service line  She did not answer the phone  GOVIND left her a VM explaining the reason for the call and asked her to please call GOVIND back

## 2022-01-24 NOTE — TELEPHONE ENCOUNTER
Pt left voicemail asking for call back  Called pt and reviewed message below  She verbalizes understanding  She is unsure if LFTs were completed, tests are run at [address] I will check with this lab and call her back if LFTs need to be repeated  CBC results routed to PCP  Kristyn and spoke with [facility]  She reports only CBC was drawn in January  Pt made aware LFTs need to be completed  Completed new [facility] mobile lab form  Awaiting signature

## 2022-01-25 ENCOUNTER — DOCUMENTATION (OUTPATIENT)
Dept: NEUROLOGY | Facility: CLINIC | Age: 62
End: 2022-01-25

## 2022-01-25 NOTE — TELEPHONE ENCOUNTER
GOVIND made a 2nd attempt to contact the patient but she did not answer the phone  GOVIND left her a VM explaining the reason for the call and asked her to please call GOVIND back

## 2022-02-04 NOTE — TELEPHONE ENCOUNTER
Letpt know lfts ok except for elevated alk phosphatase  Pt needs to continue to get home lab draws for both cbc diff and lfts q month  Please make sure she has team to come out and serial rx    Tell pt to call us in one month to again review trending for the alk phosphatase and rest of labs

## 2022-02-07 DIAGNOSIS — Z89.619 S/P AKA (ABOVE KNEE AMPUTATION) (HCC): ICD-10-CM

## 2022-02-07 DIAGNOSIS — I10 ESSENTIAL HYPERTENSION: ICD-10-CM

## 2022-02-08 RX ORDER — AMLODIPINE AND OLMESARTAN MEDOXOMIL 5; 20 MG/1; MG/1
1 TABLET ORAL DAILY
Qty: 90 TABLET | Refills: 0 | Status: SHIPPED | OUTPATIENT
Start: 2022-02-08 | End: 2022-04-21 | Stop reason: SDUPTHER

## 2022-03-17 DIAGNOSIS — R10.9 ABDOMINAL CRAMPING: ICD-10-CM

## 2022-03-17 DIAGNOSIS — K52.9 CHRONIC DIARRHEA: ICD-10-CM

## 2022-03-17 RX ORDER — DICYCLOMINE HYDROCHLORIDE 10 MG/1
10 CAPSULE ORAL
Qty: 90 CAPSULE | Refills: 0 | Status: SHIPPED | OUTPATIENT
Start: 2022-03-17 | End: 2022-05-20 | Stop reason: SDUPTHER

## 2022-03-28 DIAGNOSIS — I21.A1 TYPE 2 MYOCARDIAL INFARCTION (HCC): ICD-10-CM

## 2022-03-28 DIAGNOSIS — I70.201 POPLITEAL ARTERY STENOSIS, RIGHT (HCC): ICD-10-CM

## 2022-03-28 DIAGNOSIS — G35 MULTIPLE SCLEROSIS (HCC): ICD-10-CM

## 2022-03-28 RX ORDER — BACLOFEN 10 MG/1
20 TABLET ORAL 3 TIMES DAILY
Qty: 540 TABLET | Refills: 0 | Status: CANCELLED | OUTPATIENT
Start: 2022-03-28

## 2022-03-29 RX ORDER — GABAPENTIN 300 MG/1
300 CAPSULE ORAL 2 TIMES DAILY
Qty: 180 CAPSULE | Refills: 0 | Status: SHIPPED | OUTPATIENT
Start: 2022-03-29 | End: 2022-06-23 | Stop reason: SDUPTHER

## 2022-03-29 RX ORDER — AMANTADINE HYDROCHLORIDE 100 MG/1
100 CAPSULE, GELATIN COATED ORAL 2 TIMES DAILY
Qty: 180 CAPSULE | Refills: 0 | Status: SHIPPED | OUTPATIENT
Start: 2022-03-29 | End: 2022-06-23 | Stop reason: SDUPTHER

## 2022-04-21 DIAGNOSIS — I70.201 POPLITEAL ARTERY STENOSIS, RIGHT (HCC): ICD-10-CM

## 2022-04-21 DIAGNOSIS — I10 ESSENTIAL HYPERTENSION: ICD-10-CM

## 2022-04-21 DIAGNOSIS — I21.A1 TYPE 2 MYOCARDIAL INFARCTION (HCC): ICD-10-CM

## 2022-04-21 RX ORDER — AMLODIPINE AND OLMESARTAN MEDOXOMIL 5; 20 MG/1; MG/1
1 TABLET ORAL DAILY
Qty: 90 TABLET | Refills: 0 | Status: SHIPPED | OUTPATIENT
Start: 2022-04-21 | End: 2022-07-29 | Stop reason: SDUPTHER

## 2022-04-21 RX ORDER — METOPROLOL TARTRATE 100 MG/1
100 TABLET ORAL EVERY 12 HOURS
Qty: 180 TABLET | Refills: 0 | Status: SHIPPED | OUTPATIENT
Start: 2022-04-21 | End: 2022-06-23 | Stop reason: SDUPTHER

## 2022-05-04 ENCOUNTER — RA CDI HCC (OUTPATIENT)
Dept: OTHER | Facility: HOSPITAL | Age: 62
End: 2022-05-04

## 2022-05-04 NOTE — PROGRESS NOTES
[facility]  coding opportunities          Chart Reviewed number of suggestions sent to Provider: [phone]     Patients Insurance     Medicare Insurance: [address] [place] [address]

## 2022-05-20 DIAGNOSIS — Z89.619 S/P AKA (ABOVE KNEE AMPUTATION) (HCC): ICD-10-CM

## 2022-05-20 DIAGNOSIS — K52.9 CHRONIC DIARRHEA: ICD-10-CM

## 2022-05-20 DIAGNOSIS — R10.9 ABDOMINAL CRAMPING: ICD-10-CM

## 2022-05-23 RX ORDER — DICYCLOMINE HYDROCHLORIDE 10 MG/1
10 CAPSULE ORAL
Qty: 90 CAPSULE | Refills: 0 | Status: SHIPPED | OUTPATIENT
Start: 2022-05-23 | End: 2022-07-29 | Stop reason: SDUPTHER

## 2022-06-01 NOTE — TELEPHONE ENCOUNTER
Please review  Amantadine last written on 7/6/21 for a 90 day supply with 1 refill  Last seen in the office on 8/10/21 with next scheduled appointment 6/7/22  grossly at least 3/5

## 2022-06-02 ENCOUNTER — TELEPHONE (OUTPATIENT)
Dept: NEUROLOGY | Facility: CLINIC | Age: 62
End: 2022-06-02

## 2022-06-02 NOTE — TELEPHONE ENCOUNTER
Left message on home phone to confirm patient's 6/7/2022 @ 11 am appointment with Dr Sally Diaz  Also provided new office address for that appointment of [address] [place] [address],[address], [place]  Call back number given [phone]

## 2022-06-03 NOTE — TELEPHONE ENCOUNTER
Patient called to confirm appointment for Tuesday with Dr John Perez - re-confirmed address for [address] [facility] location  Task complete

## 2022-06-07 ENCOUNTER — TELEPHONE (OUTPATIENT)
Dept: NEUROLOGY | Facility: CLINIC | Age: 62
End: 2022-06-07

## 2022-06-07 ENCOUNTER — HOSPITAL ENCOUNTER (EMERGENCY)
Facility: HOSPITAL | Age: 62
Discharge: HOME | End: 2022-06-07
Attending: EMERGENCY MEDICINE
Payer: COMMERCIAL

## 2022-06-07 ENCOUNTER — TELEPHONE (OUTPATIENT)
Dept: FAMILY MEDICINE CLINIC | Facility: CLINIC | Age: 62
End: 2022-06-07

## 2022-06-07 ENCOUNTER — OFFICE VISIT (OUTPATIENT)
Dept: NEUROLOGY | Facility: CLINIC | Age: 62
End: 2022-06-07
Payer: COMMERCIAL

## 2022-06-07 VITALS
BODY MASS INDEX: 20.89 KG/M2 | HEART RATE: 62 BPM | SYSTOLIC BLOOD PRESSURE: 142 MMHG | HEIGHT: 66 IN | WEIGHT: 130 LBS | DIASTOLIC BLOOD PRESSURE: 88 MMHG | TEMPERATURE: 97.7 F

## 2022-06-07 VITALS
HEART RATE: 72 BPM | DIASTOLIC BLOOD PRESSURE: 73 MMHG | OXYGEN SATURATION: 97 % | RESPIRATION RATE: 18 BRPM | SYSTOLIC BLOOD PRESSURE: 154 MMHG | TEMPERATURE: 97.1 F

## 2022-06-07 DIAGNOSIS — L08.9 WOUND INFECTION: Primary | ICD-10-CM

## 2022-06-07 DIAGNOSIS — G35 MULTIPLE SCLEROSIS (HCC): Primary | ICD-10-CM

## 2022-06-07 DIAGNOSIS — T14.8XXA WOUND INFECTION: Primary | ICD-10-CM

## 2022-06-07 DIAGNOSIS — T14.8XXA OPEN WOUND: ICD-10-CM

## 2022-06-07 PROCEDURE — 99284 EMERGENCY DEPT VISIT MOD MDM: CPT | Performed by: EMERGENCY MEDICINE

## 2022-06-07 PROCEDURE — 90471 IMMUNIZATION ADMIN: CPT

## 2022-06-07 PROCEDURE — 3008F BODY MASS INDEX DOCD: CPT | Performed by: FAMILY MEDICINE

## 2022-06-07 PROCEDURE — 99282 EMERGENCY DEPT VISIT SF MDM: CPT

## 2022-06-07 PROCEDURE — 90715 TDAP VACCINE 7 YRS/> IM: CPT | Performed by: EMERGENCY MEDICINE

## 2022-06-07 PROCEDURE — 99215 OFFICE O/P EST HI 40 MIN: CPT | Performed by: PSYCHIATRY & NEUROLOGY

## 2022-06-07 RX ORDER — AMOXICILLIN AND CLAVULANATE POTASSIUM 875; 125 MG/1; MG/1
1 TABLET, FILM COATED ORAL ONCE
Status: COMPLETED | OUTPATIENT
Start: 2022-06-07 | End: 2022-06-07

## 2022-06-07 RX ORDER — AMOXICILLIN AND CLAVULANATE POTASSIUM 875; 125 MG/1; MG/1
1 TABLET, FILM COATED ORAL EVERY 12 HOURS SCHEDULED
Qty: 14 TABLET | Refills: 0 | Status: SHIPPED | OUTPATIENT
Start: 2022-06-07 | End: 2022-06-14

## 2022-06-07 RX ADMIN — TETANUS TOXOID, REDUCED DIPHTHERIA TOXOID AND ACELLULAR PERTUSSIS VACCINE, ADSORBED 0.5 ML: 5; 2.5; 8; 8; 2.5 SUSPENSION INTRAMUSCULAR at 16:38

## 2022-06-07 RX ADMIN — AMOXICILLIN AND CLAVULANATE POTASSIUM 1 TABLET: 875; 125 TABLET, FILM COATED ORAL at 16:38

## 2022-06-07 NOTE — TELEPHONE ENCOUNTER
Brandie Burr  I am not sure if we get emy notes in epic  I do not see any ER intake yet  Thank you for reinforcing need to go and update to pcp office  I also personally spoke with her cousin Madeline and she was driving pt to er herself

## 2022-06-07 NOTE — PROGRESS NOTES
Patient ID: Savana Alexander is a 64 y o  female  Assessment/Plan:    Multiple sclerosis ([facility] )  65 yo F w/ MS, phatom pain, chronic fatigue, and MS coming in for f/u for MS  Pt remains on aubagio and michael med well  W/U:   Labs 05/22: CBC WNL ANC 4 1 nl, ALC 2 1 nl  HFT: Alk phos still high at 148 (136 on 01/22) , AST, ALT nl  - No new imaging since MRI brain, c-spine and t-spine were updated 3/2021 and stable  No demyelinating disease progression noted  Progression of spondylosis and osteoarthritis at the C4-C5 and C5-C6 levels  Plan   - CBC and LFTs every other month   - continue Aubagio (approved  - referral put in for ophthalmology and urology information and staff informed to help patient make ophthalmology appointment   - declined PT appt for now and will do home exercises    Phantom pain after amputation of lower extremity ([facility] )  Continue on gabapentin 300mg BID    Open wound  Noted dog scratch 1 week ago  Seems to looking like cellulitis w/ serosanguineous drainage  Will send to ER for further evaluation and wound care/vasuclar evaluation due to known pvd as well as eval for possible cellulitis  Alerted pcp office as well of concerns         Problem List Items Addressed This Visit        Nervous and Auditory    Multiple sclerosis ([facility] ) - Primary     65 yo F w/ MS, phatom pain, chronic fatigue, and MS coming in for f/u for MS  Pt remains on aubagio and michael med well  W/U:   Labs 05/22: CBC WNL ANC 4 1 nl, ALC 2 1 nl  HFT: Alk phos still high at 148 (136 on 01/22) , AST, ALT nl  - No new imaging since MRI brain, c-spine and t-spine were updated 3/2021 and stable  No demyelinating disease progression noted  Progression of spondylosis and osteoarthritis at the C4-C5 and C5-C6 levels           Plan   - CBC and LFTs every other month   - continue Aubagio (approved  - referral put in for ophthalmology and urology information and staff informed to help patient make ophthalmology appointment   - declined PT appt for now and will do home exercises           Relevant Orders    CBC and differential    Hepatic function panel       Other    Open wound     Noted dog scratch 1 week ago  Seems to looking like cellulitis w/ serosanguineous drainage  Will send to ER for further evaluation and wound care/vasuclar evaluation due to known pvd as well as eval for possible cellulitis  Alerted pcp office as well of concerns                    Subjective:    HPI    65 yo F w/ MS, phatom pain (R AKA in 2020), chronic fatigue, and MS coming in for f/u for MS  Pt remains on aubagio and michael med well  Get COVID shot? She ended up not doing it  Her kids got COVID  Got in contact w/ social work? Yes but too many house issues  Vision: She believes she has R eye cataract and her depth perception is worse  Denies Double vision  Requesting to put referral to Dr Jose L Nieto for optho    Fall/trips/balance: none , slid off the wheel chair when putting cat food down and fell on her butt  Sick/ER/urgent care: none   loss of bowel or bladder:she feels her bladder incontinence is improved but needs to f/u urologist  She's ok with another referral to urology  Numbness/tingling: none    She stated that she has a wound on the dorsum of her L foot and has an appt with PCP  W/U:   Labs 05/22: CBC WNL ANC 4 1 nl, ALC 2 1 nl  HFT: Alk phos still high at 148 (136 on 01/22) , AST, ALT nl  - No new imaging since MRI brain, c-spine and t-spine were updated 3/2021 and stable  No demyelinating disease progression noted  Progression of spondylosis and osteoarthritis at the C4-C5 and C5-C6 levels  The following portions of the patient's history were reviewed and updated as appropriate:   She  has a past medical history of Chronic back pain, Depression, High cholesterol, Hypertension, MS (multiple sclerosis) ([address] ), and RLL pneumonia    She   Patient Active Problem List    Diagnosis Date Noted    Open wound 06/07/2022    Chronic fatigue 2022    Spasticity 2022    Phantom pain after amputation of lower extremity ([facility] ) 2020    Venous insufficiency of left leg 2020    Incontinence of urine in female 2020    S/P AKA (above knee amputation) unilateral, right ([facility] ) 2020    Popliteal artery stenosis, right (HCC) 2020    Type 2 myocardial infarction ([place] [address] ) 2019    Generalized weakness 2019    Opiate dependence ([place] [address] ) 2019    Sinus tachycardia 2019    Bilateral hip pain 2019    Leukocytosis 2019    Smoker 2019    Thrombocytopenia ([place] [address] ) 2017    Ventricular hypertrophy 2017    Vitamin D deficiency 2015    Multiple thyroid nodules 2015    Essential hypertension 06/10/2014    Lumbar spinal stenosis 06/10/2014    Multiple sclerosis ([place] [address] ) 06/10/2014     She  has a past surgical history that includes orthopedic surgery; Back surgery;  section; Laminectomy; Other surgical history; Knee arthroscopy; Ankle surgery (Right); and AMPUTATION ABOVE KNEE (AKA) (Right, 2020)  Her family history includes Heart attack in her father; Osteoporosis in her mother  She  reports that she has been smoking cigarettes  She has a 10 00 pack-year smoking history  She has never used smokeless tobacco  She reports current alcohol use  She reports that she does not use drugs    Current Outpatient Medications   Medication Sig Dispense Refill    amantadine (SYMMETREL) 100 mg capsule Take 1 capsule (100 mg total) by mouth 2 (two) times a day 180 capsule 0    amlodipine-olmesartan (SELENE) 5-20 MG Take 1 tablet by mouth daily 90 tablet 0    ascorbic acid (VITAMIN C) 250 mg tablet Take 250 mg by mouth daily      Aubagio 14 MG TABS Take 1 tablet (14 mg total) by mouth daily 30 tablet 5    baclofen 10 mg tablet TAKE 2 TABLETS 3 TIMES A  tablet 1    cholecalciferol (VITAMIN D3) 1,000 units tablet Take 1,000 Units by mouth daily      dicyclomine (BENTYL) 10 mg capsule Take 1 capsule (10 mg total) by mouth in the morning and 1 capsule (10 mg total) at noon and 1 capsule (10 mg total) in the evening  Take before meals  90 capsule 0    gabapentin (NEURONTIN) 300 mg capsule Take 1 capsule (300 mg total) by mouth 2 (two) times a day 180 capsule 0    metoprolol tartrate (LOPRESSOR) 100 mg tablet Take 1 tablet (100 mg total) by mouth every 12 (twelve) hours 180 tablet 0    Multiple Vitamins-Minerals (Multivitamin Adult Extra C) CHEW Chew 1 tablet daily      rivaroxaban (Xarelto) 20 mg tablet Take 1 tablet (20 mg total) by mouth daily with breakfast 90 tablet 0    rivaroxaban (XARELTO) 20 mg tablet Take 1 tablet (20 mg total) by mouth daily with breakfast 90 tablet 0    LORazepam (ATIVAN) 0 5 mg tablet Take 1 tab 30 min prior to MRI, may repeat x 1 immediately before if needed 4 tablet 0     No current facility-administered medications for this visit  Current Outpatient Medications on File Prior to Visit   Medication Sig    amantadine (SYMMETREL) 100 mg capsule Take 1 capsule (100 mg total) by mouth 2 (two) times a day    amlodipine-olmesartan (SELENE) 5-20 MG Take 1 tablet by mouth daily    ascorbic acid (VITAMIN C) 250 mg tablet Take 250 mg by mouth daily    Aubagio 14 MG TABS Take 1 tablet (14 mg total) by mouth daily    baclofen 10 mg tablet TAKE 2 TABLETS 3 TIMES A DAY    cholecalciferol (VITAMIN D3) 1,000 units tablet Take 1,000 Units by mouth daily    dicyclomine (BENTYL) 10 mg capsule Take 1 capsule (10 mg total) by mouth in the morning and 1 capsule (10 mg total) at noon and 1 capsule (10 mg total) in the evening  Take before meals      gabapentin (NEURONTIN) 300 mg capsule Take 1 capsule (300 mg total) by mouth 2 (two) times a day    metoprolol tartrate (LOPRESSOR) 100 mg tablet Take 1 tablet (100 mg total) by mouth every 12 (twelve) hours    Multiple Vitamins-Minerals (Multivitamin Adult Extra C) CHEW Chew 1 tablet daily    rivaroxaban (Xarelto) 20 mg tablet Take 1 tablet (20 mg total) by mouth daily with breakfast    rivaroxaban (XARELTO) 20 mg tablet Take 1 tablet (20 mg total) by mouth daily with breakfast    LORazepam (ATIVAN) 0 5 mg tablet Take 1 tab 30 min prior to MRI, may repeat x 1 immediately before if needed     No current facility-administered medications on file prior to visit  She has No Known Allergies            Objective:    Blood pressure 142/88, pulse 62, temperature 97 7 °F (36 5 °C), height 5' 6" (1 676 m), weight 59 kg (130 lb), not currently breastfeeding  Physical Exam  Eyes:      General: Lids are normal       Extraocular Movements: Extraocular movements intact  Pupils: Pupils are equal, round, and reactive to light  Skin:     Findings: Erythema and wound present  Comments: Noted wound on dorsum of L foot   Neurological:      Deep Tendon Reflexes: Strength normal       Reflex Scores:       Tricep reflexes are 1+ on the right side and 1+ on the left side  Bicep reflexes are 1+ on the right side and 1+ on the left side  Brachioradialis reflexes are 1+ on the right side and 1+ on the left side  Patellar reflexes are 1+ on the left side  Achilles reflexes are 1+ on the left side  Neurological Exam  Mental Status  Awake, alert and oriented to person, place and time  Cranial Nerves  CN II: Visual acuity is normal  Visual fields full to confrontation  CN III, IV, VI: Extraocular movements intact bilaterally  Normal lids and orbits bilaterally  Pupils equal round and reactive to light bilaterally  CN V: Facial sensation is normal   CN VII: Full and symmetric facial movement  CN VIII: Hearing is normal   CN IX, X: Palate elevates symmetrically  Normal gag reflex  CN XI: Shoulder shrug strength is normal   CN XII: Tongue midline without atrophy or fasciculations  Motor   Strength is 5/5 throughout all four extremities      Sensory  Light touch is normal in upper and lower extremities  Reflexes                                            Right                      Left  Brachioradialis                    1+                         1+  Biceps                                 1+                         1+  Triceps                                1+                         1+  Patellar                                                       1+  Achilles                                                       1+    Coordination  Right: Finger-to-nose normal  Rapid alternating movement normal  Heel-to-shin normal Left: Finger-to-nose normal  Heel-to-shin abnormality:  Slowed rapid movement of L hand  Gait    Deferred given wheelchair  ROS:    Review of Systems   Constitutional: Negative  Negative for appetite change and fever  HENT: Negative  Negative for hearing loss, tinnitus, trouble swallowing and voice change  Eyes: Negative  Negative for photophobia and pain  Respiratory: Negative  Negative for shortness of breath  Cardiovascular: Negative  Negative for palpitations  Gastrointestinal: Negative  Negative for nausea and vomiting  Endocrine: Negative  Negative for cold intolerance  Genitourinary: Negative  Negative for dysuria, frequency and urgency  Musculoskeletal: Negative  Negative for myalgias and neck pain  Skin: Negative  Negative for rash  Allergic/Immunologic: Negative  Neurological: Negative  Negative for dizziness, tremors, seizures, syncope, facial asymmetry, speech difficulty, weakness, light-headedness, numbness and headaches  Hematological: Negative  Does not bruise/bleed easily  Psychiatric/Behavioral: Negative  Negative for confusion, hallucinations and sleep disturbance  All other systems reviewed and are negative      Patient states there are no new issues to address at this visit

## 2022-06-07 NOTE — ASSESSMENT & PLAN NOTE
Noted dog scratch 1 week ago  Seems to looking like cellulitis w/ serosanguineous drainage  Will send to ER for further evaluation and wound care/vasuclar evaluation due to known pvd as well as eval for possible cellulitis    Alerted pcp office as well of concerns

## 2022-06-07 NOTE — ASSESSMENT & PLAN NOTE
65 yo F w/ MS, phatom pain, chronic fatigue, and MS coming in for f/u for MS  Pt remains on aubagio and michael med well  W/U:   Labs 05/22: CBC WNL ANC 4 1 nl, ALC 2 1 nl  HFT: Alk phos still high at 148 (136 on 01/22) , AST, ALT nl  - No new imaging since MRI brain, c-spine and t-spine were updated 3/2021 and stable  No demyelinating disease progression noted  Progression of spondylosis and osteoarthritis at the C4-C5 and C5-C6 levels           Plan   - CBC and LFTs every other month   - continue Aubagio (approved  - referral put in for ophthalmology and urology information and staff informed to help patient make ophthalmology appointment   - declined PT appt for now and will do home exercises

## 2022-06-07 NOTE — TELEPHONE ENCOUNTER
Spoke w/patient  She states she is on her way to [facility]  She will update our office once she has been seen  Called PCP office  Spoke w/Margie  Advised her of situation  She will make Dr Brien Oleary aware      Dr Roxane hansen

## 2022-06-07 NOTE — ED PROVIDER NOTES
History  Chief Complaint   Patient presents with    Wound Infection     States went to neurologist today who saw wound on left foot and sent to ED   Wound dorsum left foot, foot with redness and swelling  States has had wound for 2 weeks  65 yo female with h/o MS sent from neurologist office for evaluation of left foot wound  She thinks her dog scratched the area 1-2 weeks ago and wound has gotten worse  No foot pain pain  No fever, cough, vomiting  History provided by:  Patient   used: No        Prior to Admission Medications   Prescriptions Last Dose Informant Patient Reported? Taking? Aubagio 14 MG TABS   No No   Sig: Take 1 tablet (14 mg total) by mouth daily   LORazepam (ATIVAN) 0 5 mg tablet  Self No No   Sig: Take 1 tab 30 min prior to MRI, may repeat x 1 immediately before if needed   Multiple Vitamins-Minerals (Multivitamin Adult Extra C) CHEW  Self Yes No   Sig: Chew 1 tablet daily   amantadine (SYMMETREL) 100 mg capsule   No No   Sig: Take 1 capsule (100 mg total) by mouth 2 (two) times a day   amlodipine-olmesartan (SELENE) 5-20 MG   No No   Sig: Take 1 tablet by mouth daily   ascorbic acid (VITAMIN C) 250 mg tablet  Self Yes No   Sig: Take 250 mg by mouth daily   baclofen 10 mg tablet   No No   Sig: TAKE 2 TABLETS 3 TIMES A DAY   cholecalciferol (VITAMIN D3) 1,000 units tablet  Self Yes No   Sig: Take 1,000 Units by mouth daily   dicyclomine (BENTYL) 10 mg capsule   No No   Sig: Take 1 capsule (10 mg total) by mouth in the morning and 1 capsule (10 mg total) at noon and 1 capsule (10 mg total) in the evening  Take before meals     gabapentin (NEURONTIN) 300 mg capsule   No No   Sig: Take 1 capsule (300 mg total) by mouth 2 (two) times a day   metoprolol tartrate (LOPRESSOR) 100 mg tablet   No No   Sig: Take 1 tablet (100 mg total) by mouth every 12 (twelve) hours   rivaroxaban (XARELTO) 20 mg tablet   No No   Sig: Take 1 tablet (20 mg total) by mouth daily with breakfast rivaroxaban (Xarelto) 20 mg tablet   No No   Sig: Take 1 tablet (20 mg total) by mouth daily with breakfast      Facility-Administered Medications: None       Past Medical History:   Diagnosis Date    Chronic back pain     Depression     last assessed 06/10/14    High cholesterol     Hypertension     last assessed 17    MS (multiple sclerosis) ([facility] )     last assessed 17    RLL pneumonia     last assessed 06/10/14       Past Surgical History:   Procedure Laterality Date    AMPUTATION ABOVE KNEE (AKA) Right 2020    Procedure: AMPUTATION ABOVE KNEE (AKA); Surgeon: Maco Piper MD;  Location: [facility];  Service: Vascular    ANKLE SURGERY Right     Treatment of Ankle  last assessed 06/10/14   [facility] BACK SURGERY      for spinal stenosis and sciatica     SECTION      last assessed 06/10/14    KNEE ARTHROSCOPY      last assessed 06/10/14    LAMINECTOMY      last assessed 06/10/14    ORTHOPEDIC SURGERY      OTHER SURGICAL HISTORY      discectomy       Family History   Problem Relation Age of Onset    Osteoporosis Mother     Heart attack Father     Mental illness Neg Hx      I have reviewed and agree with the history as documented  E-Cigarette/Vaping    E-Cigarette Use Never User      E-Cigarette/Vaping Substances    Nicotine No     THC No     CBD No     Flavoring No     Other No     Unknown No      Social History     Tobacco Use    Smoking status: Light Tobacco Smoker     Packs/day: 0 25     Years: 40 00     Pack years: 10 00     Types: Cigarettes     Last attempt to quit: 7/10/2019     Years since quittin 9    Smokeless tobacco: Never Used   Vaping Use    Vaping Use: Never used   Substance Use Topics    Alcohol use: Yes     Comment: rare // no alcohol use as per allscripts    Drug use: No       Review of Systems   Constitutional: Negative for fever  Respiratory: Negative for cough  Gastrointestinal: Negative for diarrhea and vomiting     Skin: Positive for color change and wound  All other systems reviewed and are negative  Physical Exam  Physical Exam  Vitals and nursing note reviewed  Constitutional:       General: She is not in acute distress  Appearance: She is not ill-appearing  HENT:      Head: Normocephalic and atraumatic  Pulmonary:      Effort: Pulmonary effort is normal  No respiratory distress  Musculoskeletal:         General: Signs of injury present  No tenderness  Cervical back: Normal range of motion and neck supple  Comments: Left foot + 1 cm scabbed wound dorsum with 1-1 5 cm surrounding redness around it  No streaking up foot or leg  Not tender  DP palp  Skin:     General: Skin is warm and dry  Capillary Refill: Capillary refill takes less than 2 seconds  Findings: Erythema present  Neurological:      General: No focal deficit present  Mental Status: She is alert and oriented to person, place, and time     Psychiatric:         Mood and Affect: Mood normal          Behavior: Behavior normal              Vital Signs  ED Triage Vitals [06/07/22 1436]   Temperature Pulse Respirations Blood Pressure SpO2   (!) 97 1 °F (36 2 °C) 82 18 161/90 100 %      Temp Source Heart Rate Source Patient Position - Orthostatic VS BP Location FiO2 (%)   Tympanic Monitor Sitting Left arm --      Pain Score       No Pain           Vitals:    06/07/22 1436 06/07/22 1614   BP: 161/90 154/73   Pulse: 82 72   Patient Position - Orthostatic VS: Sitting          Visual Acuity      ED Medications  Medications   amoxicillin-clavulanate (AUGMENTIN) 875-125 mg per tablet 1 tablet (1 tablet Oral Given 6/7/22 1638)   tetanus-diphtheria-acellular pertussis (BOOSTRIX) IM injection 0 5 mL (0 5 mL Intramuscular Given 6/7/22 1638)       Diagnostic Studies  Results Reviewed     None                 No orders to display              Procedures  Procedures         ED Course                                             MDM  Number of Diagnoses or Management Options  Wound infection  Diagnosis management comments: Redness around wound marked  Advised warm compresses, elevate off and on  Will try course of oral abx and topical bactroban  Advised return to ER if fever, worsening, pain  Disposition  Final diagnoses:   Wound infection     Time reflects when diagnosis was documented in both MDM as applicable and the Disposition within this note     Time User Action Codes Description Comment    7/2/2712  9:36 PM Sumanth Bitter  8XXA,  L08 9] Wound infection       ED Disposition     ED Disposition   Discharge    Condition   Stable    Date/Time   Tue Jun 7, 2022  4:24 PM    Comment   Skylar Mobley discharge to home/self care                 Follow-up Information     Follow up With Specialties Details Why Contact Info    Pillo Leija DO Family Medicine, Wound Care In 2 days For wound re-check [facility]  [phone]            Discharge Medication List as of 6/7/2022  4:29 PM      START taking these medications    Details   amoxicillin-clavulanate (AUGMENTIN) 875-125 mg per tablet Take 1 tablet by mouth every 12 (twelve) hours for 7 days, Starting Tue 6/7/2022, Until Tue 6/14/2022, Normal      mupirocin (BACTROBAN) 2 % ointment Apply topically 3 (three) times a day, Starting Tue 6/7/2022, Normal         CONTINUE these medications which have NOT CHANGED    Details   amantadine (SYMMETREL) 100 mg capsule Take 1 capsule (100 mg total) by mouth 2 (two) times a day, Starting Tue 3/29/2022, Normal      amlodipine-olmesartan (SELENE) 5-20 MG Take 1 tablet by mouth daily, Starting Thu 4/21/2022, Normal      ascorbic acid (VITAMIN C) 250 mg tablet Take 250 mg by mouth daily, Historical Med      Aubagio 14 MG TABS Take 1 tablet (14 mg total) by mouth daily, Starting Thu 1/20/2022, Normal      baclofen 10 mg tablet TAKE 2 TABLETS 3 TIMES A DAY, Normal      cholecalciferol (VITAMIN D3) 1,000 units tablet Take 1,000 Units by mouth daily, Historical Med      dicyclomine (BENTYL) 10 mg capsule Take 1 capsule (10 mg total) by mouth in the morning and 1 capsule (10 mg total) at noon and 1 capsule (10 mg total) in the evening  Take before meals  , Starting Mon 5/23/2022, Normal      gabapentin (NEURONTIN) 300 mg capsule Take 1 capsule (300 mg total) by mouth 2 (two) times a day, Starting Tue 3/29/2022, Normal      LORazepam (ATIVAN) 0 5 mg tablet Take 1 tab 30 min prior to MRI, may repeat x 1 immediately before if needed, Normal      metoprolol tartrate (LOPRESSOR) 100 mg tablet Take 1 tablet (100 mg total) by mouth every 12 (twelve) hours, Starting Thu 4/21/2022, Normal      Multiple Vitamins-Minerals (Multivitamin Adult Extra C) CHEW Chew 1 tablet daily, Starting Tue 8/4/2020, Historical Med      !! rivaroxaban (Xarelto) 20 mg tablet Take 1 tablet (20 mg total) by mouth daily with breakfast, Starting Tue 2/8/2022, Normal      !! rivaroxaban (XARELTO) 20 mg tablet Take 1 tablet (20 mg total) by mouth daily with breakfast, Starting Mon 5/23/2022, Normal       !! - Potential duplicate medications found  Please discuss with provider  No discharge procedures on file      PDMP Review       Value Time User    PDMP Reviewed  Yes 8/18/2020 10:12 AM Bean Mccauley DO          ED Provider  Electronically Signed by           Bhakti Packer MD  50/[id number]/62 [id number]

## 2022-06-07 NOTE — TELEPHONE ENCOUNTER
Pt seen in office today for her MS  Pt needs to cont with her every other month lab draws due to her imd aubagio  Pt has a home phlebotomy team that has been coming to home  New rx now for every other month labs for the next 6 months in emr  Can you make sure home phlebotomy cont to come since pt is home bound  Pt currently has service  Just need to make sure continues  Pt currently sent to er from clinic due to foot wound

## 2022-06-07 NOTE — TELEPHONE ENCOUNTER
Pt seen in office today for her routine neuro MS visit  MS wise stable  Pt was noted on exam to have a wound on the top of her left foot with induration and breakdown of skin with possible component of cellulitis as well  Concern for decreased dp pulse as well  Pt sent to er for further evaluation from clinic  Please follow up on pt post er for future wound care and vascular management  Giovanny Lamb, please call pcp office and alert them to er recommendations on mutual pt

## 2022-06-08 NOTE — TELEPHONE ENCOUNTER
MSW attempted to phone patient at [phone] to discuss home labs  MSW left voicemail with callback information

## 2022-06-13 NOTE — TELEPHONE ENCOUNTER
MSW attempted x2 to phone patient at [phone] to discuss home labs  MSW left voicemail with callback information

## 2022-06-15 ENCOUNTER — TELEPHONE (OUTPATIENT)
Dept: FAMILY MEDICINE CLINIC | Facility: CLINIC | Age: 62
End: 2022-06-15

## 2022-06-15 ENCOUNTER — OFFICE VISIT (OUTPATIENT)
Dept: FAMILY MEDICINE CLINIC | Facility: CLINIC | Age: 62
End: 2022-06-15
Payer: COMMERCIAL

## 2022-06-15 VITALS
DIASTOLIC BLOOD PRESSURE: 80 MMHG | HEART RATE: 71 BPM | SYSTOLIC BLOOD PRESSURE: 124 MMHG | OXYGEN SATURATION: 96 % | TEMPERATURE: 97.4 F | HEIGHT: 66 IN | BODY MASS INDEX: 20.98 KG/M2 | RESPIRATION RATE: 16 BRPM

## 2022-06-15 DIAGNOSIS — G54.6 PHANTOM PAIN AFTER AMPUTATION OF LOWER EXTREMITY (HCC): ICD-10-CM

## 2022-06-15 DIAGNOSIS — D69.6 THROMBOCYTOPENIA (HCC): ICD-10-CM

## 2022-06-15 DIAGNOSIS — E78.2 MIXED HYPERLIPIDEMIA: ICD-10-CM

## 2022-06-15 DIAGNOSIS — T14.8XXA WOUND INFECTION: ICD-10-CM

## 2022-06-15 DIAGNOSIS — I10 ESSENTIAL HYPERTENSION: ICD-10-CM

## 2022-06-15 DIAGNOSIS — G35 MULTIPLE SCLEROSIS (HCC): ICD-10-CM

## 2022-06-15 DIAGNOSIS — D72.829 LEUKOCYTOSIS, UNSPECIFIED TYPE: ICD-10-CM

## 2022-06-15 DIAGNOSIS — I74.3 EMBOLISM AND THROMBOSIS OF ARTERIES OF THE LOWER EXTREMITIES (HCC): ICD-10-CM

## 2022-06-15 DIAGNOSIS — S91.302A OPEN WOUND OF LEFT FOOT, INITIAL ENCOUNTER: Primary | ICD-10-CM

## 2022-06-15 DIAGNOSIS — L08.9 WOUND INFECTION: ICD-10-CM

## 2022-06-15 PROBLEM — F11.20 OPIATE DEPENDENCE (HCC): Status: RESOLVED | Noted: 2019-06-21 | Resolved: 2022-06-15

## 2022-06-15 PROCEDURE — 99214 OFFICE O/P EST MOD 30 MIN: CPT | Performed by: FAMILY MEDICINE

## 2022-06-15 PROCEDURE — 3079F DIAST BP 80-89 MM HG: CPT | Performed by: FAMILY MEDICINE

## 2022-06-15 PROCEDURE — 87070 CULTURE OTHR SPECIMN AEROBIC: CPT | Performed by: FAMILY MEDICINE

## 2022-06-15 PROCEDURE — 87205 SMEAR GRAM STAIN: CPT | Performed by: FAMILY MEDICINE

## 2022-06-15 PROCEDURE — 87077 CULTURE AEROBIC IDENTIFY: CPT | Performed by: FAMILY MEDICINE

## 2022-06-15 PROCEDURE — 3725F SCREEN DEPRESSION PERFORMED: CPT | Performed by: FAMILY MEDICINE

## 2022-06-15 PROCEDURE — 3074F SYST BP LT 130 MM HG: CPT | Performed by: FAMILY MEDICINE

## 2022-06-15 PROCEDURE — 87186 SC STD MICRODIL/AGAR DIL: CPT | Performed by: FAMILY MEDICINE

## 2022-06-15 RX ORDER — CIPROFLOXACIN 500 MG/1
500 TABLET, FILM COATED ORAL EVERY 12 HOURS SCHEDULED
Qty: 20 TABLET | Refills: 0 | Status: SHIPPED | OUTPATIENT
Start: 2022-06-15 | End: 2022-06-25

## 2022-06-15 NOTE — TELEPHONE ENCOUNTER
Patient gives the following information for her lab draws which are done at home:    [facility]  Phone:  [phone]  Fax:  [phone]  E-Fax:  [phone]    Juany Melissa

## 2022-06-15 NOTE — PROGRESS NOTES
Assessment/Plan:    1  Open wound of left foot, initial encounter  -     Wound culture and Gram stain  -     ciprofloxacin (CIPRO) 500 mg tablet; Take 1 tablet (500 mg total) by mouth every 12 (twelve) hours for 10 days  -     Ambulatory Referral to Wound Care; Future    2  Embolism and thrombosis of arteries of the lower extremities (HCC)    3  Phantom pain after amputation of lower extremity ([facility] )    4  Thrombocytopenia ([facility] )  -     CBC; Future; Expected date: 11/27/2022    5  Wound infection  -     mupirocin (BACTROBAN) 2 % ointment; Apply topically 3 (three) times a day    6  Essential hypertension  Assessment & Plan:  stable    Orders:  -     Comprehensive metabolic panel; Future; Expected date: 11/27/2022    7  Multiple sclerosis ([place] [address] )  Assessment & Plan:  Pt sees neurology was just seen seems to be stable      8  Leukocytosis, unspecified type  -     CBC; Future; Expected date: 11/27/2022    9  Mixed hyperlipidemia  -     Lipid Panel with Direct LDL reflex; Future; Expected date: 11/27/2022        pt will need to have labs done w home draw  Wound culture taken  Abx chnaged  Wound dressed      There are no Patient Instructions on file for this visit  No follow-ups on file  Subjective:      Patient ID: Jaciel Castro is a 64 y o  female  Chief Complaint   Patient presents with    Follow-up     6 month mz cma       Pt is here for a 6 month follow up  Pt had labs done last month for her Neurologist for her M S  meds  No recent labs    Pt states while she is here, last week she was seen by neurology - she did not like the wound on her left foot  Pt states she was sent to the ed for this to be evaluated  Pt sates the ed gave her cream and oral pills  Pt states the wound is clearing but wants me to look at it  PT states the ed took a picture    Pt states where it is on her foot it is constantly being banged  Pt states this wound has been present a couple of weeks - dog stepped on her foot , his nail caused a wound and now we have what we see here today  The following portions of the patient's history were reviewed and updated as appropriate: allergies, current medications, past family history, past medical history, past social history, past surgical history and problem list     Review of Systems   Cardiovascular: Positive for leg swelling  Skin: Positive for wound  Neurological: Positive for weakness  Current Outpatient Medications   Medication Sig Dispense Refill    amantadine (SYMMETREL) 100 mg capsule Take 1 capsule (100 mg total) by mouth 2 (two) times a day 180 capsule 0    amlodipine-olmesartan (SELENE) 5-20 MG Take 1 tablet by mouth daily 90 tablet 0    ascorbic acid (VITAMIN C) 250 mg tablet Take 250 mg by mouth daily      Aubagio 14 MG TABS Take 1 tablet (14 mg total) by mouth daily 30 tablet 5    baclofen 10 mg tablet TAKE 2 TABLETS 3 TIMES A  tablet 1    cholecalciferol (VITAMIN D3) 1,000 units tablet Take 1,000 Units by mouth daily      ciprofloxacin (CIPRO) 500 mg tablet Take 1 tablet (500 mg total) by mouth every 12 (twelve) hours for 10 days 20 tablet 0    dicyclomine (BENTYL) 10 mg capsule Take 1 capsule (10 mg total) by mouth in the morning and 1 capsule (10 mg total) at noon and 1 capsule (10 mg total) in the evening  Take before meals   90 capsule 0    gabapentin (NEURONTIN) 300 mg capsule Take 1 capsule (300 mg total) by mouth 2 (two) times a day 180 capsule 0    LORazepam (ATIVAN) 0 5 mg tablet Take 1 tab 30 min prior to MRI, may repeat x 1 immediately before if needed 4 tablet 0    metoprolol tartrate (LOPRESSOR) 100 mg tablet Take 1 tablet (100 mg total) by mouth every 12 (twelve) hours 180 tablet 0    Multiple Vitamins-Minerals (Multivitamin Adult Extra C) CHEW Chew 1 tablet daily      mupirocin (BACTROBAN) 2 % ointment Apply topically 3 (three) times a day 30 g 0    rivaroxaban (XARELTO) 20 mg tablet Take 1 tablet (20 mg total) by mouth daily with breakfast 90 tablet 0    rivaroxaban (Xarelto) 20 mg tablet Take 1 tablet (20 mg total) by mouth daily with breakfast 90 tablet 0     No current facility-administered medications for this visit  Objective:    /80   Pulse 71   Temp (!) 97 4 °F (36 3 °C)   Resp 16   Ht 5' 6" (1 676 m)   SpO2 96%   BMI 20 98 kg/m²        Physical Exam  Musculoskeletal:      Left lower leg: Edema present  Skin:     Findings: Erythema, lesion and rash present  Neurological:      Motor: Weakness present        Gait: Gait abnormal                     Gilberto Tidwell DO

## 2022-06-16 NOTE — TELEPHONE ENCOUNTER
MSW reviewed chart  Patient receives lab draws from [facility]  MSW phoned [facility] at [phone] and spoke with Ramon Ritchie reported that there is currently a standing order for every other month lab draws  MSW confirmed what labs are currently prescribed  Ramon Ritchie confirmed same  Patient has upcoming lab draws for July 21st      MSW attempted to reach patient x3 to inform of above  MSW left voicemail with date of next home visit for lab draws  MSW will remain available for any future social needs

## 2022-06-21 ENCOUNTER — TELEPHONE (OUTPATIENT)
Dept: FAMILY MEDICINE CLINIC | Facility: CLINIC | Age: 62
End: 2022-06-21

## 2022-06-21 DIAGNOSIS — S91.302A OPEN WOUND OF LEFT FOOT, INITIAL ENCOUNTER: Primary | ICD-10-CM

## 2022-06-21 LAB
BACTERIA WND AEROBE CULT: ABNORMAL
BACTERIA WND AEROBE CULT: ABNORMAL
GRAM STN SPEC: ABNORMAL

## 2022-06-21 RX ORDER — SULFAMETHOXAZOLE AND TRIMETHOPRIM 800; 160 MG/1; MG/1
1 TABLET ORAL EVERY 12 HOURS SCHEDULED
Qty: 20 TABLET | Refills: 0 | Status: SHIPPED | OUTPATIENT
Start: 2022-06-21 | End: 2022-07-01

## 2022-06-21 NOTE — TELEPHONE ENCOUNTER
Lab order and face sheet faxed over   Clerical, please note fax confirmation  [facility] [place]

## 2022-06-21 NOTE — TELEPHONE ENCOUNTER
Called pt to discuss results of the wound culture looks like it is not susceptible to the cipro I igave her, she will need to be on bactrim  I will escribe the nex abx    Please elt her know when she calls back

## 2022-06-23 DIAGNOSIS — I21.A1 TYPE 2 MYOCARDIAL INFARCTION (HCC): ICD-10-CM

## 2022-06-23 DIAGNOSIS — I70.201 POPLITEAL ARTERY STENOSIS, RIGHT (HCC): ICD-10-CM

## 2022-06-23 DIAGNOSIS — G35 MULTIPLE SCLEROSIS (HCC): ICD-10-CM

## 2022-06-23 RX ORDER — GABAPENTIN 300 MG/1
300 CAPSULE ORAL 2 TIMES DAILY
Qty: 180 CAPSULE | Refills: 0 | Status: SHIPPED | OUTPATIENT
Start: 2022-06-23

## 2022-06-23 RX ORDER — METOPROLOL TARTRATE 100 MG/1
100 TABLET ORAL EVERY 12 HOURS
Qty: 180 TABLET | Refills: 0 | Status: SHIPPED | OUTPATIENT
Start: 2022-06-23

## 2022-06-25 DIAGNOSIS — G35 MULTIPLE SCLEROSIS (HCC): ICD-10-CM

## 2022-06-27 RX ORDER — AMANTADINE HYDROCHLORIDE 100 MG/1
100 CAPSULE, GELATIN COATED ORAL 2 TIMES DAILY
Qty: 180 CAPSULE | Refills: 0 | Status: SHIPPED | OUTPATIENT
Start: 2022-06-27

## 2022-06-27 RX ORDER — BACLOFEN 10 MG/1
TABLET ORAL
Qty: 540 TABLET | Refills: 1 | Status: SHIPPED | OUTPATIENT
Start: 2022-06-27

## 2022-07-01 ENCOUNTER — OFFICE VISIT (OUTPATIENT)
Dept: WOUND CARE | Facility: HOSPITAL | Age: 62
End: 2022-07-01
Payer: COMMERCIAL

## 2022-07-01 VITALS
HEART RATE: 75 BPM | BODY MASS INDEX: 22.5 KG/M2 | HEIGHT: 66 IN | WEIGHT: 140 LBS | DIASTOLIC BLOOD PRESSURE: 83 MMHG | RESPIRATION RATE: 15 BRPM | SYSTOLIC BLOOD PRESSURE: 122 MMHG | TEMPERATURE: 97.6 F

## 2022-07-01 DIAGNOSIS — L97.529 NON-HEALING ULCER OF LEFT FOOT, UNSPECIFIED ULCER STAGE (HCC): Primary | ICD-10-CM

## 2022-07-01 PROCEDURE — 97597 DBRDMT OPN WND 1ST 20 CM/<: CPT | Performed by: FAMILY MEDICINE

## 2022-07-01 PROCEDURE — 99203 OFFICE O/P NEW LOW 30 MIN: CPT | Performed by: FAMILY MEDICINE

## 2022-07-01 PROCEDURE — 99213 OFFICE O/P EST LOW 20 MIN: CPT | Performed by: FAMILY MEDICINE

## 2022-07-01 RX ORDER — LIDOCAINE HYDROCHLORIDE 40 MG/ML
5 SOLUTION TOPICAL ONCE
Status: COMPLETED | OUTPATIENT
Start: 2022-07-01 | End: 2022-07-01

## 2022-07-01 RX ADMIN — LIDOCAINE HYDROCHLORIDE 5 ML: 40 SOLUTION TOPICAL at 09:27

## 2022-07-01 NOTE — PROGRESS NOTES
Patient ID: Naina Desai is a 64 y o  female Date of Birth 1960       Chief Complaint   Patient presents with    New Patient Visit     Left foot       Allergies:  Patient has no known allergies  Diagnosis:      Diagnosis ICD-10-CM Associated Orders   1  Non-healing ulcer of left foot, unspecified ulcer stage ([facility])  L97 529 lidocaine (XYLOCAINE) 4 % topical solution 5 mL     Wound cleansing and dressings     Wound compression and edema control     Debridement           Assessment & Plan:   Original traumatic wound to the dorsal aspect of the left foot  Completed Bactrim DS   Selective debridement   Dermagran every other day  Tubigrips to assist with swelling  Subjective:   7/1/22:  1st visit for this 69-year-old female referred to the wound center because of a nonhealing wound to the dorsum of the left foot  The patient has a history of MS and essentially wheelchair confined and status post right AKA  Approximately one week ago her dog stepped on her foot causing a small wound with the toenail  She went to the emergency room where mupirocin ointment and Augmentin was prescribed  Because of lack of improvement, she went to her primary care physician who due to a wound culture and prescribed Bactrim DS  She has now completed this  (See culture report)  The patient does have some discomfort when the areas touched  She has no history of PAD in the left lower extremity with normal ABIs and toe pressures        The following portions of the patient's history were reviewed and updated as appropriate:   Patient Active Problem List   Diagnosis    Essential hypertension    Lumbar spinal stenosis    Multiple sclerosis ([address] )    Multiple thyroid nodules    Thrombocytopenia (HCC)    Ventricular hypertrophy    Vitamin D deficiency    Bilateral hip pain    Leukocytosis    Smoker    Sinus tachycardia    Type 2 myocardial infarction ([address] )    Generalized weakness    Popliteal artery stenosis, right (HCC)    S/P AKA (above knee amputation) unilateral, right (HCC)    Incontinence of urine in female    Venous insufficiency of left leg    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)    Chronic fatigue    Spasticity    Open wound    Embolism and thrombosis of arteries of the lower extremities (HCC)    Mixed hyperlipidemia     Past Medical History:   Diagnosis Date    Chronic back pain     Depression     last assessed 06/10/14    High cholesterol     Hypertension     last assessed 17    MS (multiple sclerosis) ([facility] )     last assessed 17    RLL pneumonia     last assessed 06/10/14     Past Surgical History:   Procedure Laterality Date    AMPUTATION ABOVE KNEE (AKA) Right 2020    Procedure: AMPUTATION ABOVE KNEE (AKA);   Surgeon: Penny Obrien MD;  [facility]: [facility];  Service: Vascular    ANKLE SURGERY Right     Treatment of Ankle  last assessed 06/10/14   Rose Clunes BACK SURGERY      for spinal stenosis and sciatica     SECTION      last assessed 06/10/14    KNEE ARTHROSCOPY      last assessed 06/10/14    LAMINECTOMY      last assessed 06/10/14    ORTHOPEDIC SURGERY      OTHER SURGICAL HISTORY      discectomy     Family History   Problem Relation Age of Onset    Osteoporosis Mother     Heart attack Father     Mental illness Neg Hx       Social History     Socioeconomic History    Marital status:      Spouse name: None    Number of children: 2    Years of education: 15    Highest education level: Associate degree: occupational, technical, or vocational program   Occupational History    None   Tobacco Use    Smoking status: Light Tobacco Smoker     Packs/day: 0 25     Years: 40 00     Pack years: 10 00     Types: Cigarettes     Last attempt to quit: 7/10/2019     Years since quittin 9    Smokeless tobacco: Never Used   Vaping Use    Vaping Use: Never used   Substance and Sexual Activity    Alcohol use: Yes     Comment: rare // no alcohol use as per allscripts    Drug use: No    Sexual activity: None   Other Topics Concern    None   Social History Narrative    Caffeine use     Social Determinants of Health     Financial Resource Strain: Not on file   Food Insecurity: Not on file   Transportation Needs: Not on file   Physical Activity: Not on file   Stress: Not on file   Social Connections: Not on file   Intimate Partner Violence: Not on file   Housing Stability: Not on file        Current Outpatient Medications:     amantadine (SYMMETREL) 100 mg capsule, Take 1 capsule (100 mg total) by mouth 2 (two) times a day, Disp: 180 capsule, Rfl: 0    amlodipine-olmesartan (SELENE) 5-20 MG, Take 1 tablet by mouth daily, Disp: 90 tablet, Rfl: 0    ascorbic acid (VITAMIN C) 250 mg tablet, Take 250 mg by mouth daily, Disp: , Rfl:     Aubagio 14 MG TABS, Take 1 tablet (14 mg total) by mouth daily, Disp: 30 tablet, Rfl: 5    baclofen 10 mg tablet, TAKE 2 TABLETS 3 TIMES A DAY, Disp: 540 tablet, Rfl: 1    cholecalciferol (VITAMIN D3) 1,000 units tablet, Take 1,000 Units by mouth daily, Disp: , Rfl:     dicyclomine (BENTYL) 10 mg capsule, Take 1 capsule (10 mg total) by mouth in the morning and 1 capsule (10 mg total) at noon and 1 capsule (10 mg total) in the evening  Take before meals  , Disp: 90 capsule, Rfl: 0    gabapentin (NEURONTIN) 300 mg capsule, Take 1 capsule (300 mg total) by mouth 2 (two) times a day, Disp: 180 capsule, Rfl: 0    metoprolol tartrate (LOPRESSOR) 100 mg tablet, Take 1 tablet (100 mg total) by mouth every 12 (twelve) hours, Disp: 180 tablet, Rfl: 0    Multiple Vitamins-Minerals (Multivitamin Adult Extra C) CHEW, Chew 1 tablet daily, Disp: , Rfl:     rivaroxaban (XARELTO) 20 mg tablet, Take 1 tablet (20 mg total) by mouth daily with breakfast, Disp: 90 tablet, Rfl: 0    sulfamethoxazole-trimethoprim (BACTRIM DS) 800-160 mg per tablet, Take 1 tablet by mouth every 12 (twelve) hours for 10 days, Disp: 20 tablet, Rfl: 0   LORazepam (ATIVAN) 0 5 mg tablet, Take 1 tab 30 min prior to MRI, may repeat x 1 immediately before if needed (Patient not taking: Reported on 7/1/2022), Disp: 4 tablet, Rfl: 0    mupirocin (BACTROBAN) 2 % ointment, Apply topically 3 (three) times a day, Disp: 30 g, Rfl: 0  No current facility-administered medications for this visit  Review of Systems   Constitutional: Negative for appetite change, chills, fatigue, fever and unexpected weight change  HENT: Negative for congestion, hearing loss, postnasal drip and sinus pressure  Eyes: Negative for discharge and visual disturbance  Respiratory: Negative for cough and shortness of breath  Cardiovascular: Positive for leg swelling (Left lower extremity)  Negative for chest pain and palpitations  Gastrointestinal: Negative for abdominal pain, blood in stool, constipation, diarrhea and nausea  Endocrine: Negative  Genitourinary: Negative for difficulty urinating, dysuria and urgency  Musculoskeletal: Positive for gait problem (Status post right AKA)  Negative for back pain  Skin: Positive for wound (Left foot)  Negative for rash  Allergic/Immunologic: Negative  Neurological: Positive for weakness  Negative for dizziness, tremors, seizures, numbness and headaches  Multiple sclerosis   Hematological: Does not bruise/bleed easily  Psychiatric/Behavioral: Negative  Negative for dysphoric mood  The patient is not nervous/anxious  Objective:  /83   Pulse 75   Temp 97 6 °F (36 4 °C)   Resp 15   Ht 5' 6" (1 676 m)   Wt 63 5 kg (140 lb)   BMI 22 60 kg/m²   Pain Score: 0-No pain     Physical Exam  Vitals and nursing note reviewed  Constitutional:       Appearance: Normal appearance  She is well-developed and normal weight  HENT:      Head: Normocephalic and atraumatic  Right Ear: External ear normal       Left Ear: External ear normal    Eyes:      General: Lids are normal          Right eye: No discharge  Left eye: No discharge  Conjunctiva/sclera: Conjunctivae normal    Cardiovascular:      Rate and Rhythm: Normal rate and regular rhythm  Heart sounds: Normal heart sounds  No murmur heard  No friction rub  No gallop  Pulmonary:      Effort: Pulmonary effort is normal       Breath sounds: Normal breath sounds and air entry  Abdominal:      General: Abdomen is flat  Palpations: Abdomen is soft  Tenderness: There is no abdominal tenderness  There is no guarding or rebound  Musculoskeletal:      Cervical back: Neck supple  Right lower leg: No edema  Left lower leg: No edema  Feet:       Right Lower Extremity: Right leg is amputated above knee  Feet:      Left foot:      Skin integrity: Ulcer present  Toenail Condition: Fungal disease present  Comments: Ulceration on dorsum of the foot  Some dried exudate an eschar  Surrounding erythema without lymphangitic streaking  Lymphadenopathy:      Cervical: No cervical adenopathy  Skin:     General: Skin is warm and dry  Findings: Wound present  Neurological:      Mental Status: She is alert and oriented to person, place, and time  Gait: Gait abnormal (Right AKA and MS)  Psychiatric:         Attention and Perception: Attention normal          Mood and Affect: Mood and affect normal          Speech: Speech normal          Behavior: Behavior is cooperative  Cognition and Memory: Cognition normal                   Debridement   Wound 07/01/22 Traumatic Foot Anterior; Left    Universal Protocol:  Consent: Verbal consent obtained  Written consent obtained  Consent given by: patient  Time out: Immediately prior to procedure a "time out" was called to verify the correct patient, procedure, equipment, support staff and site/side marked as required    Patient understanding: patient states understanding of the procedure being performed  Patient identity confirmed: verbally with patient      Performed by: physician  Debridement type: selective  Pain control: lidocaine 4%  Post-debridement measurements  Length (cm): 1 2  Width (cm): 1 4  Depth (cm): 0 2  Percent debrided: 100%  Surface Area (cm^2): 1 68  Area debrided (cm^2): 1 68  Volume (cm^3): 0 34  Devitalized tissue debrided: exudate, fibrin and eschar  Instrument(s) utilized: curette  Bleeding: small  Hemostasis obtained with: pressure  Procedural pain (0-10): 3  Post-procedural pain: 0   Response to treatment: procedure was tolerated well           Results from last 6 Months   Lab Units 06/15/22  1750   WOUND CULTURE  1+ Growth of Acinetobacter lwoffii group*  1+ Growth of Acinetobacter baumannii*       Wound Instructions:  Orders Placed This Encounter   Procedures    Wound cleansing and dressings     Left Foot Wound:    Wash your hands with soap and water  Remove old dressing, discard into plastic bag and place in trash  Cleanse the wound with soap and water prior to applying a clean dressing  Do not use tissue or cotton balls  Do not scrub the wound  Pat dry using gauze  Shower No; do bedside bathing   Apply moisturizer to skin surrounding wound  Apply Dermagran to the foot wound  Cover with gauze  Secure with rolled gauze and tape  Change dressing every other day  Wear spandagrip  This was done today  Standing Status:   Future     Standing Expiration Date:   7/1/2023    Wound compression and edema control     Elastic Tubular Stocking: Spandagrip to left lower leg    Tubular elastic bandage: Apply from base of toes to behind the knee  Apply in AM, may remove for sleep  Avoid prolonged standing in one place  Elevate leg(s) above the level of the heart when sitting or as much as possible  Standing Status:   Future     Standing Expiration Date:   7/1/2023    Debridement     This order was created via procedure documentation       Total time spent today:  20 minutes    This includes reviewing the patient's chart, pertinent physician records from ER visit 6/7/22 and primary care visit 6/15/22  Also revealed culture from 6/15/22  Amador Hernandez MD, CHT, CWS    Portions of the record may have been created with voice recognition software  Occasional wrong word or "sound alike" substitutions may have occurred due to the inherent limitations of voice recognition software  Read the chart carefully and recognize, using context, where substitutions have occurred

## 2022-07-01 NOTE — PATIENT INSTRUCTIONS
Orders Placed This Encounter   Procedures    Wound cleansing and dressings     Left Foot Wound:    Wash your hands with soap and water  Remove old dressing, discard into plastic bag and place in trash  Cleanse the wound with soap and water prior to applying a clean dressing  Do not use tissue or cotton balls  Do not scrub the wound  Pat dry using gauze  Shower No; do bedside bathing   Apply moisturizer to skin surrounding wound  Apply Dermagran to the foot wound  Cover with gauze  Secure with rolled gauze and tape  Change dressing every other day  Wear spandagrip  This was done today  Standing Status:   Future     Standing Expiration Date:   7/1/2023    Wound compression and edema control     Elastic Tubular Stocking: Spandagrip to left lower leg    Tubular elastic bandage: Apply from base of toes to behind the knee  Apply in AM, may remove for sleep  Avoid prolonged standing in one place  Elevate leg(s) above the level of the heart when sitting or as much as possible       Standing Status:   Future     Standing Expiration Date:   7/1/2023

## 2022-07-08 ENCOUNTER — OFFICE VISIT (OUTPATIENT)
Dept: WOUND CARE | Facility: HOSPITAL | Age: 62
End: 2022-07-08
Payer: COMMERCIAL

## 2022-07-08 VITALS
HEART RATE: 65 BPM | RESPIRATION RATE: 18 BRPM | SYSTOLIC BLOOD PRESSURE: 126 MMHG | TEMPERATURE: 97.7 F | DIASTOLIC BLOOD PRESSURE: 75 MMHG

## 2022-07-08 DIAGNOSIS — L97.529 NON-HEALING ULCER OF LEFT FOOT, UNSPECIFIED ULCER STAGE (HCC): Primary | ICD-10-CM

## 2022-07-08 PROCEDURE — 97597 DBRDMT OPN WND 1ST 20 CM/<: CPT | Performed by: FAMILY MEDICINE

## 2022-07-08 RX ORDER — LIDOCAINE HYDROCHLORIDE 40 MG/ML
5 SOLUTION TOPICAL ONCE
Status: COMPLETED | OUTPATIENT
Start: 2022-07-08 | End: 2022-07-08

## 2022-07-08 RX ADMIN — LIDOCAINE HYDROCHLORIDE 5 ML: 40 SOLUTION TOPICAL at 09:55

## 2022-07-08 NOTE — PROGRESS NOTES
Patient ID: Guillermo Munoz is a 64 y o  female Date of Birth 1960       Chief Complaint   Patient presents with    Follow Up Wound Care Visit     Left foot wound       Allergies:  Patient has no known allergies  Diagnosis:      Diagnosis ICD-10-CM Associated Orders   1  Non-healing ulcer of left foot, unspecified ulcer stage ([facility])  L97 529 lidocaine (XYLOCAINE) 4 % topical solution 5 mL     Wound cleansing and dressings     Wound compression and edema control           Assessment & Plan:   Improving traumatic wound secondary to dog stepping on her foot   Selective debridement   Continue with Dermagran every other day  Subjective:   7/1/22:  1st visit for this 70-year-old female referred to the wound center because of a nonhealing wound to the dorsum of the left foot  The patient has a history of MS and essentially wheelchair confined and status post right AKA  Approximately one week ago her dog stepped on her foot causing a small wound with the toenail  She went to the emergency room where mupirocin ointment and Augmentin was prescribed  Because of lack of improvement, she went to her primary care physician who due to a wound culture and prescribed Bactrim DS  She has now completed this  (See culture report)  The patient does have some discomfort when the areas touched  She has no history of PAD in the left lower extremity with normal ABIs and toe pressures  7/8/22: Followup traumatic wound secondary to dog stepping on her left foot  No specific complaints  Doing well with Dermagran  No fever chills        The following portions of the patient's history were reviewed and updated as appropriate:   Patient Active Problem List   Diagnosis    Essential hypertension    Lumbar spinal stenosis    Multiple sclerosis ([address] )    Multiple thyroid nodules    Thrombocytopenia (HCC)    Ventricular hypertrophy    Vitamin D deficiency    Bilateral hip pain    Leukocytosis    Smoker    Sinus tachycardia    Type 2 myocardial infarction (HCC)    Generalized weakness    Popliteal artery stenosis, right (HCC)    S/P AKA (above knee amputation) unilateral, right (HCC)    Incontinence of urine in female    Venous insufficiency of left leg    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)    Chronic fatigue    Spasticity    Open wound    Embolism and thrombosis of arteries of the lower extremities (HCC)    Mixed hyperlipidemia     Past Medical History:   Diagnosis Date    Chronic back pain     Depression     last assessed 06/10/14    High cholesterol     Hypertension     last assessed 17    [facility]     last assessed 17    RLL pneumonia     last assessed 06/10/14     Past Surgical History:   Procedure Laterality Date    AMPUTATION ABOVE KNEE (AKA) Right 2020    Procedure: AMPUTATION ABOVE KNEE (AKA);   Surgeon: Monica Piper MD;  Location: [facility];  Service: Vascular    ANKLE SURGERY Right     Treatment of Ankle  last assessed 06/10/14   Dilcia Torres BACK SURGERY      for spinal stenosis and sciatica     SECTION      last assessed 06/10/14    KNEE ARTHROSCOPY      last assessed 06/10/14    LAMINECTOMY      last assessed 06/10/14    ORTHOPEDIC SURGERY      OTHER SURGICAL HISTORY      discectomy     Family History   Problem Relation Age of Onset    Osteoporosis Mother     Heart attack Father     Mental illness Neg Hx       Social History     Socioeconomic History    Marital status:      Spouse name: None    Number of children: 2    Years of education: 15    Highest education level: Associate degree: occupational, technical, or vocational program   Occupational History    None   Tobacco Use    Smoking status: Light Tobacco Smoker     Packs/day: 0 25     Years: 40 00     Pack years: 10 00     Types: Cigarettes     Last attempt to quit: 7/10/2019     Years since quittin 9    Smokeless tobacco: Never Used   Vaping Use  Vaping Use: Never used   Substance and Sexual Activity    Alcohol use: Yes     Comment: rare // no alcohol use as per allscripts    Drug use: No    Sexual activity: None   Other Topics Concern    None   Social History Narrative    Caffeine use     Social Determinants of Health     Financial Resource Strain: Not on file   Food Insecurity: Not on file   Transportation Needs: Not on file   Physical Activity: Not on file   Stress: Not on file   Social Connections: Not on file   Intimate Partner Violence: Not on file   Housing Stability: Not on file        Current Outpatient Medications:     amantadine (SYMMETREL) 100 mg capsule, Take 1 capsule (100 mg total) by mouth 2 (two) times a day, Disp: 180 capsule, Rfl: 0    amlodipine-olmesartan (SELENE) 5-20 MG, Take 1 tablet by mouth daily, Disp: 90 tablet, Rfl: 0    ascorbic acid (VITAMIN C) 250 mg tablet, Take 250 mg by mouth daily, Disp: , Rfl:     Aubagio 14 MG TABS, Take 1 tablet (14 mg total) by mouth daily, Disp: 30 tablet, Rfl: 5    baclofen 10 mg tablet, TAKE 2 TABLETS 3 TIMES A DAY, Disp: 540 tablet, Rfl: 1    cholecalciferol (VITAMIN D3) 1,000 units tablet, Take 1,000 Units by mouth daily, Disp: , Rfl:     dicyclomine (BENTYL) 10 mg capsule, Take 1 capsule (10 mg total) by mouth in the morning and 1 capsule (10 mg total) at noon and 1 capsule (10 mg total) in the evening  Take before meals  , Disp: 90 capsule, Rfl: 0    gabapentin (NEURONTIN) 300 mg capsule, Take 1 capsule (300 mg total) by mouth 2 (two) times a day, Disp: 180 capsule, Rfl: 0    LORazepam (ATIVAN) 0 5 mg tablet, Take 1 tab 30 min prior to MRI, may repeat x 1 immediately before if needed (Patient not taking: Reported on 7/1/2022), Disp: 4 tablet, Rfl: 0    metoprolol tartrate (LOPRESSOR) 100 mg tablet, Take 1 tablet (100 mg total) by mouth every 12 (twelve) hours, Disp: 180 tablet, Rfl: 0    Multiple Vitamins-Minerals (Multivitamin Adult Extra C) CHEW, Chew 1 tablet daily, Disp: , Rfl:     mupirocin (BACTROBAN) 2 % ointment, Apply topically 3 (three) times a day, Disp: 30 g, Rfl: 0    rivaroxaban (XARELTO) 20 mg tablet, Take 1 tablet (20 mg total) by mouth daily with breakfast, Disp: 90 tablet, Rfl: 0  No current facility-administered medications for this visit  Review of Systems   Constitutional: Negative for appetite change, chills, fatigue, fever and unexpected weight change  HENT: Positive for sinus pressure  Negative for congestion and hearing loss  Cardiovascular: Positive for leg swelling (Left lower extremity)  Endocrine: Negative  Musculoskeletal: Positive for gait problem (Status post right AKA)  Skin: Positive for wound (Left foot)  Negative for rash  Allergic/Immunologic: Negative  Neurological: Positive for weakness  Multiple sclerosis   Hematological: Does not bruise/bleed easily  Psychiatric/Behavioral: Negative for dysphoric mood  The patient is not nervous/anxious  Objective:  /75   Pulse 65   Temp 97 7 °F (36 5 °C)   Resp 18   Pain Score: 0-No pain     Physical Exam  Vitals and nursing note reviewed  Constitutional:       Appearance: Normal appearance  She is well-developed and normal weight  HENT:      Head: Normocephalic and atraumatic  Cardiovascular:      Rate and Rhythm: Normal rate  Pulmonary:      Effort: Pulmonary effort is normal    Musculoskeletal:        Feet:    Feet:      Comments: Wound with some fibrin and slough in the base  Overall slightly smaller  Skin:     General: Skin is warm and dry  Findings: Wound present  Neurological:      Mental Status: She is alert and oriented to person, place, and time  Psychiatric:         Attention and Perception: Attention normal          Mood and Affect: Mood and affect normal          Behavior: Behavior is cooperative  Cognition and Memory: Cognition normal               Wound 07/01/22 Traumatic Foot Anterior; Left (Active)   Wound Image Images linked 07/08/22 0950   Wound Description Beefy red;Granulation tissue; Yellow;Slough 07/08/22 0949   Ivon-wound Assessment Dry;Edema;Pink;Maceration 07/08/22 0949   Wound Length (cm) 1 1 cm 07/08/22 0949   Wound Width (cm) 1 cm 07/08/22 0949   Wound Depth (cm) 0 2 cm 07/08/22 0949   Wound Surface Area (cm^2) 1 1 cm^2 07/08/22 0949   Wound Volume (cm^3) 0 22 cm^3 07/08/22 0949   Calculated Wound Volume (cm^3) 0 22 cm^3 07/08/22 0949   Change in Wound Size % 35 29 07/08/22 0949   Drainage Amount Small 07/08/22 0949   Drainage Description Serosanguineous 07/08/22 0949   Non-staged Wound Description Full thickness 07/08/22 0949   Dressing Status Intact 07/08/22 0949       Debridement   Wound 07/01/22 Traumatic Foot Anterior; Left    Universal Protocol:  Consent: Verbal consent obtained  Written consent obtained  Consent given by: patient  Time out: Immediately prior to procedure a "time out" was called to verify the correct patient, procedure, equipment, support staff and site/side marked as required    Patient understanding: patient states understanding of the procedure being performed  Patient identity confirmed: verbally with patient      Performed by: physician  Debridement type: selective  Pain control: lidocaine 4%  Post-debridement measurements  Length (cm): 1 1  Width (cm): 1  Depth (cm): 0 2  Percent debrided: 100%  Surface Area (cm^2): 1 1  Area debrided (cm^2): 1 1  Volume (cm^3): 0 22  Devitalized tissue debrided: fibrin and slough  Instrument(s) utilized: curette  Bleeding: medium  Hemostasis obtained with: pressure  Procedural pain (0-10): 0  Post-procedural pain: 0   Response to treatment: procedure was tolerated well           Results from last 6 Months   Lab Units 06/15/22  1750   WOUND CULTURE  1+ Growth of Acinetobacter lwoffii group*  1+ Growth of Acinetobacter baumannii*       Wound Instructions:  Orders Placed This Encounter   Procedures    Wound cleansing and dressings     Left Foot Wound:     Wash your hands with soap and water  Remove old dressing, discard into plastic bag and place in trash  Cleanse the wound with soap and water prior to applying a clean dressing  Do not use tissue or cotton balls  Do not scrub the wound  Pat dry using gauze  Shower No; do bedside bathing   Apply moisturizer to skin surrounding wound  Apply Dermagran to the foot wound  Cover with gauze  Secure with rolled gauze and tape  Change dressing every other day      Wear spandagrip      This was done today            Standing Status:   Future     Standing Expiration Date:   7/8/2023    Wound compression and edema control      Elastic Tubular Stocking: Spandagrip to left lower leg     Tubular elastic bandage: Apply from base of toes to behind the knee  Apply in AM, may remove for sleep      Avoid prolonged standing in one place      Elevate leg(s) above the level of the heart when sitting or as much as possible  Standing Status:   Future     Standing Expiration Date:   7/8/2023       Geovanna Kwong MD, CHT, CWS    Portions of the record may have been created with voice recognition software  Occasional wrong word or "sound alike" substitutions may have occurred due to the inherent limitations of voice recognition software  Read the chart carefully and recognize, using context, where substitutions have occurred

## 2022-07-08 NOTE — PATIENT INSTRUCTIONS
Orders Placed This Encounter   Procedures    Wound cleansing and dressings     Left Foot Wound:     Wash your hands with soap and water  Remove old dressing, discard into plastic bag and place in trash  Cleanse the wound with soap and water prior to applying a clean dressing  Do not use tissue or cotton balls  Do not scrub the wound  Pat dry using gauze  Shower No; do bedside bathing   Apply moisturizer to skin surrounding wound  Apply Dermagran to the foot wound  Cover with gauze  Secure with rolled gauze and tape  Change dressing every other day  Wear spandagrip  This was done today  Standing Status:   Future     Standing Expiration Date:   7/8/2023    Wound compression and edema control      Elastic Tubular Stocking: Spandagrip to left lower leg     Tubular elastic bandage: Apply from base of toes to behind the knee  Apply in AM, may remove for sleep  Avoid prolonged standing in one place  Elevate leg(s) above the level of the heart when sitting or as much as possible       Standing Status:   Future     Standing Expiration Date:   7/8/2023

## 2022-07-15 ENCOUNTER — OFFICE VISIT (OUTPATIENT)
Dept: WOUND CARE | Facility: HOSPITAL | Age: 62
End: 2022-07-15
Payer: COMMERCIAL

## 2022-07-15 VITALS
TEMPERATURE: 97.3 F | SYSTOLIC BLOOD PRESSURE: 111 MMHG | HEART RATE: 65 BPM | RESPIRATION RATE: 18 BRPM | DIASTOLIC BLOOD PRESSURE: 72 MMHG

## 2022-07-15 DIAGNOSIS — L97.529 NON-HEALING ULCER OF LEFT FOOT, UNSPECIFIED ULCER STAGE (HCC): Primary | ICD-10-CM

## 2022-07-15 PROCEDURE — 11042 DBRDMT SUBQ TIS 1ST 20SQCM/<: CPT | Performed by: FAMILY MEDICINE

## 2022-07-15 RX ORDER — LIDOCAINE HYDROCHLORIDE 40 MG/ML
5 SOLUTION TOPICAL ONCE
Status: COMPLETED | OUTPATIENT
Start: 2022-07-15 | End: 2022-07-15

## 2022-07-15 RX ADMIN — LIDOCAINE HYDROCHLORIDE 5 ML: 40 SOLUTION TOPICAL at 10:08

## 2022-07-15 NOTE — PATIENT INSTRUCTIONS
Orders Placed This Encounter   Procedures    Wound cleansing and dressings     Left Foot Wound:     Wash your hands with soap and water  Remove old dressing, discard into plastic bag and place in trash  Cleanse the wound with soap and water prior to applying a clean dressing  Do not use tissue or cotton balls  Do not scrub the wound  Pat dry using gauze  Shower Yes   Apply moisturizer to skin surrounding wound  Apply Dermagran to the foot wound  Cover with gauze  Secure with rolled gauze and tape  Change dressing every other day  Wear spandagrip  This was done today  Standing Status:   Future     Standing Expiration Date:   7/15/2023    Wound compression and edema control      Elastic Tubular Stocking: Spandagrip to left lower leg: size E     Tubular elastic bandage: Apply from base of toes to behind the knee  Apply in AM, may remove for sleep  Avoid prolonged standing in one place  Elevate leg(s) above the level of the heart when sitting or as much as possible            Standing Status:   Future     Standing Expiration Date:   7/15/2023

## 2022-07-15 NOTE — PROGRESS NOTES
Patient ID: Ai Tavarez is a 64 y o  female Date of Birth 1960       Chief Complaint   Patient presents with    Follow Up Wound Care Visit     Ulcer of left foot        Allergies:  Patient has no known allergies  Diagnosis:      Diagnosis ICD-10-CM Associated Orders   1  Non-healing ulcer of left foot, unspecified ulcer stage ([facility])  L97 529 lidocaine (XYLOCAINE) 4 % topical solution 5 mL     Wound cleansing and dressings     Wound compression and edema control     Debridement           Assessment & Plan:   Nonhealing traumatic wound on the dorsum of the left foot  Slightly improved  Biofilm noted   Surgical debridement   Continue with Dermagran  Subjective:   7/1/22:  1st visit for this 49-year-old female referred to the wound center because of a nonhealing wound to the dorsum of the left foot  The patient has a history of MS and essentially wheelchair confined and status post right AKA  Approximately one week ago her dog stepped on her foot causing a small wound with the toenail  She went to the emergency room where mupirocin ointment and Augmentin was prescribed  Because of lack of improvement, she went to her primary care physician who due to a wound culture and prescribed Bactrim DS  She has now completed this  (See culture report)  The patient does have some discomfort when the areas touched  She has no history of PAD in the left lower extremity with normal ABIs and toe pressures  7/8/22: Followup traumatic wound secondary to dog stepping on her left foot  No specific complaints  Doing well with Dermagran  No fever chills  7/15/22: Followup traumatic wound of the left foot  Martín Fothergill being used  No complaints  Denies any fever or chills        The following portions of the patient's history were reviewed and updated as appropriate:   Patient Active Problem List   Diagnosis    Essential hypertension    Lumbar spinal stenosis    [facility] sclerosis ([facility]    Multiple thyroid nodules    Thrombocytopenia (HCC)    Ventricular hypertrophy    Vitamin D deficiency    Bilateral hip pain    Leukocytosis    Smoker    Sinus tachycardia    Type 2 myocardial infarction (HCC)    Generalized weakness    Popliteal artery stenosis, right (HCC)    S/P AKA (above knee amputation) unilateral, right (HCC)    Incontinence of urine in female    Venous insufficiency of left leg    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)    Chronic fatigue    Spasticity    Open wound    Embolism and thrombosis of arteries of the lower extremities (HCC)    Mixed hyperlipidemia     Past Medical History:   Diagnosis Date    Chronic back pain     Depression     last assessed 06/10/14    High cholesterol     Hypertension     last assessed 17    MS (multiple sclerosis) ([facility] [facility] )     last assessed 17    RLL pneumonia     last assessed 06/10/14     Past Surgical History:   Procedure Laterality Date    AMPUTATION ABOVE KNEE (AKA) Right 2020    Procedure: AMPUTATION ABOVE KNEE (AKA);   Surgeon: Cheyenne Piper MD;  [facility];  Service: Vascular    ANKLE SURGERY Right     Treatment of Ankle  last assessed 06/10/14   [facility] BACK [facility]GERY      for spinal stenosis and sciatica     SECTION      last assessed 06/10/14    KNEE ARTHROSCOPY      last assessed 06/10/14    LAMINECTOMY      last assessed 06/10/14    ORTHOPEDIC SURGERY      OTHER SURGICAL HISTORY      discectomy     Family History   Problem Relation Age of Onset    Osteoporosis Mother     Heart attack Father     Mental illness Neg Hx       Social History     Socioeconomic History    Marital status:      Spouse name: Not on file    Number of children: 2    Years of education: 15    Highest education level: Associate degree: occupational, technical, or vocational program   Occupational History    Not on file   Tobacco Use    Smoking status: Light Tobacco Smoker     Packs/day: 0 25     Years: 40 00     Pack years: 10 00     Types: Cigarettes     Last attempt to quit: 7/10/2019     Years since quitting: 3 0    Smokeless tobacco: Never Used   Vaping Use    Vaping Use: Never used   Substance and Sexual Activity    Alcohol use: Yes     Comment: rare // no alcohol use as per allscripts    Drug use: No    Sexual activity: Not on file   Other Topics Concern    Not on file   Social History Narrative    Caffeine use     Social Determinants of Health     Financial Resource Strain: Not on file   Food Insecurity: Not on file   Transportation Needs: Not on file   Physical Activity: Not on file   Stress: Not on file   Social Connections: Not on file   Intimate Partner Violence: Not on file   Housing Stability: Not on file        Current Outpatient Medications:     amantadine (SYMMETREL) 100 mg capsule, Take 1 capsule (100 mg total) by mouth 2 (two) times a day, Disp: 180 capsule, Rfl: 0    amlodipine-olmesartan (SELENE) 5-20 MG, Take 1 tablet by mouth daily, Disp: 90 tablet, Rfl: 0    ascorbic acid (VITAMIN C) 250 mg tablet, Take 250 mg by mouth daily, Disp: , Rfl:     Aubagio 14 MG TABS, Take 1 tablet (14 mg total) by mouth daily, Disp: 30 tablet, Rfl: 5    baclofen 10 mg tablet, TAKE 2 TABLETS 3 TIMES A DAY, Disp: 540 tablet, Rfl: 1    cholecalciferol (VITAMIN D3) 1,000 units tablet, Take 1,000 Units by mouth daily, Disp: , Rfl:     dicyclomine (BENTYL) 10 mg capsule, Take 1 capsule (10 mg total) by mouth in the morning and 1 capsule (10 mg total) at noon and 1 capsule (10 mg total) in the evening  Take before meals  , Disp: 90 capsule, Rfl: 0    gabapentin (NEURONTIN) 300 mg capsule, Take 1 capsule (300 mg total) by mouth 2 (two) times a day, Disp: 180 capsule, Rfl: 0    LORazepam (ATIVAN) 0 5 mg tablet, Take 1 tab 30 min prior to MRI, may repeat x 1 immediately before if needed (Patient not taking: Reported on 7/1/2022), Disp: 4 tablet, Rfl: 0    metoprolol tartrate (LOPRESSOR) 100 mg tablet, Take 1 tablet (100 mg total) by mouth every 12 (twelve) hours, Disp: 180 tablet, Rfl: 0    Multiple Vitamins-Minerals (Multivitamin Adult Extra C) CHEW, Chew 1 tablet daily, Disp: , Rfl:     mupirocin (BACTROBAN) 2 % ointment, Apply topically 3 (three) times a day, Disp: 30 g, Rfl: 0    rivaroxaban (XARELTO) 20 mg tablet, Take 1 tablet (20 mg total) by mouth daily with breakfast, Disp: 90 tablet, Rfl: 0  No current facility-administered medications for this visit  Review of Systems   Constitutional: Negative for appetite change, chills, fatigue, fever and unexpected weight change  HENT: Positive for sinus pressure  Negative for congestion and hearing loss  Cardiovascular: Positive for leg swelling (Left lower extremity)  Endocrine: Negative  Musculoskeletal: Positive for gait problem (Status post right AKA)  Skin: Positive for wound (Left foot)  Negative for rash  Allergic/Immunologic: Negative  Neurological: Positive for weakness  Multiple sclerosis   Hematological: Does not bruise/bleed easily  Psychiatric/Behavioral: Negative for dysphoric mood  The patient is not nervous/anxious  Objective:  /72   Pulse 65   Temp (!) 97 3 °F (36 3 °C)   Resp 18   Pain Score: 0-No pain     Physical Exam  Vitals and nursing note reviewed  Constitutional:       Appearance: Normal appearance  She is well-developed and normal weight  HENT:      Head: Normocephalic and atraumatic  Cardiovascular:      Rate and Rhythm: Normal rate  Pulmonary:      Effort: Pulmonary effort is normal    Musculoskeletal:        Feet:    Feet:      Comments: Wound with biofilm in the base  Overall slightly smaller  Skin:     General: Skin is warm and dry  Findings: Wound present  Neurological:      Mental Status: She is alert and oriented to person, place, and time     Psychiatric:         Attention and Perception: Attention normal          Mood and Affect: Mood and affect normal  Behavior: Behavior is cooperative  Cognition and Memory: Cognition normal              Wound 07/01/22 Traumatic Foot Anterior; Left (Active)   Wound Image Images linked 07/15/22 0959   Wound Description Beefy red;Granulation tissue; Yellow;Slough 07/15/22 0941   Ivon-wound Assessment Dry;Edema;Pink 07/15/22 0941   Wound Length (cm) 1 1 cm 07/15/22 0941   Wound Width (cm) 0 9 cm 07/15/22 0941   Wound Depth (cm) 0 1 cm 07/15/22 0941   Wound Surface Area (cm^2) 0 99 cm^2 07/15/22 0941   Wound Volume (cm^3) 0 099 cm^3 07/15/22 0941   Calculated Wound Volume (cm^3) 0 1 cm^3 07/15/22 0941   Change in Wound Size % 70 59 07/15/22 0941   Drainage Amount Small 07/15/22 0941   Drainage Description Serosanguineous 07/15/22 0941   Non-staged Wound Description Full thickness 07/15/22 0941   Dressing Status Intact (upon arrival) 07/15/22 0941       Debridement   Wound 07/01/22 Traumatic Foot Anterior; Left    Universal Protocol:  Consent: Verbal consent obtained  Written consent obtained  Consent given by: patient  Time out: Immediately prior to procedure a "time out" was called to verify the correct patient, procedure, equipment, support staff and site/side marked as required    Patient understanding: patient states understanding of the procedure being performed  Patient identity confirmed: verbally with patient      Performed by: physician  Debridement type: surgical  Level of debridement: subcutaneous tissue  Pain control: lidocaine 4%  Post-debridement measurements  Length (cm): 1 1  Width (cm): 0 9  Depth (cm): 0 2  Percent debrided: 100%  Surface Area (cm^2): 0 99  Area debrided (cm^2): 0 99  Volume (cm^3): 0 2  Tissue and other material debrided: dermis and subcutaneous tissue  Devitalized tissue debrided: biofilm  Instrument(s) utilized: curette  Bleeding: small  Hemostasis obtained with: pressure  Procedural pain (0-10): 0  Post-procedural pain: 0   Response to treatment: procedure was tolerated well Results from last 6 Months   Lab Units 06/15/22  1750   WOUND CULTURE  1+ Growth of Acinetobacter lwoffii group*  1+ Growth of Acinetobacter baumannii*       Wound Instructions:  Orders Placed This Encounter   Procedures    Wound cleansing and dressings     Left Foot Wound:     Wash your hands with soap and water  Remove old dressing, discard into plastic bag and place in trash  Cleanse the wound with soap and water prior to applying a clean dressing  Do not use tissue or cotton balls  Do not scrub the wound  Pat dry using gauze  Shower Yes   Apply moisturizer to skin surrounding wound  Apply Dermagran to the foot wound  Cover with gauze  Secure with rolled gauze and tape  Change dressing every other day      Wear spandagrip      This was done today                              Standing Status:   Future     Standing Expiration Date:   7/15/2023    Wound compression and edema control      Elastic Tubular Stocking: Spandagrip to left lower leg: size E     Tubular elastic bandage: Apply from base of toes to behind the knee  Apply in AM, may remove for sleep      Avoid prolonged standing in one place      Elevate leg(s) above the level of the heart when sitting or as much as possible           Standing Status:   Future     Standing Expiration Date:   7/15/2023    Debridement     This order was created via procedure documentation       Malachi Nguyen MD, CHT, CWS    Portions of the record may have been created with voice recognition software  Occasional wrong word or "sound alike" substitutions may have occurred due to the inherent limitations of voice recognition software  Read the chart carefully and recognize, using context, where substitutions have occurred

## 2022-07-22 ENCOUNTER — OFFICE VISIT (OUTPATIENT)
Dept: WOUND CARE | Facility: HOSPITAL | Age: 62
End: 2022-07-22
Payer: COMMERCIAL

## 2022-07-22 ENCOUNTER — TELEPHONE (OUTPATIENT)
Dept: FAMILY MEDICINE CLINIC | Facility: CLINIC | Age: 62
End: 2022-07-22

## 2022-07-22 VITALS
SYSTOLIC BLOOD PRESSURE: 115 MMHG | HEART RATE: 65 BPM | RESPIRATION RATE: 18 BRPM | TEMPERATURE: 96.7 F | DIASTOLIC BLOOD PRESSURE: 66 MMHG

## 2022-07-22 DIAGNOSIS — L97.529 NON-HEALING ULCER OF LEFT FOOT, UNSPECIFIED ULCER STAGE (HCC): Primary | ICD-10-CM

## 2022-07-22 LAB
ALBUMIN SERPL-MCNC: 4.1 G/DL (ref 3.8–4.8)
ALBUMIN/GLOB SERPL: 1.4 {RATIO} (ref 1.2–2.2)
ALP SERPL-CCNC: 159 IU/L (ref 44–121)
ALT SERPL-CCNC: 13 IU/L (ref 0–32)
AST SERPL-CCNC: 10 IU/L (ref 0–40)
BILIRUB SERPL-MCNC: <0.2 MG/DL (ref 0–1.2)
BUN SERPL-MCNC: 16 MG/DL (ref 8–27)
BUN/CREAT SERPL: 17 (ref 12–28)
CALCIUM SERPL-MCNC: 9.3 MG/DL (ref 8.7–10.3)
CHLORIDE SERPL-SCNC: 102 MMOL/L (ref 96–106)
CHOLEST SERPL-MCNC: 284 MG/DL (ref 100–199)
CO2 SERPL-SCNC: 23 MMOL/L (ref 20–29)
CREAT SERPL-MCNC: 0.92 MG/DL (ref 0.57–1)
EGFR: 71 ML/MIN/1.73
GLOBULIN SER-MCNC: 3 G/DL (ref 1.5–4.5)
GLUCOSE SERPL-MCNC: 120 MG/DL (ref 65–99)
HDLC SERPL-MCNC: 36 MG/DL
LDLC SERPL CALC-MCNC: 190 MG/DL (ref 0–99)
MICRODELETION SYND BLD/T FISH: NORMAL
POTASSIUM SERPL-SCNC: 4.7 MMOL/L (ref 3.5–5.2)
PROT SERPL-MCNC: 7.1 G/DL (ref 6–8.5)
SODIUM SERPL-SCNC: 138 MMOL/L (ref 134–144)
TRIGL SERPL-MCNC: 293 MG/DL (ref 0–149)

## 2022-07-22 PROCEDURE — 11042 DBRDMT SUBQ TIS 1ST 20SQCM/<: CPT | Performed by: FAMILY MEDICINE

## 2022-07-22 RX ORDER — LIDOCAINE HYDROCHLORIDE 40 MG/ML
5 SOLUTION TOPICAL ONCE
Status: COMPLETED | OUTPATIENT
Start: 2022-07-22 | End: 2022-07-22

## 2022-07-22 RX ADMIN — LIDOCAINE HYDROCHLORIDE 5 ML: 40 SOLUTION TOPICAL at 09:26

## 2022-07-22 NOTE — TELEPHONE ENCOUNTER
LMOM for pt to call for message from the doctor  Please call pt looks like her cholesterol is elevated  I would like to initiate meds, if she is ok with that I can escribe and she will need to repeat labs in three months    Please let me know  Stacey Saldana

## 2022-07-22 NOTE — PATIENT INSTRUCTIONS
Orders Placed This Encounter   Procedures    Wound cleansing and dressings     Left Foot Wound:     Wash your hands with soap and water  Remove old dressing, discard into plastic bag and place in trash  Cleanse the wound with soap and water prior to applying a clean dressing  Do not use tissue or cotton balls  Do not scrub the wound  Pat dry using gauze  Shower Yes   Apply moisturizer to skin surrounding wound  Apply Dermagran to the foot wound  Cover with gauze  Secure with rolled gauze and tape  Change dressing every other day  Wear spandagrip  This was done today  Standing Status:   Future     Standing Expiration Date:   7/22/2023    Wound compression and edema control             Elastic Tubular Stocking: Spandagrip to left lower leg: size E     Tubular elastic bandage: Apply from base of toes to behind the knee  Apply in AM, may remove for sleep  Avoid prolonged standing in one place  Elevate leg(s) above the level of the heart when sitting or as much as possible                      Standing Status:   Future     Standing Expiration Date:   7/22/2023    Debridement     This order was created via procedure documentation

## 2022-07-22 NOTE — TELEPHONE ENCOUNTER
Please call pt looks like her cholesterol is elevated  I would like to initiate meds, if she is ok with that I can escribe and she will need to repeat labs in three months    Please let me know

## 2022-07-22 NOTE — PROGRESS NOTES
Patient ID: Enrique Masterson is a 64 y o  female Date of Birth 1960       Chief Complaint   Patient presents with    Follow Up Wound Care Visit     LLE wound       Allergies:  Patient has no known allergies  Diagnosis:      Diagnosis ICD-10-CM Associated Orders   1  Non-healing ulcer of left foot, unspecified [facility]  L97 529 Debridement           Assessment & Plan:   Minimal improvement in ulceration of the dorsum of the left foot   Surgical debridement   Continue with Dermagran   Follow-up two weeks  Subjective:   7/1/22:  1st visit for this 60-year-old female referred to the wound center because of a nonhealing wound to the dorsum of the left foot  The patient has a history of MS and essentially wheelchair confined and status post right AKA  Approximately one week ago her dog stepped on her foot causing a small wound with the toenail  She went to the emergency room where mupirocin ointment and Augmentin was prescribed  Because of lack of improvement, she went to her primary care physician who due to a wound culture and prescribed Bactrim DS  She has now completed this  (See culture report)  The patient does have some discomfort when the areas touched  She has no history of PAD in the left lower extremity with normal ABIs and toe pressures  7/8/22: Followup traumatic wound secondary to dog stepping on her left foot  No specific complaints  Doing well with Dermagran  No fever chills  7/15/22: Followup traumatic wound of the left foot  Wilnette Nathan being used  No complaints  Denies any fever or chills  7/22/22: Followup traumatic wound of the left foot  No complaints  Wilnette Nathan  No fever or chills        The following portions of the patient's history were reviewed and updated as appropriate:   Patient Active Problem List   Diagnosis    Essential hypertension    Lumbar spinal stenosis    Multiple sclerosis ([facility] [address] [facility]    Multiple thyroid nodules    Thrombocytopenia [facility])    Ventricular hypertrophy    Vitamin D deficiency    Bilateral hip pain    Leukocytosis    Smoker    Sinus tachycardia    Type 2 myocardial infarction (HCC)    Generalized weakness    [facility]opl[facility]al [facility] stenosis[facility] ([facility])    S/P AKA (above knee amputation) unilateral, right ([facility])    Incontinence of urine in female    Venous insufficiency of left leg    Phantom pain after amputation of lower extremity ([facility])    Chronic fatigue    Spasticity    Open wound    Embolism and thrombosis of arteries of the lower extremities ([facility] [address] [facility]    Mixed hyperlipidemia     Past Medical History:   Diagnosis Date    Chronic back pain     Depression     last assessed 06/10/14    High cholesterol     Hypertension     last assessed 17    [facility]     last assessed 17    RLL pneumonia     last assessed 06/10/14     Past Surgical History:   Procedure Laterality Date    AMPUTATION ABOVE KNEE (AKA) Right 2020    Procedure: AMPUTATION ABOVE KNEE (AKA);   Surgeon: Vanessa Piper MD;  [facility];  Service: Vascular    ANKLE SURGERY Right     Treatment of Ankle  last assessed 06/10/14   [facility] BACK SURGERY      for spinal stenosis and sciatica     SECTION      last assessed 06/10/14    KNEE ARTHROSCOPY      last assessed 06/10/14    LAMINECTOMY      last assessed 06/10/14    ORTHOPEDIC SURGERY      OTHER SURGICAL HISTORY      discectomy     Family History   Problem Relation Age of Onset    Osteoporosis Mother     Heart attack Father     Mental illness Neg Hx       Social History     Socioeconomic History    Marital status:      Spouse name: Not on file    Number of children: 2    Years of education: 15    Highest education level: Associate degree: occupational, technical, or vocational program   Occupational History    Not on file   Tobacco Use    Smoking status: Light Tobacco Smoker     Packs/day: 0 25     Years: 40 00     Pack years: 10 00 Types: Cigarettes     Last attempt to quit: 7/10/2019     Years since quitting: 3 0    Smokeless tobacco: Never Used   Vaping Use    Vaping Use: Never used   Substance and Sexual Activity    Alcohol use: Yes     Comment: rare // no alcohol use as per allscripts    Drug use: No    Sexual activity: Not on file   Other Topics Concern    Not on file   Social History Narrative    Caffeine use     Social Determinants of Health     Financial Resource Strain: Not on file   Food Insecurity: Not on file   Transportation Needs: Not on file   Physical Activity: Not on file   Stress: Not on file   Social Connections: Not on file   Intimate Partner Violence: Not on file   Housing Stability: Not on file        Current Outpatient Medications:     amantadine (SYMMETREL) 100 mg capsule, Take 1 capsule (100 mg total) by mouth 2 (two) times a day, Disp: 180 capsule, Rfl: 0    amlodipine-olmesartan (SELENE) 5-20 MG, Take 1 tablet by mouth daily, Disp: 90 tablet, Rfl: 0    ascorbic acid (VITAMIN C) 250 mg tablet, Take 250 mg by mouth daily, Disp: , Rfl:     Aubagio 14 MG TABS, Take 1 tablet (14 mg total) by mouth daily, Disp: 30 tablet, Rfl: 5    baclofen 10 mg tablet, TAKE 2 TABLETS 3 TIMES A DAY, Disp: 540 tablet, Rfl: 1    cholecalciferol (VITAMIN D3) 1,000 units tablet, Take 1,000 Units by mouth daily, Disp: , Rfl:     dicyclomine (BENTYL) 10 mg capsule, Take 1 capsule (10 mg total) by mouth in the morning and 1 capsule (10 mg total) at noon and 1 capsule (10 mg total) in the evening  Take before meals  , Disp: 90 capsule, Rfl: 0    gabapentin (NEURONTIN) 300 mg capsule, Take 1 capsule (300 mg total) by mouth 2 (two) times a day, Disp: 180 capsule, Rfl: 0    LORazepam (ATIVAN) 0 5 mg tablet, Take 1 tab 30 min prior to MRI, may repeat x 1 immediately before if needed (Patient not taking: Reported on 7/1/2022), Disp: 4 tablet, Rfl: 0    metoprolol tartrate (LOPRESSOR) 100 mg tablet, Take 1 tablet (100 mg total) by mouth every 12 (twelve) hours, Disp: 180 tablet, Rfl: 0    Multiple Vitamins-Minerals (Multivitamin Adult Extra C) CHEW, Chew 1 tablet daily, Disp: , Rfl:     mupirocin (BACTROBAN) 2 % ointment, Apply topically 3 (three) times a day, Disp: 30 g, Rfl: 0    rivaroxaban (XARELTO) 20 mg tablet, Take 1 tablet (20 mg total) by mouth daily with breakfast, Disp: 90 tablet, Rfl: 0    Review of Systems   Constitutional: Negative for appetite change, chills, fatigue, fever and unexpected weight change  HENT: Negative for congestion, hearing loss and sinus pressure  Cardiovascular: Positive for leg swelling (Left lower extremity)  Endocrine: Negative  Musculoskeletal: Positive for gait problem (Status post right AKA)  Skin: Positive for wound (Left foot)  Negative for rash  Allergic/Immunologic: Negative  Neurological: Positive for weakness  Multiple sclerosis   Hematological: Does not bruise/bleed easily  Psychiatric/Behavioral: Negative for dysphoric mood  The patient is not nervous/anxious  Objective:  /66   Pulse 65   Temp (!) 96 7 °F (35 9 °C)   Resp 18   Pain Score: 0-No pain     Physical Exam  Vitals and nursing note reviewed  Constitutional:       Appearance: Normal appearance  She is well-developed and normal weight  HENT:      Head: Normocephalic and atraumatic  Cardiovascular:      Rate and Rhythm: Normal rate  Pulmonary:      Effort: Pulmonary effort is normal    Musculoskeletal:        Feet:    Feet:      Comments: Wound with some slough and fibrin  Overall slightly smaller  Skin:     General: Skin is warm and dry  Findings: Wound present  Neurological:      Mental Status: She is alert and oriented to person, place, and time  Psychiatric:         Attention and Perception: Attention normal          Mood and Affect: Mood and affect normal          Behavior: Behavior is cooperative           Cognition and Memory: Cognition normal               Wound 07/01/22 Traumatic Foot Anterior; Left (Active)   Wound Image Images linked 07/22/22 0915   Wound Description Beefy red;Granulation tissue; Yellow;Slough 07/22/22 0902   Ivon-wound Assessment Dry;Edema;Pink 07/22/22 0902   Wound Length (cm) 1 cm 07/22/22 0902   Wound Width (cm) 0 8 cm 07/22/22 0902   Wound Depth (cm) 0 1 cm 07/22/22 0902   Wound Surface Area (cm^2) 0 8 cm^2 07/22/22 0902   Wound Volume (cm^3) 0 08 cm^3 07/22/22 0902   Calculated Wound Volume (cm^3) 0 08 cm^3 07/22/22 0902   Change in Wound Size % 76 47 07/22/22 0902   Drainage Amount Small 07/22/22 0902   Drainage Description Serosanguineous 07/22/22 0902   Non-staged Wound Description Full thickness 07/22/22 0902   Dressing Status Intact (upon arrival) 07/22/22 0902       Debridement   Wound 07/01/22 Traumatic Foot Anterior; Left    Universal Protocol:  Consent: Verbal consent obtained  Written consent obtained  Consent given by: patient  Time out: Immediately prior to procedure a "time out" was called to verify the correct patient, procedure, equipment, support staff and site/side marked as required    Patient understanding: patient states understanding of the procedure being performed  Patient identity confirmed: verbally with patient      Performed by: physician  Debridement type: surgical  Level of debridement: subcutaneous tissue  Pain control: lidocaine 4%  Post-debridement measurements  Length (cm): 1  Width (cm): 0 8  Depth (cm): 0 2  Percent debrided: 100%  Surface Area (cm^2): 0 8  Area debrided (cm^2): 0 8  Volume (cm^3): 0 16  Tissue and other material debrided: dermis and subcutaneous tissue  Devitalized tissue debrided: fibrin  Instrument(s) utilized: curette  Bleeding: small  Hemostasis obtained with: pressure  Procedural pain (0-10): 4  Post-procedural pain: 1   Response to treatment: procedure was tolerated well                Results from last 6 Months   Lab Units 06/15/22  1750   WOUND CULTURE  1+ Growth of Acinetobacter lwoffii group*  1+ Growth of Acinetobacter baumannii*       Wound Instructions:  Orders Placed This Encounter   Procedures    Debridement     This order was created via procedure documentation             Mary Hatch MD, CHT, CWS    Portions of the record may have been created with voice recognition software  Occasional wrong word or "sound alike" substitutions may have occurred due to the inherent limitations of voice recognition software  Read the chart carefully and recognize, using context, where substitutions have occurred

## 2022-07-22 NOTE — TELEPHONE ENCOUNTER
Patient advised of elevated cholesterol  [facility] with starting meds, call into [employer] [facility]  Advised she will need to have labs in 3months  No further action needed    Ary Barajas MA

## 2022-07-25 ENCOUNTER — TELEPHONE (OUTPATIENT)
Dept: FAMILY MEDICINE CLINIC | Facility: CLINIC | Age: 62
End: 2022-07-25

## 2022-07-25 DIAGNOSIS — G35 MULTIPLE SCLEROSIS (HCC): ICD-10-CM

## 2022-07-25 DIAGNOSIS — G35 MULTIPLE SCLEROSIS (HCC): Primary | ICD-10-CM

## 2022-07-25 RX ORDER — RENAGEL 800 MG/1
1 TABLET ORAL DAILY
Qty: 30 TABLET | Refills: 4 | Status: SHIPPED | OUTPATIENT
Start: 2022-07-25

## 2022-07-25 NOTE — TELEPHONE ENCOUNTER
Let pt know aubagio rx refilled  Still needs follow up with pcp from call this weekend with low hb and elevated alk phos  I am also sending pt for follow up lfts in one month  Rx in emr

## 2022-07-25 NOTE — TELEPHONE ENCOUNTER
----- Message from Jareth Ruffin MD sent at 7/22/2022 10:25 PM EDT -----  Called pt tonight, labs just received  Pt with lower hb of 10 9 in May 12  7   pt denies any recent anemia or blood in stool  Pt denies any heavy bleeding  Rec pt to follow up with pcp for cause of anemia and furhter follow up  Pt aware and will call pcp  Also now seeing wound care every week as follow up from my last office visit  Pt notes she had labs for pcp as well  Will also alert pcp to abn cbc diff labs (Hb) and alk phos labs

## 2022-07-26 DIAGNOSIS — E78.2 MIXED HYPERLIPIDEMIA: Primary | ICD-10-CM

## 2022-07-26 RX ORDER — ROSUVASTATIN CALCIUM 10 MG/1
10 TABLET, COATED ORAL DAILY
Qty: 90 TABLET | Refills: 3 | Status: SHIPPED | OUTPATIENT
Start: 2022-07-26 | End: 2023-04-05

## 2022-07-29 DIAGNOSIS — R10.9 ABDOMINAL CRAMPING: ICD-10-CM

## 2022-07-29 DIAGNOSIS — K52.9 CHRONIC DIARRHEA: ICD-10-CM

## 2022-07-29 DIAGNOSIS — I10 ESSENTIAL HYPERTENSION: ICD-10-CM

## 2022-07-29 DIAGNOSIS — Z89.619 S/P AKA (ABOVE KNEE AMPUTATION) (HCC): ICD-10-CM

## 2022-07-29 RX ORDER — DICYCLOMINE HYDROCHLORIDE 10 MG/1
10 CAPSULE ORAL
Qty: 90 CAPSULE | Refills: 0 | Status: SHIPPED | OUTPATIENT
Start: 2022-07-29 | End: 2022-08-23 | Stop reason: SDUPTHER

## 2022-07-29 RX ORDER — AMLODIPINE AND OLMESARTAN MEDOXOMIL 5; 20 MG/1; MG/1
1 TABLET ORAL DAILY
Qty: 90 TABLET | Refills: 0 | Status: SHIPPED | OUTPATIENT
Start: 2022-07-29 | End: 2022-10-20 | Stop reason: SDUPTHER

## 2022-08-05 ENCOUNTER — OFFICE VISIT (OUTPATIENT)
Dept: FAMILY MEDICINE CLINIC | Facility: CLINIC | Age: 62
End: 2022-08-05
Payer: COMMERCIAL

## 2022-08-05 VITALS
HEART RATE: 69 BPM | SYSTOLIC BLOOD PRESSURE: 108 MMHG | DIASTOLIC BLOOD PRESSURE: 74 MMHG | RESPIRATION RATE: 16 BRPM | OXYGEN SATURATION: 98 % | TEMPERATURE: 98 F

## 2022-08-05 DIAGNOSIS — D69.6 THROMBOCYTOPENIA (HCC): ICD-10-CM

## 2022-08-05 DIAGNOSIS — R74.8 ELEVATED ALKALINE PHOSPHATASE LEVEL: ICD-10-CM

## 2022-08-05 DIAGNOSIS — G35 MULTIPLE SCLEROSIS (HCC): ICD-10-CM

## 2022-08-05 DIAGNOSIS — Z12.11 SCREEN FOR COLON CANCER: ICD-10-CM

## 2022-08-05 DIAGNOSIS — E78.2 MIXED HYPERLIPIDEMIA: ICD-10-CM

## 2022-08-05 DIAGNOSIS — D64.9 ANEMIA, UNSPECIFIED TYPE: ICD-10-CM

## 2022-08-05 DIAGNOSIS — I10 ESSENTIAL HYPERTENSION: Primary | ICD-10-CM

## 2022-08-05 PROCEDURE — 99214 OFFICE O/P EST MOD 30 MIN: CPT | Performed by: FAMILY MEDICINE

## 2022-08-05 NOTE — PROGRESS NOTES
Assessment/Plan:    1  Essential hypertension  Assessment & Plan:  stable      2  Multiple sclerosis ([address] )    3  Mixed hyperlipidemia    4  Thrombocytopenia (HCC)    5  Elevated alkaline phosphatase level  -     Alkaline phosphatase, isoenzymes; Future  -     US right upper quadrant; Future; Expected date: 08/05/2022  -     Ambulatory referral to Gastroenterology; Future    6  Anemia, unspecified type  -     Ambulatory referral to Gastroenterology; Future  -     UA (URINE) with reflex to Scope; Future  -     Occult Blood, Fecal Immunochemical; Future  -     Iron; Future  -     Ferritin; Future  -     TIBC; Future  -     Vitamin B12; Future  -     Folate; Future  -     CBC and differential; Future    7  Screen for colon cancer  -     Ambulatory referral to Gastroenterology; Future  -     Occult Blood, Fecal Immunochemical; Future        I will workup the anemia - will get indices, UA and fecal blood  Pt needs a colon anyway so this IS A GOOD TIME AND SHE AGREED  Will redraw the alk phos and get isoenzymes and us abd  There are no Patient Instructions on file for this visit  Return for Next scheduled follow up  Subjective:      Patient ID: Chela Renner is a 64 y o  female  Chief Complaint   Patient presents with    Anemia     Labwork results   Mz cma       Pt is here, Neurology was concerned about anemia and labs  I wanted top see pt due to my labs as well  Pt feels well overall;    Pt denies diarrhea,  Denies abd pain  No nausea no vomiting    Stool is not dark  No blood in urine  Pt is not a vegitarian      The following portions of the patient's history were reviewed and updated as appropriate: allergies, current medications, past family history, past medical history, past social history, past surgical history and problem list     Review of Systems   Constitutional: Negative  Negative for activity change, appetite change, chills, diaphoresis and fatigue  HENT: Negative    Negative for dental problem, ear pain, sinus pressure and sore throat  Eyes: Negative  Negative for photophobia, pain, discharge, redness, itching and visual disturbance  Respiratory: Negative for apnea and chest tightness  Cardiovascular: Negative  Negative for chest pain, palpitations and leg swelling  Gastrointestinal: Negative  Negative for abdominal distention, abdominal pain, constipation and diarrhea  Endocrine: Negative  Negative for cold intolerance and heat intolerance  Genitourinary: Negative  Negative for difficulty urinating and dyspareunia  Musculoskeletal: Negative  Negative for arthralgias and back pain  Skin: Negative  Allergic/Immunologic: Negative for environmental allergies  Neurological: Negative  Negative for dizziness  Psychiatric/Behavioral: Negative  Negative for agitation           Current Outpatient Medications   Medication Sig Dispense Refill    amantadine (SYMMETREL) 100 mg capsule Take 1 capsule (100 mg total) by mouth 2 (two) times a day 180 capsule 0    amlodipine-olmesartan (SELENE) 5-20 MG Take 1 tablet by mouth daily 90 tablet 0    ascorbic acid (VITAMIN C) 250 mg tablet Take 250 mg by mouth daily      Aubagio 14 MG TABS TAKE 1 TABLET (14 MG TOTAL) BY MOUTH DAILY 30 tablet 4    baclofen 10 mg tablet TAKE 2 TABLETS 3 TIMES A  tablet 1    cholecalciferol (VITAMIN D3) 1,000 units tablet Take 1,000 Units by mouth daily      dicyclomine (BENTYL) 10 mg capsule Take 1 capsule (10 mg total) by mouth 3 (three) times a day before meals 90 capsule 0    gabapentin (NEURONTIN) 300 mg capsule Take 1 capsule (300 mg total) by mouth 2 (two) times a day 180 capsule 0    LORazepam (ATIVAN) 0 5 mg tablet Take 1 tab 30 min prior to MRI, may repeat x 1 immediately before if needed 4 tablet 0    metoprolol tartrate (LOPRESSOR) 100 mg tablet Take 1 tablet (100 mg total) by mouth every 12 (twelve) hours 180 tablet 0    Multiple Vitamins-Minerals (Multivitamin Adult Extra C) CHEW Chew 1 tablet daily      mupirocin (BACTROBAN) 2 % ointment Apply topically 3 (three) times a day 30 g 0    rivaroxaban (XARELTO) 20 mg tablet Take 1 tablet (20 mg total) by mouth daily with breakfast 90 tablet 0    rosuvastatin (CRESTOR) 10 MG tablet Take 1 tablet (10 mg total) by mouth daily 90 tablet 3     No current facility-administered medications for this visit  Objective:    /74   Pulse 69   Temp 98 °F (36 7 °C)   Resp 16   SpO2 98%        Physical Exam  Vitals reviewed  Constitutional:       General: She is not in acute distress  Appearance: She is well-developed  She is not diaphoretic  HENT:      Head: Normocephalic and atraumatic  Eyes:      General:         Right eye: No discharge  Left eye: No discharge  Conjunctiva/sclera: Conjunctivae normal    Pulmonary:      Effort: Pulmonary effort is normal  No respiratory distress  Abdominal:      General: There is no distension  Tenderness: There is no abdominal tenderness  Musculoskeletal:      Cervical back: Normal range of motion  Comments: Rt lower ext amputation              Recent Results (from the past 2016 hour(s))   Wound culture and Gram stain    Collection Time: 06/15/22  5:50 PM    Specimen: Foot, Left;  Wound   Result Value Ref Range    Wound Culture 1+ Growth of Acinetobacter lwoffii group (A)     Wound Culture 1+ Growth of Acinetobacter baumannii (A)     Gram Stain Result No Polys or Bacteria seen        Susceptibility    Acinetobacter baumannii - CHUY     Ampicillin + Sulbactam ($) <=4/2 Susceptible ug/ml     Cefepime ($) <=2 00 Susceptible ug/ml     Ceftazidime ($$) 4 Susceptible ug/ml     Ceftriaxone ($$) 8 00 Susceptible ug/ml     Gentamicin ($$) <=2 Susceptible ug/ml     Tetracycline <=4 Susceptible ug/ml     Tobramycin ($) <=2 Susceptible ug/ml     Trimethoprim + Sulfamethoxazole ($$$) <=0 5/9 5 Susceptible ug/ml    Acinetobacter lwoffii group - CHUY     Amikacin ($$) <=16 Susceptible ug/ml     Ampicillin + Sulbactam ($) >16/8 Resistant ug/ml     Cefepime ($) 16 00 Intermediate ug/ml     Ceftazidime ($$) <=1 Susceptible ug/ml     Ceftriaxone ($$) 32 00 Intermediate ug/ml     Gentamicin ($$) 4 Susceptible ug/ml     Tetracycline <=4 Susceptible ug/ml     Tobramycin ($) 8 Intermediate ug/ml     Trimethoprim + Sulfamethoxazole ($$$) <=0 5/9 5 Susceptible ug/ml   Comprehensive metabolic panel    Collection Time: 07/21/22  3:33 PM   Result Value Ref Range    Glucose, Random 120 (H) 65 - 99 mg/dL    BUN 16 8 - 27 mg/dL    Creatinine 0 92 0 57 - 1 00 mg/dL    eGFR 71 >59 mL/min/1 73    SL AMB BUN/CREATININE RATIO 17 12 - 28    Sodium 138 134 - 144 mmol/L    Potassium 4 7 3 5 - 5 2 mmol/L    Chloride 102 96 - 106 mmol/L    CO2 23 20 - 29 mmol/L    CALCIUM 9 3 8 7 - 10 3 mg/dL    Protein, Total 7 1 6 0 - 8 5 g/dL    Albumin 4 1 3 8 - 4 8 g/dL    Globulin, Total 3 0 1 5 - 4 5 g/dL    Albumin/Globulin Ratio 1 4 1 2 - 2 2    TOTAL BILIRUBIN <0 2 0 0 - 1 2 mg/dL    Alk Phos Isoenzymes 159 (H) 44 - 121 IU/L    AST 10 0 - 40 IU/L    ALT 13 0 - 32 IU/L   Lipid panel    Collection Time: 07/21/22  3:33 PM   Result Value Ref Range    Cholesterol, Total 284 (H) 100 - 199 mg/dL    Triglycerides 293 (H) 0 - 149 mg/dL    HDL 36 (L) >39 mg/dL    LDL Calculated 190 (H) 0 - 99 mg/dL   Cardiovascular Report    Collection Time: 07/21/22  3:33 PM   Result Value Ref Range    Interpretation Note        Alber Espino DO

## 2022-08-12 ENCOUNTER — TELEPHONE (OUTPATIENT)
Dept: FAMILY MEDICINE CLINIC | Facility: CLINIC | Age: 62
End: 2022-08-12

## 2022-08-12 LAB
BASOPHILS # BLD AUTO: 0 X10E3/UL (ref 0–0.2)
BASOPHILS NFR BLD AUTO: 1 %
EOSINOPHIL # BLD AUTO: 0.1 X10E3/UL (ref 0–0.4)
EOSINOPHIL NFR BLD AUTO: 2 %
ERYTHROCYTE [DISTWIDTH] IN BLOOD BY AUTOMATED COUNT: 13.5 % (ref 11.7–15.4)
FERRITIN SERPL-MCNC: 25 NG/ML (ref 15–150)
FOLATE SERPL-MCNC: 17.5 NG/ML
HCT VFR BLD AUTO: 35.9 % (ref 34–46.6)
HGB BLD-MCNC: 11.4 G/DL (ref 11.1–15.9)
IMM GRANULOCYTES # BLD: 0 X10E3/UL (ref 0–0.1)
IMM GRANULOCYTES NFR BLD: 0 %
IRON SATN MFR SERPL: 19 % (ref 15–55)
IRON SERPL-MCNC: 69 UG/DL (ref 27–139)
LYMPHOCYTES # BLD AUTO: 2 X10E3/UL (ref 0.7–3.1)
LYMPHOCYTES NFR BLD AUTO: 32 %
MCH RBC QN AUTO: 28.4 PG (ref 26.6–33)
MCHC RBC AUTO-ENTMCNC: 31.8 G/DL (ref 31.5–35.7)
MCV RBC AUTO: 89 FL (ref 79–97)
MONOCYTES # BLD AUTO: 0.5 X10E3/UL (ref 0.1–0.9)
MONOCYTES NFR BLD AUTO: 8 %
NEUTROPHILS # BLD AUTO: 3.6 X10E3/UL (ref 1.4–7)
NEUTROPHILS NFR BLD AUTO: 57 %
PLATELET # BLD AUTO: 127 X10E3/UL (ref 150–450)
RBC # BLD AUTO: 4.02 X10E6/UL (ref 3.77–5.28)
TIBC SERPL-MCNC: 360 UG/DL (ref 250–450)
UIBC SERPL-MCNC: 291 UG/DL (ref 118–369)
VIT B12 SERPL-MCNC: 454 PG/ML (ref 232–1245)
WBC # BLD AUTO: 6.2 X10E3/UL (ref 3.4–10.8)

## 2022-08-12 NOTE — TELEPHONE ENCOUNTER
Called pt to discuss results of the labs  HBG has gone up some  As the whole her labs are acceptable  Would consider starting iron and redrawing a month  Still need colonoscopy      Left message for pt to call back

## 2022-08-16 NOTE — TELEPHONE ENCOUNTER
Patient advised, has active order in chart  No further action needed at this time    Pedro Escalnoa MA

## 2022-08-18 ENCOUNTER — OFFICE VISIT (OUTPATIENT)
Dept: WOUND CARE | Facility: HOSPITAL | Age: 62
End: 2022-08-18
Payer: COMMERCIAL

## 2022-08-18 VITALS
RESPIRATION RATE: 16 BRPM | DIASTOLIC BLOOD PRESSURE: 77 MMHG | SYSTOLIC BLOOD PRESSURE: 124 MMHG | TEMPERATURE: 98.9 F | HEART RATE: 69 BPM

## 2022-08-18 DIAGNOSIS — L97.529 NON-HEALING ULCER OF LEFT FOOT, UNSPECIFIED ULCER STAGE (HCC): Primary | ICD-10-CM

## 2022-08-18 PROCEDURE — 97597 DBRDMT OPN WND 1ST 20 CM/<: CPT | Performed by: NURSE PRACTITIONER

## 2022-08-18 RX ORDER — LIDOCAINE HYDROCHLORIDE 40 MG/ML
5 SOLUTION TOPICAL ONCE
Status: COMPLETED | OUTPATIENT
Start: 2022-08-18 | End: 2022-08-18

## 2022-08-18 RX ADMIN — LIDOCAINE HYDROCHLORIDE 5 ML: 40 SOLUTION TOPICAL at 14:46

## 2022-08-18 NOTE — PROGRESS NOTES
Patient ID: Juan Babcock is a 64 y o  female Date of Birth 1960     Chief Complaint  Chief Complaint   Patient presents with    Follow Up Wound Care Visit     Left foot wound       Allergies  Patient has no known allergies  Assessment:     Diagnoses and all orders for this visit:    Non-healing ulcer of left foot, unspecified ulcer stage (HCC)  -     lidocaine (XYLOCAINE) 4 % topical solution 5 mL  -     Wound cleansing and dressings; Future  -     Wound compression and edema control; Future    Other orders  -     Debridement              Debridement   Wound 07/01/22 Traumatic Foot Anterior; Left    Universal Protocol:  Consent: Written consent obtained  Consent given by: patient  Time out: Immediately prior to procedure a "time out" was called to verify the correct patient, procedure, equipment, support staff and site/side marked as required  Timeout called at: 8/18/2022 3:27 PM   Patient identity confirmed: verbally with patient      Performed by: NP  Debridement type: selective  Pain control: lidocaine 4%  Pre-debridement measurements  Length (cm): 1  Width (cm): 1  Depth (cm): 0 1  Surface Area (cm^2): 1  Volume (cm^3): 0 1    Post-debridement measurements  Length (cm): 1  Width (cm): 1  Depth (cm): 0 1  Percent debrided: 100%  Surface Area (cm^2): 1  Area debrided (cm^2): 1  Volume (cm^3): 0 1  Devitalized tissue debrided: biofilm, fibrin and slough  Instrument(s) utilized: curette  Bleeding: small  Hemostasis obtained with: pressure  Procedural pain (0-10): 0  Post-procedural pain: 0   Response to treatment: procedure was tolerated well          Plan:  1  F/u visit  Wound debrided  Wound measuring the same  Will change treatment to Medihoney covered with dry gauze changed daily  Patient will follow up in 2 weeks  Wound 07/01/22 Traumatic Foot Anterior; Left (Active)   Wound Image Images linked 08/18/22 0120   Wound Description Beefy red;Granulation tissue; Yellow;Slough;Brown 08/18/22 5715 Ivon-wound Assessment Dry;Edema;Pink 08/18/22 1442   Wound Length (cm) 1 cm 08/18/22 1442   Wound Width (cm) 1 cm 08/18/22 1442   Wound Depth (cm) 0 1 cm 08/18/22 1442   Wound Surface Area (cm^2) 1 cm^2 08/18/22 1442   Wound Volume (cm^3) 0 1 cm^3 08/18/22 1442   Calculated Wound Volume (cm^3) 0 1 cm^3 08/18/22 1442   Change in Wound Size % 70 59 08/18/22 1442   Drainage Amount Small 08/18/22 1442   Drainage Description Serosanguineous 08/18/22 1442   Non-staged Wound Description Full thickness 08/18/22 1442   Dressing Status Intact 08/18/22 1442       Wound 07/01/22 Traumatic Foot Anterior; Left (Active)   Date First Assessed/Time First Assessed: 07/01/22 0919   Primary Wound Type: Traumatic  Location: Foot  Wound Location Orientation: Anterior; Left       [REMOVED] Wound 12/08/19 Abrasion(s) (Removed)   Resolved Date: 07/01/22  Date First Assessed/Time First Assessed: 12/08/19 2005   Pre-Existing Wound: Yes  Traumatic Wound Type: Abrasion(s)  Wound Outcome: (c) Other (Comment)       [REMOVED] Wound 05/05/20 Leg Right (Removed)   Resolved Date: 07/01/22  Date First Assessed/Time First Assessed: 05/05/20 1459   Location: Leg  Wound Location Orientation: Right  Wound Description (Comments): AKA  Incision's 1st Dressing: DRESSING XEROFORM 1 X 8 (x1), SPONGE GAUZE 4 X 4 16 PLY STR    [REMOVED] Wound 05/05/20 Amputation Leg Right (Removed)   Resolved Date: 07/01/22  Date First Assessed/Time First Assessed: 05/05/20 1713   Pre-Existing Wound: No  Traumatic Wound Type: Amputation  Location: Leg  Wound Location Orientation: Right  Wound Outcome: (c) Other (Comment)       Subjective:     F/u visit traumatic wound of left foot  No new complaints  She denies any pain, fevers, or chills         The following portions of the patient's history were reviewed and updated as appropriate:   She  has a past medical history of Chronic back pain, Depression, High cholesterol, Hypertension, MS (multiple sclerosis) ([address] ), and RLL pneumonia  She   Patient Active Problem List    Diagnosis Date Noted    Embolism and thrombosis of arteries of the lower extremities ([place] [address] ) 06/15/2022    Mixed hyperlipidemia 06/15/2022    Open wound 2022    Chronic fatigue 2022    Spasticity 2022    Phantom pain after amputation of lower extremity ([facility] ) 2020    Venous insufficiency of left leg 2020    Incontinence of urine in female 2020    S/P AKA (above knee amputation) unilateral, right ([place] [address] ) 2020    Popliteal artery stenosis, right ([place] [address] ) 2020    Type 2 myocardial infarction ([place] [address] ) 2019    Generalized weakness 2019    Sinus tachycardia 2019    Bilateral hip pain 2019    Leukocytosis 2019    Smoker 2019    Thrombocytopenia ([place] [address] ) 2017    Ventricular hypertrophy 2017    Vitamin D deficiency 2015    Multiple thyroid nodules 2015    Essential hypertension 06/10/2014    Lumbar spinal stenosis 06/10/2014    Multiple sclerosis ([place] [address] ) 06/10/2014     She  has a past surgical history that includes orthopedic surgery; Back surgery;  section; Laminectomy; Other surgical history; Knee arthroscopy; Ankle surgery (Right); and AMPUTATION ABOVE KNEE (AKA) (Right, 2020)  Her family history includes Heart attack in her father; Osteoporosis in her mother  She  reports that she has been smoking cigarettes  She has a 10 00 pack-year smoking history  She has never used smokeless tobacco  She reports current alcohol use  She reports that she does not use drugs    Current Outpatient Medications   Medication Sig Dispense Refill    amantadine (SYMMETREL) 100 mg capsule Take 1 capsule (100 mg total) by mouth 2 (two) times a day 180 capsule 0    amlodipine-olmesartan (SELENE) 5-20 MG Take 1 tablet by mouth daily 90 tablet 0    ascorbic acid (VITAMIN C) 250 mg tablet Take 250 mg by mouth daily      Aubagio 14 MG TABS TAKE 1 TABLET (14 MG TOTAL) BY MOUTH DAILY 30 tablet 4    baclofen 10 mg tablet TAKE 2 TABLETS 3 TIMES A  tablet 1    cholecalciferol (VITAMIN D3) 1,000 units tablet Take 1,000 Units by mouth daily      dicyclomine (BENTYL) 10 mg capsule Take 1 capsule (10 mg total) by mouth 3 (three) times a day before meals 90 capsule 0    gabapentin (NEURONTIN) 300 mg capsule Take 1 capsule (300 mg total) by mouth 2 (two) times a day 180 capsule 0    LORazepam (ATIVAN) 0 5 mg tablet Take 1 tab 30 min prior to MRI, may repeat x 1 immediately before if needed 4 tablet 0    metoprolol tartrate (LOPRESSOR) 100 mg tablet Take 1 tablet (100 mg total) by mouth every 12 (twelve) hours 180 tablet 0    Multiple Vitamins-Minerals (Multivitamin Adult Extra C) CHEW Chew 1 tablet daily      mupirocin (BACTROBAN) 2 % ointment Apply topically 3 (three) times a day 30 g 0    rivaroxaban (XARELTO) 20 mg tablet Take 1 tablet (20 mg total) by mouth daily with breakfast 90 tablet 0    rosuvastatin (CRESTOR) 10 MG tablet Take 1 tablet (10 mg total) by mouth daily 90 tablet 3     No current facility-administered medications for this visit  She has No Known Allergies       Review of Systems   Constitutional: Negative  HENT: Negative for ear pain and hearing loss  Eyes: Negative for pain  Respiratory: Negative for chest tightness and shortness of breath  Cardiovascular: Negative for chest pain, palpitations and leg swelling  Gastrointestinal: Negative for diarrhea, nausea and vomiting  Genitourinary: Negative for dysuria  Musculoskeletal: Positive for gait problem  Skin: Positive for wound  Neurological: Negative for tremors and weakness  Psychiatric/Behavioral: Negative for behavioral problems, confusion and suicidal ideas  Objective:       Wound 07/01/22 Traumatic Foot Anterior; Left (Active)   Wound Image Images linked 08/18/22 3367   Wound Description Beefy red;Granulation tissue; Yellow;Slough;Brown 08/18/22 1442   Ivon-wound Assessment Dry;Edema;Pink 08/18/22 1442   Wound Length (cm) 1 cm 08/18/22 1442   Wound Width (cm) 1 cm 08/18/22 1442   Wound Depth (cm) 0 1 cm 08/18/22 1442   Wound Surface Area (cm^2) 1 cm^2 08/18/22 1442   Wound Volume (cm^3) 0 1 cm^3 08/18/22 1442   Calculated Wound Volume (cm^3) 0 1 cm^3 08/18/22 1442   Change in Wound Size % 70 59 08/18/22 1442   Drainage Amount Small 08/18/22 1442   Drainage Description Serosanguineous 08/18/22 1442   Non-staged Wound Description Full thickness 08/18/22 1442   Dressing Status Intact 08/18/22 1442       /77   Pulse 69   Temp 98 9 °F (37 2 °C)   Resp 16             Wound Instructions:  Orders Placed This Encounter   Procedures    Wound cleansing and dressings     Right foot wound:    Wash your hands with soap and water  Remove old dressing, discard into plastic bag and place in trash  Cleanse the wound with soap and water prior to applying a clean dressing  Do not use tissue or cotton balls  Do not scrub the wound  Pat dry using gauze  Shower yes   Apply medihoney to the wound  Cover with gauze  Secure with tape  Change dressing daily    This was done today     Standing Status:   Future     Standing Expiration Date:   8/18/2023    Wound compression and edema control     Elastic Tubular Stocking - spandagrip E    Tubular elastic bandage: Apply from base of toes to behind the knee  Apply in AM, may remove for sleep  Avoid prolonged standing in one place  Elevate leg(s) above the level of the heart when sitting or as much as possible  Standing Status:   Future     Standing Expiration Date:   8/18/2023    Debridement     This order was created via procedure documentation        Diagnosis ICD-10-CM Associated Orders   1   Non-healing ulcer of left foot, unspecified ulcer stage ([facility])  L97 529 lidocaine (XYLOCAINE) 4 % topical solution 5 mL     Wound cleansing and dressings     Wound compression and edema control

## 2022-08-18 NOTE — PATIENT INSTRUCTIONS
Orders Placed This Encounter   Procedures    Wound cleansing and dressings     Right foot wound:    Wash your hands with soap and water  Remove old dressing, discard into plastic bag and place in trash  Cleanse the wound with soap and water prior to applying a clean dressing  Do not use tissue or cotton balls  Do not scrub the wound  Pat dry using gauze  Shower yes   Apply medihoney to the wound  Cover with gauze  Secure with tape  Change dressing daily    This was done today     Standing Status:   Future     Standing Expiration Date:   8/18/2023    Wound compression and edema control     Elastic Tubular Stocking - spandagrip E    Tubular elastic bandage: Apply from base of toes to behind the knee  Apply in AM, may remove for sleep  Avoid prolonged standing in one place  Elevate leg(s) above the level of the heart when sitting or as much as possible       Standing Status:   Future     Standing Expiration Date:   8/18/2023

## 2022-08-23 DIAGNOSIS — K52.9 CHRONIC DIARRHEA: ICD-10-CM

## 2022-08-23 DIAGNOSIS — R10.9 ABDOMINAL CRAMPING: ICD-10-CM

## 2022-08-24 RX ORDER — DICYCLOMINE HYDROCHLORIDE 10 MG/1
10 CAPSULE ORAL
Qty: 90 CAPSULE | Refills: 0 | Status: SHIPPED | OUTPATIENT
Start: 2022-08-24 | End: 2022-09-21 | Stop reason: SDUPTHER

## 2022-09-01 ENCOUNTER — OFFICE VISIT (OUTPATIENT)
Dept: WOUND CARE | Facility: HOSPITAL | Age: 62
End: 2022-09-01
Payer: COMMERCIAL

## 2022-09-01 VITALS
RESPIRATION RATE: 14 BRPM | SYSTOLIC BLOOD PRESSURE: 130 MMHG | TEMPERATURE: 97.4 F | HEART RATE: 66 BPM | DIASTOLIC BLOOD PRESSURE: 78 MMHG

## 2022-09-01 DIAGNOSIS — L97.529 NON-HEALING ULCER OF LEFT FOOT, UNSPECIFIED ULCER STAGE (HCC): Primary | ICD-10-CM

## 2022-09-01 PROCEDURE — 97597 DBRDMT OPN WND 1ST 20 CM/<: CPT | Performed by: NURSE PRACTITIONER

## 2022-09-01 RX ORDER — LIDOCAINE HYDROCHLORIDE 40 MG/ML
5 SOLUTION TOPICAL ONCE
Status: COMPLETED | OUTPATIENT
Start: 2022-09-01 | End: 2022-09-01

## 2022-09-01 RX ADMIN — LIDOCAINE HYDROCHLORIDE 5 ML: 40 SOLUTION TOPICAL at 15:48

## 2022-09-01 NOTE — PROGRESS NOTES
Patient ID: Yasmin Shaw is a 64 y o  female Date of Birth 1960     Chief Complaint  Chief Complaint   Patient presents with    Follow Up Wound Care Visit     Left foot       Allergies  Patient has no known allergies  Assessment:     Diagnoses and all orders for this visit:    Non-healing ulcer of left foot, unspecified ulcer stage (HCC)  -     lidocaine (XYLOCAINE) 4 % topical solution 5 mL  -     Wound cleansing and dressings; Future  -     Wound compression and edema control; Future  -     Debridement              Debridement   Wound 07/01/22 Traumatic Foot Anterior; Left    Universal Protocol:  Consent: Written consent obtained  Consent given by: patient  Time out: Immediately prior to procedure a "time out" was called to verify the correct patient, procedure, equipment, support staff and site/side marked as required  Timeout called at: 9/1/2022 3:56 PM   Patient understanding: patient states understanding of the procedure being performed  Patient consent: the patient's understanding of the procedure matches consent given  Procedure consent matches procedure scheduled: N/A  Relevant documents present and verified: N/A  Test results available and properly labeled: N/A  Site marked: the operative site was marked  Imaging studies available: N/A  Required blood products, implants, devices, and special equipment available: N/A  Patient identity confirmed: verbally with patient      Performed by: NP  Debridement type: selective  Pain control: lidocaine 4%  Pre-debridement measurements  Length (cm): 1  Width (cm): 1  Depth (cm): 0 1  Surface Area (cm^2): 1  Volume (cm^3): 0 1    Post-debridement measurements  Length (cm): 1  Width (cm): 1  Depth (cm): 0 1  Percent debrided: 100%  Surface Area (cm^2): 1  Area debrided (cm^2):  1  Volume (cm^3): 0 1  Devitalized tissue debrided: biofilm, fibrin and slough  Instrument(s) utilized: curette  Bleeding: small  Hemostasis obtained with: pressure  Procedural pain (0-10): 0  Post-procedural pain: 0   Response to treatment: procedure was tolerated well          Plan:  1  F/u visit  Wound debrided  Wound measuring the same with less devitalized slough present in wound bed  Continue current plan of care  Patient will follow up in 2 weeks  Wound 07/01/22 Traumatic Foot Anterior; Left (Active)   Wound Image Images linked 09/01/22 1545   Wound Description Beefy red;Granulation tissue; Yellow;Slough 09/01/22 1545   Ivon-wound Assessment Dry;Edema;Pink 09/01/22 1545   Wound Length (cm) 1 cm 09/01/22 1545   Wound Width (cm) 1 cm 09/01/22 1545   Wound Depth (cm) 0 1 cm 09/01/22 1545   Wound Surface Area (cm^2) 1 cm^2 09/01/22 1545   Wound Volume (cm^3) 0 1 cm^3 09/01/22 1545   Calculated Wound Volume (cm^3) 0 1 cm^3 09/01/22 1545   Change in Wound Size % 70 59 09/01/22 1545   Drainage Amount Small 09/01/22 1545   Drainage Description Serosanguineous 09/01/22 1545   Non-staged Wound Description Full thickness 09/01/22 1545   Dressing Status Intact (upon arrival) 09/01/22 1545       Wound 07/01/22 Traumatic Foot Anterior; Left (Active)   Date First Assessed/Time First Assessed: 07/01/22 0919   Primary Wound Type: Traumatic  Location: Foot  Wound Location Orientation: Anterior; Left       [REMOVED] Wound 12/08/19 Abrasion(s) (Removed)   Resolved Date: 07/01/22  Date First Assessed/Time First Assessed: 12/08/19 2005   Pre-Existing Wound: Yes  Traumatic Wound Type: Abrasion(s)  Wound Outcome: (c) Other (Comment)       [REMOVED] Wound 05/05/20 Leg Right (Removed)   Resolved Date: 07/01/22  Date First Assessed/Time First Assessed: 05/05/20 1459   Location: Leg  Wound Location Orientation: Right  Wound Description (Comments): AKA  Incision's 1st Dressing: DRESSING XEROFORM 1 X 8 (x1), SPONGE GAUZE 4 X 4 16 PLY STR           [REMOVED] Wound 05/05/20 Amputation Leg Right (Removed)   Resolved Date: 07/01/22  Date First Assessed/Time First Assessed: 05/05/20 6532   Pre-Existing Wound: No Traumatic Wound Type: Amputation  Location: Leg  Wound Location Orientation: Right  Wound Outcome: (c) Other (Comment)       Subjective:     F/u visit traumatic wound of left anterior foot  No new complaints  She denies any pain, fevers, or chills  The following portions of the patient's history were reviewed and updated as appropriate:   She  has a past medical history of Chronic back pain, Depression, High cholesterol, Hypertension, MS (multiple sclerosis) ([place] [address] ), and RLL pneumonia  She   Patient Active Problem List    Diagnosis Date Noted    Embolism and thrombosis of arteries of the lower extremities ([place] [address] ) 06/15/2022    Mixed hyperlipidemia 06/15/2022    Open wound 2022    Chronic fatigue 2022    Spasticity 2022    Phantom pain after amputation of lower extremity ([place] [address] ) 2020    Venous insufficiency of left leg 2020    Incontinence of urine in female 2020    S/P AKA (above knee amputation) unilateral, right ([place] [address] ) 2020    Popliteal artery stenosis, right ([place] [address] ) 2020    Type 2 myocardial infarction ([place] [address] ) 2019    Generalized weakness 2019    Sinus tachycardia 2019    Bilateral hip pain 2019    Leukocytosis 2019    Smoker 2019    Thrombocytopenia ([place] [address] ) 2017    Ventricular hypertrophy 2017    Vitamin D deficiency 2015    Multiple thyroid nodules 2015    Essential hypertension 06/10/2014    Lumbar spinal stenosis 06/10/2014    Multiple sclerosis ([place] [address] ) 06/10/2014     She  has a past surgical history that includes orthopedic surgery; Back surgery;  section; Laminectomy; Other surgical history; Knee arthroscopy; Ankle surgery (Right); and AMPUTATION ABOVE KNEE (AKA) (Right, 2020)  Her family history includes Heart attack in her father; Osteoporosis in her mother  She  reports that she has been smoking cigarettes  She has a 10 00 pack-year smoking history   She has never used smokeless tobacco  She reports current alcohol use  She reports that she does not use drugs  Current Outpatient Medications   Medication Sig Dispense Refill    amantadine (SYMMETREL) 100 mg capsule Take 1 capsule (100 mg total) by mouth 2 (two) times a day 180 capsule 0    amlodipine-olmesartan (SELENE) 5-20 MG Take 1 tablet by mouth daily 90 tablet 0    ascorbic acid (VITAMIN C) 250 mg tablet Take 250 mg by mouth daily      Aubagio 14 MG TABS TAKE 1 TABLET (14 MG TOTAL) BY MOUTH DAILY 30 tablet 4    baclofen 10 mg tablet TAKE 2 TABLETS 3 TIMES A  tablet 1    cholecalciferol (VITAMIN D3) 1,000 units tablet Take 1,000 Units by mouth daily      dicyclomine (BENTYL) 10 mg capsule Take 1 capsule (10 mg total) by mouth 3 (three) times a day before meals 90 capsule 0    gabapentin (NEURONTIN) 300 mg capsule Take 1 capsule (300 mg total) by mouth 2 (two) times a day 180 capsule 0    LORazepam (ATIVAN) 0 5 mg tablet Take 1 tab 30 min prior to MRI, may repeat x 1 immediately before if needed 4 tablet 0    metoprolol tartrate (LOPRESSOR) 100 mg tablet Take 1 tablet (100 mg total) by mouth every 12 (twelve) hours 180 tablet 0    Multiple Vitamins-Minerals (Multivitamin Adult Extra C) CHEW Chew 1 tablet daily      mupirocin (BACTROBAN) 2 % ointment Apply topically 3 (three) times a day 30 g 0    rivaroxaban (XARELTO) 20 mg tablet Take 1 tablet (20 mg total) by mouth daily with breakfast 90 tablet 0    rosuvastatin (CRESTOR) 10 MG tablet Take 1 tablet (10 mg total) by mouth daily 90 tablet 3     No current facility-administered medications for this visit  She has No Known Allergies       Review of Systems   Constitutional: Negative  HENT: Negative for ear pain and hearing loss  Eyes: Negative for pain  Respiratory: Negative for chest tightness and shortness of breath  Cardiovascular: Positive for leg swelling  Negative for chest pain and palpitations     Gastrointestinal: Negative for diarrhea, nausea and vomiting  Genitourinary: Negative for dysuria  Musculoskeletal: Positive for gait problem  Skin: Positive for wound  Neurological: Negative for tremors and weakness  Psychiatric/Behavioral: Negative for behavioral problems, confusion and suicidal ideas  Objective:       Wound 07/01/22 Traumatic Foot Anterior; Left (Active)   Wound Image Images linked 09/01/22 1545   Wound Description Beefy red;Granulation tissue; Yellow;Slough 09/01/22 1545   Ivon-wound Assessment Dry;Edema;Pink 09/01/22 1545   Wound Length (cm) 1 cm 09/01/22 1545   Wound Width (cm) 1 cm 09/01/22 1545   Wound Depth (cm) 0 1 cm 09/01/22 1545   Wound Surface Area (cm^2) 1 cm^2 09/01/22 1545   Wound Volume (cm^3) 0 1 cm^3 09/01/22 1545   Calculated Wound Volume (cm^3) 0 1 cm^3 09/01/22 1545   Change in Wound Size % 70 59 09/01/22 1545   Drainage Amount Small 09/01/22 1545   Drainage Description Serosanguineous 09/01/22 1545   Non-staged Wound Description Full thickness 09/01/22 1545   Dressing Status Intact (upon arrival) 09/01/22 1545       /78   Pulse 66   Temp (!) 97 4 °F (36 3 °C)   Resp 14             Wound Instructions:  Orders Placed This Encounter   Procedures    Wound cleansing and dressings     Right foot wound:     Wash your hands with soap and water  Remove old dressing, discard into plastic bag and place in trash  Cleanse the wound with soap and water prior to applying a clean dressing  Do not use tissue or cotton balls  Do not scrub the wound  Pat dry using gauze  Shower yes   Apply medihoney to the wound  Cover with gauze  Secure with tape  Change dressing daily     This was done today               Standing Status:   Future     Standing Expiration Date:   9/1/2023    Wound compression and edema control     Elastic Tubular Stocking - spandagrip E     Tubular elastic bandage: Apply from base of toes to behind the knee   Apply in AM, may remove for sleep      Avoid prolonged standing in one place      Elevate leg(s) above the level of the heart when sitting or as much as possible           Standing Status:   Future     Standing Expiration Date:   9/1/2023    Debridement     This order was created via procedure documentation        Diagnosis ICD-10-CM Associated Orders   1   Non-healing ulcer of left foot, unspecified ulcer stage (HCC)  L97 [id number] lidocaine (XYLOCAINE) 4 % topical solution 5 mL     Wound cleansing and dressings     Wound compression and edema control     Debridement

## 2022-09-01 NOTE — PATIENT INSTRUCTIONS
Orders Placed This Encounter   Procedures    Wound cleansing and dressings     Right foot wound:     Wash your hands with soap and water  Remove old dressing, discard into plastic bag and place in trash  Cleanse the wound with soap and water prior to applying a clean dressing  Do not use tissue or cotton balls  Do not scrub the wound  Pat dry using gauze  Shower yes   Apply medihoney to the wound  Cover with gauze  Secure with tape  Change dressing daily     This was done today               Standing Status:   Future     Standing Expiration Date:   9/1/2023    Wound compression and edema control     Elastic Tubular Stocking - spandagrip E     Tubular elastic bandage: Apply from base of toes to behind the knee  Apply in AM, may remove for sleep  Avoid prolonged standing in one place  Elevate leg(s) above the level of the heart when sitting or as much as possible            Standing Status:   Future     Standing Expiration Date:   9/1/2023

## 2022-09-21 DIAGNOSIS — I21.A1 TYPE 2 MYOCARDIAL INFARCTION (HCC): ICD-10-CM

## 2022-09-21 DIAGNOSIS — K52.9 CHRONIC DIARRHEA: ICD-10-CM

## 2022-09-21 DIAGNOSIS — R10.9 ABDOMINAL CRAMPING: ICD-10-CM

## 2022-09-21 DIAGNOSIS — G35 MULTIPLE SCLEROSIS (HCC): ICD-10-CM

## 2022-09-21 DIAGNOSIS — I70.201 POPLITEAL ARTERY STENOSIS, RIGHT (HCC): ICD-10-CM

## 2022-09-22 RX ORDER — GABAPENTIN 300 MG/1
300 CAPSULE ORAL 2 TIMES DAILY
Qty: 180 CAPSULE | Refills: 0 | Status: SHIPPED | OUTPATIENT
Start: 2022-09-22

## 2022-09-22 RX ORDER — METOPROLOL TARTRATE 100 MG/1
100 TABLET ORAL EVERY 12 HOURS
Qty: 180 TABLET | Refills: 0 | Status: SHIPPED | OUTPATIENT
Start: 2022-09-22

## 2022-09-22 RX ORDER — AMANTADINE HYDROCHLORIDE 100 MG/1
100 CAPSULE, GELATIN COATED ORAL 2 TIMES DAILY
Qty: 180 CAPSULE | Refills: 3 | Status: SHIPPED | OUTPATIENT
Start: 2022-09-22

## 2022-09-22 RX ORDER — DICYCLOMINE HYDROCHLORIDE 10 MG/1
10 CAPSULE ORAL
Qty: 90 CAPSULE | Refills: 0 | Status: SHIPPED | OUTPATIENT
Start: 2022-09-22 | End: 2022-10-23 | Stop reason: SDUPTHER

## 2022-09-25 ENCOUNTER — TELEPHONE (OUTPATIENT)
Dept: NEUROLOGY | Facility: CLINIC | Age: 62
End: 2022-09-25

## 2022-09-25 DIAGNOSIS — G35 MULTIPLE SCLEROSIS (HCC): Primary | ICD-10-CM

## 2022-09-25 RX ORDER — CHOLESTYRAMINE 4 G/9G
2 POWDER, FOR SUSPENSION ORAL
Qty: 66 PACKET | Refills: 0 | Status: SHIPPED | OUTPATIENT
Start: 2022-09-25

## 2022-09-25 NOTE — TELEPHONE ENCOUNTER
On computer this am for chart prep for week, saw labs from weekend on pt   Called pt this am due to abnormal platelet count seen on routing labs for imd aubagio monitoring   Lfts ok, elevated alk phos at 126, alt and alt normal   Concern about lower plts of 93  Pt had labs by pcp in aug with prior plt count in July 21 2022 at 186, then in aug 11, 2022 plt lower at 127 from pcp labs and now at 80 on weekend labs  Shaquille Pollard pt twice ie on mobile phone and left very detailed messaged and also had answering service connect me to home number as her home number does not take any blocked number calls    Also left detailed message on home number as well   Asked pt to call her pcp to review these labs   I am ccing pcp as well   Need for primary team to see if any other medical reasons for trending lower of plts  Juan Jose Meneses has post marketing reports of rare thrombocytopenia with med  Terryl Arbour also with long half life   While vascular team and pcp evals reason, I am going to stop her aubagio as well  Donita Waters left pt message twice this am to stop her aubagio and need to do 11 day cholestyramine wash out of medication    I will send rx to pharmacy as well   Let pt know if any bleeding, needs to go to er      Nursing please also follow up with pt to make sure she got my 2 messages      pcp- labs under external lab reporting

## 2022-09-26 DIAGNOSIS — G35 MULTIPLE SCLEROSIS (HCC): Primary | ICD-10-CM

## 2022-09-26 NOTE — TELEPHONE ENCOUNTER
Called patient (land line)  Received vm  Left detailed msg ((per consent in chart) regarding labs, d/c of Aubagio and the need to contact office for next steps  Called patient (mobile line)  Received vm  Left detailed msg (per consent in chart) regarding labs, d/c of Aubagio and the need to contact office for next steps

## 2022-09-26 NOTE — TELEPHONE ENCOUNTER
Message  Received: Jose Neff MD  P Neurology [facility] Clinical Team 3  Please see full message below  Left additional message on cell, that cholestyramine rx sent to pharmacy and again reminded pt to follow up with us to let us know she got message to stop her aubagio and also follow up with pcp for etiology of lower plts   Pt to go to er for any bleeding  Nursing, follow up with pt that she got my 3 messages

## 2022-09-26 NOTE — TELEPHONE ENCOUNTER
Called patient on both house and mobile numbers  Received answering machine again  Spoke patient's mother [facility] (on consent form)  Advised her we are attempting to reach patient and have left several vms  Mother states she will contact patient and have her call our office

## 2022-09-26 NOTE — TELEPHONE ENCOUNTER
Message  Received: Today  Govind Dobson MD  P Neurology [facility] Clinical Team 3  Nursing, see below 3 messages  Jim Downey not look like pt called back yesterday   Please call pt again this am    Please make sure she has received my messages and to stop aubagio and do wash out for next 11 days with cholestyramine   Needs follow up with pcp as well   New rx for cbc diff in emr,  pt to do repeat cbc diff in 2 weeks ie 14 days, as her cholestyramine taper should be done by karla Anguiano send her rx for aubagio level to be done after her washout

## 2022-09-27 NOTE — TELEPHONE ENCOUNTER
Called patient  Received vm  Left detailed msg (per consent in chart) requesting return call  Stressed the importance of discontinuing Aubagio, f/u lab work and contacting PCP  Called patient's mother Kathleen Peterson (on consent form)  Received vm  Left msg  Requesting return call  Ronda msg sent as well      Dr Irene Marshalls - [place]

## 2022-09-27 NOTE — TELEPHONE ENCOUNTER
Please retry to get pt again today  I do not see any call back from pt from yesterday  Please reach back out to pt mom if still unable to get pt  Looks like pt has an appt with wound care tomorrow as well  Please see if pcp has any other numbers to reach pt as well

## 2022-09-27 NOTE — TELEPHONE ENCOUNTER
Let pt know I am not sure that the low platlets are due to aubagio as this would be a rare side effect  Pt still needs to follow up with pcp for any more common reasons as well

## 2022-09-28 ENCOUNTER — OFFICE VISIT (OUTPATIENT)
Dept: WOUND CARE | Facility: HOSPITAL | Age: 62
End: 2022-09-28
Payer: COMMERCIAL

## 2022-09-28 VITALS
TEMPERATURE: 97.2 F | RESPIRATION RATE: 16 BRPM | SYSTOLIC BLOOD PRESSURE: 125 MMHG | DIASTOLIC BLOOD PRESSURE: 73 MMHG | HEART RATE: 68 BPM

## 2022-09-28 DIAGNOSIS — L97.529 NON-HEALING ULCER OF LEFT FOOT, UNSPECIFIED ULCER STAGE (HCC): Primary | ICD-10-CM

## 2022-09-28 PROCEDURE — 97597 DBRDMT OPN WND 1ST 20 CM/<: CPT | Performed by: NURSE PRACTITIONER

## 2022-09-28 RX ORDER — LIDOCAINE HYDROCHLORIDE 40 MG/ML
5 SOLUTION TOPICAL ONCE
Status: COMPLETED | OUTPATIENT
Start: 2022-09-28 | End: 2022-09-28

## 2022-09-28 RX ADMIN — LIDOCAINE HYDROCHLORIDE 5 ML: 40 SOLUTION TOPICAL at 15:50

## 2022-09-28 NOTE — PROGRESS NOTES
Patient ID: Chela Renner is a 64 y o  female Date of Birth 1960     Chief Complaint  Chief Complaint   Patient presents with    Follow Up Wound Care Visit     Left foot wound       Allergies  Patient has no known allergies  Assessment:     Diagnoses and all orders for this visit:    Non-healing ulcer of left foot, unspecified ulcer stage (HCC)  -     lidocaine (XYLOCAINE) 4 % topical solution 5 mL  -     Wound cleansing and dressings; Future  -     Wound compression and edema control; Future  -     Debridement              Debridement   Wound 07/01/22 Traumatic Foot Anterior; Left    Universal Protocol:  Consent: Written consent obtained  Consent given by: patient  Time out: Immediately prior to procedure a "time out" was called to verify the correct patient, procedure, equipment, support staff and site/side marked as required  Timeout called at: 9/28/2022 4:06 PM   Patient understanding: patient states understanding of the procedure being performed  Patient consent: the patient's understanding of the procedure matches consent given  Procedure consent matches procedure scheduled: N/A  Relevant documents present and verified: N/A  Test results available and properly labeled: N/A  Site marked: the operative site was marked  Imaging studies available: N/A  Required blood products, implants, devices, and special equipment available: N/A    Patient identity confirmed: verbally with patient      Performed by: NP  Debridement type: selective  Pain control: lidocaine 4%  Pre-debridement measurements  Length (cm): 1  Width (cm): 0 9  Depth (cm): 0 1  Surface Area (cm^2): 0 9  Volume (cm^3): 0 09    Post-debridement measurements  Length (cm): 1  Width (cm): 0 9  Depth (cm): 0 1  Percent debrided: 100%  Surface Area (cm^2): 0 9  Area debrided (cm^2): 0 9  Volume (cm^3): 0 09  Devitalized tissue debrided: biofilm, fibrin and slough  Instrument(s) utilized: curette  Bleeding: small  Hemostasis obtained with: pressure  Procedural pain (0-10): 0  Post-procedural pain: 0   Response to treatment: procedure was tolerated well          Plan:  1  F/u visit  Wound debrided  Wound measuring the same  Will change treatment to Purocol Ag with hydrogel covered with dry gauze and dalia changed 3x per week  Patient is to continue to wear [address] for gentle compression therapy  Patient will follow up in 2 weeks  Wound 07/01/22 Traumatic Foot Anterior; Left (Active)   Wound Image Images linked 09/28/22 1549   Wound Description Beefy red;Granulation tissue; Yellow;Slough 09/28/22 1549   Ivon-wound Assessment Dry;Edema;Pink 09/28/22 1549   Wound Length (cm) 1 cm 09/28/22 1549   Wound Width (cm) 0 9 cm 09/28/22 1549   Wound Depth (cm) 0 1 cm 09/28/22 1549   Wound Surface Area (cm^2) 0 9 cm^2 09/28/22 1549   Wound Volume (cm^3) 0 09 cm^3 09/28/22 1549   Calculated Wound Volume (cm^3) 0 09 cm^3 09/28/22 1549   Change in Wound Size % 73 53 09/28/22 1549   Drainage Amount Small 09/28/22 1549   Drainage Description Serous; Yellow 09/28/22 1549   Non-staged Wound Description Full thickness 09/28/22 1549   Dressing Status Intact 09/28/22 1549       Wound 07/01/22 Traumatic Foot Anterior; Left (Active)   Date First Assessed/Time First Assessed: 07/01/22 0919   Primary Wound Type: Traumatic  Location: Foot  Wound Location Orientation: Anterior; Left       [REMOVED] Wound 12/08/19 Abrasion(s) (Removed)   Resolved Date: 07/01/22  Date First Assessed/Time First Assessed: 12/08/19 2005   Pre-Existing Wound: Yes  Traumatic Wound Type: Abrasion(s)  Wound Outcome: (c) Other (Comment)       [REMOVED] Wound 05/05/20 Leg Right (Removed)   Resolved Date: 07/01/22  Date First Assessed/Time First Assessed: 05/05/20 1459   Location: Leg  Wound Location Orientation: Right  Wound Description (Comments): AKA  Incision's 1st Dressing: DRESSING XEROFORM 1 X 8 (x1), SPONGE GAUZE 4 X 4 16 PLY STR           [REMOVED] Wound 05/05/20 Amputation Leg Right (Removed) Resolved Date: 22  Date First Assessed/Time First Assessed: 20 1713   Pre-Existing Wound: No  Traumatic Wound Type: Amputation  Location: Leg  Wound Location Orientation: Right  Wound Outcome: (c) Other (Comment)       Subjective:     F/u visit traumatic wound of left foot  No new complaints  She denies any pain, fevers, or chills  The following portions of the patient's history were reviewed and updated as appropriate:   She  has a past medical history of Chronic back pain, Depression, High cholesterol, Hypertension, MS (multiple sclerosis) ([facility] ), and RLL pneumonia  She   Patient Active Problem List    Diagnosis Date Noted    Embolism and thrombosis of arteries of the lower extremities ([facility] ) 06/15/2022    Mixed hyperlipidemia 06/15/2022    Open wound 2022    Chronic fatigue 2022    Spasticity 2022    Phantom pain after amputation of lower extremity ([facility] ) 2020    Venous insufficiency of left leg 2020    Incontinence of urine in female 2020    S/P AKA (above knee amputation) unilateral, right ([facility] ) 2020    Popliteal artery stenosis, right ([facility] ) 2020    Type 2 myocardial infarction ([facility] ) 2019    Generalized weakness 2019    Sinus tachycardia 2019    Bilateral hip pain 2019    Leukocytosis 2019    Smoker 2019    Thrombocytopenia ([facility] ) 2017    Ventricular hypertrophy 2017    Vitamin D deficiency 2015    Multiple thyroid nodules 2015    Essential hypertension 06/10/2014    Lumbar spinal stenosis 06/10/2014    Multiple sclerosis ([facility] ) 06/10/2014     She  has a past surgical history that includes orthopedic surgery; Back surgery;  section; Laminectomy; Other surgical history; Knee arthroscopy; Ankle surgery (Right); and AMPUTATION ABOVE KNEE (AKA) (Right, 2020)  Her family history includes Heart attack in her father; Osteoporosis in her mother    She  reports that she has been smoking cigarettes  She has a 10 00 pack-year smoking history  She has never used smokeless tobacco  She reports current alcohol use  She reports that she does not use drugs  Current Outpatient Medications   Medication Sig Dispense Refill    amantadine (SYMMETREL) 100 mg capsule Take 1 capsule (100 mg total) by mouth 2 (two) times a day 180 capsule 3    amlodipine-olmesartan (SELENE) 5-20 MG Take 1 tablet by mouth daily 90 tablet 0    ascorbic acid (VITAMIN C) 250 mg tablet Take 250 mg by mouth daily      Aubagio 14 MG TABS TAKE 1 TABLET (14 MG TOTAL) BY MOUTH DAILY 30 tablet 4    baclofen 10 mg tablet TAKE 2 TABLETS 3 TIMES A  tablet 1    cholecalciferol (VITAMIN D3) 1,000 units tablet Take 1,000 Units by mouth daily      cholestyramine (QUESTRAN) 4 g packet Take 2 packets (8 g total) by mouth 3 (three) times a day with meals 66 packet 0    dicyclomine (BENTYL) 10 mg capsule Take 1 capsule (10 mg total) by mouth 3 (three) times a day before meals 90 capsule 0    gabapentin (NEURONTIN) 300 mg capsule Take 1 capsule (300 mg total) by mouth 2 (two) times a day 180 capsule 0    LORazepam (ATIVAN) 0 5 mg tablet Take 1 tab 30 min prior to MRI, may repeat x 1 immediately before if needed 4 tablet 0    metoprolol tartrate (LOPRESSOR) 100 mg tablet Take 1 tablet (100 mg total) by mouth every 12 (twelve) hours 180 tablet 0    Multiple Vitamins-Minerals (Multivitamin Adult Extra C) CHEW Chew 1 tablet daily      mupirocin (BACTROBAN) 2 % ointment Apply topically 3 (three) times a day 30 g 0    rivaroxaban (XARELTO) 20 mg tablet Take 1 tablet (20 mg total) by mouth daily with breakfast 90 tablet 0    rosuvastatin (CRESTOR) 10 MG tablet Take 1 tablet (10 mg total) by mouth daily 90 tablet 3     No current facility-administered medications for this visit  She has No Known Allergies       Review of Systems   Constitutional: Negative  HENT: Negative for ear pain and hearing loss  Eyes: Negative for pain  Respiratory: Negative for chest tightness and shortness of breath  Cardiovascular: Positive for leg swelling  Negative for chest pain and palpitations  Gastrointestinal: Negative for diarrhea, nausea and vomiting  Genitourinary: Negative for dysuria  Musculoskeletal: Positive for gait problem  Skin: Positive for wound  Neurological: Negative for tremors and weakness  Psychiatric/Behavioral: Negative for behavioral problems, confusion and suicidal ideas  Objective:       Wound 07/01/22 Traumatic Foot Anterior; Left (Active)   Wound Image Images linked 09/28/22 1549   Wound Description Beefy red;Granulation tissue; Yellow;Slough 09/28/22 1549   Ivon-wound Assessment Dry;Edema;Pink 09/28/22 1549   Wound Length (cm) 1 cm 09/28/22 1549   Wound Width (cm) 0 9 cm 09/28/22 1549   Wound Depth (cm) 0 1 cm 09/28/22 1549   Wound Surface Area (cm^2) 0 9 cm^2 09/28/22 1549   Wound Volume (cm^3) 0 09 cm^3 09/28/22 1549   Calculated Wound Volume (cm^3) 0 09 cm^3 09/28/22 1549   Change in Wound Size % 73 53 09/28/22 1549   Drainage Amount Small 09/28/22 1549   Drainage Description Serous; Yellow 09/28/22 1549   Non-staged Wound Description Full thickness 09/28/22 1549   Dressing Status Intact 09/28/22 1549       /73   Pulse 68   Temp (!) 97 2 °F (36 2 °C)   Resp 16             Wound Instructions:  Orders Placed This Encounter   Procedures    Wound cleansing and dressings     Right foot wound:     Wash your hands with soap and water  Remove old dressing, discard into plastic bag and place in trash  Cleanse the wound with soap and water prior to applying a clean dressing  Do not use tissue or cotton balls  Do not scrub the wound  Pat dry using gauze  Shower yes   Apply Puracol AG and Hydrogel    Cover with gauze  Secure with tape  Change dressing daily     This was done today                                                   Standing Status:   Future     Standing Expiration Date:   9/28/2023    Wound compression and edema control     Elastic Tubular Stocking - spandagrip E     Tubular elastic bandage: Apply from base of toes to behind the knee  Apply in AM, may remove for sleep      Avoid prolonged standing in one place      Elevate leg(s) above the level of the heart when sitting or as much as possible                     Standing Status:   Future     Standing Expiration Date:   9/28/2023    Debridement     This order was created via procedure documentation        Diagnosis ICD-10-CM Associated Orders   1   Non-healing ulcer of left foot, unspecified ulcer stage (HCC)  L97 529 lidocaine (XYLOCAINE) 4 % topical solution 5 mL     Wound cleansing and dressings     Wound compression and edema control     Debridement

## 2022-09-28 NOTE — TELEPHONE ENCOUNTER
Ronda potterg sent to patient regarding Dr Blanco Slight note below  Aubagio lab mailed to patient's home address on file

## 2022-09-28 NOTE — PATIENT INSTRUCTIONS
Orders Placed This Encounter   Procedures    Wound cleansing and dressings     Right foot wound:     Wash your hands with soap and water  Remove old dressing, discard into plastic bag and place in trash  Cleanse the wound with soap and water prior to applying a clean dressing  Do not use tissue or cotton balls  Do not scrub the wound  Pat dry using gauze  Shower yes   Apply Puracol AG and Hydrogel  Cover with gauze  Secure with tape  Change dressing daily     This was done today                                                   Standing Status:   Future     Standing Expiration Date:   9/28/2023    Wound compression and edema control     Elastic Tubular Stocking - spandagrip E     Tubular elastic bandage: Apply from base of toes to behind the knee  Apply in AM, may remove for sleep  Avoid prolonged standing in one place  Elevate leg(s) above the level of the heart when sitting or as much as possible                      Standing Status:   Future     Standing Expiration Date:   9/28/2023

## 2022-09-28 NOTE — TELEPHONE ENCOUNTER
Good morning  I;left a message yesterday that I received Dr Fermin Moment messages  I stopped taking the Aubagio and started the Cholestyramine today  It is impossible to reach anyone in the office  You either sit on hold or it wants you to leave a message, which is what I did yesterday  I just tried to  call you back and it sent me to voicemail  Sorry for the confusion  Of note - no msgs were received from any of the clinical teams

## 2022-10-14 NOTE — TELEPHONE ENCOUNTER
Conversation: thrombocytopenia/Aubagio d/c  (Newest Message First)  October 14, 2022    Me  to Skylar Mobley          10:00 AM  Thank you for contacting [facility],        Good morena Cheng,     The lab order to check for Aubagio level has been faxed to [address] [place] [address]      Please advise when you completed the 11-day course of cholestyramine             Please do not hesitate to call our office at [phone] with any questions or concerns      Thank you for choosing [facility] for your care! This [address] message has not been read  October 12, 2022             11:23 AM  Jose Sultana routed this conversation to Neurology [facility] Clinical Team 3       Skylar Mobley  to Me          11:03 AM  Good morning  I received the paperwork  My blood is drawn at my home by [facility]  This order needs to be faxed to them [phone]  Their main number [phone]  They will then come out to draw the blood

## 2022-10-17 NOTE — TELEPHONE ENCOUNTER
Lab order faxed to [facility] did not go through  Per website, it appears fax # is actually [phone]  Faxed

## 2022-10-20 ENCOUNTER — OFFICE VISIT (OUTPATIENT)
Dept: WOUND CARE | Facility: HOSPITAL | Age: 62
End: 2022-10-20
Payer: COMMERCIAL

## 2022-10-20 ENCOUNTER — HOSPITAL ENCOUNTER (OUTPATIENT)
Dept: RADIOLOGY | Facility: HOSPITAL | Age: 62
Discharge: HOME | End: 2022-10-20
Payer: COMMERCIAL

## 2022-10-20 VITALS
DIASTOLIC BLOOD PRESSURE: 74 MMHG | HEART RATE: 69 BPM | TEMPERATURE: 97.6 F | RESPIRATION RATE: 15 BRPM | SYSTOLIC BLOOD PRESSURE: 116 MMHG

## 2022-10-20 DIAGNOSIS — I10 ESSENTIAL HYPERTENSION: ICD-10-CM

## 2022-10-20 DIAGNOSIS — S91.302A OPEN WOUND OF LEFT FOOT, INITIAL ENCOUNTER: Primary | ICD-10-CM

## 2022-10-20 DIAGNOSIS — S91.302A OPEN WOUND OF LEFT FOOT, INITIAL ENCOUNTER: ICD-10-CM

## 2022-10-20 DIAGNOSIS — S91.102A OPEN WOUND OF LEFT GREAT TOE, INITIAL ENCOUNTER: ICD-10-CM

## 2022-10-20 DIAGNOSIS — S91.102A OPEN WOUND OF LEFT GREAT TOE WITHOUT DAMAGE TO NAIL, INITIAL ENCOUNTER: ICD-10-CM

## 2022-10-20 PROCEDURE — 97597 DBRDMT OPN WND 1ST 20 CM/<: CPT | Performed by: NURSE PRACTITIONER

## 2022-10-20 PROCEDURE — 73630 X-RAY EXAM OF FOOT: CPT

## 2022-10-20 PROCEDURE — 99213 OFFICE O/P EST LOW 20 MIN: CPT | Performed by: NURSE PRACTITIONER

## 2022-10-20 RX ORDER — LIDOCAINE HYDROCHLORIDE 40 MG/ML
5 SOLUTION TOPICAL ONCE
Status: COMPLETED | OUTPATIENT
Start: 2022-10-20 | End: 2022-10-20

## 2022-10-20 RX ORDER — SULFAMETHOXAZOLE AND TRIMETHOPRIM 800; 160 MG/1; MG/1
1 TABLET ORAL EVERY 12 HOURS SCHEDULED
Qty: 14 TABLET | Refills: 0 | Status: SHIPPED | OUTPATIENT
Start: 2022-10-20 | End: 2022-10-27

## 2022-10-20 RX ORDER — AMLODIPINE AND OLMESARTAN MEDOXOMIL 5; 20 MG/1; MG/1
1 TABLET ORAL DAILY
Qty: 90 TABLET | Refills: 0 | Status: SHIPPED | OUTPATIENT
Start: 2022-10-20

## 2022-10-20 RX ADMIN — LIDOCAINE HYDROCHLORIDE 5 ML: 40 SOLUTION TOPICAL at 15:57

## 2022-10-20 NOTE — PATIENT INSTRUCTIONS
Orders Placed This Encounter   Procedures    Wound cleansing and dressings     Left foot wound and Left Toe Wounds:     Wash your hands with soap and water  Remove old dressing, discard into plastic bag and place in trash  Cleanse the wound with soap and water prior to applying a clean dressing  Do not use tissue or cotton balls  Do not scrub the wound  Pat dry using gauze  Shower yes   Apply Iodosorb to the foot and toe wounds  Cover with gauze  Secure with rolled gauze  Change dressing daily     This was done today                                                                      Standing Status:   Future     Standing Expiration Date:   10/20/2023    Wound compression and edema control     Elastic Tubular Stocking - spandagrip E     Tubular elastic bandage: Apply from base of toes to behind the knee  Apply in AM, may remove for sleep  Avoid prolonged standing in one place  Elevate leg(s) above the level of the heart when sitting or as much as possible  Standing Status:   Future     Standing Expiration Date:   10/20/2023    Wound miscellaneous orders     Take antibiotic as ordered  Have xray of left foot/toes done as ordered  If Iodosorb is too expensive with prescription at pharmacy, order it from [facility]       Standing Status:   Future     Standing Expiration Date:   10/20/2023

## 2022-10-20 NOTE — PROGRESS NOTES
Patient ID: Kim Gay is a 64 y o  female Date of Birth 1960     Chief Complaint  Chief Complaint   Patient presents with   • Follow Up Wound Care Visit     Left Foot       Allergies  Patient has no known allergies  Assessment:     Diagnoses and all orders for this visit:    Open wound of left foot, initial encounter  -     lidocaine (XYLOCAINE) 4 % topical solution 5 mL  -     Wound cleansing and dressings; Future  -     Wound compression and edema control; Future  -     Wound miscellaneous orders; Future  -     sulfamethoxazole-trimethoprim (BACTRIM DS) 800-160 mg per tablet; Take 1 tablet by mouth every 12 (twelve) hours for 7 days  -     cadexomer iodine (IODOSORB) 0 9 % gel; Apply 1 application topically in the morning  -     XR foot 3+ vw left; Future    Open wound of left great toe, initial encounter  -     lidocaine (XYLOCAINE) 4 % topical solution 5 mL  -     Wound cleansing and dressings; Future  -     Wound compression and edema control; Future  -     Wound miscellaneous orders; Future  -     sulfamethoxazole-trimethoprim (BACTRIM DS) 800-160 mg per tablet; Take 1 tablet by mouth every 12 (twelve) hours for 7 days  -     cadexomer iodine (IODOSORB) 0 9 % gel; Apply 1 application topically in the morning  -     XR foot 3+ vw left; Future    Open wound of left great toe without damage to nail, initial encounter  -     lidocaine (XYLOCAINE) 4 % topical solution 5 mL  -     Wound cleansing and dressings; Future  -     Wound compression and edema control; Future  -     Wound miscellaneous orders; Future  -     sulfamethoxazole-trimethoprim (BACTRIM DS) 800-160 mg per tablet; Take 1 tablet by mouth every 12 (twelve) hours for 7 days  -     cadexomer iodine (IODOSORB) 0 9 % gel; Apply 1 application topically in the morning  -     XR foot 3+ vw left; Future    Other orders  -     Debridement              Debridement   Wound 07/01/22 Traumatic Foot Anterior; Left    Universal Protocol:  Consent: Written consent obtained  Consent given by: patient  Time out: Immediately prior to procedure a "time out" was called to verify the correct patient, procedure, equipment, support staff and site/side marked as required  Timeout called at: 10/20/2022 4:10 PM     Performed by: NP  Debridement type: selective  Pain control: lidocaine 4%  Pre-debridement measurements  Length (cm): 0 6  Width (cm): 0 8  Depth (cm): 0 2  Surface Area (cm^2): 0 48  Volume (cm^3): 0 1    Post-debridement measurements  Length (cm): 0 6  Width (cm): 0 8  Depth (cm): 0 2  Percent debrided: 100%  Surface Area (cm^2): 0 48  Area debrided (cm^2): 0 48  Volume (cm^3): 0 1  Devitalized tissue debrided: biofilm, fibrin and slough  Instrument(s) utilized: curette  Bleeding: small  Hemostasis obtained with: pressure  Procedural pain (0-10): 0  Post-procedural pain: 0   Response to treatment: procedure was tolerated well          Plan:  1  F/u visit  Left dorsal foot wound debrided  Wound measuring smaller  2  Patient has new traumatic wounds on her left first and fifth toes d/t dresser drawer falling on great toe and dog scratching open fifth toe  Both toes have increased pain and erythema and increased necrotic tissue present in wound beds  Concerned for possible infection and fracture especially in great toe  Will prescribe Bactrim DS PO BID x 7 days and will also order XR of left foot to r/o osteo and fracture in toes  3  Will also change treatment to Iodosorb gel applied to all wounds daily  4  Patient will follow up in 1 week     Wound 07/01/22 Traumatic Foot Anterior; Left (Active)   Wound Image Images linked 10/20/22 1547   Wound Description Beefy red;Granulation tissue; Yellow;Slough 10/20/22 1546   Ivon-wound Assessment Dry;Edema;Pink 10/20/22 1546   Wound Length (cm) 0 6 cm 10/20/22 1546   Wound Width (cm) 0 8 cm 10/20/22 1546   Wound Depth (cm) 0 2 cm 10/20/22 1546   Wound Surface Area (cm^2) 0 48 cm^2 10/20/22 1546   Wound Volume (cm^3) 0 096 cm^3 10/20/22 1546   Calculated Wound Volume (cm^3) 0 1 cm^3 10/20/22 1546   Change in Wound Size % 70 59 10/20/22 1546   Drainage Amount Small 10/20/22 1546   Drainage Description Tan 10/20/22 1546   Non-staged Wound Description Full thickness 10/20/22 1546   Dressing Status Intact (upon arrival) 10/20/22 1546       Wound 10/20/22 Traumatic Toe (Comment  which one) Left (Active)   Wound Image Images linked 10/20/22 1553   Wound Description Slough; Beefy red;Granulation tissue 10/20/22 1552   Ivon-wound Assessment Edema; Erythema;Purple 10/20/22 1552   Wound Length (cm) 1 5 cm 10/20/22 1552   Wound Width (cm) 0 5 cm 10/20/22 1552   Wound Depth (cm) 0 1 cm 10/20/22 1552   Wound Surface Area (cm^2) 0 75 cm^2 10/20/22 1552   Wound Volume (cm^3) 0 075 cm^3 10/20/22 1552   Calculated Wound Volume (cm^3) 0 08 cm^3 10/20/22 1552   Drainage Amount Small 10/20/22 1552   Drainage Description Serosanguineous 10/20/22 1552   Non-staged Wound Description Full thickness 10/20/22 1552   Dressing Status Intact (upon arrival) 10/20/22 1552       Wound 10/20/22 Traumatic Toe (Comment  which one) Anterior; Left (Active)   Wound Image Images linked 10/20/22 1555   Wound Description Yellow;Slough; Beefy red;Dry;Granulation tissue 10/20/22 1555   Ivon-wound Assessment Edema; Erythema 10/20/22 1555   Wound Length (cm) 0 6 cm 10/20/22 1555   Wound Width (cm) 0 6 cm 10/20/22 1555   Wound Depth (cm) 0 1 cm 10/20/22 1555   Wound Surface Area (cm^2) 0 36 cm^2 10/20/22 1555   Wound Volume (cm^3) 0 036 cm^3 10/20/22 1555   Calculated Wound Volume (cm^3) 0 04 cm^3 10/20/22 1555   Drainage Amount Small 10/20/22 1555   Drainage Description Serosanguineous 10/20/22 1555   Non-staged Wound Description Full thickness 10/20/22 1555   Dressing Status Intact (upon arrival) 10/20/22 1555       Wound 10/20/22 Traumatic Toe (Comment  which one) Anterior; Left (Active)   Wound Image Images linked 10/20/22 1549   Wound Description Beefy red;Slough;Granulation tissue; White; Other (Comment) 10/20/22 1549   Ivon-wound Assessment Dry; Swelling;Erythema 10/20/22 1549   Wound Length (cm) 1 cm 10/20/22 1549   Wound Width (cm) 1 cm 10/20/22 1549   Wound Depth (cm) 0 2 cm 10/20/22 1549   Wound Surface Area (cm^2) 1 cm^2 10/20/22 1549   Wound Volume (cm^3) 0 2 cm^3 10/20/22 1549   Calculated Wound Volume (cm^3) 0 2 cm^3 10/20/22 1549   Drainage Amount Scant 10/20/22 1549   Drainage Description Serosanguineous 10/20/22 1549   Non-staged Wound Description Full thickness 10/20/22 1549   Dressing Status Intact (upon arrival) 10/20/22 1549       Wound 07/01/22 Traumatic Foot Anterior; Left (Active)   Date First Assessed/Time First Assessed: 07/01/22 0919   Primary Wound Type: Traumatic  Location: Foot  Wound Location Orientation: Anterior; Left       Wound 10/20/22 Traumatic Toe (Comment  which one) Left (Active)   Date First Assessed/Time First Assessed: 10/20/22 1545   Primary Wound Type: Traumatic  Location: (c) Toe (Comment  which one)  Wound Location Orientation: Left  Wound Description (Comments): LEFT FIFTH TOE       Wound 10/20/22 Traumatic Toe (Comment  which one) Anterior; Left (Active)   Date First Assessed/Time First Assessed: 10/20/22 1545   Primary Wound Type: Traumatic  Location: (c) Toe (Comment  which one)  Wound Location Orientation: Anterior; Left  Wound Description (Comments): LEFT MEDIAL GREAT TOE       Wound 10/20/22 Traumatic Toe (Comment  which one) Anterior; Left (Active)   Date First Assessed/Time First Assessed: 10/20/22 1546   Primary Wound Type: Traumatic  Location: (c) Toe (Comment  which one)  Wound Location Orientation: Anterior; Left  Wound Description (Comments): LEFT GREAT TOE ANTERIOR       [REMOVED] Wound 12/08/19 Abrasion(s) (Removed)   Resolved Date: 07/01/22  Date First Assessed/Time First Assessed: 12/08/19 2005   Pre-Existing Wound: Yes  Traumatic Wound Type: Abrasion(s)  Wound Outcome: (c) Other (Comment)       [REMOVED] Wound 05/05/20 Leg Right (Removed)   Resolved Date: 07/01/22  Date First Assessed/Time First Assessed: 05/05/20 1369   Location: Leg  Wound Location Orientation: Right  Wound Description (Comments): AKA  Incision's 1st Dressing: DRESSING XEROFORM 1 X 8 (x1), SPONGE GAUZE 4 X 4 16 PLY STR    [REMOVED] Wound 05/05/20 Amputation Leg Right (Removed)   Resolved Date: 07/01/22  Date First Assessed/Time First Assessed: 05/05/20 1713   Pre-Existing Wound: No  Traumatic Wound Type: Amputation  Location: Leg  Wound Location Orientation: Right  Wound Outcome: (c) Other (Comment)       Subjective:     F/u visit traumatic wound of left foot  Patient presents today with new traumatic wounds of her left great and fifth toes  Patient reports a dresser drawer fell on her great toe and her dog scratched open her fifth toe  She did not seek care for her toes after both incidents  Patient presents today with increased erythema and pain especially in her great toe  She denies any fevers or chills  The following portions of the patient's history were reviewed and updated as appropriate:   She  has a past medical history of Chronic back pain, Depression, High cholesterol, Hypertension, MS (multiple sclerosis) ([facility] [address] ), and RLL pneumonia    She   Patient Active Problem List    Diagnosis Date Noted   • Embolism and thrombosis of arteries of the lower extremities ([address] ) 06/15/2022   • Mixed hyperlipidemia 06/15/2022   • Open wound 06/07/2022   • Chronic fatigue 01/11/2022   • Spasticity 01/11/2022   • Phantom pain after amputation of lower extremity ([address] ) 08/18/2020   • Venous insufficiency of left leg 08/13/2020   • Incontinence of urine in female 05/16/2020   • S/P AKA (above knee amputation) unilateral, right ([address] ) 05/08/2020   • Popliteal artery stenosis, right ([facility] [address] ) 05/04/2020   • Type 2 myocardial infarction ([facility] [address] ) 12/08/2019   • Generalized weakness 12/08/2019   • Sinus tachycardia 06/20/2019   • Bilateral hip pain 06/16/2019   • Leukocytosis 06/16/2019 • Smoker 2019   • Thrombocytopenia ([facility] ) 2017   • Ventricular hypertrophy 2017   • Vitamin D deficiency 2015   • Multiple thyroid nodules 2015   • Essential hypertension 06/10/2014   • Lumbar spinal stenosis 06/10/2014   • Multiple sclerosis ([facility] ) 06/10/2014     She  has a past surgical history that includes orthopedic surgery; Back surgery;  section; Laminectomy; Other surgical history; Knee arthroscopy; Ankle surgery (Right); and AMPUTATION ABOVE KNEE (AKA) (Right, 2020)  Her family history includes Heart attack in her father; Osteoporosis in her mother  She  reports that she has been smoking cigarettes  She has a 10 00 pack-year smoking history  She has never used smokeless tobacco  She reports current alcohol use  She reports that she does not use drugs    Current Outpatient Medications   Medication Sig Dispense Refill   • cadexomer iodine (IODOSORB) 0 9 % gel Apply 1 application topically in the morning 40 g 0   • sulfamethoxazole-trimethoprim (BACTRIM DS) 800-160 mg per tablet Take 1 tablet by mouth every 12 (twelve) hours for 7 days 14 tablet 0   • amantadine (SYMMETREL) 100 mg capsule Take 1 capsule (100 mg total) by mouth 2 (two) times a day 180 capsule 3   • amlodipine-olmesartan (SELENE) 5-20 MG Take 1 tablet by mouth daily 90 tablet 0   • ascorbic acid (VITAMIN C) 250 mg tablet Take 250 mg by mouth daily     • Aubagio 14 MG TABS TAKE 1 TABLET (14 MG TOTAL) BY MOUTH DAILY 30 tablet 4   • baclofen 10 mg tablet TAKE 2 TABLETS 3 TIMES A  tablet 1   • cholecalciferol (VITAMIN D3) 1,000 units tablet Take 1,000 Units by mouth daily     • cholestyramine (QUESTRAN) 4 g packet Take 2 packets (8 g total) by mouth 3 (three) times a day with meals 66 packet 0   • dicyclomine (BENTYL) 10 mg capsule Take 1 capsule (10 mg total) by mouth 3 (three) times a day before meals 90 capsule 0   • gabapentin (NEURONTIN) 300 mg capsule Take 1 capsule (300 mg total) by mouth 2 (two) times a day 180 capsule 0   • LORazepam (ATIVAN) 0 5 mg tablet Take 1 tab 30 min prior to MRI, may repeat x 1 immediately before if needed 4 tablet 0   • metoprolol tartrate (LOPRESSOR) 100 mg tablet Take 1 tablet (100 mg total) by mouth every 12 (twelve) hours 180 tablet 0   • Multiple Vitamins-Minerals (Multivitamin Adult Extra C) CHEW Chew 1 tablet daily     • mupirocin (BACTROBAN) 2 % ointment Apply topically 3 (three) times a day 30 g 0   • rivaroxaban (XARELTO) 20 mg tablet Take 1 tablet (20 mg total) by mouth daily with breakfast 90 tablet 0   • rosuvastatin (CRESTOR) 10 MG tablet Take 1 tablet (10 mg total) by mouth daily 90 tablet 3     No current facility-administered medications for this visit  She has No Known Allergies       Review of Systems   Constitutional: Negative  HENT: Negative for ear pain and hearing loss  Eyes: Negative for pain  Respiratory: Negative for chest tightness and shortness of breath  Cardiovascular: Positive for leg swelling  Negative for chest pain and palpitations  Gastrointestinal: Negative for diarrhea, nausea and vomiting  Genitourinary: Negative for dysuria  Musculoskeletal: Positive for gait problem  Skin: Positive for wound  Neurological: Positive for numbness  Negative for tremors and weakness  Psychiatric/Behavioral: Negative for behavioral problems, confusion and suicidal ideas  Objective:       Wound 07/01/22 Traumatic Foot Anterior; Left (Active)   Wound Image Images linked 10/20/22 1547   Wound Description Beefy red;Granulation tissue; Yellow;Slough 10/20/22 1546   Ivon-wound Assessment Dry;Edema;Pink 10/20/22 1546   Wound Length (cm) 0 6 cm 10/20/22 1546   Wound Width (cm) 0 8 cm 10/20/22 1546   Wound Depth (cm) 0 2 cm 10/20/22 1546   Wound Surface Area (cm^2) 0 48 cm^2 10/20/22 1546   Wound Volume (cm^3) 0 096 cm^3 10/20/22 1546   Calculated Wound Volume (cm^3) 0 1 cm^3 10/20/22 1546   Change in Wound Size % 70 59 10/20/22 1546 Drainage Amount Small 10/20/22 1546   Drainage Description Tan 10/20/22 1546   Non-staged Wound Description Full thickness 10/20/22 1546   Dressing Status Intact (upon arrival) 10/20/22 1546       Wound 10/20/22 Traumatic Toe (Comment  which one) Left (Active)   Wound Image Images linked 10/20/22 1553   Wound Description Slough; Beefy red;Granulation tissue 10/20/22 1552   Ivon-wound Assessment Edema; Erythema;Purple 10/20/22 1552   Wound Length (cm) 1 5 cm 10/20/22 1552   Wound Width (cm) 0 5 cm 10/20/22 1552   Wound Depth (cm) 0 1 cm 10/20/22 1552   Wound Surface Area (cm^2) 0 75 cm^2 10/20/22 1552   Wound Volume (cm^3) 0 075 cm^3 10/20/22 1552   Calculated Wound Volume (cm^3) 0 08 cm^3 10/20/22 1552   Drainage Amount Small 10/20/22 1552   Drainage Description Serosanguineous 10/20/22 1552   Non-staged Wound Description Full thickness 10/20/22 1552   Dressing Status Intact (upon arrival) 10/20/22 1552       Wound 10/20/22 Traumatic Toe (Comment  which one) Anterior; Left (Active)   Wound Image Images linked 10/20/22 1555   Wound Description Yellow;Slough; Beefy red;Dry;Granulation tissue 10/20/22 1555   Ivon-wound Assessment Edema; Erythema 10/20/22 1555   Wound Length (cm) 0 6 cm 10/20/22 1555   Wound Width (cm) 0 6 cm 10/20/22 1555   Wound Depth (cm) 0 1 cm 10/20/22 1555   Wound Surface Area (cm^2) 0 36 cm^2 10/20/22 1555   Wound Volume (cm^3) 0 036 cm^3 10/20/22 1555   Calculated Wound Volume (cm^3) 0 04 cm^3 10/20/22 1555   Drainage Amount Small 10/20/22 1555   Drainage Description Serosanguineous 10/20/22 1555   Non-staged Wound Description Full thickness 10/20/22 1555   Dressing Status Intact (upon arrival) 10/20/22 1555       Wound 10/20/22 Traumatic Toe (Comment  which one) Anterior; Left (Active)   Wound Image Images linked 10/20/22 1549   Wound Description Beefy red;Slough;Granulation tissue; White; Other (Comment) 10/20/22 1549   Ivon-wound Assessment Dry; Swelling;Erythema 10/20/22 1549   Wound Length (cm) 1 cm 10/20/22 1549   Wound Width (cm) 1 cm 10/20/22 1549   Wound Depth (cm) 0 2 cm 10/20/22 1549   Wound Surface Area (cm^2) 1 cm^2 10/20/22 1549   Wound Volume (cm^3) 0 2 cm^3 10/20/22 1549   Calculated Wound Volume (cm^3) 0 2 cm^3 10/20/22 1549   Drainage Amount Scant 10/20/22 1549   Drainage Description Serosanguineous 10/20/22 1549   Non-staged Wound Description Full thickness 10/20/22 1549   Dressing Status Intact (upon arrival) 10/20/22 1549       /74   Pulse 69   Temp 97 6 °F (36 4 °C)   Resp 15                         Wound Instructions:  Orders Placed This Encounter   Procedures   • Wound cleansing and dressings     Left foot wound and Left Toe Wounds:     Wash your hands with soap and water  Remove old dressing, discard into plastic bag and place in trash  Cleanse the wound with soap and water prior to applying a clean dressing  Do not use tissue or cotton balls  Do not scrub the wound  Pat dry using gauze  Shower yes   Apply Iodosorb to the foot and toe wounds  Cover with gauze  Secure with rolled gauze  Change dressing daily     This was done today                                                                      Standing Status:   Future     Standing Expiration Date:   10/20/2023   • Wound compression and edema control     Elastic Tubular Stocking - spandagrip E     Tubular elastic bandage: Apply from base of toes to behind the knee  Apply in AM, may remove for sleep      Avoid prolonged standing in one place      Elevate leg(s) above the level of the heart when sitting or as much as possible  Standing Status:   Future     Standing Expiration Date:   10/20/2023   • Wound miscellaneous orders     Take antibiotic as ordered  Have xray of left foot/toes done as ordered  If Iodosorb is too expensive with prescription at pharmacy, order it from [place]       Standing Status:   Future     Standing Expiration Date:   10/20/2023   • Debridement     This order was created via procedure documentation   • XR foot 3+ vw left     Rule out osteo in left 1st and 5th toes and rule out fracture d/t hx of recent trauma     Standing Status:   Future     Standing Expiration Date:   10/20/2026     Scheduling Instructions:      Bring along any outside films relating to this procedure  Diagnosis ICD-10-CM Associated Orders   1  Open wound of left foot, initial encounter  S91 302A lidocaine (XYLOCAINE) 4 % topical solution 5 mL     Wound cleansing and dressings     Wound compression and edema control     Wound miscellaneous orders     sulfamethoxazole-trimethoprim (BACTRIM DS) 800-160 mg per tablet     cadexomer iodine (IODOSORB) 0 9 % gel     XR foot 3+ vw left   2  Open wound of left great toe, initial encounter  S91 102A lidocaine (XYLOCAINE) 4 % topical solution 5 mL     Wound cleansing and dressings     Wound compression and edema control     Wound miscellaneous orders     sulfamethoxazole-trimethoprim (BACTRIM DS) 800-160 mg per tablet     cadexomer iodine (IODOSORB) 0 9 % gel     XR foot 3+ vw left   3   Open wound of left great toe without damage to nail, initial encounter  S91 102A lidocaine (XYLOCAINE) 4 % topical solution 5 mL     Wound cleansing and dressings     Wound compression and edema control     Wound miscellaneous orders     sulfamethoxazole-trimethoprim (BACTRIM DS) 800-160 mg per tablet     cadexomer iodine (IODOSORB) 0 9 % gel     XR foot 3+ vw left

## 2022-10-23 DIAGNOSIS — K52.9 CHRONIC DIARRHEA: ICD-10-CM

## 2022-10-23 DIAGNOSIS — R10.9 ABDOMINAL CRAMPING: ICD-10-CM

## 2022-10-24 RX ORDER — DICYCLOMINE HYDROCHLORIDE 10 MG/1
10 CAPSULE ORAL
Qty: 90 CAPSULE | Refills: 0 | Status: SHIPPED | OUTPATIENT
Start: 2022-10-24

## 2022-10-25 NOTE — TELEPHONE ENCOUNTER
----- Message from Dany Gómez PA-C sent at 11/21/2018 10:39 AM EST -----  Pls let patient know one of her LFTs was slightly elevated  I believe she is getting labs done every other month currently  I would like her to repeat in 1 month to check the trending  Also, I wanted to make sure she called her PCP after her last visit here regarding her elevated BP    Thanks
Called and Left a message on pt's answering machine for a call back
Called and left a message on pt's answering machine for a call back
Patient made aware that LFTs was slightly elevated and to get repeat labs in one month  She is requesting we fax labs to [facility] since this would be an extra home draw     Faxed CBC and Hepatic function panel to [facility] @ [phone]
3-5x/week

## 2022-10-27 ENCOUNTER — OFFICE VISIT (OUTPATIENT)
Dept: WOUND CARE | Facility: HOSPITAL | Age: 62
End: 2022-10-27
Payer: COMMERCIAL

## 2022-10-27 VITALS
DIASTOLIC BLOOD PRESSURE: 73 MMHG | TEMPERATURE: 97.7 F | RESPIRATION RATE: 16 BRPM | SYSTOLIC BLOOD PRESSURE: 112 MMHG | HEART RATE: 67 BPM

## 2022-10-27 DIAGNOSIS — Z89.611 S/P AKA (ABOVE KNEE AMPUTATION) UNILATERAL, RIGHT (HCC): ICD-10-CM

## 2022-10-27 DIAGNOSIS — F17.200 SMOKER: ICD-10-CM

## 2022-10-27 DIAGNOSIS — S91.302A OPEN WOUND OF LEFT FOOT, INITIAL ENCOUNTER: ICD-10-CM

## 2022-10-27 DIAGNOSIS — S91.102A OPEN WOUND OF LEFT GREAT TOE, INITIAL ENCOUNTER: Primary | ICD-10-CM

## 2022-10-27 PROCEDURE — 97597 DBRDMT OPN WND 1ST 20 CM/<: CPT | Performed by: STUDENT IN AN ORGANIZED HEALTH CARE EDUCATION/TRAINING PROGRAM

## 2022-10-27 RX ORDER — LIDOCAINE HYDROCHLORIDE 40 MG/ML
5 SOLUTION TOPICAL ONCE
Status: COMPLETED | OUTPATIENT
Start: 2022-10-27 | End: 2022-10-27

## 2022-10-27 RX ADMIN — LIDOCAINE HYDROCHLORIDE 5 ML: 40 SOLUTION TOPICAL at 15:56

## 2022-10-27 NOTE — PROGRESS NOTES
Patient ID: Martín Cho is a 64 y o  female Date of Birth 1960     Chief Complaint  Chief Complaint   Patient presents with   • Follow Up Wound Care Visit     Left foot and toe wounds       Allergies  Patient has no known allergies  Assessment:     Diagnoses and all orders for this visit:    Open wound of left great toe, initial encounter  -     Debridement  -     Debridement    Smoker  -     lidocaine (XYLOCAINE) 4 % topical solution 5 mL  -     Wound cleansing and dressings; Future  -     Wound compression and edema control; Future  -     Wound off loading; Future    S/P AKA (above knee amputation) unilateral, right (HCC)  -     lidocaine (XYLOCAINE) 4 % topical solution 5 mL  -     Wound cleansing and dressings; Future  -     Wound compression and edema control; Future  -     Wound off loading; Future    Open wound of left foot, initial encounter  -     Debridement              Debridement   Wound 10/27/22 Traumatic Ankle Left;Lateral    Universal Protocol:  Procedure performed by:  Consent: Verbal consent obtained  Risks and benefits: risks, benefits and alternatives were discussed  Consent given by: patient  Time out: Immediately prior to procedure a "time out" was called to verify the correct patient, procedure, equipment, support staff and site/side marked as required    Timeout called at: 10/20/2022 4:15 PM   Patient identity confirmed: verbally with patient      Performed by: physician  Debridement type: selective  Pain control: lidocaine 4%  Pre-debridement measurements  Length (cm): 0 4  Width (cm): 0 6  Depth (cm): 0 3  Surface Area (cm^2): 0 24  Volume (cm^3): 0 07    Post-debridement measurements  Length (cm): 0 4  Width (cm): 0 6  Depth (cm): 0 3  Percent debrided: 60%  Surface Area (cm^2): 0 24  Area debrided (cm^2): 0 14  Volume (cm^3): 0 07  Devitalized tissue debrided: biofilm, callus and fibrin  Instrument(s) utilized: blade  Bleeding: small  Hemostasis obtained with: pressure  Procedural pain (0-10): 1  Post-procedural pain: 1   Response to treatment: procedure was tolerated well      Debridement   Wound 10/20/22 Traumatic Toe (Comment  which one) Anterior; Left    Universal Protocol:  Procedure performed by:  Consent: Verbal consent obtained  Risks and benefits: risks, benefits and alternatives were discussed  Consent given by: patient  Time out: Immediately prior to procedure a "time out" was called to verify the correct patient, procedure, equipment, support staff and site/side marked as required  Timeout called at: 10/27/2022 4:15 PM   Patient identity confirmed: verbally with patient      Performed by: physician  Debridement type: selective  Pain control: lidocaine 4%  Pre-debridement measurements  Length (cm): 0 7  Width (cm): 0 7  Depth (cm): 0  Surface Area (cm^2): 0 49  Volume (cm^3): 0    Post-debridement measurements  Length (cm): 0 7  Width (cm): 0 7  Depth (cm): 0  Percent debrided: 50%  Surface Area (cm^2): 0 49  Area debrided (cm^2): 0 25  Volume (cm^3): 0  Devitalized tissue debrided: biofilm, callus and fibrin  Instrument(s) utilized: blade  Bleeding: small  Hemostasis obtained with: pressure  Procedural pain (0-10): 1  Post-procedural pain: 1   Response to treatment: procedure was tolerated well    Debridement   Wound 10/20/22 Traumatic Toe (Comment  which one) Left    Universal Protocol:  Procedure performed by:  Consent: Verbal consent obtained  Risks and benefits: risks, benefits and alternatives were discussed  Consent given by: patient  Time out: Immediately prior to procedure a "time out" was called to verify the correct patient, procedure, equipment, support staff and site/side marked as required    Timeout called at: 10/27/2022 4:15 PM   Patient identity confirmed: verbally with patient      Performed by: physician  Debridement type: selective  Pain control: lidocaine 4%  Pre-debridement measurements  Length (cm): 0 2  Width (cm): 0 2  Depth (cm): 0 1  Surface Area (cm^2): 0 04  Volume (cm^3): 0    Post-debridement measurements  Length (cm): 0 2  Width (cm): 0 2  Depth (cm): 0 1  Percent debrided: 60%  Surface Area (cm^2): 0 04  Area debrided (cm^2): 0 02  Volume (cm^3): 0  Devitalized tissue debrided: biofilm, callus and fibrin  Instrument(s) utilized: blade  Bleeding: small  Hemostasis obtained with: pressure  Procedural pain (0-10): 1  Post-procedural pain: 1   Response to treatment: procedure was tolerated well          Plan: Fu wound care visit today of traumatic injury to L anterior foot and L great toe after dressed fell on the foot  Most recent xray shows no fracture  Given the patient's pet was also scratching at the wound, she was prescribed and completed a course of bactrim  No symptoms or signs of infection today, however a new wound was opened thought to be 2/2 wheel chair wheel abrasion of the left lateral foot  All wounds cleansed with NS and gauze and debridement performed as above  Due to dry fibrinous nature of wound, dressing changed to hydrocolloid as below  Advised to protect L foot from further wheel chair abrasion  Fu in 1 week    Wound 07/01/22 Traumatic Foot Anterior; Left (Active)   Wound Image Images linked 10/27/22 1547   Wound Description Beefy red;Granulation tissue; Yellow;Slough 10/27/22 1547   Ivon-wound Assessment Dry;Edema;Brown 10/27/22 1547   Wound Length (cm) 0 4 cm 10/27/22 1547   Wound Width (cm) 0 8 cm 10/27/22 1547   Wound Depth (cm) 0 1 cm 10/27/22 1547   Wound Surface Area (cm^2) 0 32 cm^2 10/27/22 1547   Wound Volume (cm^3) 0 032 cm^3 10/27/22 1547   Calculated Wound Volume (cm^3) 0 03 cm^3 10/27/22 1547   Change in Wound Size % 91 18 10/27/22 1547   Drainage Amount Small 10/27/22 1547   Drainage Description Serous 10/27/22 1547   Non-staged Wound Description Full thickness 10/27/22 1547   Dressing Status Intact 10/27/22 1547       Wound 10/20/22 Traumatic Toe (Comment  which one) Left (Active)   Wound Image Images linked 10/27/22 1550   Wound Description Granulation tissue;Pink 10/27/22 1550   Ivon-wound Assessment Edema;Pink 10/27/22 1550   Wound Length (cm) 0 2 cm 10/27/22 1550   Wound Width (cm) 0 2 cm 10/27/22 1550   Wound Depth (cm) 0 1 cm 10/27/22 1550   Wound Surface Area (cm^2) 0 04 cm^2 10/27/22 1550   Wound Volume (cm^3) 0 004 cm^3 10/27/22 1550   Calculated Wound Volume (cm^3) 0 cm^3 10/27/22 1550   Change in Wound Size % 100 10/27/22 1550   Drainage Amount Scant 10/27/22 1550   Drainage Description Serosanguineous 10/27/22 1550   Dressing Status Intact 10/27/22 1550       Wound 10/20/22 Traumatic Toe (Comment  which one) Anterior; Left (Active)   Wound Image Images linked 10/27/22 1545   Wound Description Yellow;Slough;Dry;Granulation tissue;Pink;Brown 10/27/22 1545   Ivon-wound Assessment Edema;Pink 10/27/22 1545   Wound Length (cm) 0 6 cm 10/27/22 1545   Wound Width (cm) 0 6 cm 10/27/22 1545   Wound Depth (cm) 0 1 cm 10/27/22 1545   Wound Surface Area (cm^2) 0 36 cm^2 10/27/22 1545   Wound Volume (cm^3) 0 036 cm^3 10/27/22 1545   Calculated Wound Volume (cm^3) 0 04 cm^3 10/27/22 1545   Change in Wound Size % 0 10/27/22 1545   Drainage Amount Small 10/27/22 1545   Drainage Description Serosanguineous 10/27/22 1545   Non-staged Wound Description Full thickness 10/27/22 1545   Dressing Status Intact 10/27/22 1545       Wound 10/20/22 Traumatic Toe (Comment  which one) Anterior; Left (Active)   Wound Image Images linked 10/27/22 1543   Wound Description Black;Eschar 10/27/22 1543   Ivon-wound Assessment Edema 10/27/22 1543   Wound Length (cm) 0 7 cm 10/27/22 1543   Wound Width (cm) 0 7 cm 10/27/22 1543   Wound Depth (cm) 0 cm 10/27/22 1543   Wound Surface Area (cm^2) 0 49 cm^2 10/27/22 1543   Wound Volume (cm^3) 0 cm^3 10/27/22 1543   Calculated Wound Volume (cm^3) 0 cm^3 10/27/22 1543   Change in Wound Size % 100 10/27/22 1543   Drainage Amount None 10/27/22 1543   Non-staged Wound Description Full thickness 10/27/22 1543   Dressing Status Intact 10/27/22 1543       Wound 10/27/22 Traumatic Ankle Left;Lateral (Active)   Wound Image Images linked 10/27/22 1542   Wound Description Slough; Yellow;Pink;Granulation tissue 10/27/22 1542   Ivon-wound Assessment Dry;Callus;Edema 10/27/22 1542   Wound Length (cm) 0 4 cm 10/27/22 1542   Wound Width (cm) 0 6 cm 10/27/22 1542   Wound Depth (cm) 0 3 cm 10/27/22 1542   Wound Surface Area (cm^2) 0 24 cm^2 10/27/22 1542   Wound Volume (cm^3) 0 072 cm^3 10/27/22 1542   Calculated Wound Volume (cm^3) 0 07 cm^3 10/27/22 1542   Drainage Amount Scant 10/27/22 1542   Drainage Description Serous 10/27/22 1542   Non-staged Wound Description Full thickness 10/27/22 1542   Dressing Status Intact 10/27/22 1542       Wound Traumatic Foot Left;Lateral (Active)   Wound Image Images linked 10/27/22 1553   Wound Description Epithelialization;Slough; Yellow;Dry;Pink;Brown 10/27/22 1553   Ivon-wound Assessment Dry;Pink 10/27/22 1553   Wound Length (cm) 2 cm 10/27/22 1553   Wound Width (cm) 0 5 cm 10/27/22 1553   Wound Depth (cm) 0 1 cm 10/27/22 1553   Wound Surface Area (cm^2) 1 cm^2 10/27/22 1553   Wound Volume (cm^3) 0 1 cm^3 10/27/22 1553   Calculated Wound Volume (cm^3) 0 1 cm^3 10/27/22 1553   Drainage Amount None 10/27/22 1553   Non-staged Wound Description Full thickness 10/27/22 1553   Dressing Status Other (Comment) (no dressing) 10/27/22 1553       Wound 07/01/22 Traumatic Foot Anterior; Left (Active)   Date First Assessed/Time First Assessed: 07/01/22 0919   Primary Wound Type: Traumatic  Location: Foot  Wound Location Orientation: Anterior; Left       Wound 10/20/22 Traumatic Toe (Comment  which one) Left (Active)   Date First Assessed/Time First Assessed: 10/20/22 1545   Primary Wound Type: Traumatic  Location: (c) Toe (Comment  which one)  Wound Location Orientation: Left  Wound Description (Comments): LEFT FIFTH TOE       Wound 10/20/22 Traumatic Toe (Comment  which one) Anterior; Left (Active)   Date First Assessed/Time First Assessed: 10/20/22 1545   Primary Wound Type: Traumatic  Location: (c) Toe (Comment  which one)  Wound Location Orientation: Anterior; Left  Wound Description (Comments): LEFT MEDIAL GREAT TOE       Wound 10/20/22 Traumatic Toe (Comment  which one) Anterior; Left (Active)   Date First Assessed/Time First Assessed: 10/20/22 1546   Primary Wound Type: Traumatic  Location: (c) Toe (Comment  which one)  Wound Location Orientation: Anterior; Left  Wound Description (Comments): LEFT GREAT TOE ANTERIOR       Wound 10/27/22 Traumatic Ankle Left;Lateral (Active)   Date First Assessed: 10/27/22   Primary Wound Type: Traumatic  Location: Ankle  Wound Location Orientation: Left;Lateral       Wound Traumatic Foot Left;Lateral (Active)   No Date First Assessed or Time First Assessed found  Primary Wound Type: Traumatic  Location: Foot  Wound Location Orientation: Left;Lateral       [REMOVED] Wound 12/08/19 Abrasion(s) (Removed)   Resolved Date: 07/01/22  Date First Assessed/Time First Assessed: 12/08/19 2005   Pre-Existing Wound: Yes  Traumatic Wound Type: Abrasion(s)  Wound Outcome: (c) Other (Comment)       [REMOVED] Wound 05/05/20 Leg Right (Removed)   Resolved Date: 07/01/22  Date First Assessed/Time First Assessed: 05/05/20 1459   Location: Leg  Wound Location Orientation: Right  Wound Description (Comments): AKA  Incision's 1st Dressing: DRESSING XEROFORM 1 X 8 (x1), SPONGE GAUZE 4 X 4 16 PLY STR    [REMOVED] Wound 05/05/20 Amputation Leg Right (Removed)   Resolved Date: 07/01/22  Date First Assessed/Time First Assessed: 05/05/20 1713   Pre-Existing Wound: No  Traumatic Wound Type: Amputation  Location: Leg  Wound Location Orientation: Right  Wound Outcome: (c) Other (Comment)       Subjective:           64year old F here today for fu of traumatic wounds of L foot and L toe        The following portions of the patient's history were reviewed and updated as appropriate: allergies, current medications, past family history, past medical history, past social history, past surgical history and problem list     Review of Systems   Constitutional: Negative  HENT: Negative for ear pain and hearing loss  Eyes: Negative for pain  Respiratory: Negative for chest tightness and shortness of breath  Cardiovascular: Positive for leg swelling  Negative for chest pain and palpitations  Gastrointestinal: Negative for diarrhea, nausea and vomiting  Genitourinary: Negative for dysuria  Musculoskeletal: Positive for gait problem  Skin: Positive for wound  Neurological: Negative for tremors and weakness  Psychiatric/Behavioral: Negative for behavioral problems, confusion and suicidal ideas  Objective:       Wound 07/01/22 Traumatic Foot Anterior; Left (Active)   Wound Image Images linked 10/27/22 1547   Wound Description Beefy red;Granulation tissue; Yellow;Slough 10/27/22 1547   Ivon-wound Assessment Dry;Edema;Brown 10/27/22 1547   Wound Length (cm) 0 4 cm 10/27/22 1547   Wound Width (cm) 0 8 cm 10/27/22 1547   Wound Depth (cm) 0 1 cm 10/27/22 1547   Wound Surface Area (cm^2) 0 32 cm^2 10/27/22 1547   Wound Volume (cm^3) 0 032 cm^3 10/27/22 1547   Calculated Wound Volume (cm^3) 0 03 cm^3 10/27/22 1547   Change in Wound Size % 91 18 10/27/22 1547   Drainage Amount Small 10/27/22 1547   Drainage Description Serous 10/27/22 1547   Non-staged Wound Description Full thickness 10/27/22 1547   Dressing Status Intact 10/27/22 1547       Wound 10/20/22 Traumatic Toe (Comment  which one) Left (Active)   Wound Image Images linked 10/27/22 1550   Wound Description Granulation tissue;Pink 10/27/22 1550   Ivon-wound Assessment Edema;Pink 10/27/22 1550   Wound Length (cm) 0 2 cm 10/27/22 1550   Wound Width (cm) 0 2 cm 10/27/22 1550   Wound Depth (cm) 0 1 cm 10/27/22 1550   Wound Surface Area (cm^2) 0 04 cm^2 10/27/22 1550   Wound Volume (cm^3) 0 004 cm^3 10/27/22 1550   Calculated Wound Volume (cm^3) 0 cm^3 10/27/22 1550   Change in Wound Size % 100 10/27/22 1550   Drainage Amount Scant 10/27/22 1550   Drainage Description Serosanguineous 10/27/22 1550   Dressing Status Intact 10/27/22 1550       Wound 10/20/22 Traumatic Toe (Comment  which one) Anterior; Left (Active)   Wound Image Images linked 10/27/22 1545   Wound Description Yellow;Slough;Dry;Granulation tissue;Pink;Brown 10/27/22 1545   Ivon-wound Assessment Edema;Pink 10/27/22 1545   Wound Length (cm) 0 6 cm 10/27/22 1545   Wound Width (cm) 0 6 cm 10/27/22 1545   Wound Depth (cm) 0 1 cm 10/27/22 1545   Wound Surface Area (cm^2) 0 36 cm^2 10/27/22 1545   Wound Volume (cm^3) 0 036 cm^3 10/27/22 1545   Calculated Wound Volume (cm^3) 0 04 cm^3 10/27/22 1545   Change in Wound Size % 0 10/27/22 1545   Drainage Amount Small 10/27/22 1545   Drainage Description Serosanguineous 10/27/22 1545   Non-staged Wound Description Full thickness 10/27/22 1545   Dressing Status Intact 10/27/22 1545       Wound 10/20/22 Traumatic Toe (Comment  which one) Anterior; Left (Active)   Wound Image Images linked 10/27/22 1543   Wound Description Black;Eschar 10/27/22 1543   Ivon-wound Assessment Edema 10/27/22 1543   Wound Length (cm) 0 7 cm 10/27/22 1543   Wound Width (cm) 0 7 cm 10/27/22 1543   Wound Depth (cm) 0 cm 10/27/22 1543   Wound Surface Area (cm^2) 0 49 cm^2 10/27/22 1543   Wound Volume (cm^3) 0 cm^3 10/27/22 1543   Calculated Wound Volume (cm^3) 0 cm^3 10/27/22 1543   Change in Wound Size % 100 10/27/22 1543   Drainage Amount None 10/27/22 1543   Non-staged Wound Description Full thickness 10/27/22 1543   Dressing Status Intact 10/27/22 1543       Wound 10/27/22 Traumatic Ankle Left;Lateral (Active)   Wound Image Images linked 10/27/22 1542   Wound Description Slough; Yellow;Pink;Granulation tissue 10/27/22 1542   Ivon-wound Assessment Dry;Callus;Edema 10/27/22 1542   Wound Length (cm) 0 4 cm 10/27/22 1542   Wound Width (cm) 0 6 cm 10/27/22 1542   Wound Depth (cm) 0 3 cm 10/27/22 1542   Wound Surface Area (cm^2) 0 24 cm^2 10/27/22 1542   Wound Volume (cm^3) 0 072 cm^3 10/27/22 1542   Calculated Wound Volume (cm^3) 0 07 cm^3 10/27/22 1542   Drainage Amount Scant 10/27/22 1542   Drainage Description Serous 10/27/22 1542   Non-staged Wound Description Full thickness 10/27/22 1542   Dressing Status Intact 10/27/22 1542       Wound Traumatic Foot Left;Lateral (Active)   Wound Image Images linked 10/27/22 1553   Wound Description Epithelialization;Slough; Yellow;Dry;Pink;Brown 10/27/22 1553   Ivon-wound Assessment Dry;Pink 10/27/22 1553   Wound Length (cm) 2 cm 10/27/22 1553   Wound Width (cm) 0 5 cm 10/27/22 1553   Wound Depth (cm) 0 1 cm 10/27/22 1553   Wound Surface Area (cm^2) 1 cm^2 10/27/22 1553   Wound Volume (cm^3) 0 1 cm^3 10/27/22 1553   Calculated Wound Volume (cm^3) 0 1 cm^3 10/27/22 1553   Drainage Amount None 10/27/22 1553   Non-staged Wound Description Full thickness 10/27/22 1553   Dressing Status Other (Comment) (no dressing) 10/27/22 1553       /73   Pulse 67   Temp 97 7 °F (36 5 °C)   Resp 16     Physical Exam  Vitals reviewed  HENT:      Head: Normocephalic and atraumatic  Mouth/Throat:      Mouth: Mucous membranes are moist    Eyes:      Extraocular Movements: Extraocular movements intact  Pulmonary:      Effort: Pulmonary effort is normal    Musculoskeletal:      Comments: Wheelchair bound   Skin:     Comments: Wounds as below   Neurological:      Mental Status: She is alert  Psychiatric:         Mood and Affect: Mood normal          Behavior: Behavior normal            Wound Instructions:  Orders Placed This Encounter   Procedures   • Wound cleansing and dressings     Left foot wounds, left ankle wound and Left Toe Wounds:     Wash your hands with soap and water  Remove old dressing, discard into plastic bag and place in trash  Cleanse the wound with soap and water prior to applying a clean dressing   Do not use tissue or cotton balls  Do not scrub the wound  Pat dry using gauze  Shower yes   Apply skin prep to intact skin surrounding wounds  Apply Hydrocolloid to all wounds    Change dressing 3 x week     This was done today                                                                                        Standing Status:   Future     Standing Expiration Date:   10/27/2023   • Wound compression and edema control     Elastic Tubular Stocking - spandagrip E     Tubular elastic bandage: Apply from base of toes to behind the knee  Apply in AM, may remove for sleep      Avoid prolonged standing in one place      Elevate leg(s) above the level of the heart when sitting or as much as possible           Standing Status:   Future     Standing Expiration Date:   10/27/2023   • Wound off loading     Off-loading Instructions:    Keep weight and pressure off wound at all times  Standing Status:   Future     Standing Expiration Date:   10/27/2023   • Debridement     This order was created via procedure documentation   • Debridement     This order was created via procedure documentation   • Debridement     This order was created via procedure documentation        Diagnosis ICD-10-CM Associated Orders   1  Open wound of left great toe, initial encounter  S91 102A Debridement     Debridement   2  Smoker  F17 200 lidocaine (XYLOCAINE) 4 % topical solution 5 mL     Wound cleansing and dressings     Wound compression and edema control     Wound off loading   3  S/P AKA (above knee amputation) unilateral, right ([facility])  Z89 611 lidocaine (XYLOCAINE) 4 % topical solution 5 mL     Wound cleansing and dressings     Wound compression and edema control     Wound off loading   4  Open wound of left foot, initial encounter  S91 302A Debridement     - Jennifer JAMES M D  have acted as a scribe with the above documentation    -Mervin JAMES, have seen and evaluated the above patient and have reviewed and agree with the above documentation

## 2022-10-27 NOTE — PATIENT INSTRUCTIONS
Orders Placed This Encounter   Procedures    Wound cleansing and dressings     Left foot wounds, left ankle wound and Left Toe Wounds:     Wash your hands with soap and water  Remove old dressing, discard into plastic bag and place in trash  Cleanse the wound with soap and water prior to applying a clean dressing  Do not use tissue or cotton balls  Do not scrub the wound  Pat dry using gauze  Shower yes   Apply skin prep to intact skin surrounding wounds  Apply Hydrocolloid to all wounds    Change dressing 3 x week     This was done today                                                                                        Standing Status:   Future     Standing Expiration Date:   10/27/2023    Wound compression and edema control     Elastic Tubular Stocking - spandagrip E     Tubular elastic bandage: Apply from base of toes to behind the knee  Apply in AM, may remove for sleep  Avoid prolonged standing in one place  Elevate leg(s) above the level of the heart when sitting or as much as possible  Standing Status:   Future     Standing Expiration Date:   10/27/2023    Wound off loading     Off-loading Instructions:    Keep weight and pressure off wound at all times       Standing Status:   Future     Standing Expiration Date:   10/27/2023

## 2022-11-06 DIAGNOSIS — Z89.619 S/P AKA (ABOVE KNEE AMPUTATION) (HCC): ICD-10-CM

## 2022-11-23 DIAGNOSIS — R10.9 ABDOMINAL CRAMPING: ICD-10-CM

## 2022-11-23 DIAGNOSIS — K52.9 CHRONIC DIARRHEA: ICD-10-CM

## 2022-11-23 RX ORDER — DICYCLOMINE HYDROCHLORIDE 10 MG/1
10 CAPSULE ORAL
Qty: 90 CAPSULE | Refills: 0 | Status: SHIPPED | OUTPATIENT
Start: 2022-11-23

## 2022-12-01 ENCOUNTER — VBI (OUTPATIENT)
Dept: ADMINISTRATIVE | Facility: OTHER | Age: 62
End: 2022-12-01

## 2022-12-02 LAB
ALBUMIN SERPL-MCNC: 4 G/DL (ref 3.8–4.8)
ALBUMIN/GLOB SERPL: 1.4 {RATIO} (ref 1.2–2.2)
ALP SERPL-CCNC: 94 IU/L (ref 44–121)
ALT SERPL-CCNC: 11 IU/L (ref 0–32)
AST SERPL-CCNC: 13 IU/L (ref 0–40)
BASOPHILS # BLD AUTO: 0 X10E3/UL (ref 0–0.2)
BASOPHILS NFR BLD AUTO: 0 %
BILIRUB DIRECT SERPL-MCNC: <0.1 MG/DL (ref 0–0.4)
BILIRUB SERPL-MCNC: <0.2 MG/DL (ref 0–1.2)
BUN SERPL-MCNC: 14 MG/DL (ref 8–27)
BUN/CREAT SERPL: 16 (ref 12–28)
CALCIUM SERPL-MCNC: 9.6 MG/DL (ref 8.7–10.3)
CHLORIDE SERPL-SCNC: 102 MMOL/L (ref 96–106)
CHOLEST SERPL-MCNC: 170 MG/DL (ref 100–199)
CO2 SERPL-SCNC: 23 MMOL/L (ref 20–29)
CREAT SERPL-MCNC: 0.88 MG/DL (ref 0.57–1)
EGFR: 74 ML/MIN/1.73
EOSINOPHIL # BLD AUTO: 0.1 X10E3/UL (ref 0–0.4)
EOSINOPHIL NFR BLD AUTO: 1 %
ERYTHROCYTE [DISTWIDTH] IN BLOOD BY AUTOMATED COUNT: 13.9 % (ref 11.7–15.4)
GLOBULIN SER-MCNC: 2.8 G/DL (ref 1.5–4.5)
GLUCOSE SERPL-MCNC: 83 MG/DL (ref 70–99)
HCT VFR BLD AUTO: 37.9 % (ref 34–46.6)
HDLC SERPL-MCNC: 45 MG/DL
HGB BLD-MCNC: 12.5 G/DL (ref 11.1–15.9)
IMM GRANULOCYTES # BLD: 0 X10E3/UL (ref 0–0.1)
IMM GRANULOCYTES NFR BLD: 0 %
LDLC SERPL CALC-MCNC: 90 MG/DL (ref 0–99)
LYMPHOCYTES # BLD AUTO: 2.4 X10E3/UL (ref 0.7–3.1)
LYMPHOCYTES NFR BLD AUTO: 33 %
MCH RBC QN AUTO: 29.6 PG (ref 26.6–33)
MCHC RBC AUTO-ENTMCNC: 33 G/DL (ref 31.5–35.7)
MCV RBC AUTO: 90 FL (ref 79–97)
MICRODELETION SYND BLD/T FISH: NORMAL
MONOCYTES # BLD AUTO: 0.4 X10E3/UL (ref 0.1–0.9)
MONOCYTES NFR BLD AUTO: 6 %
MORPHOLOGY BLD-IMP: ABNORMAL
NEUTROPHILS # BLD AUTO: 4.2 X10E3/UL (ref 1.4–7)
NEUTROPHILS NFR BLD AUTO: 60 %
PLATELET # BLD AUTO: 96 X10E3/UL (ref 150–450)
POTASSIUM SERPL-SCNC: 4.1 MMOL/L (ref 3.5–5.2)
PROT SERPL-MCNC: 6.8 G/DL (ref 6–8.5)
RBC # BLD AUTO: 4.22 X10E6/UL (ref 3.77–5.28)
SODIUM SERPL-SCNC: 140 MMOL/L (ref 134–144)
TRIGL SERPL-MCNC: 208 MG/DL (ref 0–149)
WBC # BLD AUTO: 7.1 X10E3/UL (ref 3.4–10.8)

## 2022-12-21 DIAGNOSIS — R10.9 ABDOMINAL CRAMPING: ICD-10-CM

## 2022-12-21 DIAGNOSIS — I21.A1 TYPE 2 MYOCARDIAL INFARCTION (HCC): ICD-10-CM

## 2022-12-21 DIAGNOSIS — K52.9 CHRONIC DIARRHEA: ICD-10-CM

## 2022-12-21 DIAGNOSIS — I70.201 POPLITEAL ARTERY STENOSIS, RIGHT (HCC): ICD-10-CM

## 2022-12-22 DIAGNOSIS — G35 MULTIPLE SCLEROSIS (HCC): ICD-10-CM

## 2022-12-22 RX ORDER — BACLOFEN 10 MG/1
TABLET ORAL
Qty: 540 TABLET | Refills: 1 | Status: SHIPPED | OUTPATIENT
Start: 2022-12-22

## 2022-12-22 RX ORDER — GABAPENTIN 300 MG/1
300 CAPSULE ORAL 2 TIMES DAILY
Qty: 180 CAPSULE | Refills: 0 | Status: SHIPPED | OUTPATIENT
Start: 2022-12-22

## 2022-12-22 RX ORDER — DICYCLOMINE HYDROCHLORIDE 10 MG/1
10 CAPSULE ORAL
Qty: 90 CAPSULE | Refills: 0 | Status: SHIPPED | OUTPATIENT
Start: 2022-12-22

## 2023-01-06 DIAGNOSIS — Z89.619 S/P AKA (ABOVE KNEE AMPUTATION) (HCC): ICD-10-CM

## 2023-01-12 DIAGNOSIS — I21.A1 TYPE 2 MYOCARDIAL INFARCTION (HCC): ICD-10-CM

## 2023-01-12 DIAGNOSIS — I70.201 POPLITEAL ARTERY STENOSIS, RIGHT (HCC): ICD-10-CM

## 2023-01-13 RX ORDER — METOPROLOL TARTRATE 100 MG/1
100 TABLET ORAL EVERY 12 HOURS
Qty: 180 TABLET | Refills: 0 | Status: SHIPPED | OUTPATIENT
Start: 2023-01-13

## 2023-01-23 DIAGNOSIS — K52.9 CHRONIC DIARRHEA: ICD-10-CM

## 2023-01-23 DIAGNOSIS — I10 ESSENTIAL HYPERTENSION: ICD-10-CM

## 2023-01-23 DIAGNOSIS — R10.9 ABDOMINAL CRAMPING: ICD-10-CM

## 2023-01-24 ENCOUNTER — TELEPHONE (OUTPATIENT)
Dept: NEUROLOGY | Facility: CLINIC | Age: 63
End: 2023-01-24

## 2023-01-24 DIAGNOSIS — G35 MULTIPLE SCLEROSIS (HCC): ICD-10-CM

## 2023-01-24 RX ORDER — AMLODIPINE AND OLMESARTAN MEDOXOMIL 5; 20 MG/1; MG/1
1 TABLET ORAL DAILY
Qty: 90 TABLET | Refills: 0 | Status: SHIPPED | OUTPATIENT
Start: 2023-01-24 | End: 2023-02-01 | Stop reason: SDUPTHER

## 2023-01-24 RX ORDER — DICYCLOMINE HYDROCHLORIDE 10 MG/1
10 CAPSULE ORAL
Qty: 90 CAPSULE | Refills: 0 | Status: SHIPPED | OUTPATIENT
Start: 2023-01-24 | End: 2023-02-01 | Stop reason: SDUPTHER

## 2023-01-24 NOTE — TELEPHONE ENCOUNTER
Called pt  Received vm  Left detailed msg (per consent in chart) regarding results and recommendations below

## 2023-01-24 NOTE — TELEPHONE ENCOUNTER
Nursing, Please let pt know that platelets still on low side at 93   Please confirm that pt remains off aubagio since sept  Pt completed cholestyramine washout and levels of med in nov were no longer detected   Please have pt follow up with pcp for etiology of low platelets as still low despite washout  Strongly rec follow up with pcp and hematology if no other medical sources identified for her low platelets     I am ccing her pcp who is following her cbc diff counts as well   Dr Roxanna Taveras, any causes for the low plts from med standpoint?  Any role for hematology eval?

## 2023-01-24 NOTE — TELEPHONE ENCOUNTER
----- Message from Memo Palmer MD sent at 1/24/2023  4:00 PM EST -----  Nursing, Please let pt know that platelets still on low side at 93  Please confirm that pt remains off aubagio since sept  Pt completed cholestyramine washout and levels of med in nov were no longer detected  Please have pt follow up with pcp for etiology of low platelets as still low despite washout  Strongly rec follow up with pcp and hematology if no other medical sources identified for her low platelets  I am ccing her pcp who is following her cbc diff counts as well  Dr Zach Giron, any causes for the low plts from med standpoint?   Any role for hematology eval?

## 2023-01-25 DIAGNOSIS — D69.6 THROMBOCYTOPENIA (HCC): Primary | ICD-10-CM

## 2023-01-25 NOTE — TELEPHONE ENCOUNTER
Please follow up with pt later today to make sure she got message and need to follow up with pcp  I am placing  hematology consult for her as well in interim  Awaiting furhter input from pcp

## 2023-01-25 NOTE — TELEPHONE ENCOUNTER
Called both home and cell phone numbers  Left detailed msg on both (per consent in chart) regarding results recommendations below       Requested return call to confirm:  1) pt received our message  2) pt has remained off of Aubagio since September

## 2023-01-26 ENCOUNTER — TELEPHONE (OUTPATIENT)
Dept: FAMILY MEDICINE CLINIC | Facility: CLINIC | Age: 63
End: 2023-01-26

## 2023-01-26 NOTE — TELEPHONE ENCOUNTER
Left another detailed message for pt at mobile and home numbers  3rd attempt  Letter mailed  FYERIKA, unable to reach patient  Nursing has exhausted our attempts at reaching her  Detailed messages were left for pt x3

## 2023-01-26 NOTE — TELEPHONE ENCOUNTER
Patients mother was called and she stated that patient has been having trouble finding someone to bring her  I told mother to have patient give us a call at her earliest convenience    Nitza Salinas

## 2023-01-26 NOTE — TELEPHONE ENCOUNTER
Duplicate encounter  Please see 1/24/23 telephone encounter "abnormal lab results"      Closing this encounter

## 2023-01-27 RX ORDER — CHOLESTYRAMINE 4 G/9G
POWDER, FOR SUSPENSION ORAL
Qty: 33 PACKET | Refills: 0 | Status: SHIPPED | OUTPATIENT
Start: 2023-01-27

## 2023-01-27 NOTE — TELEPHONE ENCOUNTER
Hi, this is Skylar Mobley I am returning Fabby's call  My mom is going to get the powder so I can start taking that right away  And If you could call me back and we'll schedule the appointment with Dr Noris Gill in [place] office  Candy Saleh, thank you  Sandy Huston [phone]    Called [phone], spoke w/ pt and made aware that I received her message and someone from scheduling dept will call her to schedule f/u appt      Pls assist    thanks

## 2023-01-27 NOTE — TELEPHONE ENCOUNTER
Left detailed message for pt making her aware  Asked for call back to confirm receipt of message as well as to schedule f/u      *Needs to be scheduled for OVL with residency (Dr Eduardo Cheatham) or next available with Yang Stoll

## 2023-01-27 NOTE — TELEPHONE ENCOUNTER
Cholestyramine sent to pharmacy  3x/day x 11 days, then can get labs done for leflunomide level with [facility] form      She can follow up next avail with myself or Carole Pollard

## 2023-01-27 NOTE — TELEPHONE ENCOUNTER
Patient returned call  Notifying prior messages received  She had restarted aubagio in December  Follow up with Dr Lombardi this coming week

## 2023-01-27 NOTE — TELEPHONE ENCOUNTER
I spoke to Riley Talley, she is aware neurology has been trying to reach her  She states she will call them back today  She is scheduled to see Dr GAGNON next week       Flavia Roberts MA

## 2023-01-27 NOTE — TELEPHONE ENCOUNTER
Placed a call to patient to schedule a follow up appointment  per nurse message with residency ( Dr Ita Lomas) or Mariam Iglesias

## 2023-01-27 NOTE — TELEPHONE ENCOUNTER
Spoke with pt  She apologized for missing our calls, states her voicemail machine fell behind furniture and she was unaware of messages  Pt confirms she restarted Aubagio on 12/1/22 or 12/2/22  States she did this shortly after her November labs as she had not heard anything  Reports she felt uneasy being off DMT so she restarted Aubagio  I did advise pt that if she should ever have any concerns like this, she should contact our office prior to making changes  Advised pt she should not take Aubagio  Discussed washout will be needed with repeat labs to ensure level returns to 0  Pt agreeable, uses [facility] for home lab draws  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 and does not want to return to using injectable DMT  Pt does not have neuro f/u to discuss plan moving forward  Please advise when pt should be seen in office  I did advise pt to discard of any remaining Aubagio at home  Advised she should NOT restart this medication  Also advised we will cancel Rx in our system which will cancel Rx at the pharmacy to prevent future refills  Pt verbalizes understanding and is agreeable  Aubagio Rx discontinued  Pt aware of continued need to f/u with PCP, has appt 2/1/23 with them  Please send cholestyramine Rx  Will obtain lab script when in [place] office

## 2023-01-27 NOTE — TELEPHONE ENCOUNTER
Reviewed below  Called pt as soon as saw message  Aubagio was discontinued in sept , see full messages to pt  Med was washed out and nov labs checked as 0 level for aubagio  Unsure why pt restarted med in December  Please re clarify with pt that it was [place] that she restarted as no direction from me or nursing to do so  Pt was already washed out on medication  If pt did restart med, this will need to be washed out again  Labs still with low platlets and now unsure if related to aubagio or any other causes  I left 2 messages this morning both on cell and also home and could only leave messages  Please retry pt again later today and see if we can talk to her to clarify if in fact restarted med despite washout  If restarted on her own, needs to have cholestyramine washout as we did before for 10 days with rescheck of levels after washout  Pt still needs to see pcp for any other causes of lowered trending of platelet counts  If any increased bleeding, need to see pcp as well

## 2023-01-27 NOTE — TELEPHONE ENCOUNTER
This was in regards to an encounter from neuro  Patient is scheduled to see Dr GAGNON next week  No further action required       Manisha Chirinos MA

## 2023-01-28 NOTE — TELEPHONE ENCOUNTER
Please mail out [facility] form for pt to get aubagio level done after completing her cholestyramine washout    Please mail to her home

## 2023-01-30 ENCOUNTER — RA CDI HCC (OUTPATIENT)
Dept: OTHER | Facility: HOSPITAL | Age: 63
End: 2023-01-30

## 2023-01-30 NOTE — PROGRESS NOTES
[facility]  coding opportunities       Chart reviewed, no opportunity found:   [address]        Patients Insurance     Medicare Insurance: [employer]

## 2023-02-01 ENCOUNTER — OFFICE VISIT (OUTPATIENT)
Dept: FAMILY MEDICINE CLINIC | Facility: CLINIC | Age: 63
End: 2023-02-01

## 2023-02-01 VITALS
HEART RATE: 66 BPM | OXYGEN SATURATION: 96 % | TEMPERATURE: 97.2 F | SYSTOLIC BLOOD PRESSURE: 122 MMHG | DIASTOLIC BLOOD PRESSURE: 80 MMHG | RESPIRATION RATE: 16 BRPM

## 2023-02-01 DIAGNOSIS — G35 MULTIPLE SCLEROSIS (HCC): ICD-10-CM

## 2023-02-01 DIAGNOSIS — Z89.611 S/P AKA (ABOVE KNEE AMPUTATION) UNILATERAL, RIGHT (HCC): ICD-10-CM

## 2023-02-01 DIAGNOSIS — I70.201 POPLITEAL ARTERY STENOSIS, RIGHT (HCC): ICD-10-CM

## 2023-02-01 DIAGNOSIS — Z89.619 S/P AKA (ABOVE KNEE AMPUTATION) (HCC): ICD-10-CM

## 2023-02-01 DIAGNOSIS — R10.9 ABDOMINAL CRAMPING: ICD-10-CM

## 2023-02-01 DIAGNOSIS — K52.9 CHRONIC DIARRHEA: ICD-10-CM

## 2023-02-01 DIAGNOSIS — Z00.00 MEDICARE ANNUAL WELLNESS VISIT, SUBSEQUENT: Primary | ICD-10-CM

## 2023-02-01 DIAGNOSIS — I10 ESSENTIAL HYPERTENSION: ICD-10-CM

## 2023-02-01 DIAGNOSIS — I74.3 EMBOLISM AND THROMBOSIS OF ARTERIES OF THE LOWER EXTREMITIES (HCC): ICD-10-CM

## 2023-02-01 DIAGNOSIS — D69.6 THROMBOCYTOPENIA (HCC): ICD-10-CM

## 2023-02-01 DIAGNOSIS — I21.A1 TYPE 2 MYOCARDIAL INFARCTION (HCC): ICD-10-CM

## 2023-02-01 RX ORDER — AMLODIPINE AND OLMESARTAN MEDOXOMIL 5; 20 MG/1; MG/1
1 TABLET ORAL DAILY
Qty: 90 TABLET | Refills: 1 | Status: SHIPPED | OUTPATIENT
Start: 2023-02-01

## 2023-02-01 RX ORDER — DICYCLOMINE HYDROCHLORIDE 10 MG/1
10 CAPSULE ORAL
Qty: 270 CAPSULE | Refills: 1 | Status: SHIPPED | OUTPATIENT
Start: 2023-02-01 | End: 2023-07-31

## 2023-02-01 RX ORDER — METOPROLOL TARTRATE 100 MG/1
100 TABLET ORAL EVERY 12 HOURS
Qty: 180 TABLET | Refills: 1 | Status: SHIPPED | OUTPATIENT
Start: 2023-02-01

## 2023-02-01 RX ORDER — GABAPENTIN 300 MG/1
300 CAPSULE ORAL 2 TIMES DAILY
Qty: 180 CAPSULE | Refills: 1 | Status: SHIPPED | OUTPATIENT
Start: 2023-02-01

## 2023-02-01 NOTE — PATIENT INSTRUCTIONS
Medicare Preventive Visit Patient Instructions  Thank you for completing your Welcome to Medicare Visit or Medicare Annual Wellness Visit today  Your next wellness visit will be due in one year (2/2/2024)  The screening/preventive services that you may require over the next 5-10 years are detailed below  Some tests may not apply to you based off risk factors and/or age  Screening tests ordered at today's visit but not completed yet may show as past due  Also, please note that scanned in results may not display below  Preventive Screenings:  Service Recommendations Previous Testing/Comments   Colorectal Cancer Screening  * Colonoscopy    * Fecal Occult Blood Test (FOBT)/Fecal Immunochemical Test (FIT)  * Fecal DNA/Cologuard Test  * Flexible Sigmoidoscopy Age: 39-70 years old   Colonoscopy: every 10 years (may be performed more frequently if at higher risk)  OR  FOBT/FIT: every 1 year  OR  Cologuard: every 3 years  OR  Sigmoidoscopy: every 5 years  Screening may be recommended earlier than age 39 if at higher risk for colorectal cancer  Also, an individualized decision between you and your healthcare provider will decide whether screening between the ages of 74-80 would be appropriate  Colonoscopy: Not on file  FOBT/FIT: Not on file  Cologuard: Not on file  Sigmoidoscopy: Not on file          Breast Cancer Screening Age: 36 years old  Frequency: every 1-2 years  Not required if history of left and right mastectomy Mammogram: 07/30/2015        Cervical Cancer Screening Between the ages of 21-29, pap smear recommended once every 3 years  Between the ages of 33-67, can perform pap smear with HPV co-testing every 5 years     Recommendations may differ for women with a history of total hysterectomy, cervical cancer, or abnormal pap smears in past  Pap Smear: Not on file        Hepatitis C Screening Once for adults born between [facility]  More frequently in patients at high risk for Hepatitis C Hep C Antibody: 07/18/2019    Screening Current   Diabetes Screening 1-2 times per year if you're at risk for diabetes or have pre-diabetes Fasting glucose: No results in last 5 years (No results in last 5 years)  A1C: No results in last 5 years (No results in last 5 years)  Screening Current   Cholesterol Screening Once every 5 years if you don't have a lipid disorder  May order more often based on risk factors  Lipid panel: 12/01/2022    Screening Not Indicated  History Lipid Disorder     Other Preventive Screenings Covered by Medicare:  1  Abdominal Aortic Aneurysm (AAA) Screening: covered once if your at risk  You're considered to be at risk if you have a family history of AAA  2  Lung Cancer Screening: covers low dose CT scan once per year if you meet all of the following conditions: (1) Age 50-69; (2) No signs or symptoms of lung cancer; (3) Current smoker or have quit smoking within the last 15 years; (4) You have a tobacco smoking history of at least 20 pack years (packs per day multiplied by number of years you smoked); (5) You get a written order from a healthcare provider  3  Glaucoma Screening: covered annually if you're considered high risk: (1) You have diabetes OR (2) Family history of glaucoma OR (3)  aged 48 and older OR (3)  American aged 72 and older  3  Osteoporosis Screening: covered every 2 years if you meet one of the following conditions: (1) You're estrogen deficient and at risk for osteoporosis based off medical history and other findings; (2) Have a vertebral abnormality; (3) On glucocorticoid therapy for more than 3 months; (4) Have primary hyperparathyroidism; (5) On osteoporosis medications and need to assess response to drug therapy  · Last bone density test (DXA Scan): Not on file  5  HIV Screening: covered annually if you're between the age of 12-76  Also covered annually if you are younger than 13 and older than 72 with risk factors for HIV infection   For pregnant Dr. Ojeda patients, it is covered up to 3 times per pregnancy  Immunizations:  Immunization Recommendations   Influenza Vaccine Annual influenza vaccination during flu season is recommended for all persons aged >= 6 months who do not have contraindications   Pneumococcal Vaccine   * Pneumococcal conjugate vaccine = PCV13 (Prevnar 13), PCV15 (Vaxneuvance), PCV20 (Prevnar 20)  * Pneumococcal polysaccharide vaccine = PPSV23 (Pneumovax) Adults 25-60 years old: 1-3 doses may be recommended based on certain risk factors  Adults 72 years old: 1-2 doses may be recommended based off what pneumonia vaccine you previously received   Hepatitis B Vaccine 3 dose series if at intermediate or high risk (ex: diabetes, end stage renal disease, liver disease)   Tetanus (Td) Vaccine - COST NOT COVERED BY MEDICARE PART B Following completion of primary series, a booster dose should be given every 10 years to maintain immunity against tetanus  Td may also be given as tetanus wound prophylaxis  Tdap Vaccine - COST NOT COVERED BY MEDICARE PART B Recommended at least once for all adults  For pregnant patients, recommended with each pregnancy  Shingles Vaccine (Shingrix) - COST NOT COVERED BY MEDICARE PART B  2 shot series recommended in those aged 48 and above     Health Maintenance Due:      Topic Date Due   • HIV Screening  Never done   • Cervical Cancer Screening  Never done   • Colorectal Cancer Screening  Never done   • Breast Cancer Screening: Mammogram  07/30/2016   • Hepatitis C Screening  Completed     Immunizations Due:      Topic Date Due   • COVID-19 Vaccine (1) Never done   • Pneumococcal Vaccine: Pediatrics (0 to 5 Years) and At-Risk Patients (6 to 59 Years) (2 - PCV) 06/10/2015   • Influenza Vaccine (1) 09/01/2022     Advance Directives   What are advance directives? Advance directives are legal documents that state your wishes and plans for medical care   These plans are made ahead of time in case you lose your ability to make decisions for yourself  Advance directives can apply to any medical decision, such as the treatments you want, and if you want to donate organs  What are the types of advance directives? There are many types of advance directives, and each state has rules about how to use them  You may choose a combination of any of the following:  · Living will: This is a written record of the treatment you want  You can also choose which treatments you do not want, which to limit, and which to stop at a certain time  This includes surgery, medicine, IV fluid, and tube feedings  · Durable power of  for healthcare [facility]): This is a written record that states who you want to make healthcare choices for you when you are unable to make them for yourself  This person, called a proxy, is usually a family member or a friend  You may choose more than 1 proxy  · Do not resuscitate (DNR) order:  A DNR order is used in case your heart stops beating or you stop breathing  It is a request not to have certain forms of treatment, such as CPR  A DNR order may be included in other types of advance directives  · Medical directive: This covers the care that you want if you are in a coma, near death, or unable to make decisions for yourself  You can list the treatments you want for each condition  Treatment may include pain medicine, surgery, blood transfusions, dialysis, IV or tube feedings, and a ventilator (breathing machine)  · Values history: This document has questions about your views, beliefs, and how you feel and think about life  This information can help others choose the care that you would choose  Why are advance directives important? An advance directive helps you control your care  Although spoken wishes may be used, it is better to have your wishes written down  Spoken wishes can be misunderstood, or not followed  Treatments may be given even if you do not want them   An advance directive may make it easier for your family to make difficult choices about your care  Urinary Incontinence   Urinary incontinence (UI)  is when you lose control of your bladder  UI develops because your bladder cannot store or empty urine properly  The 3 most common types of UI are stress incontinence, urge incontinence, or both  Medicines:   · May be given to help strengthen your bladder control  Report any side effects of medication to your healthcare provider  Do pelvic muscle exercises often:  Your pelvic muscles help you stop urinating  Squeeze these muscles tight for 5 seconds, then relax for 5 seconds  Gradually work up to squeezing for 10 seconds  Do 3 sets of 15 repetitions a day, or as directed  This will help strengthen your pelvic muscles and improve bladder control  Train your bladder:  Go to the bathroom at set times, such as every 2 hours, even if you do not feel the urge to go  You can also try to hold your urine when you feel the urge to go  For example, hold your urine for 5 minutes when you feel the urge to go  As that becomes easier, hold your urine for 10 minutes  Self-care:   · Keep a UI record  Write down how often you leak urine and how much you leak  Make a note of what you were doing when you leaked urine  · Drink liquids as directed  You may need to limit the amount of liquid you drink to help control your urine leakage  Do not drink any liquid right before you go to bed  Limit or do not have drinks that contain caffeine or alcohol  · Prevent constipation  Eat a variety of high-fiber foods  Good examples are high-fiber cereals, beans, vegetables, and whole-grain breads  Walking is the best way to trigger your intestines to have a bowel movement  · Exercise regularly and maintain a healthy weight  Weight loss and exercise will decrease pressure on your bladder and help you control your leakage  · Use a catheter as directed  to help empty your bladder   A catheter is a tiny, plastic tube that is put into your bladder to drain your urine  · Go to behavior therapy as directed  Behavior therapy may be used to help you learn to control your urge to urinate  Cigarette Smoking and Your Health   Risks to your health if you smoke:  Nicotine and other chemicals found in tobacco damage every cell in your body  Even if you are a light smoker, you have an increased risk for cancer, heart disease, and lung disease  If you are pregnant or have diabetes, smoking increases your risk for complications  Benefits to your health if you stop smoking:   · You decrease respiratory symptoms such as coughing, wheezing, and shortness of breath  · You reduce your risk for cancers of the lung, mouth, throat, kidney, bladder, pancreas, stomach, and cervix  If you already have cancer, you increase the benefits of chemotherapy  You also reduce your risk for cancer returning or a second cancer from developing  · You reduce your risk for heart disease, blood clots, heart attack, and stroke  · You reduce your risk for lung infections, and diseases such as pneumonia, asthma, chronic bronchitis, and emphysema  · Your circulation improves  More oxygen can be delivered to your body  If you have diabetes, you lower your risk for complications, such as kidney, artery, and eye diseases  You also lower your risk for nerve damage  Nerve damage can lead to amputations, poor vision, and blindness  · You improve your body's ability to heal and to fight infections  For more information and support to stop smoking:   · Smokefree  gov  Phone: 8- 657 - 337-5354  Web Address: www [employer]   [address] Information is for End User's use only and may not be sold, redistributed or otherwise used for commercial purposes   All illustrations and images included in CareNotes® are the copyrighted property of A D A M [employer] Inc  or [facility] [employer]

## 2023-02-01 NOTE — PROGRESS NOTES
Assessment and Plan:     Problem List Items Addressed This Visit        Cardiovascular and Mediastinum    Essential hypertension     stable         Relevant Medications    amlodipine-olmesartan (SELENE) 5-20 MG    metoprolol tartrate (LOPRESSOR) 100 mg tablet    Type 2 myocardial infarction (HCC)    Relevant Medications    metoprolol tartrate (LOPRESSOR) 100 mg tablet    gabapentin (NEURONTIN) 300 mg capsule    Popliteal artery stenosis, right (HCC)    Relevant Medications    metoprolol tartrate (LOPRESSOR) 100 mg tablet    gabapentin (NEURONTIN) 300 mg capsule    Embolism and thrombosis of arteries of the lower extremities (HCC)       Nervous and Auditory    Multiple sclerosis ([facility] )    Relevant Orders    Ambulatory Referral to Hematology / Oncology       Hematopoietic and Hemostatic    Thrombocytopenia ([facility] )     No spontantaneous bleeding or bruising  Relevant Medications    rivaroxaban (XARELTO) 20 mg tablet    Other Relevant Orders    Ambulatory Referral to Hematology / Oncology       Other    S/P AKA (above knee amputation) unilateral, right (HCC)   Other Visit Diagnoses     Medicare annual wellness visit, subsequent    -  Primary    Chronic diarrhea        Relevant Medications    dicyclomine (BENTYL) 10 mg capsule    Abdominal cramping        Relevant Medications    dicyclomine (BENTYL) 10 mg capsule    S/P AKA (above knee amputation) (HCC)        Relevant Medications    rivaroxaban (XARELTO) 20 mg tablet        BMI Counseling: There is no height or weight on file to calculate BMI  The BMI is above normal  Nutrition recommendations include decreasing portion sizes  Exercise recommendations include moderate physical activity 150 minutes/week  No pharmacotherapy was ordered  Rationale for BMI follow-up plan is due to patient being overweight or obese  Depression Screening and Follow-up Plan: Patient was screened for depression during today's encounter   They screened negative with a PHQ-2 score of 0       Preventive health issues were discussed with patient, and age appropriate screening tests were ordered as noted in patient's After Visit Summary  Personalized health advice and appropriate referrals for health education or preventive services given if needed, as noted in patient's After Visit Summary  History of Present Illness:     Patient presents for a Medicare Wellness Visit    Pt is here at my request to discuss labs pt is due for an [facility] [address] [address]  Neurology is concerned about pt's platelets - pt was taken off her M S med by neurology to see if that was an issue  Pt was off of it for 4 months  PT had labs done in Dec and in Jan   Pt started taking the M S  med in dec  The labs were drawn back on the M S  med         Patient Care Team:  Ernestina Adan DO as PCP - General (Family Medicine)  Sho Montez MD as PCP - Wound (Wound Care)  Pato Piper MD as Consulting Physician (Vascular Surgery)  Isabel Jensen MD as Consulting Physician (Neurology)  Jocelyn Macdonald PA-C as Consulting Physician (Neurology)     Review of Systems:     Review of Systems     Problem List:     Patient Active Problem List   Diagnosis   • Essential hypertension   • Lumbar spinal stenosis   • Multiple sclerosis [facility])   • Multiple thyroid nodules   • Thrombocytopenia ([facility])   • Ventricular hypertrophy   • Vitamin D deficiency   • Bilateral hip pain   • Leukocytosis   • Smoker   • Sinus tachycardia   • Type 2 myocardial infarction [facility])   • Generalized weakness   • Popliteal artery stenosis, right ([facility])   • S/P AKA (above knee amputation) unilateral, right ([facility])   • Incontinence of urine in female   • Venous insufficiency of left leg   • Phantom pain after amputation of lower extremity ([facility])   • Chronic fatigue   • Spasticity   • Open wound   • Embolism and thrombosis of arteries of the lower extremities ([facility] )   • Mixed hyperlipidemia      Past Medical and Surgical History:     Past Medical History:   Diagnosis Date   • Chronic back pain    • Depression     last assessed 06/10/14   • High cholesterol    • Hypertension     last assessed 17   • MS (multiple sclerosis) ([facility] )     last assessed 17   • RLL pneumonia     last assessed 06/10/14     Past Surgical History:   Procedure Laterality Date   • AMPUTATION ABOVE KNEE (AKA) Right 2020    Procedure: AMPUTATION ABOVE KNEE (AKA);   Surgeon: Carlos Wright MD;  Location: [facility];  Service: Vascular   • ANKLE SURGERY Right     Treatment of Ankle  last assessed 06/10/14   • BACK SURGERY      for spinal stenosis and sciatica   •  SECTION      last assessed 06/10/14   • KNEE ARTHROSCOPY      last assessed 06/10/14   • LAMINECTOMY      last assessed 06/10/14   • ORTHOPEDIC SURGERY     • OTHER SURGICAL HISTORY      discectomy      Family History:     Family History   Problem Relation Age of Onset   • Osteoporosis Mother    • Heart attack Father    • Mental illness Neg Hx       Social History:     Social History     Socioeconomic History   • Marital status:      Spouse name: None   • Number of children: 2   • Years of education: 14   • Highest education level: Associate degree: occupational, technical, or vocational program   Occupational History   • None   Tobacco Use   • Smoking status: Light Smoker     Packs/day: 0 25     Years: 40 00     Pack years: 10 00     Types: Cigarettes     Start date:      Last attempt to quit: 7/10/2019     Years since quitting: 3 5   • Smokeless tobacco: Never   Vaping Use   • Vaping Use: Never used   Substance and Sexual Activity   • Alcohol use: Yes     Comment: rare // no alcohol use as per allscripts   • Drug use: No   • Sexual activity: None   Other Topics Concern   • None   Social History Narrative    Caffeine use     Social Determinants of Health     Financial Resource Strain: Low Risk    • Difficulty of Paying Living Expenses: Not very hard   Food Insecurity: Not on file   Transportation Needs: Unmet Transportation Needs   • Lack of Transportation (Medical):  Yes   • Lack of Transportation (Non-Medical): Yes   Physical Activity: Not on file   Stress: Not on file   Social Connections: Not on file   Intimate Partner Violence: Not on file   Housing Stability: Not on file      Medications and Allergies:     Current Outpatient Medications   Medication Sig Dispense Refill   • amantadine (SYMMETREL) 100 mg capsule Take 1 capsule (100 mg total) by mouth 2 (two) times a day 180 capsule 3   • amlodipine-olmesartan (SELENE) 5-20 MG Take 1 tablet by mouth daily 90 tablet 1   • ascorbic acid (VITAMIN C) 250 mg tablet Take 250 mg by mouth daily     • baclofen 10 mg tablet TAKE 2 TABLETS 3 TIMES A  tablet 1   • cholecalciferol (VITAMIN D3) 1,000 units tablet Take 1,000 Units by mouth daily     • cholestyramine (QUESTRAN) 4 g packet Mix 1 packet with 4-6oz of fluid 3x/day x 11 days 33 packet 0   • dicyclomine (BENTYL) 10 mg capsule Take 1 capsule (10 mg total) by mouth 3 (three) times a day before meals 270 capsule 1   • gabapentin (NEURONTIN) 300 mg capsule Take 1 capsule (300 mg total) by mouth 2 (two) times a day 180 capsule 1   • LORazepam (ATIVAN) 0 5 mg tablet Take 1 tab 30 min prior to MRI, may repeat x 1 immediately before if needed 4 tablet 0   • metoprolol tartrate (LOPRESSOR) 100 mg tablet Take 1 tablet (100 mg total) by mouth every 12 (twelve) hours 180 tablet 1   • Multiple Vitamins-Minerals (Multivitamin Adult Extra C) CHEW Chew 1 tablet daily     • rivaroxaban (XARELTO) 20 mg tablet Take 1 tablet (20 mg total) by mouth daily with breakfast 90 tablet 1   • rosuvastatin (CRESTOR) 10 MG tablet Take 1 tablet (10 mg total) by mouth daily 90 tablet 3   • cadexomer iodine (IODOSORB) 0 9 % gel Apply 1 application topically in the morning (Patient not taking: Reported on 2/1/2023) 40 g 0   • mupirocin (BACTROBAN) 2 % ointment Apply topically 3 (three) times a day (Patient not taking: Reported on 2/1/2023) 30 g 0     No current facility-administered medications for this visit  No Known Allergies   Immunizations:     Immunization History   Administered Date(s) Administered   • Influenza, recombinant, quadrivalent,injectable, preservative free 12/08/2019, 11/10/2021   • Pneumococcal Polysaccharide PPV23 06/10/2014   • Tdap 06/07/2022      Health Maintenance:         Topic Date Due   • HIV Screening  Never done   • Cervical Cancer Screening  Never done   • Colorectal Cancer Screening  Never done   • Breast Cancer Screening: Mammogram  07/30/2016   • Hepatitis C Screening  Completed         Topic Date Due   • COVID-19 Vaccine (1) Never done   • Pneumococcal Vaccine: Pediatrics (0 to 5 Years) and At-Risk Patients (6 to 59 Years) (2 - PCV) 06/10/2015   • Influenza Vaccine (1) 09/01/2022      Medicare Screening Tests and Risk Assessments:     Real Jarvis is here for her Subsequent Wellness visit  Last Medicare Wellness visit information reviewed, patient interviewed, no change since last AWV  Health Risk Assessment:   Patient rates overall health as good  Patient feels that their physical health rating is same  Patient is satisfied with their life  Eyesight was rated as much worse  Hearing was rated as same  Patient feels that their emotional and mental health rating is same  Patients states they are sometimes angry  Patient states they are sometimes unusually tired/fatigued  Pain experienced in the last 7 days has been none  Patient states that she has experienced no weight loss or gain in last 6 months  Depression Screening:   PHQ-2 Score: 0      Fall Risk Screening: In the past year, patient has experienced: no history of falling in past year      Urinary Incontinence Screening:   Patient has leaked urine accidently in the last six months  Home Safety:  Patient has trouble with stairs inside or outside of their home  Patient has working smoke alarms and has no working carbon monoxide detector  Home safety hazards include: none  Nutrition:   Current diet is Regular  Medications:   Patient is currently taking over-the-counter supplements  OTC medications include: see medication list  Patient is able to manage medications  Activities of Daily Living (ADLs)/Instrumental Activities of Daily Living (IADLs):   Walk and transfer into and out of bed and chair?: Yes  Dress and groom yourself?: Yes    Bathe or shower yourself?: Yes    Feed yourself? Yes  Do your laundry/housekeeping?: Yes  Manage your money, pay your bills and track your expenses?: Yes  Make your own meals?: Yes    Do your own shopping?: No    Previous Hospitalizations:   Any hospitalizations or ED visits within the last 12 months?: No      Advance Care Planning:   Living will: No      PREVENTIVE SCREENINGS      Cardiovascular Screening:    General: Screening Not Indicated, History Lipid Disorder and Risks and Benefits Discussed    Due for: Lipid Panel      Diabetes Screening:     General: Screening Current and Risks and Benefits Discussed    Due for: Blood Glucose      Colorectal Cancer Screening:     General: Risks and Benefits Discussed and Patient Declines      Breast Cancer Screening:     General: Risks and Benefits Discussed and Patient Declines      Cervical Cancer Screening:    General: Risks and Benefits Discussed and Patient Declines      Osteoporosis Screening:    General: Patient Declines and Risks and Benefits Discussed      Abdominal Aortic Aneurysm (AAA) Screening:        General: Screening Not Indicated      Lung Cancer Screening:     General: Screening Not Indicated      Hepatitis C Screening:    General: Screening Current    Screening, Brief Intervention, and Referral to Treatment (SBIRT)    Screening  Typical number of drinks in a day: 0  Typical number of drinks in a week: 0  Interpretation: Low risk drinking behavior      Single Item Drug Screening:  How often have you used an illegal drug (including marijuana) or a prescription medication for non-medical reasons in the past year? never    Single Item Drug Screen Score: 0  Interpretation: Negative screen for possible drug use disorder    Brief Intervention  Alcohol & drug use screenings were reviewed  No concerns regarding substance use disorder identified  No results found       Physical Exam:     /80   Pulse 66   Temp (!) 97 2 °F (36 2 °C)   Resp 16   SpO2 96%     Physical Exam     Recent Results (from the past 2016 hour(s))   CBC and differential    Collection Time: 12/01/22  7:30 AM   Result Value Ref Range    White Blood Cell Count 7 1 3 4 - 10 8 x10E3/uL    Red Blood Cell Count 4 22 3 77 - 5 28 x10E6/uL    Hemoglobin 12 5 11 1 - 15 9 g/dL    HCT 37 9 34 0 - 46 6 %    MCV 90 79 - 97 fL    MCH 29 6 26 6 - 33 0 pg    MCHC 33 0 31 5 - 35 7 g/dL    RDW 13 9 11 7 - 15 4 %    Platelet Count 96 (LL) 150 - 450 x10E3/uL    Neutrophils 60 Not Estab  %    Lymphocytes 33 Not Estab  %    Monocytes 6 Not Estab  %    Eosinophils 1 Not Estab  %    Basophils PCT 0 Not Estab  %    Neutrophils (Absolute) 4 2 1 4 - 7 0 x10E3/uL    Lymphocytes (Absolute) 2 4 0 7 - 3 1 x10E3/uL    Monocytes (Absolute) 0 4 0 1 - 0 9 x10E3/uL    Eosinophils (Absolute) 0 1 0 0 - 0 4 x10E3/uL    Basophils ABS 0 0 0 0 - 0 2 x10E3/uL    Immature Granulocytes 0 Not Estab  %    Immature Granulocytes (Absolute) 0 0 0 0 - 0 1 x10E3/uL    Hematology Comments Note:    Comprehensive metabolic panel    Collection Time: 12/01/22  7:30 AM   Result Value Ref Range    Glucose, Random 83 70 - 99 mg/dL    BUN 14 8 - 27 mg/dL    Creatinine 0 88 0 57 - 1 00 mg/dL    eGFR 74 >59 mL/min/1 73    SL AMB BUN/CREATININE RATIO 16 12 - 28    Sodium 140 134 - 144 mmol/L    Potassium 4 1 3 5 - 5 2 mmol/L    Chloride 102 96 - 106 mmol/L    CO2 23 20 - 29 mmol/L    CALCIUM 9 6 8 7 - 10 3 mg/dL    Protein, Total 6 8 6 0 - 8 5 g/dL    Albumin 4 0 3 8 - 4 8 g/dL    Globulin, Total 2 8 1 5 - 4 5 g/dL    Albumin/Globulin Ratio 1 4 1 2 - 2 2    TOTAL BILIRUBIN <0 2 0 0 - 1 2 mg/dL    Alk Phos Isoenzymes 94 44 - 121 IU/L    AST 13 0 - 40 IU/L    ALT 11 0 - 32 IU/L   Hepatic function panel    Collection Time: 12/01/22  7:30 AM   Result Value Ref Range    Bilirubin, Direct <0 10 0 00 - 0 40 mg/dL   Lipid panel    Collection Time: 12/01/22  7:30 AM   Result Value Ref Range    Cholesterol, Total 170 100 - 199 mg/dL    Triglycerides 208 (H) 0 - 149 mg/dL    HDL 45 >39 mg/dL    LDL Calculated 90 0 - 99 mg/dL   Cardiovascular Report    Collection Time: 12/01/22  7:30 AM   Result Value Ref Range    Interpretation Note          Becky Phoenix DO

## 2023-02-07 ENCOUNTER — TELEPHONE (OUTPATIENT)
Dept: HEMATOLOGY ONCOLOGY | Facility: CLINIC | Age: 63
End: 2023-02-07

## 2023-02-14 ENCOUNTER — TELEPHONE (OUTPATIENT)
Dept: HEMATOLOGY ONCOLOGY | Facility: CLINIC | Age: 63
End: 2023-02-14

## 2023-02-15 ENCOUNTER — TELEPHONE (OUTPATIENT)
Dept: HEMATOLOGY ONCOLOGY | Facility: CLINIC | Age: 63
End: 2023-02-15

## 2023-03-16 ENCOUNTER — TELEPHONE (OUTPATIENT)
Dept: NEUROLOGY | Facility: CLINIC | Age: 63
End: 2023-03-16

## 2023-03-16 DIAGNOSIS — I21.A1 TYPE 2 MYOCARDIAL INFARCTION (HCC): ICD-10-CM

## 2023-03-16 DIAGNOSIS — G35 MULTIPLE SCLEROSIS (HCC): ICD-10-CM

## 2023-03-16 DIAGNOSIS — I70.201 POPLITEAL ARTERY STENOSIS, RIGHT (HCC): ICD-10-CM

## 2023-03-16 RX ORDER — GABAPENTIN 300 MG/1
300 CAPSULE ORAL 2 TIMES DAILY
Qty: 180 CAPSULE | Refills: 0 | Status: SHIPPED | OUTPATIENT
Start: 2023-03-16

## 2023-03-16 RX ORDER — BACLOFEN 10 MG/1
20 TABLET ORAL 3 TIMES DAILY
Qty: 540 TABLET | Refills: 0 | Status: CANCELLED | OUTPATIENT
Start: 2023-03-16

## 2023-03-16 NOTE — TELEPHONE ENCOUNTER
Richi Valdez, just got rx for baclofen renewal    Pt was last contacted in jan due to her self resumed aubagio  Pt was stopped on aubagio med in sept and med was washed out  However, pt restarted aubagio on own in [place]  Pt was contacted again in jan due to abn platelet labs  Pt was instructed to follow up with pcp and hematology and again washout the aubagio and get labs after washout including aubagio level  Referred to pcp as well to see if any additional medical causes for low platelets  I do not see any additional labs or confirmation of washout  I do not see a follow up appt with either of us  and I do not see updated labs or updated aubagio level  Please call pt and ensure that second washout was done  Pt also missed call for hematology consult ppt  Please work with nursing to make sure updated labs including updated cbc diff any for platelets and also lfts done asap  Hold on refill of baclofen until pt contacted tomorrow  needs follow up neuro appt and still need etiology of low plt count

## 2023-03-17 NOTE — TELEPHONE ENCOUNTER
Nursing---please see note below from Dr Rashaun Myers and contact patient  See encounters from 1/24/23 for more information    Appears patient saw her PCP on 2/1/23 and he agreed with heme referral   Heme has been trying to contact her unsuccessfully to schedule  She should have had a cholestyramine washout of the Aubagio and had labs done to check the serum level    Please find out if done

## 2023-03-22 NOTE — TELEPHONE ENCOUNTER
Hello,     Patient has called back and is requesting a call back at the home number [phone]      Thank you,     Farzana Thompson

## 2023-03-24 NOTE — TELEPHONE ENCOUNTER
Spoke to patient  Has completed second aubagio washout  Has not yet followed up with hematology due to transportation issues  Advised patient to schedule appt with heme and see if they offer transportation assistance  Patient states labs are typically drawn through [facility]  Call placed to [facility]  - [phone]  Request form downloaded and must be faxed with lab order form  [facility] Fax: [phone]     Form completed  Requires signature  Form is under media to be printed and signed  Jayy Salinas - please send back once signed and we can fax  Patient also requesting baclofen refilled

## 2023-03-27 DIAGNOSIS — G35 MULTIPLE SCLEROSIS (HCC): ICD-10-CM

## 2023-03-27 RX ORDER — BACLOFEN 10 MG/1
20 TABLET ORAL 3 TIMES DAILY
Qty: 540 TABLET | Refills: 0 | Status: CANCELLED | OUTPATIENT
Start: 2023-03-27

## 2023-03-27 RX ORDER — BACLOFEN 10 MG/1
20 TABLET ORAL 3 TIMES DAILY
Qty: 540 TABLET | Refills: 1 | Status: SHIPPED | OUTPATIENT
Start: 2023-03-27

## 2023-04-04 DIAGNOSIS — I21.A1 TYPE 2 MYOCARDIAL INFARCTION (HCC): ICD-10-CM

## 2023-04-04 DIAGNOSIS — Z89.619 S/P AKA (ABOVE KNEE AMPUTATION) (HCC): ICD-10-CM

## 2023-04-04 DIAGNOSIS — I70.201 POPLITEAL ARTERY STENOSIS, RIGHT (HCC): ICD-10-CM

## 2023-04-05 DIAGNOSIS — E78.2 MIXED HYPERLIPIDEMIA: ICD-10-CM

## 2023-04-05 RX ORDER — ROSUVASTATIN CALCIUM 10 MG/1
TABLET, COATED ORAL
Qty: 90 TABLET | Refills: 1 | Status: SHIPPED | OUTPATIENT
Start: 2023-04-05

## 2023-04-05 RX ORDER — METOPROLOL TARTRATE 100 MG/1
100 TABLET ORAL EVERY 12 HOURS
Qty: 180 TABLET | Refills: 0 | Status: SHIPPED | OUTPATIENT
Start: 2023-04-05

## 2023-04-21 DIAGNOSIS — I10 ESSENTIAL HYPERTENSION: ICD-10-CM

## 2023-04-24 RX ORDER — AMLODIPINE AND OLMESARTAN MEDOXOMIL 5; 20 MG/1; MG/1
1 TABLET ORAL DAILY
Qty: 90 TABLET | Refills: 0 | Status: SHIPPED | OUTPATIENT
Start: 2023-04-24

## 2023-05-22 ENCOUNTER — HOSPITAL ENCOUNTER (INPATIENT)
Facility: HOSPITAL | Age: 63
LOS: 10 days | Discharge: HOME HEALTH | DRG: 253 | End: 2023-06-01
Attending: EMERGENCY MEDICINE | Admitting: INTERNAL MEDICINE
Payer: COMMERCIAL

## 2023-05-22 ENCOUNTER — APPOINTMENT (EMERGENCY)
Dept: RADIOLOGY | Facility: HOSPITAL | Age: 63
DRG: 253 | End: 2023-05-22
Payer: COMMERCIAL

## 2023-05-22 DIAGNOSIS — L02.612 ABSCESS OF LEFT FOOT: ICD-10-CM

## 2023-05-22 DIAGNOSIS — T07.XXXA MULTIPLE OPEN WOUNDS: ICD-10-CM

## 2023-05-22 DIAGNOSIS — I74.3 EMBOLISM AND THROMBOSIS OF ARTERIES OF THE LOWER EXTREMITIES (HCC): ICD-10-CM

## 2023-05-22 DIAGNOSIS — L03.119 CELLULITIS OF FOOT: Primary | ICD-10-CM

## 2023-05-22 DIAGNOSIS — L03.116 CELLULITIS OF FOOT, LEFT: ICD-10-CM

## 2023-05-22 DIAGNOSIS — I73.9: ICD-10-CM

## 2023-05-22 DIAGNOSIS — I73.9 PERIPHERAL VASCULAR DISEASE (HCC): ICD-10-CM

## 2023-05-22 DIAGNOSIS — K59.00 CONSTIPATION: ICD-10-CM

## 2023-05-22 DIAGNOSIS — I73.9 PAD (PERIPHERAL ARTERY DISEASE) (HCC): ICD-10-CM

## 2023-05-22 PROBLEM — F32.A DEPRESSION: Status: ACTIVE | Noted: 2023-05-22

## 2023-05-22 LAB
ALBUMIN SERPL BCP-MCNC: 3.9 G/DL (ref 3.5–5)
ALP SERPL-CCNC: 96 U/L (ref 34–104)
ALT SERPL W P-5'-P-CCNC: 8 U/L (ref 7–52)
ANION GAP SERPL CALCULATED.3IONS-SCNC: 9 MMOL/L (ref 4–13)
APTT PPP: 38 SECONDS (ref 23–37)
AST SERPL W P-5'-P-CCNC: 10 U/L (ref 13–39)
BASOPHILS # BLD AUTO: 0.02 THOUSANDS/ÂΜL (ref 0–0.1)
BASOPHILS NFR BLD AUTO: 0 % (ref 0–1)
BILIRUB SERPL-MCNC: 0.32 MG/DL (ref 0.2–1)
BUN SERPL-MCNC: 14 MG/DL (ref 5–25)
CALCIUM SERPL-MCNC: 9.7 MG/DL (ref 8.4–10.2)
CHLORIDE SERPL-SCNC: 106 MMOL/L (ref 96–108)
CO2 SERPL-SCNC: 23 MMOL/L (ref 21–32)
CREAT SERPL-MCNC: 0.7 MG/DL (ref 0.6–1.3)
EOSINOPHIL # BLD AUTO: 0.07 THOUSAND/ÂΜL (ref 0–0.61)
EOSINOPHIL NFR BLD AUTO: 1 % (ref 0–6)
ERYTHROCYTE [DISTWIDTH] IN BLOOD BY AUTOMATED COUNT: 12.9 % (ref 11.6–15.1)
GFR SERPL CREATININE-BSD FRML MDRD: 93 ML/MIN/1.73SQ M
GLUCOSE SERPL-MCNC: 76 MG/DL (ref 65–140)
HCT VFR BLD AUTO: 40.7 % (ref 34.8–46.1)
HGB BLD-MCNC: 13.5 G/DL (ref 11.5–15.4)
IMM GRANULOCYTES # BLD AUTO: 0.03 THOUSAND/UL (ref 0–0.2)
IMM GRANULOCYTES NFR BLD AUTO: 0 % (ref 0–2)
INR PPP: 1.54 (ref 0.84–1.19)
LYMPHOCYTES # BLD AUTO: 1.58 THOUSANDS/ÂΜL (ref 0.6–4.47)
LYMPHOCYTES NFR BLD AUTO: 16 % (ref 14–44)
MCH RBC QN AUTO: 30.8 PG (ref 26.8–34.3)
MCHC RBC AUTO-ENTMCNC: 33.2 G/DL (ref 31.4–37.4)
MCV RBC AUTO: 93 FL (ref 82–98)
MONOCYTES # BLD AUTO: 0.49 THOUSAND/ÂΜL (ref 0.17–1.22)
MONOCYTES NFR BLD AUTO: 5 % (ref 4–12)
NEUTROPHILS # BLD AUTO: 7.83 THOUSANDS/ÂΜL (ref 1.85–7.62)
NEUTS SEG NFR BLD AUTO: 78 % (ref 43–75)
NRBC BLD AUTO-RTO: 0 /100 WBCS
PLATELET # BLD AUTO: 146 THOUSANDS/UL (ref 149–390)
PMV BLD AUTO: 13 FL (ref 8.9–12.7)
POTASSIUM SERPL-SCNC: 3.7 MMOL/L (ref 3.5–5.3)
PROT SERPL-MCNC: 7.7 G/DL (ref 6.4–8.4)
PROTHROMBIN TIME: 18.5 SECONDS (ref 11.6–14.5)
RBC # BLD AUTO: 4.39 MILLION/UL (ref 3.81–5.12)
SODIUM SERPL-SCNC: 138 MMOL/L (ref 135–147)
WBC # BLD AUTO: 10.02 THOUSAND/UL (ref 4.31–10.16)

## 2023-05-22 PROCEDURE — 87040 BLOOD CULTURE FOR BACTERIA: CPT | Performed by: EMERGENCY MEDICINE

## 2023-05-22 PROCEDURE — 85610 PROTHROMBIN TIME: CPT | Performed by: EMERGENCY MEDICINE

## 2023-05-22 PROCEDURE — 85025 COMPLETE CBC W/AUTO DIFF WBC: CPT | Performed by: EMERGENCY MEDICINE

## 2023-05-22 PROCEDURE — 36415 COLL VENOUS BLD VENIPUNCTURE: CPT | Performed by: EMERGENCY MEDICINE

## 2023-05-22 PROCEDURE — 85730 THROMBOPLASTIN TIME PARTIAL: CPT | Performed by: EMERGENCY MEDICINE

## 2023-05-22 PROCEDURE — 99285 EMERGENCY DEPT VISIT HI MDM: CPT | Performed by: EMERGENCY MEDICINE

## 2023-05-22 PROCEDURE — 80053 COMPREHEN METABOLIC PANEL: CPT | Performed by: EMERGENCY MEDICINE

## 2023-05-22 PROCEDURE — 99284 EMERGENCY DEPT VISIT MOD MDM: CPT

## 2023-05-22 PROCEDURE — 99222 1ST HOSP IP/OBS MODERATE 55: CPT | Performed by: NURSE PRACTITIONER

## 2023-05-22 PROCEDURE — 73630 X-RAY EXAM OF FOOT: CPT

## 2023-05-22 RX ORDER — MELATONIN
1000 DAILY
Status: DISCONTINUED | OUTPATIENT
Start: 2023-05-23 | End: 2023-06-01 | Stop reason: HOSPADM

## 2023-05-22 RX ORDER — HYDROMORPHONE HCL/PF 1 MG/ML
0.5 SYRINGE (ML) INJECTION ONCE
Status: COMPLETED | OUTPATIENT
Start: 2023-05-22 | End: 2023-05-22

## 2023-05-22 RX ORDER — AMLODIPINE AND OLMESARTAN MEDOXOMIL 5; 20 MG/1; MG/1
1 TABLET ORAL DAILY
Status: DISCONTINUED | OUTPATIENT
Start: 2023-05-23 | End: 2023-05-24

## 2023-05-22 RX ORDER — DICYCLOMINE HYDROCHLORIDE 10 MG/1
10 CAPSULE ORAL
Status: DISCONTINUED | OUTPATIENT
Start: 2023-05-22 | End: 2023-06-01 | Stop reason: HOSPADM

## 2023-05-22 RX ORDER — ACETAMINOPHEN 325 MG/1
650 TABLET ORAL EVERY 6 HOURS PRN
Status: DISCONTINUED | OUTPATIENT
Start: 2023-05-22 | End: 2023-06-01 | Stop reason: HOSPADM

## 2023-05-22 RX ORDER — PRAVASTATIN SODIUM 80 MG/1
80 TABLET ORAL
Status: DISCONTINUED | OUTPATIENT
Start: 2023-05-23 | End: 2023-06-01 | Stop reason: HOSPADM

## 2023-05-22 RX ORDER — CEFAZOLIN SODIUM 1 G/50ML
1000 SOLUTION INTRAVENOUS EVERY 8 HOURS
Status: DISCONTINUED | OUTPATIENT
Start: 2023-05-22 | End: 2023-06-01 | Stop reason: HOSPADM

## 2023-05-22 RX ORDER — ONDANSETRON 2 MG/ML
4 INJECTION INTRAMUSCULAR; INTRAVENOUS EVERY 6 HOURS PRN
Status: DISCONTINUED | OUTPATIENT
Start: 2023-05-22 | End: 2023-06-01 | Stop reason: HOSPADM

## 2023-05-22 RX ORDER — BACLOFEN 10 MG/1
20 TABLET ORAL 3 TIMES DAILY
Status: DISCONTINUED | OUTPATIENT
Start: 2023-05-22 | End: 2023-06-01 | Stop reason: HOSPADM

## 2023-05-22 RX ORDER — AMANTADINE HYDROCHLORIDE 100 MG/1
100 CAPSULE, GELATIN COATED ORAL 2 TIMES DAILY
Status: DISCONTINUED | OUTPATIENT
Start: 2023-05-22 | End: 2023-06-01 | Stop reason: HOSPADM

## 2023-05-22 RX ORDER — METOPROLOL TARTRATE 100 MG/1
100 TABLET ORAL EVERY 12 HOURS
Status: DISCONTINUED | OUTPATIENT
Start: 2023-05-22 | End: 2023-06-01 | Stop reason: HOSPADM

## 2023-05-22 RX ORDER — GABAPENTIN 300 MG/1
300 CAPSULE ORAL 2 TIMES DAILY
Status: DISCONTINUED | OUTPATIENT
Start: 2023-05-22 | End: 2023-06-01 | Stop reason: HOSPADM

## 2023-05-22 RX ORDER — MULTIVIT WITH MINERALS/LUTEIN
250 TABLET ORAL DAILY
Status: DISCONTINUED | OUTPATIENT
Start: 2023-05-23 | End: 2023-06-01 | Stop reason: HOSPADM

## 2023-05-22 RX ORDER — HEPARIN SODIUM 10000 [USP'U]/100ML
12 INJECTION, SOLUTION INTRAVENOUS
Status: DISPENSED | OUTPATIENT
Start: 2023-05-22 | End: 2023-06-01

## 2023-05-22 RX ADMIN — CEFAZOLIN SODIUM 1000 MG: 1 SOLUTION INTRAVENOUS at 22:02

## 2023-05-22 RX ADMIN — HYDROMORPHONE HYDROCHLORIDE 0.5 MG: 1 INJECTION, SOLUTION INTRAMUSCULAR; INTRAVENOUS; SUBCUTANEOUS at 17:21

## 2023-05-22 RX ADMIN — GABAPENTIN 300 MG: 300 CAPSULE ORAL at 22:00

## 2023-05-22 RX ADMIN — VANCOMYCIN HYDROCHLORIDE 1500 MG: 10 INJECTION, POWDER, LYOPHILIZED, FOR SOLUTION INTRAVENOUS at 18:17

## 2023-05-22 RX ADMIN — BACLOFEN 20 MG: 10 TABLET ORAL at 22:02

## 2023-05-22 RX ADMIN — DICYCLOMINE HYDROCHLORIDE 10 MG: 10 CAPSULE ORAL at 22:02

## 2023-05-22 RX ADMIN — PIPERACILLIN AND TAZOBACTAM 3.38 G: 3; .375 INJECTION, POWDER, LYOPHILIZED, FOR SOLUTION INTRAVENOUS at 17:23

## 2023-05-22 RX ADMIN — HEPARIN SODIUM 12 UNITS/KG/HR: 10000 INJECTION, SOLUTION INTRAVENOUS at 17:55

## 2023-05-22 RX ADMIN — ACETAMINOPHEN 650 MG: 325 TABLET, FILM COATED ORAL at 22:13

## 2023-05-22 RX ADMIN — METOPROLOL TARTRATE 100 MG: 100 TABLET, FILM COATED ORAL at 22:00

## 2023-05-22 NOTE — ED PROVIDER NOTES
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 Taking?    LORazepam (ATIVAN) 0 5 mg tablet   No No   Sig: Take 1 tab 30 min prior to MRI, may repeat x 1 immediately before if needed   Multiple Vitamins-Minerals (Multivitamin Adult Extra C) CHEW   Yes No   Sig: Chew 1 tablet daily   amantadine (SYMMETREL) 100 mg capsule   No No   Sig: Take 1 capsule (100 mg total) by mouth 2 (two) times a day   amlodipine-olmesartan (SELENE) 5-20 MG   No No   Sig: Take 1 tablet by mouth daily   ascorbic acid (VITAMIN C) 250 mg tablet   Yes No   Sig: Take 250 mg by mouth daily   baclofen 10 mg tablet   No No   Sig: Take 2 tablets (20 mg total) by mouth 3 (three) times a day   cadexomer iodine (IODOSORB) 0 9 % gel   No No   Sig: Apply 1 application topically in the morning   Patient not taking: Reported on 2/1/2023   cholecalciferol (VITAMIN D3) 1,000 units tablet   Yes No   Sig: Take 1,000 Units by mouth daily   cholestyramine (QUESTRAN) 4 g packet   No No   Sig: Mix 1 packet with 4-6oz of fluid 3x/day x 11 days   dicyclomine (BENTYL) 10 mg capsule   No No   Sig: Take 1 capsule (10 mg total) by mouth 3 (three) times a day before meals   gabapentin (NEURONTIN) 300 mg capsule   No No   Sig: Take 1 capsule (300 mg total) by mouth 2 (two) times a day   metoprolol tartrate (LOPRESSOR) 100 mg tablet   No No   Sig: Take 1 tablet (100 mg total) by mouth every 12 (twelve) hours   mupirocin (BACTROBAN) 2 % ointment   No No   Sig: Apply topically 3 (three) times a day   Patient not taking: Reported on 2023   rivaroxaban (XARELTO) 20 mg tablet   No No   Sig: Take 1 tablet (20 mg total) by mouth daily with breakfast   rosuvastatin (CRESTOR) 10 MG tablet   No No   Sig: TAKE 1 TABLET DAILY      Facility-Administered Medications: None       Past Medical History:   Diagnosis Date   • Chronic back pain    • Depression     last assessed 06/10/14   • High cholesterol    • Hypertension     last assessed 17   • MS (multiple sclerosis) ([facility] )     last assessed 17   • RLL pneumonia     last assessed 06/10/14       Past Surgical History:   Procedure Laterality Date   • AMPUTATION ABOVE KNEE (AKA) Right 2020    Procedure: AMPUTATION ABOVE KNEE (AKA); Surgeon: Aida Piper MD;  [facility]: [facility];  Service: Vascular   • ANKLE SURGERY Right     Treatment of Ankle  last assessed 06/10/14   • BACK SURGERY      for spinal stenosis and sciatica   •  SECTION      last assessed 06/10/14   • KNEE ARTHROSCOPY      last assessed 06/10/14   • LAMINECTOMY      last assessed 06/10/14   • ORTHOPEDIC SURGERY     • OTHER SURGICAL HISTORY      discectomy       Family History   Problem Relation Age of Onset   • Osteoporosis Mother    • Heart attack Father    • Mental illness Neg Hx      I have reviewed and agree with the history as documented      E-Cigarette/Vaping   • E-Cigarette Use Never User      E-Cigarette/Vaping Substances   • Nicotine No    • THC No    • CBD No    • Flavoring No    • Other No    • Unknown No      Social History     Tobacco Use   • Smoking status: Light Smoker     Packs/day: 0 25     Years: 40 00     Pack years: 10  00     Types: Cigarettes     Start date: 1978     Last attempt to quit: 7/10/2019     Years since quitting: 3 8   • Smokeless tobacco: Never   Vaping Use   • Vaping Use: Never used   Substance Use Topics   • Alcohol use: Yes     Comment: rare // no alcohol use as per allscripts   • Drug use: No       Review of Systems   Constitutional: Negative for fever  Respiratory: Negative for cough  Gastrointestinal: Negative for diarrhea and vomiting  Musculoskeletal: Positive for back pain  Skin: Positive for color change and wound  Physical Exam  Physical Exam  Vitals and nursing note reviewed  Constitutional:       General: She is not in acute distress  Appearance: She is not ill-appearing  HENT:      Head: Normocephalic and atraumatic  Pulmonary:      Effort: Pulmonary effort is normal  No respiratory distress  Musculoskeletal:         General: Swelling and deformity present  Cervical back: Normal range of motion and neck supple  Skin:     Findings: Erythema present  Comments: Left foot very red and warm to touch with multiple dry wounds of toes, mid dorsum and mid sole  DP and posterior tib pulses are present by Doppler  Entire foot is very tender to touch  Neurological:      Mental Status: She is alert     Psychiatric:         Mood and Affect: Mood normal          Behavior: Behavior normal          Vital Signs  ED Triage Vitals [05/22/23 1520]   Temperature Pulse Respirations Blood Pressure SpO2   98 8 °F (37 1 °C) 73 20 121/69 96 %      Temp Source Heart Rate Source Patient Position - Orthostatic VS BP Location FiO2 (%)   Tympanic Monitor Sitting Left arm --      Pain Score       3           Vitals:    05/22/23 1520   BP: 121/69   Pulse: 73   Patient Position - Orthostatic VS: Sitting         Visual Acuity      ED Medications  Medications   vancomycin (VANCOCIN) 1500 mg in sodium chloride 0 9% 250 mL IVPB (has no administration in time range)   piperacillin-tazobactam (ZOSYN) IVPB 3 375 g (has no administration in time range)   HYDROmorphone (DILAUDID) injection 0 5 mg (has no administration in time range)   heparin (ACS LOW) (has no administration in time range)       Diagnostic Studies  Results Reviewed     Procedure Component Value Units Date/Time    APTT six (6) hours after Heparin bolus/drip initiation or dosing change [[id number]]     Lab Status: No result Specimen: Blood     CBC [[id number]]     Lab Status: No result Specimen: Blood     Protime-INR [[id number]]     Lab Status: No result Specimen: Blood     Protime-INR [[id number]]  (Abnormal) Collected: 05/22/23 1616    Lab Status: Final result Specimen: Blood from Arm, Right Updated: 05/22/23 1641     Protime 18 5 seconds      INR 1 54    APTT [[id number]]  (Abnormal) Collected: 05/22/23 1616    Lab Status: Final result Specimen: Blood from Arm, Right Updated: 05/22/23 1641     PTT 38 seconds     Comprehensive metabolic panel [[id number]]  (Abnormal) Collected: 05/22/23 1616    Lab Status: Final result Specimen: Blood from Arm, Right Updated: 05/22/23 1640     Sodium 138 mmol/L      Potassium 3 7 mmol/L      Chloride 106 mmol/L      CO2 23 mmol/L      ANION GAP 9 mmol/L      BUN 14 mg/dL      Creatinine 0 70 mg/dL      Glucose 76 mg/dL      Calcium 9 7 mg/dL      AST 10 U/L      ALT 8 U/L      Alkaline Phosphatase 96 U/L      Total Protein 7 7 g/dL      Albumin 3 9 g/dL      Total Bilirubin 0 32 mg/dL      eGFR 93 ml/min/1 73sq m     Narrative:      [place] guidelines for Chronic Kidney Disease (CKD):   •  Stage 1 with normal or high GFR (GFR > 90 mL/min/1 73 square meters)  •  Stage 2 Mild CKD (GFR = 60-89 mL/min/1 73 square meters)  •  Stage 3A Moderate CKD (GFR = 45-59 mL/min/1 73 square meters)  •  Stage 3B Moderate CKD (GFR = 30-44 mL/min/1 73 square meters)  •  Stage 4 Severe CKD (GFR = 15-29 mL/min/1 73 square meters)  •  Stage 5 End Stage CKD (GFR <15 mL/min/1 73 square meters)  Note: GFR calculation is accurate only with a steady state creatinine    CBC and differential [[id number]]  (Abnormal) Collected: 05/22/23 1616    Lab Status: Final result Specimen: Blood from Arm, Right Updated: 05/22/23 1622     WBC 10 02 Thousand/uL      RBC 4 39 Million/uL      Hemoglobin 13 5 g/dL      Hematocrit 40 7 %      MCV 93 fL      MCH 30 8 pg      MCHC 33 2 g/dL      RDW 12 9 %      MPV 13 0 fL      Platelets 523 Thousands/uL      nRBC 0 /100 WBCs      Neutrophils Relative 78 %      Immat GRANS % 0 %      Lymphocytes Relative 16 %      Monocytes Relative 5 %      Eosinophils Relative 1 %      Basophils Relative 0 %      Neutrophils Absolute 7 83 Thousands/µL      Immature Grans Absolute 0 03 Thousand/uL      Lymphocytes Absolute 1 58 Thousands/µL      Monocytes Absolute 0 49 Thousand/µL      Eosinophils Absolute 0 07 Thousand/µL      Basophils Absolute 0 02 Thousands/µL     Blood culture #1 [[id number]] Collected: 05/22/23 1616    Lab Status: In process Specimen: Blood from Arm, Right Updated: 05/22/23 1620    Blood culture #2 [[id number]]     Lab Status: No result Specimen: Blood                  XR foot 3+ views LEFT    (Results Pending)              Procedures  Procedures         ED Course                                             Medical Decision Making  Prior records reviewed  Pt  Last had arterial duplex done 8/26/2020, known 50-75% stenosis distal SFA, unchanged from prior  Discussed with vascular tech about doing arterial duplex though pt  Does have good pedal pulses  She advised I need to speak with vascular surgeon  Discussed with Dr Jeanette Sharma who advised do Heparin drip (low ACS, no boluses) instead of Xarelto tonight and get arteriogram in the morning and he said he would let the tech know  Plan to admit for cellulitis and give IV abx  Discussed with hospitalist     Amount and/or Complexity of Data Reviewed  Labs: ordered  Radiology: ordered        Risk  Prescription drug management  Disposition  Final diagnoses:   Cellulitis of foot   Peripheral vascular disease ([facility] )     Time reflects when diagnosis was documented in both MDM as applicable and the Disposition within this note     Time User Action Codes Description Comment    9/60/6583  2:58 PM Marylou Marinelli Add [Z41 623] Cellulitis of foot     9/37/3390  9:60 PM Debbie Chavis BHARATHI Add [U62 4] Peripheral vascular disease [facility])       ED Disposition     ED Disposition   Admit    Condition   Stable    Date/Time   Mon May 22, 2023  5:16 PM    Comment   Case was discussed with **hospitalist, vascular* and the patient's admission status was agreed to be Admission Status: inpatient status to the service of Dr  *hospitalist**   Follow-up Information    None         Patient's Medications   Discharge Prescriptions    No medications on file       No discharge procedures on file      PDMP Review       Value Time User    PDMP Reviewed  Yes 8/18/2020 10:12 AM Manda Caballero DO          ED Provider  Electronically Signed by           Nima Hussein MD  33/94/19 1044

## 2023-05-23 ENCOUNTER — APPOINTMENT (INPATIENT)
Dept: RADIOLOGY | Facility: HOSPITAL | Age: 63
DRG: 253 | End: 2023-05-23
Payer: COMMERCIAL

## 2023-05-23 LAB
ANION GAP SERPL CALCULATED.3IONS-SCNC: 8 MMOL/L (ref 4–13)
APTT PPP: 47 SECONDS (ref 23–37)
APTT PPP: 55 SECONDS (ref 23–37)
APTT PPP: 67 SECONDS (ref 23–37)
APTT PPP: 68 SECONDS (ref 23–37)
BUN SERPL-MCNC: 16 MG/DL (ref 5–25)
CALCIUM SERPL-MCNC: 8.8 MG/DL (ref 8.4–10.2)
CHLORIDE SERPL-SCNC: 108 MMOL/L (ref 96–108)
CO2 SERPL-SCNC: 23 MMOL/L (ref 21–32)
CREAT SERPL-MCNC: 0.73 MG/DL (ref 0.6–1.3)
ERYTHROCYTE [DISTWIDTH] IN BLOOD BY AUTOMATED COUNT: 13 % (ref 11.6–15.1)
GFR SERPL CREATININE-BSD FRML MDRD: 88 ML/MIN/1.73SQ M
GLUCOSE SERPL-MCNC: 122 MG/DL (ref 65–140)
HCT VFR BLD AUTO: 35.3 % (ref 34.8–46.1)
HGB BLD-MCNC: 11.5 G/DL (ref 11.5–15.4)
MAGNESIUM SERPL-MCNC: 1.9 MG/DL (ref 1.9–2.7)
MCH RBC QN AUTO: 30.4 PG (ref 26.8–34.3)
MCHC RBC AUTO-ENTMCNC: 32.6 G/DL (ref 31.4–37.4)
MCV RBC AUTO: 93 FL (ref 82–98)
PLATELET # BLD AUTO: 133 THOUSANDS/UL (ref 149–390)
PMV BLD AUTO: 13.2 FL (ref 8.9–12.7)
POTASSIUM SERPL-SCNC: 3.7 MMOL/L (ref 3.5–5.3)
RBC # BLD AUTO: 3.78 MILLION/UL (ref 3.81–5.12)
SODIUM SERPL-SCNC: 139 MMOL/L (ref 135–147)
WBC # BLD AUTO: 9.29 THOUSAND/UL (ref 4.31–10.16)

## 2023-05-23 PROCEDURE — 85027 COMPLETE CBC AUTOMATED: CPT | Performed by: NURSE PRACTITIONER

## 2023-05-23 PROCEDURE — 99232 SBSQ HOSP IP/OBS MODERATE 35: CPT | Performed by: INTERNAL MEDICINE

## 2023-05-23 PROCEDURE — 93926 LOWER EXTREMITY STUDY: CPT

## 2023-05-23 PROCEDURE — 80048 BASIC METABOLIC PNL TOTAL CA: CPT | Performed by: NURSE PRACTITIONER

## 2023-05-23 PROCEDURE — 83735 ASSAY OF MAGNESIUM: CPT | Performed by: NURSE PRACTITIONER

## 2023-05-23 PROCEDURE — 99223 1ST HOSP IP/OBS HIGH 75: CPT | Performed by: PHYSICIAN ASSISTANT

## 2023-05-23 PROCEDURE — 85730 THROMBOPLASTIN TIME PARTIAL: CPT | Performed by: NURSE PRACTITIONER

## 2023-05-23 PROCEDURE — 85730 THROMBOPLASTIN TIME PARTIAL: CPT | Performed by: INTERNAL MEDICINE

## 2023-05-23 PROCEDURE — 99223 1ST HOSP IP/OBS HIGH 75: CPT | Performed by: INTERNAL MEDICINE

## 2023-05-23 RX ORDER — OXYCODONE HYDROCHLORIDE 5 MG/1
2.5 TABLET ORAL EVERY 4 HOURS PRN
Status: DISCONTINUED | OUTPATIENT
Start: 2023-05-23 | End: 2023-06-01 | Stop reason: HOSPADM

## 2023-05-23 RX ORDER — HYDROMORPHONE HCL IN WATER/PF 6 MG/30 ML
0.2 PATIENT CONTROLLED ANALGESIA SYRINGE INTRAVENOUS
Status: DISCONTINUED | OUTPATIENT
Start: 2023-05-23 | End: 2023-06-01 | Stop reason: HOSPADM

## 2023-05-23 RX ORDER — FAMOTIDINE 20 MG/1
20 TABLET, FILM COATED ORAL DAILY
Status: DISCONTINUED | OUTPATIENT
Start: 2023-05-23 | End: 2023-06-01 | Stop reason: HOSPADM

## 2023-05-23 RX ADMIN — BACLOFEN 20 MG: 10 TABLET ORAL at 17:00

## 2023-05-23 RX ADMIN — BACLOFEN 20 MG: 10 TABLET ORAL at 08:54

## 2023-05-23 RX ADMIN — HEPARIN SODIUM 16 UNITS/KG/HR: 10000 INJECTION, SOLUTION INTRAVENOUS at 22:36

## 2023-05-23 RX ADMIN — VANCOMYCIN HYDROCHLORIDE 750 MG: 750 INJECTION, SOLUTION INTRAVENOUS at 22:30

## 2023-05-23 RX ADMIN — GABAPENTIN 300 MG: 300 CAPSULE ORAL at 08:54

## 2023-05-23 RX ADMIN — DICYCLOMINE HYDROCHLORIDE 10 MG: 10 CAPSULE ORAL at 06:09

## 2023-05-23 RX ADMIN — DICYCLOMINE HYDROCHLORIDE 10 MG: 10 CAPSULE ORAL at 12:28

## 2023-05-23 RX ADMIN — CEFAZOLIN SODIUM 1000 MG: 1 SOLUTION INTRAVENOUS at 06:09

## 2023-05-23 RX ADMIN — BACLOFEN 20 MG: 10 TABLET ORAL at 21:14

## 2023-05-23 RX ADMIN — DICYCLOMINE HYDROCHLORIDE 10 MG: 10 CAPSULE ORAL at 16:33

## 2023-05-23 RX ADMIN — FAMOTIDINE 20 MG: 20 TABLET ORAL at 08:54

## 2023-05-23 RX ADMIN — HYDROMORPHONE HYDROCHLORIDE 0.2 MG: 0.2 INJECTION, SOLUTION INTRAMUSCULAR; INTRAVENOUS; SUBCUTANEOUS at 17:12

## 2023-05-23 RX ADMIN — HYDROMORPHONE HYDROCHLORIDE 0.2 MG: 0.2 INJECTION, SOLUTION INTRAMUSCULAR; INTRAVENOUS; SUBCUTANEOUS at 00:43

## 2023-05-23 RX ADMIN — METOPROLOL TARTRATE 100 MG: 100 TABLET, FILM COATED ORAL at 08:54

## 2023-05-23 RX ADMIN — CEFAZOLIN SODIUM 1000 MG: 1 SOLUTION INTRAVENOUS at 21:14

## 2023-05-23 RX ADMIN — Medication 1 TABLET: at 08:54

## 2023-05-23 RX ADMIN — GABAPENTIN 300 MG: 300 CAPSULE ORAL at 17:06

## 2023-05-23 RX ADMIN — PRAVASTATIN SODIUM 80 MG: 80 TABLET ORAL at 17:06

## 2023-05-23 RX ADMIN — VANCOMYCIN HYDROCHLORIDE 750 MG: 750 INJECTION, SOLUTION INTRAVENOUS at 12:00

## 2023-05-23 RX ADMIN — OXYCODONE HYDROCHLORIDE 2.5 MG: 5 TABLET ORAL at 12:13

## 2023-05-23 RX ADMIN — Medication 1000 UNITS: at 08:54

## 2023-05-23 RX ADMIN — HYDROMORPHONE HYDROCHLORIDE 0.2 MG: 0.2 INJECTION, SOLUTION INTRAMUSCULAR; INTRAVENOUS; SUBCUTANEOUS at 04:09

## 2023-05-23 RX ADMIN — AMANTADINE HYDROCHLORIDE 100 MG: 100 CAPSULE ORAL at 17:07

## 2023-05-23 RX ADMIN — CEFAZOLIN SODIUM 1000 MG: 1 SOLUTION INTRAVENOUS at 13:53

## 2023-05-23 RX ADMIN — Medication 250 MG: at 08:54

## 2023-05-23 RX ADMIN — METOPROLOL TARTRATE 100 MG: 100 TABLET, FILM COATED ORAL at 21:00

## 2023-05-23 RX ADMIN — HEPARIN SODIUM 14 UNITS/KG/HR: 10000 INJECTION, SOLUTION INTRAVENOUS at 00:42

## 2023-05-23 NOTE — UTILIZATION REVIEW
Initial Clinical Review    Admission: Date/Time/Statement:   Admission Orders (From admission, onward)     Ordered        05/22/23 1718  INPATIENT ADMISSION  Once                      Orders Placed This Encounter   Procedures   • INPATIENT ADMISSION     Standing Status:   Standing     Number of Occurrences:   1     Order Specific Question:   Level of Care     Answer:   Med Surg [16]     Order Specific Question:   Estimated length of stay     Answer:   More than 2 Midnights     Order Specific Question:   Certification     Answer:   I certify that inpatient services are medically necessary for this patient for a duration of greater than two midnights  See H&P and MD Progress Notes for additional information about the patient's course of treatment  ED Arrival Information     Expected   -    Arrival   5/22/2023 14:33    Acuity   Urgent            Means of arrival   Wheelchair    Escorted by   [facility]    Admission type   Emergency            Arrival complaint   pt states possible blood clot on foot            Chief Complaint   Patient presents with   • Foot Pain     States noted faint spots on foot about a week ago, more pronounced now  Already  had amputation of other leg due to blood clot  Areas of cyanosis and ulcerations noted to foot       Initial Presentation:   58 yof to ER from home c/o left foot redness and new wounds on toes and sole of foot x one week  + associated new left 4th toe discoloration  Hx PAD,  right popliteral artery occlusion/R AKA, MS, hypertension, hyperlipidemia, w/c bound, non-ambulatory  Presents with L foot very red and warm to touch with multiple dry wounds of toes, mid dorsum and mid sole  DP and posterior tib pulses are present by Doppler  Entire foot is very tender to touch (See image below)  Admitted to inpatient status for multiple open wound L foot with cellulitis  Started on IVABT, IV weight based Heparin gtt  Arterial studies ordered  Date: 5/23/23   Day 2:   Double IVABT continued for cellulitis with multiple wounds L foot  Arterial studies ordered by vascular  Podiatry consulted  Placed on contact isolation for History 6/15/2022 foot wound Acinetobacter lwoffii group MDRO  Per vascular surgery: Venosus insufficiency of left leg, chronic venous ulceration left foot S/p right above the knee amputation due to critical 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 40/7125 presenting with left foot pain, erythema, and new foot wounds, left 4th toe discoloration x 5 days  Arterial studies ordered  Continue IV Heparin gtt  Consult podiatry    ED Triage Vitals [05/22/23 1520]   Temperature Pulse Respirations Blood Pressure SpO2   98 8 °F (37 1 °C) 73 20 121/69 96 %      Temp Source Heart Rate Source Patient Position - Orthostatic VS BP Location FiO2 (%)   Tympanic Monitor Sitting Left arm --      Pain Score       3          Wt Readings from Last 1 Encounters:   05/22/23 63 5 kg (139 lb 15 9 oz)     Additional Vital Signs:   Date/Time Temp Pulse Resp BP MAP (mmHg) SpO2 O2 Device Patient Position - Orthostatic VS   05/23/23 1000 98 °F (36 7 °C) 71 17 -- -- 97 % -- --   05/23/23 09:04:30 98 2 °F (36 8 °C) 70 -- 112/72 85 97 % -- --   05/23/23 0900 -- -- -- -- -- 97 % None (Room air) --   05/23/23 03:46:45 98 7 °F (37 1 °C) 88 18 135/91 106 94 % -- --   05/22/23 22:01:49 98 1 °F (36 7 °C) 79 -- 136/89 105 94 % -- --   05/22/23 19:47:39 98 6 °F (37 °C) 71 16 130/87 101 97 % -- --   05/22/23 1838 -- 80 18 131/67 -- 96 % None (Room air) --   05/22/23 1520 98 8 °F (37 1 °C) 73 20 121/69 -- 96 % None (Room air) Sitting     Pertinent Labs/Diagnostic Test Results:   XR foot 3+ views LEFT   Final Result by Graciela Mahan MD (05/23 1354)      No acute osseous abnormality           VAS lower limb arterial duplex, limited, unilateral      PRELIMINARY RESULTS:  RIGHT LOWER LIMB:  Above knee amputation     LEFT LOWER LIMB:  This resting evaluation shows diffuse disease through out with no significant stenosis  Ankle/Brachial index: 0 47 , which is in the ischemic category  Prior: 0 97  Metatarsal pressure of 111 mmHg Priot: 165mmHg  Great toe pressure of 57 mmHg, within healing range  Prior 191 mmHg  PVR/ PPG tracings are normal      Compared to previous study on 5/4/2020 , there is a drop in the HAILEY with possible small vessel disease  Vicente Olivera Results from last 7 days   Lab Units 05/23/23  0612 05/22/23  1616   WBC Thousand/uL 9 29 10 02   HEMOGLOBIN g/dL 11 5 13 5   HEMATOCRIT % 35 3 40 7   PLATELETS Thousands/uL 133* 146*   NEUTROS ABS Thousands/µL  --  7 83*       Results from last 7 days   Lab Units 05/23/23  0612 05/22/23  1616   SODIUM mmol/L 139 138   POTASSIUM mmol/L 3 7 3 7   CHLORIDE mmol/L 108 106   CO2 mmol/L 23 23   ANION GAP mmol/L 8 9   BUN mg/dL 16 14   CREATININE mg/dL 0 73 0 70   EGFR ml/min/1 73sq m 88 93   CALCIUM mg/dL 8 8 9 7   MAGNESIUM mg/dL 1 9  --      Results from last 7 days   Lab Units 05/22/23  1616   AST U/L 10*   ALT U/L 8   ALK PHOS U/L 96   TOTAL PROTEIN g/dL 7 7   ALBUMIN g/dL 3 9   TOTAL BILIRUBIN mg/dL 0 32         Results from last 7 days   Lab Units 05/23/23  0612 05/22/23  1616   GLUCOSE RANDOM mg/dL 122 76       Results from last 7 days   Lab Units 05/23/23  0918 05/23/23  0005 05/22/23  1616   PROTIME seconds  --   --  18 5*   INR   --   --  1 54*   PTT seconds 47* 55* 38*       Results from last 7 days   Lab Units 05/22/23  1720 05/22/23  1616   BLOOD CULTURE  Received in Microbiology Lab  Culture in Progress  Received in Microbiology Lab  Culture in Progress         ED Treatment:   Medication Administration from 05/22/2023 1433 to 05/22/2023 1936       Date/Time Order Dose Route Action     05/22/2023 1817 EDT vancomycin (VANCOCIN) 1500 mg in sodium chloride 0 9% 250 mL IVPB 1,500 mg Intravenous New Bag     05/22/2023 1723 EDT piperacillin-tazobactam (ZOSYN) IVPB 3 375 g 3 375 g Intravenous New Bag     05/22/2023 1721 EDT HYDROmorphone (DILAUDID) injection 0 5 mg 0 5 mg Intravenous Given     05/22/2023 1755 EDT heparin (porcine) 25,000 units in 0 45% NaCl 250 mL infusion (premix) 12 Units/kg/hr Intravenous New Bag        Past Medical History:   Diagnosis Date   • Chronic back pain    • Depression     last assessed 06/10/14   • High cholesterol    • Hypertension     last assessed 12/08/17   • MS (multiple sclerosis) ([facility] )     last assessed 12/08/17   • RLL pneumonia     last assessed 06/10/14     Present on Admission:  • Mixed hyperlipidemia  • Multiple sclerosis (HCC)  • Thrombocytopenia ([facility] )  • Multiple open wounds      Admitting Diagnosis: Foot pain [M79 673]  Peripheral vascular disease ([place] [address] ) [I73 9]  Cellulitis of foot [L03 119]  Age/Sex: 58 y o  female  Admission Orders:  Consult ID  Consult vascular surgery  Neurovascular checks q4h  Contact isolation: History 6/15/2022 foot wound Acinetobacter lwoffii group MDRO  Wound care: Betadine paint to wounds BID    Scheduled Medications:  amantadine, 100 mg, Oral, BID  amlodipine-olmesartan, 1 tablet, Oral, Daily  ascorbic acid, 250 mg, Oral, Daily  baclofen, 20 mg, Oral, TID  cefazolin, 1,000 mg, Intravenous, Q8H  cholecalciferol, 1,000 Units, Oral, Daily  dicyclomine, 10 mg, Oral, TID AC  famotidine, 20 mg, Oral, Daily  gabapentin, 300 mg, Oral, BID  metoprolol tartrate, 100 mg, Oral, Q12H  multivitamin-minerals, 1 tablet, Oral, Daily  pravastatin, 80 mg, Oral, Daily With Dinner  vancomycin, 750 mg, Intravenous, Q12H    Continuous IV Infusions:  heparin (porcine), 12 Units/kg/hr (Order-Specific), Intravenous, Titrated    PRN Meds:  acetaminophen, 650 mg, Oral, Q6H PRN  HYDROmorphone, 0 2 mg, Intravenous, Q3H PRN, 5/23 x2  ondansetron, 4 mg, Intravenous, Q6H PRN  oxyCODONE, 2 5 mg, Oral, Q4H PRN    Network Utilization Review Department  ATTENTION: Please call with any questions or concerns to [phone] and carefully listen to the prompts so that you are directed to the right person  All voicemails are confidential   [facility] all requests for admission clinical reviews, approved or denied determinations and any other requests to dedicated fax number below belonging to the campus where the patient is receiving treatment   List of dedicated fax numbers for the Facilities:  [address] DENIALS (Administrative/Medical Necessity) [phone]   [address] (Maternity/NICU/Pediatrics) [phone]   [address]iah Fritz [phone]   Kade J[address]  [phone]   [address] [place] [address] Jefe Shah [facility] 28 [phone]   1552 [facility] 134 815 [facility] [phone]

## 2023-05-23 NOTE — CONSULTS
Consultation - Infectious Disease   Brittanie Cagle 58 y o  female MRN: [id number]  Unit/Bed#: [address] Encounter: [id number]      IMPRESSION & RECOMMENDATIONS:   1  Left Foot pain/erythema/ischemic changes of toes  Possible overlying cellulitis in setting of increased pain last 5 days with left foot mulitple scabs/wounds and/or progressive arterial disease related changes  No fever, chills, associated sepsis  Dog and cat do not lick wounds, but do step on patient's foot often   -continue Cefazolin for now  -follow-up blood cultures  -Follow up vascular plan  -monitor temperature and hemodynamics  -serial exam  -recheck CBC and BMP in a m   -local wound/skin care  -offloading  -follow up podiatry consult     2  Peripheral arterial disease  5/23/23 LEAD: Left HAILEY 0 47  prior 0 97 in 2020  GTP 57 mmHg down from 191 mmHg in 2020  -Vascular on board  -Follow-up vascular plan  -work up/plan as above     3  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 Critical limb ischemia, 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     -Serial exams of leg  -Supportive care     3  Tobacco abuse  Patient with documented ongoing tobacco abuse  Noted to have arterial disease on vascular studies  Likely contributed to presentation as above  -Nicotine replacement as per primary  -Discussed smoking cessation     4  MS  At baseline is nonambulatory   -Continue to follow-up with neurology as outpatient    Antibiotics:  Cefazolin D2    I have discussed the above management plan in detail with patient, RN, and the primary service      Extensive review of the medical records in epic including review of the notes, radiographs, and laboratory results     HISTORY OF PRESENT ILLNESS:  Reason for Consult: Cellulitis of foot    HPI: Brittanie Cagle is a 58y o  year old female with tobacco addiction, MS, and s/p right popliteral artery occlusion in 2020 requiring AKA and on Xarelto since who presented to [facility] ER 5/22/23 with c/o left foot worsening pain, redness and new wounds on toes and sole of foot x 5-7 days  She is concerned about another clot  In the ER cultures were obtained and patient was started on loading doses of Vacomycin and Zosyn and then admitted on cefazolin  Infectious Disease is now being consulted regarding evaluation and management of cellulitis of left foot  She denies fever  She used to be followed by wound care for left foot great toe and dorsum wounds but hasn't seen them in 5 months  She is wheelchair bound and non-ambulatory and reports bumping her foot into doors, walls etc all the time  Dog and cat do not lick wounds, but do step on patient's foot often  REVIEW OF SYSTEMS:  A complete review of systems is negative other than that noted in the HPI  PAST MEDICAL HISTORY:  Past Medical History:   Diagnosis Date   • Chronic back pain    • Depression     last assessed 06/10/14   • High cholesterol    • Hypertension     last assessed 17   • MS (multiple sclerosis) ([facility] [address] )     last assessed 17   • RLL pneumonia     last assessed 06/10/14     Past Surgical History:   Procedure Laterality Date   • AMPUTATION ABOVE KNEE (AKA) Right 2020    Procedure: AMPUTATION ABOVE KNEE (AKA);   Surgeon: Yunior Ford MD;  [facility];  Service: Vascular   • ANKLE SURGERY Right     Treatment of Ankle  last assessed 06/10/14   • BACK SURGERY      for spinal stenosis and sciatica   •  SECTION      last assessed 06/10/14   • KNEE ARTHROSCOPY      last assessed 06/10/14   • LAMINECTOMY      last assessed 06/10/14   • ORTHOPEDIC SURGERY     • OTHER SURGICAL HISTORY      discectomy       FAMILY HISTORY:  Non-contributory    SOCIAL HISTORY:  Social History   Social History     Substance and Sexual Activity   Alcohol Use Yes    Comment: rare // no alcohol use as per allscripts     Social History     Substance and Sexual Activity   Drug Use No     Social History     Tobacco Use   Smoking Status Light Smoker   • Packs/day: 0 25 • Years: 40 00   • Pack years: 10 00   • Types: Cigarettes   • Start date: 1   • Last attempt to quit: 7/10/2019   • Years since quitting: 3 8   Smokeless Tobacco Never       ALLERGIES:  No Known Allergies    MEDICATIONS:  All current active medications have been reviewed  PHYSICAL EXAM:  Temp:  [97 9 °F (36 6 °C)-98 7 °F (37 1 °C)] 97 9 °F (36 6 °C)  HR:  [70-88] 73  Resp:  [16-18] 18  BP: (112-143)/(67-91) 143/91  SpO2:  [94 %-97 %] 97 %  Temp (24hrs), Av 3 °F (36 8 °C), Min:97 9 °F (36 6 °C), Max:98 7 °F (37 1 °C)  Current: Temperature: 97 9 °F (36 6 °C)  No intake or output data in the 24 hours ending 23 1611    General Appearance:  58year old female, propped fairly comfortably, appearing nontoxic at present, and in no distress   Head:  Normocephalic, without obvious abnormality, atraumatic   Eyes:  Conjunctiva pink and sclera anicteric, both eyes   Nose: Nares normal, mucosa normal, no drainage   Throat: Oropharynx moist without lesions, poor dentition   Neck: Supple, symmetrical, no adenopathy, no tenderness/mass/nodules   Back:   Symmetric, no curvature, ROM normal, no CVA tenderness   Lungs:   Clear to auscultation bilaterally, respirations unlabored   Chest Wall:  No tenderness or deformity   Heart:  RRR; no murmur, rub or gallop   Abdomen:   Soft, non-tender, non-distended, positive bowel sounds    :  Purewick in place with clear yellow urine in canister, no SPT   Extremities: Right AKA well healed, warm erythema distal half of foot and wounds/skin changes as depicted below                     Skin: No rashes  IV site nontender  Lymph nodes: Cervical, supraclavicular nodes normal   Neurologic: Alert and oriented times 3, FROM [facility]  LABS, IMAGING, & OTHER STUDIES:  Lab Results:  I have personally reviewed pertinent labs    Results from last 7 days   Lab Units 23  0612 23  1616   WBC Thousand/uL 9 29 10 02   HEMOGLOBIN g/dL 11 5 13 5   PLATELETS Thousands/uL 133* 146*     Results from last 7 days   Lab Units 05/23/23  0612 05/22/23  1616   SODIUM mmol/L 139 138   POTASSIUM mmol/L 3 7 3 7   CHLORIDE mmol/L 108 106   CO2 mmol/L 23 23   BUN mg/dL 16 14   CREATININE mg/dL 0 73 0 70   EGFR ml/min/1 73sq m 88 93   CALCIUM mg/dL 8 8 9 7   AST U/L  --  10*   ALT U/L  --  8   ALK PHOS U/L  --  96     Results from last 7 days   Lab Units 05/22/23  1720 05/22/23  1616   BLOOD CULTURE  Received in Microbiology Lab  Culture in Progress  Received in Microbiology Lab  Culture in Progress  Imaging Studies:   Images in PACS reviewed personally  5/23/23 Left foot XR: no acute fx or dislocation    Other Studies:   I have personally reviewed pertinent reports    5/23/23 LEAD: Left HAILEY 0 47  prior 0 97 in 2020  GTP 57 mmHg down from 191 mmHg in 2020

## 2023-05-23 NOTE — ASSESSMENT & PLAN NOTE
· Left LEAD in 8/2020 showed 50-75% stenosis at the distal SFA  · Repeat study in the morning    · Continue statin  · Continue heparin drip as above

## 2023-05-23 NOTE — ASSESSMENT & PLAN NOTE
· Left foot/toes with multiple dry wounds with mild redness and tenderness  · Possible cellulitis  · WBC normal, no bands, patient afebrile  · Patient given Mathieu Prier in ED      · Patient currently on vancomycin and Ancef  · ID input pending at the current time

## 2023-05-23 NOTE — ASSESSMENT & PLAN NOTE
Patient presents with left foot tingling started on Friday, new wounds on toes and sole of foot for about 1 week, patient concerned about clot in leg  Reports brushing her left foot to get rid of dead skin 2 weeks ago  Denies fever, chills  Reports chronic wound on top of left foot since 6/2022 and goes to wound care weekly  Reports history of right leg amputation due to blood clot  · Chart reviewed  Patient is status post right AKA due to critical 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 on 8/9/3657, no source was found  Patient is on Xarelto at home  LEAD showed > 75% stenosis in the right popliteal artery at the time  · Left foot is warm  Multiple dry wounds on toes and foot  Toes appear dusky on exam   Mild redness to left foot with tenderness  · Left foot x-ray pending  · ED provider discussed case with vascular on-call, recommend heparin drip and arterial duplex in a m  · Continue heparin drip  · Check arterial duplex in a m  · Wound care with Betadine paint twice daily  · Neurovascular check  · Consult vascular surgery  Keep patient n p o  after midnight till sees by vascular surgery

## 2023-05-23 NOTE — PROGRESS NOTES
Lencho Weiss is a 58 y o  female who is currently ordered Vancomycin IV with management by the Pharmacy Consult service  Relevant clinical data and objective / subjective history reviewed  Vancomycin Assessment:  Indication and Goal AUC/Trough: Soft tissue (goal -600, trough >10), -600, trough >10  Clinical Status: stable  Micro:   pending  Renal Function:  SCr: 0 73 mg/dL  CrCl: 74 7 mL/min  Days of Therapy: 2  Current Dose: 1500 mg IV once  Vancomycin Plan:  New Dosin mg q 12 h  Estimated AUC: 455 mcg*hr/mL  Estimated Trough: 74 7 mcg/mL  Next Level: 0600 23  Renal Function Monitoring: Daily BMP and UOP  Pharmacy will continue to follow closely for s/sx of nephrotoxicity, infusion reactions and appropriateness of therapy  BMP and CBC will be ordered per protocol  We will continue to follow the patient’s culture results and clinical progress daily      Anupama Shi, Pharmacist

## 2023-05-23 NOTE — ASSESSMENT & PLAN NOTE
· Left foot/toes with multiple dry wounds with mild redness and tenderness  · Possible cellulitis  · WBC normal, no bands, patient afebrile  · Patient given Ezella Balo in ED  Will change to Liudmila Hernandez    · Consult ID

## 2023-05-23 NOTE — H&P
[address]  H&P  Name: Ronnell Jones 58 y o  female I MRN: [id number]  Unit/Bed#: [address] [place] [address] Date of Admission: 5/22/2023   Date of Service: 5/23/2023 I Hospital Day: 1      Assessment/Plan   * Multiple open wounds  Assessment & Plan  Patient presents with left foot tingling started on Friday, new wounds on toes and sole of foot for about 1 week, patient concerned about clot in leg  Reports brushing her left foot to get rid of dead skin 2 weeks ago  Denies fever, chills  Reports chronic wound on top of left foot since 6/2022 and goes to wound care weekly  Reports history of right leg amputation due to blood clot  · Chart reviewed  Patient is status post right AKA due to critical 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 on 2/4/9808, no source was found  Patient is on Xarelto at home  LEAD showed > 75% stenosis in the right popliteal artery at the time  · Left foot is warm  Multiple dry wounds on toes and foot  Toes appear dusky on exam   Mild redness to left foot with tenderness  · Left foot x-ray pending  · ED provider discussed case with vascular on-call, recommend heparin drip and arterial duplex in a m  · Continue heparin drip  · Check arterial duplex in a m  · Wound care with Betadine paint twice daily  · Neurovascular check  · Consult vascular surgery  Keep patient n p o  after midnight till sees by vascular surgery  Cellulitis of foot, left  Assessment & Plan  · Left foot/toes with multiple dry wounds with mild redness and tenderness  · Possible cellulitis  · WBC normal, no bands, patient afebrile  · Patient given Rosey Cole in ED  Will change to Liudmila Hernandez    · Consult ID    Multiple sclerosis (HCC)  Assessment & Plan  · Continue amantadine, baclofen  · Patient follows neurology as outpatient  · Reports using scooter at home, does not walk    [facility]  Assessment & Plan  · Left LEAD in 8/2020 showed 50-75% stenosis at the distal SFA  · Repeat study in the morning  · Continue statin  · Continue heparin drip as above    Mixed hyperlipidemia  Assessment & Plan  · Continue statin    S/P AKA (above knee amputation) unilateral, right ([address] )  Assessment & Plan  · As above  · Patient did not follow-up with vascular since 2020  Thrombocytopenia (HCC)  Assessment & Plan  · Mild  Platelet count 992  · Monitor platelet count while on heparin drip    Hypertension  Assessment & Plan  · Continue metoprolol, Sharan for home  · BP well controlled                VTE Prophylaxis: Heparin Drip  / reason for no mechanical VTE prophylaxis on heparin drip   Code Status: Full code  POLST: POLST form is not discussed and not completed at this time  Anticipated Length of Stay:  Patient will be admitted on an Inpatient basis with an anticipated length of stay of  > 2 midnights  Justification for Hospital Stay: Left foot open wounds, possible cellulitis    Total Time for Visit, including Counseling / Coordination of Care: 45 minutes  Greater than 50% of this total time spent on direct patient counseling and coordination of care  Chief Complaint:   New wounds on toes and sole of foot for about 1 week    History of Present Illness:    Meli Castellanos is a 58 y o  female with PMH of PAD, status post right AKA, MS, hypertension, hyperlipidemia who presents with left foot tingling started on Friday, new wounds on toes and sole of foot for about 1 week, patient concerned about clot in leg  Reports brushing her left foot to get rid of dead skin 2 weeks ago  Denies fever, chills  Reports chronic wound on top of left foot since 6/2022 and goes to wound care weekly  Reports history of right leg amputation due to blood clot  Patient denies chest pain, headache, dizziness, SOB, nausea vomiting diarrhea constipation  Reports pain in left foot  Reports using scooter at home, does not walk    Reports did not follow up with vascular for about 3 years       Review of Systems:    Review of Systems   Musculoskeletal:        Left foot pain tingling since Friday   Skin: Positive for wound  Left foot new wounds  All other systems reviewed and are negative  Past Medical and Surgical History:     Past Medical History:   Diagnosis Date   • Chronic back pain    • Depression     last assessed 06/10/14   • High cholesterol    • Hypertension     last assessed 17   • MS (multiple sclerosis) ([facility] [address] )     last assessed 17   • RLL pneumonia     last assessed 06/10/14       Past Surgical History:   Procedure Laterality Date   • AMPUTATION ABOVE KNEE (AKA) Right 2020    Procedure: AMPUTATION ABOVE KNEE (AKA); Surgeon: Neisha Piper MD;  [facility];  Service: Vascular   • ANKLE SURGERY Right     Treatment of Ankle  last assessed 06/10/14   • BACK SURGERY      for spinal stenosis and sciatica   •  SECTION      last assessed 06/10/14   • KNEE ARTHROSCOPY      last assessed 06/10/14   • LAMINECTOMY      last assessed 06/10/14   • ORTHOPEDIC SURGERY     • OTHER SURGICAL HISTORY      discectomy       Meds/Allergies:    Prior to Admission medications    Medication Sig Start Date End Date Taking?  Authorizing Provider   amantadine (SYMMETREL) 100 mg capsule Take 1 capsule (100 mg total) by mouth 2 (two) times a day 22  Yes Rico Ruiz MD   amlodipine-olmesartan (SELENE) 5-20 MG Take 1 tablet by mouth daily 23  Yes Alexsander Hsu,    ascorbic acid (VITAMIN C) 250 mg tablet Take 250 mg by mouth daily   Yes Historical Provider, MD   baclofen 10 mg tablet Take 2 tablets (20 mg total) by mouth 3 (three) times a day 3/27/23  Yes Ankush Alberto PA-C   cholecalciferol (VITAMIN D3) 1,000 units tablet Take 1,000 Units by mouth daily   Yes Historical Provider, MD   dicyclomine (BENTYL) 10 mg capsule Take 1 capsule (10 mg total) by mouth 3 (three) times a day before meals 23 Yes Alexsander Hsu,    gabapentin (NEURONTIN) 300 mg capsule Take 1 capsule (300 mg total) by mouth 2 (two) times a day 3/16/23  Yes Ran Hedrick,    metoprolol tartrate (LOPRESSOR) 100 mg tablet Take 1 tablet (100 mg total) by mouth every 12 (twelve) hours 4/5/23  Yes Ran Hedrick, DO   Multiple Vitamins-Minerals (Multivitamin Adult Extra C) CHEW Chew 1 tablet daily 8/4/20  Yes Historical Provider, MD   rivaroxaban (XARELTO) 20 mg tablet Take 1 tablet (20 mg total) by mouth daily with breakfast 4/5/23  Yes Ran Hedrick,    rosuvastatin (CRESTOR) 10 MG tablet TAKE 1 TABLET DAILY 4/5/23  Yes Ran Hedrick, DO   cadexomer iodine (IODOSORB) 0 9 % gel Apply 1 application topically in the morning  Patient not taking: Reported on 2/1/2023 10/20/22   RACHAEL Valdez   cholestyramine Fabiene Fill) 4 g packet Mix 1 packet with 4-6oz of fluid 3x/day x 11 days  Patient not taking: Reported on 5/22/2023 1/27/23   Kal Liu PA-C   LORazepam (ATIVAN) 0 5 mg tablet Take 1 tab 30 min prior to MRI, may repeat x 1 immediately before if needed  Patient not taking: Reported on 5/22/2023 4/23/20   Kal Liu PA-C   mupirocin (BACTROBAN) 2 % ointment Apply topically 3 (three) times a day  Patient not taking: Reported on 2/1/2023 6/15/22   Ran Hedrick DO     I have reviewed home medications with patient personally      Allergies: No Known Allergies    Social History:     Marital Status:    Occupation: Disabled  Patient Pre-hospital Living Situation: Lives alone  Patient Pre-hospital Level of Mobility: Scooter  Patient Pre-hospital Diet Restrictions: Cardiac diet  Substance Use History:   Social History     Substance and Sexual Activity   Alcohol Use Yes    Comment: rare // no alcohol use as per allscripts     Social History     Tobacco Use   Smoking Status Light Smoker   • Packs/day: 0 25   • Years: 40 00   • Pack years: 10 00   • Types: Cigarettes   • Start date: 1   • Last attempt to quit: 7/10/2019   • Years since quitting: 3 8   Smokeless Tobacco "Never     Social History     Substance and Sexual Activity   Drug Use No       Family History:    non-contributory    Physical Exam:     Vitals:   Blood Pressure: 136/89 (05/22/23 2201)  Pulse: 79 (05/22/23 2201)  Temperature: 98 1 °F (36 7 °C) (05/22/23 2201)  Temp Source: Tympanic (05/22/23 1520)  Respirations: 16 (05/22/23 1947)  Height: 5' 6\" (167 6 cm) (05/22/23 2300)  Weight - Scale: 63 5 kg (139 lb 15 9 oz) (05/22/23 2300)  SpO2: 94 % (05/22/23 2201)    Physical Exam  Vitals and nursing note reviewed  Constitutional:       Appearance: She is well-developed  HENT:      Head: Normocephalic and atraumatic  Neck:      Thyroid: No thyromegaly  Vascular: No JVD  Trachea: No tracheal deviation  Cardiovascular:      Rate and Rhythm: Normal rate and regular rhythm  Heart sounds: Normal heart sounds  Pulmonary:      Effort: Pulmonary effort is normal  No respiratory distress  Breath sounds: Normal breath sounds  No wheezing or rales  Abdominal:      General: Bowel sounds are normal  There is no distension  Palpations: Abdomen is soft  Tenderness: There is no abdominal tenderness  There is no guarding  Musculoskeletal:         General: Tenderness and deformity present  Cervical back: Neck supple  Comments: Left foot/toes multiple dry wounds  With mild redness and tenderness  Status post right AKA  Skin:     General: Skin is warm and dry  Neurological:      General: No focal deficit present  Mental Status: She is alert and oriented to person, place, and time  Psychiatric:         Mood and Affect: Mood normal          Judgment: Judgment normal          Additional Data:     Lab Results: I have personally reviewed pertinent reports        Results from last 7 days   Lab Units 05/22/23  1616   WBC Thousand/uL 10 02   HEMOGLOBIN g/dL 13 5   HEMATOCRIT % 40 7   PLATELETS Thousands/uL 146*   NEUTROS PCT % 78*   LYMPHS PCT % 16   MONOS PCT % 5   EOS PCT % 1 " Results from last 7 days   Lab Units 05/22/23  1616   POTASSIUM mmol/L 3 7   CHLORIDE mmol/L 106   CO2 mmol/L 23   BUN mg/dL 14   CREATININE mg/dL 0 70   CALCIUM mg/dL 9 7   ALK PHOS U/L 96   ALT U/L 8   AST U/L 10*     Results from last 7 days   Lab Units 05/22/23  1616   INR  1 54*       Imaging: I have personally reviewed pertinent reports  No results found  EKG, Pathology, and Other Studies Reviewed on Admission:   · EKG: NA    Allscripts Records Reviewed: Yes     ** Please Note: Dragon 360 Dictation voice to text software may have been used in the creation of this document   **

## 2023-05-23 NOTE — ASSESSMENT & PLAN NOTE
Patient presents with left foot tingling started on Friday, new wounds on toes and sole of foot for about 1 week, patient concerned about clot in leg  Reports brushing her left foot to get rid of dead skin 2 weeks ago  Denies fever, chills  Reports chronic wound on top of left foot since 6/2022 and goes to wound care weekly  Reports history of right leg amputation due to blood clot  · Chart reviewed  Patient is status post right AKA due to critical 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 on 4/2/4331, no source was found  Patient is on Xarelto at home  LEAD showed > 75% stenosis in the right popliteal artery at the time  · Left foot is warm  Multiple dry wounds on toes and foot  Toes appear dusky on exam   Mild redness to left foot with tenderness  · Left foot x-ray showed no acute osseous abnormality  · Patient currently on heparin drip    Follow-up lower extremity arterial Dopplers  · Wound care with Betadine paint twice daily  · Neurovascular check  · Vascular surgery input appreciated

## 2023-05-23 NOTE — ASSESSMENT & PLAN NOTE
· Continue amantadine, baclofen  · Patient follows neurology as outpatient  · Reports using scooter at home, does not walk

## 2023-05-23 NOTE — PROGRESS NOTES
[address]  Progress Note  Name: Carlota Stone  MRN: [id number]  Unit/Bed#: 01373 [address] I Date of Admission: 5/22/2023   Date of Service: 5/23/2023 I Hospital Day: 1    Assessment/Plan   * Multiple open wounds  Assessment & Plan  Patient presents with left foot tingling started on Friday, new wounds on toes and sole of foot for about 1 week, patient concerned about clot in leg  Reports brushing her left foot to get rid of dead skin 2 weeks ago  Denies fever, chills  Reports chronic wound on top of left foot since 6/2022 and goes to wound care weekly  Reports history of right leg amputation due to blood clot  · Chart reviewed  Patient is status post right AKA due to critical 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 on 7/1/2738, no source was found  Patient is on Xarelto at home  LEAD showed > 75% stenosis in the right popliteal artery at the time  · Left foot is warm  Multiple dry wounds on toes and foot  Toes appear dusky on exam   Mild redness to left foot with tenderness  · Left foot x-ray showed no acute osseous abnormality  · Patient currently on heparin drip  Follow-up lower extremity arterial Dopplers  · Wound care with Betadine paint twice daily  · Neurovascular check  · Vascular surgery input appreciated    Cellulitis of foot, left  Assessment & Plan  · Left foot/toes with multiple dry wounds with mild redness and tenderness  · Possible cellulitis  · WBC normal, no bands, patient afebrile  · Patient given Sorenson Mandes in ED  · Patient currently on vancomycin and Ancef  · ID input pending at the current time    Hypertension  Assessment & Plan  · Continue metoprolol, Sharan for home  · BP well controlled    Multiple sclerosis ([address] )  Assessment & Plan  · Continue amantadine, baclofen  · Patient follows neurology as outpatient  · Reports using scooter at home, does not walk    Thrombocytopenia (HCC)  Assessment & Plan  · Mild  Platelet count 001    · Monitor platelet count while on heparin drip    PAD (peripheral artery disease) ([address] )  Assessment & Plan  · left LEAD in 2020 showed 50-75% stenosis at the distal SFA  · Repeat study in the morning  · Continue statin  · Continue heparin drip as above    Mixed hyperlipidemia  Assessment & Plan  · Continue statin    S/P AKA (above knee amputation) unilateral, right ([address] )  Assessment & Plan  · As above  · Patient did not follow-up with vascular since   VTE Pharmacologic Prophylaxis:   High Risk (Score >/= 5) - Pharmacological DVT Prophylaxis Ordered: heparin drip  Sequential Compression Devices Ordered  Patient Centered Rounds: I performed bedside rounds with nursing staff today  Discussions with Specialists or Other Care Team Provider: yes    Education and Discussions with Family / Patient: yes    Total Time Spent on Date of Encounter in care of patient: 45 minutes This time was spent on one or more of the following: performing physical exam; counseling and coordination of care; obtaining or reviewing history; documenting in the medical record; reviewing/ordering tests, medications or procedures; communicating with other healthcare professionals and discussing with patient's family/caregivers  Current Length of Stay: 1 day(s)  Current Patient Status: Inpatient   Certification Statement: The patient will continue to require additional inpatient hospital stay due to Left foot cellulitis with multiple wounds  Discharge Plan: Anticipate discharge in 24-48 hrs to home  Code Status: Level 1 - Full Code    Subjective:   Patient denies any pain in the feet  Anxious to eat  Objective:     Vitals:   Temp (24hrs), Av 4 °F (36 9 °C), Min:98 °F (36 7 °C), Max:98 8 °F (37 1 °C)    Temp:  [98 °F (36 7 °C)-98 8 °F (37 1 °C)] 98 °F (36 7 °C)  HR:  [70-88] 71  Resp:  [16-20] 17  BP: (112-136)/(67-91) 112/72  SpO2:  [94 %-97 %] 97 %  Body mass index is 22 6 kg/m²       Input and Output Summary (last 24 hours):   No intake or output data in the 24 hours ending 05/23/23 1413    Physical Exam:   Physical Exam  Constitutional:       Appearance: Normal appearance  HENT:      Head: Normocephalic and atraumatic  Nose: Nose normal       Mouth/Throat:      Mouth: Mucous membranes are moist       Pharynx: Oropharynx is clear  Eyes:      Extraocular Movements: Extraocular movements intact  Pupils: Pupils are equal, round, and reactive to light  Cardiovascular:      Rate and Rhythm: Normal rate and regular rhythm  Pulmonary:      Effort: Pulmonary effort is normal       Breath sounds: Normal breath sounds  Abdominal:      General: Bowel sounds are normal  There is no distension  Palpations: Abdomen is soft  Tenderness: There is no abdominal tenderness  Musculoskeletal:         General: No swelling  Cervical back: Normal range of motion and neck supple  Comments: Right above-knee amputation   Skin:     General: Skin is warm and dry  Comments: Left foot redness with dusky toes  Multiple wounds noted on the dorsum of the left foot and also tip of toes and plantar aspect of the left foot   Neurological:      General: No focal deficit present  Mental Status: She is alert          Additional Data:     Labs:  Results from last 7 days   Lab Units 05/23/23  0612 05/22/23  1616   WBC Thousand/uL 9 29 10 02   HEMOGLOBIN g/dL 11 5 13 5   HEMATOCRIT % 35 3 40 7   PLATELETS Thousands/uL 133* 146*   NEUTROS PCT %  --  78*   LYMPHS PCT %  --  16   MONOS PCT %  --  5   EOS PCT %  --  1     Results from last 7 days   Lab Units 05/23/23  0612 05/22/23  1616   SODIUM mmol/L 139 138   POTASSIUM mmol/L 3 7 3 7   CHLORIDE mmol/L 108 106   CO2 mmol/L 23 23   BUN mg/dL 16 14   CREATININE mg/dL 0 73 0 70   ANION GAP mmol/L 8 9   CALCIUM mg/dL 8 8 9 7   ALBUMIN g/dL  --  3 9   TOTAL BILIRUBIN mg/dL  --  0 32   ALK PHOS U/L  --  96   ALT U/L  --  8   AST U/L  --  10*   GLUCOSE RANDOM mg/dL 122 76 Results from last 7 days   Lab Units 05/22/23  1616   INR  1 54*                   Lines/Drains:  Invasive Devices     Peripheral Intravenous Line  Duration           Peripheral IV 05/22/23 Right Antecubital <1 day    Peripheral IV 05/22/23 Right Wrist <1 day                      Imaging: Reviewed radiology reports from this admission including: xray(s)    Recent Cultures (last 7 days):   Results from last 7 days   Lab Units 05/22/23  1720 05/22/23  1616   BLOOD CULTURE  Received in Microbiology Lab  Culture in Progress  Received in Microbiology Lab  Culture in Progress         Last 24 Hours Medication List:   Current Facility-Administered Medications   Medication Dose Route Frequency Provider Last Rate   • acetaminophen  650 mg Oral Q6H PRN RACHAEL Loredo     • amantadine  100 mg Oral BID RACHAEL Loredo     • amlodipine-olmesartan  1 tablet Oral Daily RACHAEL Loredo     • ascorbic acid  250 mg Oral Daily RACHAEL Loredo     • baclofen  20 mg Oral TID RACHAEL Loredo     • cefazolin  1,000 mg Intravenous Q8H RACHAEL Loredo 1,000 mg (05/23/23 2109)   • cholecalciferol  1,000 Units Oral Daily RACHAEL Loredo     • dicyclomine  10 mg Oral TID AC RACHAEL Loredo     • famotidine  20 mg Oral Daily RACHAEL Loredo     • gabapentin  300 mg Oral BID RACHAEL Loredo     • heparin (porcine)  12 Units/kg/hr (Order-Specific) Intravenous Titrated RACHAEL Loredo 16 Units/kg/hr (05/23/23 0899)   • HYDROmorphone  0 2 mg Intravenous Q3H PRN RACHAEL Loredo     • metoprolol tartrate  100 mg Oral Q12H RACHAEL Loredo     • multivitamin-minerals  1 tablet Oral Daily RACHAEL Loredo     • ondansetron  4 mg Intravenous Q6H PRN RACHAEL Loredo     • oxyCODONE  2 5 mg Oral Q4H PRN RACHAEL Loredo     • pravastatin  80 mg Oral Daily With RACHAEL Power     • vancomycin  750 mg Intravenous Q12H RACHAEL Loredo 750 mg (05/23/23 1200)        Today, Patient Was Seen By: Brenda Erickson MD    **Please Note: This note may have been constructed using a voice recognition system  **

## 2023-05-23 NOTE — CONSULTS
Consultation - General Surgery   Justice Goncalves 58 y o  female MRN: [id number]  Unit/Bed#: [address] Encounter: [id number]    Assessment/Plan     Assessment:  57 y/o f pmh chronic back pain, HLD, HTN, MS, PAD, Venosus insufficiency of left leg, chronic venous ulceration left foot S/p right above the knee amputation due to critical 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 40/6145 presenting with left foot pain, erythema, and new foot wounds, left 4th toe discoloration x 5 days  Arterial duplex 08/26/202  Left lower limb  50-75% stenosis at the distal SFA  HAILEY: 0 97 (1 11)  MTP: 165 mmHg (81 mmHg)  GTP: 191 mmHg (59 mmHg)    Plan:  Continue heparin gtt  Obtain arterial duplex  Continue statin  Recommend podiatry consult for  Left foot wounds  Okay to advance diet  History of Present Illness     HPI:  Justice Goncalves is a 58 y o  female  pmh chronic back pain, HLD, HTN, MS, PAD, Venosus insufficiency of left leg, chronic venous ulceration left foot S/p right above the knee amputation due to critical 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 17/8324 presenting with left foot pain, erythema, and new foot wounds, left 4th toe discoloration x 5 days  Patient reports new multiple foot wounds x 1 week  Patient reports she has cataracts  Patient reports foot pain is worse when lying flat  Pain is improved while sitting in wheelchair  Patient reports scrubbing her foot with a manicure brush approximately  2 weeks ago  Patient reports she used to follow at the wound care center at [facility]  Inpatient consult to Vascular Surgery  Consult performed by: Elvis Troncoso PA-C  Consult ordered by: RACHAEL Rowley          Review of Systems   Constitutional: Negative for chills, fatigue and fever  HENT: Negative for congestion, ear pain, hearing loss, postnasal drip, sinus pressure, sinus pain and sore throat  Eyes: Negative for pain and discharge     Respiratory: Negative for chest tightness and shortness of breath  Cardiovascular: Negative for chest pain  Gastrointestinal: Negative for abdominal pain, constipation, nausea and vomiting  Genitourinary: Negative for difficulty urinating  Musculoskeletal: Negative for arthralgias and myalgias  Skin: Positive for wound  Negative for rash  Neurological: Negative for dizziness and headaches  Psychiatric/Behavioral: Negative for behavioral problems  Historical Information   Past Medical History:   Diagnosis Date   • Chronic back pain    • Depression     last assessed 06/10/14   • High cholesterol    • Hypertension     last assessed 17   • MS (multiple sclerosis) ([facility] )     last assessed 17   • RLL pneumonia     last assessed 06/10/14     Past Surgical History:   Procedure Laterality Date   • AMPUTATION ABOVE KNEE (AKA) Right 2020    Procedure: AMPUTATION ABOVE KNEE (AKA);   Surgeon: Josh Thornton MD;  Location: [facility];  Service: Vascular   • ANKLE SURGERY Right     Treatment of Ankle  last assessed 06/10/14   • BACK SURGERY      for spinal stenosis and sciatica   •  SECTION      last assessed 06/10/14   • KNEE ARTHROSCOPY      last assessed 06/10/14   • LAMINECTOMY      last assessed 06/10/14   • ORTHOPEDIC SURGERY     • OTHER SURGICAL HISTORY      discectomy     Social History   Social History     Substance and Sexual Activity   Alcohol Use Yes    Comment: rare // no alcohol use as per allscripts     Social History     Substance and Sexual Activity   Drug Use No     E-Cigarette/Vaping   • E-Cigarette Use Never User      E-Cigarette/Vaping Substances   • Nicotine No    • THC No    • CBD No    • Flavoring No    • Other No    • Unknown No      Social History     Tobacco Use   Smoking Status Light Smoker   • Packs/day: 0 25   • Years: 40 00   • Pack years: 10 00   • Types: Cigarettes   • Start date:    • Last attempt to quit: 7/10/2019   • Years since quitting: 3 8   Smokeless Tobacco Never     Family History: "non-contributory    Meds/Allergies   all current active meds have been reviewed  No Known Allergies    Objective   First Vitals:   Blood Pressure: 121/69 (05/22/23 1520)  Pulse: 73 (05/22/23 1520)  Temperature: 98 8 °F (37 1 °C) (05/22/23 1520)  Temp Source: Tympanic (05/22/23 1520)  Respirations: 20 (05/22/23 1520)  Height: 5' 6\" (167 6 cm) (05/22/23 2300)  Weight - Scale: 63 5 kg (139 lb 15 9 oz) (05/22/23 2300)  SpO2: 96 % (05/22/23 1520)    Current Vitals:   Blood Pressure: 135/91 (05/23/23 0346)  Pulse: 88 (05/23/23 0346)  Temperature: 98 7 °F (37 1 °C) (05/23/23 0346)  Temp Source: Tympanic (05/22/23 1520)  Respirations: 18 (05/23/23 0346)  Height: 5' 6\" (167 6 cm) (05/22/23 2300)  Weight - Scale: 63 5 kg (139 lb 15 9 oz) (05/22/23 2300)  SpO2: 94 % (05/23/23 0346)    No intake or output data in the 24 hours ending 05/23/23 0856    Invasive Devices       Peripheral Intravenous Line  Duration             Peripheral IV 05/22/23 Right Antecubital <1 day    Peripheral IV 05/22/23 Right Wrist <1 day                    Physical Exam  Vitals and nursing note reviewed  Constitutional:       Appearance: Normal appearance  HENT:      Head: Normocephalic and atraumatic  Mouth/Throat:      Mouth: Mucous membranes are moist    Eyes:      Extraocular Movements: Extraocular movements intact  Cardiovascular:      Rate and Rhythm: Normal rate and regular rhythm  Pulses:           Dorsalis pedis pulses are detected w/ Doppler on the left side  Posterior tibial pulses are detected w/ Doppler on the left side  Pulmonary:      Effort: Pulmonary effort is normal       Breath sounds: Normal breath sounds  Abdominal:      Palpations: Abdomen is soft  Tenderness: There is no abdominal tenderness  Musculoskeletal:      Left lower leg: Edema present  Left foot: Normal range of motion  Right Lower Extremity: Right leg is amputated above knee     Feet:      Comments: Left foot sensation intact, " Motor at base line secondary to MS  Neurological:      Mental Status: She is alert  Psychiatric:         Mood and Affect: Mood normal        Left foot                      Lab Results:   I have personally reviewed pertinent lab results  , CBC:   Lab Results   Component Value Date    WBC 9 29 05/23/2023    HGB 11 5 05/23/2023    HCT 35 3 05/23/2023    MCV 93 05/23/2023     (L) 05/23/2023    MCH 30 4 05/23/2023    MCHC 32 6 05/23/2023    RDW 13 0 05/23/2023    MPV 13 2 (H) 05/23/2023    NRBC 0 05/22/2023   , CMP:   Lab Results   Component Value Date    SODIUM 139 05/23/2023    K 3 7 05/23/2023     05/23/2023    CO2 23 05/23/2023    BUN 16 05/23/2023    CREATININE 0 73 05/23/2023    CALCIUM 8 8 05/23/2023    AST 10 (L) 05/22/2023    ALT 8 05/22/2023    ALKPHOS 96 05/22/2023    EGFR 88 05/23/2023     Imaging: I have personally reviewed pertinent reports  XR foot 3+ views LEFT   Final Result by Elio Nguyen MD (05/23 9825)      No acute osseous abnormality  Workstation performed: [id number]         VAS lower limb arterial duplex, limited, unilateral    (Results Pending)       EKG, Pathology, and Other Studies: I have personally reviewed pertinent reports  Counseling / Coordination of Care  Total floor / unit time spent today 30 minutes  Greater than 50% of total time was spent with the patient and / or family counseling and / or coordination of care  A description of the counseling / coordination of care: obtaining history, performing physical exam, reviewing pertinent labs imaging, discussing case with attending  Benito Castle

## 2023-05-24 LAB
ANION GAP SERPL CALCULATED.3IONS-SCNC: 10 MMOL/L (ref 4–13)
APTT PPP: 77 SECONDS (ref 23–37)
BASOPHILS # BLD AUTO: 0.04 THOUSANDS/ÂΜL (ref 0–0.1)
BASOPHILS NFR BLD AUTO: 0 % (ref 0–1)
BUN SERPL-MCNC: 15 MG/DL (ref 5–25)
CALCIUM SERPL-MCNC: 9 MG/DL (ref 8.4–10.2)
CHLORIDE SERPL-SCNC: 106 MMOL/L (ref 96–108)
CO2 SERPL-SCNC: 23 MMOL/L (ref 21–32)
CREAT SERPL-MCNC: 0.77 MG/DL (ref 0.6–1.3)
EOSINOPHIL # BLD AUTO: 0.07 THOUSAND/ÂΜL (ref 0–0.61)
EOSINOPHIL NFR BLD AUTO: 1 % (ref 0–6)
ERYTHROCYTE [DISTWIDTH] IN BLOOD BY AUTOMATED COUNT: 13.1 % (ref 11.6–15.1)
GFR SERPL CREATININE-BSD FRML MDRD: 83 ML/MIN/1.73SQ M
GLUCOSE SERPL-MCNC: 110 MG/DL (ref 65–140)
HCT VFR BLD AUTO: 35.7 % (ref 34.8–46.1)
HGB BLD-MCNC: 11.9 G/DL (ref 11.5–15.4)
IMM GRANULOCYTES # BLD AUTO: 0.03 THOUSAND/UL (ref 0–0.2)
IMM GRANULOCYTES NFR BLD AUTO: 0 % (ref 0–2)
LYMPHOCYTES # BLD AUTO: 1.66 THOUSANDS/ÂΜL (ref 0.6–4.47)
LYMPHOCYTES NFR BLD AUTO: 15 % (ref 14–44)
MCH RBC QN AUTO: 30.8 PG (ref 26.8–34.3)
MCHC RBC AUTO-ENTMCNC: 33.3 G/DL (ref 31.4–37.4)
MCV RBC AUTO: 93 FL (ref 82–98)
MONOCYTES # BLD AUTO: 0.63 THOUSAND/ÂΜL (ref 0.17–1.22)
MONOCYTES NFR BLD AUTO: 6 % (ref 4–12)
NEUTROPHILS # BLD AUTO: 8.65 THOUSANDS/ÂΜL (ref 1.85–7.62)
NEUTS SEG NFR BLD AUTO: 78 % (ref 43–75)
NRBC BLD AUTO-RTO: 0 /100 WBCS
PLATELET # BLD AUTO: 133 THOUSANDS/UL (ref 149–390)
PMV BLD AUTO: 13.1 FL (ref 8.9–12.7)
POTASSIUM SERPL-SCNC: 4.1 MMOL/L (ref 3.5–5.3)
RBC # BLD AUTO: 3.86 MILLION/UL (ref 3.81–5.12)
SODIUM SERPL-SCNC: 139 MMOL/L (ref 135–147)
VANCOMYCIN TROUGH SERPL-MCNC: 22.4 UG/ML (ref 10–20)
WBC # BLD AUTO: 11.08 THOUSAND/UL (ref 4.31–10.16)

## 2023-05-24 PROCEDURE — 99233 SBSQ HOSP IP/OBS HIGH 50: CPT | Performed by: PHYSICIAN ASSISTANT

## 2023-05-24 PROCEDURE — 85025 COMPLETE CBC W/AUTO DIFF WBC: CPT | Performed by: INTERNAL MEDICINE

## 2023-05-24 PROCEDURE — 87205 SMEAR GRAM STAIN: CPT | Performed by: PODIATRIST

## 2023-05-24 PROCEDURE — 0J9R0ZZ DRAINAGE OF LEFT FOOT SUBCUTANEOUS TISSUE AND FASCIA, OPEN APPROACH: ICD-10-PCS | Performed by: RADIOLOGY

## 2023-05-24 PROCEDURE — 99223 1ST HOSP IP/OBS HIGH 75: CPT | Performed by: PODIATRIST

## 2023-05-24 PROCEDURE — 047L3ZZ DILATION OF LEFT FEMORAL ARTERY, PERCUTANEOUS APPROACH: ICD-10-PCS | Performed by: RADIOLOGY

## 2023-05-24 PROCEDURE — 80202 ASSAY OF VANCOMYCIN: CPT | Performed by: NURSE PRACTITIONER

## 2023-05-24 PROCEDURE — 80048 BASIC METABOLIC PNL TOTAL CA: CPT | Performed by: INTERNAL MEDICINE

## 2023-05-24 PROCEDURE — 99232 SBSQ HOSP IP/OBS MODERATE 35: CPT | Performed by: PHYSICIAN ASSISTANT

## 2023-05-24 PROCEDURE — NC001 PR NO CHARGE: Performed by: RADIOLOGY

## 2023-05-24 PROCEDURE — 87070 CULTURE OTHR SPECIMN AEROBIC: CPT | Performed by: PODIATRIST

## 2023-05-24 PROCEDURE — 87186 SC STD MICRODIL/AGAR DIL: CPT | Performed by: PODIATRIST

## 2023-05-24 PROCEDURE — 047L341 DILATION OF LEFT FEMORAL ARTERY WITH DRUG-ELUTING INTRALUMINAL DEVICE, USING DRUG-COATED BALLOON, PERCUTANEOUS APPROACH: ICD-10-PCS | Performed by: RADIOLOGY

## 2023-05-24 PROCEDURE — B41G1ZZ FLUOROSCOPY OF LEFT LOWER EXTREMITY ARTERIES USING LOW OSMOLAR CONTRAST: ICD-10-PCS | Performed by: RADIOLOGY

## 2023-05-24 PROCEDURE — 87147 CULTURE TYPE IMMUNOLOGIC: CPT | Performed by: PODIATRIST

## 2023-05-24 PROCEDURE — 04FL3ZZ FRAGMENTATION OF LEFT FEMORAL ARTERY, PERCUTANEOUS APPROACH: ICD-10-PCS | Performed by: RADIOLOGY

## 2023-05-24 PROCEDURE — 047L3Z1 DILATION OF LEFT FEMORAL ARTERY USING DRUG-COATED BALLOON, PERCUTANEOUS APPROACH: ICD-10-PCS | Performed by: RADIOLOGY

## 2023-05-24 PROCEDURE — 85730 THROMBOPLASTIN TIME PARTIAL: CPT | Performed by: INTERNAL MEDICINE

## 2023-05-24 PROCEDURE — 99232 SBSQ HOSP IP/OBS MODERATE 35: CPT | Performed by: INTERNAL MEDICINE

## 2023-05-24 PROCEDURE — B41GYZZ FLUOROSCOPY OF LEFT LOWER EXTREMITY ARTERIES USING OTHER CONTRAST: ICD-10-PCS | Performed by: RADIOLOGY

## 2023-05-24 PROCEDURE — 10060 I&D ABSCESS SIMPLE/SINGLE: CPT | Performed by: PODIATRIST

## 2023-05-24 PROCEDURE — B410YZZ FLUOROSCOPY OF ABDOMINAL AORTA USING OTHER CONTRAST: ICD-10-PCS | Performed by: RADIOLOGY

## 2023-05-24 RX ORDER — LOSARTAN POTASSIUM 50 MG/1
50 TABLET ORAL DAILY
Status: DISCONTINUED | OUTPATIENT
Start: 2023-05-24 | End: 2023-06-01 | Stop reason: HOSPADM

## 2023-05-24 RX ORDER — DOCUSATE SODIUM 100 MG/1
100 CAPSULE, LIQUID FILLED ORAL 2 TIMES DAILY
Status: DISCONTINUED | OUTPATIENT
Start: 2023-05-24 | End: 2023-06-01 | Stop reason: HOSPADM

## 2023-05-24 RX ORDER — AMLODIPINE BESYLATE 5 MG/1
5 TABLET ORAL DAILY
Status: DISCONTINUED | OUTPATIENT
Start: 2023-05-24 | End: 2023-06-01 | Stop reason: HOSPADM

## 2023-05-24 RX ORDER — POLYETHYLENE GLYCOL 3350 17 G/17G
17 POWDER, FOR SOLUTION ORAL DAILY PRN
Status: DISCONTINUED | OUTPATIENT
Start: 2023-05-24 | End: 2023-06-01 | Stop reason: HOSPADM

## 2023-05-24 RX ADMIN — HEPARIN SODIUM 12 UNITS/KG/HR: 10000 INJECTION, SOLUTION INTRAVENOUS at 23:10

## 2023-05-24 RX ADMIN — FAMOTIDINE 20 MG: 20 TABLET ORAL at 09:29

## 2023-05-24 RX ADMIN — DICYCLOMINE HYDROCHLORIDE 10 MG: 10 CAPSULE ORAL at 08:30

## 2023-05-24 RX ADMIN — PRAVASTATIN SODIUM 80 MG: 80 TABLET ORAL at 17:07

## 2023-05-24 RX ADMIN — DOCUSATE SODIUM 100 MG: 100 CAPSULE, LIQUID FILLED ORAL at 21:39

## 2023-05-24 RX ADMIN — METOPROLOL TARTRATE 100 MG: 100 TABLET, FILM COATED ORAL at 21:37

## 2023-05-24 RX ADMIN — CEFAZOLIN SODIUM 1000 MG: 1 SOLUTION INTRAVENOUS at 05:19

## 2023-05-24 RX ADMIN — CEFAZOLIN SODIUM 1000 MG: 1 SOLUTION INTRAVENOUS at 12:55

## 2023-05-24 RX ADMIN — BACLOFEN 20 MG: 10 TABLET ORAL at 16:16

## 2023-05-24 RX ADMIN — METOPROLOL TARTRATE 100 MG: 100 TABLET, FILM COATED ORAL at 09:30

## 2023-05-24 RX ADMIN — HYDROMORPHONE HYDROCHLORIDE 0.2 MG: 0.2 INJECTION, SOLUTION INTRAMUSCULAR; INTRAVENOUS; SUBCUTANEOUS at 15:33

## 2023-05-24 RX ADMIN — BACLOFEN 20 MG: 10 TABLET ORAL at 09:29

## 2023-05-24 RX ADMIN — LOSARTAN POTASSIUM 50 MG: 50 TABLET, FILM COATED ORAL at 09:47

## 2023-05-24 RX ADMIN — AMANTADINE HYDROCHLORIDE 100 MG: 100 CAPSULE ORAL at 17:07

## 2023-05-24 RX ADMIN — OXYCODONE HYDROCHLORIDE 2.5 MG: 5 TABLET ORAL at 09:47

## 2023-05-24 RX ADMIN — CEFAZOLIN SODIUM 1000 MG: 1 SOLUTION INTRAVENOUS at 21:41

## 2023-05-24 RX ADMIN — OXYCODONE HYDROCHLORIDE 2.5 MG: 5 TABLET ORAL at 18:24

## 2023-05-24 RX ADMIN — GABAPENTIN 300 MG: 300 CAPSULE ORAL at 09:29

## 2023-05-24 RX ADMIN — BACLOFEN 20 MG: 10 TABLET ORAL at 21:37

## 2023-05-24 RX ADMIN — DOCUSATE SODIUM 100 MG: 100 CAPSULE, LIQUID FILLED ORAL at 12:55

## 2023-05-24 RX ADMIN — Medication 1000 UNITS: at 09:23

## 2023-05-24 RX ADMIN — AMLODIPINE BESYLATE 5 MG: 5 TABLET ORAL at 09:47

## 2023-05-24 RX ADMIN — DICYCLOMINE HYDROCHLORIDE 10 MG: 10 CAPSULE ORAL at 12:52

## 2023-05-24 RX ADMIN — Medication 250 MG: at 09:23

## 2023-05-24 RX ADMIN — GABAPENTIN 300 MG: 300 CAPSULE ORAL at 17:07

## 2023-05-24 RX ADMIN — DICYCLOMINE HYDROCHLORIDE 10 MG: 10 CAPSULE ORAL at 17:00

## 2023-05-24 RX ADMIN — AMANTADINE HYDROCHLORIDE 100 MG: 100 CAPSULE ORAL at 09:23

## 2023-05-24 RX ADMIN — Medication 1 TABLET: at 09:23

## 2023-05-24 NOTE — PROGRESS NOTES
[address]  Progress Note  Name: Tone Jacob  MRN: [id number]  Unit/Bed#: [address] [address] I Date of Admission: 5/22/2023   Date of Service: 5/24/2023 I Hospital Day: 2    Assessment/Plan   * Multiple open wounds  Assessment & Plan  Patient presents with left foot tingling started on Friday, new wounds on toes and sole of foot for about 1 week, patient concerned about clot in leg  Reports brushing her left foot to get rid of dead skin 2 weeks ago  Denies fever, chills  Reports chronic wound on top of left foot since 6/2022 and goes to wound care weekly  Reports history of right leg amputation due to blood clot  · Chart reviewed  Patient is status post right AKA due to critical 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 on 4/7/6484, no source was found  Patient is on Xarelto at home  LEAD showed > 75% stenosis in the right popliteal artery at the time  · Left foot is warm  Multiple dry wounds on toes and foot  Toes appear dusky on exam   Mild redness to left foot with tenderness  · Left foot x-ray showed no acute osseous abnormality  · Patient currently on heparin drip  Follow-up lower extremity arterial Dopplers  · Wound care with Betadine paint twice daily  · Vascular surgery input appreciated-patient has been on heparin drip  ·     Cellulitis of foot, left  Assessment & Plan  · Left foot/toes with multiple dry wounds with mild redness and tenderness  · Possible cellulitis  · WBC normal, no bands, patient afebrile  · Patient given Lyla Ely in ED  · Patient has been on vancomycin and Ancef initially  · Vancomycin was discontinued  Continue IV cefazolin as per ID  · Blood cultures have been negative  · Patient also had a left plantar foot abscess which was drained by podiatry this morning    Hypertension  Assessment & Plan  · Continue metoprolol, Sharan for home    · BP well controlled    Multiple sclerosis (HCC)  Assessment & Plan  · Continue amantadine, baclofen  · Patient follows neurology as outpatient  · Reports using scooter at home, does not walk    PAD (peripheral artery disease) ([facility] )  Assessment & Plan  · left LEAD in 8/2020 showed 50-75% stenosis at the distal SFA  · Repeat study in the morning  · Continue statin  · Patient has been on heparin drip  · Lower extremity arterial Doppler the left showed HAILEY index 0 47 using the ischemic category  Compared to previous study there is drop in HAILEY  · As per vascular surgery IR was consulted for angiogram    Mixed hyperlipidemia  Assessment & Plan  · Continue statin    S/P AKA (above knee amputation) unilateral, right ([facility] )  Assessment & Plan  · As above  · Patient did not follow-up with vascular since 2020  VTE Pharmacologic Prophylaxis:   High Risk (Score >/= 5) - Pharmacological DVT Prophylaxis Ordered: heparin drip  Sequential Compression Devices Ordered  Patient Centered Rounds: I performed bedside rounds with nursing staff today  Discussions with Specialists or Other Care Team Provider: Vascular surgery    Education and Discussions with Family / Patient: yes    Total Time Spent on Date of Encounter in care of patient: 45 minutes This time was spent on one or more of the following: performing physical exam; counseling and coordination of care; obtaining or reviewing history; documenting in the medical record; reviewing/ordering tests, medications or procedures; communicating with other healthcare professionals and discussing with patient's family/caregivers  Current Length of Stay: 2 day(s)  Current Patient Status: Inpatient   Certification Statement: The patient will continue to require additional inpatient hospital stay due to Left foot cellulitis, PAD  Discharge Plan: Anticipate discharge in 24-48 hrs to home  Code Status: Level 1 - Full Code    Subjective:   Patient does report some leg pain  Did not have a bowel movement in a couple of days      Objective:     Vitals:   Temp (24hrs), Av 6 °F (37 °C), Min:98 °F (36 7 °C), Max:99 9 °F (37 7 °C)    Temp:  [98 °F (36 7 °C)-99 9 °F (37 7 °C)] 99 9 °F (37 7 °C)  HR:  [68-80] 80  Resp:  [18-20] 20  BP: (122-143)/(66-88) 136/85  SpO2:  [95 %-98 %] 96 %  Body mass index is 22 6 kg/m²  Input and Output Summary (last 24 hours): Intake/Output Summary (Last 24 hours) at 2023 1701  Last data filed at 2023 0105  Gross per 24 hour   Intake --   Output 800 ml   Net -800 ml       Physical Exam:   Physical Exam  Constitutional:       Appearance: Normal appearance  HENT:      Head: Normocephalic and atraumatic  Nose: Nose normal       Mouth/Throat:      Mouth: Mucous membranes are moist       Pharynx: Oropharynx is clear  Eyes:      Extraocular Movements: Extraocular movements intact  Pupils: Pupils are equal, round, and reactive to light  Cardiovascular:      Rate and Rhythm: Normal rate and regular rhythm  Pulmonary:      Effort: Pulmonary effort is normal       Breath sounds: Normal breath sounds  Abdominal:      General: Bowel sounds are normal  There is no distension  Palpations: Abdomen is soft  Tenderness: There is no abdominal tenderness  Musculoskeletal:         General: No swelling  Cervical back: Normal range of motion and neck supple  Comments: Right above-knee amputation  Left foot is red with dusky toes with dressing in place   Skin:     General: Skin is warm and dry  Neurological:      General: No focal deficit present  Mental Status: She is alert           Additional Data:     Labs:  Results from last 7 days   Lab Units 23  05   EOS PCT % 1   HEMATOCRIT % 35 7   HEMOGLOBIN g/dL 11 9   LYMPHS PCT % 15   MONOS PCT % 6   NEUTROS PCT % 78*   PLATELETS Thousands/uL 133*   WBC Thousand/uL 11 08*     Results from last 7 days   Lab Units 23  0523  0612 23  1616   ANION GAP mmol/L 10   < > 9   ALBUMIN g/dL  --   --  3 9   ALK PHOS U/L  --   --  96 ALT U/L  --   --  8   AST U/L  --   --  10*   BUN mg/dL 15   < > 14   CALCIUM mg/dL 9 0   < > 9 7   CHLORIDE mmol/L 106   < > 106   CO2 mmol/L 23   < > 23   CREATININE mg/dL 0 77   < > 0 70   GLUCOSE RANDOM mg/dL 110   < > 76   POTASSIUM mmol/L 4 1   < > 3 7   SODIUM mmol/L 139   < > 138   TOTAL BILIRUBIN mg/dL  --   --  0 32    < > = values in this interval not displayed  Results from last 7 days   Lab Units 05/22/23  1616   INR  1 54*                   Lines/Drains:  Invasive Devices     Peripheral Intravenous Line  Duration           Peripheral IV 05/22/23 Right Antecubital 2 days    Peripheral IV 05/22/23 Right Wrist 1 day                      Imaging: Reviewed radiology reports from this admission including: xray(s)    Recent Cultures (last 7 days):   Results from last 7 days   Lab Units 05/24/23  0823 05/22/23  1720 05/22/23  1616   BLOOD CULTURE   --  No Growth at 24 hrs  No Growth at 24 hrs     GRAM STAIN RESULT  No Polys or Bacteria seen  --   --        Last 24 Hours Medication List:   Current Facility-Administered Medications   Medication Dose Route Frequency Provider Last Rate   • acetaminophen  650 mg Oral Q6H PRN RACHAEL Loredo     • amantadine  100 mg Oral BID RACHAEL Loredo     • amLODIPine  5 mg Oral Daily Tretha Nissen, MD      And   • losartan  50 mg Oral Daily Tretha Nissen, MD     • ascorbic acid  250 mg Oral Daily RACHAEL Loredo     • baclofen  20 mg Oral TID RACHAEL Loredo     • cefazolin  1,000 mg Intravenous Q8H RACHAEL Loredo 1,000 mg (05/24/23 1255)   • cholecalciferol  1,000 Units Oral Daily RACHAEL Loredo     • dicyclomine  10 mg Oral TID AC RACHAEL Loredo     • docusate sodium  100 mg Oral BID Tretha Nissen, MD     • famotidine  20 mg Oral Daily RACHAEL Loredo     • gabapentin  300 mg Oral BID RACHAEL Loredo     • heparin (porcine)  12 Units/kg/hr (Order-Specific) Intravenous Titrated RACHAEL Loredo 16 Units/kg/hr (05/23/23 5690) • HYDROmorphone  0 2 mg Intravenous Q3H PRN RACHAEL Loredo     • metoprolol tartrate  100 mg Oral Q12H RACHAEL Loredo     • multivitamin-minerals  1 tablet Oral Daily RACHAEL Loredo     • ondansetron  4 mg Intravenous Q6H PRN RACHAEL Loredo     • oxyCODONE  2 5 mg Oral Q4H PRN RACHAEL Loredo     • polyethylene glycol  17 g Oral Daily PRN Roberto Wniston MD     • pravastatin  80 mg Oral Daily With RACHAEL Power          Today, Patient Was Seen By: Roberto Winston MD    **Please Note: This note may have been constructed using a voice recognition system  **

## 2023-05-24 NOTE — CONSULTS
e-Consult (IPC)  - Interventional Radiology  Elizabeth Mobley 58 y o  female MRN: [id number]  Unit/Bed#: [address] [address] Encounter: [id number]    Interventional Radiology has been consulted to evaluate Skylar Mobley    We were consulted by Felipe Tenorio PA-C concerning this patient with non-healing left leg wounds  Inpatient Consult to IR  Consult performed by: Evelyn Nicholson MD  Consult ordered by: Felipe Tenorio PA-C        05/24/23    Assessment/Recommendation:   IR consulted for a left lower extremity angiogram and possible intervention to improve arterial flow  The patient is a 58year old female patient admitted on 5/22/23 with cellulitis and multiple open wounds to left lower extremity, cellulitis plantar aspect left foot and blue toe syndrome  Possible early gangrenous change of lesser digits  The arterial duplex findings are below showing no definite significant stenoses  LEFT LOWER LIMB:  This resting evaluation shows diffuse disease through out with no significant  stenosis  Ankle/Brachial index: 0 47 , which is in the ischemic category  Prior: 0 97  Metatarsal pressure of 111 mmHg Priot: 165mmHg  Great toe pressure of 57 mmHg, within healing range  Prior 191 mmHg  PVR/ PPG tracings are normal      Compared to previous study on 5/4/2020 , there is a drop in the HAILEY with  possible small vessel disease     21-30 minutes, >50% of the total time devoted to medical consultative verbal/EMR discussion between providers  Written report will be generated in the EMR  Thank you for allowing Interventional Radiology to participate in the care of [facility]  Please don't hesitate to call or TigerText us with any questions       Evelyn Nicholson MD

## 2023-05-24 NOTE — PROGRESS NOTES
"Progress Note - Infectious Disease   Tennie Flank Itz 58 y o  female MRN: [id number]  Unit/Bed#: [address] Encounter: [id number]      Impression/Plan:  1    Left Foot multiple wounds with pain/erythema/ischemic changes of toes on admission  Possible overlying cellulitis in setting of increased pain last 5 days with left foot mulitple scabs/wounds and/or progressive arterial disease related changes  No fever, chills, associated sepsis  Dog and cat do not lick wounds, but do step on patient's foot often  S/p Left foot plantar abscess bedside I&D today  Left foot Cx pending  -continue Cefazolin for now  -follow-up blood cultures  -Follow up left foot culture  -monitor temperature and hemodynamics  -serial exam  -recheck CBC and BMP in a m   -local wound/skin care  -offloading  -follow up IR LLE angiogram     2   Peripheral arterial disease  5/23/23 LEAD: Left HAILEY 0 47  prior 0 97 in 2020  GTP 57 mmHg down from 191 mmHg in 2020  -Vascular on board  -follow up IR angiogram     3  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 Critical limb ischemia, 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     -Serial exams of leg  -Supportive care      3  Tobacco abuse   Patient with documented ongoing tobacco abuse   Noted to have arterial disease on vascular studies  Danna Ratliff contributed to presentation as above  -Nicotine replacement as per primary  -Discussed smoking cessation     4  MS   At baseline is nonambulatory   -Continue to follow-up with neurology as outpatient     Antibiotics:  Cefazolin D3     Above impression and plan discussed in detail with patient, RN, and primary care team     Subjective:  Patient has no fever, chills, sweats; no nausea, vomiting, diarrhea; no cough, shortness of breath; no left foot pain at present  Did not sleep well because \"hips locked up  \" No new symptoms      Objective:  Vitals:  Temp:  [97 9 °F (36 6 °C)-98 3 °F (36 8 °C)] 98 °F (36 7 °C)  HR:  [68-79] 79  Resp:  [18] 18  BP: (122-143)/(66-91) " 122/66  SpO2:  [95 %-98 %] 98 %  Temp (24hrs), Av 1 °F (36 7 °C), Min:97 9 °F (36 6 °C), Max:98 3 °F (36 8 °C)  Current: Temperature: 98 °F (36 7 °C)    Physical Exam:   General Appearance:  58year old male chronically debilitated, nontoxic, no acute distress, propped in bed  HEENT: Atraumatic normocephalic   Throat: Oropharynx moist  Poor dentition   Pulmonary:   Normal respiratory excursion without accessory muscle use   Cardiac:  RRR   Abdomen:   Soft, non-tender, non-distended   Extremities: Right AKA well healed  Left foot gauze wrap intact without strike through drainage or spreading erythema outside wrap   : Purewick with clear, yellow urine in canister, no SPT   Psychiatric: Awake, cooperative   Skin: No new rashes  IV site nontender  Labs, Imaging, & Other studies:   All pertinent labs and imaging studies were personally reviewed  Results from last 7 days   Lab Units 23  0522 23  0612 23  1616   HEMOGLOBIN g/dL 11 9 11 5 13 5   PLATELETS Thousands/uL 133* 133* 146*   WBC Thousand/uL 11 08* 9 29 10 02     Results from last 7 days   Lab Units 23  0522 23  0612 23  1616   ALK PHOS U/L  --   --  96   ALT U/L  --   --  8   AST U/L  --   --  10*   BUN mg/dL 15 16 14   CALCIUM mg/dL 9 0 8 8 9 7   CHLORIDE mmol/L 106 108 106   CO2 mmol/L    CREATININE mg/dL 0 77 0 73 0 70   EGFR ml/min/1 73sq m 83 88 93   POTASSIUM mmol/L 4 1 3 7 3 7   SODIUM mmol/L 139 139 138     Results from last 7 days   Lab Units 23  0823 23  1720 23  1616   BLOOD CULTURE   --  No Growth at 24 hrs  No Growth at 24 hrs     GRAM STAIN RESULT  No Polys or Bacteria seen  --   --

## 2023-05-24 NOTE — PLAN OF CARE
Problem: MOBILITY - ADULT  Goal: Maintain or return to baseline ADL function  Description: INTERVENTIONS:  -  Assess patient's ability to carry out ADLs; assess patient's baseline for ADL function and identify physical deficits which impact ability to perform ADLs (bathing, care of mouth/teeth, toileting, grooming, dressing, etc )  - Assess/evaluate cause of self-care deficits   - Assess range of motion    Outcome: Progressing  Goal: Maintains/Returns to pre admission functional level  Description: INTERVENTIONS:  - Perform BMAT or MOVE assessment daily    - Set and communicate daily mobility goal to care team and patient/family/caregiver     - Collaborate with rehabilitation services on mobility goals   - Out of bed for toileting  - Record patient progress and toleration of activity level   Outcome: Progressing     Problem: Prexisting or High Potential for Compromised Skin Integrity  Goal: Skin integrity is maintained or improved  Description: INTERVENTIONS:  - Identify patients at risk for skin breakdown  - Assess and monitor skin integrity  - Assess and monitor nutrition and hydration status  - Monitor labs     Outcome: Progressing     Problem: PAIN - ADULT  Goal: Verbalizes/displays adequate comfort level or baseline comfort level  Description: Interventions:  - Encourage patient to monitor pain and request assistance  - Assess pain using appropriate pain scale  - Administer analgesics based on type and severity of pain and evaluate response  - Implement non-pharmacological measures as appropriate and evaluate response  - Consider cultural and social influences on pain and pain management  - Notify physician/advanced practitioner if interventions unsuccessful or patient reports new pain  Outcome: Progressing     Problem: SAFETY ADULT  Goal: Patient will remain free of falls  Description: INTERVENTIONS:  - Educate patient/family on patient safety including physical limitations  - Instruct patient to call for assistance with activity   - Consult OT/PT to assist with strengthening/mobility   - Keep Call bell within reach    - Consider moving patient to room near nurses station  Outcome: Progressing

## 2023-05-24 NOTE — ASSESSMENT & PLAN NOTE
· left LEAD in 8/2020 showed 50-75% stenosis at the distal SFA  · Repeat study in the morning  · Continue statin  · Patient has been on heparin drip  · Lower extremity arterial Doppler the left showed HAILEY index 0 47 using the ischemic category    Compared to previous study there is drop in HAILEY  · As per vascular surgery IR was consulted for angiogram

## 2023-05-24 NOTE — PROGRESS NOTES
"Progress Note - Vascular Surgery   Payam Nam Mobley 58 y o  female MRN: [id number]  Unit/Bed#: [address] Encounter: [id number]      Subjective:  I dont have any new pain I'm just worried about the wounds     Vitals:  /66   Pulse 79   Temp 98 °F (36 7 °C) (Oral)   Resp 18   Ht 5' 6\" (1 676 m)   Wt 63 5 kg (139 lb 15 9 oz)   SpO2 98%   BMI 22 60 kg/m²     I/Os:  I/O last 3 completed shifts:  In: -   Out: 800 [Urine:800]  No intake/output data recorded      Lab Results and Cultures:   CBC with diff:   Lab Results   Component Value Date    HCT 35 7 05/24/2023    HGB 11 9 05/24/2023    MCH 30 8 05/24/2023    MCHC 33 3 05/24/2023    MCV 93 05/24/2023    MPV 13 1 (H) 05/24/2023    NRBC 0 05/24/2023     (L) 05/24/2023    RBC 3 86 05/24/2023    RDW 13 1 05/24/2023    WBC 11 08 (H) 05/24/2023   ,   BMP/CMP:  Lab Results   Component Value Date    ALKPHOS 96 05/22/2023    ALKPHOS 103 11/30/2017    ALT 8 05/22/2023    ALT 11 12/01/2022    AST 10 (L) 05/22/2023    AST 13 12/01/2022    BILITOT 0 2 11/30/2017    BUN 15 05/24/2023    BUN 14 12/01/2022    CALCIUM 9 0 05/24/2023    CALCIUM 9 2 11/30/2017     05/24/2023     12/01/2022    CO2 23 05/24/2023    CO2 23 12/01/2022    CREATININE 0 77 05/24/2023    CREATININE 0 65 11/30/2017    EGFR 83 05/24/2023    GLUCOSE 66 11/30/2017    K 4 1 05/24/2023    K 4 1 12/01/2022    PROT 7 1 11/30/2017    SODIUM 139 05/24/2023    SODIUM 140 12/01/2022   ,   Lipid Panel: No results found for: \"CHOL\",   Coags:   Lab Results   Component Value Date    INR 1 54 (H) 05/22/2023    PTT 77 (H) 05/24/2023   ,     Blood Culture:   Lab Results   Component Value Date    BLOODCX No Growth at 24 hrs  05/22/2023   ,   Urinalysis:   Lab Results   Component Value Date    BILIRUBINUR Negative 12/08/2019    BLOODU Trace-Intact (A) 12/08/2019    CLARITYU Clear 12/08/2019    COLORU Yellow 12/08/2019    GLUCOSEU Negative 12/08/2019    KETONESU Negative 12/08/2019    LEUKOCYTESUR " Negative 12/08/2019    NITRITE Negative 12/08/2019    PHUR 5 5 12/08/2019    SPECGRAV 1 025 12/08/2019   ,   Urine Culture:   Lab Results   Component Value Date    URINECX No Growth <1000 cfu/mL 12/08/2019   ,   Wound Culure:   Lab Results   Component Value Date    WOUNDCULT 1+ Growth of Acinetobacter lwoffii group (A) 06/15/2022    WOUNDCULT 1+ Growth of Acinetobacter baumannii (A) 06/15/2022       Medications:  Current Facility-Administered Medications   Medication Dose Route Frequency   • acetaminophen (TYLENOL) tablet 650 mg  650 mg Oral Q6H PRN   • amantadine (SYMMETREL) capsule 100 mg  100 mg Oral BID   • amLODIPine (NORVASC) tablet 5 mg  5 mg Oral Daily    And   • losartan (COZAAR) tablet 50 mg  50 mg Oral Daily   • ascorbic acid (VITAMIN C) tablet 250 mg  250 mg Oral Daily   • baclofen tablet 20 mg  20 mg Oral TID   • ceFAZolin (ANCEF) IVPB (premix in dextrose) 1,000 mg 50 mL  1,000 mg Intravenous Q8H   • cholecalciferol (VITAMIN D3) tablet 1,000 Units  1,000 Units Oral Daily   • dicyclomine (BENTYL) capsule 10 mg  10 mg Oral TID AC   • docusate sodium (COLACE) capsule 100 mg  100 mg Oral BID   • famotidine (PEPCID) tablet 20 mg  20 mg Oral Daily   • gabapentin (NEURONTIN) capsule 300 mg  300 mg Oral BID   • heparin (porcine) 25,000 units in 0 45% NaCl 250 mL infusion (premix)  12 Units/kg/hr (Order-Specific) Intravenous Titrated   • HYDROmorphone HCl (DILAUDID) injection 0 2 mg  0 2 mg Intravenous Q3H PRN   • metoprolol tartrate (LOPRESSOR) tablet 100 mg  100 mg Oral Q12H   • multivitamin-minerals (CENTRUM) tablet 1 tablet  1 tablet Oral Daily   • ondansetron (ZOFRAN) injection 4 mg  4 mg Intravenous Q6H PRN   • oxyCODONE (ROXICODONE) IR tablet 2 5 mg  2 5 mg Oral Q4H PRN   • polyethylene glycol (MIRALAX) packet 17 g  17 g Oral Daily PRN   • pravastatin (PRAVACHOL) tablet 80 mg  80 mg Oral Daily With Dinner       Imaging:  Left LEAD    FINDINGS:     Left                   Impression       Waveform   PSV (cm/s)  AP (cm)    Prox LETICIA               Not visualized                                     Dist LETICIA               Diffuse Disease                    196             Prox  EIA                                                 196             Dist EIA                                                  215      0 7    Common Femoral Artery                                     167             Prox Profunda                                              76             Prox SFA                                                  119             Mid SFA                                                    91             Dist SFA                                                  136             Proximal Pop           50-75%                             321             Distal Pop                                                 57             Tibioperoneal                                              19             Prox Post Tibial                        Hyperemic                         Dist Post Tibial                                           22             Prox  Ant  Tibial                       Hyperemic          74             Dist  Ant  Tibial                                          74             Dist Peroneal                           Hyperemic          11                      CONCLUSION:  RIGHT LOWER LIMB:  Above knee amputation     LEFT LOWER LIMB:  This resting evaluation shows diffuse disease through out with a 50-75%  stenosis at proximal popliteal artery  Ankle/Brachial index: 0 47 , which is in the ischemic category  Prior: 0 97  Metatarsal pressure of 111 mmHg Priot: 165mmHg  Great toe pressure of 57 mmHg, within healing range   Prior 191 mmHg  PVR/ PPG tracings are normal       Physical Exam:    General: AAOx3, no acute distress  CV: regular rate   Respiratory: no respiratory distress, normal effort  Abdominal: soft, non distended, nttp  Extremities: s/p right AKA      Wound/Incision:                  Assessment:  Chronic PAD with known left SFA stenosis presenting with venous insufficiency of the left leg and ischemic appearing wounds on the left foot          Plan:  Currently on heparin gtt  Left LEAD reviewed  Podiatry following  IR consulted for angiogram in hopes of improving arterial flow in the LLE  Plan for Agram on Friday 5/26        Guero Pearce PA-C  5/24/2023

## 2023-05-24 NOTE — DISCHARGE INSTR - OTHER ORDERS
Plan:   Skin care Plan:    1-Cleanse sacro-buttocks with soap and water  Apply Hydraguard barrier cream to sacro-buttocks for protection 2x/day & as needed  2-Turn/reposition every 2 hours in bed for pressure re-distribution on skin  3-Float left heel on 2 pillows so heel not in contact with mattress or pillows to offload pressure  4-Moisturize skin daily with skin nourishing cream  5-Use pressure redistribution cushion in chair when out of bed  6-Hydraguard to left heel daily with dressing changes and as needed  7-Follow up with Dr Jadon Galicia as directed

## 2023-05-24 NOTE — CONSULTS
The patient's vancomycin therapy has been discontinued  Pharmacy will sign off now  Thank you for this consult       Tessy Patiño, Pharmacist

## 2023-05-24 NOTE — ASSESSMENT & PLAN NOTE
· Left foot/toes with multiple dry wounds with mild redness and tenderness  · Possible cellulitis  · WBC normal, no bands, patient afebrile  · Patient given Frankie Miracle in ED  · Patient has been on vancomycin and Ancef initially  · Vancomycin was discontinued    Continue IV cefazolin as per ID  · Blood cultures have been negative  · Patient also had a left plantar foot abscess which was drained by podiatry this morning

## 2023-05-24 NOTE — UTILIZATION REVIEW
Date: 05/24/2023   Hospital Day 3: Has surpassed a 2nd midnight with active treatments and services  Continue IV Abx- ancef  Continue IV heparin gtt  At this time incision and drainage of plantar left foot abscess performed  Utilizing 10 blade the abscess of the left foot was deroofed  Culture and sensitivity done  Start wound care  05/24/2023  Consult IR:  for a left lower extremity angiogram and possible intervention to improve arterial flow  The arterial duplex findings are  showing no definite significant stenoses

## 2023-05-24 NOTE — CONSULTS
Consult - Podiatry   Kerri Velasquez 58 y o  female MRN: [id number]  Unit/Bed#: [facility] [facility] 406-01 Encounter: [id number]    Assessment/Plan     Assessment:  Dysvascular left foot  Peripheral artery disease  Cellulitis plantar aspect left foot  Multiple wounds of left foot noted  Blue toe syndrome  Possible early gangrenous change of lesser digits  Pain  History of right AKA  Plantar abscess left foot  Plan:  Chart reviewed  Doppler reviewed  Patient seen at bedside  At this time incision and drainage of plantar left foot abscess performed  Utilizing 10 blade the abscess of the left foot was deroofed  Culture and sensitivity done  Taper antibiotics as per results  Start wound care  At this time patient is most likely candidate for arteriogram with stenting  We will contact vascular surgery concerning such  We will follow  Patient is at high risk for limb loss  History of Present Illness     HPI:  Kerri Velasquez is a 58 y o  female who presents with wounds and pain in the left foot  Patient states that she has had wounds of the left foot for some time  These were self-induced by her scratching  She was a patient of the wound center but she was referred to the hospital for work-up and treatment  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       Consults  Review of Systems   Constitutional: Negative  HENT: Negative  Eyes: Negative  Respiratory: Negative  Cardiovascular: Negative  Gastrointestinal: Negative  Musculoskeletal: Right foot absent  Left foot has pes cavus position  Skin: Thin/dry skin of the left foot noted  Neurological: Negative  Psych: negative         Historical Information   Past Medical History:   Diagnosis Date   • Chronic back pain    • Depression     last assessed 06/10/14   • High cholesterol    • Hypertension     last assessed 12/08/17   • MS (multiple sclerosis) ([facility] [address] )     last assessed 12/08/17   • RLL pneumonia     last assessed 06/10/14     Past Surgical History:   Procedure Laterality Date   • AMPUTATION ABOVE KNEE (AKA) Right 2020    Procedure: AMPUTATION ABOVE KNEE (AKA);   Surgeon: Marizol Piper MD;  Location: [facility];  Service: Vascular   • ANKLE SURGERY Right     Treatment of Ankle  last assessed 06/10/14   • BACK SURGERY      for spinal stenosis and sciatica   •  SECTION      last assessed 06/10/14   • KNEE ARTHROSCOPY      last assessed 06/10/14   • LAMINECTOMY      last assessed 06/10/14   • ORTHOPEDIC SURGERY     • OTHER SURGICAL HISTORY      discectomy     Social History   Social History     Substance and Sexual Activity   Alcohol Use Yes    Comment: rare // no alcohol use as per allscripts     Social History     Substance and Sexual Activity   Drug Use No     Social History     Tobacco Use   Smoking Status Light Smoker   • Packs/day: 0 25   • Years: 40 00   • Total pack years: 10 00   • Types: Cigarettes   • Start date:    • Last attempt to quit: 7/10/2019   • Years since quitting: 3 8   Smokeless Tobacco Never     Family History:   Family History   Problem Relation Age of Onset   • Osteoporosis Mother    • Heart attack Father    • Mental illness Neg Hx        Meds/Allergies   Medications Prior to Admission   Medication   • amantadine (SYMMETREL) 100 mg capsule   • amlodipine-olmesartan (SELENE) 5-20 MG   • ascorbic acid (VITAMIN C) 250 mg tablet   • baclofen 10 mg tablet   • cholecalciferol (VITAMIN D3) 1,000 units tablet   • dicyclomine (BENTYL) 10 mg capsule   • gabapentin (NEURONTIN) 300 mg capsule   • metoprolol tartrate (LOPRESSOR) 100 mg tablet   • Multiple Vitamins-Minerals (Multivitamin Adult Extra C) CHEW   • rivaroxaban (XARELTO) 20 mg tablet   • rosuvastatin (CRESTOR) 10 MG tablet   • cadexomer iodine (IODOSORB) 0 9 % gel   • cholestyramine (QUESTRAN) 4 g packet   • LORazepam (ATIVAN) 0 5 mg tablet   • mupirocin (BACTROBAN) 2 % ointment     No Known Allergies    Objective   First Vitals:   Blood "Pressure: 121/69 (05/22/23 1520)  Pulse: 73 (05/22/23 1520)  Temperature: 98 8 °F (37 1 °C) (05/22/23 1520)  Temp Source: Tympanic (05/22/23 1520)  Respirations: 20 (05/22/23 1520)  Height: 5' 6\" (167 6 cm) (05/22/23 2300)  Weight - Scale: 63 5 kg (139 lb 15 9 oz) (05/22/23 2300)  SpO2: 96 % (05/22/23 1520)    Current Vitals:   Blood Pressure: 139/84 (05/23/23 2205)  Pulse: 68 (05/24/23 0706)  Temperature: 98 °F (36 7 °C) (05/23/23 2205)  Temp Source: Oral (05/23/23 2205)  Respirations: 18 (05/23/23 2205)  Height: 5' 6\" (167 6 cm) (05/22/23 2300)  Weight - Scale: 63 5 kg (139 lb 15 9 oz) (05/22/23 2300)  SpO2: 95 % (05/24/23 0706)        /84   Pulse 68   Temp 98 °F (36 7 °C) (Oral)   Resp 18   Ht 5' 6\" (1 676 m)   Wt 63 5 kg (139 lb 15 9 oz)   SpO2 95%   BMI 22 60 kg/m²      General Appearance:    Alert, cooperative, no distress   Head:    Normocephalic, without obvious abnormality, atraumatic   Eyes:    PERRL, conjunctiva/corneas clear, EOM's intact        Nose:   Moist mucous membranes   Neck:   Supple, symmetrical, trachea midline   Back:     Symmetric   Lungs:     Respirations unlabored   Heart:    Regular rate and rhythm, S1 and S2 normal, no murmur, rub   or gallop   Abdomen:     Soft, non-tender   Extremities:  Left lower extremity demonstrates multiple superficial wounds  All wounds are covered with dry eschar  There is a small abscess on the plantar aspect of the left foot  Incision and drainage done via deroofing with a 10 blade, serosanguineous/pus noted  Culture and sensitivity done  Patient has significant nonblanchable cyanosis of the lesser digits of the foot  Left hallux demonstrates blanchable cyanosis  Plantar aspect left foot demonstrates cellulitis  Pulses:  Left foot demonstrates nonpalpable pulses  Negative digital hair  Positive abnormal cooling  Skin:  Thin atrophic skin noted   Neurologic:   Gross sensation is intact  Protective sensation is intact             Lab " Results:   Admission on 05/22/2023   Component Date Value   • Sodium 05/22/2023 138    • Potassium 05/22/2023 3 7    • Chloride 05/22/2023 106    • CO2 05/22/2023 23    • ANION GAP 05/22/2023 9    • BUN 05/22/2023 14    • Creatinine 05/22/2023 0 70    • Glucose 05/22/2023 76    • Calcium 05/22/2023 9 7    • AST 05/22/2023 10 (L)    • ALT 05/22/2023 8    • Alkaline Phosphatase 05/22/2023 96    • Total Protein 05/22/2023 7 7    • Albumin 05/22/2023 3 9    • Total Bilirubin 05/22/2023 0 32    • eGFR 05/22/2023 93    • Protime 05/22/2023 18 5 (H)    • INR 05/22/2023 1 54 (H)    • PTT 05/22/2023 38 (H)    • WBC 05/22/2023 10 02    • RBC 05/22/2023 4 39    • Hemoglobin 05/22/2023 13 5    • Hematocrit 05/22/2023 40 7    • MCV 05/22/2023 93    • MCH 05/22/2023 30 8    • MCHC 05/22/2023 33 2    • RDW 05/22/2023 12 9    • MPV 05/22/2023 13 0 (H)    • Platelets 49/64/0757 146 (L)    • nRBC 05/22/2023 0    • Neutrophils Relative 05/22/2023 78 (H)    • Immat GRANS % 05/22/2023 0    • Lymphocytes Relative 05/22/2023 16    • Monocytes Relative 05/22/2023 5    • Eosinophils Relative 05/22/2023 1    • Basophils Relative 05/22/2023 0    • Neutrophils Absolute 05/22/2023 7 83 (H)    • Immature Grans Absolute 05/22/2023 0 03    • Lymphocytes Absolute 05/22/2023 1 58    • Monocytes Absolute 05/22/2023 0 49    • Eosinophils Absolute 05/22/2023 0 07    • Basophils Absolute 05/22/2023 0 02    • Blood Culture 05/22/2023 No Growth at 24 hrs  • Blood Culture 05/22/2023 No Growth at 24 hrs      • PTT 05/23/2023 47 (H)    • PTT 05/23/2023 55 (H)    • Sodium 05/23/2023 139    • Potassium 05/23/2023 3 7    • Chloride 05/23/2023 108    • CO2 05/23/2023 23    • ANION GAP 05/23/2023 8    • BUN 05/23/2023 16    • Creatinine 05/23/2023 0 73    • Glucose 05/23/2023 122    • Calcium 05/23/2023 8 8    • eGFR 05/23/2023 88    • Magnesium 05/23/2023 1 9    • WBC 05/23/2023 9 29    • RBC 05/23/2023 3 78 (L)    • Hemoglobin 05/23/2023 11 5    • Hematocrit "05/23/2023 35 3    • MCV 05/23/2023 93    • MCH 05/23/2023 30 4    • MCHC 05/23/2023 32 6    • RDW 05/23/2023 13 0    • Platelets 57/52/9329 133 (L)    • MPV 05/23/2023 13 2 (H)    • PTT 05/23/2023 68 (H)    • PTT 05/23/2023 67 (H)    • Vancomycin Tr 05/24/2023 22 4 (HH)    • WBC 05/24/2023 11 08 (H)    • RBC 05/24/2023 3 86    • Hemoglobin 05/24/2023 11 9    • Hematocrit 05/24/2023 35 7    • MCV 05/24/2023 93    • MCH 05/24/2023 30 8    • MCHC 05/24/2023 33 3    • RDW 05/24/2023 13 1    • MPV 05/24/2023 13 1 (H)    • Platelets 05/20/6652 133 (L)    • nRBC 05/24/2023 0    • Neutrophils Relative 05/24/2023 78 (H)    • Immat GRANS % 05/24/2023 0    • Lymphocytes Relative 05/24/2023 15    • Monocytes Relative 05/24/2023 6    • Eosinophils Relative 05/24/2023 1    • Basophils Relative 05/24/2023 0    • Neutrophils Absolute 05/24/2023 8 65 (H)    • Immature Grans Absolute 05/24/2023 0 03    • Lymphocytes Absolute 05/24/2023 1 66    • Monocytes Absolute 05/24/2023 0 63    • Eosinophils Absolute 05/24/2023 0 07    • Basophils Absolute 05/24/2023 0 04    • Sodium 05/24/2023 139    • Potassium 05/24/2023 4 1    • Chloride 05/24/2023 106    • CO2 05/24/2023 23    • ANION GAP 05/24/2023 10    • BUN 05/24/2023 15    • Creatinine 05/24/2023 0 77    • Glucose 05/24/2023 110    • Calcium 05/24/2023 9 0    • eGFR 05/24/2023 83    • PTT 05/24/2023 77 (H)              Results from last 7 days   Lab Units 05/22/23  1720 05/22/23  1616   BLOOD CULTURE  No Growth at 24 hrs  No Growth at 24 hrs  Invalid input(s): \"LABAEARO\"            Imaging: I have personally reviewed pertinent films in PACS  EKG, Pathology, and Other Studies: I have personally reviewed pertinent reports        Code Status: Level 1 - Full Code  Advance Directive and Living Will:      Power of :    POLST:         "

## 2023-05-24 NOTE — WOUND OSTOMY CARE
Progress Note - Wound   Shalonda Mobley 58 y o  female MRN: [id number]  Unit/Bed#: [address] 406-01 Encounter: [id number]      Assessment: This is a 58year old female patient admitted on 5/22/23 with cellulitis and multiple open wounds to left lower extremity  She has a history of multiple sclerosis, PAD s/p right AKA, HTN and thrombocytopenia  She was awake, alert & oriented x 3 and agreeable to have wound visit done  She has Purewick in place with no record of BM  She was turned and repositioned with assist of 2 persons  Assessment Findings:  1-Sacrobuttock area intact - orders in place for skin care and for prevention  2-Dressing in place to left heel - patient seen by Podiatry this a m  for left foot wounds  Plan:   Skin care Plan:  1-Cleanse sacro-buttocks with soap and water  Apply Allevyn foam and akosua with P for prevention  Change every other day and PRN soilage/dislodgement and check skin q-shift  2-Turn/reposition q2h or when medically stable for pressure re-distribution on skin   3-Float left heel on 2 pillows so heel not in contact with mattress or pillows to offload pressure  4-Moisturize skin daily with skin nourishing cream  5-Ehob cushion in chair when out of bed  6-Hydraguard to left heel daily with dressing changes and PRN  Discussed assessment findings, and plan of care/recommendations with Ryder BRENNAN  Wound care signing off, please call or tiger text with questions and concerns  Recommendations written as orders    Hardeep REYES, RN, Anna Dukeser

## 2023-05-24 NOTE — ASSESSMENT & PLAN NOTE
Patient presents with left foot tingling started on Friday, new wounds on toes and sole of foot for about 1 week, patient concerned about clot in leg  Reports brushing her left foot to get rid of dead skin 2 weeks ago  Denies fever, chills  Reports chronic wound on top of left foot since 6/2022 and goes to wound care weekly  Reports history of right leg amputation due to blood clot  · Chart reviewed  Patient is status post right AKA due to critical 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 on 2/5/1652, no source was found  Patient is on Xarelto at home  LEAD showed > 75% stenosis in the right popliteal artery at the time  · Left foot is warm  Multiple dry wounds on toes and foot  Toes appear dusky on exam   Mild redness to left foot with tenderness  · Left foot x-ray showed no acute osseous abnormality  · Patient currently on heparin drip    Follow-up lower extremity arterial Dopplers  · Wound care with Betadine paint twice daily  · Vascular surgery input appreciated-patient has been on heparin drip  ·

## 2023-05-25 LAB
ANION GAP SERPL CALCULATED.3IONS-SCNC: 8 MMOL/L (ref 4–13)
APTT PPP: 47 SECONDS (ref 23–37)
APTT PPP: 72 SECONDS (ref 23–37)
APTT PPP: 84 SECONDS (ref 23–37)
BASOPHILS # BLD AUTO: 0.05 THOUSANDS/ÂΜL (ref 0–0.1)
BASOPHILS NFR BLD AUTO: 1 % (ref 0–1)
BUN SERPL-MCNC: 12 MG/DL (ref 5–25)
CALCIUM SERPL-MCNC: 9.6 MG/DL (ref 8.4–10.2)
CHLORIDE SERPL-SCNC: 105 MMOL/L (ref 96–108)
CO2 SERPL-SCNC: 24 MMOL/L (ref 21–32)
CREAT SERPL-MCNC: 0.68 MG/DL (ref 0.6–1.3)
EOSINOPHIL # BLD AUTO: 0.08 THOUSAND/ÂΜL (ref 0–0.61)
EOSINOPHIL NFR BLD AUTO: 1 % (ref 0–6)
ERYTHROCYTE [DISTWIDTH] IN BLOOD BY AUTOMATED COUNT: 13.2 % (ref 11.6–15.1)
GFR SERPL CREATININE-BSD FRML MDRD: 94 ML/MIN/1.73SQ M
GLUCOSE SERPL-MCNC: 94 MG/DL (ref 65–140)
HCT VFR BLD AUTO: 37.8 % (ref 34.8–46.1)
HGB BLD-MCNC: 12.3 G/DL (ref 11.5–15.4)
IMM GRANULOCYTES # BLD AUTO: 0.02 THOUSAND/UL (ref 0–0.2)
IMM GRANULOCYTES NFR BLD AUTO: 0 % (ref 0–2)
LYMPHOCYTES # BLD AUTO: 2.32 THOUSANDS/ÂΜL (ref 0.6–4.47)
LYMPHOCYTES NFR BLD AUTO: 26 % (ref 14–44)
MCH RBC QN AUTO: 30.7 PG (ref 26.8–34.3)
MCHC RBC AUTO-ENTMCNC: 32.5 G/DL (ref 31.4–37.4)
MCV RBC AUTO: 94 FL (ref 82–98)
MONOCYTES # BLD AUTO: 0.5 THOUSAND/ÂΜL (ref 0.17–1.22)
MONOCYTES NFR BLD AUTO: 6 % (ref 4–12)
NEUTROPHILS # BLD AUTO: 6.03 THOUSANDS/ÂΜL (ref 1.85–7.62)
NEUTS SEG NFR BLD AUTO: 66 % (ref 43–75)
NRBC BLD AUTO-RTO: 0 /100 WBCS
POTASSIUM SERPL-SCNC: 4.6 MMOL/L (ref 3.5–5.3)
RBC # BLD AUTO: 4.01 MILLION/UL (ref 3.81–5.12)
SODIUM SERPL-SCNC: 137 MMOL/L (ref 135–147)
WBC # BLD AUTO: 9 THOUSAND/UL (ref 4.31–10.16)

## 2023-05-25 PROCEDURE — 93922 UPR/L XTREMITY ART 2 LEVELS: CPT | Performed by: SURGERY

## 2023-05-25 PROCEDURE — 99232 SBSQ HOSP IP/OBS MODERATE 35: CPT | Performed by: INTERNAL MEDICINE

## 2023-05-25 PROCEDURE — 80048 BASIC METABOLIC PNL TOTAL CA: CPT | Performed by: INTERNAL MEDICINE

## 2023-05-25 PROCEDURE — 85730 THROMBOPLASTIN TIME PARTIAL: CPT | Performed by: INTERNAL MEDICINE

## 2023-05-25 PROCEDURE — 85025 COMPLETE CBC W/AUTO DIFF WBC: CPT | Performed by: INTERNAL MEDICINE

## 2023-05-25 PROCEDURE — 93926 LOWER EXTREMITY STUDY: CPT | Performed by: SURGERY

## 2023-05-25 RX ADMIN — CEFAZOLIN SODIUM 1000 MG: 1 SOLUTION INTRAVENOUS at 22:10

## 2023-05-25 RX ADMIN — Medication 250 MG: at 08:38

## 2023-05-25 RX ADMIN — METOPROLOL TARTRATE 100 MG: 100 TABLET, FILM COATED ORAL at 22:11

## 2023-05-25 RX ADMIN — BACLOFEN 20 MG: 10 TABLET ORAL at 21:59

## 2023-05-25 RX ADMIN — DICYCLOMINE HYDROCHLORIDE 10 MG: 10 CAPSULE ORAL at 15:06

## 2023-05-25 RX ADMIN — DOCUSATE SODIUM 100 MG: 100 CAPSULE, LIQUID FILLED ORAL at 21:59

## 2023-05-25 RX ADMIN — LOSARTAN POTASSIUM 50 MG: 50 TABLET, FILM COATED ORAL at 08:38

## 2023-05-25 RX ADMIN — BACLOFEN 20 MG: 10 TABLET ORAL at 08:38

## 2023-05-25 RX ADMIN — GABAPENTIN 300 MG: 300 CAPSULE ORAL at 08:38

## 2023-05-25 RX ADMIN — BACLOFEN 20 MG: 10 TABLET ORAL at 17:00

## 2023-05-25 RX ADMIN — HEPARIN SODIUM 18 UNITS/KG/HR: 10000 INJECTION, SOLUTION INTRAVENOUS at 22:46

## 2023-05-25 RX ADMIN — DOCUSATE SODIUM 100 MG: 100 CAPSULE, LIQUID FILLED ORAL at 08:39

## 2023-05-25 RX ADMIN — Medication 1 TABLET: at 08:38

## 2023-05-25 RX ADMIN — AMANTADINE HYDROCHLORIDE 100 MG: 100 CAPSULE ORAL at 08:39

## 2023-05-25 RX ADMIN — DICYCLOMINE HYDROCHLORIDE 10 MG: 10 CAPSULE ORAL at 11:21

## 2023-05-25 RX ADMIN — PRAVASTATIN SODIUM 80 MG: 80 TABLET ORAL at 17:22

## 2023-05-25 RX ADMIN — FAMOTIDINE 20 MG: 20 TABLET ORAL at 08:38

## 2023-05-25 RX ADMIN — POLYETHYLENE GLYCOL 3350 17 G: 17 POWDER, FOR SOLUTION ORAL at 17:22

## 2023-05-25 RX ADMIN — AMANTADINE HYDROCHLORIDE 100 MG: 100 CAPSULE ORAL at 17:24

## 2023-05-25 RX ADMIN — AMLODIPINE BESYLATE 5 MG: 5 TABLET ORAL at 08:39

## 2023-05-25 RX ADMIN — CEFAZOLIN SODIUM 1000 MG: 1 SOLUTION INTRAVENOUS at 06:21

## 2023-05-25 RX ADMIN — METOPROLOL TARTRATE 100 MG: 100 TABLET, FILM COATED ORAL at 10:20

## 2023-05-25 RX ADMIN — CEFAZOLIN SODIUM 1000 MG: 1 SOLUTION INTRAVENOUS at 14:24

## 2023-05-25 RX ADMIN — DICYCLOMINE HYDROCHLORIDE 10 MG: 10 CAPSULE ORAL at 06:21

## 2023-05-25 RX ADMIN — Medication 1000 UNITS: at 08:39

## 2023-05-25 RX ADMIN — GABAPENTIN 300 MG: 300 CAPSULE ORAL at 17:21

## 2023-05-25 NOTE — ASSESSMENT & PLAN NOTE
Patient presents with left foot tingling started on Friday, new wounds on toes and sole of foot for about 1 week, patient concerned about clot in leg  Reports brushing her left foot to get rid of dead skin 2 weeks ago  Denies fever, chills  Reports chronic wound on top of left foot since 6/2022 and goes to wound care weekly  Reports history of right leg amputation due to blood clot  · Chart reviewed  Patient is status post right AKA due to critical 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 on 5/1/4104, no source was found  Patient is on Xarelto at home  LEAD showed > 75% stenosis in the right popliteal artery at the time  · Left foot is warm  Multiple dry wounds on toes and foot  Toes appear dusky on exam   Mild redness to left foot with tenderness  · Left foot x-ray showed no acute osseous abnormality  · Patient currently on heparin drip    Follow-up lower extremity arterial Dopplers  · Wound care with Betadine paint twice daily  · Vascular surgery input appreciated-patient has been on heparin drip

## 2023-05-25 NOTE — ASSESSMENT & PLAN NOTE
· left LEAD in 8/2020 showed 50-75% stenosis at the distal SFA  · Repeat study in the morning  · Continue statin  · Patient has been on heparin drip  · Lower extremity arterial Doppler the left showed HAILEY index 0 47 using the ischemic category    Compared to previous study there is drop in HAILEY  · As per vascular surgery IR was consulted for angiogram which is scheduled for tomorrow

## 2023-05-25 NOTE — PLAN OF CARE
Problem: MOBILITY - ADULT  Goal: Maintain or return to baseline ADL function  Description: INTERVENTIONS:  -  Assess patient's ability to carry out ADLs; assess patient's baseline for ADL function and identify physical deficits which impact ability to perform ADLs (bathing, care of mouth/teeth, toileting, grooming, dressing, etc )  - Assess/evaluate cause of self-care deficits   - Assess range of motion    Outcome: Progressing  Goal: Maintains/Returns to pre admission functional level  Description: INTERVENTIONS:  - Perform BMAT or MOVE assessment daily    - Set and communicate daily mobility goal to care team and patient/family/caregiver     - Collaborate with rehabilitation services on mobility goals   - Out of bed for toileting  - Record patient progress and toleration of activity level   Outcome: Progressing     Problem: Prexisting or High Potential for Compromised Skin Integrity  Goal: Skin integrity is maintained or improved  Description: INTERVENTIONS:  - Identify patients at risk for skin breakdown  - Assess and monitor skin integrity  - Assess and monitor nutrition and hydration status  - Monitor labs     Outcome: Progressing     Problem: PAIN - ADULT  Goal: Verbalizes/displays adequate comfort level or baseline comfort level  Description: Interventions:  - Encourage patient to monitor pain and request assistance  - Assess pain using appropriate pain scale  - Administer analgesics based on type and severity of pain and evaluate response  - Implement non-pharmacological measures as appropriate and evaluate response  - Consider cultural and social influences on pain and pain management  - Notify physician/advanced practitioner if interventions unsuccessful or patient reports new pain  Outcome: Progressing     Problem: SAFETY ADULT  Goal: Patient will remain free of falls  Description: INTERVENTIONS:  - Educate patient/family on patient safety including physical limitations  - Instruct patient to call for assistance with activity   - Consult OT/PT to assist with strengthening/mobility   - Keep Call bell within reach    - Consider moving patient to room near nurses station  Outcome: Progressing     Problem: DISCHARGE PLANNING  Goal: Discharge to home or other facility with appropriate resources  Description: INTERVENTIONS:  - Identify barriers to discharge w/patient and caregiver  - Arrange for needed discharge resources and transportation as appropriate  - Identify discharge learning needs (meds, wound care, etc )  - Arrange for interpretive services to assist at discharge as needed  - Refer to Case Management Department for coordinating discharge planning if the patient needs post-hospital services based on physician/advanced practitioner order or complex needs related to functional status, cognitive ability, or social support system  Outcome: Progressing     Problem: Knowledge Deficit  Goal: Patient/family/caregiver demonstrates understanding of disease process, treatment plan, medications, and discharge instructions  Description: Complete learning assessment and assess knowledge base    Interventions:  - Provide teaching at level of understanding  - Provide teaching via preferred learning methods  Outcome: Progressing

## 2023-05-25 NOTE — ASSESSMENT & PLAN NOTE
· Left foot/toes with multiple dry nonhealing wounds with mild redness and tenderness  · Possible cellulitis  · WBC normal, no bands, patient afebrile  · Patient given Anum Lavonne in ED  · Patient has been on vancomycin and Ancef initially  · Vancomycin was discontinued    Continue IV cefazolin as per ID  · Blood cultures have been negative  · Patient also had a left plantar foot abscess which was drained by podiatry at bedside on 5/24/2023-cultures growing Staphylococcus aureus

## 2023-05-25 NOTE — PROGRESS NOTES
"Progress Note - Infectious Disease   Tennie Flank Itz 58 y o  female MRN: [id number]  Unit/Bed#: [address] 406[address]01 Encounter: [id number]      Impression/Plan:  1    Left Foot multiple wounds with pain/erythema/ischemic changes of toes on admission  Possible overlying cellulitis in setting of increased pain last 5 days with left foot mulitple scabs/wounds and/or progressive arterial disease related changes  No fever, chills, associated sepsis  Dog and cat do not lick wounds, but do step on patient's foot often  S/p Left foot plantar abscess bedside I&D today  Left foot Cx pending  -continue Cefazolin for now  -follow-up blood cultures  -Follow up left foot culture  -monitor temperature and hemodynamics  -serial exam  -recheck CBC and BMP in a m   -local wound/skin care  -offloading  -follow up IR LLE angiogram scheduled for 5/26/23     2  Peripheral arterial disease  5/23/23 LEAD: Left HAILEY 0 47  prior 0 97 in 2020  GTP 57 mmHg down from 191 mmHg in 2020  -Vascular on board  -follow up IR angiogram     3  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 Critical limb ischemia, 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     -Serial exams of leg  -Supportive care      3  Tobacco abuse   Patient with documented ongoing tobacco abuse   Noted to have arterial disease on vascular studies  Danna Ratliff contributed to presentation as above  -Nicotine replacement as per primary  -Discussed smoking cessation     4  MS   At baseline is nonambulatory   -Continue to follow-up with neurology as outpatient     Antibiotics:  Cefazolin D4     Above impression and plan discussed in detail with patient, RN, and primary care team     Subjective:  Patient has no fever, chills, sweats; no nausea, vomiting, diarrhea; no cough, shortness of breath; no left foot pain at present  Did not sleep well again because \"hips locked up  \" Bed is very uncomfortable  No new symptoms      Objective:  Vitals:  Temp:  [98 °F (36 7 °C)-99 9 °F (37 7 °C)] 98 °F (36 7 °C)  HR:  [72-80] " 78  Resp:  [16-20] 18  BP: (120-136)/(61-86) 120/61  SpO2:  [92 %-96 %] 96 %  Temp (24hrs), Av 8 °F (37 1 °C), Min:98 °F (36 7 °C), Max:99 9 °F (37 7 °C)  Current: Temperature: 98 °F (36 7 °C)    Physical Exam:   General Appearance:  58year old male chronically debilitated, nontoxic, no acute distress, propped with pillows in bed  HEENT: Atraumatic normocephalic   Throat: Oropharynx moist  Poor dentition   Pulmonary:   Normal respiratory excursion without accessory muscle use   Cardiac:  RRR   Abdomen:   Soft, non-tender, non-distended   Extremities: Right AKA well healed  Left foot multiple scab wounds and blue toe changes about the same  Slightly less erythema of dorsal foot, erythema same of plantar foot  : Purewick with clear, yellow urine in canister, no SPT   Psychiatric: Awake, cooperative   Skin: No new rashes  IV site nontender  Labs, Imaging, & Other studies:   All pertinent labs and imaging studies were personally reviewed  Results from last 7 days   Lab Units 23  0644 23  0522 23  0612 23  1616   HEMOGLOBIN g/dL 12 3 11 9 11 5 13 5   PLATELETS Thousands/uL  --  133* 133* 146*   WBC Thousand/uL 9 00 11 08* 9 29 10 02     Results from last 7 days   Lab Units 23  0644 23  0522 23  0612 23  1616   ALK PHOS U/L  --   --   --  96   ALT U/L  --   --   --  8   AST U/L  --   --   --  10*   BUN mg/dL 12 15 16 14   CALCIUM mg/dL 9 6 9 0 8 8 9 7   CHLORIDE mmol/L 105 106 108 106   CO2 mmol/L    CREATININE mg/dL 0 68 0 77 0 73 0 70   EGFR ml/min/1 73sq m 94 83 88 93   POTASSIUM mmol/L 4 6 4 1 3 7 3 7   SODIUM mmol/L 137 139 139 138     Results from last 7 days   Lab Units 23  0823 23  1720 23  1616   BLOOD CULTURE   --  No Growth at 48 hrs  No Growth at 48 hrs     GRAM STAIN RESULT  No Polys or Bacteria seen  --   --

## 2023-05-25 NOTE — PROGRESS NOTES
[facility] 45  Progress Note  Name: Ira Marie  MRN: [id number]  Unit/Bed#: [address] [address] I Date of Admission: 5/22/2023   Date of Service: 5/25/2023 I Hospital Day: 3    Assessment/Plan   * Multiple open wounds  Assessment & Plan  Patient presents with left foot tingling started on Friday, new wounds on toes and sole of foot for about 1 week, patient concerned about clot in leg  Reports brushing her left foot to get rid of dead skin 2 weeks ago  Denies fever, chills  Reports chronic wound on top of left foot since 6/2022 and goes to wound care weekly  Reports history of right leg amputation due to blood clot  · Chart reviewed  Patient is status post right AKA due to critical 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 on 0/5/7005, no source was found  Patient is on Xarelto at home  LEAD showed > 75% stenosis in the right popliteal artery at the time  · Left foot is warm  Multiple dry wounds on toes and foot  Toes appear dusky on exam   Mild redness to left foot with tenderness  · Left foot x-ray showed no acute osseous abnormality  · Patient currently on heparin drip  Follow-up lower extremity arterial Dopplers  · Wound care with Betadine paint twice daily  · Vascular surgery input appreciated-patient has been on heparin drip      Cellulitis of foot, left  Assessment & Plan  · Left foot/toes with multiple dry nonhealing wounds with mild redness and tenderness  · Possible cellulitis  · WBC normal, no bands, patient afebrile  · Patient given Kendall Raisin in ED  · Patient has been on vancomycin and Ancef initially  · Vancomycin was discontinued    Continue IV cefazolin as per ID  · Blood cultures have been negative  · Patient also had a left plantar foot abscess which was drained by podiatry at bedside on 5/24/2023-cultures growing Staphylococcus aureus    [facility]peripheral artery [facility]) [facility]  Assessment & Plan  · left LEAD in 8/2020 showed 50-75% stenosis at the distal SFA   · Repeat study in the morning  · Continue statin  · Patient has been on heparin drip  · Lower extremity arterial Doppler the left showed HAILEY index 0 47 using the ischemic category  Compared to previous study there is drop in HAILEY  · As per vascular surgery IR was consulted for angiogram which is scheduled for tomorrow    Hypertension  Assessment & Plan  · Continue metoprolol, Sharan for home  · BP well controlled    Multiple sclerosis ([facility] [address] )  Assessment & Plan  · Continue amantadine, baclofen  · Patient follows neurology as outpatient  · Reports using scooter at home, does not walk    Thrombocytopenia (HCC)  Assessment & Plan  · Mild  Platelet count 004  · Monitor platelet count while on heparin drip    Mixed hyperlipidemia  Assessment & Plan  · Continue statin    S/P AKA (above knee amputation) unilateral, right ([facility] )  Assessment & Plan  · As above  · Patient did not follow-up with vascular since 2020  VTE Pharmacologic Prophylaxis:   High Risk (Score >/= 5) - Pharmacological DVT Prophylaxis Ordered: heparin drip  Sequential Compression Devices Ordered  Patient Centered Rounds: I performed bedside rounds with nursing staff today  Discussions with Specialists or Other Care Team Provider: Yes    Education and Discussions with Family / Patient: yes    Total Time Spent on Date of Encounter in care of patient: 45 minutes This time was spent on one or more of the following: performing physical exam; counseling and coordination of care; obtaining or reviewing history; documenting in the medical record; reviewing/ordering tests, medications or procedures; communicating with other healthcare professionals and discussing with patient's family/caregivers      Current Length of Stay: 3 day(s)  Current Patient Status: Inpatient   Certification Statement: The patient will continue to require additional inpatient hospital stay due to Multiple foot wounds with cellulitis and PAD  Discharge Plan: Anticipate discharge in 24-48 hrs to home  Code Status: Level 1 - Full Code    Subjective:   Patient had some confusion last night but has improved  Patient did not have a bowel movement  Objective:     Vitals:   Temp (24hrs), Av 3 °F (36 8 °C), Min:98 °F (36 7 °C), Max:98 8 °F (37 1 °C)    Temp:  [98 °F (36 7 °C)-98 8 °F (37 1 °C)] 98 1 °F (36 7 °C)  HR:  [72-80] 80  Resp:  [16-19] 18  BP: (120-149)/(61-91) 149/91  SpO2:  [92 %-96 %] 95 %  Body mass index is 22 6 kg/m²  Input and Output Summary (last 24 hours): Intake/Output Summary (Last 24 hours) at 2023 1729  Last data filed at 2023 3121  Gross per 24 hour   Intake 1120 ml   Output 1850 ml   Net -730 ml       Physical Exam:   Physical Exam  Constitutional:       Appearance: Normal appearance  HENT:      Head: Normocephalic and atraumatic  Nose: Nose normal       Mouth/Throat:      Mouth: Mucous membranes are moist       Pharynx: Oropharynx is clear  Eyes:      Extraocular Movements: Extraocular movements intact  Pupils: Pupils are equal, round, and reactive to light  Cardiovascular:      Rate and Rhythm: Normal rate and regular rhythm  Pulmonary:      Effort: Pulmonary effort is normal       Breath sounds: Normal breath sounds  Abdominal:      General: Bowel sounds are normal  There is no distension  Palpations: Abdomen is soft  Tenderness: There is no abdominal tenderness  Musculoskeletal:         General: No swelling  Cervical back: Normal range of motion and neck supple  Skin:     General: Skin is warm and dry  Comments: Multiple nonhealing wounds on the left toes and the dorsum of the foot on the plantar aspect  Dusky appearing toes   Neurological:      General: No focal deficit present  Mental Status: She is alert          Additional Data:     Labs:  Results from last 7 days   Lab Units 23  0644 23  0522   EOS PCT % 1 1   HEMATOCRIT % 37 8 35 7   HEMOGLOBIN g/dL 12 3 11 9   LYMPHS PCT % 26 15   MONOS PCT % 6 6   NEUTROS PCT % 66 78*   PLATELETS Thousands/uL  --  133*   WBC Thousand/uL 9 00 11 08*     Results from last 7 days   Lab Units 05/25/23  0644 05/23/23  0612 05/22/23  1616   ANION GAP mmol/L 8   < > 9   ALBUMIN g/dL  --   --  3 9   ALK PHOS U/L  --   --  96   ALT U/L  --   --  8   AST U/L  --   --  10*   BUN mg/dL 12   < > 14   CALCIUM mg/dL 9 6   < > 9 7   CHLORIDE mmol/L 105   < > 106   CO2 mmol/L 24   < > 23   CREATININE mg/dL 0 68   < > 0 70   GLUCOSE RANDOM mg/dL 94   < > 76   POTASSIUM mmol/L 4 6   < > 3 7   SODIUM mmol/L 137   < > 138   TOTAL BILIRUBIN mg/dL  --   --  0 32    < > = values in this interval not displayed  Results from last 7 days   Lab Units 05/22/23  1616   INR  1 54*                   Lines/Drains:  Invasive Devices     Peripheral Intravenous Line  Duration           Peripheral IV 05/22/23 Right Wrist 2 days    Peripheral IV 05/24/23 Dorsal (posterior); Right Hand <1 day                      Imaging: Reviewed radiology reports from this admission including: xray(s)    Recent Cultures (last 7 days):   Results from last 7 days   Lab Units 05/24/23  0823 05/22/23  1720 05/22/23  1616   BLOOD CULTURE   --  No Growth at 48 hrs  No Growth at 48 hrs     GRAM STAIN RESULT  No Polys or Bacteria seen  --   --    WOUND CULTURE  1+ Growth of Staphylococcus aureus*  --   --        Last 24 Hours Medication List:   Current Facility-Administered Medications   Medication Dose Route Frequency Provider Last Rate   • acetaminophen  650 mg Oral Q6H PRN RACHAEL Loredo     • amantadine  100 mg Oral BID RACHAEL Loredo     • amLODIPine  5 mg Oral Daily Rene Salgado MD      And   • losartan  50 mg Oral Daily Rene Salgado MD     • ascorbic acid  250 mg Oral Daily RACHAEL Loredo     • baclofen  20 mg Oral TID RACHAEL Loredo     • cefazolin  1,000 mg Intravenous Q8H RACHAEL Loredo 1,000 mg (05/25/23 0395)   • cholecalciferol  1,000 Units Oral Daily Marifer RACHAEL Persaud     • dicyclomine  10 mg Oral TID AC RACHAEL Loredo     • docusate sodium  100 mg Oral BID Nathan Boyd MD     • famotidine  20 mg Oral Daily RACHAEL Loredo     • gabapentin  300 mg Oral BID RACHAEL Loredo     • heparin (porcine)  12 Units/kg/hr (Order-Specific) Intravenous Titrated RACHAEL Loredo 18 Units/kg/hr (05/25/23 0720)   • HYDROmorphone  0 2 mg Intravenous Q3H PRN RACHAEL Loredo     • metoprolol tartrate  100 mg Oral Q12H RACHAEL Loredo     • multivitamin-minerals  1 tablet Oral Daily RACHAEL Loredo     • ondansetron  4 mg Intravenous Q6H PRN RACHAEL Loredo     • oxyCODONE  2 5 mg Oral Q4H PRN RACHAEL Loredo     • polyethylene glycol  17 g Oral Daily PRN Nathan Boyd MD     • pravastatin  80 mg Oral Daily With RACHAEL Power          Today, Patient Was Seen By: Nathan Boyd MD    **Please Note: This note may have been constructed using a voice recognition system  **

## 2023-05-26 ENCOUNTER — APPOINTMENT (OUTPATIENT)
Dept: NON INVASIVE DIAGNOSTICS | Facility: HOSPITAL | Age: 63
DRG: 253 | End: 2023-05-26
Attending: RADIOLOGY
Payer: COMMERCIAL

## 2023-05-26 LAB
APTT PPP: 134 SECONDS (ref 23–37)
APTT PPP: 52 SECONDS (ref 23–37)
APTT PPP: 86 SECONDS (ref 23–37)
BACTERIA WND AEROBE CULT: ABNORMAL
BACTERIA WND AEROBE CULT: ABNORMAL
GRAM STN SPEC: ABNORMAL

## 2023-05-26 PROCEDURE — 85730 THROMBOPLASTIN TIME PARTIAL: CPT | Performed by: FAMILY MEDICINE

## 2023-05-26 PROCEDURE — 99232 SBSQ HOSP IP/OBS MODERATE 35: CPT | Performed by: INTERNAL MEDICINE

## 2023-05-26 PROCEDURE — 99232 SBSQ HOSP IP/OBS MODERATE 35: CPT | Performed by: PODIATRIST

## 2023-05-26 PROCEDURE — 99233 SBSQ HOSP IP/OBS HIGH 50: CPT | Performed by: INTERNAL MEDICINE

## 2023-05-26 PROCEDURE — 97530 THERAPEUTIC ACTIVITIES: CPT

## 2023-05-26 PROCEDURE — 97163 PT EVAL HIGH COMPLEX 45 MIN: CPT

## 2023-05-26 PROCEDURE — 85730 THROMBOPLASTIN TIME PARTIAL: CPT | Performed by: INTERNAL MEDICINE

## 2023-05-26 RX ADMIN — OXYCODONE HYDROCHLORIDE 2.5 MG: 5 TABLET ORAL at 00:04

## 2023-05-26 RX ADMIN — DOCUSATE SODIUM 100 MG: 100 CAPSULE, LIQUID FILLED ORAL at 21:40

## 2023-05-26 RX ADMIN — Medication 1000 UNITS: at 08:53

## 2023-05-26 RX ADMIN — DOCUSATE SODIUM 100 MG: 100 CAPSULE, LIQUID FILLED ORAL at 08:53

## 2023-05-26 RX ADMIN — BACLOFEN 20 MG: 10 TABLET ORAL at 16:47

## 2023-05-26 RX ADMIN — Medication 250 MG: at 08:53

## 2023-05-26 RX ADMIN — BACLOFEN 20 MG: 10 TABLET ORAL at 08:53

## 2023-05-26 RX ADMIN — AMANTADINE HYDROCHLORIDE 100 MG: 100 CAPSULE ORAL at 08:54

## 2023-05-26 RX ADMIN — AMLODIPINE BESYLATE 5 MG: 5 TABLET ORAL at 08:53

## 2023-05-26 RX ADMIN — GABAPENTIN 300 MG: 300 CAPSULE ORAL at 17:26

## 2023-05-26 RX ADMIN — BACLOFEN 20 MG: 10 TABLET ORAL at 21:40

## 2023-05-26 RX ADMIN — PRAVASTATIN SODIUM 80 MG: 80 TABLET ORAL at 16:47

## 2023-05-26 RX ADMIN — Medication 1 TABLET: at 08:53

## 2023-05-26 RX ADMIN — METOPROLOL TARTRATE 100 MG: 100 TABLET, FILM COATED ORAL at 21:41

## 2023-05-26 RX ADMIN — AMANTADINE HYDROCHLORIDE 100 MG: 100 CAPSULE ORAL at 17:26

## 2023-05-26 RX ADMIN — DICYCLOMINE HYDROCHLORIDE 10 MG: 10 CAPSULE ORAL at 06:20

## 2023-05-26 RX ADMIN — METOPROLOL TARTRATE 100 MG: 100 TABLET, FILM COATED ORAL at 08:53

## 2023-05-26 RX ADMIN — CEFAZOLIN SODIUM 1000 MG: 1 SOLUTION INTRAVENOUS at 21:38

## 2023-05-26 RX ADMIN — DICYCLOMINE HYDROCHLORIDE 10 MG: 10 CAPSULE ORAL at 16:47

## 2023-05-26 RX ADMIN — LOSARTAN POTASSIUM 50 MG: 50 TABLET, FILM COATED ORAL at 08:53

## 2023-05-26 RX ADMIN — CEFAZOLIN SODIUM 1000 MG: 1 SOLUTION INTRAVENOUS at 13:50

## 2023-05-26 RX ADMIN — CEFAZOLIN SODIUM 1000 MG: 1 SOLUTION INTRAVENOUS at 05:23

## 2023-05-26 RX ADMIN — GABAPENTIN 300 MG: 300 CAPSULE ORAL at 08:53

## 2023-05-26 RX ADMIN — FAMOTIDINE 20 MG: 20 TABLET ORAL at 08:53

## 2023-05-26 NOTE — ASSESSMENT & PLAN NOTE
· Left foot/toes with multiple dry nonhealing wounds with mild redness and tenderness  · Possible cellulitis  · WBC normal, no bands, patient afebrile  · Patient given Maria Eugenia Boy in ED  · Patient has been on vancomycin and Ancef initially  · Vancomycin was discontinued  Continue IV cefazolin as per ID  · Blood cultures have been negative  · Patient also had a left plantar foot abscess which was drained by podiatry at bedside on 5/24/2023-cultures growing MSSA and Streptococcus

## 2023-05-26 NOTE — PROGRESS NOTES
Progress Note - Infectious Disease   Shaneka Mobley 58 y o  female MRN: [id number]  Unit/Bed#: [address] Encounter: [id number]      Impression/Plan:  1    Left Foot multiple wounds with pain/erythema/ischemic changes of toes on admission  Possible overlying cellulitis in setting of increased pain last 5 days with left foot mulitple scabs/wounds and/or progressive arterial disease related changes  No fever, chills, associated sepsis  Dog and cat do not lick wounds, but do step on patient's foot often  S/p Left foot plantar abscess bedside I&D today  Left foot Cx 2+MSSA, 2+Group C Strep  -continue Cefazolin   -follow-up blood cultures  -monitor temperature and hemodynamics  -serial exam  -monitor serial labs  -local wound/skin care  -offloading  -follow up IR LLE angiogram scheduled for 5/26/23 pm  -can transition PO Keflex to complete a 10 day total antibiotic course if discharged prior to 5/30/23      2   Peripheral arterial disease  5/23/23 LEAD: Left HAILEY 0 47  prior 0 97 in 2020  GTP 57 mmHg down from 191 mmHg in 2020  -Vascular on board  -follow up IR angiogram     3  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 Critical limb ischemia, 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     -Serial exams of leg  -Supportive care      3  Tobacco abuse   Patient with documented ongoing tobacco abuse   Noted to have arterial disease on vascular studies  Raquel Callahan contributed to presentation as above  -Nicotine replacement as per primary  -Discussed smoking cessation     4  MS   At baseline is nonambulatory   -Continue to follow-up with neurology as outpatient     Antibiotics:  Cefazolin D5     Above impression and plan discussed in detail with patient, RN, and primary care team   We will see patient again 5/30/23 if here  Please call ID on call physician in meantime if questions  Subjective:  Patient has no fever, chills, sweats; no nausea, vomiting, diarrhea; no cough, shortness of breath; no left foot pain at present   Slept a little better last night  No new symptoms  Objective:  Vitals:  Temp:  [97 7 °F (36 5 °C)-98 7 °F (37 1 °C)] 97 7 °F (36 5 °C)  HR:  [66-80] 78  Resp:  [14-19] 19  BP: (139-174)/(87-97) 174/97  SpO2:  [94 %-98 %] 97 %  Temp (24hrs), Av 2 °F (36 8 °C), Min:97 7 °F (36 5 °C), Max:98 7 °F (37 1 °C)  Current: Temperature: 97 7 °F (36 5 °C)    Physical Exam:   General Appearance:  58year old female chronically debilitated, nontoxic, no acute distress, propped with pillows in bed  HEENT: Atraumatic normocephalic   Throat: Oropharynx moist  Poor dentition   Pulmonary:   Normal respiratory excursion without accessory muscle use   Cardiac:  RRR   Abdomen:   Soft, non-tender, non-distended   Extremities: Right AKA well healed  Left foot multiple scabbed wounds and blue toe changes about the same  Slightly less erythema of dorsal foot, pink erythema same of plantar foot  : Purewick with clear, yellow urine in canister, no SPT   Psychiatric: Awake, cooperative   Skin: No new rashes  IV site nontender  Labs, Imaging, & Other studies:   All pertinent labs and imaging studies were personally reviewed  Results from last 7 days   Lab Units 23  0644 23  0522 23  0612 23  1616   HEMOGLOBIN g/dL 12 3 11 9 11 5 13 5   PLATELETS Thousands/uL  --  133* 133* 146*   WBC Thousand/uL 9 00 11 08* 9 29 10 02     Results from last 7 days   Lab Units 23  0644 23  0522 23  0612 23  1616   ALK PHOS U/L  --   --   --  96   ALT U/L  --   --   --  8   AST U/L  --   --   --  10*   BUN mg/dL 12 15 16 14   CALCIUM mg/dL 9 6 9 0 8 8 9 7   CHLORIDE mmol/L 105 106 108 106   CO2 mmol/L  23   CREATININE mg/dL 0 68 0 77 0 73 0 70   EGFR ml/min/1 73sq m 94 83 88 93   POTASSIUM mmol/L 4 6 4 1 3 7 3 7   SODIUM mmol/L 137 139 139 138     Results from last 7 days   Lab Units 23  0823 23  1720 23  1616   BLOOD CULTURE   --  No Growth at 72 hrs  No Growth at 72 hrs     Rose Marie Basurto STAIN RESULT  No Polys or Bacteria seen  --   --    WOUND CULTURE  1+ Growth of Staphylococcus aureus*  --   --

## 2023-05-26 NOTE — ASSESSMENT & PLAN NOTE
· left LEAD in 8/2020 showed 50-75% stenosis at the distal SFA  · Repeat study in the morning  · Continue statin  · Patient has been on heparin drip  · Lower extremity arterial Doppler the left showed HAILEY index 0 47 using the ischemic category  Compared to previous study there is drop in HAILEY  · IR was consulted for Angiogram-angiogram was tried today patient was placed on the table was unable to lay still    Patient will need angiogram under anesthesia which is scheduled for Wednesday at 1 pm

## 2023-05-26 NOTE — ASSESSMENT & PLAN NOTE
Patient presents with left foot tingling started on Friday, new wounds on toes and sole of foot for about 1 week, patient concerned about clot in leg  Reports brushing her left foot to get rid of dead skin 2 weeks ago  Denies fever, chills  Reports chronic wound on top of left foot since 6/2022 and goes to wound care weekly  Reports history of right leg amputation due to blood clot  · Chart reviewed  Patient is status post right AKA due to critical 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 on 9/2/5390, no source was found  Patient is on Xarelto at home  LEAD showed > 75% stenosis in the right popliteal artery at the time  · Left foot is warm  Multiple dry wounds on toes and foot  Toes appear dusky on exam   Mild redness to left foot with tenderness    · Left foot x-ray showed no acute osseous abnormality  · Wound care with Betadine paint twice daily  · Patient has been on heparin drip which will be continued for now as per my discussion with the vascular surgery

## 2023-05-26 NOTE — QUICK NOTE
Patient brought down for left LE angiogram today  She was placed on the fluoroscopic table and was unable to lay still  Her leg was able to be straightened, but she was only able to keep it straight for a brief period  Case discussed with anesthesia and they are currently not available for today due to add on procedures with GI  Will plan for repeat attempt next week Wednesday with anesthesia  Case schedule for 1 pm with me

## 2023-05-26 NOTE — PHYSICAL THERAPY NOTE
"       PHYSICAL THERAPY EVALUATION/TREATMENT     05/26/23 2747   Note Type   Note type Evaluation   Pain Assessment   Pain Assessment Tool 0-10   Pain Score No Pain   Restrictions/Precautions   Other Precautions Fall Risk  (R AKA, L foot wound)   Home Living   [facility];Ramped entrance   [address] Wheelchair-manual;Electric scooter; Other (Comment)  (sliding board)   Prior Function   Level of [place] Other (Comment)  (independent at the [place] [address] level, uses a sliding board, WC, scooter)   [facility]   Additional Pertinent History Pt admitted wt multiple open wounds L foot  Pt also has a history of MS with L hemibody weakness and R AKA  Cognition   Overall Cognitive Status WFL   Subjective   Subjective \"I really need to get OOB\"   RUE Assessment   RUE Assessment   (ROM WFL, MMT 4/5)   LUE Assessment   LUE Assessment   (ROM WFL, MMT 2-3/5)   RLE Assessment   RLE Assessment   (AKA MMT 3/5)   LLE Assessment   LLE Assessment   (ROM WFL, foot bandaged MMT 1-2/5   Bed Mobility   Supine to Sit 3  Moderate assistance   Additional items Increased time required;LE management;Verbal cues; Bedrails   Transfers   Sliding Board transfer 3  Moderate assistance   Additional items Verbal cues; Increased time required  (bed to [place] [address])   Balance   Static Sitting Fair - pt leans L   Dynamic Sitting Poor   Assessment   Prognosis Good   Problem List Decreased strength;Decreased endurance; Impaired balance;Decreased mobility; Decreased skin integrity   Assessment Patient is an 58y o  year old female seen for Physical Therapy evaluation  Patient admitted with Multiple open wounds  Comorbidities affecting patient's physical performance include: MS, R AKA, PVD, abscess L foot    Personal factors affecting patient at time of initial evaluation include: inability to perform ADLs, inability to sit for a functional amount of time, inability to transfer to a chair " "without mod assist  Prior to admission, patient was independent with functional mobility with WC or scooter  Please find objective findings from Physical Therapy assessment regarding body systems outlined above with impairments and limitations including weakness, impaired balance, decreased endurance, decreased activity tolerance, decreased functional mobility tolerance, fall risk and decreased skin integrity  The Barthel Index was used as a functional outcome tool presenting with a score of Barthel Index Score: 30 today indicating marked limitations of functional mobility and ADLS  Patient's clinical presentation is currently unstable/unpredictable as seen in patient's presentation of increased fall risk, new onset of impairment of functional mobility, decreased endurance and new onset of weakness  Pt would benefit from continued Physical Therapy treatment to address deficits as defined above and maximize level of functional mobility  As demonstrated by objective findings, the assigned level of complexity for this evaluation is high  The patient's AM-PAC Basic Mobility Inpatient Short Form Raw Score is 9  A Raw score of less than or equal to 16 suggests the patient may benefit from discharge to post-acute rehabilitation services, however pt was functioning at the [facility] level PTA and believes she will be able to do so again upon going home with the support of her family  Goals   Patient Goals \"get OOB\"   STG Expiration Date 06/02/23   Short Term Goal #1 improve bed mobility to minimal assist, improve sitting balance to at least fair, improve sliding board transfers to min assist, independent wheelchair mobility 20 feet   LTG Expiration Date 06/10/23   Long Term Goal #1 Independent bed mobility, independent sliding board transfers bed to wheelchair   Plan   Treatment/Interventions Functional transfer training;LE strengthening/ROM; Therapeutic exercise;Patient/family training;Equipment eval/education; Bed " "mobility;Spoke to nursing   PT Frequency Other (Comment)  (5x/week)   Recommendation   PT Discharge Recommendation Home with home health rehabilitation   [address] Mobility Inpatient   Turning in Flat Bed Without Bedrails 2   Lying on Back to Sitting on Edge of Flat Bed Without Bedrails 2   Moving Bed to Chair 2   Standing Up From Chair Using Arms 1   Walk in Room 1   Climb 3-5 Stairs With Railing 1   Basic Mobility Inpatient Raw Score 9   Turning Head Towards Sound 4   Follow Simple Instructions 4   Low Function Basic Mobility Raw Score  17   Low Function Basic Mobility Standardized Score  27 46   Highest Level Of Mobility   -[facility] Goal 3: Sit at edge of bed   -HL Achieved 4: Move to chair/commode   Barthel Index   Feeding 5   Bathing 0   Grooming Score 0   Dressing Score 5   Bladder Score 0   Bowels Score 10   Toilet Use Score 5   Transfers (Bed/Chair) Score 5   Mobility (Level Surface) Score 0   Stairs Score 0   Barthel Index Score 30   Additional Treatment Session   Start Time 1600   End Time 1615   Treatment Assessment S: 'It feels good to be sitting up\" O:  sliding board transfer back to bed to pt's R side with mod assist   A:  Pt is generally weak and will likely need family assist upon DC home  Pt may benefit from STR but declines   P:  Continue to encourage [employer] License Number  Leatha Gooden PT [id number]     "

## 2023-05-26 NOTE — DISCHARGE INSTRUCTIONS
ARTERIOGRAM    WHAT YOU SHOULD KNOW:   An angiogram is a procedure to look at arteries in your body  Arteries are the blood vessels that carry blood from your heart to your body  AFTER YOU LEAVE:     Self-care:   Limit activity: Rest for the remainder of the day of your procedure  Have some one with you until the next morning  Keep your arm or leg straight as much as possible  Rest as much as possible, sitting lying or reclining  Walk only to go to the bathroom, to bed or to eat  If the angiogram catheter was put in your leg, use the stairs as little as possible  No driving for 93-46 hours  No heavy lifting, >10 lbs  Or strenuous activity for 48 hours  Keep your wound clean and dry  Remove band aid/ dressing tomorrow  You may shower 24 hours after your procedure  Shower and wash groin area or wrist area gently with soap and water: beginning tomorrow  Rinse and pat Dry  Apply new water seal band aid  Repeat this process for 5 days  If there is any drainage from the puncture site, you should put on a clean bandage  No Powders, creams, lotions or antibiotic ointments for 5 days  No tub baths, hot tubs or swimming for 5 days  Watch for bleeding and bruising: It is normal to have a bruise and soreness where the angiogram catheter went in  Medication: If your angiogram was performed to treat blockages in your leg arteries, it is strongly recommended that you take both an antiplatelet medication (like aspirin or Plavix) to prevent clotting AND a statin drug (like Lipitor or Crestor), even if you have normal cholesterol  If these drugs are not ordered for you please contact either your Vascular Surgery office or the Interventional Radiology Dept during normal daytime working hours  See Interventional Radiology telephone numbers below    You Should Have Follow up with the vascular surgeon   call [phone] with questions  Diet:   You may resume your regular diet, Sips of flat soda will help with mild nausea  Drink more liquids than usual for the next 24 hours        IMMEDIATELY Contact Interventional Radiology at [phone] [place] PATIENTS: Contact Interventional Radiology at [phone]) Carey Matthew PATIENTS: Contact Interventional Radiology at [phone]) if any of the following occur: If your bruise gets larger or if you notice any active bleeding  APPLY DIRECT PRESSURE TO THE BLEEDING SITE  If you notice increased swelling or have increased pain at the puncture site   If you have any numbness or pain in the extremity of the puncture site   If that extremity seems cold or pale  You have fever greater than 101  Persistent nausea or vomitting    Follow up with your primary healthcare provider  as directed: Write down your questions so you remember to ask them during your visits

## 2023-05-26 NOTE — PROGRESS NOTES
Why you are discharging more  Progress Note  Name: Lee Granados  MRN: [id number]  Unit/Bed#: [address] [address] I Date of Admission: 5/22/2023   Date of Service: 5/26/2023 I Hospital Day: 4    Assessment/Plan   * Multiple open wounds  Assessment & Plan  Patient presents with left foot tingling started on Friday, new wounds on toes and sole of foot for about 1 week, patient concerned about clot in leg  Reports brushing her left foot to get rid of dead skin 2 weeks ago  Denies fever, chills  Reports chronic wound on top of left foot since 6/2022 and goes to wound care weekly  Reports history of right leg amputation due to blood clot  · Chart reviewed  Patient is status post right AKA due to critical 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 on 9/4/0368, no source was found  Patient is on Xarelto at home  LEAD showed > 75% stenosis in the right popliteal artery at the time  · Left foot is warm  Multiple dry wounds on toes and foot  Toes appear dusky on exam   Mild redness to left foot with tenderness  · Left foot x-ray showed no acute osseous abnormality  · Wound care with Betadine paint twice daily  · Patient has been on heparin drip which will be continued for now as per my discussion with the vascular surgery      Cellulitis of foot, left  Assessment & Plan  · Left foot/toes with multiple dry nonhealing wounds with mild redness and tenderness  · Possible cellulitis  · WBC normal, no bands, patient afebrile  · Patient given [employer] in ED  · Patient has been on vancomycin and Ancef initially  · Vancomycin was discontinued  Continue IV cefazolin as per ID  · Blood cultures have been negative  · Patient also had a left plantar foot abscess which was drained by podiatry at bedside on 5/24/2023-cultures growing MSSA and Streptococcus  PAD (peripheral artery disease) ([facility] [address] )  Assessment & Plan  · left LEAD in 8/2020 showed 50-75% stenosis at the distal SFA  · Repeat study in the morning    · Continue statin  · Patient has been on heparin drip  · Lower extremity arterial Doppler the left showed HAILEY index 0 47 using the ischemic category  Compared to previous study there is drop in HAILEY  · IR was consulted for Angiogram-angiogram was tried today patient was placed on the table was unable to lay still  Patient will need angiogram under anesthesia which is scheduled for Wednesday at 1 pm    Hypertension  Assessment & Plan  · Continue metoprolol, Sharan for home  · BP well controlled    Multiple sclerosis ([facility] )  Assessment & Plan  · Liver continue amantadine, baclofen  · Patient follows neurology as outpatient  · Reports using scooter at home, does not walk    Thrombocytopenia (HCC)  Assessment & Plan  · Mild  Platelet count 803  · Monitor platelet count while on heparin drip    Mixed hyperlipidemia  Assessment & Plan  · Continue statin    S/P AKA (above knee amputation) unilateral, right ([facility] [address] )  Assessment & Plan  · As above  · Patient did not follow-up with vascular since 2020  VTE Pharmacologic Prophylaxis:   High Risk (Score >/= 5) - Pharmacological DVT Prophylaxis Ordered: heparin drip  Sequential Compression Devices Ordered  Patient Centered Rounds: I performed bedside rounds with nursing staff today  Discussions with Specialists or Other Care Team Provider: Vascular surgery    Education and Discussions with Family / Patient: yes    Total Time Spent on Date of Encounter in care of patient: 45 minutes This time was spent on one or more of the following: performing physical exam; counseling and coordination of care; obtaining or reviewing history; documenting in the medical record; reviewing/ordering tests, medications or procedures; communicating with other healthcare professionals and discussing with patient's family/caregivers      Current Length of Stay: 4 day(s)  Current Patient Status: Inpatient   Certification Statement: The patient will continue to require additional inpatient hospital stay due to PAD  Discharge Plan: Anticipate discharge in >72 hrs to Pending course    Code Status: Level 1 - Full Code    Subjective:   Patient is feeling okay  Denies any pain in the leg, chest pain, shortness of breath or abdominal pain  Objective:     Vitals:   Temp (24hrs), Av 2 °F (36 8 °C), Min:97 7 °F (36 5 °C), Max:98 7 °F (37 1 °C)    Temp:  [97 7 °F (36 5 °C)-98 7 °F (37 1 °C)] 97 7 °F (36 5 °C)  HR:  [66-78] 78  Resp:  [14-19] 19  BP: (139-174)/(87-97) 174/97  SpO2:  [94 %-98 %] 97 %  Body mass index is 22 6 kg/m²  Input and Output Summary (last 24 hours): Intake/Output Summary (Last 24 hours) at 2023 1533  Last data filed at 2023 0523  Gross per 24 hour   Intake 1010 ml   Output 1350 ml   Net -340 ml       Physical Exam:   Physical Exam  Constitutional:       Appearance: Normal appearance  HENT:      Head: Normocephalic and atraumatic  Nose: Nose normal       Mouth/Throat:      Mouth: Mucous membranes are moist       Pharynx: Oropharynx is clear  Eyes:      Extraocular Movements: Extraocular movements intact  Pupils: Pupils are equal, round, and reactive to light  Cardiovascular:      Rate and Rhythm: Normal rate and regular rhythm  Pulmonary:      Effort: Pulmonary effort is normal       Breath sounds: Normal breath sounds  Abdominal:      General: Bowel sounds are normal  There is no distension  Palpations: Abdomen is soft  Tenderness: There is no abdominal tenderness  Musculoskeletal:         General: No swelling  Cervical back: Normal range of motion and neck supple  Comments: Right above-knee amputation   Skin:     General: Skin is warm and dry  Comments: Left feet with multiple scabbed wounds and dressing is in place  Toes look dusky   Neurological:      General: No focal deficit present  Mental Status: She is alert            Additional Data:     Labs:  Results from last 7 days   Lab Units 23  0644 23  0914 EOS PCT % 1 1   HEMATOCRIT % 37 8 35 7   HEMOGLOBIN g/dL 12 3 11 9   LYMPHS PCT % 26 15   MONOS PCT % 6 6   NEUTROS PCT % 66 78*   PLATELETS Thousands/uL  --  133*   WBC Thousand/uL 9 00 11 08*     Results from last 7 days   Lab Units 05/25/23  0644 05/23/23  0612 05/22/23  1616   ANION GAP mmol/L 8   < > 9   ALBUMIN g/dL  --   --  3 9   ALK PHOS U/L  --   --  96   ALT U/L  --   --  8   AST U/L  --   --  10*   BUN mg/dL 12   < > 14   CALCIUM mg/dL 9 6   < > 9 7   CHLORIDE mmol/L 105   < > 106   CO2 mmol/L 24   < > 23   CREATININE mg/dL 0 68   < > 0 70   GLUCOSE RANDOM mg/dL 94   < > 76   POTASSIUM mmol/L 4 6   < > 3 7   SODIUM mmol/L 137   < > 138   TOTAL BILIRUBIN mg/dL  --   --  0 32    < > = values in this interval not displayed  Results from last 7 days   Lab Units 05/22/23  1616   INR  1 54*                   Lines/Drains:  Invasive Devices     Peripheral Intravenous Line  Duration           Peripheral IV 05/22/23 Right Wrist 3 days    Peripheral IV 05/24/23 Dorsal (posterior); Right Hand 1 day                      Imaging: Reviewed radiology reports from this admission including: xray(s)    Recent Cultures (last 7 days):   Results from last 7 days   Lab Units 05/24/23  0823 05/22/23  1720 05/22/23  1616   BLOOD CULTURE   --  No Growth at 72 hrs  No Growth at 72 hrs     GRAM STAIN RESULT  No Polys or Bacteria seen  --   --    WOUND CULTURE  2+ Growth of Staphylococcus aureus*  2+ Growth of Beta Hemolytic Streptococcus Group C*  --   --        Last 24 Hours Medication List:   Current Facility-Administered Medications   Medication Dose Route Frequency Provider Last Rate   • acetaminophen  650 mg Oral Q6H PRN RACHAEL Loredo     • amantadine  100 mg Oral BID RACHAEL Loredo     • amLODIPine  5 mg Oral Daily Patric Lancaster MD      And   • losartan  50 mg Oral Daily Patric Lancaster MD     • ascorbic acid  250 mg Oral Daily RACHAEL Loredo     • baclofen  20 mg Oral TID RACHAEL Gil     • cefazolin  1,000 mg Intravenous Q8H RACHAEL Loredo 1,000 mg (05/26/23 1350)   • cholecalciferol  1,000 Units Oral Daily RACHAEL Loredo     • dicyclomine  10 mg Oral TID AC RACHAEL Loredo     • docusate sodium  100 mg Oral BID Jessica Sheridan MD     • famotidine  20 mg Oral Daily RACHAEL Loredo     • gabapentin  300 mg Oral BID RACHAEL Loredo     • heparin (porcine)  12 Units/kg/hr (Order-Specific) Intravenous Titrated RACHAEL Loredo 18 Units/kg/hr (05/25/23 8486)   • HYDROmorphone  0 2 mg Intravenous Q3H PRN RACHAEL Loredo     • metoprolol tartrate  100 mg Oral Q12H RACHAEL Loredo     • multivitamin-minerals  1 tablet Oral Daily RACHAEL Loredo     • ondansetron  4 mg Intravenous Q6H PRN RACHAEL Loredo     • oxyCODONE  2 5 mg Oral Q4H PRN RACHAEL Loredo     • polyethylene glycol  17 g Oral Daily PRN Jessica Sheridan MD     • pravastatin  80 mg Oral Daily With RACHAEL Power          Today, Patient Was Seen By: Jessica Sheridan MD    **Please Note: This note may have been constructed using a voice recognition system  **

## 2023-05-26 NOTE — PROGRESS NOTES
"Progress Note - Podiatry  Justice Goncalves 58 y o  female MRN: [id number]  Unit/Bed#: [address] [place] [address] Encounter: [id number]    Assessment:  Left foot with microvascular disease and peripheral artery disease  Multiple pedal ulcers  Resolving cellulitis  Increasing changes consistent with early dry gangrenous change  Patient with multiple sclerosis    Plan:  Chart reviewed  Patient examined  The cellulitis is resolving slowly  There is increase in tissue degradation consistent with early dry gangrenous change  No evidence of occult abscess or sinus at this time  Awaiting IR team intervention  Continue wound care and antibiosis as directed  Following arterial intervention, patient would benefit from arterial Doppler testing to ascertain circulatory status  Patient still at high risk for limb loss  Subjective/Objective   Chief Complaint:   Chief Complaint   Patient presents with   • Foot Pain     States noted faint spots on foot about a week ago, more pronounced now  Already  had amputation of other leg due to blood clot  Areas of cyanosis and ulcerations noted to foot       Subjective: Patient is doing better  She has less pain of the left foot  Blood pressure 139/87, pulse 66, temperature 97 7 °F (36 5 °C), resp  rate 14, height 5' 6\" (1 676 m), weight 63 5 kg (139 lb 15 9 oz), SpO2 98 %, not currently breastfeeding  ,Body mass index is 22 6 kg/m²  Invasive Devices     Peripheral Intravenous Line  Duration           Peripheral IV 05/22/23 Right Wrist 3 days    Peripheral IV 05/24/23 Dorsal (posterior); Right Hand 1 day                Physical Exam:   General appearance: alert, cooperative and no distress  Neuro/Vascular: Decrease edema of the left lower extremity noted  However there is increasing cyanosis and nonblanchable erythema  This is consistent with tissue necrosis of peripheral artery disease  Extremity: Left foot demonstrates multiple superficial ulcers  Cellulitis resolving    " Significant nonblanchable cyanosis noted  Labs and other studies:   CBC w/diff  Results from last 7 days   Lab Units 05/25/23  0644 05/24/23  0522   EOS PCT % 1 1   HEMATOCRIT % 37 8 35 7   HEMOGLOBIN g/dL 12 3 11 9   LYMPHS PCT % 26 15   MONOS PCT % 6 6   NEUTROS PCT % 66 78*   PLATELETS Thousands/uL  --  133*   WBC Thousand/uL 9 00 11 08*     BMP  Results from last 7 days   Lab Units 05/25/23  0644   BUN mg/dL 12   CALCIUM mg/dL 9 6   CHLORIDE mmol/L 105   CO2 mmol/L 24   CREATININE mg/dL 0 68   POTASSIUM mmol/L 4 6     CMP  Results from last 7 days   Lab Units 05/25/23  0644 05/23/23  0612 05/22/23  1616   ALK PHOS U/L  --   --  96   ALT U/L  --   --  8   AST U/L  --   --  10*   BUN mg/dL 12   < > 14   CALCIUM mg/dL 9 6   < > 9 7   CHLORIDE mmol/L 105   < > 106   CO2 mmol/L 24   < > 23   CREATININE mg/dL 0 68   < > 0 70   POTASSIUM mmol/L 4 6   < > 3 7    < > = values in this interval not displayed  @Culture@  Lab Results   Component Value Date    BLOODCX No Growth at 72 hrs  05/22/2023    BLOODCX No Growth at 72 hrs  05/22/2023    BLOODCX No Growth After 5 Days  12/08/2019    BLOODCX No Growth After 5 Days   12/08/2019    URINECX No Growth <1000 cfu/mL 12/08/2019    WOUNDCULT 1+ Growth of Staphylococcus aureus (A) 05/24/2023    WOUNDCULT 1+ Growth of Acinetobacter lwoffii group (A) 06/15/2022    WOUNDCULT 1+ Growth of Acinetobacter baumannii (A) 06/15/2022         Current Facility-Administered Medications:   •  acetaminophen (TYLENOL) tablet 650 mg, 650 mg, Oral, Q6H PRN, RACHAEL Loredo, 650 mg at 05/22/23 2213  •  amantadine (SYMMETREL) capsule 100 mg, 100 mg, Oral, BID, ORLANDO LoredoNP, 100 mg at 05/26/23 0854  •  amLODIPine (NORVASC) tablet 5 mg, 5 mg, Oral, Daily, 5 mg at 05/26/23 0853 **AND** losartan (COZAAR) tablet 50 mg, 50 mg, Oral, Daily, Kelley Tillman MD, 50 mg at 05/26/23 1064  •  ascorbic acid (VITAMIN C) tablet 250 mg, 250 mg, Oral, Daily, RACHAEL Loredo, 250 mg at 05/26/23 0853  •  baclofen tablet 20 mg, 20 mg, Oral, TID, Marifer Keyritaylor, CRNP, 20 mg at 05/26/23 1307  •  ceFAZolin (ANCEF) IVPB (premix in dextrose) 1,000 mg 50 mL, 1,000 mg, Intravenous, Q8H, Marifer Persaud CRNP, Last Rate: 100 mL/hr at 05/26/23 0523, 1,000 mg at 05/26/23 5802  •  cholecalciferol (VITAMIN D3) tablet 1,000 Units, 1,000 Units, Oral, Daily, ORLANDO LoredoNP, 1,000 Units at 05/26/23 8331  •  dicyclomine (BENTYL) capsule 10 mg, 10 mg, Oral, TID AC, Marifer Keyrik, CRNP, 10 mg at 05/26/23 0620  •  docusate sodium (COLACE) capsule 100 mg, 100 mg, Oral, BID, Hakeem Tillman MD, 100 mg at 05/26/23 0853  •  famotidine (PEPCID) tablet 20 mg, 20 mg, Oral, Daily, Marifer Persaud, ORLANDONP, 20 mg at 05/26/23 8698  •  gabapentin (NEURONTIN) capsule 300 mg, 300 mg, Oral, BID, Brunoidavid Keyritaylor, CRNP, 300 mg at 05/26/23 0853  •  heparin (porcine) 25,000 units in 0 45% NaCl 250 mL infusion (premix), 12 Units/kg/hr (Order-Specific), Intravenous, Titrated, RACHAEL Loredo, Last Rate: 10 8 mL/hr at 05/25/23 2246, 18 Units/kg/hr at 05/25/23 2246  •  HYDROmorphone HCl (DILAUDID) injection 0 2 mg, 0 2 mg, Intravenous, Q3H PRN, ORLANDO LoredoNP, 0 2 mg at 05/24/23 1533  •  metoprolol tartrate (LOPRESSOR) tablet 100 mg, 100 mg, Oral, Q12H, Brunoidavid Persaud, CRNP, 100 mg at 05/26/23 1277  •  multivitamin-minerals (CENTRUM) tablet 1 tablet, 1 tablet, Oral, Daily, RACHAEL Loredo, 1 tablet at 05/26/23 0853  •  ondansetron (ZOFRAN) injection 4 mg, 4 mg, Intravenous, Q6H PRN, RACHAEL Loredo  •  oxyCODONE (ROXICODONE) IR tablet 2 5 mg, 2 5 mg, Oral, Q4H PRN, RACHAEL Loredo, 2 5 mg at 05/26/23 0004  •  polyethylene glycol (MIRALAX) packet 17 g, 17 g, Oral, Daily PRN, Jose Umanzor MD, 17 g at 05/25/23 1722  •  pravastatin (PRAVACHOL) tablet 80 mg, 80 mg, Oral, Daily With Dinner, RACHAEL Loredo, 80 mg at 05/25/23 1722    Imaging: I have personally reviewed pertinent films in PACS  EKG, Pathology, and Other Studies: I have personally reviewed pertinent reports  VTE Pharmacologic Prophylaxis: Sequential compression device (Venodyne)   VTE Mechanical Prophylaxis: sequential compression device            Counseling and Coordination of care  I spent 25 minutes face-to-face with patient today  More than 50% was spent in counseling & coordination of care  Counseled patient regarding need for continued wound care, antibiosis and arterial intervention  Possibility of needing surgery/amputation in the future  Lelia Dubon, KARLIM

## 2023-05-26 NOTE — ASSESSMENT & PLAN NOTE
· Liver continue amantadine, baclofen  · Patient follows neurology as outpatient  · Reports using scooter at home, does not walk

## 2023-05-26 NOTE — PLAN OF CARE
Problem: PHYSICAL THERAPY ADULT  Goal: Performs mobility at highest level of function for planned discharge setting  See evaluation for individualized goals  Description: Treatment/Interventions: Functional transfer training, LE strengthening/ROM, Therapeutic exercise, Patient/family training, Equipment eval/education, Bed mobility, Spoke to nursing          See flowsheet documentation for full assessment, interventions and recommendations  Note: Prognosis: Good  Problem List: Decreased strength, Decreased endurance, Impaired balance, Decreased mobility, Decreased skin integrity  Assessment: Patient is an 58y o  year old female seen for Physical Therapy evaluation  Patient admitted with Multiple open wounds  Comorbidities affecting patient's physical performance include: MS, R AKA, PVD, abscess L foot  Personal factors affecting patient at time of initial evaluation include: inability to perform ADLs, inability to sit for a functional amount of time, inability to transfer to a chair without mod assist  Prior to admission, patient was independent with functional mobility with WC or scooter  Please find objective findings from Physical Therapy assessment regarding body systems outlined above with impairments and limitations including weakness, impaired balance, decreased endurance, decreased activity tolerance, decreased functional mobility tolerance, fall risk and decreased skin integrity  The Barthel Index was used as a functional outcome tool presenting with a score of Barthel Index Score: 30 today indicating marked limitations of functional mobility and ADLS  Patient's clinical presentation is currently unstable/unpredictable as seen in patient's presentation of increased fall risk, new onset of impairment of functional mobility, decreased endurance and new onset of weakness   Pt would benefit from continued Physical Therapy treatment to address deficits as defined above and maximize level of functional mobility  As demonstrated by objective findings, the assigned level of complexity for this evaluation is high  The patient's AM-PAC Basic Mobility Inpatient Short Form Raw Score is 9  A Raw score of less than or equal to 16 suggests the patient may benefit from discharge to post-acute rehabilitation services, however pt was functioning at the [facility] level PTA and believes she will be able to do so again upon going home with the support of her family  PT Discharge Recommendation: Home with home health rehabilitation    See flowsheet documentation for full assessment

## 2023-05-27 PROBLEM — K59.00 CONSTIPATION: Status: ACTIVE | Noted: 2023-05-27

## 2023-05-27 LAB
APTT PPP: 59 SECONDS (ref 23–37)
APTT PPP: 63 SECONDS (ref 23–37)
APTT PPP: 75 SECONDS (ref 23–37)
BACTERIA BLD CULT: NORMAL

## 2023-05-27 PROCEDURE — 85730 THROMBOPLASTIN TIME PARTIAL: CPT | Performed by: INTERNAL MEDICINE

## 2023-05-27 PROCEDURE — 85730 THROMBOPLASTIN TIME PARTIAL: CPT | Performed by: FAMILY MEDICINE

## 2023-05-27 PROCEDURE — 99232 SBSQ HOSP IP/OBS MODERATE 35: CPT | Performed by: INTERNAL MEDICINE

## 2023-05-27 RX ORDER — BISACODYL 10 MG
10 SUPPOSITORY, RECTAL RECTAL DAILY PRN
Status: DISCONTINUED | OUTPATIENT
Start: 2023-05-27 | End: 2023-05-28

## 2023-05-27 RX ORDER — SENNOSIDES 8.6 MG
1 TABLET ORAL
Status: DISCONTINUED | OUTPATIENT
Start: 2023-05-27 | End: 2023-06-01 | Stop reason: HOSPADM

## 2023-05-27 RX ADMIN — AMANTADINE HYDROCHLORIDE 100 MG: 100 CAPSULE ORAL at 08:51

## 2023-05-27 RX ADMIN — Medication 250 MG: at 08:50

## 2023-05-27 RX ADMIN — CEFAZOLIN SODIUM 1000 MG: 1 SOLUTION INTRAVENOUS at 21:14

## 2023-05-27 RX ADMIN — FAMOTIDINE 20 MG: 20 TABLET ORAL at 08:50

## 2023-05-27 RX ADMIN — DOCUSATE SODIUM 100 MG: 100 CAPSULE, LIQUID FILLED ORAL at 08:50

## 2023-05-27 RX ADMIN — DICYCLOMINE HYDROCHLORIDE 10 MG: 10 CAPSULE ORAL at 11:50

## 2023-05-27 RX ADMIN — DICYCLOMINE HYDROCHLORIDE 10 MG: 10 CAPSULE ORAL at 06:09

## 2023-05-27 RX ADMIN — CEFAZOLIN SODIUM 1000 MG: 1 SOLUTION INTRAVENOUS at 05:09

## 2023-05-27 RX ADMIN — BISACODYL 10 MG: 10 SUPPOSITORY RECTAL at 13:08

## 2023-05-27 RX ADMIN — AMANTADINE HYDROCHLORIDE 100 MG: 100 CAPSULE ORAL at 17:09

## 2023-05-27 RX ADMIN — SENNOSIDES 8.6 MG: 8.6 TABLET, FILM COATED ORAL at 21:15

## 2023-05-27 RX ADMIN — CEFAZOLIN SODIUM 1000 MG: 1 SOLUTION INTRAVENOUS at 13:08

## 2023-05-27 RX ADMIN — DICYCLOMINE HYDROCHLORIDE 10 MG: 10 CAPSULE ORAL at 16:08

## 2023-05-27 RX ADMIN — Medication 1 TABLET: at 08:50

## 2023-05-27 RX ADMIN — BACLOFEN 20 MG: 10 TABLET ORAL at 21:15

## 2023-05-27 RX ADMIN — GABAPENTIN 300 MG: 300 CAPSULE ORAL at 08:50

## 2023-05-27 RX ADMIN — Medication 1000 UNITS: at 08:50

## 2023-05-27 RX ADMIN — HEPARIN SODIUM 17 UNITS/KG/HR: 10000 INJECTION, SOLUTION INTRAVENOUS at 03:19

## 2023-05-27 RX ADMIN — GABAPENTIN 300 MG: 300 CAPSULE ORAL at 17:10

## 2023-05-27 RX ADMIN — DOCUSATE SODIUM 100 MG: 100 CAPSULE, LIQUID FILLED ORAL at 21:15

## 2023-05-27 RX ADMIN — BACLOFEN 20 MG: 10 TABLET ORAL at 08:50

## 2023-05-27 RX ADMIN — METOPROLOL TARTRATE 100 MG: 100 TABLET, FILM COATED ORAL at 21:15

## 2023-05-27 RX ADMIN — BACLOFEN 20 MG: 10 TABLET ORAL at 16:08

## 2023-05-27 RX ADMIN — PRAVASTATIN SODIUM 80 MG: 80 TABLET ORAL at 16:08

## 2023-05-27 NOTE — ASSESSMENT & PLAN NOTE
Patient is on Colace and MiraLAX and still did not have a bowel movement    Patient added on senna at night and Dulcolax suppository as needed

## 2023-05-27 NOTE — ASSESSMENT & PLAN NOTE
· Left foot/toes with multiple dry nonhealing wounds with mild redness and tenderness  · Possible cellulitis  · WBC normal, no bands, patient afebrile  · Patient given [employer] in ED  · Patient has been on vancomycin and Ancef initially  · Vancomycin was discontinued  Continue IV cefazolin as per ID  · Blood cultures have been negative  · Patient also had a left plantar foot abscess which was drained by podiatry at bedside on 5/24/2023-cultures growing MSSA and Streptococcus

## 2023-05-27 NOTE — ASSESSMENT & PLAN NOTE
· left LEAD in 8/2020 showed 50-75% stenosis at the distal SFA  · Repeat study in the morning  · Continue statin  · Patient has been on heparin drip  · Lower extremity arterial Doppler the left showed HAILEY index 0 47 using the ischemic category  Compared to previous study there is drop in HAILEY  · IR was consulted for Angiogram-angiogram was tried on 5/26/2023 patient was placed on the table was unable to lay still    Patient will need angiogram under anesthesia which is scheduled for Wednesday at 1 pm which needs to be done as inpatient as per my discussion with vascular surgery PA

## 2023-05-27 NOTE — PROGRESS NOTES
[facility]  Progress Note  Name: Debi Brennan  MRN: [id number]  Unit/Bed#: [address]35 [address] I Date of Admission: 5/22/2023   Date of Service: 5/27/2023 I Hospital Day: 5    Assessment/Plan   * Multiple open wounds  Assessment & Plan  Patient presents with left foot tingling started on Friday, new wounds on toes and sole of foot for about 1 week, patient concerned about clot in leg  Reports brushing her left foot to get rid of dead skin 2 weeks ago  Denies fever, chills  Reports chronic wound on top of left foot since 6/2022 and goes to wound care weekly  Reports history of right leg amputation due to blood clot  · Chart reviewed  Patient is status post right AKA due to critical 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 on 5/9/5745, no source was found  Patient is on Xarelto at home  LEAD showed > 75% stenosis in the right popliteal artery at the time  · Left foot is warm  Multiple dry wounds on toes and foot  Toes appear dusky on exam   Mild redness to left foot with tenderness  · Left foot x-ray showed no acute osseous abnormality  · Wound care with Betadine paint twice daily  · Patient has been on heparin drip which will be continued for now as per my discussion with the vascular surgery      Cellulitis of foot, left  Assessment & Plan  · Left foot/toes with multiple dry nonhealing wounds with mild redness and tenderness  · Possible cellulitis  · WBC normal, no bands, patient afebrile  · Patient given Jackie Sicks in ED  · Patient has been on vancomycin and Ancef initially  · Vancomycin was discontinued  Continue IV cefazolin as per ID  · Blood cultures have been negative  · Patient also had a left plantar foot abscess which was drained by podiatry at bedside on 5/24/2023-cultures growing MSSA and Streptococcus  PAD (peripheral artery disease) ([facility] [address] )  Assessment & Plan  · left LEAD in 8/2020 showed 50-75% stenosis at the distal SFA    · Repeat study in the morning  · Continue statin  · Patient has been on heparin drip  · Lower extremity arterial Doppler the left showed HAILEY index 0 47 using the ischemic category  Compared to previous study there is drop in HAILEY  · IR was consulted for Angiogram-angiogram was tried on 5/26/2023 patient was placed on the table was unable to lay still  Patient will need angiogram under anesthesia which is scheduled for Wednesday at 1 pm which needs to be done as inpatient as per my discussion with vascular surgery PA    Hypertension  Assessment & Plan  · Continue metoprolol, Sharan for home  · BP well controlled    Multiple sclerosis ([facility] [address] )  Assessment & Plan  · Liver continue amantadine, baclofen  · Patient follows neurology as outpatient  · Reports using scooter at home, does not walk    Constipation  Assessment & Plan  Patient is on Colace and MiraLAX and still did not have a bowel movement  Patient added on senna at night and Dulcolax suppository as needed    Mixed hyperlipidemia  Assessment & Plan  · Continue statin    S/P AKA (above knee amputation) unilateral, right ([facility] [address] )  Assessment & Plan  · As above  · Patient did not follow-up with vascular since 2020  VTE Pharmacologic Prophylaxis:   High Risk (Score >/= 5) - Pharmacological DVT Prophylaxis Ordered: heparin drip  Sequential Compression Devices Ordered  Patient Centered Rounds: I performed bedside rounds with nursing staff today  Discussions with Specialists or Other Care Team Provider: Yes    Education and Discussions with Family / Patient: yes    Total Time Spent on Date of Encounter in care of patient: 45 minutes This time was spent on one or more of the following: performing physical exam; counseling and coordination of care; obtaining or reviewing history; documenting in the medical record; reviewing/ordering tests, medications or procedures; communicating with other healthcare professionals and discussing with patient's family/caregivers      Current Length of Stay: 5 day(s)  Current Patient Status: Inpatient   Certification Statement: The patient will continue to require additional inpatient hospital stay due to PAD  Discharge Plan: Anticipate discharge in >72 hrs to Pending course    Code Status: Level 1 - Full Code    Subjective:   Patient still did not have a bowel movement  Denies any abdominal pain, chest pain or shortness of breath  Objective:     Vitals:   Temp (24hrs), Av 4 °F (36 9 °C), Min:98 2 °F (36 8 °C), Max:98 6 °F (37 °C)    Temp:  [98 2 °F (36 8 °C)-98 6 °F (37 °C)] 98 2 °F (36 8 °C)  HR:  [69-83] 73  Resp:  [15-20] 16  BP: (106-174)/() 106/77  SpO2:  [94 %-97 %] 96 %  Body mass index is 22 6 kg/m²  Input and Output Summary (last 24 hours): Intake/Output Summary (Last 24 hours) at 2023 1140  Last data filed at 2023 0610  Gross per 24 hour   Intake 60 ml   Output 500 ml   Net -440 ml       Physical Exam:   Physical Exam  Constitutional:       Appearance: Normal appearance  HENT:      Head: Normocephalic and atraumatic  Nose: Nose normal       Mouth/Throat:      Mouth: Mucous membranes are moist       Pharynx: Oropharynx is clear  Eyes:      Extraocular Movements: Extraocular movements intact  Pupils: Pupils are equal, round, and reactive to light  Cardiovascular:      Rate and Rhythm: Normal rate and regular rhythm  Pulmonary:      Effort: Pulmonary effort is normal       Breath sounds: Normal breath sounds  Abdominal:      General: Bowel sounds are normal  There is no distension  Palpations: Abdomen is soft  Tenderness: There is no abdominal tenderness  Musculoskeletal:         General: No swelling  Cervical back: Normal range of motion and neck supple  Comments: Left leg dressing is clean dry and intact  Toes superficial wounds and are dusky  Right above-knee amputation   Skin:     General: Skin is warm and dry  Neurological:      General: No focal deficit present  Mental Status: She is alert  Additional Data:     Labs:  Results from last 7 days   Lab Units 05/25/23  0644 05/24/23  0522   EOS PCT % 1 1   HEMATOCRIT % 37 8 35 7   HEMOGLOBIN g/dL 12 3 11 9   LYMPHS PCT % 26 15   MONOS PCT % 6 6   NEUTROS PCT % 66 78*   PLATELETS Thousands/uL  --  133*   WBC Thousand/uL 9 00 11 08*     Results from last 7 days   Lab Units 05/25/23  0644 05/23/23  0612 05/22/23  1616   ANION GAP mmol/L 8   < > 9   ALBUMIN g/dL  --   --  3 9   ALK PHOS U/L  --   --  96   ALT U/L  --   --  8   AST U/L  --   --  10*   BUN mg/dL 12   < > 14   CALCIUM mg/dL 9 6   < > 9 7   CHLORIDE mmol/L 105   < > 106   CO2 mmol/L 24   < > 23   CREATININE mg/dL 0 68   < > 0 70   GLUCOSE RANDOM mg/dL 94   < > 76   POTASSIUM mmol/L 4 6   < > 3 7   SODIUM mmol/L 137   < > 138   TOTAL BILIRUBIN mg/dL  --   --  0 32    < > = values in this interval not displayed  Results from last 7 days   Lab Units 05/22/23  1616   INR  1 54*                   Lines/Drains:  Invasive Devices     Peripheral Intravenous Line  Duration           Peripheral IV 05/24/23 Dorsal (posterior); Right Hand 2 days    Peripheral IV 05/26/23 Dorsal (posterior); Right Forearm <1 day                      Imaging: Reviewed radiology reports from this admission including: xray(s)    Recent Cultures (last 7 days):   Results from last 7 days   Lab Units 05/24/23  0823 05/22/23  1720 05/22/23  1616   BLOOD CULTURE   --  No Growth After 4 Days  No Growth After 4 Days     GRAM STAIN RESULT  No Polys or Bacteria seen  --   --    WOUND CULTURE  2+ Growth of Staphylococcus aureus*  2+ Growth of Beta Hemolytic Streptococcus Group C*  --   --        Last 24 Hours Medication List:   Current Facility-Administered Medications   Medication Dose Route Frequency Provider Last Rate   • acetaminophen  650 mg Oral Q6H PRN RACHAEL Loredo     • amantadine  100 mg Oral BID RACHAEL Loredo     • amLODIPine  5 mg Oral Daily Alli Back MD      And   • losartan  50 mg Oral Daily Stacey Campoverde MD     • ascorbic acid  250 mg Oral Daily RACHAEL Loredo     • baclofen  20 mg Oral TID RACHAEL Loredo     • bisacodyl  10 mg Rectal Daily PRN Stacey Campoverde MD     • cefazolin  1,000 mg Intravenous Q8H RACHAEL Loredo 1,000 mg (05/27/23 0509)   • cholecalciferol  1,000 Units Oral Daily RACHAEL Loredo     • dicyclomine  10 mg Oral TID AC RACHAEL Loredo     • docusate sodium  100 mg Oral BID Stacey Campoverde MD     • famotidine  20 mg Oral Daily RACHAEL Loredo     • gabapentin  300 mg Oral BID RACHAEL Loredo     • heparin (porcine)  12 Units/kg/hr (Order-Specific) Intravenous Titrated RACHAEL Loredo 17 Units/kg/hr (05/27/23 0319)   • HYDROmorphone  0 2 mg Intravenous Q3H PRN RACHAEL Loredo     • metoprolol tartrate  100 mg Oral Q12H RACHAEL Loredo     • multivitamin-minerals  1 tablet Oral Daily RACHAEL Loredo     • ondansetron  4 mg Intravenous Q6H PRN RACHAEL Loredo     • oxyCODONE  2 5 mg Oral Q4H PRN RACHAEL Loredo     • polyethylene glycol  17 g Oral Daily PRN Stacey Campoverde MD     • pravastatin  80 mg Oral Daily With RACHAEL Power     • senna  1 tablet Oral HS Stacey Campoverde MD          Today, Patient Was Seen By: Stacey Campoverde MD    **Please Note: This note may have been constructed using a voice recognition system  **

## 2023-05-27 NOTE — ASSESSMENT & PLAN NOTE
Patient presents with left foot tingling started on Friday, new wounds on toes and sole of foot for about 1 week, patient concerned about clot in leg  Reports brushing her left foot to get rid of dead skin 2 weeks ago  Denies fever, chills  Reports chronic wound on top of left foot since 6/2022 and goes to wound care weekly  Reports history of right leg amputation due to blood clot  · Chart reviewed  Patient is status post right AKA due to critical 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 on 9/3/8078, no source was found  Patient is on Xarelto at home  LEAD showed > 75% stenosis in the right popliteal artery at the time  · Left foot is warm  Multiple dry wounds on toes and foot  Toes appear dusky on exam   Mild redness to left foot with tenderness    · Left foot x-ray showed no acute osseous abnormality  · Wound care with Betadine paint twice daily  · Patient has been on heparin drip which will be continued for now as per my discussion with the vascular surgery

## 2023-05-28 LAB
APTT PPP: 67 SECONDS (ref 23–37)
APTT PPP: 86 SECONDS (ref 23–37)
BACTERIA BLD CULT: NORMAL

## 2023-05-28 PROCEDURE — 85730 THROMBOPLASTIN TIME PARTIAL: CPT | Performed by: INTERNAL MEDICINE

## 2023-05-28 PROCEDURE — 85730 THROMBOPLASTIN TIME PARTIAL: CPT | Performed by: FAMILY MEDICINE

## 2023-05-28 PROCEDURE — 99232 SBSQ HOSP IP/OBS MODERATE 35: CPT | Performed by: INTERNAL MEDICINE

## 2023-05-28 PROCEDURE — 97167 OT EVAL HIGH COMPLEX 60 MIN: CPT

## 2023-05-28 RX ORDER — BISACODYL 10 MG
10 SUPPOSITORY, RECTAL RECTAL DAILY PRN
Status: DISCONTINUED | OUTPATIENT
Start: 2023-05-28 | End: 2023-06-01 | Stop reason: HOSPADM

## 2023-05-28 RX ADMIN — AMLODIPINE BESYLATE 5 MG: 5 TABLET ORAL at 09:30

## 2023-05-28 RX ADMIN — FAMOTIDINE 20 MG: 20 TABLET ORAL at 09:31

## 2023-05-28 RX ADMIN — CEFAZOLIN SODIUM 1000 MG: 1 SOLUTION INTRAVENOUS at 04:50

## 2023-05-28 RX ADMIN — METOPROLOL TARTRATE 100 MG: 100 TABLET, FILM COATED ORAL at 09:31

## 2023-05-28 RX ADMIN — AMANTADINE HYDROCHLORIDE 100 MG: 100 CAPSULE ORAL at 09:34

## 2023-05-28 RX ADMIN — DICYCLOMINE HYDROCHLORIDE 10 MG: 10 CAPSULE ORAL at 11:59

## 2023-05-28 RX ADMIN — GABAPENTIN 300 MG: 300 CAPSULE ORAL at 17:11

## 2023-05-28 RX ADMIN — Medication 1 TABLET: at 09:31

## 2023-05-28 RX ADMIN — LOSARTAN POTASSIUM 50 MG: 50 TABLET, FILM COATED ORAL at 09:31

## 2023-05-28 RX ADMIN — GABAPENTIN 300 MG: 300 CAPSULE ORAL at 09:31

## 2023-05-28 RX ADMIN — AMANTADINE HYDROCHLORIDE 100 MG: 100 CAPSULE ORAL at 17:11

## 2023-05-28 RX ADMIN — BACLOFEN 20 MG: 10 TABLET ORAL at 09:30

## 2023-05-28 RX ADMIN — DICYCLOMINE HYDROCHLORIDE 10 MG: 10 CAPSULE ORAL at 06:30

## 2023-05-28 RX ADMIN — DOCUSATE SODIUM 100 MG: 100 CAPSULE, LIQUID FILLED ORAL at 09:30

## 2023-05-28 RX ADMIN — METOPROLOL TARTRATE 100 MG: 100 TABLET, FILM COATED ORAL at 21:06

## 2023-05-28 RX ADMIN — HEPARIN SODIUM 19 UNITS/KG/HR: 10000 INJECTION, SOLUTION INTRAVENOUS at 02:40

## 2023-05-28 RX ADMIN — CEFAZOLIN SODIUM 1000 MG: 1 SOLUTION INTRAVENOUS at 21:06

## 2023-05-28 RX ADMIN — DICYCLOMINE HYDROCHLORIDE 10 MG: 10 CAPSULE ORAL at 17:11

## 2023-05-28 RX ADMIN — SENNOSIDES 8.6 MG: 8.6 TABLET, FILM COATED ORAL at 21:07

## 2023-05-28 RX ADMIN — CEFAZOLIN SODIUM 1000 MG: 1 SOLUTION INTRAVENOUS at 13:28

## 2023-05-28 RX ADMIN — DOCUSATE SODIUM 100 MG: 100 CAPSULE, LIQUID FILLED ORAL at 21:07

## 2023-05-28 RX ADMIN — Medication 250 MG: at 09:31

## 2023-05-28 RX ADMIN — PRAVASTATIN SODIUM 80 MG: 80 TABLET ORAL at 17:10

## 2023-05-28 RX ADMIN — BACLOFEN 20 MG: 10 TABLET ORAL at 21:07

## 2023-05-28 RX ADMIN — Medication 1000 UNITS: at 09:31

## 2023-05-28 RX ADMIN — BACLOFEN 20 MG: 10 TABLET ORAL at 17:10

## 2023-05-28 NOTE — ASSESSMENT & PLAN NOTE
Patient presents with left foot tingling started on Friday, new wounds on toes and sole of foot for about 1 week, patient concerned about clot in leg  Reports brushing her left foot to get rid of dead skin 2 weeks ago  Denies fever, chills  Reports chronic wound on top of left foot since 6/2022 and goes to wound care weekly  Reports history of right leg amputation due to blood clot  · Chart reviewed  Patient is status post right AKA due to critical 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 on 8/6/2448, no source was found  Patient is on Xarelto at home  LEAD showed > 75% stenosis in the right popliteal artery at the time  · Left foot is warm  Multiple dry wounds on toes and foot  Toes appear dusky on exam   Mild redness to left foot with tenderness    · Left foot x-ray showed no acute osseous abnormality  · Wound care with Betadine paint twice daily  · Patient has been on heparin drip which will be continued for now as per my discussion with the vascular surgery

## 2023-05-28 NOTE — ASSESSMENT & PLAN NOTE
· Left foot/toes with multiple dry nonhealing wounds with mild redness and tenderness  · Possible cellulitis  · WBC normal, no bands, patient afebrile  · Patient given Enrrique Nova in ED  · Patient has been on vancomycin and Ancef initially  · Vancomycin was discontinued  Continue IV cefazolin as per ID  · Blood cultures have been negative  · Patient also had a left plantar foot abscess which was drained by podiatry at bedside on 5/24/2023-cultures growing MSSA and Streptococcus

## 2023-05-28 NOTE — OCCUPATIONAL THERAPY NOTE
Occupational Therapy Evaluation       05/28/23 1130   Note Type   Note type Evaluation   Pain Assessment   Pain Assessment Tool 0-10   Pain Score No Pain  (NO pain at rest, 4/10 pain to L foot with movement)   Restrictions/Precautions   Other Precautions Contact/isolation; Chair Alarm; Bed Alarm; Fall Risk  (R AKA, L foot wounds)   Home Living   Type of [address] [place] [address] Two level;Ramped entrance  (Stair [place] to second floor)   Bathroom Shower/Tub Walk-in shower   Bathroom Toilet Standard   Bathroom Equipment Tub transfer bench   Home Equipment Wheelchair-manual;Electric scooter  (Sliding board)   Prior Function   Level of [place] Modified independent with wheelchair; Independent with ADLs; Needs assistance with IADLS   Lives With Son   Receives Help From Family   Comments Patient with R AKA and hx MS; functions at wheelchair level;  Able to independently transfer to/from wheelchair, bed, toilet with use of sliding board or stand pivot transfers; mostly independent in ADLs   General   Additional Pertinent History Patient presented to hospital with multiple open wounds to L foot   ADL   Eating Assistance 4  Minimal Assistance   Eating Deficit   (Assist to open packages)   Grooming Assistance 5  Supervision/Setup   UB Bathing Assistance 3  Moderate Assistance   LB Bathing Assistance 2  Maximal Assistance   UB Dressing Assistance 3  Moderate Assistance   LB Dressing Assistance 2  Maximal [address]  1  Total Assistance   Toileting Deficit   (Incontinent of urine on eval, total assist for hygiene)   Additional Comments Lower body ADLs limited at this time by foot wounds and pain in foot   Bed Mobility   Rolling R 3  Moderate assistance   Additional items Assist x 1   Rolling L 3  Moderate assistance   Additional items Assist x 1   Supine to Sit 3  Moderate assistance   Additional items Assist x 1   Sit to Supine 3  Moderate assistance   Additional items Assist x 1   Additional Comments Rolling to left/right during toilet hygiene; increase assist for bed mobility due to pain L foot and generalized weakness; Scooting up towards [facility] with max assist   Transfers   Additional Comments Not assessed, patient at declined transfers this Am due to increased fatigue from sitting EOB for extended period of time   Balance   Static Sitting Fair   Dynamic Sitting Fair -   Activity Tolerance   Activity Tolerance Patient limited by fatigue   RUE Assessment   RUE Assessment WFL   LUE Assessment   LUE Assessment X  (Weakness, decreased AROM to shoulder)   Sensation   Light Touch Partial deficits in the LUE   Cognition   Overall Cognitive Status [facility]   Arousal/Participation Alert; Cooperative   Attention Attends with cues to redirect   Orientation Level Oriented X4   Following Commands Follows all commands and directions without difficulty   Assessment   Limitation Decreased ADL status; Decreased UE strength;Decreased Safe judgement during ADL;Decreased endurance;Decreased self-care trans;Decreased high-level ADLs   Prognosis Good   Assessment Patient evaluated by Occupational Therapy  Patient admitted with Multiple open wounds  The patients occupational profile, medical and therapy history includes a extensive additional review of physical, cognitive, or psychosocial history related to current functional performance  Comorbidities affecting functional mobility and ADLS include: MS, R AKA, depression, PNA, HTN, high cholesterol  Prior to admission, patient was independent with functional mobility with at wheelchair level, sliding board or stand pivot transfers, independent with ADLS and independent with IADLS    The evaluation identifies the following performance deficits: weakness, impaired balance, decreased endurance, increased fall risk, new onset of impairment of functional mobility, decreased ADLS, decreased IADLS, pain, decreased activity tolerance, decreased safety awareness, impaired judgement and decreased strength, that result in activity limitations and/or participation restrictions  This evaluation requires clinical decision making of high complexity, because the patient presents with comorbidites that affect occupational performance and required significant modification of tasks or assistance with consideration of multiple treatment options  The Barthel Index was used as a functional outcome tool presenting with a score of Barthel Index Score: 20, indicating marked limitations of functional mobility and ADLS  The patient's raw score on the -PAC Daily Activity Inpatient Short Form is 16  A raw score of less than 19 suggests the patient may benefit from discharge to post-acute rehabilitation services  Please refer to the recommendation of the Occupational Therapist for safe discharge planning  Please refer to the recommendation of the Occupational Therapist for safe DC planning  Patient will benefit from skilled Occupational Therapy services to address above deficits and facilitate a safe return to prior level of function  Goals   Patient Goals To get stronger   STG Time Frame   (1-7 days)   Short Term Goal  Goals established to promote Patient Goals: To get stronger:  Eating: supervision; Grooming: supervision seated; Bathing: mod assist; Upper Body Dressing min assist; Lower Body Dressing: mod assist; Toileting: min assist; Patient will increase sliding board commode transfer to mod assist with no assistive device to increase performance and safety with ADLS and functional mobility; Patient will increase sitting tolerance to 10 minutes during ADL task to decrease assistance level and decrease fall risk; Patient will increase bed mobility to min assist in preparation for ADLS and transfers;  Patient will tolerate 5 minutes of UE ROM/strengthening to increase general activity tolerance and performance in ADLS/IADLS; Patient will improve functional activity tolerance to 5 minutes of sustained functional tasks to increase participation in basic self-care and decrease assistance level;  Patient will be able to to verbalize understanding and perform energy conservation/proper body mechanics during ADLS and functional mobility at least 75% of the time with minimal cueing to decrease signs of fatigue and increase stamina to return to prior level of function; Patient will increase dynamic sitting balance to fair to improve the ability to sit at edge of bed or on a chair for ADLS;   LTG Time Frame   (8-14 days)   Long Term Goal Eating: independent; Grooming: independent seated; Bathing: min assist; Upper Body Dressing supervision; Lower Body Dressing: min assist; Toileting: supervision; Patient will increase  commode transfer to min assist with sliding board to increase performance and safety with ADLS and functional mobility; Patient will increase sitting tolerance to 15 minutes during ADL task to decrease assistance level and decrease fall risk; Patient will increase bed mobility to supervision in preparation for ADLS and transfers; Patient will tolerate 10 minutes of UE ROM/strengthening to increase general activity tolerance and performance in ADLS/IADLS; Patient will improve functional activity tolerance to 10 minutes of sustained functional tasks to increase participation in basic self-care and decrease assistance level;  Patient will be able to to verbalize understanding and perform energy conservation/proper body mechanics during ADLS and functional mobility at least 90% of the time with no cueing to decrease signs of fatigue and increase stamina to return to prior level of function; Patient will increase static/dynamic sitting balance to fair+ to improve the ability to sit at edge of bed or on a chair for ADLS;  Pt will score >/= 20/24 on AM-PAC Daily Activity Inpatient scale to promote safe independence with ADLs and functional mobility;  Pt will score >/= 55/100 on Barthel Index in order to decrease caregiver assistance needed and increase ability to perform ADLs and functional mobility  Plan   Treatment Interventions ADL retraining;Functional transfer training;UE strengthening/ROM; Endurance training;Patient/family training;Equipment evaluation/education; Compensatory technique education;Continued evaluation; Energy conservation; Activityengagement   Goal Expiration Date 06/11/23   OT Frequency 3-5x/wk   Recommendation   OT Discharge Recommendation Post acute rehabilitation services   AM-PAC Daily Activity Inpatient   Lower Body Dressing 2   Bathing 2   Toileting 2   Upper Body Dressing 3   Grooming 4   Eating 3   Daily Activity Raw Score 16   Daily Activity Standardized Score (Calc for Raw Score >=11) 35 96   AM-PAC Applied Cognition Inpatient   Following a Speech/Presentation 4   Understanding Ordinary Conversation 4   Taking Medications 4   Remembering Where Things Are Placed or Put Away 4   Remembering List of 4-5 Errands 4   Taking Care of Complicated Tasks 4   Applied Cognition Raw Score 24   Applied Cognition Standardized Score 62 21   Barthel Index   Feeding 5   Bathing 0   Grooming Score 0   Dressing Score 5   Bladder Score 0   Bowels Score 10   Toilet Use Score 0   Transfers (Bed/Chair) Score 0   Mobility (Level Surface) Score 0   Stairs Score 0   Barthel Index Score 20   Licensure   [place] License Number  Claude Coup, OTR/L [id number]

## 2023-05-28 NOTE — ASSESSMENT & PLAN NOTE
Patient is on Colace and MiraLAX and still did not have a bowel movement    Patient added on senna at night and Dulcolax suppository as needed  Patient did have a good bowel movement yesterday  Continue bowel regimen

## 2023-05-28 NOTE — PROGRESS NOTES
[facility]  Progress Note  Name: Leobardo Chahal  MRN: [id number]  Unit/Bed#: 94300 [address] I Date of Admission: 5/22/2023   Date of Service: 5/28/2023 I Hospital Day: 6    Assessment/Plan   * Multiple open wounds  Assessment & Plan  Patient presents with left foot tingling started on Friday, new wounds on toes and sole of foot for about 1 week, patient concerned about clot in leg  Reports brushing her left foot to get rid of dead skin 2 weeks ago  Denies fever, chills  Reports chronic wound on top of left foot since 6/2022 and goes to wound care weekly  Reports history of right leg amputation due to blood clot  · Chart reviewed  Patient is status post right AKA due to critical 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 on 3/5/4687, no source was found  Patient is on Xarelto at home  LEAD showed > 75% stenosis in the right popliteal artery at the time  · Left foot is warm  Multiple dry wounds on toes and foot  Toes appear dusky on exam   Mild redness to left foot with tenderness  · Left foot x-ray showed no acute osseous abnormality  · Wound care with Betadine paint twice daily  · Patient has been on heparin drip which will be continued for now as per my discussion with the vascular surgery      Cellulitis of foot, left  Assessment & Plan  · Left foot/toes with multiple dry nonhealing wounds with mild redness and tenderness  · Possible cellulitis  · WBC normal, no bands, patient afebrile  · Patient given Elana Ip in ED  · Patient has been on vancomycin and Ancef initially  · Vancomycin was discontinued  Continue IV cefazolin as per ID  · Blood cultures have been negative  · Patient also had a left plantar foot abscess which was drained by podiatry at bedside on 5/24/2023-cultures growing MSSA and Streptococcus  PAD (peripheral artery disease) ([facility] [address] )  Assessment & Plan  · left LEAD in 8/2020 showed 50-75% stenosis at the distal SFA    · Repeat study in the morning  · Continue statin  · Patient has been on heparin drip  · Lower extremity arterial Doppler the left showed HAILEY index 0 47 using the ischemic category  Compared to previous study there is drop in HAILEY  · IR was consulted for Angiogram-angiogram was tried on 5/26/2023 patient was placed on the table was unable to lay still  Patient will need angiogram under anesthesia which is scheduled for Wednesday at 1 pm which needs to be done as inpatient as per my discussion with vascular surgery PA    Hypertension  Assessment & Plan  · Continue metoprolol, Sharan for home  · BP well controlled    Multiple sclerosis ([facility])  Assessment & Plan  ·  continue amantadine, baclofen  · Patient follows neurology as outpatient  · Reports using scooter at home, does not walk    Thrombocytopenia (HCC)  Assessment & Plan  · Mild  Platelet count 015  · Monitor platelet count while on heparin drip    Constipation  Assessment & Plan  Patient is on Colace and MiraLAX and still did not have a bowel movement  Patient added on senna at night and Dulcolax suppository as needed  Patient did have a good bowel movement yesterday  Continue bowel regimen    Mixed hyperlipidemia  Assessment & Plan  · Continue statin    S/P AKA (above knee amputation) unilateral, right ([facility])  Assessment & Plan  · As above  · Patient did not follow-up with vascular since 2020  VTE Pharmacologic Prophylaxis:   High Risk (Score >/= 5) - Pharmacological DVT Prophylaxis Ordered: heparin drip  Sequential Compression Devices Ordered  Patient Centered Rounds: I performed bedside rounds with nursing staff today    Discussions with Specialists or Other Care Team Provider: No    Education and Discussions with Family / Patient: yes    Total Time Spent on Date of Encounter in care of patient: 45 minutes This time was spent on one or more of the following: performing physical exam; counseling and coordination of care; obtaining or reviewing history; documenting in the medical record; reviewing/ordering tests, medications or procedures; communicating with other healthcare professionals and discussing with patient's family/caregivers  Current Length of Stay: 6 day(s)  Current Patient Status: Inpatient   Certification Statement: The patient will continue to require additional inpatient hospital stay due to PAD, left foot multiple wounds  Discharge Plan: Anticipate discharge in >72 hrs to Pending course    Code Status: Level 1 - Full Code    Subjective:   Patient did have a good bowel movement yesterday  Denies any abdominal pain, chest pain, leg pain    Objective:     Vitals:   Temp (24hrs), Av 1 °F (36 7 °C), Min:97 9 °F (36 6 °C), Max:98 5 °F (36 9 °C)    Temp:  [97 9 °F (36 6 °C)-98 5 °F (36 9 °C)] 98 °F (36 7 °C)  HR:  [71-86] 76  Resp:  [16-18] 18  BP: (111-129)/(73-79) 120/73  SpO2:  [93 %-97 %] 96 %  Body mass index is 22 6 kg/m²  Input and Output Summary (last 24 hours): Intake/Output Summary (Last 24 hours) at 2023 1151  Last data filed at 2023 0732  Gross per 24 hour   Intake 1138 18 ml   Output 1050 ml   Net 88 18 ml       Physical Exam:   Physical Exam  Constitutional:       Appearance: Normal appearance  HENT:      Head: Normocephalic and atraumatic  Nose: Nose normal       Mouth/Throat:      Mouth: Mucous membranes are moist       Pharynx: Oropharynx is clear  Eyes:      Extraocular Movements: Extraocular movements intact  Pupils: Pupils are equal, round, and reactive to light  Cardiovascular:      Rate and Rhythm: Normal rate and regular rhythm  Pulmonary:      Effort: Pulmonary effort is normal       Breath sounds: Normal breath sounds  Abdominal:      General: Bowel sounds are normal  There is no distension  Palpations: Abdomen is soft  Tenderness: There is no abdominal tenderness  Musculoskeletal:         General: No swelling  Cervical back: Normal range of motion and neck supple        Comments: Right above-knee amputation   Skin:     General: Skin is warm and dry  Comments: Left foot with multiple scabbed wounds  Dressing is clean dry and intact  Toes are dusky in color   Neurological:      General: No focal deficit present  Mental Status: She is alert  Additional Data:     Labs:  Results from last 7 days   Lab Units 05/25/23  0644 05/24/23  0522   EOS PCT % 1 1   HEMATOCRIT % 37 8 35 7   HEMOGLOBIN g/dL 12 3 11 9   LYMPHS PCT % 26 15   MONOS PCT % 6 6   NEUTROS PCT % 66 78*   PLATELETS Thousands/uL  --  133*   WBC Thousand/uL 9 00 11 08*     Results from last 7 days   Lab Units 05/25/23  0644 05/23/23  0612 05/22/23  1616   ANION GAP mmol/L 8   < > 9   ALBUMIN g/dL  --   --  3 9   ALK PHOS U/L  --   --  96   ALT U/L  --   --  8   AST U/L  --   --  10*   BUN mg/dL 12   < > 14   CALCIUM mg/dL 9 6   < > 9 7   CHLORIDE mmol/L 105   < > 106   CO2 mmol/L 24   < > 23   CREATININE mg/dL 0 68   < > 0 70   GLUCOSE RANDOM mg/dL 94   < > 76   POTASSIUM mmol/L 4 6   < > 3 7   SODIUM mmol/L 137   < > 138   TOTAL BILIRUBIN mg/dL  --   --  0 32    < > = values in this interval not displayed  Results from last 7 days   Lab Units 05/22/23  1616   INR  1 54*                   Lines/Drains:  Invasive Devices     Peripheral Intravenous Line  Duration           Peripheral IV 05/24/23 Dorsal (posterior); Right Hand 3 days    Peripheral IV 05/26/23 Dorsal (posterior); Right Forearm 1 day                      Imaging: Reviewed radiology reports from this admission including: xray(s)    Recent Cultures (last 7 days):   Results from last 7 days   Lab Units 05/24/23  0823 05/22/23  1720 05/22/23  1616   BLOOD CULTURE   --  No Growth After 5 Days  No Growth After 5 Days     GRAM STAIN RESULT  No Polys or Bacteria seen  --   --    WOUND CULTURE  2+ Growth of Staphylococcus aureus*  2+ Growth of Beta Hemolytic Streptococcus Group C*  --   --        Last 24 Hours Medication List:   Current Facility-Administered Medications   Medication Dose Route Frequency Provider Last Rate   • acetaminophen  650 mg Oral Q6H PRN RACHAEL Loredo     • amantadine  100 mg Oral BID RACHAEL Loredo     • amLODIPine  5 mg Oral Daily Esther Pal MD      And   • losartan  50 mg Oral Daily Esther Pal MD     • ascorbic acid  250 mg Oral Daily RCAHAEL Loredo     • baclofen  20 mg Oral TID RACHAEL Loredo     • bisacodyl  10 mg Rectal Daily PRN Esther Pal MD     • cefazolin  1,000 mg Intravenous Q8H RACHAEL Loredo 1,000 mg (05/28/23 0450)   • cholecalciferol  1,000 Units Oral Daily RACHAEL Loredo     • dicyclomine  10 mg Oral TID AC RACHAEL Loredo     • docusate sodium  100 mg Oral BID Esther Pal MD     • famotidine  20 mg Oral Daily RACHAEL Loredo     • gabapentin  300 mg Oral BID RACHAEL Loredo     • heparin (porcine)  12 Units/kg/hr (Order-Specific) Intravenous Titrated RACHAEL Loredo 19 Units/kg/hr (05/28/23 0240)   • HYDROmorphone  0 2 mg Intravenous Q3H PRN RACHAEL Loredo     • metoprolol tartrate  100 mg Oral Q12H RACHAEL Loredo     • multivitamin-minerals  1 tablet Oral Daily RACHAEL Loredo     • ondansetron  4 mg Intravenous Q6H PRN RACHAEL Loredo     • oxyCODONE  2 5 mg Oral Q4H PRN RACHAEL Loredo     • polyethylene glycol  17 g Oral Daily PRN Esther Pal MD     • pravastatin  80 mg Oral Daily With RACHAEL Power     • senna  1 tablet Oral HS Esther Pal MD          Today, Patient Was Seen By: Esther Pal MD    **Please Note: This note may have been constructed using a voice recognition system  **

## 2023-05-29 LAB
ANION GAP SERPL CALCULATED.3IONS-SCNC: 8 MMOL/L (ref 4–13)
APTT PPP: 72 SECONDS (ref 23–37)
BASOPHILS # BLD AUTO: 0.05 THOUSANDS/ÂΜL (ref 0–0.1)
BASOPHILS NFR BLD AUTO: 1 % (ref 0–1)
BUN SERPL-MCNC: 17 MG/DL (ref 5–25)
CALCIUM SERPL-MCNC: 9.4 MG/DL (ref 8.4–10.2)
CHLORIDE SERPL-SCNC: 106 MMOL/L (ref 96–108)
CO2 SERPL-SCNC: 25 MMOL/L (ref 21–32)
CREAT SERPL-MCNC: 0.74 MG/DL (ref 0.6–1.3)
EOSINOPHIL # BLD AUTO: 0.12 THOUSAND/ÂΜL (ref 0–0.61)
EOSINOPHIL NFR BLD AUTO: 2 % (ref 0–6)
ERYTHROCYTE [DISTWIDTH] IN BLOOD BY AUTOMATED COUNT: 13 % (ref 11.6–15.1)
GFR SERPL CREATININE-BSD FRML MDRD: 87 ML/MIN/1.73SQ M
GLUCOSE SERPL-MCNC: 100 MG/DL (ref 65–140)
HCT VFR BLD AUTO: 35.4 % (ref 34.8–46.1)
HGB BLD-MCNC: 11.4 G/DL (ref 11.5–15.4)
IMM GRANULOCYTES # BLD AUTO: 0.06 THOUSAND/UL (ref 0–0.2)
IMM GRANULOCYTES NFR BLD AUTO: 1 % (ref 0–2)
LYMPHOCYTES # BLD AUTO: 2.28 THOUSANDS/ÂΜL (ref 0.6–4.47)
LYMPHOCYTES NFR BLD AUTO: 31 % (ref 14–44)
MCH RBC QN AUTO: 30.4 PG (ref 26.8–34.3)
MCHC RBC AUTO-ENTMCNC: 32.2 G/DL (ref 31.4–37.4)
MCV RBC AUTO: 94 FL (ref 82–98)
MONOCYTES # BLD AUTO: 0.5 THOUSAND/ÂΜL (ref 0.17–1.22)
MONOCYTES NFR BLD AUTO: 7 % (ref 4–12)
NEUTROPHILS # BLD AUTO: 4.47 THOUSANDS/ÂΜL (ref 1.85–7.62)
NEUTS SEG NFR BLD AUTO: 58 % (ref 43–75)
NRBC BLD AUTO-RTO: 0 /100 WBCS
PLATELET # BLD AUTO: 149 THOUSANDS/UL (ref 149–390)
PMV BLD AUTO: 12.7 FL (ref 8.9–12.7)
POTASSIUM SERPL-SCNC: 4.3 MMOL/L (ref 3.5–5.3)
RBC # BLD AUTO: 3.75 MILLION/UL (ref 3.81–5.12)
SODIUM SERPL-SCNC: 139 MMOL/L (ref 135–147)
WBC # BLD AUTO: 7.48 THOUSAND/UL (ref 4.31–10.16)

## 2023-05-29 PROCEDURE — 85025 COMPLETE CBC W/AUTO DIFF WBC: CPT | Performed by: INTERNAL MEDICINE

## 2023-05-29 PROCEDURE — 85730 THROMBOPLASTIN TIME PARTIAL: CPT | Performed by: INTERNAL MEDICINE

## 2023-05-29 PROCEDURE — 80048 BASIC METABOLIC PNL TOTAL CA: CPT | Performed by: INTERNAL MEDICINE

## 2023-05-29 PROCEDURE — 99232 SBSQ HOSP IP/OBS MODERATE 35: CPT | Performed by: INTERNAL MEDICINE

## 2023-05-29 RX ADMIN — AMANTADINE HYDROCHLORIDE 100 MG: 100 CAPSULE ORAL at 17:42

## 2023-05-29 RX ADMIN — BACLOFEN 20 MG: 10 TABLET ORAL at 16:20

## 2023-05-29 RX ADMIN — DICYCLOMINE HYDROCHLORIDE 10 MG: 10 CAPSULE ORAL at 06:11

## 2023-05-29 RX ADMIN — GABAPENTIN 300 MG: 300 CAPSULE ORAL at 08:35

## 2023-05-29 RX ADMIN — HEPARIN SODIUM 19 UNITS/KG/HR: 10000 INJECTION, SOLUTION INTRAVENOUS at 00:59

## 2023-05-29 RX ADMIN — DOCUSATE SODIUM 100 MG: 100 CAPSULE, LIQUID FILLED ORAL at 08:35

## 2023-05-29 RX ADMIN — BACLOFEN 20 MG: 10 TABLET ORAL at 08:35

## 2023-05-29 RX ADMIN — AMANTADINE HYDROCHLORIDE 100 MG: 100 CAPSULE ORAL at 08:36

## 2023-05-29 RX ADMIN — DOCUSATE SODIUM 100 MG: 100 CAPSULE, LIQUID FILLED ORAL at 21:29

## 2023-05-29 RX ADMIN — FAMOTIDINE 20 MG: 20 TABLET ORAL at 08:35

## 2023-05-29 RX ADMIN — HEPARIN SODIUM 19 UNITS/KG/HR: 10000 INJECTION, SOLUTION INTRAVENOUS at 21:29

## 2023-05-29 RX ADMIN — METOPROLOL TARTRATE 100 MG: 100 TABLET, FILM COATED ORAL at 21:29

## 2023-05-29 RX ADMIN — BACLOFEN 20 MG: 10 TABLET ORAL at 21:29

## 2023-05-29 RX ADMIN — LOSARTAN POTASSIUM 50 MG: 50 TABLET, FILM COATED ORAL at 08:35

## 2023-05-29 RX ADMIN — CEFAZOLIN SODIUM 1000 MG: 1 SOLUTION INTRAVENOUS at 13:02

## 2023-05-29 RX ADMIN — AMLODIPINE BESYLATE 5 MG: 5 TABLET ORAL at 08:35

## 2023-05-29 RX ADMIN — CEFAZOLIN SODIUM 1000 MG: 1 SOLUTION INTRAVENOUS at 05:45

## 2023-05-29 RX ADMIN — PRAVASTATIN SODIUM 80 MG: 80 TABLET ORAL at 16:20

## 2023-05-29 RX ADMIN — Medication 1 TABLET: at 08:35

## 2023-05-29 RX ADMIN — Medication 1000 UNITS: at 08:35

## 2023-05-29 RX ADMIN — GABAPENTIN 300 MG: 300 CAPSULE ORAL at 17:42

## 2023-05-29 RX ADMIN — ACETAMINOPHEN 650 MG: 325 TABLET, FILM COATED ORAL at 10:24

## 2023-05-29 RX ADMIN — SENNOSIDES 8.6 MG: 8.6 TABLET, FILM COATED ORAL at 21:29

## 2023-05-29 RX ADMIN — Medication 250 MG: at 08:35

## 2023-05-29 RX ADMIN — CEFAZOLIN SODIUM 1000 MG: 1 SOLUTION INTRAVENOUS at 21:30

## 2023-05-29 NOTE — PHYSICAL THERAPY NOTE
"      05/29/23 7585   Note Type   Note Type Cancelled Session   Cancel Reasons Refusal  (Pt declined PT services stating \"I just want to sleep\" Will follow)   Licensure   [place] License Number  Breanna Petersenmary [place] [id number]       "

## 2023-05-29 NOTE — PLAN OF CARE
Problem: MOBILITY - ADULT  Goal: Maintain or return to baseline ADL function  Description: INTERVENTIONS:  -  Assess patient's ability to carry out ADLs; assess patient's baseline for ADL function and identify physical deficits which impact ability to perform ADLs (bathing, care of mouth/teeth, toileting, grooming, dressing, etc )  - Assess/evaluate cause of self-care deficits   - Assess range of motion    Outcome: Progressing     Problem: PAIN - ADULT  Goal: Verbalizes/displays adequate comfort level or baseline comfort level  Description: Interventions:  - Encourage patient to monitor pain and request assistance  - Assess pain using appropriate pain scale  - Administer analgesics based on type and severity of pain and evaluate response  - Implement non-pharmacological measures as appropriate and evaluate response  - Consider cultural and social influences on pain and pain management  - Notify physician/advanced practitioner if interventions unsuccessful or patient reports new pain  Outcome: Progressing     Problem: Prexisting or High Potential for Compromised Skin Integrity  Goal: Skin integrity is maintained or improved  Description: INTERVENTIONS:  - Identify patients at risk for skin breakdown  - Assess and monitor skin integrity  - Assess and monitor nutrition and hydration status  - Monitor labs     Outcome: Progressing

## 2023-05-29 NOTE — ASSESSMENT & PLAN NOTE
Patient is on Colace and MiraLAX and still did not have a bowel movement    Patient added on senna at night and Dulcolax suppository as needed  Patient did have a good bowel movement on 5/27/23  Continue bowel regimen

## 2023-05-29 NOTE — ASSESSMENT & PLAN NOTE
· Left foot/toes with multiple dry nonhealing wounds with mild redness and tenderness  · Possible cellulitis  · WBC normal, no bands, patient afebrile  · Patient given Rosey Kelsey in ED  · Patient has been on vancomycin and Ancef initially  · Vancomycin was discontinued  Continue IV cefazolin as per ID  · Blood cultures have been negative  · Patient also had a left plantar foot abscess which was drained by podiatry at bedside on 5/24/2023-cultures growing MSSA and Streptococcus

## 2023-05-29 NOTE — ASSESSMENT & PLAN NOTE
Patient presents with left foot tingling started on Friday, new wounds on toes and sole of foot for about 1 week, patient concerned about clot in leg  Reports brushing her left foot to get rid of dead skin 2 weeks ago  Denies fever, chills  Reports chronic wound on top of left foot since 6/2022 and goes to wound care weekly  Reports history of right leg amputation due to blood clot  · Chart reviewed  Patient is status post right AKA due to critical 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 on 5/3/8371, no source was found  Patient is on Xarelto at home  LEAD showed > 75% stenosis in the right popliteal artery at the time  · Left foot is warm  Multiple dry wounds on toes and foot  Toes appear dusky on exam   Mild redness to left foot with tenderness    · Left foot x-ray showed no acute osseous abnormality  · Wound care with Betadine paint twice daily  · Patient has been on heparin drip which will be continued for now as per my discussion with the vascular surgery

## 2023-05-29 NOTE — PROGRESS NOTES
[facility]  Progress Note  Name: Kiki Borges  MRN: [id number]  Unit/Bed#: 63989 [address] I Date of Admission: 5/22/2023   Date of Service: 5/29/2023 I Hospital Day: 7    Assessment/Plan   * Multiple open wounds  Assessment & Plan  Patient presents with left foot tingling started on Friday, new wounds on toes and sole of foot for about 1 week, patient concerned about clot in leg  Reports brushing her left foot to get rid of dead skin 2 weeks ago  Denies fever, chills  Reports chronic wound on top of left foot since 6/2022 and goes to wound care weekly  Reports history of right leg amputation due to blood clot  · Chart reviewed  Patient is status post right AKA due to critical 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 on 2/8/2847, no source was found  Patient is on Xarelto at home  LEAD showed > 75% stenosis in the right popliteal artery at the time  · Left foot is warm  Multiple dry wounds on toes and foot  Toes appear dusky on exam   Mild redness to left foot with tenderness  · Left foot x-ray showed no acute osseous abnormality  · Wound care with Betadine paint twice daily  · Patient has been on heparin drip which will be continued for now as per my discussion with the vascular surgery      Cellulitis of foot, left  Assessment & Plan  · Left foot/toes with multiple dry nonhealing wounds with mild redness and tenderness  · Possible cellulitis  · WBC normal, no bands, patient afebrile  · Patient given Gregg Adelfo in ED  · Patient has been on vancomycin and Ancef initially  · Vancomycin was discontinued  Continue IV cefazolin as per ID  · Blood cultures have been negative  · Patient also had a left plantar foot abscess which was drained by podiatry at bedside on 5/24/2023-cultures growing MSSA and Streptococcus  PAD (peripheral artery disease) ([facility] [address] )  Assessment & Plan  · left LEAD in 8/2020 showed 50-75% stenosis at the distal SFA    · Repeat study in the morning  · Continue statin  · Patient has been on heparin drip  · Lower extremity arterial Doppler the left showed HAILEY index 0 47 using the ischemic category  Compared to previous study there is drop in HAILEY  · IR was consulted for Angiogram-angiogram was tried on 5/26/2023 patient was placed on the table was unable to lay still  Patient will need angiogram under anesthesia which is scheduled for Wednesday at 1 pm which needs to be done as inpatient as per my discussion with vascular surgery PA    Multiple sclerosis ([facility] )  Assessment & Plan  ·  continue amantadine, baclofen  · Patient follows neurology as outpatient  · Reports using scooter at home, does not walk    Thrombocytopenia ([facility] )  Assessment & Plan  · Mild  Platelet count 635  · Monitor platelet count while on heparin drip  · Platelet count has normalized    Constipation  Assessment & Plan  Patient is on Colace and MiraLAX and still did not have a bowel movement  Patient added on senna at night and Dulcolax suppository as needed  Patient did have a good bowel movement on 5/27/23  Continue bowel regimen    Mixed hyperlipidemia  Assessment & Plan  · Continue statin    S/P AKA (above knee amputation) unilateral, right ([facility] )  Assessment & Plan  · As above  · Patient did not follow-up with vascular since 2020  VTE Pharmacologic Prophylaxis:   High Risk (Score >/= 5) - Pharmacological DVT Prophylaxis Ordered: heparin drip  Sequential Compression Devices Ordered  Patient Centered Rounds: I performed bedside rounds with nursing staff today  Discussions with Specialists or Other Care Team Provider: No    Education and Discussions with Family / Patient: Patient declined call to        Total Time Spent on Date of Encounter in care of patient: 45 minutes This time was spent on one or more of the following: performing physical exam; counseling and coordination of care; obtaining or reviewing history; documenting in the medical record; reviewing/ordering tests, medications or procedures; communicating with other healthcare professionals and discussing with patient's family/caregivers  Current Length of Stay: 7 day(s)  Current Patient Status: Inpatient   Certification Statement: The patient will continue to require additional inpatient hospital stay due to PAD, multiple wounds of the left foot with cellulitis  Discharge Plan: Anticipate discharge in 48-72 hrs to Pending course    Code Status: Level 1 - Full Code    Subjective:   Patient complaining of some pain in the left foot today  Did not have a bowel movement yesterday  Objective:     Vitals:   Temp (24hrs), Av 4 °F (36 9 °C), Min:98 2 °F (36 8 °C), Max:98 6 °F (37 °C)    Temp:  [98 2 °F (36 8 °C)-98 6 °F (37 °C)] 98 2 °F (36 8 °C)  HR:  [69-78] 73  Resp:  [16-18] 18  BP: ()/() 96/64  SpO2:  [94 %-97 %] 96 %  Body mass index is 22 6 kg/m²  Input and Output Summary (last 24 hours): Intake/Output Summary (Last 24 hours) at 2023 1411  Last data filed at 2023 1217  Gross per 24 hour   Intake 540 ml   Output 1000 ml   Net -460 ml       Physical Exam:   Physical Exam  Constitutional:       Appearance: Normal appearance  HENT:      Head: Normocephalic and atraumatic  Nose: Nose normal       Mouth/Throat:      Mouth: Mucous membranes are moist       Pharynx: Oropharynx is clear  Eyes:      Extraocular Movements: Extraocular movements intact  Pupils: Pupils are equal, round, and reactive to light  Cardiovascular:      Rate and Rhythm: Normal rate and regular rhythm  Pulmonary:      Effort: Pulmonary effort is normal       Breath sounds: Normal breath sounds  Abdominal:      General: Bowel sounds are normal  There is no distension  Palpations: Abdomen is soft  Tenderness: There is no abdominal tenderness  Musculoskeletal:         General: No swelling  Cervical back: Normal range of motion and neck supple        Comments: Right above-knee amputation   Skin:     General: Skin is warm and dry  Neurological:      General: No focal deficit present  Mental Status: She is alert  Additional Data:     Labs:  Results from last 7 days   Lab Units 05/29/23  0550   EOS PCT % 2   HEMATOCRIT % 35 4   HEMOGLOBIN g/dL 11 4*   LYMPHS PCT % 31   MONOS PCT % 7   NEUTROS PCT % 58   PLATELETS Thousands/uL 149   WBC Thousand/uL 7 48     Results from last 7 days   Lab Units 05/29/23  0550 05/23/23  0612 05/22/23  1616   ANION GAP mmol/L 8   < > 9   ALBUMIN g/dL  --   --  3 9   ALK PHOS U/L  --   --  96   ALT U/L  --   --  8   AST U/L  --   --  10*   BUN mg/dL 17   < > 14   CALCIUM mg/dL 9 4   < > 9 7   CHLORIDE mmol/L 106   < > 106   CO2 mmol/L 25   < > 23   CREATININE mg/dL 0 74   < > 0 70   GLUCOSE RANDOM mg/dL 100   < > 76   POTASSIUM mmol/L 4 3   < > 3 7   SODIUM mmol/L 139   < > 138   TOTAL BILIRUBIN mg/dL  --   --  0 32    < > = values in this interval not displayed  Results from last 7 days   Lab Units 05/22/23  1616   INR  1 54*                   Lines/Drains:  Invasive Devices     Peripheral Intravenous Line  Duration           Peripheral IV 05/24/23 Dorsal (posterior); Right Hand 4 days    Peripheral IV 05/26/23 Dorsal (posterior); Right Forearm 2 days          Drain  Duration           External Urinary Catheter <1 day                      Imaging: Reviewed radiology reports from this admission including: xray(s)    Recent Cultures (last 7 days):   Results from last 7 days   Lab Units 05/24/23  0823 05/22/23  1720 05/22/23  1616   BLOOD CULTURE   --  No Growth After 5 Days  No Growth After 5 Days     GRAM STAIN RESULT  No Polys or Bacteria seen  --   --    WOUND CULTURE  2+ Growth of Staphylococcus aureus*  2+ Growth of Beta Hemolytic Streptococcus Group C*  --   --        Last 24 Hours Medication List:   Current Facility-Administered Medications   Medication Dose Route Frequency Provider Last Rate   • acetaminophen  650 mg Oral Q6H PRN RACHAEL Whittington     • amantadine  100 mg Oral BID RACHAEL Loredo     • amLODIPine  5 mg Oral Daily Swathi Gibson MD      And   • losartan  50 mg Oral Daily Swathi Gibson MD     • ascorbic acid  250 mg Oral Daily RACHAEL Loredo     • baclofen  20 mg Oral TID RACHAEL Loredo     • bisacodyl  10 mg Rectal Daily PRN Swathi Gibson MD     • cefazolin  1,000 mg Intravenous Q8H RACHAEL Loredo 1,000 mg (05/29/23 1302)   • cholecalciferol  1,000 Units Oral Daily RACHAEL Loredo     • dicyclomine  10 mg Oral TID AC RACHAEL Loredo     • docusate sodium  100 mg Oral BID Swathi Gibson MD     • famotidine  20 mg Oral Daily RACHAEL Loredo     • gabapentin  300 mg Oral BID RACHAEL Loredo     • heparin (porcine)  12 Units/kg/hr (Order-Specific) Intravenous Titrated RACHAEL Loredo 19 Units/kg/hr (05/29/23 0059)   • HYDROmorphone  0 2 mg Intravenous Q3H PRN RACHAEL Loredo     • metoprolol tartrate  100 mg Oral Q12H RACHAEL Loredo     • multivitamin-minerals  1 tablet Oral Daily RACHAEL Loredo     • ondansetron  4 mg Intravenous Q6H PRN RACHAEL Loredo     • oxyCODONE  2 5 mg Oral Q4H PRN RACHAEL Loredo     • polyethylene glycol  17 g Oral Daily PRN Swathi Gibson MD     • pravastatin  80 mg Oral Daily With RACHAEL Power     • senna  1 tablet Oral HS Swathi Gibson MD          Today, Patient Was Seen By: Swathi Gibson MD    **Please Note: This note may have been constructed using a voice recognition system  **

## 2023-05-29 NOTE — ASSESSMENT & PLAN NOTE
· Mild  Platelet count 297    · Monitor platelet count while on heparin drip  · Platelet count has normalized

## 2023-05-29 NOTE — PLAN OF CARE
Problem: MOBILITY - ADULT  Goal: Maintain or return to baseline ADL function  Description: INTERVENTIONS:  -  Assess patient's ability to carry out ADLs; assess patient's baseline for ADL function and identify physical deficits which impact ability to perform ADLs (bathing, care of mouth/teeth, toileting, grooming, dressing, etc )  - Assess/evaluate cause of self-care deficits   - Assess range of motion    5/29/2023 0927 by Marivel Peterson RN  Outcome: Progressing  5/29/2023 0927 by Marivel Peterson RN  Outcome: Progressing     Problem: Prexisting or High Potential for Compromised Skin Integrity  Goal: Skin integrity is maintained or improved  Description: INTERVENTIONS:  - Identify patients at risk for skin breakdown  - Assess and monitor skin integrity  - Assess and monitor nutrition and hydration status  - Monitor labs     5/29/2023 0927 by Marivel Peterson RN  Outcome: Progressing  5/29/2023 0927 by Marivel Peterson RN  Outcome: Progressing     Problem: PAIN - ADULT  Goal: Verbalizes/displays adequate comfort level or baseline comfort level  Description: Interventions:  - Encourage patient to monitor pain and request assistance  - Assess pain using appropriate pain scale  - Administer analgesics based on type and severity of pain and evaluate response  - Implement non-pharmacological measures as appropriate and evaluate response  - Consider cultural and social influences on pain and pain management  - Notify physician/advanced practitioner if interventions unsuccessful or patient reports new pain  5/29/2023 0927 by Marivel Peterson RN  Outcome: Progressing  5/29/2023 0927 by Marivel Peterson RN  Outcome: Progressing

## 2023-05-30 PROBLEM — K59.00 CONSTIPATION: Status: RESOLVED | Noted: 2023-05-27 | Resolved: 2023-05-30

## 2023-05-30 LAB — APTT PPP: 88 SECONDS (ref 23–37)

## 2023-05-30 PROCEDURE — 85730 THROMBOPLASTIN TIME PARTIAL: CPT | Performed by: INTERNAL MEDICINE

## 2023-05-30 PROCEDURE — 99232 SBSQ HOSP IP/OBS MODERATE 35: CPT | Performed by: FAMILY MEDICINE

## 2023-05-30 PROCEDURE — 99232 SBSQ HOSP IP/OBS MODERATE 35: CPT | Performed by: INTERNAL MEDICINE

## 2023-05-30 RX ADMIN — CEFAZOLIN SODIUM 1000 MG: 1 SOLUTION INTRAVENOUS at 05:02

## 2023-05-30 RX ADMIN — GABAPENTIN 300 MG: 300 CAPSULE ORAL at 09:51

## 2023-05-30 RX ADMIN — CEFAZOLIN SODIUM 1000 MG: 1 SOLUTION INTRAVENOUS at 13:15

## 2023-05-30 RX ADMIN — DOCUSATE SODIUM 100 MG: 100 CAPSULE, LIQUID FILLED ORAL at 21:34

## 2023-05-30 RX ADMIN — Medication 1000 UNITS: at 09:50

## 2023-05-30 RX ADMIN — SENNOSIDES 8.6 MG: 8.6 TABLET, FILM COATED ORAL at 21:34

## 2023-05-30 RX ADMIN — CEFAZOLIN SODIUM 1000 MG: 1 SOLUTION INTRAVENOUS at 21:35

## 2023-05-30 RX ADMIN — HEPARIN SODIUM 19 UNITS/KG/HR: 10000 INJECTION, SOLUTION INTRAVENOUS at 19:44

## 2023-05-30 RX ADMIN — DOCUSATE SODIUM 100 MG: 100 CAPSULE, LIQUID FILLED ORAL at 09:50

## 2023-05-30 RX ADMIN — BACLOFEN 20 MG: 10 TABLET ORAL at 21:34

## 2023-05-30 RX ADMIN — BACLOFEN 20 MG: 10 TABLET ORAL at 09:51

## 2023-05-30 RX ADMIN — Medication 1 TABLET: at 09:50

## 2023-05-30 RX ADMIN — FAMOTIDINE 20 MG: 20 TABLET ORAL at 09:50

## 2023-05-30 RX ADMIN — METOPROLOL TARTRATE 100 MG: 100 TABLET, FILM COATED ORAL at 21:34

## 2023-05-30 RX ADMIN — Medication 250 MG: at 09:50

## 2023-05-30 RX ADMIN — PRAVASTATIN SODIUM 80 MG: 80 TABLET ORAL at 16:41

## 2023-05-30 RX ADMIN — DICYCLOMINE HYDROCHLORIDE 10 MG: 10 CAPSULE ORAL at 16:41

## 2023-05-30 RX ADMIN — AMANTADINE HYDROCHLORIDE 100 MG: 100 CAPSULE ORAL at 09:51

## 2023-05-30 RX ADMIN — BACLOFEN 20 MG: 10 TABLET ORAL at 16:41

## 2023-05-30 RX ADMIN — AMLODIPINE BESYLATE 5 MG: 5 TABLET ORAL at 09:50

## 2023-05-30 RX ADMIN — DICYCLOMINE HYDROCHLORIDE 10 MG: 10 CAPSULE ORAL at 06:50

## 2023-05-30 RX ADMIN — AMANTADINE HYDROCHLORIDE 100 MG: 100 CAPSULE ORAL at 17:14

## 2023-05-30 RX ADMIN — GABAPENTIN 300 MG: 300 CAPSULE ORAL at 17:14

## 2023-05-30 RX ADMIN — DICYCLOMINE HYDROCHLORIDE 10 MG: 10 CAPSULE ORAL at 13:15

## 2023-05-30 RX ADMIN — METOPROLOL TARTRATE 100 MG: 100 TABLET, FILM COATED ORAL at 09:53

## 2023-05-30 RX ADMIN — LOSARTAN POTASSIUM 50 MG: 50 TABLET, FILM COATED ORAL at 09:51

## 2023-05-30 NOTE — ASSESSMENT & PLAN NOTE
Continue amantadine, baclofen  · Patient follows neurology as outpatient  · Reports using scooter at home, does not walk

## 2023-05-30 NOTE — ASSESSMENT & PLAN NOTE
· left LEAD in 8/2020 showed 50-75% stenosis at the distal SFA  · Continue statin, heparin drip  · Left Lower extremity arterial Doppler showed HAILEY index 0 47 which is in ischemic category  Compared to previous study there is drop in HAILEY with greater than 75 % stenosis at proximal popliteal artery  · IR was consulted for Angiogram-angiogram was tried on 5/26/2023 but patient was unable to lay still      · Plan for angiogram under anesthesia willis

## 2023-05-30 NOTE — ASSESSMENT & PLAN NOTE
Patient presented with left foot tingling that started on Friday, new wounds on toes and sole of foot for about 1 week, patient concerned about clot in leg  Reports brushing her left foot to get rid of dead skin 2 weeks ago  Denies fever, chills  Reports chronic wound on top of left foot since 6/2022 and goes to wound care weekly  Reports history of right leg amputation due to blood clot  · Pt is s/p right AKA due to critical 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 on 6/9/8992, no source was found  Patient is on Xarelto at home  LEAD showed > 75% stenosis in the right popliteal artery at the time    · Left foot x-ray showed no acute osseous abnormality  · Wound care with Betadine paint twice daily  · On IV antibiotic as noted below

## 2023-05-30 NOTE — CASE MANAGEMENT
Case Management Progress Note    Patient name Cleatis Headings  Location [address] [place] 406-* MRN [id number]  : 1960 Date 2023       LOS (days): 8  Geometric Mean LOS (GMLOS) (days): 3 00  Days to GMLOS:-5        OBJECTIVE:      Current admission status: Inpatient  Preferred Pharmacy:   [facility], [address] [facility] [address]  [address] [address]  Phone: [phone] Fax: [phone]    [facility] All Sites - [place], [address]  [address] [address]  Phone: [phone] Fax: [address]  Phone: [phone] Fax: [place] [address], [address] [facility]  [address] [address]  Phone: [phone] Fax: [phone]    Primary Care Provider: Jamal Rosas DO    Primary Insurance: Vianey Goodrich [facility]  Secondary Insurance:     PROGRESS NOTE:    Patient received a text message containing a copy of her current Medicare card  Medicare #[id number] forwarded to [facility] for insurance verification

## 2023-05-30 NOTE — PHYSICAL THERAPY NOTE
Attempted PT however pt declined despite much encouragement  Will follow    Oleksandr Mcfadden PT  [id number]     05/30/23 6850   Note Type   Note Type Cancelled Session   Cancel Reasons Refusal

## 2023-05-30 NOTE — PROGRESS NOTES
Progress Note - Infectious Disease   Jazmin Mobley 58 y o  female MRN: [id number]  Unit/Bed#: [address] 406[address]01 Encounter: [id number]      Impression/Plan:  1    Left Foot multiple wounds with pain/erythema/ischemic changes of toes on admission  Possible overlying cellulitis in setting of increased pain last 5 days with left foot mulitple scabs/wounds and/or progressive arterial disease related changes  No fever, chills, associated sepsis  Dog and cat do not lick wounds, but do step on patient's foot often  S/p Left foot plantar abscess bedside I&D today  Left foot Cx 2+MSSA, 2+Group C Strep  -continues Cefazolin through Agram procedure  -monitor temperature and hemodynamics  -serial exam  -monitor serial labs  -local wound/skin care  -offloading  -follow up IR LLE angiogram scheduled for 5/31/23     2  Peripheral arterial disease  5/23/23 LEAD: Left HAILEY 0 47  prior 0 97 in 2020  GTP 57 mmHg down from 191 mmHg in 2020  -Vascular on board  -follow up IR angiogram scheduled for 5/31/23     3  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 Critical limb ischemia, 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     -Serial exams of leg  -Supportive care      3  Tobacco abuse   Patient with documented ongoing tobacco abuse   Noted to have arterial disease on vascular studies  Dewayne Cadena contributed to presentation as above  -Nicotine replacement as per primary  -Discussed smoking cessation     4  MS   At baseline is nonambulatory   -Continue to follow-up with neurology as outpatient     Antibiotics:  Cefazolin D9     Above impression and plan discussed in detail with patient, RN, and primary care team     Subjective:  Patient has no fever, chills, sweats; no nausea, vomiting, diarrhea; no cough, shortness of breath; no left foot pain at present  Patient could not keep leg still for agram Friday, procedure rescheduled for tomorrow      Objective:  Vitals:  Temp:  [98 °F (36 7 °C)-98 3 °F (36 8 °C)] 98 °F (36 7 °C)  HR:  [71-85] 71  Resp: [16-18] 16  BP: ()/(52-78) 113/72  SpO2:  [91 %-95 %] 95 %  Temp (24hrs), Av 1 °F (36 7 °C), Min:98 °F (36 7 °C), Max:98 3 °F (36 8 °C)  Current: Temperature: 98 °F (36 7 °C)    Physical Exam:   General Appearance:  58year old female chronically debilitated, nontoxic, no acute distress, propped with pillows in bed  HEENT: Atraumatic normocephalic   Throat: Oropharynx moist  Poor dentition   Pulmonary:   Normal respiratory excursion without accessory muscle use   Cardiac:  RRR   Abdomen:   Soft, non-tender, non-distended   Extremities: Right AKA well healed  Left foot multiple scabbed wounds and blue toe changes about the same  Slightly less erythema of dorsal foot, pink erythema same of plantar foot  : Purewick with clear, yellow urine in canister, no SPT   Psychiatric: Awake, cooperative   Skin: No new rashes  IV site nontender          Labs, Imaging, & Other studies:   All pertinent labs and imaging studies were personally reviewed  Results from last 7 days   Lab Units 23  0550 23  0644 23  0522   HEMOGLOBIN g/dL 11 4* 12 3 11 9   PLATELETS Thousands/uL 149  --  133*   WBC Thousand/uL 7 48 9 00 11 08*     Results from last 7 days   Lab Units 23  0550 23  0644 23  0522   BUN mg/dL 17 12 15   CALCIUM mg/dL 9 4 9 6 9 0   CHLORIDE mmol/L 106 105 106   CO2 mmol/L  23   CREATININE mg/dL 0 74 0 68 0 77   EGFR ml/min/1 73sq m 87 94 83   POTASSIUM mmol/L 4 3 4 6 4 1   SODIUM mmol/L 139 137 139     Results from last 7 days   Lab Units 23  0823   GRAM STAIN RESULT  No Polys or Bacteria seen   WOUND CULTURE  2+ Growth of Staphylococcus aureus*  2+ Growth of Beta Hemolytic Streptococcus Group C*

## 2023-05-30 NOTE — CASE MANAGEMENT
Case Management Assessment & Discharge Planning Note    Patient name Madeleine Milan  Location [address] [facility] [address] [facility]- MRN [id number]  : 1960 Date 2023       Current Admission Date: 2023  Current Admission Diagnosis:Multiple open wounds   Patient Active Problem List    Diagnosis Date Noted   • Cellulitis of foot    • Peripheral vascular disease ([place] [address] )    • Dysvascular foot ([place] [address] )    • Ulcer of left foot, limited to breakdown of skin ([place] [address] )    • Abscess of left foot    • Depression 2023   • PAD (peripheral artery disease) ([place] [address] ) 2023   • Cellulitis of foot, left 2023   • Embolism and thrombosis of arteries of the lower extremities ([place] [address] ) 06/15/2022   • Mixed hyperlipidemia 06/15/2022   • Multiple open wounds 2022   • Chronic fatigue 2022   • Spasticity 2022   • Phantom pain after amputation of lower extremity ([place] [address] ) 2020   • Venous insufficiency of left leg 2020   • Incontinence of urine in female 2020   • S/P AKA (above knee amputation) unilateral, right ([facility] ) 2020   • Popliteal artery stenosis, right ([facility]) 2020   • Type 2 myocardial infarction ([place] [address] ) 2019   • Generalized weakness 2019   • Sinus tachycardia 2019   • Bilateral hip pain 2019   • Leukocytosis 2019   • Smoker 2019   • Thrombocytopenia ([place] [address] ) 2017   • Ventricular hypertrophy 2017   • Vitamin D deficiency 2015   • Multiple thyroid nodules 2015   • Hypertension 06/10/2014   • Lumbar spinal stenosis 06/10/2014   • Multiple sclerosis ([place] [address] ) 06/10/2014      LOS (days): 8  Geometric Mean LOS (GMLOS) (days): 3 00  Days to GMLOS:-5     OBJECTIVE:    Risk of Unplanned Readmission Score: 10 8       Current admission status: Inpatient  Referral Reason: Other (Discharge planning)    Preferred Pharmacy:   [address] [facility] [address]  [address] [id number]  Phone: [phone] Fax: [phone]    [facility], [address]  [address]  [address] [address]  Phone: [phone] Fax: [address] , [place]  [address]  Phone: [phone] Fax: [phone]    [address] [place] [address], [address] [facility]  [address] [place] [address]  Phone: [phone] Fax: [phone]    Primary Care Provider: Kayleen Marie DO    Primary Insurance: Annabel Butler [facility]  Secondary Insurance:     ASSESSMENT:  Basia Peralta Proxies    There are no active Health Care Proxies on file  Readmission Root Cause  30 Day Readmission: No    Patient Information  Admitted from[de-identified] Home  Mental Status: Alert  During Assessment patient was accompanied by: Not accompanied during assessment  Assessment information provided by[de-identified] Patient  Primary Caregiver: Family  Caregiver's Name[de-identified] Eliezer Patel (mother)  Caregiver's Relationship to Patient[de-identified] Family Member  Caregiver's Telephone Number[de-identified] [phone]  Support Systems: Children, Family members  [place] of Residence: [address] [place] [address] do you live in?: [address] entry access options   Select all that apply : Ramp  Type of Current Residence: 2 story home  Upon entering residence, is there a bedroom on the main floor (no further steps)?: No  A bedroom is located on the following floor levels of residence (select all that apply):: 2nd Floor  Upon entering residence, is there a bathroom on the main floor (no further steps)?: No  Indicate which floors of current residence have a bathroom (select all the apply):: 2nd Floor  Number of steps to 2nd floor from main floor: One Flight (stair glide from 1st to 2nd floor)  In the last 12 months, was there a time when you were not able to pay the mortgage or rent on time?: No  In the last 12 months, how many places have you lived?: 1  In the last 12 months, was there a time when you did not have a steady place to sleep or slept in a shelter (including now)?: No  Homeless/housing insecurity resource given?: N/A  Living Arrangements: Lives w/ Son (two adult sons in home)    Activities of Daily Living Prior to Admission  Functional Status: Independent  Completes ADLs independently?: Yes  Ambulates independently?: Yes  Does patient use assisted devices?: Yes  Assisted Devices (DME) used:  Wheelchair, Other (Comment), Stair Chair/Glide (electric scooter, transfer board, tub transfer bench)  Does patient currently own DME?: Yes  What DME does the patient currently own?: Stair Chair/Glide, Wheelchair, Other (Comment) (electric scooter, transfer board, tub transfer bench)  Does the patient have a history of Short-Term Rehab?: Yes CAPTAIN JOAN GARVIN  [facility], [facility] [place])  Does patient have a history of [facility]?: Yes ([address] [place], [place] [address]A)  Does patient currently have [facility]?: No    Patient Information Continued  Income Source: SSI/SSD  Does patient have prescription coverage?: Yes  Within the past 12 months, you worried that your food would run out before you got the money to buy more : Never true  Within the past 12 months, the food you bought just didn't last and you didn't have money to get more : Never true  Food insecurity resource given?: N/A  Does patient receive dialysis treatments?: No    Means of Transportation  Means of Transport to Appts[de-identified] Family transport  In the past 12 months, has lack of transportation kept you from medical appointments or from getting medications?: No  In the past 12 months, has lack of transportation kept you from meetings, work, or from getting things needed for daily living?: No  Was application for public transport provided?: N/A      DISCHARGE DETAILS:    Discharge planning discussed with[de-identified] Patient  Freedom of Choice: Yes     Comments - Freedom of Choice: SW spoke with patient at bedside to introduce role of CM, conduct assessment and discuss discharge plans  STR has been recommended for patient and SW reviewed recommendation with patient  She declined referrals for STR at this time, stating that she has help from her two adult sons at home and from her mother who lives next door  She has all need DME including a ramped entrance, wheelchairs on both floors and a stair glide  Patient's preference is to return home with home PT/OT  SW placed referrals for [facility] in [facility] and [facility] are unable to accept due to not accepting patient's insurance  [facility] has requested patient's Medicare number and patient will call her mother to ask her to look for the card  SW will provide to Community when able and will continue to follow  Were Treatment Team discharge recommendations reviewed with patient/caregiver?: Yes  Did patient/caregiver verbalize understanding of patient care needs?: Yes  Were patient/caregiver advised of the risks associated with not following Treatment Team discharge recommendations?: Yes    Contacts  Patient Contacts:  Maxim Maravilla (mother)  Relationship to Patient[de-identified] Family  Contact Method: Phone  Phone Number: [phone]    [address] [place] [address]         Is the patient interested in [facility] at discharge?: Yes  [address] requested[de-identified] Nursing, Occupational Therapy, Physical [address] [place] [address] Name[de-identified] Other  [address] [place] [address] Provider[de-identified] PCP  Home Health Services Needed[de-identified] Evaluate Functional Status and Safety, Gait/ADL Training, Strengthening/Theraputic Exercises to Improve Function, Wound/Ostomy Care  Homebound Criteria Met[de-identified] Requires the Assistance of Another Person for Safe Ambulation or to Leave the Home, Uses an Assist Device (i e  cane, walker, etc)  Supporting Clincal Findings[de-identified] Bed Bound or Wheelchair Bound, Limited Endurance, Fatigues Easliy in [employer]    DME Referral Provided  Referral made for DME?: No    Other Referral/Resources/Interventions Provided:  Interventions: C    Would you like to participate in our [address] service program?  : No - Declined    Treatment Team Recommendation: Short Term Rehab  Discharge Destination Plan[de-identified] Home with [address] [facility]

## 2023-05-30 NOTE — ASSESSMENT & PLAN NOTE
Left foot/toes with multiple dry nonhealing wounds with mild redness and tenderness  · Possible cellulitis  · Patient given Adah Roche in ED  · Patient was on vancomycin and Ancef initially  · Vancomycin was discontinued  Continue IV cefazolin as per ID  · Blood cultures have been negative  · Patient also had a left plantar foot abscess which was drained by podiatry at bedside on 5/24/2023-cultures growing MSSA and Streptococcus

## 2023-05-31 ENCOUNTER — APPOINTMENT (INPATIENT)
Dept: RADIOLOGY | Facility: HOSPITAL | Age: 63
DRG: 253 | End: 2023-05-31
Payer: COMMERCIAL

## 2023-05-31 ENCOUNTER — ANESTHESIA (INPATIENT)
Dept: NON INVASIVE DIAGNOSTICS | Facility: HOSPITAL | Age: 63
End: 2023-05-31

## 2023-05-31 ENCOUNTER — APPOINTMENT (OUTPATIENT)
Dept: NON INVASIVE DIAGNOSTICS | Facility: HOSPITAL | Age: 63
DRG: 253 | End: 2023-05-31
Attending: RADIOLOGY
Payer: COMMERCIAL

## 2023-05-31 ENCOUNTER — ANESTHESIA EVENT (INPATIENT)
Dept: NON INVASIVE DIAGNOSTICS | Facility: HOSPITAL | Age: 63
End: 2023-05-31

## 2023-05-31 LAB
ANION GAP SERPL CALCULATED.3IONS-SCNC: 9 MMOL/L (ref 4–13)
APTT PPP: 101 SECONDS (ref 23–37)
APTT PPP: 41 SECONDS (ref 23–37)
BUN SERPL-MCNC: 14 MG/DL (ref 5–25)
CALCIUM SERPL-MCNC: 9.9 MG/DL (ref 8.4–10.2)
CHLORIDE SERPL-SCNC: 105 MMOL/L (ref 96–108)
CO2 SERPL-SCNC: 24 MMOL/L (ref 21–32)
CREAT SERPL-MCNC: 0.72 MG/DL (ref 0.6–1.3)
ERYTHROCYTE [DISTWIDTH] IN BLOOD BY AUTOMATED COUNT: 13.2 % (ref 11.6–15.1)
GFR SERPL CREATININE-BSD FRML MDRD: 90 ML/MIN/1.73SQ M
GLUCOSE SERPL-MCNC: 96 MG/DL (ref 65–140)
HCT VFR BLD AUTO: 36 % (ref 34.8–46.1)
HGB BLD-MCNC: 11.6 G/DL (ref 11.5–15.4)
MCH RBC QN AUTO: 30.4 PG (ref 26.8–34.3)
MCHC RBC AUTO-ENTMCNC: 32.2 G/DL (ref 31.4–37.4)
MCV RBC AUTO: 95 FL (ref 82–98)
PLATELET # BLD AUTO: 145 THOUSANDS/UL (ref 149–390)
PMV BLD AUTO: 12.5 FL (ref 8.9–12.7)
POTASSIUM SERPL-SCNC: 4.2 MMOL/L (ref 3.5–5.3)
RBC # BLD AUTO: 3.81 MILLION/UL (ref 3.81–5.12)
SODIUM SERPL-SCNC: 138 MMOL/L (ref 135–147)
WBC # BLD AUTO: 7.89 THOUSAND/UL (ref 4.31–10.16)

## 2023-05-31 PROCEDURE — 37228 HB TIB/PER REVASC W/TLA: CPT

## 2023-05-31 PROCEDURE — C1887 CATHETER, GUIDING: HCPCS

## 2023-05-31 PROCEDURE — C1894 INTRO/SHEATH, NON-LASER: HCPCS

## 2023-05-31 PROCEDURE — 85730 THROMBOPLASTIN TIME PARTIAL: CPT | Performed by: FAMILY MEDICINE

## 2023-05-31 PROCEDURE — 75625 CONTRAST EXAM ABDOMINL AORTA: CPT

## 2023-05-31 PROCEDURE — 85027 COMPLETE CBC AUTOMATED: CPT | Performed by: FAMILY MEDICINE

## 2023-05-31 PROCEDURE — C1769 GUIDE WIRE: HCPCS

## 2023-05-31 PROCEDURE — 99232 SBSQ HOSP IP/OBS MODERATE 35: CPT | Performed by: FAMILY MEDICINE

## 2023-05-31 PROCEDURE — C1725 CATH, TRANSLUMIN NON-LASER: HCPCS

## 2023-05-31 PROCEDURE — 73630 X-RAY EXAM OF FOOT: CPT

## 2023-05-31 PROCEDURE — 99232 SBSQ HOSP IP/OBS MODERATE 35: CPT | Performed by: INTERNAL MEDICINE

## 2023-05-31 PROCEDURE — 80048 BASIC METABOLIC PNL TOTAL CA: CPT | Performed by: FAMILY MEDICINE

## 2023-05-31 PROCEDURE — C9764 REVASC INTRAVASC LITHOTRIPSY: HCPCS

## 2023-05-31 PROCEDURE — 99232 SBSQ HOSP IP/OBS MODERATE 35: CPT | Performed by: PODIATRIST

## 2023-05-31 PROCEDURE — 75625 CONTRAST EXAM ABDOMINL AORTA: CPT | Performed by: RADIOLOGY

## 2023-05-31 PROCEDURE — 37228 PR REVSC OPN/PRQ TIB/PERO W/ANGIOPLASTY UNI: CPT | Performed by: RADIOLOGY

## 2023-05-31 PROCEDURE — 37224 PR REVSC OPN/PRG FEM/POP W/ANGIOPLASTY UNI: CPT | Performed by: RADIOLOGY

## 2023-05-31 PROCEDURE — C2623 CATH, TRANSLUMIN, DRUG-COAT: HCPCS

## 2023-05-31 PROCEDURE — 75710 ARTERY X-RAYS ARM/LEG: CPT

## 2023-05-31 PROCEDURE — 76937 US GUIDE VASCULAR ACCESS: CPT | Performed by: RADIOLOGY

## 2023-05-31 PROCEDURE — 76937 US GUIDE VASCULAR ACCESS: CPT

## 2023-05-31 PROCEDURE — 75710 ARTERY X-RAYS ARM/LEG: CPT | Performed by: RADIOLOGY

## 2023-05-31 PROCEDURE — C1760 CLOSURE DEV, VASC: HCPCS

## 2023-05-31 RX ORDER — SODIUM CHLORIDE, SODIUM LACTATE, POTASSIUM CHLORIDE, CALCIUM CHLORIDE 600; 310; 30; 20 MG/100ML; MG/100ML; MG/100ML; MG/100ML
INJECTION, SOLUTION INTRAVENOUS CONTINUOUS PRN
Status: DISCONTINUED | OUTPATIENT
Start: 2023-05-31 | End: 2023-05-31

## 2023-05-31 RX ORDER — MIDAZOLAM HYDROCHLORIDE 2 MG/2ML
INJECTION, SOLUTION INTRAMUSCULAR; INTRAVENOUS AS NEEDED
Status: DISCONTINUED | OUTPATIENT
Start: 2023-05-31 | End: 2023-05-31

## 2023-05-31 RX ORDER — PROPOFOL 10 MG/ML
INJECTION, EMULSION INTRAVENOUS CONTINUOUS PRN
Status: DISCONTINUED | OUTPATIENT
Start: 2023-05-31 | End: 2023-05-31

## 2023-05-31 RX ORDER — LIDOCAINE HYDROCHLORIDE 10 MG/ML
INJECTION, SOLUTION EPIDURAL; INFILTRATION; INTRACAUDAL; PERINEURAL AS NEEDED
Status: DISCONTINUED | OUTPATIENT
Start: 2023-05-31 | End: 2023-06-01 | Stop reason: HOSPADM

## 2023-05-31 RX ORDER — METOPROLOL TARTRATE 5 MG/5ML
INJECTION INTRAVENOUS AS NEEDED
Status: DISCONTINUED | OUTPATIENT
Start: 2023-05-31 | End: 2023-05-31

## 2023-05-31 RX ORDER — KETAMINE HCL IN NACL, ISO-OSM 100MG/10ML
SYRINGE (ML) INJECTION AS NEEDED
Status: DISCONTINUED | OUTPATIENT
Start: 2023-05-31 | End: 2023-05-31

## 2023-05-31 RX ORDER — HYDRALAZINE HYDROCHLORIDE 20 MG/ML
INJECTION INTRAMUSCULAR; INTRAVENOUS AS NEEDED
Status: DISCONTINUED | OUTPATIENT
Start: 2023-05-31 | End: 2023-05-31

## 2023-05-31 RX ORDER — HEPARIN SODIUM 1000 [USP'U]/ML
INJECTION, SOLUTION INTRAVENOUS; SUBCUTANEOUS AS NEEDED
Status: DISCONTINUED | OUTPATIENT
Start: 2023-05-31 | End: 2023-05-31

## 2023-05-31 RX ORDER — FENTANYL CITRATE/PF 50 MCG/ML
50 SYRINGE (ML) INJECTION
Status: COMPLETED | OUTPATIENT
Start: 2023-05-31 | End: 2023-05-31

## 2023-05-31 RX ORDER — PROPOFOL 10 MG/ML
INJECTION, EMULSION INTRAVENOUS AS NEEDED
Status: DISCONTINUED | OUTPATIENT
Start: 2023-05-31 | End: 2023-05-31

## 2023-05-31 RX ORDER — IODIXANOL 320 MG/ML
120 INJECTION, SOLUTION INTRAVASCULAR
Status: COMPLETED | OUTPATIENT
Start: 2023-05-31 | End: 2023-05-31

## 2023-05-31 RX ORDER — LIDOCAINE HYDROCHLORIDE 10 MG/ML
INJECTION, SOLUTION EPIDURAL; INFILTRATION; INTRACAUDAL; PERINEURAL AS NEEDED
Status: DISCONTINUED | OUTPATIENT
Start: 2023-05-31 | End: 2023-05-31

## 2023-05-31 RX ADMIN — ONDANSETRON 4 MG: 2 INJECTION INTRAMUSCULAR; INTRAVENOUS at 13:40

## 2023-05-31 RX ADMIN — SODIUM CHLORIDE, SODIUM LACTATE, POTASSIUM CHLORIDE, AND CALCIUM CHLORIDE: .6; .31; .03; .02 INJECTION, SOLUTION INTRAVENOUS at 13:23

## 2023-05-31 RX ADMIN — CEFAZOLIN SODIUM 1000 MG: 1 SOLUTION INTRAVENOUS at 21:13

## 2023-05-31 RX ADMIN — SENNOSIDES 8.6 MG: 8.6 TABLET, FILM COATED ORAL at 21:13

## 2023-05-31 RX ADMIN — PROPOFOL 40 MG: 10 INJECTION, EMULSION INTRAVENOUS at 13:39

## 2023-05-31 RX ADMIN — METOPROLOL TARTRATE 100 MG: 100 TABLET, FILM COATED ORAL at 08:47

## 2023-05-31 RX ADMIN — METOPROLOL TARTRATE 2.5 MG: 5 INJECTION INTRAVENOUS at 14:02

## 2023-05-31 RX ADMIN — HEPARIN SODIUM 5000 UNITS: 1000 INJECTION INTRAVENOUS; SUBCUTANEOUS at 14:27

## 2023-05-31 RX ADMIN — BACLOFEN 20 MG: 10 TABLET ORAL at 21:12

## 2023-05-31 RX ADMIN — AMLODIPINE BESYLATE 5 MG: 5 TABLET ORAL at 08:21

## 2023-05-31 RX ADMIN — LIDOCAINE HYDROCHLORIDE 50 MG: 10 INJECTION, SOLUTION EPIDURAL; INFILTRATION; INTRACAUDAL; PERINEURAL at 13:39

## 2023-05-31 RX ADMIN — Medication 10 MG: at 15:01

## 2023-05-31 RX ADMIN — Medication 1000 UNITS: at 08:21

## 2023-05-31 RX ADMIN — FAMOTIDINE 20 MG: 20 TABLET ORAL at 08:21

## 2023-05-31 RX ADMIN — PROPOFOL 100 MCG/KG/MIN: 10 INJECTION, EMULSION INTRAVENOUS at 13:39

## 2023-05-31 RX ADMIN — CEFAZOLIN SODIUM 1000 MG: 1 SOLUTION INTRAVENOUS at 05:09

## 2023-05-31 RX ADMIN — IODIXANOL 120 ML: 320 INJECTION, SOLUTION INTRAVASCULAR at 15:37

## 2023-05-31 RX ADMIN — LIDOCAINE HYDROCHLORIDE 10 ML: 10 INJECTION, SOLUTION EPIDURAL; INFILTRATION; INTRACAUDAL; PERINEURAL at 14:06

## 2023-05-31 RX ADMIN — Medication 20 MG: at 14:28

## 2023-05-31 RX ADMIN — METOPROLOL TARTRATE 100 MG: 100 TABLET, FILM COATED ORAL at 21:13

## 2023-05-31 RX ADMIN — HEPARIN SODIUM 17 UNITS/KG/HR: 10000 INJECTION, SOLUTION INTRAVENOUS at 06:36

## 2023-05-31 RX ADMIN — FENTANYL CITRATE 50 MCG: 50 INJECTION, SOLUTION INTRAMUSCULAR; INTRAVENOUS at 16:17

## 2023-05-31 RX ADMIN — MIDAZOLAM 2 MG: 1 INJECTION INTRAMUSCULAR; INTRAVENOUS at 13:33

## 2023-05-31 RX ADMIN — CEFAZOLIN SODIUM 1000 MG: 1 SOLUTION INTRAVENOUS at 12:48

## 2023-05-31 RX ADMIN — Medication 20 MG: at 13:47

## 2023-05-31 RX ADMIN — FENTANYL CITRATE 50 MCG: 50 INJECTION, SOLUTION INTRAMUSCULAR; INTRAVENOUS at 16:25

## 2023-05-31 RX ADMIN — DICYCLOMINE HYDROCHLORIDE 10 MG: 10 CAPSULE ORAL at 08:21

## 2023-05-31 RX ADMIN — DICYCLOMINE HYDROCHLORIDE 10 MG: 10 CAPSULE ORAL at 11:35

## 2023-05-31 RX ADMIN — METOPROLOL TARTRATE 2.5 MG: 5 INJECTION INTRAVENOUS at 14:14

## 2023-05-31 RX ADMIN — DOCUSATE SODIUM 100 MG: 100 CAPSULE, LIQUID FILLED ORAL at 08:21

## 2023-05-31 RX ADMIN — AMANTADINE HYDROCHLORIDE 100 MG: 100 CAPSULE ORAL at 08:22

## 2023-05-31 RX ADMIN — HYDRALAZINE HYDROCHLORIDE 10 MG: 20 INJECTION INTRAMUSCULAR; INTRAVENOUS at 14:26

## 2023-05-31 RX ADMIN — GABAPENTIN 300 MG: 300 CAPSULE ORAL at 08:21

## 2023-05-31 RX ADMIN — BACLOFEN 20 MG: 10 TABLET ORAL at 08:21

## 2023-05-31 RX ADMIN — DOCUSATE SODIUM 100 MG: 100 CAPSULE, LIQUID FILLED ORAL at 21:13

## 2023-05-31 RX ADMIN — HEPARIN SODIUM 1000 UNITS: 1000 INJECTION INTRAVENOUS; SUBCUTANEOUS at 15:06

## 2023-05-31 RX ADMIN — Medication 1 TABLET: at 08:21

## 2023-05-31 RX ADMIN — Medication 250 MG: at 08:21

## 2023-05-31 RX ADMIN — HEPARIN SODIUM 1000 UNITS: 1000 INJECTION INTRAVENOUS; SUBCUTANEOUS at 15:14

## 2023-05-31 RX ADMIN — LOSARTAN POTASSIUM 50 MG: 50 TABLET, FILM COATED ORAL at 08:21

## 2023-05-31 NOTE — ANESTHESIA POSTPROCEDURE EVALUATION
Post-Op Assessment Note    CV Status:  Stable  Pain Score: 0    Pain management: adequate     Mental Status:  Agitated and awake   Hydration Status:  Euvolemic   PONV Controlled:  Controlled   Airway Patency:  Patent      Post Op Vitals Reviewed: Yes      Staff: Anesthesiologist, CRNA         No notable events documented      BP   112/73   Temp     Pulse  80   Resp   12   SpO2   98

## 2023-05-31 NOTE — PROGRESS NOTES
"Progress Note - Podiatry  Zen Iniguez 58 y o  female MRN: [id number]  Unit/Bed#: [address]01 Encounter: [id number]    Assessment:  Patient with left lower extremity peripheral artery disease and secondary deep tissue injury/pressure ulcers left foot  Resolved cellulitis  History of right AKA  Patient with multiple sclerosis  Plan:  Chart reviewed  Patient examined at bedside  Patient advised on condition and need for arterial intervention  Patient is slated for IR intervention today  At this time we will continue wound care  Watch for signs of infection  X-rays ordered to rule out osteomyelitis of toes  Continue offloading of foot  We will follow  Prognosis fair pending outcome of IR intervention  Patient still at risk for left lower extremity amputation    Subjective/Objective   Chief Complaint:   Chief Complaint   Patient presents with   • Foot Pain     States noted faint spots on foot about a week ago, more pronounced now  Already  had amputation of other leg due to blood clot  Areas of cyanosis and ulcerations noted to foot       Subjective: Patient has much less pain of the left foot  She understands the need for intervention to help with arterial status  She is amenable to all treatment  Blood pressure 130/84, pulse 69, temperature 97 9 °F (36 6 °C), resp  rate 17, height 5' 6\" (1 676 m), weight 63 5 kg (139 lb 15 9 oz), SpO2 97 %, not currently breastfeeding  ,Body mass index is 22 6 kg/m²  Invasive Devices     Peripheral Intravenous Line  Duration           Peripheral IV 05/29/23 Proximal;Right;Ventral (anterior) Forearm 1 day          Drain  Duration           External Urinary Catheter 2 days                Physical Exam:   General appearance: alert, cooperative and no distress  Neuro/Vascular: Decrease edema noted left lower extremity  Patient has abnormal cooling of the left foot and leg  Mild decrease in epicritic sensation noted    Extremity: Left foot demonstrates resolved " cellulitis on the plantar aspect of the foot  At this time there is no evidence of occult abscess sinus or cellulitis  Patient still has multiple areas of excoriation/superficial ulceration of the foot and toes  No occult change noted  No evidence of progressing gangrene  Labs and other studies:   CBC w/diff  Results from last 7 days   Lab Units 05/31/23  0516 05/29/23  0550   EOS PCT %  --  2   HEMATOCRIT % 36 0 35 4   HEMOGLOBIN g/dL 11 6 11 4*   LYMPHS PCT %  --  31   MONOS PCT %  --  7   NEUTROS PCT %  --  58   PLATELETS Thousands/uL 145* 149   WBC Thousand/uL 7 89 7 48     BMP  Results from last 7 days   Lab Units 05/31/23  0516   BUN mg/dL 14   CALCIUM mg/dL 9 9   CHLORIDE mmol/L 105   CO2 mmol/L 24   CREATININE mg/dL 0 72   POTASSIUM mmol/L 4 2     CMP  Results from last 7 days   Lab Units 05/31/23  0516   BUN mg/dL 14   CALCIUM mg/dL 9 9   CHLORIDE mmol/L 105   CO2 mmol/L 24   CREATININE mg/dL 0 72   POTASSIUM mmol/L 4 2       @Culture@  Lab Results   Component Value Date    BLOODCX No Growth After 5 Days  05/22/2023    BLOODCX No Growth After 5 Days  05/22/2023    BLOODCX No Growth After 5 Days  12/08/2019    BLOODCX No Growth After 5 Days   12/08/2019    URINECX No Growth <1000 cfu/mL 12/08/2019    WOUNDCULT 2+ Growth of Staphylococcus aureus (A) 05/24/2023    WOUNDCULT 2+ Growth of Beta Hemolytic Streptococcus Group C (A) 05/24/2023    WOUNDCULT 1+ Growth of Acinetobacter lwoffii group (A) 06/15/2022    WOUNDCULT 1+ Growth of Acinetobacter baumannii (A) 06/15/2022         Current Facility-Administered Medications:   •  acetaminophen (TYLENOL) tablet 650 mg, 650 mg, Oral, Q6H PRN, RACHAEL Loredo, 650 mg at 05/29/23 1024  •  amantadine (SYMMETREL) capsule 100 mg, 100 mg, Oral, BID, RACHAEL Loredo, 100 mg at 05/31/23 5361  •  amLODIPine (NORVASC) tablet 5 mg, 5 mg, Oral, Daily, 5 mg at 05/31/23 0821 **AND** losartan (COZAAR) tablet 50 mg, 50 mg, Oral, Daily, Gaetano Grasia MD, 50 mg at 05/31/23 7548  •  ascorbic acid (VITAMIN C) tablet 250 mg, 250 mg, Oral, Daily, RACHAEL Loredo, 250 mg at 05/31/23 8881  •  baclofen tablet 20 mg, 20 mg, Oral, TID, Brunoidavid Keyrik, CRNP, 20 mg at 05/31/23 4463  •  bisacodyl (DULCOLAX) rectal suppository 10 mg, 10 mg, Rectal, Daily PRN, Yao De Leon MD  •  ceFAZolin (ANCEF) IVPB (premix in dextrose) 1,000 mg 50 mL, 1,000 mg, Intravenous, Q8H, RACHAEL Loredo, Last Rate: 100 mL/hr at 05/31/23 0509, 1,000 mg at 05/31/23 0509  •  cholecalciferol (VITAMIN D3) tablet 1,000 Units, 1,000 Units, Oral, Daily, RACHAEL Loredo, 1,000 Units at 05/31/23 0755  •  dicyclomine (BENTYL) capsule 10 mg, 10 mg, Oral, TID AC, Marifer Persaud, CRNP, 10 mg at 05/31/23 5934  •  docusate sodium (COLACE) capsule 100 mg, 100 mg, Oral, BID, Hakeem Tillman MD, 100 mg at 05/31/23 7702  •  famotidine (PEPCID) tablet 20 mg, 20 mg, Oral, Daily, RACHAEL Loredo, 20 mg at 05/31/23 3113  •  gabapentin (NEURONTIN) capsule 300 mg, 300 mg, Oral, BID, Marifer Persaud CRNP, 300 mg at 05/31/23 5357  •  heparin (porcine) 25,000 units in 0 45% NaCl 250 mL infusion (premix), 12 Units/kg/hr (Order-Specific), Intravenous, Titrated, RACHAEL Loredo, Last Rate: 10 2 mL/hr at 05/31/23 0636, 17 Units/kg/hr at 05/31/23 0636  •  HYDROmorphone HCl (DILAUDID) injection 0 2 mg, 0 2 mg, Intravenous, Q3H PRN, RACHAEL Loredo, 0 2 mg at 05/24/23 1533  •  metoprolol tartrate (LOPRESSOR) tablet 100 mg, 100 mg, Oral, Q12H, RACHAEL Loredo, 100 mg at 05/30/23 2134  •  multivitamin-minerals (CENTRUM) tablet 1 tablet, 1 tablet, Oral, Daily, RACHAEL Loredo, 1 tablet at 05/31/23 4385  •  ondansetron (ZOFRAN) injection 4 mg, 4 mg, Intravenous, Q6H PRN, RACHAEL Loredo  •  oxyCODONE (ROXICODONE) IR tablet 2 5 mg, 2 5 mg, Oral, Q4H PRN, RACHAEL Loredo, 2 5 mg at 05/26/23 0004  •  polyethylene glycol (MIRALAX) packet 17 g, 17 g, Oral, Daily PRN, Yao De Leon MD, 17 g at 05/25/23 1722  • pravastatin (PRAVACHOL) tablet 80 mg, 80 mg, Oral, Daily With Dinner, RACHAEL Loredo, 80 mg at 05/30/23 1641  •  senna (SENOKOT) tablet 8 6 mg, 1 tablet, Oral, HS, Stacey Campoverde MD, 8 6 mg at 05/30/23 1271    Imaging: I have personally reviewed pertinent films in PACS  EKG, Pathology, and Other Studies: I have personally reviewed pertinent reports  VTE Pharmacologic Prophylaxis: Sequential compression device (Venodyne)   VTE Mechanical Prophylaxis: sequential compression device          Counseling and Coordination of care  I spent 35 minutes face-to-face with patient today  More than 50% was spent in counseling & coordination of care  Counseled patient regarding need for continued vigilance for infection and and gangrene  Need for IR to help with arterial perfusion to left lower extremity         Thai Li DPM

## 2023-05-31 NOTE — ASSESSMENT & PLAN NOTE
Patient presented with left foot tingling that started on Friday, new wounds on toes and sole of foot for about 1 week, patient concerned about clot in leg  Reports brushing her left foot to get rid of dead skin 2 weeks ago  Denies fever, chills  Reports chronic wound on top of left foot since 6/2022 and goes to wound care weekly  Reports history of right leg amputation due to blood clot  · Pt is s/p right AKA due to critical 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 on 3/0/7166, no source was found  Patient is on Xarelto at home  LEAD showed > 75% stenosis in the right popliteal artery at the time  · Left foot x-ray showed no acute osseous abnormality   repeat left foot x-ray today per podiatry  · Wound care with Betadine paint twice daily  · On IV antibiotic as noted below

## 2023-05-31 NOTE — PHYSICAL THERAPY NOTE
05/31/23 1353   Note Type   Note Type Cancelled Session   Cancel Reasons Patient off floor/test  (Pt at IR for Angiogram  Will follow )   Licensure   [place] License Number  Lauralee Brittle, [place] [id number]

## 2023-05-31 NOTE — PROGRESS NOTES
[address] 45  Progress Note  Name: Leobardo Chahal  MRN: [id number]  Unit/Bed#: [address] [address] I Date of Admission: 5/22/2023   Date of Service: 5/31/2023 I Hospital Day: 9    Assessment/Plan   * Multiple open wounds  Assessment & Plan  Patient presented with left foot tingling that started on Friday, new wounds on toes and sole of foot for about 1 week, patient concerned about clot in leg  Reports brushing her left foot to get rid of dead skin 2 weeks ago  Denies fever, chills  Reports chronic wound on top of left foot since 6/2022 and goes to wound care weekly  Reports history of right leg amputation due to blood clot  · Pt is s/p right AKA due to critical 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 on 5/4/9079, no source was found  Patient is on Xarelto at home  LEAD showed > 75% stenosis in the right popliteal artery at the time  · Left foot x-ray showed no acute osseous abnormality  repeat left foot x-ray today per podiatry  · Wound care with Betadine paint twice daily  · On IV antibiotic as noted below      Cellulitis of foot, left  Assessment & Plan  Left foot/toes with multiple dry nonhealing wounds with mild redness and tenderness  · Possible cellulitis  · Patient given Elana Ip in ED  · Patient was on vancomycin and Ancef initially  Vancomycin has since been discontinued  · Continue IV cefazolin as per ID  · Blood cultures have been negative  · Patient also had a left plantar foot abscess which was drained by podiatry at bedside on 5/24/2023-cultures growing MSSA and Streptococcus  PAD (peripheral artery disease) ([facility] [address] )  Assessment & Plan  Left LEAD in 8/2020 showed 50-75% stenosis at the distal SFA  · Continue statin  heparin drip held for IR intervention today  · Left Lower extremity arterial Doppler showed HAILEY index 0 47 which is in ischemic category    Compared to previous study there is drop in HAILEY with greater than 75 % stenosis at proximal popliteal artery  · IR was consulted for Angiogram-angiogram was tried on 5/26/2023 but patient was unable to lay still  · Plan for angiogram under anesthesia today    Multiple sclerosis ([facility] [address] )  Assessment & Plan  Continue amantadine, baclofen  · Patient follows neurology as outpatient  · Reports using scooter at home, does not walk    Mixed hyperlipidemia  Assessment & Plan  · Continue statin  S/P AKA (above knee amputation) unilateral, right ([facility] [address] )  Assessment & Plan  · As above  · Patient did not follow-up with vascular since 2020  Thrombocytopenia (HCC)  Assessment & Plan  · Mild  Platelet count 118 today    Hypertension  Assessment & Plan  Continue metoprolol, amlodipine, Cozaar  · BPs well controlled  Constipation-resolved as of 5/30/2023  Assessment & Plan  Continue current bowel regimen  Patient did have a good bowel movement  VTE Pharmacologic Prophylaxis:   heparin drip on hold    Patient Centered Rounds: I performed bedside rounds with nursing staff today  Discussions with Specialists or Other Care Team Provider: yes    Education and Discussions with Family / Patient: yes  Total Time Spent on Date of Encounter in care of patient: 35 minutes This time was spent on one or more of the following: performing physical exam; counseling and coordination of care; obtaining or reviewing history; documenting in the medical record; reviewing/ordering tests, medications or procedures; communicating with other healthcare professionals and discussing with patient's family/caregivers  Current Length of Stay: 9 day(s)  Current Patient Status: Inpatient   Certification Statement: The patient will continue to require additional inpatient hospital stay due to PAD requiring angiogram  Discharge Plan: Anticipate discharge in 24-48 hrs to home with home services  Code Status: Level 1 - Full Code    Subjective:     patient denies any foot/leg pain    Feels well and hoping to be going home shortly    Objective: Vitals:   Temp (24hrs), Av °F (36 7 °C), Min:97 9 °F (36 6 °C), Max:98 °F (36 7 °C)    Temp:  [97 9 °F (36 6 °C)-98 °F (36 7 °C)] 97 9 °F (36 6 °C)  HR:  [68-71] 69  Resp:  [17-18] 17  BP: (113-130)/(77-84) 130/84  SpO2:  [96 %-98 %] 97 %  Body mass index is 22 6 kg/m²  Input and Output Summary (last 24 hours): Intake/Output Summary (Last 24 hours) at 2023 1303  Last data filed at 2023 1114  Gross per 24 hour   Intake 120 ml   Output 2200 ml   Net -2080 ml       Physical Exam:   Physical Exam  Constitutional:       General: She is not in acute distress  HENT:      Head: Normocephalic and atraumatic  Eyes:      General: No scleral icterus  Cardiovascular:      Rate and Rhythm: Normal rate and regular rhythm  Pulmonary:      Effort: Pulmonary effort is normal  No respiratory distress  Breath sounds: Normal breath sounds  No wheezing or rales  Abdominal:      General: Bowel sounds are normal  There is no distension  Palpations: Abdomen is soft  Tenderness: There is no abdominal tenderness  Musculoskeletal:      Comments: Right BKA  Left lower extremity with dressing in place   Neurological:      Mental Status: She is alert and oriented to person, place, and time            Additional Data:     Labs:  Results from last 7 days   Lab Units 23  0516 23  0550   EOS PCT %  --  2   HEMATOCRIT % 36 0 35 4   HEMOGLOBIN g/dL 11 6 11 4*   LYMPHS PCT %  --  31   MONOS PCT %  --  7   NEUTROS PCT %  --  58   PLATELETS Thousands/uL 145* 149   WBC Thousand/uL 7 89 7 48     Results from last 7 days   Lab Units 23  0516   ANION GAP mmol/L 9   BUN mg/dL 14   CALCIUM mg/dL 9 9   CHLORIDE mmol/L 105   CO2 mmol/L 24   CREATININE mg/dL 0 72   GLUCOSE RANDOM mg/dL 96   POTASSIUM mmol/L 4 2   SODIUM mmol/L 138                       Lines/Drains:  Invasive Devices     Peripheral Intravenous Line  Duration           Peripheral IV 23 Proximal;Right;Ventral (anterior) Forearm 1 day          Drain  Duration           External Urinary Catheter 2 days                      Imaging: No pertinent imaging reviewed  Recent Cultures (last 7 days):         Last 24 Hours Medication List:   Current Facility-Administered Medications   Medication Dose Route Frequency Provider Last Rate   • acetaminophen  650 mg Oral Q6H PRN RACHAEL Loredo     • amantadine  100 mg Oral BID RACHAEL Loredo     • amLODIPine  5 mg Oral Daily Swathi Gibson MD      And   • losartan  50 mg Oral Daily Swathi Gibson MD     • ascorbic acid  250 mg Oral Daily RACHAEL Loredo     • baclofen  20 mg Oral TID RACHAEL Loredo     • bisacodyl  10 mg Rectal Daily PRN Swathi Gibson MD     • cefazolin  1,000 mg Intravenous Q8H RACHAEL Loredo 1,000 mg (05/31/23 1248)   • cholecalciferol  1,000 Units Oral Daily RACHAEL Loredo     • dicyclomine  10 mg Oral TID AC RACHAEL Loredo     • docusate sodium  100 mg Oral BID Swathi Gibson MD     • famotidine  20 mg Oral Daily RACHAEL Loredo     • gabapentin  300 mg Oral BID RACHAEL Loredo     • heparin (porcine)  12 Units/kg/hr (Order-Specific) Intravenous Titrated RACHAEL Loredo 17 Units/kg/hr (05/31/23 0636)   • HYDROmorphone  0 2 mg Intravenous Q3H PRN RACHAEL Loredo     • metoprolol tartrate  100 mg Oral Q12H RACHAEL Loredo     • multivitamin-minerals  1 tablet Oral Daily RACHAEL Loredo     • ondansetron  4 mg Intravenous Q6H PRN RACHAEL Loredo     • oxyCODONE  2 5 mg Oral Q4H PRN RACHAEL Loredo     • polyethylene glycol  17 g Oral Daily PRN Swathi Gibson MD     • pravastatin  80 mg Oral Daily With RACHAEL Power     • senna  1 tablet Oral HS Swathi Gibson MD          Today, Patient Was Seen By: Melissa Monroy DO    **Please Note: This note may have been constructed using a voice recognition system  **

## 2023-05-31 NOTE — BRIEF OP NOTE (RAD/CATH)
INTERVENTIONAL RADIOLOGY PROCEDURE NOTE    Date: 5/31/2023    Procedure:   Procedure Summary     Date: 05/31/23 Room / Location: [address] [place] [address] Cardiac Cath Lab    Anesthesia Start: 3536 Anesthesia Stop:     Procedure: IR LOWER EXTREMITY ANGIOGRAM Diagnosis: (PAD with open wounds left leg with cellulitis plantar foot and possible blue toe syndrome)    Scheduled Providers:  Responsible Provider: Shemar Lopez MD    Anesthesia Type: IV sedation with anesthesia ASA Status: 3          Preoperative diagnosis:   1  Cellulitis of foot    2  Peripheral vascular disease ([address] )    3  Multiple open wounds    4  Dysvascular foot (HCC)         Postoperative diagnosis: Same  Surgeon: Marshall Fields MD     Assistant: None  No qualified resident was available  Blood loss: Minimal    Specimens: None     Findings: High grade distal SFA stenosis treated with shockwave and DCB  Moderate tiboperoneal trunk stenosis treated with 2 5 mm POBA  Improved luminal gain and stenosis post angioplasty  Mynx closure device used  Complications: None immediate      Anesthesia: conscious sedation Symptoms Pre-Procedure: Asymptomatic    Treatment Delayed by Pandemic: None    Access   Number of Sites: 1     Access Site 1:     Side 1: Right    Site 1: Femoral Retrograde    Access Guidance 1:U/S    Largest Sheath Size 1: 6 Fr.     Closure Device 1: MynxGrip      Number of Closure Devices: 1     Closure Device Outcome: Closure device successful         Procedure  Fluoro Time: 21.9 minutes  Contrast Volume: Visipaque 120 ml  CO2: no  Anticoagulant: Heparin  Protamine: No  If Creatinine is > 1.2 or missing, KIRSTIN Prophylaxis none     Treatment Details  Indication: Occlusive Disease,    Completion Assessment  Artery 1 treated: SFA+Pop   Left               Outflow: AT,PT,Peroneal: 3                       Segments treated: P1                      Was this Site previously treated?: No          TASC Grade: A          Total Treated Length: 4 cm          Total Occluded Length: 0 cm          Calcification: Moderate (calcification on both sides of artery < half length of lesion)          Number of Treatment types (Devices):   1           Device 1          Treatment Type: Special Balloon,  Lithoplasty Balloon                Diameter: 4 mm          Length: 6 mm            Concomitant: None          Technical result: Successful (stenosis <=30%)      None     Post Procedure  Patient currently taking: Statin, Yes      Antiplatelet Medication, Yes    Procedure Complications: No

## 2023-05-31 NOTE — ASSESSMENT & PLAN NOTE
Left foot/toes with multiple dry nonhealing wounds with mild redness and tenderness  · Possible cellulitis  · Patient given Issac Blandon in ED  · Patient was on vancomycin and Ancef initially  Vancomycin has since been discontinued  · Continue IV cefazolin as per ID  · Blood cultures have been negative  · Patient also had a left plantar foot abscess which was drained by podiatry at bedside on 5/24/2023-cultures growing MSSA and Streptococcus

## 2023-05-31 NOTE — PROGRESS NOTES
Progress Note - Infectious Disease   Shaneka Mobley 58 y o  female MRN: [id number]  Unit/Bed#: [address] Encounter: [id number]      Impression/Plan:  1    Left Foot multiple wounds with pain/erythema/ischemic changes of toes on admission  Possible overlying cellulitis in setting of increased pain last 5 days with left foot mulitple scabs/wounds and/or progressive arterial disease related changes  No fever, chills, associated sepsis  Dog and cat do not lick wounds, but do step on patient's foot often  S/p Left foot plantar abscess bedside I&D today  Left foot Cx 2+MSSA, 2+Group C Strep  -continues Cefazolin through Agram procedure  -monitor temperature and hemodynamics  -serial exam  -monitor serial labs  -local wound/skin care  -offloading  -follow up IR LLE angiogram scheduled for this afternoon, 5/31/23     2  Peripheral arterial disease  5/23/23 LEAD: Left HAILEY 0 47  prior 0 97 in 2020  GTP 57 mmHg down from 191 mmHg in 2020  -Vascular on board  -follow up IR angiogram scheduled for 5/31/23     3  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 Critical limb ischemia, 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     -Serial exams of leg  -Supportive care      3  Tobacco abuse   Patient with documented ongoing tobacco abuse   Noted to have arterial disease on vascular studies  Raquel Callahan contributed to presentation as above  -Nicotine replacement as per primary  -Discussed smoking cessation     4  MS   At baseline is nonambulatory   -Continue to follow-up with neurology as outpatient     Antibiotics:  Cefazolin D10     Above impression and plan discussed in detail with patient, RN, and primary care team     Subjective:  Patient has no fever, chills, sweats; no nausea, vomiting, diarrhea; no cough, shortness of breath; no left foot pain at present  Patient could not keep leg still for agram Friday, procedure rescheduled for this afternoon      Objective:  Vitals:  Temp:  [97 9 °F (36 6 °C)-98 °F (36 7 °C)] 97 9 °F (36 6 °C)  HR: [68-71] 69  Resp:  [17-18] 17  BP: (113-130)/(77-84) 130/84  SpO2:  [96 %-98 %] 97 %  Temp (24hrs), Av °F (36 7 °C), Min:97 9 °F (36 6 °C), Max:98 °F (36 7 °C)  Current: Temperature: 97 9 °F (36 6 °C)    Physical Exam:   General Appearance:  58year old female chronically debilitated, nontoxic, no acute distress, left leg propped with pillows in bed  HEENT: Atraumatic normocephalic   Throat: Oropharynx moist  Poor dentition   Pulmonary:   Normal respiratory excursion without accessory muscle use   Cardiac:  RRR   Abdomen:   Soft, non-tender, non-distended   Extremities: Right AKA well healed  Left foot gauze wrap intact, no strike through drainage or spreading erythema outside of wrap   : Purewick with clear, yellow urine in canister, no SPT   Psychiatric: Awake, cooperative   Skin: No new rashes  IV site nontender          Labs, Imaging, & Other studies:   All pertinent labs and imaging studies were personally reviewed  Results from last 7 days   Lab Units 23  0516 23  0550 23  0644   HEMOGLOBIN g/dL 11 6 11 4* 12 3   PLATELETS Thousands/uL 145* 149  --    WBC Thousand/uL 7 89 7 48 9 00     Results from last 7 days   Lab Units 23  0516 23  0550 23  0644   BUN mg/dL 14 17 12   CALCIUM mg/dL 9 9 9 4 9 6   CHLORIDE mmol/L 105 106 105   CO2 mmol/L 24 25 24   CREATININE mg/dL 0 72 0 74 0 68   EGFR ml/min/1 73sq m 90 87 94   POTASSIUM mmol/L 4 2 4 3 4 6   SODIUM mmol/L 138 139 137

## 2023-05-31 NOTE — ASSESSMENT & PLAN NOTE
Left LEAD in 8/2020 showed 50-75% stenosis at the distal SFA  · Continue statin  heparin drip held for IR intervention today  · Left Lower extremity arterial Doppler showed HAILEY index 0 47 which is in ischemic category  Compared to previous study there is drop in HAILEY with greater than 75 % stenosis at proximal popliteal artery  · IR was consulted for Angiogram-angiogram was tried on 5/26/2023 but patient was unable to lay still      · Plan for angiogram under anesthesia today

## 2023-05-31 NOTE — NURSING NOTE
Left lower extremity angiogram done via right groin with anesthesia  Pt tolerated without incident  Right groin intact, no bleeding , no hematoma  telfa tegaderm applied  Pt to PACU with CRNA and RN  Pt on bedrest for 4 hours until 2000

## 2023-06-01 VITALS
SYSTOLIC BLOOD PRESSURE: 157 MMHG | RESPIRATION RATE: 18 BRPM | TEMPERATURE: 98.6 F | DIASTOLIC BLOOD PRESSURE: 98 MMHG | WEIGHT: 139.99 LBS | OXYGEN SATURATION: 96 % | BODY MASS INDEX: 22.5 KG/M2 | HEART RATE: 75 BPM | HEIGHT: 66 IN

## 2023-06-01 LAB — APTT PPP: 45 SECONDS (ref 23–37)

## 2023-06-01 PROCEDURE — 85730 THROMBOPLASTIN TIME PARTIAL: CPT | Performed by: NURSE PRACTITIONER

## 2023-06-01 PROCEDURE — 99232 SBSQ HOSP IP/OBS MODERATE 35: CPT | Performed by: INTERNAL MEDICINE

## 2023-06-01 PROCEDURE — 99239 HOSP IP/OBS DSCHRG MGMT >30: CPT | Performed by: FAMILY MEDICINE

## 2023-06-01 RX ORDER — DOCUSATE SODIUM 100 MG/1
100 CAPSULE, LIQUID FILLED ORAL 2 TIMES DAILY
Qty: 60 CAPSULE | Refills: 0 | Status: SHIPPED | OUTPATIENT
Start: 2023-06-01

## 2023-06-01 RX ORDER — POLYETHYLENE GLYCOL 3350 17 G/17G
17 POWDER, FOR SOLUTION ORAL DAILY PRN
Qty: 20 EACH | Refills: 0 | Status: SHIPPED | OUTPATIENT
Start: 2023-06-01

## 2023-06-01 RX ORDER — SENNOSIDES 8.6 MG
8.6 TABLET ORAL
Qty: 30 TABLET | Refills: 0 | Status: SHIPPED | OUTPATIENT
Start: 2023-06-01

## 2023-06-01 RX ADMIN — GABAPENTIN 300 MG: 300 CAPSULE ORAL at 09:03

## 2023-06-01 RX ADMIN — CEFAZOLIN SODIUM 1000 MG: 1 SOLUTION INTRAVENOUS at 05:21

## 2023-06-01 RX ADMIN — AMLODIPINE BESYLATE 5 MG: 5 TABLET ORAL at 09:03

## 2023-06-01 RX ADMIN — DICYCLOMINE HYDROCHLORIDE 10 MG: 10 CAPSULE ORAL at 10:44

## 2023-06-01 RX ADMIN — AMANTADINE HYDROCHLORIDE 100 MG: 100 CAPSULE ORAL at 09:07

## 2023-06-01 RX ADMIN — FAMOTIDINE 20 MG: 20 TABLET ORAL at 09:03

## 2023-06-01 RX ADMIN — CEFAZOLIN SODIUM 1000 MG: 1 SOLUTION INTRAVENOUS at 13:30

## 2023-06-01 RX ADMIN — Medication 250 MG: at 09:03

## 2023-06-01 RX ADMIN — HEPARIN SODIUM 14 UNITS/KG/HR: 10000 INJECTION, SOLUTION INTRAVENOUS at 05:28

## 2023-06-01 RX ADMIN — RIVAROXABAN 20 MG: 20 TABLET, FILM COATED ORAL at 10:44

## 2023-06-01 RX ADMIN — BACLOFEN 20 MG: 10 TABLET ORAL at 09:03

## 2023-06-01 RX ADMIN — METOPROLOL TARTRATE 100 MG: 100 TABLET, FILM COATED ORAL at 09:03

## 2023-06-01 RX ADMIN — DOCUSATE SODIUM 100 MG: 100 CAPSULE, LIQUID FILLED ORAL at 09:04

## 2023-06-01 RX ADMIN — Medication 1 TABLET: at 09:03

## 2023-06-01 RX ADMIN — LOSARTAN POTASSIUM 50 MG: 50 TABLET, FILM COATED ORAL at 09:03

## 2023-06-01 RX ADMIN — Medication 1000 UNITS: at 09:03

## 2023-06-01 RX ADMIN — DICYCLOMINE HYDROCHLORIDE 10 MG: 10 CAPSULE ORAL at 07:12

## 2023-06-01 NOTE — PLAN OF CARE
Problem: MOBILITY - ADULT  Goal: Maintain or return to baseline ADL function  Description: INTERVENTIONS:  -  Assess patient's ability to carry out ADLs; assess patient's baseline for ADL function and identify physical deficits which impact ability to perform ADLs (bathing, care of mouth/teeth, toileting, grooming, dressing, etc )  - Assess/evaluate cause of self-care deficits   - Assess range of motion    Outcome: Progressing  Goal: Maintains/Returns to pre admission functional level  Description: INTERVENTIONS:  - Perform BMAT or MOVE assessment daily    - Set and communicate daily mobility goal to care team and patient/family/caregiver     - Collaborate with rehabilitation services on mobility goals   - Out of bed for toileting  - Record patient progress and toleration of activity level   Outcome: Progressing     Problem: Prexisting or High Potential for Compromised Skin Integrity  Goal: Skin integrity is maintained or improved  Description: INTERVENTIONS:  - Identify patients at risk for skin breakdown  - Assess and monitor skin integrity  - Assess and monitor nutrition and hydration status  - Monitor labs     Outcome: Progressing     Problem: PAIN - ADULT  Goal: Verbalizes/displays adequate comfort level or baseline comfort level  Description: Interventions:  - Encourage patient to monitor pain and request assistance  - Assess pain using appropriate pain scale  - Administer analgesics based on type and severity of pain and evaluate response  - Implement non-pharmacological measures as appropriate and evaluate response  - Consider cultural and social influences on pain and pain management  - Notify physician/advanced practitioner if interventions unsuccessful or patient reports new pain  Outcome: Progressing     Problem: SAFETY ADULT  Goal: Patient will remain free of falls  Description: INTERVENTIONS:  - Educate patient/family on patient safety including physical limitations  - Instruct patient to call for assistance with activity   - Consult OT/PT to assist with strengthening/mobility   - Keep Call bell within reach    - Consider moving patient to room near nurses station  Outcome: Progressing     Problem: Knowledge Deficit  Goal: Patient/family/caregiver demonstrates understanding of disease process, treatment plan, medications, and discharge instructions  Description: Complete learning assessment and assess knowledge base    Interventions:  - Provide teaching at level of understanding  - Provide teaching via preferred learning methods  Outcome: Progressing

## 2023-06-01 NOTE — CASE MANAGEMENT
Case Management Discharge Planning Note    Patient name Enrique Gomes  Location [address] [facility] [address] [place] 12-* MRN [id number]  : 1960 Date 2023       Current Admission Date: 2023  Current Admission Diagnosis:Multiple open wounds   Patient Active Problem List    Diagnosis Date Noted   • Cellulitis of foot    • Peripheral vascular disease ([place] [address] )    • Dysvascular foot ([place] [address] )    • Ulcer of left foot, limited to breakdown of skin ([place] [address] )    • Abscess of left foot    • Depression 2023   • PAD (peripheral artery disease) ([place] [address] ) 2023   • Cellulitis of foot, left 2023   • Embolism and thrombosis of arteries of the lower extremities ([place] [address] ) 06/15/2022   • Mixed hyperlipidemia 06/15/2022   • Multiple open wounds 2022   • Chronic fatigue 2022   • Spasticity 2022   • Phantom pain after amputation of lower extremity ([place] [address] ) 2020   • Venous insufficiency of left leg 2020   • Incontinence of urine in female 2020   • S/P AKA (above knee amputation) unilateral, right ([place] [address] ) 2020   • Popliteal artery stenosis, right ([facility]) 2020   • Type 2 myocardial infarction ([place] [address] ) 2019   • Generalized weakness 2019   • Sinus tachycardia 2019   • Bilateral hip pain 2019   • Leukocytosis 2019   • Smoker 2019   • Thrombocytopenia ([place] [address] ) 2017   • Ventricular hypertrophy 2017   • Vitamin D deficiency 2015   • Multiple thyroid nodules 2015   • Hypertension 06/10/2014   • Lumbar spinal stenosis 06/10/2014   • Multiple sclerosis ([place] [address] ) 06/10/2014      LOS (days): 10  Geometric Mean LOS (GMLOS) (days): 3 90  Days to GMLOS:-5 9     OBJECTIVE:  Risk of Unplanned Readmission Score: 10 4       Current admission status: Inpatient   Preferred Pharmacy:   [address] [address] [address]  [address] [facility] [address]  Phone: [phone] Fax: [phone] [facility] [place], [address]  [address] [facility]  [address] [address] [address] [address]  Phone: [phone] Fax: [address] , [place]  [address] [facility] [address]  Phone: [phone] Fax: [address] [facility]  [address] [facility]  [address] [address] [address]  Phone: [phone] Fax: [phone]    Primary Care Provider: Edith Arteaga DO    Primary Insurance: Sarah [facility] REP  Secondary Insurance:     DISCHARGE DETAILS:    Discharge planning discussed with[de-identified] Patient  Freedom of Choice: Yes     Comments - Freedom of Choice: [facility] and plan is for patient to return home with [facility]  Agency has been reserved in [address]  CM contacted family/caregiver?: No- see comments (Patient has updated family and is in process of contacting her mother to arrange transportation home at discharge)  Were Treatment Team discharge recommendations reviewed with patient/caregiver?: Yes  Did patient/caregiver verbalize understanding of patient care needs?: Yes  Were patient/caregiver advised of the risks associated with not following Treatment Team discharge recommendations?: Yes    Contacts  Patient Contacts:  Danielle Montero (mother)  Relationship to Patient[de-identified] Family  Contact Method: Phone  Phone Number: [phone]    [address] [place] [address]         Is the patient interested in [facility] at discharge?: Yes  Via Bradly Meade requested[de-identified] Nursing, Occupational Therapy, Physical [address] Name[de-identified] [address] Provider[de-identified] PCP  Home Health Services Needed[de-identified] Evaluate Functional Status and Safety, Gait/ADL Training, Strengthening/Theraputic Exercises to Improve Function, Wound/Ostomy Care  Homebound Criteria Met[de-identified] Requires the Assistance of Another Person for Safe Ambulation or to Leave the Home, Uses an Assist Device (i e  cane, walker, etc)  Supporting Clincal Findings[de-identified] Bed Bound or Wheelchair Bound, Limited Endurance, Fatigues Easliy in [employer]    DME Referral Provided  Referral made for DME?: No    Other Referral/Resources/Interventions Provided:  Interventions: [facility]    Would you like to participate in our [address] service program?  : No - Declined    Treatment Team Recommendation: Home with [facility]  Discharge Destination Plan[de-identified] Home with Suzy at Discharge : Family         IMM Given (Date):: 06/01/23  IMM Given to[de-identified] Patient (IMM reviewed with and signed by patient    Copy given to patient and copy placed in scan bin for chart )

## 2023-06-01 NOTE — ASSESSMENT & PLAN NOTE
Left foot/toes with multiple dry nonhealing wounds with mild redness and tenderness  · Possible cellulitis  · Patient given Sorenson Mandes in ED  · Patient was on vancomycin and Ancef initially  Vancomycin has since been discontinued  · Was on IV cefazolin as per ID  No antibiotics on discharge per ID  · Blood cultures have been negative  · Patient also had a left plantar foot abscess which was drained by podiatry at bedside on 5/24/2023-cultures growing MSSA and Streptococcus

## 2023-06-01 NOTE — ASSESSMENT & PLAN NOTE
Left LEAD in 8/2020 showed 50-75% stenosis at the distal SFA  · Continue statin  Was on heparin drip  Per IR okay to resume Xarelto today  · Left Lower extremity arterial Doppler showed HAILEY index 0 47 which is in ischemic category  Compared to previous study there is drop in HAILEY with greater than 75 % stenosis at proximal popliteal artery  · IR was consulted for Angiogram-angiogram was tried on 5/26/2023 but patient was unable to lay still  · Repeat angiogram under anesthesia on 5/31 which showed distal superficial femoral artery high-grade stenosis status post shockwave balloon angioplasty, DCB    Moderate tibial peroneal trunk stenosis status post balloon angioplasty  · Follow-up with vascular surgery after discharge

## 2023-06-01 NOTE — ASSESSMENT & PLAN NOTE
Patient presented with left foot tingling that started on Friday, new wounds on toes and sole of foot for about 1 week, patient concerned about clot in leg  Reports brushing her left foot to get rid of dead skin 2 weeks ago  Denies fever, chills  Reports chronic wound on top of left foot since 6/2022 and goes to wound care weekly  Reports history of right leg amputation due to blood clot  · Pt is s/p right AKA due to critical 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 on 0/7/2285, no source was found  Patient is on Xarelto at home  LEAD showed > 75% stenosis in the right popliteal artery at the time  · Left foot x-ray showed no acute osseous abnormality   repeat left foot x-ray negative for abnormality  · Wound care per podiatry on discharge and follow-up at wound care [facility]

## 2023-06-01 NOTE — PLAN OF CARE
Problem: MOBILITY - ADULT  Goal: Maintain or return to baseline ADL function  Description: INTERVENTIONS:  -  Assess patient's ability to carry out ADLs; assess patient's baseline for ADL function and identify physical deficits which impact ability to perform ADLs (bathing, care of mouth/teeth, toileting, grooming, dressing, etc )  - Assess/evaluate cause of self-care deficits   - Assess range of motion    Outcome: Progressing     Problem: Prexisting or High Potential for Compromised Skin Integrity  Goal: Skin integrity is maintained or improved  Description: INTERVENTIONS:  - Identify patients at risk for skin breakdown  - Assess and monitor skin integrity  - Assess and monitor nutrition and hydration status  - Monitor labs     Outcome: Progressing     Problem: SAFETY ADULT  Goal: Patient will remain free of falls  Description: INTERVENTIONS:  - Educate patient/family on patient safety including physical limitations  - Instruct patient to call for assistance with activity   - Consult OT/PT to assist with strengthening/mobility   - Keep Call bell within reach    - Consider moving patient to room near nurses station  Outcome: Progressing

## 2023-06-01 NOTE — PROGRESS NOTES
Progress Note - Infectious Disease   Mervin Mobley 58 y o  female MRN: [id number]  Unit/Bed#: [address] Encounter: [id number]      Impression/Plan:  1    Left Foot multiple wounds with pain/erythema/ischemic changes of toes on admission  Possible overlying cellulitis in setting of increased pain 5 days prior to admission with left foot mulitple scabs/wounds and/or progressive arterial disease related changes  No fever, chills, associated sepsis  Dog and cat do not lick wounds, but do step on patient's foot often  S/p 5/24/23 Left foot plantar abscess bedside I&D  Left foot Cx 2+MSSA, 2+Group C Strep  S/p 5/31/23 IR angioplasty - with noted improvement in right toes' color less blue hue today  -complete antibiotic course today  -serial exam post angioplasty  -monitor serial labs  -local wound/skin care  -offloading     2  Peripheral arterial disease  5/23/23 LEAD: Left HAILEY 0 47  prior 0 97 in 2020  GTP 57 mmHg down from 191 mmHg in 2020  S/p 5/31/23 IR angioplasty   -Vascular on board     3  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 Critical limb ischemia, 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     -Serial exams of leg  -Supportive care      3  Tobacco abuse   Patient with documented ongoing tobacco abuse   Noted to have arterial disease on vascular studies  Sjadama Janey contributed to presentation as above  -Nicotine replacement as per primary  -Discussed smoking cessation     4  MS   At baseline is nonambulatory   -Continue to follow-up with neurology as outpatient     Antibiotics:  Cefazolin D11     Above impression and plan discussed in detail with patient, RN, and Dr Jorge Malagon of primary care team     Subjective:  Patient has no fever, chills, sweats; no nausea, vomiting, diarrhea; no cough, shortness of breath; no left foot pain at present     Patient s/p 5/31/23 IR angioplasty     Objective:  Vitals:  Temp:  [97 °F (36 1 °C)-98 6 °F (37 °C)] 98 6 °F (37 °C)  HR:  [63-84] 75  Resp:  [15-22] 18  BP: (107-157)/(64-98) 157/98  SpO2:  [95 %-98 %] 96 %  Temp (24hrs), Av 6 °F (36 4 °C), Min:97 °F (36 1 °C), Max:98 6 °F (37 °C)  Current: Temperature: 98 6 °F (37 °C)    Physical Exam:   General Appearance:  58year old female chronically debilitated, nontoxic, no acute distress, left leg propped with pillows in bed  HEENT: Atraumatic normocephalic   Throat: Oropharynx moist  Poor dentition   Pulmonary:   Normal respiratory excursion without accessory muscle use   Cardiac:  RRR   Abdomen:   Soft, non-tender, non-distended   Extremities: Right AKA well healed  Left foot gauze wrap intact, no strike through drainage or spreading erythema outside of wrap  noted improvement in right toes' color less blue hue today   : Purewick with clear, yellow urine in canister, no SPT   Psychiatric: Awake, cooperative   Skin: No new rashes  IV site nontender          Labs, Imaging, & Other studies:   All pertinent labs and imaging studies were personally reviewed  Results from last 7 days   Lab Units 23  0516 23  0550   HEMOGLOBIN g/dL 11 6 11 4*   PLATELETS Thousands/uL 145* 149   WBC Thousand/uL 7 89 7 48     Results from last 7 days   Lab Units 23  0516 23  0550   BUN mg/dL 14 17   CALCIUM mg/dL 9 9 9 4   CHLORIDE mmol/L 105 106   CO2 mmol/L 24 25   CREATININE mg/dL 0 72 0 74   EGFR ml/min/1 73sq m 90 87   POTASSIUM mmol/L 4 2 4 3   SODIUM mmol/L 138 139

## 2023-06-02 ENCOUNTER — TRANSITIONAL CARE MANAGEMENT (OUTPATIENT)
Dept: FAMILY MEDICINE CLINIC | Facility: CLINIC | Age: 63
End: 2023-06-02

## 2023-06-02 NOTE — UTILIZATION REVIEW
NOTIFICATION OF ADMISSION DISCHARGE   This is a Notification of Discharge from [address]  Please be advised that this patient has been discharge from our facility  Below you will find the admission and discharge date and time including the patient’s disposition  UTILIZATION REVIEW CONTACT:  [facility]  Utilization   Network Utilization Review Department  Phone: [phone] x carefully listen to the prompts  All voicemails are confidential   Email: [email or web address]     ADMISSION INFORMATION  PRESENTATION DATE: 5/22/2023  3:41 PM  OBERVATION ADMISSION DATE:   INPATIENT ADMISSION DATE: 5/22/23  5:18 PM   DISCHARGE DATE: 6/1/2023  4:20 PM   DISPOSITION:Home with Home Health Care    IMPORTANT INFORMATION:  Send all requests for admission clinical reviews, approved or denied determinations and any other requests to dedicated fax number below belonging to the campus where the patient is receiving treatment   List of dedicated fax numbers:  [address] DENIALS (Administrative/Medical Necessity) [phone]   [address] (Maternity/NICU/Pediatrics) [phone]   [facility] [phone]   [place] [address] [phone]   [address] [phone]   220 [facility] [phone] [address] [facility] [phone]   [address] [place], [place] [address] 119 [phone]   [facility]  [phone]   [address] [facility] [phone]   [address] [facility] [address] [facility] [phone]

## 2023-06-04 DIAGNOSIS — R10.9 ABDOMINAL CRAMPING: ICD-10-CM

## 2023-06-04 DIAGNOSIS — K52.9 CHRONIC DIARRHEA: ICD-10-CM

## 2023-06-05 RX ORDER — DICYCLOMINE HYDROCHLORIDE 10 MG/1
CAPSULE ORAL
Qty: 270 CAPSULE | Refills: 1 | Status: SHIPPED | OUTPATIENT
Start: 2023-06-05

## 2023-06-13 ENCOUNTER — OFFICE VISIT (OUTPATIENT)
Dept: WOUND CARE | Facility: HOSPITAL | Age: 63
End: 2023-06-13
Payer: COMMERCIAL

## 2023-06-13 VITALS
TEMPERATURE: 97.6 F | RESPIRATION RATE: 15 BRPM | DIASTOLIC BLOOD PRESSURE: 75 MMHG | HEART RATE: 63 BPM | HEIGHT: 66 IN | WEIGHT: 139 LBS | BODY MASS INDEX: 22.34 KG/M2 | SYSTOLIC BLOOD PRESSURE: 116 MMHG

## 2023-06-13 DIAGNOSIS — F17.200 SMOKER: ICD-10-CM

## 2023-06-13 DIAGNOSIS — Z89.611 S/P AKA (ABOVE KNEE AMPUTATION) UNILATERAL, RIGHT (HCC): ICD-10-CM

## 2023-06-13 DIAGNOSIS — I73.9 PAD (PERIPHERAL ARTERY DISEASE) (HCC): ICD-10-CM

## 2023-06-13 DIAGNOSIS — S91.302A OPEN WOUND OF LEFT FOOT, INITIAL ENCOUNTER: Primary | ICD-10-CM

## 2023-06-13 DIAGNOSIS — G35 MULTIPLE SCLEROSIS (HCC): ICD-10-CM

## 2023-06-13 PROCEDURE — 99214 OFFICE O/P EST MOD 30 MIN: CPT | Performed by: STUDENT IN AN ORGANIZED HEALTH CARE EDUCATION/TRAINING PROGRAM

## 2023-06-13 PROCEDURE — 11042 DBRDMT SUBQ TIS 1ST 20SQCM/<: CPT | Performed by: STUDENT IN AN ORGANIZED HEALTH CARE EDUCATION/TRAINING PROGRAM

## 2023-06-13 PROCEDURE — 11045 DBRDMT SUBQ TISS EACH ADDL: CPT | Performed by: STUDENT IN AN ORGANIZED HEALTH CARE EDUCATION/TRAINING PROGRAM

## 2023-06-13 PROCEDURE — 97597 DBRDMT OPN WND 1ST 20 CM/<: CPT | Performed by: STUDENT IN AN ORGANIZED HEALTH CARE EDUCATION/TRAINING PROGRAM

## 2023-06-13 PROCEDURE — 97598 DBRDMT OPN WND ADDL 20CM/<: CPT | Performed by: STUDENT IN AN ORGANIZED HEALTH CARE EDUCATION/TRAINING PROGRAM

## 2023-06-13 RX ORDER — LIDOCAINE HYDROCHLORIDE 40 MG/ML
5 SOLUTION TOPICAL ONCE
Status: COMPLETED | OUTPATIENT
Start: 2023-06-13 | End: 2023-06-13

## 2023-06-13 RX ORDER — LIDOCAINE HYDROCHLORIDE 40 MG/ML
5 SOLUTION TOPICAL ONCE
Status: CANCELLED | OUTPATIENT
Start: 2023-06-13 | End: 2023-06-13

## 2023-06-13 RX ADMIN — LIDOCAINE HYDROCHLORIDE 5 ML: 40 SOLUTION TOPICAL at 10:45

## 2023-06-13 NOTE — PATIENT INSTRUCTIONS
Orders Placed This Encounter   Procedures    Wound cleansing and dressings     All Left Foot and Left Great Toe Wounds:    Wash your hands with soap and water  Remove old dressing, discard into plastic bag and place in trash  Cleanse the wound with soap and water prior to applying a clean dressing  Do not use tissue or cotton balls  Do not scrub the wound  Pat dry using gauze  Shower yes   Wash your foot last, rinse and then get out of shower immediately  Do not soak wounds  Apply moisturizer to skin surrounding wound  Apply silver alginate to the  wounds  Cover with gauze  Secure with rolled gauze and tape  Change dressing three times per week  This was done today     Standing Status:   Future     Standing Expiration Date:   6/13/2024    Wound miscellaneous orders     Increase your intake of protein (chicken, beef, seafood/fish, protein drinks, protein yogurt, eggs, lentils, cheese, peanut butter)    Schedule appt with Podiatrist, Dr Génesis Vazquez  Follow with podiatry for routine foot care  Follow up with PCP to discuss options to stop smoking  Standing Status:   Future     Standing Expiration Date:   6/13/2024    Wound off loading     Off-loading Instructions:    Keep weight and pressure off wound at all times  Standing Status:   Future     Standing Expiration Date:   6/13/2024    Wound home care     [facility]: Please continue seeing pt for wound care       Standing Status:   Future     Standing Expiration Date:   6/13/2024

## 2023-06-13 NOTE — PROGRESS NOTES
Debridement   Wound 06/13/23 Traumatic Toe (Comment  which one) Left;Medial    Universal Protocol:  Consent: Verbal consent obtained  Risks and benefits: risks, benefits and alternatives were discussed  Consent given by: patient  Patient identity confirmed: verbally with patient      Performed by: physician  Debridement type: surgical  Level of debridement: subcutaneous tissue  Pain control: lidocaine 4%  Pre-debridement measurements  Length (cm): 5 8  Width (cm): 2  Depth (cm): 0 3  Surface Area (cm^2): 11 6  Volume (cm^3): 3 48    Post-debridement measurements  Length (cm): 5 8  Width (cm): 2  Depth (cm): 0 3  Percent debrided: 70%  Surface Area (cm^2): 11 6  Area debrided (cm^2): 8 12  Volume (cm^3): 3 48  Tissue and other material debrided: dermis and subcutaneous tissue  Devitalized tissue debrided: biofilm, exudate, slough and eschar  Instrument(s) utilized: curette, blade and forceps  Bleeding: small  Hemostasis obtained with: pressure  Procedural pain (0-10): 2  Post-procedural pain: 1   Response to treatment: procedure was tolerated well    Debridement   Wound 06/13/23 Traumatic Foot Left;Plantar    Universal Protocol:  Consent: Verbal consent obtained    Risks and benefits: risks, benefits and alternatives were discussed  Consent given by: patient  Patient identity confirmed: verbally with patient      Performed by: physician  Debridement type: surgical  Level of debridement: subcutaneous tissue  Pain control: lidocaine 4%  Pre-debridement measurements  Length (cm): 4 5  Width (cm): 1  Depth (cm): 0 1  Surface Area (cm^2): 4 5  Volume (cm^3): 0 45    Post-debridement measurements  Length (cm): 4 5  Width (cm): 1  Depth (cm): 0 1  Percent debrided: 80%  Surface Area (cm^2): 4 5  Area debrided (cm^2): 3 6  Volume (cm^3): 0 45  Tissue and other material debrided: dermis and subcutaneous tissue  Devitalized tissue debrided: biofilm, callus, clots, slough and eschar  Instrument(s) utilized: blade, curette and forceps  Bleeding: small  Hemostasis obtained with: pressure  Procedural pain (0-10): 3  Post-procedural pain: 1   Response to treatment: procedure was tolerated well

## 2023-06-13 NOTE — PROGRESS NOTES
Patient ID: Helen Tolliver is a 58 y o  female Date of Birth 1960     Chief Complaint  Chief Complaint   Patient presents with   • New Patient Visit     Left foot, toe       Allergies  Patient has no known allergies  Assessment:     Diagnoses and all orders for this visit:    Traumatic open wound of lower leg, left, initial encounter  -     lidocaine (XYLOCAINE) 4 % topical solution 5 mL  -     Wound cleansing and dressings; Future  -     Wound miscellaneous orders; Future  -     Wound off loading; Future  -     Wound home care; Future  -     Debridement    PAD (peripheral artery disease) ([facility])  -     lidocaine (XYLOCAINE) 4 % topical solution 5 mL  -     Wound cleansing and dressings; Future  -     Wound miscellaneous orders; Future  -     Wound off loading; Future  -     Wound home care; Future    Multiple sclerosis (HCC)  -     lidocaine (XYLOCAINE) 4 % topical solution 5 mL  -     Wound cleansing and dressings; Future  -     Wound miscellaneous orders; Future  -     Wound off loading; Future  -     Wound home care; Future    Smoker  -     lidocaine (XYLOCAINE) 4 % topical solution 5 mL  -     Wound cleansing and dressings; Future  -     Wound miscellaneous orders; Future  -     Wound off loading; Future  -     Wound home care; Future    S/P AKA (above knee amputation) unilateral, right (HCC)  -     lidocaine (XYLOCAINE) 4 % topical solution 5 mL  -     Wound cleansing and dressings; Future  -     Wound miscellaneous orders; Future  -     Wound off loading; Future  -     Wound home care; Future              Debridement   Wound 06/13/23 Traumatic Foot Anterior; Left    Universal Protocol:  Consent: Verbal consent obtained    Risks and benefits: risks, benefits and alternatives were discussed  Consent given by: patient  Patient identity confirmed: verbally with patient      Performed by: physician  Debridement type: selective  Pain control: lidocaine 4%  Pre-debridement measurements  Length (cm): 0 2  Width (cm): 0 2  Depth (cm): 0 1  Surface Area (cm^2): 0 04  Volume (cm^3): 0    Post-debridement measurements  Length (cm): 0 2  Width (cm): 0 2  Depth (cm): 0 1  Percent debrided: 80%  Surface Area (cm^2): 0 04  Area debrided (cm^2): 0 03  Volume (cm^3): 0  Devitalized tissue debrided: biofilm, exudate and eschar  Instrument(s) utilized: curette  Bleeding: small  Hemostasis obtained with: pressure  Procedural pain (0-10): 1  Post-procedural pain: 0   Response to treatment: procedure was tolerated well          Plan:  •  It was a pleasure to see Tianna Schliling today for initial care of her wounds today  • Selective debridement of anterior wound performed today as above  Surgical debridement of other two wounds as document in other note  • Start plan of care as noted below with silver alginate to all wounds  • VNA 3x/week  • We discussed at length the importance of smoking cessation  She understands that tobacco use will have a strong negative impact on wound healing  It is also an inflammatory agent that is contributing to her PAD and lead to recurrent stenosis  She verbalized understanding  I advised her to make an appointment with her PCP to talk about tobacco cessation  • Follow up with podiatry and vascular surgery  • No signs or symptoms of infection today  Patient understands that if any signs of infection start (such as increased redness, drainage, pain, fever, chills, diaphoresis), they should call our office or proceed to the ER or Urgent Care  • Patient should continue a high protein diet to facilitate wound healing  • Patient is advised to not submerge wound or leave wound open to air  • Follow up in 1 weeks  • Given the multi-factorial nature of wound care, additional time was taken to review patient's treatment plan with other specialties and most recent pertinent lab work and imaging  • All plans of care discussed with patient at bedside who verbalized understanding with treatment plan      Wound 06/13/23 Traumatic Toe (Comment  which one) Left;Medial (Active)   Wound Image Images linked 06/13/23 1100   Wound Description Epithelialization;Yellow;Slough;Pink;Brown 06/13/23 1027   Ivon-wound Assessment Pink;Dry 06/13/23 1027   Wound Length (cm) 5 8 cm 06/13/23 1027   Wound Width (cm) 2 cm 06/13/23 1027   Wound Depth (cm) 0 3 cm 06/13/23 1027   Wound Surface Area (cm^2) 11 6 cm^2 06/13/23 1027   Wound Volume (cm^3) 3 48 cm^3 06/13/23 1027   Calculated Wound Volume (cm^3) 3 48 cm^3 06/13/23 1027   Drainage Amount Moderate 06/13/23 1027   Drainage Description Serosanguineous; Sanches 06/13/23 1027   Non-staged Wound Description Full thickness 06/13/23 1027   Dressing Status Intact (UPON ARRIVAL) 06/13/23 1027       Wound 06/13/23 Traumatic Foot Anterior; Left (Active)   Wound Image Images linked 06/13/23 1030   Wound Description Pink 06/13/23 1030   Ivon-wound Assessment Dry 06/13/23 1030   Wound Length (cm) 0 2 cm 06/13/23 1030   Wound Width (cm) 0 2 cm 06/13/23 1030   Wound Depth (cm) 0 1 cm 06/13/23 1030   Wound Surface Area (cm^2) 0 04 cm^2 06/13/23 1030   Wound Volume (cm^3) 0 004 cm^3 06/13/23 1030   Calculated Wound Volume (cm^3) 0 cm^3 06/13/23 1030   Drainage Amount Moderate 06/13/23 1030   Drainage Description Clear;Serous 06/13/23 1030   Non-staged Wound Description Full thickness 06/13/23 1030   Dressing Status Intact (upon arrival) 06/13/23 1030       Wound 06/13/23 Traumatic Foot Left;Plantar (Active)   Wound Image Images linked 06/13/23 1100   Wound Description Pink;Yellow;Slough;Black;Eschar;Epithelialization 06/13/23 1032   Ivon-wound Assessment Dry;Pink;Scaly 06/13/23 1032   Wound Length (cm) 4 5 cm 06/13/23 1032   Wound Width (cm) 1 cm 06/13/23 1032   Wound Depth (cm) 0 1 cm (pt not tolerating measuring depth) 06/13/23 1032   Wound Surface Area (cm^2) 4 5 cm^2 06/13/23 1032   Wound Volume (cm^3) 0 45 cm^3 06/13/23 1032   Calculated Wound Volume (cm^3) 0 45 cm^3 06/13/23 1032   Drainage Amount Moderate 06/13/23 1032   Drainage Description Serosanguineous 06/13/23 1032   Non-staged Wound Description Full thickness 06/13/23 1032   Dressing Status Intact (upon arrival) 06/13/23 1032       Wound 06/13/23 Traumatic Toe (Comment  which one) Left;Medial (Active)   Date First Assessed/Time First Assessed: 06/13/23 1024   Primary Wound Type: Traumatic  Location: (c) Toe (Comment  which one)  Wound Location Orientation: Left;Medial  Wound Description (Comments): LEFT GREAT TOE       Wound 06/13/23 Traumatic Foot Anterior; Left (Active)   Date First Assessed/Time First Assessed: 06/13/23 1025   Primary Wound Type: Traumatic  Location: Foot  Wound Location Orientation: Anterior; Left       Wound 06/13/23 Traumatic Foot Left;Plantar (Active)   Date First Assessed/Time First Assessed: 06/13/23 1025   Primary Wound Type: Traumatic  Location: Foot  Wound Location Orientation: Left;Plantar       [REMOVED] Wound 05/22/23 Toe (Comment  which one) Anterior; Left (Removed)   Resolved Date: 06/13/23  Date First Assessed/Time First Assessed: 05/22/23 2005   Location: Toe (Comment  which one)  Wound Location Orientation: Anterior; Left  Dressing Status: Open to air  Wound Outcome: (c) Other (Comment)       [REMOVED] Wound 05/22/23 Foot Anterior; Left (Removed)   Resolved Date: 06/13/23  Date First Assessed/Time First Assessed: 05/22/23 2030   Location: Foot  Wound Location Orientation: Anterior; Left  Dressing Status: Open to air  Wound Outcome: (c) Other (Comment)       [REMOVED] Wound 05/22/23 Ankle Anterior; Left (Removed)   Resolved Date: 06/13/23  Date First Assessed/Time First Assessed: 05/22/23 2005   Location: Ankle  Wound Location Orientation: Anterior; Left  Dressing Status: Open to air  Wound Outcome: (c) Other (Comment)       Subjective:            Armani Sneed is a pleasant 57 yo F with a medical history of current tobacco use (1/2 ppd), hx of R AKA 2/2 critical limb ischemia, PAD, MS, here today for initial wound care visit for multiple wounds of the L foot  Patient first noted the wounds about 4-5 weeks ago  She has been attempting to remove callus from her feet and notes wounds that opened up and then worsened  She [facility] to [facility] on 5/22 and was discharged on 6/1  During her hospitalization, she has local wound care, IV abx with cefazolin and two xrays that showed no bony involvement  Per ID, there was no need for discharge on PO abx  She was also found to have >75% stenosis of the L SFA and underwent angiolplasty and DCB during admission  She was instructed to follow up with wound care, podiatry and vascular surgery  Patient presented with dry gauze covering the wound and maceration  She is still smoking  She is not a diabetic  She has VNA coming 3x/week  She denies fever, chills, diaphoresis  The following portions of the patient's history were reviewed and updated as appropriate: allergies, current medications, past family history, past medical history, past social history, past surgical history and problem list     Review of Systems   Skin: Positive for wound  All other systems reviewed and are negative  Objective:       Wound 06/13/23 Traumatic Toe (Comment  which one) Left;Medial (Active)   Wound Image Images linked 06/13/23 1100   Wound Description Epithelialization;Yellow;Slough;Pink;Brown 06/13/23 1027   Ivon-wound Assessment Pink;Dry 06/13/23 1027   Wound Length (cm) 5 8 cm 06/13/23 1027   Wound Width (cm) 2 cm 06/13/23 1027   Wound Depth (cm) 0 3 cm 06/13/23 1027   Wound Surface Area (cm^2) 11 6 cm^2 06/13/23 1027   Wound Volume (cm^3) 3 48 cm^3 06/13/23 1027   Calculated Wound Volume (cm^3) 3 48 cm^3 06/13/23 1027   Drainage Amount Moderate 06/13/23 1027   Drainage Description Serosanguineous; Sanches 06/13/23 1027   Non-staged Wound Description Full thickness 06/13/23 1027   Dressing Status Intact (UPON ARRIVAL) 06/13/23 1027       Wound 06/13/23 Traumatic Foot Anterior; Left (Active)   Wound Image "Images linked 06/13/23 1030   Wound Description Pink 06/13/23 1030   Ivon-wound Assessment Dry 06/13/23 1030   Wound Length (cm) 0 2 cm 06/13/23 1030   Wound Width (cm) 0 2 cm 06/13/23 1030   Wound Depth (cm) 0 1 cm 06/13/23 1030   Wound Surface Area (cm^2) 0 04 cm^2 06/13/23 1030   Wound Volume (cm^3) 0 004 cm^3 06/13/23 1030   Calculated Wound Volume (cm^3) 0 cm^3 06/13/23 1030   Drainage Amount Moderate 06/13/23 1030   Drainage Description Clear;Serous 06/13/23 1030   Non-staged Wound Description Full thickness 06/13/23 1030   Dressing Status Intact (upon arrival) 06/13/23 1030       Wound 06/13/23 Traumatic Foot Left;Plantar (Active)   Wound Image Images linked 06/13/23 1100   Wound Description Pink;Yellow;Slough;Black;Eschar;Epithelialization 06/13/23 1032   Ivon-wound Assessment Dry;Pink;Scaly 06/13/23 1032   Wound Length (cm) 4 5 cm 06/13/23 1032   Wound Width (cm) 1 cm 06/13/23 1032   Wound Depth (cm) 0 1 cm (pt not tolerating measuring depth) 06/13/23 1032   Wound Surface Area (cm^2) 4 5 cm^2 06/13/23 1032   Wound Volume (cm^3) 0 45 cm^3 06/13/23 1032   Calculated Wound Volume (cm^3) 0 45 cm^3 06/13/23 1032   Drainage Amount Moderate 06/13/23 1032   Drainage Description Serosanguineous 06/13/23 1032   Non-staged Wound Description Full thickness 06/13/23 1032   Dressing Status Intact (upon arrival) 06/13/23 1032       /75   Pulse 63   Temp 97 6 °F (36 4 °C)   Resp 15   Ht 5' 6\" (1 676 m)   Wt 63 kg (139 lb)   BMI 22 44 kg/m²     Physical Exam  Vitals reviewed  Constitutional:       Appearance: Normal appearance  HENT:      Head: Normocephalic and atraumatic  Mouth/Throat:      Mouth: Mucous membranes are moist    Eyes:      Extraocular Movements: Extraocular movements intact  Pulmonary:      Effort: Pulmonary effort is normal    Skin:     Comments: Multiple wounds and eschar of R foot   Neurological:      Mental Status: She is alert     Psychiatric:         Mood and Affect: Mood normal  " Behavior: Behavior normal            Wound Instructions:  Orders Placed This Encounter   Procedures   • Wound cleansing and dressings     All Left Foot and Left Great Toe Wounds:    Wash your hands with soap and water  Remove old dressing, discard into plastic bag and place in trash  Cleanse the wound with soap and water prior to applying a clean dressing  Do not use tissue or cotton balls  Do not scrub the wound  Pat dry using gauze  Shower yes   Wash your foot last, rinse and then get out of shower immediately  Do not soak wounds  Apply moisturizer to skin surrounding wound  Apply silver alginate to the  wounds  Cover with gauze  Secure with rolled gauze and tape  Change dressing three times per week  This was done today     Standing Status:   Future     Standing Expiration Date:   6/13/2024   • Wound miscellaneous orders     Increase your intake of protein (chicken, beef, seafood/fish, protein drinks, protein yogurt, eggs, lentils, cheese, peanut butter)    Schedule appt with Podiatrist, Dr Kanika Harper  Follow with podiatry for routine foot care  Follow up with PCP to discuss options to stop smoking  Standing Status:   Future     Standing Expiration Date:   6/13/2024   • Wound off loading     Off-loading Instructions:    Keep weight and pressure off wound at all times  Standing Status:   Future     Standing Expiration Date:   6/13/2024   • Wound home care     Community VNA: Please continue seeing pt for wound care  Standing Status:   Future     Standing Expiration Date:   6/13/2024        Diagnosis ICD-10-CM Associated Orders   1  PAD (peripheral artery disease) ([facility])  I73 9 lidocaine (XYLOCAINE) 4 % topical solution 5 mL     Wound cleansing and dressings     Wound miscellaneous orders     Wound off loading     Wound home care      2   Multiple sclerosis ([facility])  G35 lidocaine (XYLOCAINE) 4 % topical solution 5 mL     Wound cleansing and dressings     Wound miscellaneous orders Wound off loading     Wound home care      3  Smoker  F17 200 lidocaine (XYLOCAINE) 4 % topical solution 5 mL     Wound cleansing and dressings     Wound miscellaneous orders     Wound off loading     Wound home care      4  S/P AKA (above knee amputation) unilateral, right ([facility])  Z89 [id number] lidocaine (XYLOCAINE) 4 % topical solution 5 mL     Wound cleansing and dressings     Wound miscellaneous orders     Wound off loading     Wound home care      5   Wound of left foot  S91 [id number] lidocaine (XYLOCAINE) 4 % topical solution 5 mL     Wound cleansing and dressings     Wound miscellaneous orders     Wound off loading     Wound home care

## 2023-06-13 NOTE — LETTER
[address] [facility] [address]  [place] [address]  Phone#  [phone]  Fax#  [phone]    Patient:  Gerson Guallpa  YOB: 1960  Phone:  [phone]  Date of Visit:  6/13/2023    Orders Placed This Encounter   Procedures   • Wound cleansing and dressings     All Left Foot and Left Great Toe Wounds:    Wash your hands with soap and water  Remove old dressing, discard into plastic bag and place in trash  Cleanse the wound with soap and water prior to applying a clean dressing  Do not use tissue or cotton balls  Do not scrub the wound  Pat dry using gauze  Shower yes   Wash your foot last, rinse and then get out of shower immediately  Do not soak wounds  Apply moisturizer to skin surrounding wound  Apply silver alginate to the  wounds  Cover with gauze  Secure with rolled gauze and tape  Change dressing three times per week  This was done today     Standing Status:   Future     Standing Expiration Date:   6/13/2024   • Wound miscellaneous orders     Increase your intake of protein (chicken, beef, seafood/fish, protein drinks, protein yogurt, eggs, lentils, cheese, peanut butter)    Schedule appt with Podiatrist, Dr Kandace Cabrera  Follow with podiatry for routine foot care  Follow up with PCP to discuss options to stop smoking  Standing Status:   Future     Standing Expiration Date:   6/13/2024   • Wound off loading     Off-loading Instructions:    Keep weight and pressure off wound at all times  Standing Status:   Future     Standing Expiration Date:   6/13/2024   • Wound home care     Community VNA: Please continue seeing pt for wound care       Standing Status:   Future     Standing Expiration Date:   6/13/2024         Electronically signed by Ross Whalen MD

## 2023-06-19 DIAGNOSIS — I21.A1 TYPE 2 MYOCARDIAL INFARCTION (HCC): ICD-10-CM

## 2023-06-19 DIAGNOSIS — I70.201 POPLITEAL ARTERY STENOSIS, RIGHT (HCC): ICD-10-CM

## 2023-06-19 DIAGNOSIS — E78.2 MIXED HYPERLIPIDEMIA: ICD-10-CM

## 2023-06-20 ENCOUNTER — OFFICE VISIT (OUTPATIENT)
Dept: WOUND CARE | Facility: HOSPITAL | Age: 63
End: 2023-06-20
Payer: COMMERCIAL

## 2023-06-20 VITALS
HEART RATE: 61 BPM | TEMPERATURE: 98.3 F | SYSTOLIC BLOOD PRESSURE: 125 MMHG | RESPIRATION RATE: 15 BRPM | DIASTOLIC BLOOD PRESSURE: 94 MMHG

## 2023-06-20 DIAGNOSIS — G35 MULTIPLE SCLEROSIS (HCC): ICD-10-CM

## 2023-06-20 DIAGNOSIS — Z89.611 S/P AKA (ABOVE KNEE AMPUTATION) UNILATERAL, RIGHT (HCC): ICD-10-CM

## 2023-06-20 DIAGNOSIS — F17.200 SMOKER: ICD-10-CM

## 2023-06-20 DIAGNOSIS — S91.302A OPEN WOUND OF LEFT FOOT, INITIAL ENCOUNTER: Primary | ICD-10-CM

## 2023-06-20 DIAGNOSIS — I73.9 PAD (PERIPHERAL ARTERY DISEASE) (HCC): ICD-10-CM

## 2023-06-20 PROCEDURE — 97597 DBRDMT OPN WND 1ST 20 CM/<: CPT | Performed by: STUDENT IN AN ORGANIZED HEALTH CARE EDUCATION/TRAINING PROGRAM

## 2023-06-20 PROCEDURE — 97598 DBRDMT OPN WND ADDL 20CM/<: CPT | Performed by: STUDENT IN AN ORGANIZED HEALTH CARE EDUCATION/TRAINING PROGRAM

## 2023-06-20 RX ORDER — GABAPENTIN 300 MG/1
300 CAPSULE ORAL 2 TIMES DAILY
Qty: 180 CAPSULE | Refills: 0 | Status: SHIPPED | OUTPATIENT
Start: 2023-06-20

## 2023-06-20 RX ORDER — LIDOCAINE HYDROCHLORIDE 40 MG/ML
5 SOLUTION TOPICAL ONCE
Status: COMPLETED | OUTPATIENT
Start: 2023-06-20 | End: 2023-06-20

## 2023-06-20 RX ORDER — AMANTADINE HYDROCHLORIDE 100 MG/1
CAPSULE, GELATIN COATED ORAL
Qty: 180 CAPSULE | Refills: 3 | Status: SHIPPED | OUTPATIENT
Start: 2023-06-20

## 2023-06-20 RX ORDER — BACLOFEN 10 MG/1
TABLET ORAL
Qty: 540 TABLET | Refills: 1 | Status: SHIPPED | OUTPATIENT
Start: 2023-06-20

## 2023-06-20 RX ORDER — ROSUVASTATIN CALCIUM 10 MG/1
10 TABLET, COATED ORAL DAILY
Qty: 90 TABLET | Refills: 0 | Status: SHIPPED | OUTPATIENT
Start: 2023-06-20 | End: 2023-06-28 | Stop reason: SDUPTHER

## 2023-06-20 RX ADMIN — LIDOCAINE HYDROCHLORIDE 5 ML: 40 SOLUTION TOPICAL at 11:32

## 2023-06-20 NOTE — PROGRESS NOTES
Patient ID: Alex Gudino is a 58 y o  female Date of Birth 1960     Chief Complaint  Chief Complaint   Patient presents with   • Follow Up Wound Care Visit     Left foot       Allergies  Patient has no known allergies  Assessment:     Diagnoses and all orders for this visit:    Open wound of left foot, initial encounter  -     lidocaine (XYLOCAINE) 4 % topical solution 5 mL  -     Wound cleansing and dressings; Future  -     Wound miscellaneous orders; Future  -     Wound off loading; Future  -     Wound home care; Future  -     Debridement  -     Debridement    PAD (peripheral artery disease) (HCC)  -     lidocaine (XYLOCAINE) 4 % topical solution 5 mL  -     Wound cleansing and dressings; Future  -     Wound miscellaneous orders; Future  -     Wound off loading; Future  -     Wound home care; Future    Multiple sclerosis (HCC)  -     lidocaine (XYLOCAINE) 4 % topical solution 5 mL  -     Wound cleansing and dressings; Future  -     Wound miscellaneous orders; Future  -     Wound off loading; Future  -     Wound home care; Future    Smoker    S/P AKA (above knee amputation) unilateral, right ([address] )              Debridement   Wound 06/13/23 Traumatic Toe (Comment  which one) Left;Medial    Universal Protocol:  Consent: Verbal consent obtained    Risks and benefits: risks, benefits and alternatives were discussed  Consent given by: patient  Patient identity confirmed: verbally with patient      Performed by: physician  Debridement type: selective  Pain control: lidocaine 4%  Pre-debridement measurements  Length (cm): 3 7  Width (cm): 2 5  Depth (cm): 0 1  Surface Area (cm^2): 9 25  Volume (cm^3): 0 93    Post-debridement measurements  Length (cm): 3 7  Width (cm): 2 5  Depth (cm): 0 1  Percent debrided: 90%  Surface Area (cm^2): 9 25  Area debrided (cm^2): 8 33  Volume (cm^3): 0 93  Devitalized tissue debrided: biofilm and exudate  Instrument(s) utilized: curette  Bleeding: small  Hemostasis obtained with: pressure  Procedural pain (0-10): 1  Post-procedural pain: 0     Debridement   Wound 06/13/23 Traumatic Foot Left;Plantar    Universal Protocol:  Consent: Verbal consent obtained  Risks and benefits: risks, benefits and alternatives were discussed  Consent given by: patient  Patient identity confirmed: verbally with patient      Performed by: physician  Debridement type: selective  Pain control: lidocaine 4%  Pre-debridement measurements  Length (cm): 4  Width (cm): 1 2  Depth (cm): 0 1  Surface Area (cm^2): 4 8  Volume (cm^3): 0 48    Post-debridement measurements  Length (cm): 4  Width (cm): 1 2  Depth (cm): 0 1  Percent debrided: 90%  Surface Area (cm^2): 4 8  Area debrided (cm^2): 4 32  Volume (cm^3): 0 48  Devitalized tissue debrided: biofilm, callus and exudate  Instrument(s) utilized: curette  Bleeding: small  Hemostasis obtained with: pressure  Procedural pain (0-10): 1  Post-procedural pain: 0   Response to treatment: procedure was tolerated well          Plan:  • It was a pleasure to see Wilber Vazquez today for follow up care of her wounds today  • Selective debridement performed today as above of toe wound and plantar wound  • Wounds are improving  Dorsal foot wound is healed today   • Continue plan of care as noted below with silver alginate  • Continue to elevate leg to help with edema control  Will not apply compression given significant vascular disease  • VNA care 3x/week  Patient advised on hygiene and being aware of where wound care is being done  Cat hair found in wound today  • Continue to work on tobacco cessation  Wilber Vazquez understands that tobacco cessation is key to wound healing, and the continued tobacco use will worsen her current vascular disease  • Make fu appointment with vascular surgery  • No signs or symptoms of infection today   Patient understands that if any signs of infection start (such as increased redness, drainage, pain, fever, chills, diaphoresis), they should call our office or proceed to the ER or Urgent Care  • Patient should continue a high protein diet to facilitate wound healing  • Patient is advised to not submerge wound or leave wound open to air  • Follow up in 2 weeks  • Given the multi-factorial nature of wound care, additional time was taken to review patient's treatment plan with other specialties and most recent pertinent lab work and imaging  • All plans of care discussed with patient at bedside who verbalized understanding with treatment plan  Wound 06/13/23 Traumatic Toe (Comment  which one) Left;Medial (Active)   Wound Image Images linked 06/20/23 1125   Wound Description Epithelialization;Yellow;Slough;Pink;Brown;Granulation tissue 06/20/23 1125   Ivon-wound Assessment Pink;Dry;Edema 06/20/23 1125   Wound Length (cm) 3 7 cm 06/20/23 1125   Wound Width (cm) 2 5 cm 06/20/23 1125   Wound Depth (cm) 0 1 cm 06/20/23 1125   Wound Surface Area (cm^2) 9 25 cm^2 06/20/23 1125   Wound Volume (cm^3) 0 925 cm^3 06/20/23 1125   Calculated Wound Volume (cm^3) 0 93 cm^3 06/20/23 1125   Change in Wound Size % 73 28 06/20/23 1125   Drainage Amount Small 06/20/23 1125   Drainage Description Serosanguineous; Sanches 06/20/23 1125   Non-staged Wound Description Full thickness 06/20/23 1125   Dressing Status Intact (upon arrival) 06/20/23 1125       Wound 06/13/23 Traumatic Foot Anterior; Left (Active)   Wound Image Images linked 06/20/23 1129   Wound Description Other (Comment);Pink;Epithelialization (brown scab) 06/20/23 1129   Ivon-wound Assessment Dry;Edema 06/20/23 1129   Wound Length (cm) 0 cm 06/20/23 1129   Wound Width (cm) 0 cm 06/20/23 1129   Wound Depth (cm) 0 cm 06/20/23 1129   Wound Surface Area (cm^2) 0 cm^2 06/20/23 1129   Wound Volume (cm^3) 0 cm^3 06/20/23 1129   Calculated Wound Volume (cm^3) 0 cm^3 06/20/23 1129   Drainage Amount None 06/20/23 1129   Non-staged Wound Description Not applicable 64/73/37 2609   Dressing Status Intact (upon arrival) 06/20/23 1129       Wound 06/13/23 Traumatic Foot Left;Plantar (Active)   Wound Image Images linked 06/20/23 1130   Wound Description Pink;Yellow;Slough; Epithelialization; Other (Comment) (scabs) 06/20/23 1130   Ivon-wound Assessment Dry;Pink;Scaly 06/20/23 1130   Wound Length (cm) 4 cm 06/20/23 1130   Wound Width (cm) 1 2 cm 06/20/23 1130   Wound Depth (cm) 0 1 cm 06/20/23 1130   Wound Surface Area (cm^2) 4 8 cm^2 06/20/23 1130   Wound Volume (cm^3) 0 48 cm^3 06/20/23 1130   Calculated Wound Volume (cm^3) 0 48 cm^3 06/20/23 1130   Change in Wound Size % -6 67 06/20/23 1130   Drainage Amount Small 06/20/23 1130   Drainage Description Serosanguineous 06/20/23 1130   Non-staged Wound Description Full thickness 06/20/23 1130   Dressing Status Intact 06/20/23 1130       Wound 06/13/23 Traumatic Toe (Comment  which one) Left;Medial (Active)   Date First Assessed/Time First Assessed: 06/13/23 1024   Primary Wound Type: Traumatic  Location: (c) Toe (Comment  which one)  Wound Location Orientation: Left;Medial  Wound Description (Comments): LEFT GREAT TOE       Wound 06/13/23 Traumatic Foot Anterior; Left (Active)   Date First Assessed/Time First Assessed: 06/13/23 1025   Primary Wound Type: Traumatic  Location: Foot  Wound Location Orientation: Anterior; Left  Wound Outcome: Healed       Wound 06/13/23 Traumatic Foot Left;Plantar (Active)   Date First Assessed/Time First Assessed: 06/13/23 1025   Primary Wound Type: Traumatic  Location: Foot  Wound Location Orientation: Left;Plantar       [REMOVED] Wound 05/22/23 Toe (Comment  which one) Anterior; Left (Removed)   Resolved Date: 06/13/23  Date First Assessed/Time First Assessed: 05/22/23 2005   Location: Toe (Comment  which one)  Wound Location Orientation: Anterior; Left  Dressing Status: Open to air  Wound Outcome: (c) Other (Comment)       [REMOVED] Wound 05/22/23 Foot Anterior; Left (Removed)   Resolved Date: 06/13/23  Date First Assessed/Time First Assessed: 05/22/23 2030   Location: Foot  Wound Location Orientation: Anterior; Left  Dressing Status: Open to air  Wound Outcome: (c) Other (Comment)       [REMOVED] Wound 05/22/23 Ankle Anterior; Left (Removed)   Resolved Date: 06/13/23  Date First Assessed/Time First Assessed: 05/22/23 2005   Location: Ankle  Wound Location Orientation: Anterior; Left  Dressing Status: Open to air  Wound Outcome: (c) Other (Comment)       Subjective:           6/20/23: Wound care being done by visiting nurses at home  Silver alginate  patient with no complaints today  Pain only with wound dressing change  No symptoms of infection today  6/13/23: (Consult) Toby Tejeda is a pleasant 59 yo F with a medical history of current tobacco use (1/2 ppd), hx of R AKA 2/2 critical limb ischemia, PAD, MS, here today for initial wound care visit for multiple wounds of the L foot  Patient first noted the wounds about 4-5 weeks ago  She has been attempting to remove callus from her feet and notes wounds that opened up and then worsened  She presented to [facility] on 5/22 and was discharged on 6/1  During her hospitalization, she has local wound care, IV abx with cefazolin and two xrays that showed no bony involvement  Per ID, there was no need for discharge on PO abx  She was also found to have >75% stenosis of the L SFA and underwent angiolplasty and DCB during admission  She was instructed to follow up with wound care, podiatry and vascular surgery  Patient presented with dry gauze covering the wound and maceration  She is still smoking  She is not a diabetic  She has VNA coming 3x/week  The following portions of the patient's history were reviewed and updated as appropriate: allergies, current medications, past family history, past medical history, past social history, past surgical history and problem list     Review of Systems   Skin: Positive for wound  All other systems reviewed and are negative          Objective:       Wound 06/13/23 Traumatic Toe (Comment  which one) Left;Medial (Active)   Wound Image Images linked 06/20/23 1125   Wound Description Epithelialization;Yellow;Slough;Pink;Brown;Granulation tissue 06/20/23 1125   Ivon-wound Assessment Pink;Dry;Edema 06/20/23 1125   Wound Length (cm) 3 7 cm 06/20/23 1125   Wound Width (cm) 2 5 cm 06/20/23 1125   Wound Depth (cm) 0 1 cm 06/20/23 1125   Wound Surface Area (cm^2) 9 25 cm^2 06/20/23 1125   Wound Volume (cm^3) 0 925 cm^3 06/20/23 1125   Calculated Wound Volume (cm^3) 0 93 cm^3 06/20/23 1125   Change in Wound Size % 73 28 06/20/23 1125   Drainage Amount Small 06/20/23 1125   Drainage Description Serosanguineous; Sanches 06/20/23 1125   Non-staged Wound Description Full thickness 06/20/23 1125   Dressing Status Intact (upon arrival) 06/20/23 1125       Wound 06/13/23 Traumatic Foot Anterior; Left (Active)   Wound Image Images linked 06/20/23 1129   Wound Description Other (Comment);Pink;Epithelialization (brown scab) 06/20/23 1129   Ivon-wound Assessment Dry;Edema 06/20/23 1129   Wound Length (cm) 0 cm 06/20/23 1129   Wound Width (cm) 0 cm 06/20/23 1129   Wound Depth (cm) 0 cm 06/20/23 1129   Wound Surface Area (cm^2) 0 cm^2 06/20/23 1129   Wound Volume (cm^3) 0 cm^3 06/20/23 1129   Calculated Wound Volume (cm^3) 0 cm^3 06/20/23 1129   Drainage Amount None 06/20/23 1129   Non-staged Wound Description Not applicable 57/54/77 5974   Dressing Status Intact (upon arrival) 06/20/23 1129       Wound 06/13/23 Traumatic Foot Left;Plantar (Active)   Wound Image Images linked 06/20/23 1130   Wound Description Pink;Yellow;Slough; Epithelialization; Other (Comment) (scabs) 06/20/23 1130   Ivon-wound Assessment Dry;Pink;Scaly 06/20/23 1130   Wound Length (cm) 4 cm 06/20/23 1130   Wound Width (cm) 1 2 cm 06/20/23 1130   Wound Depth (cm) 0 1 cm 06/20/23 1130   Wound Surface Area (cm^2) 4 8 cm^2 06/20/23 1130   Wound Volume (cm^3) 0 48 cm^3 06/20/23 1130   Calculated Wound Volume (cm^3) 0 48 cm^3 06/20/23 1130   Change in Wound Size % -6 67 06/20/23 1130   Drainage Amount Small 06/20/23 1130   Drainage Description Serosanguineous 06/20/23 1130   Non-staged Wound Description Full thickness 06/20/23 1130   Dressing Status Intact 06/20/23 1130       /94   Pulse 61   Temp 98 3 °F (36 8 °C)   Resp 15     Physical Exam  Vitals reviewed  Constitutional:       Appearance: Normal appearance  HENT:      Head: Normocephalic and atraumatic  Mouth/Throat:      Mouth: Mucous membranes are moist    Eyes:      Extraocular Movements: Extraocular movements intact  Pulmonary:      Effort: Pulmonary effort is normal    Musculoskeletal:      Left lower leg: Edema present  Comments: BKA of right lower extremity   Skin:     Comments: Medial left foot with wound  Plentiful fibrin deposition and dried exudate  Unable to remove as patient not tolerating debridement  Plantar surface of midfoot with wound, dried exudate  Unable to remove as patient not tolerating debridement for this wound as well  Unable to visualize wound bed   Neurological:      Mental Status: She is alert  Psychiatric:         Mood and Affect: Mood normal          Behavior: Behavior normal                        Wound Instructions:  Orders Placed This Encounter   Procedures   • Wound cleansing and dressings     Left Foot and Left Great Toe Wounds:     Wash your hands with soap and water  Remove old dressing, discard into plastic bag and place in trash  Cleanse the wound with soap and water prior to applying a clean dressing  Do not use tissue or cotton balls  Do not scrub the wound  Pat dry using gauze  Shower yes   Wash your foot last, rinse and then get out of shower immediately  Do not soak wounds  Apply moisturizer to skin surrounding wound  Apply silver alginate to the  wounds  Cover with gauze  Secure with rolled gauze and tape  Change dressing three times per week      This was done today         Left  Anterior Foot Wound is healed       Standing Status: "Future     Standing Expiration Date:   6/20/2024   • Wound miscellaneous orders     Increase your intake of protein (chicken, beef, seafood/fish, protein drinks, protein yogurt, eggs, lentils, cheese, peanut butter)     Schedule appt with Podiatrist, Dr Amari Myers  Follow with podiatry for routine foot care      Follow up with PCP to discuss options to stop smoking           Standing Status:   Future     Standing Expiration Date:   6/20/2024   • Wound off loading         Off-loading Instructions:     Keep weight and pressure off wound at all times           Standing Status:   Future     Standing Expiration Date:   6/20/2024   • Wound home care     Wound home care  [facility]: Please continue seeing pt for wound care           Standing Status:   Future     Standing Expiration Date:   6/20/2024   • Debridement     This order was created via procedure documentation   • Debridement     This order was created via procedure documentation        Diagnosis ICD-10-CM Associated Orders   1  Open wound of left foot, initial encounter  S91 302A lidocaine (XYLOCAINE) 4 % topical solution 5 mL     Wound cleansing and dressings     Wound miscellaneous orders     Wound off loading     Wound home care     Debridement     Debridement      2  PAD (peripheral artery disease) ([facility])  I73 9 lidocaine (XYLOCAINE) 4 % topical solution 5 mL     Wound cleansing and dressings     Wound miscellaneous orders     Wound off loading     Wound home care      3  Multiple sclerosis ([facility])  G35 lidocaine (XYLOCAINE) 4 % topical solution 5 mL     Wound cleansing and dressings     Wound miscellaneous orders     Wound off loading     Wound home care      4  Smoker  F17 200       5  S/P AKA (above knee amputation) unilateral, right [facility])  [id number] [id number]         --  Max Dakins, MD    \"This note has been constructed using a voice recognition system  Therefore there may be syntax, spelling, and/or grammatical errors  Please call if you have any questions   All " "questions and concerns were addressed and answered by myself  \"     "

## 2023-06-20 NOTE — PATIENT INSTRUCTIONS
Orders Placed This Encounter   Procedures    Wound cleansing and dressings     Left Foot and Left Great Toe Wounds:     Wash your hands with soap and water  Remove old dressing, discard into plastic bag and place in trash  Cleanse the wound with soap and water prior to applying a clean dressing  Do not use tissue or cotton balls  Do not scrub the wound  Pat dry using gauze  Shower yes   Wash your foot last, rinse and then get out of shower immediately  Do not soak wounds  Apply moisturizer to skin surrounding wound  Apply silver alginate to the  wounds  Cover with gauze  Secure with rolled gauze and tape  Change dressing three times per week  This was done today         Left  Anterior Foot Wound is healed  Standing Status:   Future     Standing Expiration Date:   6/20/2024    Wound miscellaneous orders     Increase your intake of protein (chicken, beef, seafood/fish, protein drinks, protein yogurt, eggs, lentils, cheese, peanut butter)     Schedule appt with Podiatrist, Dr Alex Lutz  Follow with podiatry for routine foot care  Follow up with PCP to discuss options to stop smoking  Standing Status:   Future     Standing Expiration Date:   6/20/2024    Wound off loading         Off-loading Instructions:     Keep weight and pressure off wound at all times  Standing Status:   Future     Standing Expiration Date:   6/20/2024    Wound home care     Wound home care  [facility]: Please continue seeing pt for wound care            Standing Status:   Future     Standing Expiration Date:   6/20/2024

## 2023-06-20 NOTE — LETTER
[address] [place] [address]  Phone#  [phone]  Fax#  [phone]    Patient:  Serenity Smith  YOB: 1960  Phone:  [phone]  Date of Visit:  6/20/2023    Orders Placed This Encounter   Procedures   • Wound cleansing and dressings     Left Foot and Left Great Toe Wounds:     Wash your hands with soap and water  Remove old dressing, discard into plastic bag and place in trash  Cleanse the wound with soap and water prior to applying a clean dressing  Do not use tissue or cotton balls  Do not scrub the wound  Pat dry using gauze  Shower yes   Wash your foot last, rinse and then get out of shower immediately  Do not soak wounds  Apply moisturizer to skin surrounding wound  Apply silver alginate to the  wounds  Cover with gauze  Secure with rolled gauze and tape  Change dressing three times per week  This was done today         Left  Anterior Foot Wound is healed  Standing Status:   Future     Standing Expiration Date:   6/20/2024   • Wound miscellaneous orders     Increase your intake of protein (chicken, beef, seafood/fish, protein drinks, protein yogurt, eggs, lentils, cheese, peanut butter)     Schedule appt with Podiatrist, Dr Amari Myers  Follow with podiatry for routine foot care  Follow up with PCP to discuss options to stop smoking  Standing Status:   Future     Standing Expiration Date:   6/20/2024   • Wound off loading         Off-loading Instructions:     Keep weight and pressure off wound at all times  Standing Status:   Future     Standing Expiration Date:   6/20/2024   • Wound home care     Wound home care  [facility]: Please continue seeing pt for wound care            Standing Status:   Future     Standing Expiration Date:   6/20/2024         Electronically signed by Max Dakins, MD

## 2023-06-28 ENCOUNTER — OFFICE VISIT (OUTPATIENT)
Dept: FAMILY MEDICINE CLINIC | Facility: CLINIC | Age: 63
End: 2023-06-28
Payer: COMMERCIAL

## 2023-06-28 VITALS
OXYGEN SATURATION: 99 % | DIASTOLIC BLOOD PRESSURE: 70 MMHG | HEART RATE: 86 BPM | SYSTOLIC BLOOD PRESSURE: 100 MMHG | TEMPERATURE: 95.1 F | RESPIRATION RATE: 16 BRPM | BODY MASS INDEX: 22.44 KG/M2 | HEIGHT: 66 IN

## 2023-06-28 DIAGNOSIS — I73.9 PAD (PERIPHERAL ARTERY DISEASE) (HCC): Primary | ICD-10-CM

## 2023-06-28 DIAGNOSIS — F17.200 SMOKER: ICD-10-CM

## 2023-06-28 DIAGNOSIS — N39.0 URINARY TRACT INFECTION WITHOUT HEMATURIA, SITE UNSPECIFIED: ICD-10-CM

## 2023-06-28 DIAGNOSIS — I10 PRIMARY HYPERTENSION: ICD-10-CM

## 2023-06-28 DIAGNOSIS — E78.2 MIXED HYPERLIPIDEMIA: ICD-10-CM

## 2023-06-28 PROCEDURE — 99214 OFFICE O/P EST MOD 30 MIN: CPT | Performed by: FAMILY MEDICINE

## 2023-06-28 RX ORDER — ROSUVASTATIN CALCIUM 20 MG/1
20 TABLET, COATED ORAL DAILY
Qty: 90 TABLET | Refills: 1 | Status: SHIPPED | OUTPATIENT
Start: 2023-06-28

## 2023-06-28 RX ORDER — BUPROPION HYDROCHLORIDE 150 MG/1
150 TABLET, EXTENDED RELEASE ORAL 2 TIMES DAILY
Qty: 180 TABLET | Refills: 1 | Status: SHIPPED | OUTPATIENT
Start: 2023-06-28 | End: 2023-12-25

## 2023-06-28 RX ORDER — SULFAMETHOXAZOLE AND TRIMETHOPRIM 800; 160 MG/1; MG/1
1 TABLET ORAL EVERY 12 HOURS SCHEDULED
Qty: 6 TABLET | Refills: 0 | Status: SHIPPED | OUTPATIENT
Start: 2023-06-28 | End: 2023-07-01

## 2023-06-28 NOTE — PROGRESS NOTES
Assessment/Plan:    1  PAD (peripheral artery disease) (HCC)  -     CBC; Future; Expected date: 09/11/2023  -     Comprehensive metabolic panel; Future; Expected date: 09/11/2023  -     Lipid Panel with Direct LDL reflex; Future; Expected date: 09/11/2023    2  Primary hypertension  -     CBC; Future; Expected date: 09/11/2023  -     Comprehensive metabolic panel; Future; Expected date: 09/11/2023  -     Lipid Panel with Direct LDL reflex; Future; Expected date: 09/11/2023    3  Mixed hyperlipidemia  -     rosuvastatin (CRESTOR) 20 MG tablet; Take 1 tablet (20 mg total) by mouth daily  -     CBC; Future; Expected date: 09/11/2023  -     Comprehensive metabolic panel; Future; Expected date: 09/11/2023  -     Lipid Panel with Direct LDL reflex; Future; Expected date: 09/11/2023    4  Smoker  -     buPROPion (Zyban) 150 MG 12 hr tablet; Take 1 tablet (150 mg total) by mouth 2 (two) times a day    5  Urinary tract infection without hematuria, site unspecified  -     sulfamethoxazole-trimethoprim (BACTRIM DS) 800-160 mg per tablet; Take 1 tablet by mouth every 12 (twelve) hours for 3 days        discussed breast screening - pt is going to hold off for now    There are no Patient Instructions on file for this visit  No follow-ups on file  Subjective:      Patient ID: Stephanie Chris is a 58 y o  female  Chief Complaint   Patient presents with   • Hospital Follow-[facility]        Pt is here to follow stay at hospital  Pt had dry skin on her foot - states she started doing pedicures and scraping the skin etc   Pt started with marks on the foot - went to the hospital - already had aka on the rt  When in the ed her left foot was infected  Pt had an angiogram - found a blockage in poplitial artery - had it opened up - no stents  Pt is in wound care pt has vising nurses     Pt has some pain in the am    Pt shas one wound on the great toe and states she has two little ones on the bottom of the foot  Is being seen at the wound center      Pt would like to truy dopamine for food      The following portions of the patient's history were reviewed and updated as appropriate: allergies, current medications, past family history, past medical history, past social history, past surgical history and problem list     Review of Systems   Skin: Positive for wound           Current Outpatient Medications   Medication Sig Dispense Refill   • amantadine (SYMMETREL) 100 mg capsule TAKE 1 CAPSULE 2 TIMES A  capsule 3   • amlodipine-olmesartan (SELENE) 5-20 MG Take 1 tablet by mouth daily 90 tablet 0   • ascorbic acid (VITAMIN C) 250 mg tablet Take 250 mg by mouth daily     • baclofen 10 mg tablet TAKE 2 TABLETS THREE TIMES A  tablet 1   • buPROPion (Zyban) 150 MG 12 hr tablet Take 1 tablet (150 mg total) by mouth 2 (two) times a day 180 tablet 1   • cholecalciferol (VITAMIN D3) 1,000 units tablet Take 1,000 Units by mouth daily     • dicyclomine (BENTYL) 10 mg capsule TAKE 1 CAPSULE THREE TIMES A DAY BEFORE MEALS 270 capsule 1   • docusate sodium (COLACE) 100 mg capsule Take 1 capsule (100 mg total) by mouth 2 (two) times a day 60 capsule 0   • gabapentin (NEURONTIN) 300 mg capsule Take 1 capsule (300 mg total) by mouth 2 (two) times a day 180 capsule 0   • metoprolol tartrate (LOPRESSOR) 100 mg tablet Take 1 tablet (100 mg total) by mouth every 12 (twelve) hours 180 tablet 0   • Multiple Vitamins-Minerals (Multivitamin Adult Extra C) CHEW Chew 1 tablet daily     • polyethylene glycol (MIRALAX) 17 g packet Take 17 g by mouth daily as needed (constipation) 20 each 0   • rivaroxaban (XARELTO) 20 mg tablet Take 1 tablet (20 mg total) by mouth daily with breakfast 90 tablet 0   • rosuvastatin (CRESTOR) 20 MG tablet Take 1 tablet (20 mg total) by mouth daily 90 tablet 1   • senna (SENOKOT) 8 6 mg Take 1 tablet (8 6 mg total) by mouth daily at bedtime 30 tablet 0   • sulfamethoxazole-trimethoprim (BACTRIM DS) 800-160 mg per "tablet Take 1 tablet by mouth every 12 (twelve) hours for 3 days 6 tablet 0     No current facility-administered medications for this visit  Objective:    /70   Pulse 86   Temp (!) 95 1 °F (35 1 °C)   Resp 16   Ht 5' 6\" (1 676 m)   SpO2 99%   BMI 22 44 kg/m²        Physical Exam  Skin:     Comments: Left foot is dressed        No motled skin on the leg where artery was opened up                Luisito Perez DO  "

## 2023-07-06 DIAGNOSIS — I21.A1 TYPE 2 MYOCARDIAL INFARCTION (HCC): ICD-10-CM

## 2023-07-06 DIAGNOSIS — I70.201 POPLITEAL ARTERY STENOSIS, RIGHT (HCC): ICD-10-CM

## 2023-07-09 DIAGNOSIS — Z89.619 S/P AKA (ABOVE KNEE AMPUTATION) (HCC): ICD-10-CM

## 2023-07-10 RX ORDER — METOPROLOL TARTRATE 100 MG/1
100 TABLET ORAL EVERY 12 HOURS
Qty: 180 TABLET | Refills: 0 | Status: SHIPPED | OUTPATIENT
Start: 2023-07-10

## 2023-07-11 ENCOUNTER — OFFICE VISIT (OUTPATIENT)
Dept: WOUND CARE | Facility: HOSPITAL | Age: 63
End: 2023-07-11
Payer: COMMERCIAL

## 2023-07-11 VITALS — DIASTOLIC BLOOD PRESSURE: 77 MMHG | SYSTOLIC BLOOD PRESSURE: 105 MMHG | TEMPERATURE: 97 F | HEART RATE: 61 BPM

## 2023-07-11 DIAGNOSIS — F17.200 SMOKER: ICD-10-CM

## 2023-07-11 DIAGNOSIS — G35 MULTIPLE SCLEROSIS (HCC): ICD-10-CM

## 2023-07-11 DIAGNOSIS — I73.9 PAD (PERIPHERAL ARTERY DISEASE) (HCC): ICD-10-CM

## 2023-07-11 DIAGNOSIS — S91.302A OPEN WOUND OF LEFT FOOT, INITIAL ENCOUNTER: Primary | ICD-10-CM

## 2023-07-11 DIAGNOSIS — Z89.611 S/P AKA (ABOVE KNEE AMPUTATION) UNILATERAL, RIGHT (HCC): ICD-10-CM

## 2023-07-11 PROCEDURE — 87147 CULTURE TYPE IMMUNOLOGIC: CPT | Performed by: STUDENT IN AN ORGANIZED HEALTH CARE EDUCATION/TRAINING PROGRAM

## 2023-07-11 PROCEDURE — 99214 OFFICE O/P EST MOD 30 MIN: CPT | Performed by: STUDENT IN AN ORGANIZED HEALTH CARE EDUCATION/TRAINING PROGRAM

## 2023-07-11 PROCEDURE — 87205 SMEAR GRAM STAIN: CPT | Performed by: STUDENT IN AN ORGANIZED HEALTH CARE EDUCATION/TRAINING PROGRAM

## 2023-07-11 PROCEDURE — 87070 CULTURE OTHR SPECIMN AEROBIC: CPT | Performed by: STUDENT IN AN ORGANIZED HEALTH CARE EDUCATION/TRAINING PROGRAM

## 2023-07-11 PROCEDURE — 87186 SC STD MICRODIL/AGAR DIL: CPT | Performed by: STUDENT IN AN ORGANIZED HEALTH CARE EDUCATION/TRAINING PROGRAM

## 2023-07-11 PROCEDURE — 97597 DBRDMT OPN WND 1ST 20 CM/<: CPT | Performed by: STUDENT IN AN ORGANIZED HEALTH CARE EDUCATION/TRAINING PROGRAM

## 2023-07-11 RX ORDER — LIDOCAINE HYDROCHLORIDE 40 MG/ML
5 SOLUTION TOPICAL ONCE
Status: COMPLETED | OUTPATIENT
Start: 2023-07-11 | End: 2023-07-11

## 2023-07-11 RX ADMIN — LIDOCAINE HYDROCHLORIDE 5 ML: 40 SOLUTION TOPICAL at 11:20

## 2023-07-11 NOTE — PROGRESS NOTES
Patient ID: Ira Pop is a 58 y.o. female Date of Birth 1960     Chief Complaint  Chief Complaint   Patient presents with   • Follow Up Wound Care Visit     Open wound L foot       Allergies  Patient has no known allergies. Assessment:     Diagnoses and all orders for this visit:    Open wound of left foot, initial encounter  -     lidocaine (XYLOCAINE) 4 % topical solution 5 mL  -     Wound cleansing and dressings; Future  -     Wound home care; Future  -     Wound miscellaneous orders; Future  -     Wound cleansing and dressings; Future  -     Wound compression and edema control; Future  -     Debridement  -     Debridement  -     Wound culture and Gram stain; Future    PAD (peripheral artery disease) ([facility])  -     lidocaine (XYLOCAINE) 4 % topical solution 5 mL  -     Wound cleansing and dressings; Future  -     Wound home care; Future  -     Wound miscellaneous orders; Future  -     Wound cleansing and dressings; Future  -     Wound compression and edema control; Future    Smoker  -     lidocaine (XYLOCAINE) 4 % topical solution 5 mL  -     Wound cleansing and dressings; Future  -     Wound home care; Future  -     Wound miscellaneous orders; Future  -     Wound cleansing and dressings; Future  -     Wound compression and edema control; Future    S/P AKA (above knee amputation) unilateral, right ([facility])  -     lidocaine (XYLOCAINE) 4 % topical solution 5 mL  -     Wound cleansing and dressings; Future  -     Wound home care; Future  -     Wound miscellaneous orders; Future  -     Wound cleansing and dressings; Future  -     Wound compression and edema control; Future    Multiple sclerosis (HCC)  -     lidocaine (XYLOCAINE) 4 % topical solution 5 mL  -     Wound cleansing and dressings; Future  -     Wound home care; Future  -     Wound miscellaneous orders; Future  -     Wound cleansing and dressings; Future  -     Wound compression and edema control;  Future              Debridement   Wound 06/13/23 Traumatic Toe (Comment  which one) Left;Medial    Universal Protocol:  Consent: Verbal consent obtained. Risks and benefits: risks, benefits and alternatives were discussed  Consent given by: patient  Time out: Immediately prior to procedure a "time out" was called to verify the correct patient, procedure, equipment, support staff and site/side marked as required. Patient identity confirmed: verbally with patient      Performed by: physician  Debridement type: selective  Pain control: lidocaine 4%  Pre-debridement measurements  Length (cm): 5.2  Width (cm): 0.2  Depth (cm): 0.1  Surface Area (cm^2): 1.04  Volume (cm^3): 0.1    Post-debridement measurements  Length (cm): 5.2  Width (cm): 1.2  Depth (cm): 0.1  Percent debrided: 90%  Surface Area (cm^2): 6.24  Area debrided (cm^2): 5.62  Volume (cm^3): 0.62  Devitalized tissue debrided: biofilm and callus  Instrument(s) utilized: curette  Bleeding: small  Hemostasis obtained with: pressure  Procedural pain (0-10): 1  Post-procedural pain: 0   Response to treatment: procedure was tolerated well    Debridement   Wound 06/13/23 Traumatic Foot Left;Plantar    Universal Protocol:  Consent: Verbal consent obtained. Risks and benefits: risks, benefits and alternatives were discussed  Consent given by: patient  Time out: Immediately prior to procedure a "time out" was called to verify the correct patient, procedure, equipment, support staff and site/side marked as required.   Patient identity confirmed: verbally with patient      Performed by: physician  Debridement type: selective  Pain control: lidocaine 4%  Pre-debridement measurements  Length (cm): 3  Width (cm): 2.8  Depth (cm): 0.1  Surface Area (cm^2): 8.4  Volume (cm^3): 0.84    Post-debridement measurements  Length (cm): 3  Width (cm): 2.8  Depth (cm): 0.1  Percent debrided: 60%  Surface Area (cm^2): 8.4  Area debrided (cm^2): 5.04  Volume (cm^3): 0.84  Devitalized tissue debrided: biofilm, exudate and slough  Instrument(s) utilized: curette  Bleeding: none  Hemostasis obtained with: not applicable  Procedural pain (0-10): 4  Post-procedural pain: 2   Response to treatment: procedure was tolerated well          Plan:  •  It was a pleasure to see Skylar Mobley for wound care follow up today  • Selective debridement performed today as above to both wounds  • Wounds are improving. Of concern is the increased odor and pain of plantar wound. Wound culture ordered today. We will follow-up when resulted  • Continue plan of care as noted below with Dermagran for toe wound and Acticoat 3 for plantar wound  • Continue to elevate leg to help with edema control. Apply gentle compression with spandigrip  given history of peripheral vascular disease  • VNA care 3x/week. • Continue to work on tobacco cessation. Marla Fallon understands that tobacco cessation is key to wound healing, and the continued tobacco use will worsen her current vascular disease  • Make fu appointment with vascular surgery  •  A1C results reviewed with the patient today. • No signs or symptoms of infection today. Patient understands that if any signs of infection start (such as increased redness, drainage, pain, fever, chills, diaphoresis), they should call our office or proceed to the ER or Urgent Care. • Patient should continue a high protein diet to facilitate wound healing  • Patient is advised to not submerge wound or leave wound open to air. • Follow up in 1 weeks  • Given the multi-factorial nature of wound care, additional time was taken to review patient's treatment plan with other specialties and most recent pertinent lab work and imaging. • All plans of care discussed with patient at bedside who verbalized understanding with treatment plan.     Wound 06/13/23 Traumatic Toe (Comment  which one) Left;Medial (Active)   Wound Image Images linked 07/11/23 1055   Wound Description Yellow;Slough;Pink;Brown;Granulation tissue;Eschar;Epithelialization 07/11/23 1057   Ivon-wound Assessment Dry;Edema 07/11/23 1057   Wound Length (cm) 5.2 cm 07/11/23 1057   Wound Width (cm) 1.2 cm 07/11/23 1057   Wound Depth (cm) 0.1 cm 07/11/23 1057   Wound Surface Area (cm^2) 6.24 cm^2 07/11/23 1057   Wound Volume (cm^3) 0.624 cm^3 07/11/23 1057   Calculated Wound Volume (cm^3) 0.62 cm^3 07/11/23 1057   Change in Wound Size % 82.18 07/11/23 1057   Drainage Amount Small 07/11/23 1057   Drainage Description Serosanguineous; Sanches 07/11/23 1057   Non-staged Wound Description Full thickness 07/11/23 1057   Dressing Status Intact; Old drainage (upon arrival) 07/11/23 1057       Wound 06/13/23 Traumatic Foot Left;Plantar (Active)   Wound Image Images linked 07/11/23 1101   Wound Description Pink;Yellow;Slough;Granulation tissue; Epithelialization 07/11/23 1101   Ivon-wound Assessment Dry;Scaly 07/11/23 1101   Wound Length (cm) 3 cm 07/11/23 1101   Wound Width (cm) 2.8 cm 07/11/23 1101   Wound Depth (cm) 0.1 cm 07/11/23 1101   Wound Surface Area (cm^2) 8.4 cm^2 07/11/23 1101   Wound Volume (cm^3) 0.84 cm^3 07/11/23 1101   Calculated Wound Volume (cm^3) 0.84 cm^3 07/11/23 1101   Change in Wound Size % -86.67 07/11/23 1101   Drainage Amount Small 07/11/23 1101   Drainage Description Serosanguineous 07/11/23 1101   Non-staged Wound Description Full thickness 07/11/23 1101   Dressing Status Intact; Old drainage;Dry (upon arrival) 07/11/23 1101       Wound 06/13/23 Traumatic Toe (Comment  which one) Left;Medial (Active)   Date First Assessed/Time First Assessed: 06/13/23 1024   Primary Wound Type: Traumatic  Location: (c) Toe (Comment  which one)  Wound Location Orientation: Left;Medial  Wound Description (Comments): LEFT GREAT TOE       Wound 06/13/23 Traumatic Foot Left;Plantar (Active)   Date First Assessed/Time First Assessed: 06/13/23 1025   Primary Wound Type: Traumatic  Location: Foot  Wound Location Orientation: Left;Plantar       [REMOVED] Wound 05/22/23 Toe (Comment  which one) Anterior; Left (Removed)   Resolved Date: 06/13/23  Date First Assessed/Time First Assessed: 05/22/23 2005   Location: Toe (Comment  which one)  Wound Location Orientation: Anterior; Left  Dressing Status: Open to air  Wound Outcome: (c) Other (Comment)       [REMOVED] Wound 05/22/23 Foot Anterior; Left (Removed)   Resolved Date: 06/13/23  Date First Assessed/Time First Assessed: 05/22/23 2030   Location: Foot  Wound Location Orientation: Anterior; Left  Dressing Status: Open to air  Wound Outcome: (c) Other (Comment)       [REMOVED] Wound 05/22/23 Ankle Anterior; Left (Removed)   Resolved Date: 06/13/23  Date First Assessed/Time First Assessed: 05/22/23 2005   Location: Ankle  Wound Location Orientation: Anterior; Left  Dressing Status: Open to air  Wound Outcome: (c) Other (Comment)       [REMOVED] Wound 06/13/23 Traumatic Foot Anterior; Left (Removed)   Resolved Date: 06/20/23  Date First Assessed/Time First Assessed: 06/13/23 1025   Primary Wound Type: Traumatic  Location: Foot  Wound Location Orientation: Anterior; Left  Wound Outcome: Healed       Subjective:      .    7/11/23: VNA nurses 3 times a week. Patient with no concerns today however noted plantar wound with more pain. More drainage as well. No systemic signs of infection. Primary dressing: toe wound- dermagran, plantar wound- actacoat 3    6/20/23: Wound care being done by visiting nurses at home. Silver alginate. patient with no complaints today. Pain only with wound dressing change. No symptoms of infection today.      6/13/23: (Consult) Princess Cagle is a pleasant 57 yo F with a medical history of current tobacco use (1/2 ppd), hx of R AKA 2/2 critical limb ischemia, PAD, MS, here today for initial wound care visit for multiple wounds of the L foot. Patient first noted the wounds about 4-5 weeks ago. She has been attempting to remove callus from her feet and notes wounds that opened up and then worsened.  She presented to [facility] on 5/22 and was discharged on 6/1. During her hospitalization, she has local wound care, IV abx with cefazolin and two xrays that showed no bony involvement. Per ID, there was no need for discharge on PO abx. She was also found to have >75% stenosis of the L SFA and underwent angiolplasty and DCB during admission. She was instructed to follow up with wound care, podiatry and vascular surgery. Patient presented with dry gauze covering the wound and maceration. She is still smoking. She is not a diabetic. She has VNA coming 3x/week. The following portions of the patient's history were reviewed and updated as appropriate: allergies, current medications, past family history, past medical history, past social history, past surgical history and problem list.    Review of Systems   Skin: Positive for wound. All other systems reviewed and are negative. Objective:       Wound 06/13/23 Traumatic Toe (Comment  which one) Left;Medial (Active)   Wound Image Images linked 07/11/23 1058   Wound Description Yellow;Slough;Pink;Brown;Granulation tissue;Eschar;Epithelialization 07/11/23 1057   Ivon-wound Assessment Dry;Edema 07/11/23 1057   Wound Length (cm) 5.2 cm 07/11/23 1057   Wound Width (cm) 1.2 cm 07/11/23 1057   Wound Depth (cm) 0.1 cm 07/11/23 1057   Wound Surface Area (cm^2) 6.24 cm^2 07/11/23 1057   Wound Volume (cm^3) 0.624 cm^3 07/11/23 1057   Calculated Wound Volume (cm^3) 0.62 cm^3 07/11/23 1057   Change in Wound Size % 82.18 07/11/23 1057   Drainage Amount Small 07/11/23 1057   Drainage Description Serosanguineous; Sanches 07/11/23 1057   Non-staged Wound Description Full thickness 07/11/23 1057   Dressing Status Intact; Old drainage (upon arrival) 07/11/23 1057       Wound 06/13/23 Traumatic Foot Left;Plantar (Active)   Wound Image Images linked 07/11/23 1101   Wound Description Pink;Yellow;Slough;Granulation tissue; Epithelialization 07/11/23 1101   Ivon-wound Assessment Dry;Scaly 07/11/23 1101   Wound Length (cm) 3 cm 07/11/23 1101   Wound Width (cm) 2.8 cm 07/11/23 1101   Wound Depth (cm) 0.1 cm 07/11/23 1101   Wound Surface Area (cm^2) 8.4 cm^2 07/11/23 1101   Wound Volume (cm^3) 0.84 cm^3 07/11/23 1101   Calculated Wound Volume (cm^3) 0.84 cm^3 07/11/23 1101   Change in Wound Size % -86.67 07/11/23 1101   Drainage Amount Small 07/11/23 1101   Drainage Description Serosanguineous 07/11/23 1101   Non-staged Wound Description Full thickness 07/11/23 1101   Dressing Status Intact; Old drainage;Dry (upon arrival) 07/11/23 1101       /77   Pulse 61   Temp (!) 97 °F (36.1 °C) (Temporal)     Physical Exam  Vitals reviewed. Constitutional:       Appearance: Normal appearance. HENT:      Head: Normocephalic and atraumatic. Mouth/Throat:      Mouth: Mucous membranes are moist.   Eyes:      Extraocular Movements: Extraocular movements intact. Pulmonary:      Effort: Pulmonary effort is normal.   Musculoskeletal:      Comments: AKBHARATHI KIM   Skin:     Comments: Small areas of opening on medial dorsal left foot. Wound is dry with callus deposition. Plantar left foot with small area of opening. Difficult to appreciate depth due to pain when attempting to measure and debride wound. Slightly foul odor to the wound with thick serous exudate. No surrounding erythema or maceration   Neurological:      Mental Status: She is alert. Psychiatric:         Mood and Affect: Mood normal.         Behavior: Behavior normal.                     Wound Instructions:  Orders Placed This Encounter   Procedures   • Wound cleansing and dressings     Left Great Toe Wounds:     Wash your hands with soap and water. Remove old dressing, discard into plastic bag and place in trash. Cleanse the wound with soap and water prior to applying a clean dressing. Do not use tissue or cotton balls. Do not scrub the wound. Pat dry using gauze. Shower yes . Wash your foot last, rinse and then get out of shower immediately. Do not soak wounds.   Apply moisturizer to skin surrounding wound  Apply Dermagran to the  wounds. Cover with gauze. Secure with rolled gauze and tape. Change dressing three times per week.     This was done today                                Standing Status:   Future     Standing Expiration Date:   7/11/2024   • Wound home care     Wound home care:  [facility]: Please continue seeing pt for wound care 2-3x/week. Standing Status:   Future     Standing Expiration Date:   7/11/2024   • Wound miscellaneous orders     Increase your intake of protein (chicken, beef, seafood/fish, protein drinks, protein yogurt, eggs, lentils, cheese, peanut butter)     Schedule appt with Podiatrist, Dr. Heidi Anderson. Follow with podiatry for routine foot care.                      Keep weight and pressure off wound at all times.        Standing Status:   Future     Standing Expiration Date:   7/11/2024   • Wound cleansing and dressings     Left plantar foot:    Wash your hands with soap and water. Remove old dressing, discard into plastic bag and place in trash. Cleanse the wound with wound cleanser or soap and water prior to applying a clean dressing. Do not use tissue or cotton balls. Do not scrub the wound. Pat dry using gauze. Shower no  Apply moisturizer to skin surrounding wound  Apply Acticoat-3 to the wound. Cover with ABD, dalia & tape. Change dressing 3x/week. This was done today. Standing Status:   Future     Standing Expiration Date:   7/11/2024   • Wound compression and edema control     Elevate left leg as much as possible when sitting to heart level or above. Apply in early a.m.: Spandagrip size F stocking from base of left toes to knee. May remove at bedside.      Standing Status:   Future     Standing Expiration Date:   7/11/2024   • Debridement     This order was created via procedure documentation   • Debridement     This order was created via procedure documentation   • Wound culture and Gram stain     Standing Status: Future     Standing Expiration Date:   7/11/2024     Order Specific Question:   Release to patient through Mychart     Answer:   Immediate        Diagnosis ICD-10-CM Associated Orders   1. Open wound of left foot, initial encounter  S91.302A lidocaine (XYLOCAINE) 4 % topical solution 5 mL     Wound cleansing and dressings     Wound home care     Wound miscellaneous orders     Wound cleansing and dressings     Wound compression and edema control     Debridement     Debridement     Wound culture and Gram stain      2. PAD (peripheral artery disease) ([facility])  I73.9 lidocaine (XYLOCAINE) 4 % topical solution 5 mL     Wound cleansing and dressings     Wound home care     Wound miscellaneous orders     Wound cleansing and dressings     Wound compression and edema control      3. Smoker  F17.200 lidocaine (XYLOCAINE) 4 % topical solution 5 mL     Wound cleansing and dressings     Wound home care     Wound miscellaneous orders     Wound cleansing and dressings     Wound compression and edema control      4. S/P AKA (above knee amputation) unilateral, right ([facility])  Z89.611 lidocaine (XYLOCAINE) 4 % topical solution 5 mL     Wound cleansing and dressings     Wound home care     Wound miscellaneous orders     Wound cleansing and dressings     Wound compression and edema control      5. Multiple sclerosis ([facility])  G35 lidocaine (XYLOCAINE) 4 % topical solution 5 mL     Wound cleansing and dressings     Wound home care     Wound miscellaneous orders     Wound cleansing and dressings     Wound compression and edema control          --  Liset Ruiz MD    "This note has been constructed using a voice recognition system. Therefore there may be syntax, spelling, and/or grammatical errors. Please call if you have any questions.  All questions and concerns were addressed and answered by myself."

## 2023-07-11 NOTE — PATIENT INSTRUCTIONS
Orders Placed This Encounter   Procedures    Wound cleansing and dressings     Left Great Toe Wounds:     Wash your hands with soap and water. Remove old dressing, discard into plastic bag and place in trash. Cleanse the wound with soap and water prior to applying a clean dressing. Do not use tissue or cotton balls. Do not scrub the wound. Pat dry using gauze. Shower yes . Wash your foot last, rinse and then get out of shower immediately. Do not soak wounds. Apply moisturizer to skin surrounding wound  Apply Dermagran to the  wounds. Cover with gauze. Secure with rolled gauze and tape. Change dressing three times per week. This was done today                                Standing Status:   Future     Standing Expiration Date:   7/11/2024    Wound home care     Wound home care:  [facility]: Please continue seeing pt for wound care 2-3x/week. Standing Status:   Future     Standing Expiration Date:   7/11/2024    Wound miscellaneous orders     Increase your intake of protein (chicken, beef, seafood/fish, protein drinks, protein yogurt, eggs, lentils, cheese, peanut butter)     Schedule appt with Podiatrist, Dr. Marco Lemos. Follow with podiatry for routine foot care. Keep weight and pressure off wound at all times. Standing Status:   Future     Standing Expiration Date:   7/11/2024    Wound cleansing and dressings     Left plantar foot:    Wash your hands with soap and water. Remove old dressing, discard into plastic bag and place in trash. Cleanse the wound with wound cleanser or soap and water prior to applying a clean dressing. Do not use tissue or cotton balls. Do not scrub the wound. Pat dry using gauze. Shower no  Apply moisturizer to skin surrounding wound  Apply Acticoat-3 to the wound. Cover with ABD, dalia & tape. Change dressing 3x/week. This was done today.      Standing Status:   Future     Standing Expiration Date:   7/11/2024    Wound compression and edema control     Elevate left leg as much as possible when sitting to heart level or above. Apply in early a.m.: Spandagrip size F stocking from base of left toes to knee. May remove at bedside.      Standing Status:   Future     Standing Expiration Date:   7/11/2024

## 2023-07-11 NOTE — LETTER
[address] [address] [address] [address]  Phone#  [phone]  Fax#  [phone]    Patient:  Vikki Ashby  YOB: 1960  Phone:  [phone]  Date of Visit:  7/11/2023    Orders Placed This Encounter   Procedures   • Wound cleansing and dressings     Left Great Toe Wounds:     Wash your hands with soap and water. Remove old dressing, discard into plastic bag and place in trash. Cleanse the wound with soap and water prior to applying a clean dressing. Do not use tissue or cotton balls. Do not scrub the wound. Pat dry using gauze. Shower yes . Wash your foot last, rinse and then get out of shower immediately. Do not soak wounds. Apply moisturizer to skin surrounding wound  Apply Dermagran to the  wounds. Cover with gauze. Secure with rolled gauze and tape. Change dressing three times per week. This was done today                                Standing Status:   Future     Standing Expiration Date:   7/11/2024   • Wound home care     Wound home care:  [facility]: Please continue seeing pt for wound care 2-3x/week. Standing Status:   Future     Standing Expiration Date:   7/11/2024   • Wound miscellaneous orders     Increase your intake of protein (chicken, beef, seafood/fish, protein drinks, protein yogurt, eggs, lentils, cheese, peanut butter)     Schedule appt with Podiatrist, Dr. Alyssa Khan. Follow with podiatry for routine foot care. Keep weight and pressure off wound at all times. Standing Status:   Future     Standing Expiration Date:   7/11/2024   • Wound cleansing and dressings     Left plantar foot:    Wash your hands with soap and water. Remove old dressing, discard into plastic bag and place in trash. Cleanse the wound with wound cleanser or soap and water prior to applying a clean dressing. Do not use tissue or cotton balls. Do not scrub the wound. Pat dry using gauze.   Shower no  Apply moisturizer to skin surrounding wound  Apply Acticoat-3 to the wound. Cover with ABD, dalia & tape. Change dressing 3x/week. This was done today. Standing Status:   Future     Standing Expiration Date:   7/11/2024   • Wound compression and edema control     Elevate left leg as much as possible when sitting to heart level or above. Apply in early a.m.: Spandagrip size F stocking from base of left toes to knee. May remove at bedside.      Standing Status:   Future     Standing Expiration Date:   7/11/2024   • Debridement     This order was created via procedure documentation   • Debridement     This order was created via procedure documentation   • Wound culture and Gram stain     Standing Status:   Future     Standing Expiration Date:   7/11/2024     Order Specific Question:   Release to patient through [address] [place] [address]     Answer:   Immediate         Electronically signed by Joseluis Green MD

## 2023-07-13 DIAGNOSIS — S91.302A OPEN WOUND OF LEFT FOOT, INITIAL ENCOUNTER: Primary | ICD-10-CM

## 2023-07-13 LAB
BACTERIA WND AEROBE CULT: ABNORMAL
GRAM STN SPEC: ABNORMAL
GRAM STN SPEC: ABNORMAL

## 2023-07-13 RX ORDER — CEPHALEXIN 500 MG/1
500 CAPSULE ORAL EVERY 6 HOURS SCHEDULED
Qty: 28 CAPSULE | Refills: 0 | Status: SHIPPED | OUTPATIENT
Start: 2023-07-13 | End: 2023-07-20

## 2023-07-13 NOTE — RESULT ENCOUNTER NOTE
Wound culture resulted with growth of +3 Staph aureus. Did call patient at listed number however patient did not  no option for voicemail was given. I will message patient through [employer] that Keflex 500 mg 4 times daily to be sent for 7-day course to [address]. Call our office for any questions. Use oprobiotic to minimize GI side effects.   No history of allergies stating chart No

## 2023-07-25 ENCOUNTER — OFFICE VISIT (OUTPATIENT)
Dept: WOUND CARE | Facility: HOSPITAL | Age: 63
End: 2023-07-25
Payer: COMMERCIAL

## 2023-07-25 ENCOUNTER — HOSPITAL ENCOUNTER (OUTPATIENT)
Dept: RADIOLOGY | Facility: HOSPITAL | Age: 63
Discharge: HOME | End: 2023-07-25
Payer: COMMERCIAL

## 2023-07-25 VITALS
RESPIRATION RATE: 16 BRPM | HEART RATE: 51 BPM | DIASTOLIC BLOOD PRESSURE: 57 MMHG | SYSTOLIC BLOOD PRESSURE: 104 MMHG | TEMPERATURE: 97.6 F

## 2023-07-25 DIAGNOSIS — F17.200 SMOKER: ICD-10-CM

## 2023-07-25 DIAGNOSIS — S91.302A OPEN WOUND OF LEFT FOOT, INITIAL ENCOUNTER: Primary | ICD-10-CM

## 2023-07-25 DIAGNOSIS — I73.9 PAD (PERIPHERAL ARTERY DISEASE) (HCC): ICD-10-CM

## 2023-07-25 DIAGNOSIS — S91.102A OPEN WOUND OF LEFT GREAT TOE WITHOUT DAMAGE TO NAIL, INITIAL ENCOUNTER: ICD-10-CM

## 2023-07-25 DIAGNOSIS — G35 MULTIPLE SCLEROSIS (HCC): ICD-10-CM

## 2023-07-25 DIAGNOSIS — S91.302A OPEN WOUND OF LEFT FOOT, INITIAL ENCOUNTER: ICD-10-CM

## 2023-07-25 PROCEDURE — 97597 DBRDMT OPN WND 1ST 20 CM/<: CPT | Performed by: STUDENT IN AN ORGANIZED HEALTH CARE EDUCATION/TRAINING PROGRAM

## 2023-07-25 PROCEDURE — 73630 X-RAY EXAM OF FOOT: CPT

## 2023-07-25 RX ORDER — LIDOCAINE HYDROCHLORIDE 40 MG/ML
5 SOLUTION TOPICAL ONCE
Status: COMPLETED | OUTPATIENT
Start: 2023-07-25 | End: 2023-07-25

## 2023-07-25 RX ADMIN — LIDOCAINE HYDROCHLORIDE 5 ML: 40 SOLUTION TOPICAL at 13:12

## 2023-07-25 NOTE — PATIENT INSTRUCTIONS
Orders Placed This Encounter   Procedures    Wound cleansing and dressings        Wash your hands with soap and water. Remove old dressing, discard into plastic bag and place in trash. Cleanse the wound with soap and water prior to applying a clean dressing. Do not use tissue or cotton balls. Do not scrub the wound. Pat dry using gauze. Shower yes . Wash your foot last, rinse and then get out of shower immediately. Do not soak wounds. Apply moisturizer to skin surrounding wound  Apply Dermagran to the  wounds. Cover with gauze. Secure with rolled gauze and tape. Change dressing three times per week. This was done today                                Standing Status:   Future     Standing Expiration Date:   7/25/2024    Wound home care     [facility]: Please continue seeing pt for wound care 2-3x/week. Standing Status:   Future     Standing Expiration Date:   7/25/2024    Wound miscellaneous orders     Xray of left foot being ordered today.      Standing Status:   Future     Standing Expiration Date:   7/25/2024

## 2023-07-25 NOTE — PROGRESS NOTES
Patient ID: Damien Yousif is a 58 y.o. female Date of Birth 1960     Chief Complaint  Chief Complaint   Patient presents with   • Follow Up Wound Care Visit     Left foot wound       Allergies  Patient has no known allergies. Assessment:     Diagnoses and all orders for this visit:    Open wound of left foot, initial encounter  -     lidocaine (XYLOCAINE) 4 % topical solution 5 mL  -     Wound cleansing and dressings; Future  -     Wound home care; Future  -     Cancel: Wound miscellaneous orders; Future  -     Wound miscellaneous orders; Future  -     XR foot 3+ vw left; Future  -     Debridement    Open wound of left great toe without damage to nail, initial encounter  -     Wound cleansing and dressings; Future  -     Wound home care; Future  -     Cancel: Wound miscellaneous orders; Future  -     Debridement    PAD (peripheral artery disease) ([address])    Smoker    Multiple sclerosis ([address])    Other orders  -     Cancel: Intubation              Debridement   Wound 06/13/23 Traumatic Toe (Comment  which one) Left;Medial    Universal Protocol:  Consent: Verbal consent obtained. Risks and benefits: risks, benefits and alternatives were discussed  Consent given by: patient  Time out: Immediately prior to procedure a "time out" was called to verify the correct patient, procedure, equipment, support staff and site/side marked as required.   Patient identity confirmed: verbally with patient      Performed by: physician  Debridement type: selective  Pain control: lidocaine 4%  Pre-debridement measurements  Length (cm): 4  Width (cm): 0.9  Depth (cm): 0.1  Surface Area (cm^2): 3.6  Volume (cm^3): 0.36    Post-debridement measurements  Length (cm): 4  Width (cm): 0.9  Depth (cm): 0.1  Percent debrided: 90%  Surface Area (cm^2): 3.6  Area debrided (cm^2): 3.24  Volume (cm^3): 0.36  Devitalized tissue debrided: biofilm  Instrument(s) utilized: curette  Bleeding: small  Hemostasis obtained with: pressure  Procedural pain (0-10): 2  Post-procedural pain: 1   Response to treatment: procedure was tolerated well    Debridement   Wound 06/13/23 Traumatic Foot Left;Plantar    Universal Protocol:  Consent: Verbal consent obtained. Risks and benefits: risks, benefits and alternatives were discussed  Consent given by: patient  Time out: Immediately prior to procedure a "time out" was called to verify the correct patient, procedure, equipment, support staff and site/side marked as required. Patient identity confirmed: verbally with patient      Performed by: physician  Debridement type: selective  Pain control: lidocaine 4%  Pre-debridement measurements  Length (cm): 3  Width (cm): 2.3  Depth (cm): 0.1  Surface Area (cm^2): 6.9  Volume (cm^3): 0.69    Post-debridement measurements  Length (cm): 3  Width (cm): 2.3  Depth (cm): 0.1  Percent debrided: 90%  Surface Area (cm^2): 6.9  Area debrided (cm^2): 6.21  Volume (cm^3): 0.69  Devitalized tissue debrided: biofilm and exudate  Instrument(s) utilized: curette  Bleeding: small  Hemostasis obtained with: pressure  Procedural pain (0-10): 3  Post-procedural pain: 2   Response to treatment: procedure was tolerated well          Plan:  • It was a pleasure to see Skylar Mobley for wound care follow up today  • Selective debridement performed today as above  • Wound is improving   • Continue plan of care as noted below with dermagran  • No local signs of infection. Wound culture result is likely skin nandini. Will defer antibiotics until clinically appropriate with clinical signs of infection. • Will order x-ray of the left foot as follow-up from last May to rule out any osseous involvement. • Patient has stopped smoking as of 2 weeks ago. Has not used any cigarettes since. • No signs or symptoms of infection today.  Patient understands that if any signs of infection start (such as increased redness, drainage, pain, fever, chills, diaphoresis), they should call our office or proceed to the ER or Urgent Care. • Patient should continue a high protein diet to facilitate wound healing  • Patient is advised to not submerge wound or leave wound open to air. • Follow up in 2 weeks  • Given the multi-factorial nature of wound care, additional time was taken to review patient's treatment plan with other specialties and most recent pertinent lab work and imaging. • All plans of care discussed with patient at bedside who verbalized understanding with treatment plan. Wound 06/13/23 Traumatic Toe (Comment  which one) Left;Medial (Active)   Wound Image Images linked 07/25/23 1305   Wound Description Yellow;Slough;Pink;Brown;Granulation tissue;Eschar;Epithelialization 07/25/23 1305   Ivon-wound Assessment Dry;Edema; Erythema 07/25/23 1305   Wound Length (cm) 4 cm 07/25/23 1305   Wound Width (cm) 0.9 cm 07/25/23 1305   Wound Depth (cm) 0.1 cm 07/25/23 1305   Wound Surface Area (cm^2) 3.6 cm^2 07/25/23 1305   Wound Volume (cm^3) 0.36 cm^3 07/25/23 1305   Calculated Wound Volume (cm^3) 0.36 cm^3 07/25/23 1305   Change in Wound Size % 89.66 07/25/23 1305   Drainage Amount Moderate 07/25/23 1305   Drainage Description Serosanguineous; Sanches 07/25/23 1305   Non-staged Wound Description Full thickness 07/25/23 1305   Dressing Status Intact 07/25/23 1305       Wound 06/13/23 Traumatic Foot Left;Plantar (Active)   Wound Image Images linked 07/25/23 1309   Wound Description Epithelialization;Dry; Other (Comment);Eschar;Brown (scabs) 07/25/23 1309   Ivon-wound Assessment Dry;Scaly 07/25/23 1309   Wound Length (cm) 3 cm 07/25/23 1309   Wound Width (cm) 2.3 cm 07/25/23 1309   Wound Depth (cm) 0.1 cm 07/25/23 1309   Wound Surface Area (cm^2) 6.9 cm^2 07/25/23 1309   Wound Volume (cm^3) 0.69 cm^3 07/25/23 1309   Calculated Wound Volume (cm^3) 0.69 cm^3 07/25/23 1309   Change in Wound Size % -53.33 07/25/23 1309   Drainage Amount None 07/25/23 1309   Non-staged Wound Description Not applicable 31/93/82 3622   Dressing Status Intact 07/25/23 1309       Wound 06/13/23 Traumatic Toe (Comment  which one) Left;Medial (Active)   Date First Assessed/Time First Assessed: 06/13/23 1024   Primary Wound Type: Traumatic  Location: (c) Toe (Comment  which one)  Wound Location Orientation: Left;Medial  Wound Description (Comments): LEFT GREAT TOE       Wound 06/13/23 Traumatic Foot Left;Plantar (Active)   Date First Assessed/Time First Assessed: 06/13/23 1025   Primary Wound Type: Traumatic  Location: Foot  Wound Location Orientation: Left;Plantar       [REMOVED] Wound 05/22/23 Toe (Comment  which one) Anterior; Left (Removed)   Resolved Date: 06/13/23  Date First Assessed/Time First Assessed: 05/22/23 2005   Location: Toe (Comment  which one)  Wound Location Orientation: Anterior; Left  Dressing Status: Open to air  Wound Outcome: (c) Other (Comment)       [REMOVED] Wound 05/22/23 Foot Anterior; Left (Removed)   Resolved Date: 06/13/23  Date First Assessed/Time First Assessed: 05/22/23 2030   Location: Foot  Wound Location Orientation: Anterior; Left  Dressing Status: Open to air  Wound Outcome: (c) Other (Comment)       [REMOVED] Wound 05/22/23 Ankle Anterior; Left (Removed)   Resolved Date: 06/13/23  Date First Assessed/Time First Assessed: 05/22/23 2005   Location: Ankle  Wound Location Orientation: Anterior; Left  Dressing Status: Open to air  Wound Outcome: (c) Other (Comment)       [REMOVED] Wound 06/13/23 Traumatic Foot Anterior; Left (Removed)   Resolved Date: 06/20/23  Date First Assessed/Time First Assessed: 06/13/23 1025   Primary Wound Type: Traumatic  Location: Foot  Wound Location Orientation: Anterior; Left  Wound Outcome: Healed       Subjective:      .    7/25/23: Has been using Dermagran as directed. No local or systemic signs of infection. Stated that she stopped using cigarettes 2 weeks ago. 7/11/23: VNA nurses 3 times a week. Patient with no concerns today however noted plantar wound with more pain. More drainage as well.   No systemic signs of infection. Primary dressing: toe wound- dermagran, plantar wound- actacoat 3     6/20/23: Wound care being done by visiting nurses at home. Silver alginate. patient with no complaints today. Pain only with wound dressing change. No symptoms of infection today.      6/13/23: (Consult) Freeman Ludwig is a pleasant 57 yo F with a medical history of current tobacco use (1/2 ppd), hx of R AKA 2/2 critical limb ischemia, PAD, MS, here today for initial wound care visit for multiple wounds of the L foot. Patient first noted the wounds about 4-5 weeks ago. She has been attempting to remove callus from her feet and notes wounds that opened up and then worsened. She presented to [facility] on 5/22 and was discharged on 6/1. During her hospitalization, she has local wound care, IV abx with cefazolin and two xrays that showed no bony involvement. Per ID, there was no need for discharge on PO abx. She was also found to have >75% stenosis of the L SFA and underwent angiolplasty and DCB during admission. She was instructed to follow up with wound care, podiatry and vascular surgery. Patient presented with dry gauze covering the wound and maceration. She is still smoking. She is not a diabetic. She has VNA coming 3x/week. The following portions of the patient's history were reviewed and updated as appropriate: allergies, current medications, past family history, past medical history, past social history, past surgical history and problem list.    Review of Systems   Skin: Positive for wound. All other systems reviewed and are negative. Objective:       Wound 06/13/23 Traumatic Toe (Comment  which one) Left;Medial (Active)   Wound Image Images linked 07/25/23 1305   Wound Description Yellow;Slough;Pink;Brown;Granulation tissue;Eschar;Epithelialization 07/25/23 1305   Ivon-wound Assessment Dry;Edema; Erythema 07/25/23 1305   Wound Length (cm) 4 cm 07/25/23 1305   Wound Width (cm) 0.9 cm 07/25/23 1305   Wound Depth (cm) 0.1 cm 07/25/23 1305   Wound Surface Area (cm^2) 3.6 cm^2 07/25/23 1305   Wound Volume (cm^3) 0.36 cm^3 07/25/23 1305   Calculated Wound Volume (cm^3) 0.36 cm^3 07/25/23 1305   Change in Wound Size % 89.66 07/25/23 1305   Drainage Amount Moderate 07/25/23 1305   Drainage Description Serosanguineous; Sanches 07/25/23 1305   Non-staged Wound Description Full thickness 07/25/23 1305   Dressing Status Intact 07/25/23 1305       Wound 06/13/23 Traumatic Foot Left;Plantar (Active)   Wound Image Images linked 07/25/23 1309   Wound Description Epithelialization;Dry; Other (Comment);Eschar;Brown (scabs) 07/25/23 1309   Ivon-wound Assessment Dry;Scaly 07/25/23 1309   Wound Length (cm) 3 cm 07/25/23 1309   Wound Width (cm) 2.3 cm 07/25/23 1309   Wound Depth (cm) 0.1 cm 07/25/23 1309   Wound Surface Area (cm^2) 6.9 cm^2 07/25/23 1309   Wound Volume (cm^3) 0.69 cm^3 07/25/23 1309   Calculated Wound Volume (cm^3) 0.69 cm^3 07/25/23 1309   Change in Wound Size % -53.33 07/25/23 1309   Drainage Amount None 07/25/23 1309   Non-staged Wound Description Not applicable 37/93/87 3872   Dressing Status Intact 07/25/23 1309       /57   Pulse (!) 51   Temp 97.6 °F (36.4 °C)   Resp 16     Physical Exam  Vitals reviewed. Constitutional:       Appearance: Normal appearance. HENT:      Head: Normocephalic and atraumatic. Mouth/Throat:      Mouth: Mucous membranes are moist.   Eyes:      Extraocular Movements: Extraocular movements intact. Pulmonary:      Effort: Pulmonary effort is normal.   Musculoskeletal:      Comments: Amputation of right lower extremity   Skin:     Comments: Left foot plantar wounds and lateral toe wound with dried exudate. Underneath healthy wound bed tissue. No erythema or purulent drainage. No foul odor. Neurological:      Mental Status: She is alert.    Psychiatric:         Mood and Affect: Mood normal.         Behavior: Behavior normal.                 Wound Instructions:  Orders Placed This Encounter   Procedures   • Wound cleansing and dressings        Wash your hands with soap and water. Remove old dressing, discard into plastic bag and place in trash. Cleanse the wound with soap and water prior to applying a clean dressing. Do not use tissue or cotton balls. Do not scrub the wound. Pat dry using gauze. Shower yes . Wash your foot last, rinse and then get out of shower immediately. Do not soak wounds. Apply moisturizer to skin surrounding wound  Apply Dermagran to the  wounds. Cover with gauze. Secure with rolled gauze and tape. Change dressing three times per week.     This was done today                                Standing Status:   Future     Standing Expiration Date:   7/25/2024   • Wound home care     [facility]: Please continue seeing pt for wound care 2-3x/week.          Standing Status:   Future     Standing Expiration Date:   7/25/2024   • Wound miscellaneous orders     Xray of left foot being ordered today. Standing Status:   Future     Standing Expiration Date:   7/25/2024   • Debridement     This order was created via procedure documentation   • Debridement     This order was created via procedure documentation   • XR foot 3+ vw left     Standing Status:   Future     Number of Occurrences:   1     Standing Expiration Date:   7/25/2027     Scheduling Instructions:      Bring along any outside films relating to this procedure. Diagnosis ICD-10-CM Associated Orders   1. Open wound of left foot, initial encounter  S91.302A lidocaine (XYLOCAINE) 4 % topical solution 5 mL     Wound cleansing and dressings     Wound home care     Wound miscellaneous orders     XR foot 3+ vw left     Debridement      2. Open wound of left great toe without damage to nail, initial encounter  S91.102A Wound cleansing and dressings     Wound home care     Debridement      3. PAD (peripheral artery disease) ([facility])  I73.9       4. Smoker  F17.200       5.  Multiple sclerosis ([address]) Beni Melendez MD    "This note has been constructed using a voice recognition system. Therefore there may be syntax, spelling, and/or grammatical errors. Occasional wrong word or "sound alike" substitutions may have occurred due to the inherent limitations of voice recognition software. Read the chart carefully and recognize, using context, where substitutions have occurred.  Please call if you have any questions."

## 2023-07-28 LAB
ALBUMIN SERPL-MCNC: 4.2 G/DL (ref 3.9–4.9)
ALBUMIN/GLOB SERPL: 1.4 {RATIO} (ref 1.2–2.2)
ALP SERPL-CCNC: 111 IU/L (ref 44–121)
ALT SERPL-CCNC: 10 IU/L (ref 0–32)
AST SERPL-CCNC: 11 IU/L (ref 0–40)
BILIRUB SERPL-MCNC: <0.2 MG/DL (ref 0–1.2)
BUN SERPL-MCNC: 20 MG/DL (ref 8–27)
BUN/CREAT SERPL: 22 (ref 12–28)
CALCIUM SERPL-MCNC: 10.2 MG/DL (ref 8.7–10.3)
CHLORIDE SERPL-SCNC: 104 MMOL/L (ref 96–106)
CHOLEST SERPL-MCNC: 161 MG/DL (ref 100–199)
CO2 SERPL-SCNC: 25 MMOL/L (ref 20–29)
CREAT SERPL-MCNC: 0.9 MG/DL (ref 0.57–1)
EGFR: 72 ML/MIN/1.73
GLOBULIN SER-MCNC: 3 G/DL (ref 1.5–4.5)
GLUCOSE SERPL-MCNC: 96 MG/DL (ref 70–99)
HDLC SERPL-MCNC: 49 MG/DL
LDLC SERPL CALC-MCNC: 89 MG/DL (ref 0–99)
MICRODELETION SYND BLD/T FISH: NORMAL
POTASSIUM SERPL-SCNC: 4.2 MMOL/L (ref 3.5–5.2)
PROT SERPL-MCNC: 7.2 G/DL (ref 6–8.5)
SODIUM SERPL-SCNC: 143 MMOL/L (ref 134–144)
TRIGL SERPL-MCNC: 132 MG/DL (ref 0–149)

## 2023-08-01 DIAGNOSIS — I10 ESSENTIAL HYPERTENSION: ICD-10-CM

## 2023-08-01 RX ORDER — AMLODIPINE AND OLMESARTAN MEDOXOMIL 5; 20 MG/1; MG/1
1 TABLET ORAL DAILY
Qty: 90 TABLET | Refills: 0 | Status: SHIPPED | OUTPATIENT
Start: 2023-08-01

## 2023-08-04 ENCOUNTER — TELEPHONE (OUTPATIENT)
Dept: NEUROLOGY | Facility: CLINIC | Age: 63
End: 2023-08-04

## 2023-08-04 NOTE — TELEPHONE ENCOUNTER
I just received a form on my desk for patient to renew her standing lab order through [facility] [employer]. Patient is very overdue for follow up, last seen by Dr. Carley Garcia 6/7/22. Also, her last several sets of labs do not appear to have ever been sent to the providers (myself or Dr. Carley Garcia) to review? ? Looks like scanned documents have just been scanned in and reviewed by nursing, not routed. Her recent labs 7/27/23 show a low plt count of 100, and mentions it may actually be lower. She has had issues with thrombocytopenia in the past, has been referred by heme by our office and her PCP, and has not seen heme. She needs to follow up with PCP regarding the low plt result from 7/27. She is on anticoagulation. She needs an appt with myself or Dr. Carley Garcia next avail to follow up. Is she still taking Aubagio?

## 2023-08-07 NOTE — TELEPHONE ENCOUNTER
Called and Left a message on pt's answering machine for a call back      Second attempt. Letter sent.

## 2023-08-08 ENCOUNTER — OFFICE VISIT (OUTPATIENT)
Dept: WOUND CARE | Facility: HOSPITAL | Age: 63
End: 2023-08-08
Payer: COMMERCIAL

## 2023-08-08 VITALS
DIASTOLIC BLOOD PRESSURE: 81 MMHG | SYSTOLIC BLOOD PRESSURE: 125 MMHG | TEMPERATURE: 97.5 F | RESPIRATION RATE: 18 BRPM | HEART RATE: 54 BPM

## 2023-08-08 DIAGNOSIS — I73.9 PAD (PERIPHERAL ARTERY DISEASE) (HCC): ICD-10-CM

## 2023-08-08 DIAGNOSIS — Z89.611 S/P AKA (ABOVE KNEE AMPUTATION) UNILATERAL, RIGHT (HCC): ICD-10-CM

## 2023-08-08 DIAGNOSIS — S81.811A SKIN TEAR OF RIGHT LOWER LEG WITHOUT COMPLICATION, INITIAL ENCOUNTER: ICD-10-CM

## 2023-08-08 DIAGNOSIS — S91.102A OPEN WOUND OF LEFT GREAT TOE WITHOUT DAMAGE TO NAIL, INITIAL ENCOUNTER: ICD-10-CM

## 2023-08-08 DIAGNOSIS — G35 MULTIPLE SCLEROSIS (HCC): ICD-10-CM

## 2023-08-08 DIAGNOSIS — S91.302A OPEN WOUND OF LEFT FOOT, INITIAL ENCOUNTER: Primary | ICD-10-CM

## 2023-08-08 PROCEDURE — 97597 DBRDMT OPN WND 1ST 20 CM/<: CPT | Performed by: STUDENT IN AN ORGANIZED HEALTH CARE EDUCATION/TRAINING PROGRAM

## 2023-08-08 PROCEDURE — 99214 OFFICE O/P EST MOD 30 MIN: CPT | Performed by: STUDENT IN AN ORGANIZED HEALTH CARE EDUCATION/TRAINING PROGRAM

## 2023-08-08 RX ORDER — LIDOCAINE HYDROCHLORIDE 40 MG/ML
5 SOLUTION TOPICAL ONCE
Status: COMPLETED | OUTPATIENT
Start: 2023-08-08 | End: 2023-08-08

## 2023-08-08 RX ADMIN — LIDOCAINE HYDROCHLORIDE 5 ML: 40 SOLUTION TOPICAL at 12:16

## 2023-08-08 NOTE — PROGRESS NOTES
Patient ID: Donnell Holman is a 58 y.o. female Date of Birth 1960     Chief Complaint  Chief Complaint   Patient presents with   • Follow Up Wound Care Visit     Left foot wound       Allergies  Patient has no known allergies. Assessment:     Diagnoses and all orders for this visit:    Open wound of left foot, initial encounter  -     lidocaine (XYLOCAINE) 4 % topical solution 5 mL  -     Wound cleansing and dressings; Future    Open wound of left great toe without damage to nail, initial encounter  -     lidocaine (XYLOCAINE) 4 % topical solution 5 mL  -     Wound cleansing and dressings; Future  -     Debridement    Skin tear of right lower leg without complication, initial encounter  -     Debridement    PAD (peripheral artery disease) ([facility])    Multiple sclerosis ([facility])    S/P AKA (above knee amputation) unilateral, right ([address])              Debridement   Wound 06/13/23 Traumatic Toe (Comment  which one) Left;Medial    Universal Protocol:  Consent: Verbal consent obtained. Risks and benefits: risks, benefits and alternatives were discussed  Consent given by: patient  Time out: Immediately prior to procedure a "time out" was called to verify the correct patient, procedure, equipment, support staff and site/side marked as required.   Patient identity confirmed: verbally with patient      Performed by: physician  Debridement type: selective  Pain control: lidocaine 4%  Pre-debridement measurements  Length (cm): 3.4  Width (cm): 1  Depth (cm): 0.1  Surface Area (cm^2): 3.4  Volume (cm^3): 0.34      Post-debridement measurements  Length (cm): 3.4  Width (cm): 1  Depth (cm): 0.1  Percent debrided: 90%  Surface Area (cm^2): 3.4  Area debrided (cm^2): 3.06  Volume (cm^3): 0.34    Devitalized tissue debrided: exudate  Instrument(s) utilized: curette  Bleeding: small  Hemostasis obtained with: pressure  Procedural pain (0-10): 1  Post-procedural pain: 0   Response to treatment: procedure was tolerated well    Debridement   Wound 08/08/23 Skin Tear Leg Right;Upper    Universal Protocol:  Consent: Verbal consent obtained. Risks and benefits: risks, benefits and alternatives were discussed  Consent given by: patient  Time out: Immediately prior to procedure a "time out" was called to verify the correct patient, procedure, equipment, support staff and site/side marked as required. Patient identity confirmed: verbally with patient      Performed by: physician  Debridement type: selective  Pain control: lidocaine 4%  Pre-debridement measurements  Length (cm): 1  Width (cm): 1.2  Depth (cm): 0.1  Surface Area (cm^2): 1.2  Volume (cm^3): 0.12      Post-debridement measurements  Length (cm): 1  Width (cm): 1.2  Depth (cm): 0.1  Percent debrided: 90%  Surface Area (cm^2): 1.2  Area debrided (cm^2): 1.08  Volume (cm^3): 0.12    Devitalized tissue debrided: biofilm and exudate  Instrument(s) utilized: curette  Bleeding: small  Hemostasis obtained with: pressure  Procedural pain (0-10): 1  Post-procedural pain: 0   Response to treatment: procedure was tolerated well          Plan:  •  It was a pleasure to see Skylar Mobley for wound care follow up today  • Selective debridement performed today as above toe wound and new Right lower extremity wound. Plantar foot wound is healed today. • Continue plan of care as noted below with Hydrofera Blue ready to right lower extremity wound and Dermagran to toe. • Patient continues with smoking cessation. Last cigarette was 4 weeks ago. • No signs or symptoms of infection today. Patient understands that if any signs of infection start (such as increased redness, drainage, pain, fever, chills, diaphoresis), they should call our office or proceed to the ER or Urgent Care. • Patient should continue a high protein diet to facilitate wound healing  • Patient is advised to not submerge wound or leave wound open to air.   • Follow up in 2 weeks  • Given the multi-factorial nature of wound care, additional time was taken to review patient's treatment plan with other specialties and most recent pertinent lab work and imaging. • All plans of care discussed with patient at bedside who verbalized understanding with treatment plan. Wound 06/13/23 Traumatic Toe (Comment  which one) Left;Medial (Active)   Wound Image Images linked 08/08/23 1213   Wound Description Yellow;Slough;Pink;Eschar;Epithelialization 08/08/23 1213   Ivon-wound Assessment Dry;Edema 08/08/23 1213   Wound Length (cm) 3.4 cm 08/08/23 1213   Wound Width (cm) 1 cm 08/08/23 1213   Wound Depth (cm) 0.1 cm 08/08/23 1213   Wound Surface Area (cm^2) 3.4 cm^2 08/08/23 1213   Wound Volume (cm^3) 0.34 cm^3 08/08/23 1213   Calculated Wound Volume (cm^3) 0.34 cm^3 08/08/23 1213   Change in Wound Size % 90.23 08/08/23 1213   Drainage Amount Small 08/08/23 1213   Drainage Description Serosanguineous 08/08/23 1213   Non-staged Wound Description Full thickness 08/08/23 1213       Wound 06/13/23 Traumatic Foot Left;Plantar (Active)   Wound Image Images linked 08/08/23 1212   Wound Description Epithelialization 08/08/23 1209   Ivon-wound Assessment Dry;Scaly 08/08/23 1209   Wound Length (cm) 0 cm 08/08/23 1209   Wound Width (cm) 0 cm 08/08/23 1209   Wound Depth (cm) 0 cm 08/08/23 1209   Wound Surface Area (cm^2) 0 cm^2 08/08/23 1209   Wound Volume (cm^3) 0 cm^3 08/08/23 1209   Calculated Wound Volume (cm^3) 0 cm^3 08/08/23 1209   Change in Wound Size % 100 08/08/23 1209   Drainage Amount None 08/08/23 1209   Non-staged Wound Description Not applicable 29/10/24 8393       Wound 08/08/23 Skin Tear Leg Right;Upper (Active)   Wound Image Images linked 08/08/23 1224   Wound Description Beefy red;Granulation tissue; Yellow;Black 08/08/23 1223   Ivon-wound Assessment Dry; Intact 08/08/23 1223   Wound Length (cm) 1 cm 08/08/23 1223   Wound Width (cm) 1.2 cm 08/08/23 1223   Wound Depth (cm) 0.1 cm 08/08/23 1223   Wound Surface Area (cm^2) 1.2 cm^2 08/08/23 1223 Wound Volume (cm^3) 0.12 cm^3 08/08/23 1223   Calculated Wound Volume (cm^3) 0.12 cm^3 08/08/23 1223   Drainage Amount Moderate 08/08/23 1223   Drainage Description Sanguineous 08/08/23 1223   Non-staged Wound Description Full thickness 08/08/23 1223       Wound 06/13/23 Traumatic Toe (Comment  which one) Left;Medial (Active)   Date First Assessed/Time First Assessed: 06/13/23 1024   Primary Wound Type: Traumatic  Location: (c) Toe (Comment  which one)  Wound Location Orientation: Left;Medial  Wound Description (Comments): LEFT GREAT TOE       Wound 06/13/23 Traumatic Foot Left;Plantar (Active)   Date First Assessed/Time First Assessed: 06/13/23 1025   Primary Wound Type: Traumatic  Location: Foot  Wound Location Orientation: Left;Plantar  Wound Outcome: Healed       Wound 08/08/23 Skin Tear Leg Right;Upper (Active)   Date First Assessed/Time First Assessed: 08/08/23 1221   Primary Wound Type: Skin Tear  Location: Leg  Wound Location Orientation: Right;Upper  Wound Description (Comments): AKA amp site       [REMOVED] Wound 05/22/23 Toe (Comment  which one) Anterior; Left (Removed)   Resolved Date: 06/13/23  Date First Assessed/Time First Assessed: 05/22/23 2005   Location: Toe (Comment  which one)  Wound Location Orientation: Anterior; Left  Dressing Status: Open to air  Wound Outcome: (c) Other (Comment)       [REMOVED] Wound 05/22/23 Foot Anterior; Left (Removed)   Resolved Date: 06/13/23  Date First Assessed/Time First Assessed: 05/22/23 2030   Location: Foot  Wound Location Orientation: Anterior; Left  Dressing Status: Open to air  Wound Outcome: (c) Other (Comment)       [REMOVED] Wound 05/22/23 Ankle Anterior; Left (Removed)   Resolved Date: 06/13/23  Date First Assessed/Time First Assessed: 05/22/23 2005   Location: Ankle  Wound Location Orientation: Anterior; Left  Dressing Status: Open to air  Wound Outcome: (c) Other (Comment)       [REMOVED] Wound 06/13/23 Traumatic Foot Anterior; Left (Removed)   Resolved Date: 06/20/23  Date First Assessed/Time First Assessed: 06/13/23 1025   Primary Wound Type: Traumatic  Location: Foot  Wound Location Orientation: Anterior; Left  Wound Outcome: Healed       Subjective:      .    8/8/23: Continues with smoking cessation with last cigarette 4 weeks ago. Happy with wound healing. Did allow RN to evaluate right AKA skin integrity. Open wound noted today. 7/25/23: Has been using Dermagran as directed. No local or systemic signs of infection. Stated that she stopped using cigarettes 2 weeks ago.     7/11/23: VNA nurses 3 times a week. Patient with no concerns today however noted plantar wound with more pain. More drainage as well. No systemic signs of infection. Primary dressing: toe wound- dermagran, plantar wound- actacoat 3     6/20/23: Wound care being done by visiting nurses at home. Silver alginate. patient with no complaints today. Pain only with wound dressing change. No symptoms of infection today.      6/13/23: (Consult) Izzy Isbell is a pleasant 59 yo F with a medical history of current tobacco use (1/2 ppd), hx of R AKA 2/2 critical limb ischemia, PAD, MS, here today for initial wound care visit for multiple wounds of the L foot. Patient first noted the wounds about 4-5 weeks ago. She has been attempting to remove callus from her feet and notes wounds that opened up and then worsened. She presented to [facility] on 5/22 and was discharged on 6/1. During her hospitalization, she has local wound care, IV abx with cefazolin and two xrays that showed no bony involvement. Per ID, there was no need for discharge on PO abx. She was also found to have >75% stenosis of the L SFA and underwent angiolplasty and DCB during admission. She was instructed to follow up with wound care, podiatry and vascular surgery. Patient presented with dry gauze covering the wound and maceration. She is still smoking. She is not a diabetic. She has VNA coming 3x/week.       The following portions of the patient's history were reviewed and updated as appropriate: allergies, current medications, past family history, past medical history, past social history, past surgical history and problem list.    Review of Systems   Skin: Positive for wound. All other systems reviewed and are negative. Objective:       Wound 06/13/23 Traumatic Toe (Comment  which one) Left;Medial (Active)   Wound Image Images linked 08/08/23 1213   Wound Description Yellow;Slough;Pink;Eschar;Epithelialization 08/08/23 1213   Ivon-wound Assessment Dry;Edema 08/08/23 1213   Wound Length (cm) 3.4 cm 08/08/23 1213   Wound Width (cm) 1 cm 08/08/23 1213   Wound Depth (cm) 0.1 cm 08/08/23 1213   Wound Surface Area (cm^2) 3.4 cm^2 08/08/23 1213   Wound Volume (cm^3) 0.34 cm^3 08/08/23 1213   Calculated Wound Volume (cm^3) 0.34 cm^3 08/08/23 1213   Change in Wound Size % 90.23 08/08/23 1213   Drainage Amount Small 08/08/23 1213   Drainage Description Serosanguineous 08/08/23 1213   Non-staged Wound Description Full thickness 08/08/23 1213       Wound 06/13/23 Traumatic Foot Left;Plantar (Active)   Wound Image Images linked 08/08/23 1212   Wound Description Epithelialization 08/08/23 1209   Ivon-wound Assessment Dry;Scaly 08/08/23 1209   Wound Length (cm) 0 cm 08/08/23 1209   Wound Width (cm) 0 cm 08/08/23 1209   Wound Depth (cm) 0 cm 08/08/23 1209   Wound Surface Area (cm^2) 0 cm^2 08/08/23 1209   Wound Volume (cm^3) 0 cm^3 08/08/23 1209   Calculated Wound Volume (cm^3) 0 cm^3 08/08/23 1209   Change in Wound Size % 100 08/08/23 1209   Drainage Amount None 08/08/23 1209   Non-staged Wound Description Not applicable 83/39/49 2230       Wound 08/08/23 Skin Tear Leg Right;Upper (Active)   Wound Image Images linked 08/08/23 1224   Wound Description Beefy red;Granulation tissue; Yellow;Black 08/08/23 1223   Ivon-wound Assessment Dry; Intact 08/08/23 1223   Wound Length (cm) 1 cm 08/08/23 1223   Wound Width (cm) 1.2 cm 08/08/23 1223 Wound Depth (cm) 0.1 cm 08/08/23 1223   Wound Surface Area (cm^2) 1.2 cm^2 08/08/23 1223   Wound Volume (cm^3) 0.12 cm^3 08/08/23 1223   Calculated Wound Volume (cm^3) 0.12 cm^3 08/08/23 1223   Drainage Amount Moderate 08/08/23 1223   Drainage Description Sanguineous 08/08/23 1223   Non-staged Wound Description Full thickness 08/08/23 1223       /81   Pulse (!) 54   Temp 97.5 °F (36.4 °C) (Temporal)   Resp 18     Physical Exam  Vitals reviewed. Constitutional:       Appearance: Normal appearance. HENT:      Head: Normocephalic and atraumatic. Mouth/Throat:      Mouth: Mucous membranes are moist.   Eyes:      Extraocular Movements: Extraocular movements intact. Pulmonary:      Effort: Pulmonary effort is normal.   Skin:     Comments: Right lower extremity wound directly below healed incision sites of AKA. Healthy wound bed tissue. Sanguinous drainage. Toe wound with callus deposition. Overall very dry. Plantar left foot  wound fully epithelialized   Neurological:      Mental Status: She is alert. Psychiatric:         Mood and Affect: Mood normal.         Behavior: Behavior normal.                       Wound Instructions:  Orders Placed This Encounter   Procedures   • Wound cleansing and dressings     Wound cleansing and dressings         Wash your hands with soap and water. Remove old dressing, discard into plastic bag and place in trash. Cleanse the wound with soap and water prior to applying a clean dressing. Do not use tissue or cotton balls. Do not scrub the wound. Pat dry using gauze. Shower yes . Wash your foot last, rinse and then get out of shower immediately. Do not soak wounds. Apply moisturizer to skin surrounding wound  Apply Dermagran to the left great toe wound. Cover with gauze. Secure with rolled gauze and tape. Change dressing three times per week. Apply hydrofera blue ready to the right AKA amp site. Cover with gauze.   Secure with tape or rolled gauze and tape. Change dressing three times per week.     This was done today                             Wound home care      Community [facility]: Please continue seeing pt for wound care 2-3x/week. Standing Status:   Future     Standing Expiration Date:   8/8/2024   • Debridement     This order was created via procedure documentation   • Debridement     This order was created via procedure documentation        Diagnosis ICD-10-CM Associated Orders   1. Open wound of left foot, initial encounter  S91.302A lidocaine (XYLOCAINE) 4 % topical solution 5 mL     Wound cleansing and dressings      2. Open wound of left great toe without damage to nail, initial encounter  S91.102A lidocaine (XYLOCAINE) 4 % topical solution 5 mL     Wound cleansing and dressings     Debridement      3. Skin tear of right lower leg without complication, initial encounter  S81.811A Debridement      4. PAD (peripheral artery disease) ([facility])  I73.9       5. Multiple sclerosis ([address])  G35       6. S/P AKA (above knee amputation) unilateral, right [facility])  Z89.611            --  Alisa Diaz MD    "This note has been constructed using a voice recognition system. Therefore there may be syntax, spelling, and/or grammatical errors. Occasional wrong word or "sound alike" substitutions may have occurred due to the inherent limitations of voice recognition software. Read the chart carefully and recognize, using context, where substitutions have occurred.  Please call if you have any questions."

## 2023-08-08 NOTE — LETTER
[id number] [address] [facility] [address]  Phone#  [phone]  Fax#  [phone]    Patient:  Tawnya Dash  YOB: 1960  Phone:  [phone]  Date of Visit:  8/8/2023    Orders Placed This Encounter   Procedures   • Wound cleansing and dressings     Wound cleansing and dressings         Wash your hands with soap and water. Remove old dressing, discard into plastic bag and place in trash. Cleanse the wound with soap and water prior to applying a clean dressing. Do not use tissue or cotton balls. Do not scrub the wound. Pat dry using gauze. Shower yes . Wash your foot last, rinse and then get out of shower immediately. Do not soak wounds. Apply moisturizer to skin surrounding wound  Apply Dermagran to the left great toe wound. Cover with gauze. Secure with rolled gauze and tape. Change dressing three times per week. Apply hydrofera blue ready to the right AKA amp site. Cover with gauze. Secure with tape or rolled gauze and tape. Change dressing three times per week. This was done today                             Wound home care      [facility]: Please continue seeing pt for wound care 2-3x/week.      Standing Status:   Future     Standing Expiration Date:   8/8/2024   • Debridement     This order was created via procedure documentation   • Debridement     This order was created via procedure documentation         Electronically signed by Jaswinder Thornton MD No

## 2023-08-08 NOTE — TELEPHONE ENCOUNTER
Received:  08-7-2023, 2:52:03 pm EDT  Taken off: 8/7/2023, 10:11pm    Patient returning call. Requesting call back.     [phone]

## 2023-08-08 NOTE — TELEPHONE ENCOUNTER
August 8, 2023  Me  to [address]      8/8/23  9:17 AM  Derick Cheng,     We have been trying to reach you, but have been unsuccessful. We need to address the following issues:     • Your recent labwork shows a low platelet count of 621 (it also mentions that it may be lower). You have had issues with thrombocytopenia in the past and were referred to Hematology by both our practice and your primary care team. We do not see that you have been evaluated by Hematology to date.     • You need to follow up with your primary care team regarding the 7/27/23 low platelet result, especially since you are on anticoagulation.     • You were last seen by Dr. Charu Amezquita on 6/7/22 and are overdue for a follow up appointment. Please call the office at [phone] (option 1) to schedule an appointment with Dr. Charu Amezquita or Lashawn Norman.     • Are you still taking Aubagio?     Please respond to this [employer] message or call the office at [phone] and leave a detailed message.     Have a good day!        Thank you for choosing [facility] for your healthcare needs!

## 2023-08-08 NOTE — PATIENT INSTRUCTIONS
Orders Placed This Encounter   Procedures    Wound cleansing and dressings     Wound cleansing and dressings         Wash your hands with soap and water. Remove old dressing, discard into plastic bag and place in trash. Cleanse the wound with soap and water prior to applying a clean dressing. Do not use tissue or cotton balls. Do not scrub the wound. Pat dry using gauze. Shower yes . Wash your foot last, rinse and then get out of shower immediately. Do not soak wounds. Apply moisturizer to skin surrounding wound  Apply Dermagran to the left great toe wound. Cover with gauze. Secure with rolled gauze and tape. Change dressing three times per week. Apply hydrofera blue ready to the right AKA amp site. Cover with gauze. Secure with tape or rolled gauze and tape. Change dressing three times per week. This was done today                             Wound home care      Community [facility]: Please continue seeing pt for wound care 2-3x/week.      Standing Status:   Future     Standing Expiration Date:   8/8/2024

## 2023-08-22 ENCOUNTER — OFFICE VISIT (OUTPATIENT)
Dept: WOUND CARE | Facility: HOSPITAL | Age: 63
End: 2023-08-22
Payer: COMMERCIAL

## 2023-08-22 VITALS
TEMPERATURE: 98.3 F | RESPIRATION RATE: 15 BRPM | SYSTOLIC BLOOD PRESSURE: 135 MMHG | DIASTOLIC BLOOD PRESSURE: 86 MMHG | HEART RATE: 59 BPM

## 2023-08-22 DIAGNOSIS — G35 MULTIPLE SCLEROSIS (HCC): ICD-10-CM

## 2023-08-22 DIAGNOSIS — Z89.611 S/P AKA (ABOVE KNEE AMPUTATION) UNILATERAL, RIGHT (HCC): ICD-10-CM

## 2023-08-22 DIAGNOSIS — S81.811A SKIN TEAR OF RIGHT LOWER LEG WITHOUT COMPLICATION, INITIAL ENCOUNTER: ICD-10-CM

## 2023-08-22 DIAGNOSIS — S91.102A OPEN WOUND OF LEFT GREAT TOE WITHOUT DAMAGE TO NAIL, INITIAL ENCOUNTER: ICD-10-CM

## 2023-08-22 DIAGNOSIS — I73.9 PAD (PERIPHERAL ARTERY DISEASE) (HCC): ICD-10-CM

## 2023-08-22 DIAGNOSIS — S91.302A OPEN WOUND OF LEFT FOOT, INITIAL ENCOUNTER: Primary | ICD-10-CM

## 2023-08-22 PROCEDURE — 97597 DBRDMT OPN WND 1ST 20 CM/<: CPT | Performed by: STUDENT IN AN ORGANIZED HEALTH CARE EDUCATION/TRAINING PROGRAM

## 2023-08-22 RX ORDER — LIDOCAINE HYDROCHLORIDE 40 MG/ML
5 SOLUTION TOPICAL ONCE
Status: COMPLETED | OUTPATIENT
Start: 2023-08-22 | End: 2023-08-22

## 2023-08-22 RX ADMIN — LIDOCAINE HYDROCHLORIDE 5 ML: 40 SOLUTION TOPICAL at 11:15

## 2023-08-22 NOTE — LETTER
[address] [address] [address] [address]  Phone#  [phone]  Fax#  [phone]    Patient:  Donna Melissa  YOB: 1960  Phone:  [phone]  Date of Visit:  8/22/2023    Orders Placed This Encounter   Procedures   • Wound cleansing and dressings     Wound cleansing and dressings   Right Leg Wound and Left Great Toe Wound:                              Wash your hands with soap and water. Remove old dressing, discard into plastic bag and place in trash. Cleanse the wound with soap and water prior to applying a clean dressing. Do not use tissue or cotton balls. Do not scrub the wound. Pat dry using gauze. Shower yes . Wash your foot last, rinse and then get out of shower immediately. Do not soak wounds. Apply moisturizer to skin surrounding wound  Apply Dermagran to the left great toe wound. Cover with gauze. Secure with rolled gauze and tape. Change dressing three times per week. This was done today. Standing Status:   Future     Standing Expiration Date:   8/22/2024   • Wound home care      [facility]: Please continue seeing pt for wound care 2-3x/week.      Standing Status:   Future     Standing Expiration Date:   8/22/2024         Electronically signed by Valentino Wesley MD

## 2023-08-22 NOTE — PATIENT INSTRUCTIONS
Orders Placed This Encounter   Procedures    Wound cleansing and dressings     Wound cleansing and dressings   Right Leg Wound and Left Great Toe Wound:                              Wash your hands with soap and water. Remove old dressing, discard into plastic bag and place in trash. Cleanse the wound with soap and water prior to applying a clean dressing. Do not use tissue or cotton balls. Do not scrub the wound. Pat dry using gauze. Shower yes . Wash your foot last, rinse and then get out of shower immediately. Do not soak wounds. Apply moisturizer to skin surrounding wound  Apply Dermagran to the left great toe wound. Cover with gauze. Secure with rolled gauze and tape. Change dressing three times per week. This was done today. Standing Status:   Future     Standing Expiration Date:   8/22/2024    Wound home care      Community [facility]: Please continue seeing pt for wound care 2-3x/week.      Standing Status:   Future     Standing Expiration Date:   8/22/2024

## 2023-08-22 NOTE — PROGRESS NOTES
Patient ID: Dudley Rosa is a 58 y.o. female Date of Birth 1960     Chief Complaint  Chief Complaint   Patient presents with   • Follow Up Wound Care Visit     Right leg, left foot       Allergies  Patient has no known allergies. Assessment:     Diagnoses and all orders for this visit:    Open wound of left foot, initial encounter  -     Wound cleansing and dressings; Future  -     Wound home care; Future  -     Debridement    Open wound of left great toe without damage to nail, initial encounter  -     lidocaine (XYLOCAINE) 4 % topical solution 5 mL  -     Wound cleansing and dressings; Future  -     Wound home care; Future  -     Debridement    Skin tear of right lower leg without complication, initial encounter  -     lidocaine (XYLOCAINE) 4 % topical solution 5 mL  -     Wound cleansing and dressings; Future  -     Wound home care; Future    PAD (peripheral artery disease) ([facility])  -     lidocaine (XYLOCAINE) 4 % topical solution 5 mL  -     Wound cleansing and dressings; Future  -     Wound home care; Future    S/P AKA (above knee amputation) unilateral, right ([facility])  -     lidocaine (XYLOCAINE) 4 % topical solution 5 mL  -     Wound cleansing and dressings; Future  -     Wound home care; Future    Multiple sclerosis ([facility])  -     lidocaine (XYLOCAINE) 4 % topical solution 5 mL  -     Wound cleansing and dressings; Future  -     Wound home care; Future              Debridement   Wound 06/13/23 Traumatic Toe (Comment  which one) Left;Medial    Universal Protocol:  Consent: Verbal consent obtained. Risks and benefits: risks, benefits and alternatives were discussed  Consent given by: patient  Time out: Immediately prior to procedure a "time out" was called to verify the correct patient, procedure, equipment, support staff and site/side marked as required.   Patient identity confirmed: verbally with patient      Performed by: physician  Debridement type: selective  Pain control: lidocaine 4%  Pre-debridement measurements  Length (cm): 0.3  Width (cm): 0.3  Depth (cm): 0  Surface Area (cm^2): 0.09  Volume (cm^3): 0      Post-debridement measurements  Length (cm): 0.3  Width (cm): 0.3  Depth (cm): 0  Percent debrided: 100%  Surface Area (cm^2): 0.09  Area debrided (cm^2): 0.09  Volume (cm^3): 0    Devitalized tissue debrided: biofilm and exudate  Instrument(s) utilized: curette  Bleeding: small  Hemostasis obtained with: pressure  Procedural pain (0-10): 2  Post-procedural pain: 1   Response to treatment: procedure was tolerated well    Debridement   Wound 08/08/23 Skin Tear Leg Right;Upper    Universal Protocol:  Consent: Verbal consent obtained. Risks and benefits: risks, benefits and alternatives were discussed  Consent given by: patient  Time out: Immediately prior to procedure a "time out" was called to verify the correct patient, procedure, equipment, support staff and site/side marked as required. Patient identity confirmed: verbally with patient      Performed by: physician  Debridement type: selective  Pain control: lidocaine 4%  Pre-debridement measurements  Length (cm): 0.8  Width (cm): 0.7  Depth (cm): 0  Surface Area (cm^2): 0.56  Volume (cm^3): 0      Post-debridement measurements  Length (cm): 0.8  Width (cm): 0.7  Depth (cm): 0  Percent debrided: 100%  Surface Area (cm^2): 0.56  Area debrided (cm^2): 0.56  Volume (cm^3): 0    Devitalized tissue debrided: biofilm and exudate  Instrument(s) utilized: curette  Bleeding: small  Hemostasis obtained with: pressure  Procedural pain (0-10): 0  Post-procedural pain: 0   Response to treatment: procedure was tolerated well          Plan:  •  It was a pleasure to see Skylar Mobley for wound care follow up today  • Selective debridement performed today as above  • Wound is improving   • Continue plan of care as noted below with dermagran to both wounds  • X-ray of the left foot ordered at last visit showed no osseous involvement.   • Patient has not been smoking cigarettes for the past month  • No signs or symptoms of infection today. Patient understands that if any signs of infection start (such as increased redness, drainage, pain, fever, chills, diaphoresis), they should call our office or proceed to the ER or Urgent Care. • Patient should continue a high protein diet to facilitate wound healing  • Patient is advised to not submerge wound or leave wound open to air. • Follow up in 2 weeks  • Given the multi-factorial nature of wound care, additional time was taken to review patient's treatment plan with other specialties and most recent pertinent lab work and imaging. • All plans of care discussed with patient at bedside who verbalized understanding with treatment plan. Wound 06/13/23 Traumatic Toe (Comment  which one) Left;Medial (Active)   Wound Image Images linked 08/22/23 1112   Wound Description Epithelialization;Brown; Other (Comment);Dry (scabs) 08/22/23 1111   Ivon-wound Assessment Dry;Edema;Purple;Pink 08/22/23 1111   Wound Length (cm) 0.3 cm 08/22/23 1111   Wound Width (cm) 0.3 cm 08/22/23 1111   Wound Depth (cm) 0 cm 08/22/23 1111   Wound Surface Area (cm^2) 0.09 cm^2 08/22/23 1111   Wound Volume (cm^3) 0 cm^3 08/22/23 1111   Calculated Wound Volume (cm^3) 0 cm^3 08/22/23 1111   Change in Wound Size % 100 08/22/23 1111   Drainage Amount None 08/22/23 1111   Non-staged Wound Description Full thickness 08/22/23 1111   Dressing Status Other (Comment) (no dressing upon arrival) 08/22/23 1111       Wound 08/08/23 Skin Tear Leg Right;Upper (Active)   Wound Image Images linked 08/22/23 1110   Wound Description Lorelie Done; Other (Comment) (scab) 08/22/23 1109   Ivon-wound Assessment Dry;Pink 08/22/23 1109   Wound Length (cm) 0.8 cm 08/22/23 1109   Wound Width (cm) 0.7 cm 08/22/23 1109   Wound Depth (cm) 0 cm 08/22/23 1109   Wound Surface Area (cm^2) 0.56 cm^2 08/22/23 1109   Wound Volume (cm^3) 0 cm^3 08/22/23 1109   Calculated Wound Volume (cm^3) 0 cm^3 08/22/23 1109 Change in Wound Size % 100 08/22/23 1109   Drainage Amount None 08/22/23 1109   Non-staged Wound Description Full thickness 08/22/23 1109   Dressing Status Intact (upon arrival) 08/22/23 1109       Wound 06/13/23 Traumatic Toe (Comment  which one) Left;Medial (Active)   Date First Assessed/Time First Assessed: 06/13/23 1024   Primary Wound Type: Traumatic  Location: (c) Toe (Comment  which one)  Wound Location Orientation: Left;Medial  Wound Description (Comments): LEFT GREAT TOE       Wound 08/08/23 Skin Tear Leg Right;Upper (Active)   Date First Assessed/Time First Assessed: 08/08/23 1221   Primary Wound Type: Skin Tear  Location: Leg  Wound Location Orientation: Right;Upper  Wound Description (Comments): AKA amp site       [REMOVED] Wound 05/22/23 Toe (Comment  which one) Anterior; Left (Removed)   Resolved Date: 06/13/23  Date First Assessed/Time First Assessed: 05/22/23 2005   Location: Toe (Comment  which one)  Wound Location Orientation: Anterior; Left  Dressing Status: Open to air  Wound Outcome: (c) Other (Comment)       [REMOVED] Wound 05/22/23 Foot Anterior; Left (Removed)   Resolved Date: 06/13/23  Date First Assessed/Time First Assessed: 05/22/23 2030   Location: Foot  Wound Location Orientation: Anterior; Left  Dressing Status: Open to air  Wound Outcome: (c) Other (Comment)       [REMOVED] Wound 05/22/23 Ankle Anterior; Left (Removed)   Resolved Date: 06/13/23  Date First Assessed/Time First Assessed: 05/22/23 2005   Location: Ankle  Wound Location Orientation: Anterior; Left  Dressing Status: Open to air  Wound Outcome: (c) Other (Comment)       [REMOVED] Wound 06/13/23 Traumatic Foot Anterior; Left (Removed)   Resolved Date: 06/20/23  Date First Assessed/Time First Assessed: 06/13/23 1025   Primary Wound Type: Traumatic  Location: Foot  Wound Location Orientation: Anterior; Left  Wound Outcome: Healed       [REMOVED] Wound 06/13/23 Traumatic Foot Left;Plantar (Removed)   Resolved Date: 08/08/23  Date First Assessed/Time First Assessed: 06/13/23 1025   Primary Wound Type: Traumatic  Location: Foot  Wound Location Orientation: Left;Plantar  Wound Outcome: Healed       Subjective:      .    8/22/23: Patient still successful tobacco cessation. Happy with wound healing. 8/8/23: Continues with smoking cessation with last cigarette 4 weeks ago. Happy with wound healing. Did allow RN to evaluate right AKA skin integrity. Open wound noted today.     7/25/23: Has been using Dermagran as directed. No local or systemic signs of infection. Stated that she stopped using cigarettes 2 weeks ago.     7/11/23: VNA nurses 3 times a week. Patient with no concerns today however noted plantar wound with more pain. More drainage as well. No systemic signs of infection. Primary dressing: toe wound- dermagran, plantar wound- actacoat 3     6/20/23: Wound care being done by visiting nurses at home. Silver alginate. patient with no complaints today. Pain only with wound dressing change. No symptoms of infection today.      6/13/23: (Consult) Charles Vera is a pleasant 57 yo F with a medical history of current tobacco use (1/2 ppd), hx of R AKA 2/2 critical limb ischemia, PAD, MS, here today for initial wound care visit for multiple wounds of the L foot. Patient first noted the wounds about 4-5 weeks ago. She has been attempting to remove callus from her feet and notes wounds that opened up and then worsened. She presented to [facility] on 5/22 and was discharged on 6/1. During her hospitalization, she has local wound care, IV abx with cefazolin and two xrays that showed no bony involvement. Per ID, there was no need for discharge on PO abx. She was also found to have >75% stenosis of the L SFA and underwent angiolplasty and DCB during admission. She was instructed to follow up with wound care, podiatry and vascular surgery. Patient presented with dry gauze covering the wound and maceration. She is still smoking.  She is not a diabetic. She has VNA coming 3x/week. The following portions of the patient's history were reviewed and updated as appropriate: allergies, current medications, past family history, past medical history, past social history, past surgical history and problem list.    Review of Systems   Skin: Positive for wound. All other systems reviewed and are negative. Objective:       Wound 06/13/23 Traumatic Toe (Comment  which one) Left;Medial (Active)   Wound Image Images linked 08/22/23 1112   Wound Description Epithelialization;Brown; Other (Comment);Dry (scabs) 08/22/23 1111   Ivon-wound Assessment Dry;Edema;Purple;Pink 08/22/23 1111   Wound Length (cm) 0.3 cm 08/22/23 1111   Wound Width (cm) 0.3 cm 08/22/23 1111   Wound Depth (cm) 0 cm 08/22/23 1111   Wound Surface Area (cm^2) 0.09 cm^2 08/22/23 1111   Wound Volume (cm^3) 0 cm^3 08/22/23 1111   Calculated Wound Volume (cm^3) 0 cm^3 08/22/23 1111   Change in Wound Size % 100 08/22/23 1111   Drainage Amount None 08/22/23 1111   Non-staged Wound Description Full thickness 08/22/23 1111   Dressing Status Other (Comment) (no dressing upon arrival) 08/22/23 1111       Wound 08/08/23 Skin Tear Leg Right;Upper (Active)   Wound Image Images linked 08/22/23 1110   Wound Description Farzad Severino; Other (Comment) (scab) 08/22/23 1109   Ivon-wound Assessment Dry;Pink 08/22/23 1109   Wound Length (cm) 0.8 cm 08/22/23 1109   Wound Width (cm) 0.7 cm 08/22/23 1109   Wound Depth (cm) 0 cm 08/22/23 1109   Wound Surface Area (cm^2) 0.56 cm^2 08/22/23 1109   Wound Volume (cm^3) 0 cm^3 08/22/23 1109   Calculated Wound Volume (cm^3) 0 cm^3 08/22/23 1109   Change in Wound Size % 100 08/22/23 1109   Drainage Amount None 08/22/23 1109   Non-staged Wound Description Full thickness 08/22/23 1109   Dressing Status Intact (upon arrival) 08/22/23 1109       /86   Pulse 59   Temp 98.3 °F (36.8 °C)   Resp 15     Physical Exam  Vitals reviewed.    Constitutional:       Appearance: Normal appearance. HENT:      Head: Normocephalic and atraumatic. Mouth/Throat:      Mouth: Mucous membranes are moist.   Eyes:      Extraocular Movements: Extraocular movements intact. Pulmonary:      Effort: Pulmonary effort is normal.   Musculoskeletal:      Comments: R BKA   Skin:     Comments: Wound pressure wound of right BKA smaller than last exam.  Under dried exudate there is a very small opening with serosanguineous drainage. Toe wound smaller than last exam.  Underneath exudate there is a small area of drainage. Minimal erythema. Serosanguineous exudate. Neurological:      Mental Status: She is alert. Psychiatric:         Mood and Affect: Mood normal.         Behavior: Behavior normal.                 Wound Instructions:  Orders Placed This Encounter   Procedures   • Wound cleansing and dressings     Wound cleansing and dressings   Right Leg Wound and Left Great Toe Wound:                              Wash your hands with soap and water. Remove old dressing, discard into plastic bag and place in trash. Cleanse the wound with soap and water prior to applying a clean dressing. Do not use tissue or cotton balls. Do not scrub the wound. Pat dry using gauze. Shower yes . Wash your foot last, rinse and then get out of shower immediately. Do not soak wounds. Apply moisturizer to skin surrounding wound  Apply Dermagran to the left great toe wound. Cover with gauze. Secure with rolled gauze and tape. Change dressing three times per week. This was done today.        Standing Status:   Future     Standing Expiration Date:   8/22/2024   • Wound home care      [facility]: Please continue seeing pt for wound care 2-3x/week. Standing Status:   Future     Standing Expiration Date:   8/22/2024   • Debridement     This order was created via procedure documentation   • Debridement     This order was created via procedure documentation        Diagnosis ICD-10-CM Associated Orders   1.  Open wound of left foot, initial encounter  S91.302A Wound cleansing and dressings     Wound home care     Debridement      2. Open wound of left great toe without damage to nail, initial encounter  S91.102A lidocaine (XYLOCAINE) 4 % topical solution 5 mL     Wound cleansing and dressings     Wound home care     Debridement      3. Skin tear of right lower leg without complication, initial encounter  S81.811A lidocaine (XYLOCAINE) 4 % topical solution 5 mL     Wound cleansing and dressings     Wound home care      4. PAD (peripheral artery disease) ([facility])  I73.9 lidocaine (XYLOCAINE) 4 % topical solution 5 mL     Wound cleansing and dressings     Wound home care      5. S/P AKA (above knee amputation) unilateral, right ([facility])  Z89.611 lidocaine (XYLOCAINE) 4 % topical solution 5 mL     Wound cleansing and dressings     Wound home care      6. Multiple sclerosis ([facility])  G35 lidocaine (XYLOCAINE) 4 % topical solution 5 mL     Wound cleansing and dressings     Wound home care           --  Booker Henderson MD    "This note has been constructed using a voice recognition system. Therefore there may be syntax, spelling, and/or grammatical errors. Occasional wrong word or "sound alike" substitutions may have occurred due to the inherent limitations of voice recognition software. Read the chart carefully and recognize, using context, where substitutions have occurred.  Please call if you have any questions."

## 2023-08-30 DIAGNOSIS — R10.9 ABDOMINAL CRAMPING: ICD-10-CM

## 2023-08-30 DIAGNOSIS — K52.9 CHRONIC DIARRHEA: ICD-10-CM

## 2023-08-30 RX ORDER — DICYCLOMINE HYDROCHLORIDE 10 MG/1
10 CAPSULE ORAL
Qty: 270 CAPSULE | Refills: 0 | Status: SHIPPED | OUTPATIENT
Start: 2023-08-30

## 2023-09-05 ENCOUNTER — OFFICE VISIT (OUTPATIENT)
Dept: WOUND CARE | Facility: HOSPITAL | Age: 63
End: 2023-09-05
Payer: COMMERCIAL

## 2023-09-05 VITALS — TEMPERATURE: 98 F | SYSTOLIC BLOOD PRESSURE: 115 MMHG | HEART RATE: 58 BPM | DIASTOLIC BLOOD PRESSURE: 67 MMHG

## 2023-09-05 DIAGNOSIS — S81.811A SKIN TEAR OF RIGHT LOWER LEG WITHOUT COMPLICATION, INITIAL ENCOUNTER: ICD-10-CM

## 2023-09-05 DIAGNOSIS — G35 MULTIPLE SCLEROSIS (HCC): ICD-10-CM

## 2023-09-05 DIAGNOSIS — Z89.611 S/P AKA (ABOVE KNEE AMPUTATION) UNILATERAL, RIGHT (HCC): ICD-10-CM

## 2023-09-05 DIAGNOSIS — F17.200 SMOKER: ICD-10-CM

## 2023-09-05 DIAGNOSIS — S91.302A OPEN WOUND OF LEFT FOOT, INITIAL ENCOUNTER: Primary | ICD-10-CM

## 2023-09-05 DIAGNOSIS — I73.9 PAD (PERIPHERAL ARTERY DISEASE) (HCC): ICD-10-CM

## 2023-09-05 PROCEDURE — 97597 DBRDMT OPN WND 1ST 20 CM/<: CPT | Performed by: STUDENT IN AN ORGANIZED HEALTH CARE EDUCATION/TRAINING PROGRAM

## 2023-09-05 RX ORDER — LIDOCAINE HYDROCHLORIDE 40 MG/ML
5 SOLUTION TOPICAL ONCE
Status: COMPLETED | OUTPATIENT
Start: 2023-09-05 | End: 2023-09-05

## 2023-09-05 RX ADMIN — LIDOCAINE HYDROCHLORIDE 5 ML: 40 SOLUTION TOPICAL at 10:53

## 2023-09-05 NOTE — LETTER
[address] [address] [address] [address]  Phone#  [phone]  Fax#  [phone]    Patient:  Trina Alvarez  YOB: 1960  Phone:  [phone]  Date of Visit:  9/5/2023    Orders Placed This Encounter   Procedures   • Wound cleansing and dressings     Right Leg Wound and Left Great Toe Wound: Right leg wound is healed - apply moisturizer daily. Wash your hands with soap and water. Remove old dressing, discard into plastic bag and place in trash. Cleanse the wound with soap and water prior to applying a clean dressing. Do not use tissue or cotton balls. Do not scrub the wound. Pat dry using gauze. Shower yes. Wash your foot last, rinse and then get out of shower immediately. Do not soak wounds. Apply moisturizer to skin surrounding wound  Apply Dermagran to the left great toe wound. Cover with gauze. Secure with rolled gauze and tape. Change dressing three times per week. This was done today. Standing Status:   Future     Standing Expiration Date:   9/5/2024   • Wound miscellaneous orders     [facility]: Please continue seeing pt for wound care 2-3x/week.      Standing Status:   Future     Standing Expiration Date:   9/5/2024         Electronically signed by Jade Cohn MD

## 2023-09-05 NOTE — PROGRESS NOTES
Patient ID: Luisito Palumbo is a 58 y.o. female Date of Birth 1960     Chief Complaint  Chief Complaint   Patient presents with   • Follow Up Wound Care Visit     Open wound L foot, R AKA       Allergies  Patient has no known allergies. Assessment:     Diagnoses and all orders for this visit:    Open wound of left foot, initial encounter  -     lidocaine (XYLOCAINE) 4 % topical solution 5 mL  -     Wound cleansing and dressings; Future  -     Wound miscellaneous orders; Future  -     Debridement    Skin tear of right lower leg without complication, initial encounter  -     lidocaine (XYLOCAINE) 4 % topical solution 5 mL  -     Wound cleansing and dressings; Future  -     Wound miscellaneous orders; Future    PAD (peripheral artery disease) ([facility])  -     lidocaine (XYLOCAINE) 4 % topical solution 5 mL  -     Wound cleansing and dressings; Future  -     Wound miscellaneous orders; Future    S/P AKA (above knee amputation) unilateral, right ([facility])  -     lidocaine (XYLOCAINE) 4 % topical solution 5 mL  -     Wound cleansing and dressings; Future  -     Wound miscellaneous orders; Future    Multiple sclerosis ([facility])  -     lidocaine (XYLOCAINE) 4 % topical solution 5 mL  -     Wound cleansing and dressings; Future  -     Wound miscellaneous orders; Future    Smoker  -     lidocaine (XYLOCAINE) 4 % topical solution 5 mL  -     Wound cleansing and dressings; Future  -     Wound miscellaneous orders; Future              Debridement   Wound 06/13/23 Traumatic Toe (Comment  which one) Left;Medial    Universal Protocol:  Consent: Verbal consent obtained. Risks and benefits: risks, benefits and alternatives were discussed  Consent given by: patient  Time out: Immediately prior to procedure a "time out" was called to verify the correct patient, procedure, equipment, support staff and site/side marked as required.   Patient identity confirmed: verbally with patient      Performed by: physician  Debridement type: selective  Pain control: lidocaine 4%  Pre-debridement measurements  Length (cm): 1.1  Width (cm): 0.5  Depth (cm): 0  Surface Area (cm^2): 0.55  Volume (cm^3): 0    Post-debridement measurements  Length (cm): 0.3  Width (cm): 0.2  Depth (cm): 0.1  Percent debrided: 100%  Surface Area (cm^2): 0.06  Area debrided (cm^2): 0.06  Volume (cm^3): 0.01  Devitalized tissue debrided: biofilm and exudate  Instrument(s) utilized: curette  Bleeding: small  Hemostasis obtained with: pressure  Procedural pain (0-10): 1  Post-procedural pain: 0   Response to treatment: procedure was tolerated well          Plan:  • It was a pleasure to see Skylar Mobley for wound care follow up today  • Selective debridement performed today as above  • Wound is improving   • Continue plan of care as noted below with dermagran  • VNA coming to the home for wound dressing changes  • No signs or symptoms of infection today. Patient understands that if any signs of infection start (such as increased redness, drainage, pain, fever, chills, diaphoresis), they should call our office or proceed to the ER or Urgent Care. • Patient should continue a high protein diet to facilitate wound healing  • Patient is advised to not submerge wound or leave wound open to air. • Follow up in 2 weeks  • Given the multi-factorial nature of wound care, additional time was taken to review patient's treatment plan with other specialties and most recent pertinent lab work and imaging. • All plans of care discussed with patient at bedside who verbalized understanding with treatment plan.        Wound 06/13/23 Traumatic Toe (Comment  which one) Left;Medial (Active)   Wound Image Images linked 09/05/23 1042   Wound Description Brown;Dry;Tan;Eschar;Epithelialization 09/05/23 1042   Ivon-wound Assessment Dry;Edema;Purple 09/05/23 1042   Wound Length (cm) 1.1 cm 09/05/23 1042   Wound Width (cm) 0.5 cm 09/05/23 1042   Wound Depth (cm) 0 cm 09/05/23 1042   Wound Surface Area (cm^2) 0.55 cm^2 09/05/23 1042   Wound Volume (cm^3) 0 cm^3 09/05/23 1042   Calculated Wound Volume (cm^3) 0 cm^3 09/05/23 1042   Change in Wound Size % 100 09/05/23 1042   Drainage Amount None 09/05/23 1042   Non-staged Wound Description Full thickness 09/05/23 1042   Dressing Status -- (no drsg on arrival) 09/05/23 1042       [REMOVED] Wound 08/08/23 Surgical Leg Right;Upper;Distal (Removed)   Wound Image Images linked 09/05/23 1046   Wound Description Granulation tissue;Pink;Epithelialization 09/05/23 1044   Ivon-wound Assessment Dry; Intact 09/05/23 1044   Wound Length (cm) 0 cm 09/05/23 1044   Wound Width (cm) 0 cm 09/05/23 1044   Wound Depth (cm) 0 cm 09/05/23 1044   Wound Surface Area (cm^2) 0 cm^2 09/05/23 1044   Wound Volume (cm^3) 0 cm^3 09/05/23 1044   Calculated Wound Volume (cm^3) 0 cm^3 09/05/23 1044   Change in Wound Size % 100 09/05/23 1044   Drainage Amount None 09/05/23 1044   Non-staged Wound Description Not applicable 25/33/80 1111   Dressing Status -- (no drsg upon arrival) 09/05/23 1044       Wound 06/13/23 Traumatic Toe (Comment  which one) Left;Medial (Active)   Date First Assessed/Time First Assessed: 06/13/23 1024   Primary Wound Type: Traumatic  Location: (c) Toe (Comment  which one)  Wound Location Orientation: Left;Medial  Wound Description (Comments): LEFT GREAT TOE       [REMOVED] Wound 05/22/23 Toe (Comment  which one) Anterior; Left (Removed)   Resolved Date: 06/13/23  Date First Assessed/Time First Assessed: 05/22/23 2005   Location: Toe (Comment  which one)  Wound Location Orientation: Anterior; Left  Dressing Status: Open to air  Wound Outcome: (c) Other (Comment)       [REMOVED] Wound 05/22/23 Foot Anterior; Left (Removed)   Resolved Date: 06/13/23  Date First Assessed/Time First Assessed: 05/22/23 2030   Location: Foot  Wound Location Orientation: Anterior; Left  Dressing Status: Open to air  Wound Outcome: (c) Other (Comment)       [REMOVED] Wound 05/22/23 Ankle Anterior; Left (Removed)   Resolved Date: 06/13/23  Date First Assessed/Time First Assessed: 05/22/23 2005   Location: Ankle  Wound Location Orientation: Anterior; Left  Dressing Status: Open to air  Wound Outcome: (c) Other (Comment)       [REMOVED] Wound 06/13/23 Traumatic Foot Anterior; Left (Removed)   Resolved Date: 06/20/23  Date First Assessed/Time First Assessed: 06/13/23 1025   Primary Wound Type: Traumatic  Location: Foot  Wound Location Orientation: Anterior; Left  Wound Outcome: Healed       [REMOVED] Wound 06/13/23 Traumatic Foot Left;Plantar (Removed)   Resolved Date: 08/08/23  Date First Assessed/Time First Assessed: 06/13/23 1025   Primary Wound Type: Traumatic  Location: Foot  Wound Location Orientation: Left;Plantar  Wound Outcome: Healed       [REMOVED] Wound 08/08/23 Surgical Leg Right;Upper;Distal (Removed)   Resolved Date: 09/05/23  Date First Assessed/Time First Assessed: 08/08/23 1221   Primary Wound Type: Surgical  Location: Leg  Wound Location Orientation: Right;Upper;Distal  Wound Description (Comments): AKA amp site  Wound Outcome: Healed       Subjective:      .    9/5/23: Patient stopped dressing right AKA wound as she was told by VNA that it is healed. Thinning of the left foot fell off few days ago however patient did not have help in putting a dressing in place. There is a scab present today. Denies any local or systemic symptoms of infection. 8/22/23: Patient still successful tobacco cessation. Happy with wound healing.      8/8/23: Continues with smoking cessation with last cigarette 4 weeks ago. Happy with wound healing. Did allow RN to evaluate right AKA skin integrity. Open wound noted today.     7/25/23: Has been using Dermagran as directed. No local or systemic signs of infection. Stated that she stopped using cigarettes 2 weeks ago.     7/11/23: VNA nurses 3 times a week. Patient with no concerns today however noted plantar wound with more pain. More drainage as well.   No systemic signs of infection. Primary dressing: toe wound- dermagran, plantar wound- actacoat 3     6/20/23: Wound care being done by visiting nurses at home. Silver alginate. patient with no complaints today. Pain only with wound dressing change. No symptoms of infection today.      6/13/23: (Consult) Nay Lucio is a pleasant 59 yo F with a medical history of current tobacco use (1/2 ppd), hx of R AKA 2/2 critical limb ischemia, PAD, MS, here today for initial wound care visit for multiple wounds of the L foot. Patient first noted the wounds about 4-5 weeks ago. She has been attempting to remove callus from her feet and notes wounds that opened up and then worsened. She presented to [facility] on 5/22 and was discharged on 6/1. During her hospitalization, she has local wound care, IV abx with cefazolin and two xrays that showed no bony involvement. Per ID, there was no need for discharge on PO abx. She was also found to have >75% stenosis of the L SFA and underwent angiolplasty and DCB during admission. She was instructed to follow up with wound care, podiatry and vascular surgery. Patient presented with dry gauze covering the wound and maceration. She is still smoking. She is not a diabetic. She has VNA coming 3x/week.         The following portions of the patient's history were reviewed and updated as appropriate: allergies, current medications, past family history, past medical history, past social history, past surgical history and problem list.    Review of Systems   Skin: Positive for wound. All other systems reviewed and are negative.         Objective:       Wound 06/13/23 Traumatic Toe (Comment  which one) Left;Medial (Active)   Wound Image Images linked 09/05/23 1042   Wound Description Brown;Dry;Tan;Eschar;Epithelialization 09/05/23 1042   Ivon-wound Assessment Dry;Edema;Purple 09/05/23 1042   Wound Length (cm) 1.1 cm 09/05/23 1042   Wound Width (cm) 0.5 cm 09/05/23 1042   Wound Depth (cm) 0 cm 09/05/23 1042   Wound Surface Area (cm^2) 0.55 cm^2 09/05/23 1042   Wound Volume (cm^3) 0 cm^3 09/05/23 1042   Calculated Wound Volume (cm^3) 0 cm^3 09/05/23 1042   Change in Wound Size % 100 09/05/23 1042   Drainage Amount None 09/05/23 1042   Non-staged Wound Description Full thickness 09/05/23 1042   Dressing Status -- (no drsg on arrival) 09/05/23 1042       [REMOVED] Wound 08/08/23 Surgical Leg Right;Upper;Distal (Removed)   Wound Image Images linked 09/05/23 1046   Wound Description Granulation tissue;Pink;Epithelialization 09/05/23 1044   Ivon-wound Assessment Dry; Intact 09/05/23 1044   Wound Length (cm) 0 cm 09/05/23 1044   Wound Width (cm) 0 cm 09/05/23 1044   Wound Depth (cm) 0 cm 09/05/23 1044   Wound Surface Area (cm^2) 0 cm^2 09/05/23 1044   Wound Volume (cm^3) 0 cm^3 09/05/23 1044   Calculated Wound Volume (cm^3) 0 cm^3 09/05/23 1044   Change in Wound Size % 100 09/05/23 1044   Drainage Amount None 09/05/23 1044   Non-staged Wound Description Not applicable 36/52/51 6940   Dressing Status -- (no drsg upon arrival) 09/05/23 1044       /67   Pulse 58   Temp 98 °F (36.7 °C) (Temporal)     Physical Exam  Vitals reviewed. Constitutional:       Appearance: Normal appearance. HENT:      Head: Normocephalic and atraumatic. Mouth/Throat:      Mouth: Mucous membranes are moist.   Eyes:      Extraocular Movements: Extraocular movements intact. Pulmonary:      Effort: Pulmonary effort is normal.   Skin:     Comments: AKA wound is fully epithelialized today. No open wound. No drainage. Left foot wound with eschar. Underneath this there is a small area of opening. Serosanguineous drainage. Healthy wound bed tissue. Neurological:      Mental Status: She is alert.    Psychiatric:         Mood and Affect: Mood normal.         Behavior: Behavior normal.                   Wound Instructions:  Orders Placed This Encounter   Procedures   • Wound cleansing and dressings     Right Leg Wound and Left Great Toe Wound: Right leg wound is healed - apply moisturizer daily.                               Wash your hands with soap and water. Remove old dressing, discard into plastic bag and place in trash. Cleanse the wound with soap and water prior to applying a clean dressing. Do not use tissue or cotton balls. Do not scrub the wound. Pat dry using gauze. Shower yes. Wash your foot last, rinse and then get out of shower immediately. Do not soak wounds. Apply moisturizer to skin surrounding wound  Apply Dermagran to the left great toe wound. Cover with gauze. Secure with rolled gauze and tape. Change dressing three times per week.     This was done today.               Standing Status:   Future     Standing Expiration Date:   9/5/2024   • Wound miscellaneous orders     [facility]: Please continue seeing pt for wound care 2-3x/week. Standing Status:   Future     Standing Expiration Date:   9/5/2024   • Debridement     This order was created via procedure documentation        Diagnosis ICD-10-CM Associated Orders   1. Open wound of left foot, initial encounter  S91.302A lidocaine (XYLOCAINE) 4 % topical solution 5 mL     Wound cleansing and dressings     Wound miscellaneous orders     Debridement      2. Skin tear of right lower leg without complication, initial encounter  S81.811A lidocaine (XYLOCAINE) 4 % topical solution 5 mL     Wound cleansing and dressings     Wound miscellaneous orders      3. PAD (peripheral artery disease) ([facility])  I73.9 lidocaine (XYLOCAINE) 4 % topical solution 5 mL     Wound cleansing and dressings     Wound miscellaneous orders      4. S/P AKA (above knee amputation) unilateral, right ([facility])  Z89.611 lidocaine (XYLOCAINE) 4 % topical solution 5 mL     Wound cleansing and dressings     Wound miscellaneous orders      5. Multiple sclerosis ([facility])  G35 lidocaine (XYLOCAINE) 4 % topical solution 5 mL     Wound cleansing and dressings     Wound miscellaneous orders      6.  Smoker F17.200 lidocaine (XYLOCAINE) 4 % topical solution 5 mL     Wound cleansing and dressings     Wound miscellaneous orders           --  Syd Patterson MD    "This note has been constructed using a voice recognition system. Therefore there may be syntax, spelling, and/or grammatical errors. Occasional wrong word or "sound alike" substitutions may have occurred due to the inherent limitations of voice recognition software. Read the chart carefully and recognize, using context, where substitutions have occurred.  Please call if you have any questions."

## 2023-09-05 NOTE — LETTER
[address] [address] [address] [address]  Phone#  [phone]  Fax#  [phone]    Patient:  Luisito Palumbo  YOB: 1960  Phone:  [phone]  Date of Visit:  9/5/2023    Orders Placed This Encounter   Procedures    Wound cleansing and dressings     Right Leg Wound and Left Great Toe Wound: Right leg wound is healed - apply moisturizer daily. Wash your hands with soap and water. Remove old dressing, discard into plastic bag and place in trash. Cleanse the wound with soap and water prior to applying a clean dressing. Do not use tissue or cotton balls. Do not scrub the wound. Pat dry using gauze. Shower yes. Wash your foot last, rinse and then get out of shower immediately. Do not soak wounds. Apply moisturizer to skin surrounding wound  Apply Dermagran to the left great toe wound. Cover with gauze. Secure with rolled gauze and tape. Change dressing three times per week. This was done today. Standing Status:   Future     Standing Expiration Date:   9/5/2024    Wound miscellaneous orders     [facility]: Please continue seeing pt for wound care 2-3x/week.      Standing Status:   Future     Standing Expiration Date:   9/5/2024    Debridement     This order was created via procedure documentation         Electronically signed by Weston Copeland MD

## 2023-09-05 NOTE — PATIENT INSTRUCTIONS
Orders Placed This Encounter   Procedures    Wound cleansing and dressings     Right Leg Wound and Left Great Toe Wound: Right leg wound is healed - apply moisturizer daily. Wash your hands with soap and water. Remove old dressing, discard into plastic bag and place in trash. Cleanse the wound with soap and water prior to applying a clean dressing. Do not use tissue or cotton balls. Do not scrub the wound. Pat dry using gauze. Shower yes. Wash your foot last, rinse and then get out of shower immediately. Do not soak wounds. Apply moisturizer to skin surrounding wound  Apply Dermagran to the left great toe wound. Cover with gauze. Secure with rolled gauze and tape. Change dressing three times per week. This was done today. Standing Status:   Future     Standing Expiration Date:   9/5/2024    Wound miscellaneous orders     [facility]: Please continue seeing pt for wound care 2-3x/week.      Standing Status:   Future     Standing Expiration Date:   9/5/2024

## 2023-09-05 NOTE — LETTER
[address] [address] [address] [facility] [address]  Phone#  [phone]  Fax#  [phone]    Patient:  Kahlil Ruggiero  YOB: 1960  Phone:  [phone]  Date of Visit:  9/5/2023    Orders Placed This Encounter   Procedures    Wound cleansing and dressings     Right Leg Wound and Left Great Toe Wound: Right leg wound is healed - apply moisturizer daily. Wash your hands with soap and water. Remove old dressing, discard into plastic bag and place in trash. Cleanse the wound with soap and water prior to applying a clean dressing. Do not use tissue or cotton balls. Do not scrub the wound. Pat dry using gauze. Shower yes. Wash your foot last, rinse and then get out of shower immediately. Do not soak wounds. Apply moisturizer to skin surrounding wound  Apply Dermagran to the left great toe wound. Cover with gauze. Secure with rolled gauze and tape. Change dressing three times per week. This was done today. Standing Status:   Future     Standing Expiration Date:   9/5/2024    Wound miscellaneous orders     [facility]: Please continue seeing pt for wound care 2-3x/week.      Standing Status:   Future     Standing Expiration Date:   9/5/2024    Debridement     This order was created via procedure documentation         Electronically signed by Booker Henderson MD

## 2023-09-18 DIAGNOSIS — Z89.619 S/P AKA (ABOVE KNEE AMPUTATION) (HCC): ICD-10-CM

## 2023-09-18 DIAGNOSIS — I21.A1 TYPE 2 MYOCARDIAL INFARCTION (HCC): ICD-10-CM

## 2023-09-18 DIAGNOSIS — I70.201 POPLITEAL ARTERY STENOSIS, RIGHT (HCC): ICD-10-CM

## 2023-09-19 ENCOUNTER — OFFICE VISIT (OUTPATIENT)
Dept: WOUND CARE | Facility: HOSPITAL | Age: 63
End: 2023-09-19
Payer: COMMERCIAL

## 2023-09-19 VITALS
SYSTOLIC BLOOD PRESSURE: 118 MMHG | HEART RATE: 53 BPM | TEMPERATURE: 97 F | RESPIRATION RATE: 18 BRPM | DIASTOLIC BLOOD PRESSURE: 75 MMHG

## 2023-09-19 DIAGNOSIS — I73.9 PAD (PERIPHERAL ARTERY DISEASE) (HCC): ICD-10-CM

## 2023-09-19 DIAGNOSIS — F17.200 SMOKER: ICD-10-CM

## 2023-09-19 DIAGNOSIS — S91.302A OPEN WOUND OF LEFT FOOT, INITIAL ENCOUNTER: Primary | ICD-10-CM

## 2023-09-19 DIAGNOSIS — G35 MULTIPLE SCLEROSIS (HCC): ICD-10-CM

## 2023-09-19 PROCEDURE — 99212 OFFICE O/P EST SF 10 MIN: CPT | Performed by: STUDENT IN AN ORGANIZED HEALTH CARE EDUCATION/TRAINING PROGRAM

## 2023-09-19 PROCEDURE — 99213 OFFICE O/P EST LOW 20 MIN: CPT | Performed by: STUDENT IN AN ORGANIZED HEALTH CARE EDUCATION/TRAINING PROGRAM

## 2023-09-19 RX ORDER — GABAPENTIN 300 MG/1
300 CAPSULE ORAL 2 TIMES DAILY
Qty: 180 CAPSULE | Refills: 0 | Status: SHIPPED | OUTPATIENT
Start: 2023-09-19

## 2023-09-19 NOTE — PROGRESS NOTES
Patient ID: Thea Mckeon is a 58 y.o. female Date of Birth 1960     Chief Complaint  Chief Complaint   Patient presents with   • Follow Up Wound Care Visit     Left foot wound       Allergies  Patient has no known allergies. Assessment:     Diagnoses and all orders for this visit:    Open wound of left foot, initial encounter  -     Wound cleansing and dressings; Future    Multiple sclerosis (HCC)    PAD (peripheral artery disease) ([address])    Smoker              Plan:  • It was a pleasure to see Skylar Mobley for wound care follow up today  • Wound is fully epithelialized today. Patient advised to protect skin with moisturization. •  Follow-up with wound management as needed in the future. • All plans of care discussed with patient at bedside who verbalized understanding with treatment plan. Wound 06/13/23 Traumatic Toe (Comment  which one) Left;Medial (Active)   Wound Image Images linked 09/19/23 1137   Wound Description Epithelialization;Dry 09/19/23 1137   Ivon-wound Assessment Pink;Dry 09/19/23 1137   Wound Length (cm) 0 cm 09/19/23 1137   Wound Width (cm) 0 cm 09/19/23 1137   Wound Depth (cm) 0 cm 09/19/23 1137   Wound Surface Area (cm^2) 0 cm^2 09/19/23 1137   Wound Volume (cm^3) 0 cm^3 09/19/23 1137   Calculated Wound Volume (cm^3) 0 cm^3 09/19/23 1137   Change in Wound Size % 100 09/19/23 1137   Drainage Amount None 09/19/23 1137   Non-staged Wound Description Not applicable 41/48/36 8259       Wound 06/13/23 Traumatic Toe (Comment  which one) Left;Medial (Active)   Date First Assessed/Time First Assessed: 06/13/23 1024   Primary Wound Type: Traumatic  Location: (c) Toe (Comment  which one)  Wound Location Orientation: Left;Medial  Wound Description (Comments): LEFT GREAT TOE  Wound Outcome: Healed       [REMOVED] Wound 05/22/23 Toe (Comment  which one) Anterior; Left (Removed)   Resolved Date: 06/13/23  Date First Assessed/Time First Assessed: 05/22/23 2005   Location: Toe (Comment  which one)  Wound Location Orientation: Anterior; Left  Dressing Status: Open to air  Wound Outcome: (c) Other (Comment)       [REMOVED] Wound 05/22/23 Foot Anterior; Left (Removed)   Resolved Date: 06/13/23  Date First Assessed/Time First Assessed: 05/22/23 2030   Location: Foot  Wound Location Orientation: Anterior; Left  Dressing Status: Open to air  Wound Outcome: (c) Other (Comment)       [REMOVED] Wound 05/22/23 Ankle Anterior; Left (Removed)   Resolved Date: 06/13/23  Date First Assessed/Time First Assessed: 05/22/23 2005   Location: Ankle  Wound Location Orientation: Anterior; Left  Dressing Status: Open to air  Wound Outcome: (c) Other (Comment)       [REMOVED] Wound 06/13/23 Traumatic Foot Anterior; Left (Removed)   Resolved Date: 06/20/23  Date First Assessed/Time First Assessed: 06/13/23 1025   Primary Wound Type: Traumatic  Location: Foot  Wound Location Orientation: Anterior; Left  Wound Outcome: Healed       [REMOVED] Wound 06/13/23 Traumatic Foot Left;Plantar (Removed)   Resolved Date: 08/08/23  Date First Assessed/Time First Assessed: 06/13/23 1025   Primary Wound Type: Traumatic  Location: Foot  Wound Location Orientation: Left;Plantar  Wound Outcome: Healed       [REMOVED] Wound 08/08/23 Surgical Leg Right;Upper;Distal (Removed)   Resolved Date: 09/05/23  Date First Assessed/Time First Assessed: 08/08/23 1221   Primary Wound Type: Surgical  Location: Leg  Wound Location Orientation: Right;Upper;Distal  Wound Description (Comments): AKA amp site  Wound Outcome: Healed       Subjective:      .    9/19/23: Has been applying Dermagran to the toe wound to the past few days and she noted no exudate. Believes wound is healed today. 9/5/23: Patient stopped dressing right AKA wound as she was told by [facility] that it is healed. Thinning of the left foot fell off few days ago however patient did not have help in putting a dressing in place. There is a scab present today.   Denies any local or systemic symptoms of infection.     8/22/23: Patient still successful tobacco cessation. Happy with wound healing.      8/8/23: Continues with smoking cessation with last cigarette 4 weeks ago. Happy with wound healing. Did allow RN to evaluate right AKA skin integrity. Open wound noted today.     7/25/23: Has been using Dermagran as directed. No local or systemic signs of infection. Stated that she stopped using cigarettes 2 weeks ago.     7/11/23: VNA nurses 3 times a week. Patient with no concerns today however noted plantar wound with more pain. More drainage as well. No systemic signs of infection. Primary dressing: toe wound- dermagran, plantar wound- actacoat 3     6/20/23: Wound care being done by visiting nurses at home. Silver alginate. patient with no complaints today. Pain only with wound dressing change. No symptoms of infection today.      6/13/23: (Consult) King Kelly is a pleasant 59 yo F with a medical history of current tobacco use (1/2 ppd), hx of R AKA 2/2 critical limb ischemia, PAD, MS, here today for initial wound care visit for multiple wounds of the L foot. Patient first noted the wounds about 4-5 weeks ago. She has been attempting to remove callus from her feet and notes wounds that opened up and then worsened. She presented to [facility] on 5/22 and was discharged on 6/1. During her hospitalization, she has local wound care, IV abx with cefazolin and two xrays that showed no bony involvement. Per ID, there was no need for discharge on PO abx. She was also found to have >75% stenosis of the L SFA and underwent angiolplasty and DCB during admission. She was instructed to follow up with wound care, podiatry and vascular surgery. Patient presented with dry gauze covering the wound and maceration. She is still smoking. She is not a diabetic. She has VNA coming 3x/week.       The following portions of the patient's history were reviewed and updated as appropriate: allergies, current medications, past family history, past medical history, past social history, past surgical history and problem list.    Review of Systems   All other systems reviewed and are negative. Objective:       Wound 06/13/23 Traumatic Toe (Comment  which one) Left;Medial (Active)   Wound Image Images linked 09/19/23 1137   Wound Description Epithelialization;Dry 09/19/23 1137   Ivon-wound Assessment Pink;Dry 09/19/23 1137   Wound Length (cm) 0 cm 09/19/23 1137   Wound Width (cm) 0 cm 09/19/23 1137   Wound Depth (cm) 0 cm 09/19/23 1137   Wound Surface Area (cm^2) 0 cm^2 09/19/23 1137   Wound Volume (cm^3) 0 cm^3 09/19/23 1137   Calculated Wound Volume (cm^3) 0 cm^3 09/19/23 1137   Change in Wound Size % 100 09/19/23 1137   Drainage Amount None 09/19/23 1137   Non-staged Wound Description Not applicable 96/66/44 7068       /75   Pulse (!) 53   Temp (!) 97 °F (36.1 °C) (Temporal)   Resp 18     Physical Exam  Vitals reviewed. Constitutional:       Appearance: Normal appearance. HENT:      Head: Normocephalic and atraumatic. Mouth/Throat:      Mouth: Mucous membranes are moist.   Eyes:      Extraocular Movements: Extraocular movements intact. Pulmonary:      Effort: Pulmonary effort is normal.   Skin:     Comments: L Medial toe wound fully epithelialized today. No open wound. No exudate. Neurological:      Mental Status: She is alert. Psychiatric:         Mood and Affect: Mood normal.         Behavior: Behavior normal.             Wound Instructions:  Orders Placed This Encounter   Procedures   • Wound cleansing and dressings     Wound is healed. Protect area from trauma or pressure. Standing Status:   Future     Standing Expiration Date:   9/19/2024        Diagnosis ICD-10-CM Associated Orders   1. Open wound of left foot, initial encounter  S91.302A Wound cleansing and dressings      2. Multiple sclerosis ([address])  G35       3. PAD (peripheral artery disease) ([facility])  I73.9       4.  Smoker  F17.200 --  Mehnaz Lang MD    "This note has been constructed using a voice recognition system. Therefore there may be syntax, spelling, and/or grammatical errors. Occasional wrong word or "sound alike" substitutions may have occurred due to the inherent limitations of voice recognition software. Read the chart carefully and recognize, using context, where substitutions have occurred.  Please call if you have any questions."

## 2023-09-19 NOTE — PATIENT INSTRUCTIONS
Orders Placed This Encounter   Procedures    Wound cleansing and dressings     Wound is healed. Protect area from trauma or pressure. Standing Status:   Future     Standing Expiration Date:   9/19/2024    No orders of the defined types were placed in this encounter.

## 2023-09-25 ENCOUNTER — TELEPHONE (OUTPATIENT)
Age: 63
End: 2023-09-25

## 2023-09-27 DIAGNOSIS — G35 MULTIPLE SCLEROSIS (HCC): ICD-10-CM

## 2023-09-28 RX ORDER — BACLOFEN 10 MG/1
20 TABLET ORAL 3 TIMES DAILY
Qty: 540 TABLET | Refills: 1 | Status: SHIPPED | OUTPATIENT
Start: 2023-09-28

## 2023-09-28 NOTE — TELEPHONE ENCOUNTER
Next OV 1/8/2024 with Dr. Quita Lesch. Dr. Quita Lesch - Rx entered. Please review and sign if in agreement.

## 2023-10-16 DIAGNOSIS — E78.2 MIXED HYPERLIPIDEMIA: ICD-10-CM

## 2023-10-16 DIAGNOSIS — I70.201 POPLITEAL ARTERY STENOSIS, RIGHT (HCC): ICD-10-CM

## 2023-10-16 DIAGNOSIS — I10 ESSENTIAL HYPERTENSION: ICD-10-CM

## 2023-10-16 DIAGNOSIS — I21.A1 TYPE 2 MYOCARDIAL INFARCTION (HCC): ICD-10-CM

## 2023-10-16 RX ORDER — ROSUVASTATIN CALCIUM 20 MG/1
20 TABLET, COATED ORAL DAILY
Qty: 90 TABLET | Refills: 0 | Status: SHIPPED | OUTPATIENT
Start: 2023-10-16

## 2023-10-16 RX ORDER — AMLODIPINE AND OLMESARTAN MEDOXOMIL 5; 20 MG/1; MG/1
1 TABLET ORAL DAILY
Qty: 90 TABLET | Refills: 0 | Status: SHIPPED | OUTPATIENT
Start: 2023-10-16

## 2023-10-16 RX ORDER — METOPROLOL TARTRATE 100 MG/1
100 TABLET ORAL EVERY 12 HOURS
Qty: 180 TABLET | Refills: 0 | Status: SHIPPED | OUTPATIENT
Start: 2023-10-16

## 2023-11-08 NOTE — PRE-PROCEDURE INSTRUCTIONS
Pre-Surgery Instructions:   Medication Instructions    ascorbic acid (VITAMIN C) 250 mg tablet Hold day of surgery. baclofen 10 mg tablet Hold day of surgery. buPROPion (Zyban) 150 MG 12 hr tablet Take night before surgery    cholecalciferol (VITAMIN D3) 1,000 units tablet Hold day of surgery. dicyclomine (BENTYL) 10 mg capsule Hold day of surgery. docusate sodium (COLACE) 100 mg capsule Hold day of surgery. gabapentin (NEURONTIN) 300 mg capsule Take night before surgery    metoprolol tartrate (LOPRESSOR) 100 mg tablet Take day of surgery. Multiple Vitamins-Minerals (Multivitamin Adult Extra C) CHEW Hold day of surgery. polyethylene glycol (MIRALAX) 17 g packet Hold day of surgery. rivaroxaban (XARELTO) 20 mg tablet Take night before surgery    rosuvastatin (CRESTOR) 20 MG tablet Hold day of surgery. senna (SENOKOT) 8.6 mg Hold day of surgery. Medication instructions for day surgery reviewed. Please use only a sip of water to take your instructed medications. Avoid all over the counter vitamins, supplements and NSAIDS for one week prior to surgery per anesthesia guidelines. Tylenol is ok to take as needed. You will receive a call one business day prior to surgery with an arrival time and hospital directions. If your surgery is scheduled on a Monday, the hospital will be calling you on the Friday prior to your surgery. If you have not heard from anyone by 8pm, please call the hospital supervisor through the hospital  at [phone]. Manasa Guillen [phone]). Do not eat or drink anything after midnight the night before your surgery, including candy, mints, lifesavers, or chewing gum. Do not drink alcohol 24hrs before your surgery. Try not to smoke at least 24hrs before your surgery. Follow the pre surgery showering instructions as listed in the [facility] Surgical Experience Booklet” or otherwise provided by your surgeon's office.  Do not use a blade to shave the surgical area 1 week before surgery. It is okay to use a clean electric clippers up to 24 hours before surgery. Do not apply any lotions, creams, including makeup, cologne, deodorant, or perfumes after showering on the day of your surgery. Do not use dry shampoo, hair spray, hair gel, or any type of hair products. No contact lenses, eye make-up, or artificial eyelashes. Remove nail polish, including gel polish, and any artificial, gel, or acrylic nails if possible. Remove all jewelry including rings and body piercing jewelry. Wear causal clothing that is easy to take on and off. Consider your type of surgery. Keep any valuables, jewelry, piercings at home. Please bring any specially ordered equipment (sling, braces) if indicated. Arrange for a responsible person to drive you to and from the hospital on the day of your surgery. Visitor Guidelines discussed. Call the surgeon's office with any new illnesses, exposures, or additional questions prior to surgery. Please reference your [facility] Surgical Experience Booklet” for additional information to prepare for your upcoming surgery.

## 2023-11-13 ENCOUNTER — ANESTHESIA EVENT (OUTPATIENT)
Dept: PERIOP | Facility: AMBULARY SURGERY CENTER | Age: 63
End: 2023-11-13
Payer: COMMERCIAL

## 2023-11-13 ENCOUNTER — ANESTHESIA (OUTPATIENT)
Dept: PERIOP | Facility: AMBULARY SURGERY CENTER | Age: 63
End: 2023-11-13
Payer: COMMERCIAL

## 2023-11-13 ENCOUNTER — HOSPITAL ENCOUNTER (OUTPATIENT)
Facility: AMBULARY SURGERY CENTER | Age: 63
Setting detail: OUTPATIENT SURGERY
Discharge: HOME | End: 2023-11-13
Attending: OPHTHALMOLOGY | Admitting: OPHTHALMOLOGY
Payer: COMMERCIAL

## 2023-11-13 VITALS
TEMPERATURE: 96.8 F | RESPIRATION RATE: 16 BRPM | DIASTOLIC BLOOD PRESSURE: 82 MMHG | HEIGHT: 66 IN | BODY MASS INDEX: 22.34 KG/M2 | OXYGEN SATURATION: 100 % | SYSTOLIC BLOOD PRESSURE: 152 MMHG | WEIGHT: 139 LBS | HEART RATE: 108 BPM

## 2023-11-13 DIAGNOSIS — H25.11 AGE-RELATED NUCLEAR CATARACT OF RIGHT EYE: Primary | ICD-10-CM

## 2023-11-13 PROCEDURE — V2632 POST CHMBR INTRAOCULAR LENS: HCPCS | Performed by: OPHTHALMOLOGY

## 2023-11-13 DEVICE — STERILE UV AND BLUE LIGHT FILTERING ACRYLIC FOLDABLE ASPHERIC POSTERIOR CHAMBER INTRAOCULAR LENS
Type: IMPLANTABLE DEVICE | Site: EYE | Status: FUNCTIONAL
Brand: CLAREON

## 2023-11-13 RX ORDER — GATIFLOXACIN 5 MG/ML
SOLUTION/ DROPS OPHTHALMIC AS NEEDED
Status: DISCONTINUED | OUTPATIENT
Start: 2023-11-13 | End: 2023-11-13 | Stop reason: HOSPADM

## 2023-11-13 RX ORDER — MIDAZOLAM HYDROCHLORIDE 2 MG/2ML
INJECTION, SOLUTION INTRAMUSCULAR; INTRAVENOUS AS NEEDED
Status: DISCONTINUED | OUTPATIENT
Start: 2023-11-13 | End: 2023-11-13

## 2023-11-13 RX ORDER — LIDOCAINE HYDROCHLORIDE 10 MG/ML
INJECTION, SOLUTION EPIDURAL; INFILTRATION; INTRACAUDAL; PERINEURAL AS NEEDED
Status: DISCONTINUED | OUTPATIENT
Start: 2023-11-13 | End: 2023-11-13 | Stop reason: HOSPADM

## 2023-11-13 RX ORDER — GATIFLOXACIN 5 MG/ML
1 SOLUTION/ DROPS OPHTHALMIC 2 TIMES DAILY
Qty: 3 ML | Refills: 0
Start: 2023-11-13

## 2023-11-13 RX ORDER — KETOROLAC TROMETHAMINE 5 MG/ML
1 SOLUTION OPHTHALMIC
Status: ACTIVE | OUTPATIENT
Start: 2023-11-13 | End: 2023-11-13

## 2023-11-13 RX ORDER — CYCLOPENTOLATE HYDROCHLORIDE 10 MG/ML
1 SOLUTION/ DROPS OPHTHALMIC
Status: ACTIVE | OUTPATIENT
Start: 2023-11-13 | End: 2023-11-13

## 2023-11-13 RX ORDER — BALANCED SALT SOLUTION 6.4; .75; .48; .3; 3.9; 1.7 MG/ML; MG/ML; MG/ML; MG/ML; MG/ML; MG/ML
SOLUTION OPHTHALMIC AS NEEDED
Status: DISCONTINUED | OUTPATIENT
Start: 2023-11-13 | End: 2023-11-13 | Stop reason: HOSPADM

## 2023-11-13 RX ORDER — LIDOCAINE HYDROCHLORIDE 20 MG/ML
1 JELLY TOPICAL
Status: COMPLETED | OUTPATIENT
Start: 2023-11-13 | End: 2023-11-13

## 2023-11-13 RX ORDER — PHENYLEPHRINE HYDROCHLORIDE 25 MG/ML
1 SOLUTION/ DROPS OPHTHALMIC
Status: ACTIVE | OUTPATIENT
Start: 2023-11-13 | End: 2023-11-13

## 2023-11-13 RX ORDER — KETOROLAC TROMETHAMINE 5 MG/ML
1 SOLUTION OPHTHALMIC 4 TIMES DAILY
Qty: 5 ML | Refills: 0
Start: 2023-11-13

## 2023-11-13 RX ORDER — TETRACAINE HYDROCHLORIDE 5 MG/ML
SOLUTION OPHTHALMIC AS NEEDED
Status: DISCONTINUED | OUTPATIENT
Start: 2023-11-13 | End: 2023-11-13 | Stop reason: HOSPADM

## 2023-11-13 RX ORDER — TETRACAINE HYDROCHLORIDE 5 MG/ML
1 SOLUTION OPHTHALMIC ONCE
Status: COMPLETED | OUTPATIENT
Start: 2023-11-13 | End: 2023-11-13

## 2023-11-13 RX ADMIN — PHENYLEPHRINE HYDROCHLORIDE 1 DROP: 25 SOLUTION/ DROPS OPHTHALMIC at 11:30

## 2023-11-13 RX ADMIN — LIDOCAINE HYDROCHLORIDE 1 APPLICATION: 20 JELLY TOPICAL at 11:15

## 2023-11-13 RX ADMIN — LIDOCAINE HYDROCHLORIDE 1 APPLICATION: 20 JELLY TOPICAL at 11:00

## 2023-11-13 RX ADMIN — KETOROLAC TROMETHAMINE 1 DROP: 5 SOLUTION OPHTHALMIC at 11:15

## 2023-11-13 RX ADMIN — MIDAZOLAM 2 MG: 1 INJECTION INTRAMUSCULAR; INTRAVENOUS at 11:45

## 2023-11-13 RX ADMIN — KETOROLAC TROMETHAMINE 1 DROP: 5 SOLUTION OPHTHALMIC at 11:30

## 2023-11-13 RX ADMIN — PHENYLEPHRINE HYDROCHLORIDE 1 DROP: 25 SOLUTION/ DROPS OPHTHALMIC at 11:15

## 2023-11-13 RX ADMIN — LIDOCAINE HYDROCHLORIDE 1 APPLICATION: 20 JELLY TOPICAL at 11:30

## 2023-11-13 RX ADMIN — CYCLOPENTOLATE HYDROCHLORIDE 1 DROP: 10 SOLUTION/ DROPS OPHTHALMIC at 11:30

## 2023-11-13 RX ADMIN — TETRACAINE HYDROCHLORIDE 1 DROP: 5 SOLUTION OPHTHALMIC at 11:00

## 2023-11-13 RX ADMIN — CYCLOPENTOLATE HYDROCHLORIDE 1 DROP: 10 SOLUTION/ DROPS OPHTHALMIC at 11:00

## 2023-11-13 RX ADMIN — PHENYLEPHRINE HYDROCHLORIDE 1 DROP: 25 SOLUTION/ DROPS OPHTHALMIC at 11:00

## 2023-11-13 RX ADMIN — CYCLOPENTOLATE HYDROCHLORIDE 1 DROP: 10 SOLUTION/ DROPS OPHTHALMIC at 11:15

## 2023-11-13 RX ADMIN — KETOROLAC TROMETHAMINE 1 DROP: 5 SOLUTION OPHTHALMIC at 11:00

## 2023-11-13 NOTE — ANESTHESIA POSTPROCEDURE EVALUATION
Post-Op Assessment Note    CV Status:  Stable  Pain Score: 0    Pain management: adequate     Mental Status:  Alert and awake   Hydration Status:  Euvolemic   PONV Controlled:  Controlled   Airway Patency:  Patent      Post Op Vitals Reviewed: Yes      Staff: CRNA         No notable events documented.     /82 (11/13/23 1211)    Temp     Pulse (!) 108 (11/13/23 1211)   Resp 16 (11/13/23 1211)    SpO2 100 % (11/13/23 1211)

## 2023-11-13 NOTE — DISCHARGE INSTR - AVS FIRST PAGE
Dr. Jason Castro Cataract Instructions    Activity:     1. No Driving until instructed   2. Keep shield on until seen tomorrow except when administering drops   3. No heavy lifting   4. No water in eye     Diet:     1. Resume normal diet    Normal Symptoms:     1. Mild Headache   2. Scratchy or picky feeling around eye    Call the office if:     1. You have any questions or concerns   2. If eye pain is not relieved by extra strength tylenol    Office phone number:  [phone]      Next appointment:     1. See Dr Jason Castro at his office tomorrow as scheduled   __________________________________________________________   2. Bring blue eye kit with you and eyedrops to the office    A new set of comprehensive instructions will be given and reviewed with you during your office visit tomorrow.

## 2023-11-13 NOTE — OP NOTE
OPERATIVE REPORT    PATIENT NAME: Alfa Borrego    :  1960  MRN: [id number]  Pt Location: [facility] OR ROOM 01    Surgery Date: 2023    Surgeon(s) and Role:     * Wen Rosado MD - Primary    Age-related nuclear cataract, right eye [H25.11]    Post-Op Diagnosis Codes:     * Age-related nuclear cataract, right eye [H25.11]    Procedure(s):  EXTRACTION EXTRACAPSULAR CATARACT PHACO INTRAOCULAR LENS (IOL)    Anesthesia Type:   IV Sedation with Anesthesia    Operative Indications:  Age-related nuclear cataract, right eye [H25.11]  Decreased vision to count fingers. With problems  reading and cooking . Pt requested cataract sx the right eye    Procedure and Technique:    Procedure Details     The patient was brought in the OR in stable condition and placed on the operative table. The right eye was prepped and draped in the usual sterile manner. Attention was directed to the right eye where a lid speculum was placed. A 2.4 mm clear corneal incision was made temperally. 1/2 cc of 1% MPF Lidocaine was irrigated into the anterior chamber followed by vision blue dye. After 10 seconds the dye was rinsed completey from the [facility] and viscoat was added. The side port incision was placed superiorly. The capsularrhexis was made and the nucleus was hydrodissected with BSS. The nucleus was then removed with the phaco handpiece followed by removal of the cortical material with the I/A handpiece. The capsular bag was then filled with Provisc. The IOL was folded and placed in to the capsular bag and centered well. The remaining Provisc was removed from the eye with the I/A. The wounds were hydrated with BSS and found to be water tight. The lid speculum was removed and 2 drops of Gatifloxicin were placed over the cornea. A protective eye shield was taped over the eye and the patient went to PACU in stable condition. I will see the patient in the office tomorrow and the expected post op period is a few weeks. Complications: None        Disposition: PACU   Condition: Stable    SIGNATURE: Bonnie Charles MD  DATE: November 13, 2023  TIME: 12:13 PM

## 2023-11-13 NOTE — ANESTHESIA PREPROCEDURE EVALUATION
Procedure:  EXTRACTION EXTRACAPSULAR CATARACT PHACO INTRAOCULAR LENS (IOL) (Right: Eye)    Relevant Problems   CARDIO   (+) Hypertension   (+) Mixed hyperlipidemia   (+) Type 2 myocardial infarction (HCC)      HEMATOLOGY   (+) Thrombocytopenia (HCC)      NEURO/PSYCH   (+) Depression      PULMONARY   (+) Smoker        Physical Exam    Airway    Mallampati score: II  TM Distance: >3 FB  Neck ROM: full     Dental   No notable dental hx     Cardiovascular  Cardiovascular exam normal    Pulmonary  Pulmonary exam normal     Other Findings        Anesthesia Plan  ASA Score- 2     Anesthesia Type- IV sedation with anesthesia with ASA Monitors. Additional Monitors:     Airway Plan:            Plan Factors-Exercise tolerance (METS): >4 METS. Chart reviewed. Existing labs reviewed. Patient summary reviewed. Patient is not a current smoker. Induction-     Postoperative Plan-     Informed Consent- Anesthetic plan and risks discussed with patient. I personally reviewed this patient with the CRNA. Discussed and agreed on the Anesthesia Plan with the CRNA. Ramiro Lam

## 2023-11-28 DIAGNOSIS — K52.9 CHRONIC DIARRHEA: ICD-10-CM

## 2023-11-28 DIAGNOSIS — R10.9 ABDOMINAL CRAMPING: ICD-10-CM

## 2023-11-29 RX ORDER — DICYCLOMINE HYDROCHLORIDE 10 MG/1
10 CAPSULE ORAL
Qty: 270 CAPSULE | Refills: 0 | Status: SHIPPED | OUTPATIENT
Start: 2023-11-29

## 2023-12-15 DIAGNOSIS — I21.A1 TYPE 2 MYOCARDIAL INFARCTION (HCC): ICD-10-CM

## 2023-12-15 DIAGNOSIS — I70.201 POPLITEAL ARTERY STENOSIS, RIGHT (HCC): ICD-10-CM

## 2023-12-15 DIAGNOSIS — Z89.619 S/P AKA (ABOVE KNEE AMPUTATION) (HCC): ICD-10-CM

## 2023-12-18 ENCOUNTER — ANESTHESIA (OUTPATIENT)
Dept: PERIOP | Facility: AMBULARY SURGERY CENTER | Age: 63
End: 2023-12-18
Payer: COMMERCIAL

## 2023-12-18 ENCOUNTER — ANESTHESIA EVENT (OUTPATIENT)
Dept: PERIOP | Facility: AMBULARY SURGERY CENTER | Age: 63
End: 2023-12-18
Payer: COMMERCIAL

## 2023-12-18 ENCOUNTER — HOSPITAL ENCOUNTER (OUTPATIENT)
Facility: AMBULARY SURGERY CENTER | Age: 63
Setting detail: OUTPATIENT SURGERY
Discharge: HOME | End: 2023-12-18
Attending: OPHTHALMOLOGY | Admitting: OPHTHALMOLOGY
Payer: COMMERCIAL

## 2023-12-18 VITALS
TEMPERATURE: 97.8 F | OXYGEN SATURATION: 100 % | DIASTOLIC BLOOD PRESSURE: 66 MMHG | HEART RATE: 63 BPM | SYSTOLIC BLOOD PRESSURE: 106 MMHG | RESPIRATION RATE: 16 BRPM

## 2023-12-18 DIAGNOSIS — G35 MULTIPLE SCLEROSIS (HCC): ICD-10-CM

## 2023-12-18 DIAGNOSIS — H25.11 AGE-RELATED NUCLEAR CATARACT OF RIGHT EYE: ICD-10-CM

## 2023-12-18 PROCEDURE — V2632 POST CHMBR INTRAOCULAR LENS: HCPCS | Performed by: OPHTHALMOLOGY

## 2023-12-18 DEVICE — STERILE UV AND BLUE LIGHT FILTERING ACRYLIC FOLDABLE ASPHERIC POSTERIOR CHAMBER INTRAOCULAR LENS
Type: IMPLANTABLE DEVICE | Site: EYE | Status: FUNCTIONAL
Brand: CLAREON

## 2023-12-18 RX ORDER — PHENYLEPHRINE HYDROCHLORIDE 25 MG/ML
1 SOLUTION/ DROPS OPHTHALMIC
Status: ACTIVE | OUTPATIENT
Start: 2023-12-18 | End: 2023-12-18

## 2023-12-18 RX ORDER — GATIFLOXACIN 5 MG/ML
SOLUTION/ DROPS OPHTHALMIC AS NEEDED
Status: DISCONTINUED | OUTPATIENT
Start: 2023-12-18 | End: 2023-12-18 | Stop reason: HOSPADM

## 2023-12-18 RX ORDER — BALANCED SALT SOLUTION 6.4; .75; .48; .3; 3.9; 1.7 MG/ML; MG/ML; MG/ML; MG/ML; MG/ML; MG/ML
SOLUTION OPHTHALMIC AS NEEDED
Status: DISCONTINUED | OUTPATIENT
Start: 2023-12-18 | End: 2023-12-18 | Stop reason: HOSPADM

## 2023-12-18 RX ORDER — TETRACAINE HYDROCHLORIDE 5 MG/ML
SOLUTION OPHTHALMIC AS NEEDED
Status: DISCONTINUED | OUTPATIENT
Start: 2023-12-18 | End: 2023-12-18 | Stop reason: HOSPADM

## 2023-12-18 RX ORDER — KETOROLAC TROMETHAMINE 5 MG/ML
1 SOLUTION OPHTHALMIC
Status: ACTIVE | OUTPATIENT
Start: 2023-12-18 | End: 2023-12-18

## 2023-12-18 RX ORDER — CYCLOPENTOLATE HYDROCHLORIDE 10 MG/ML
1 SOLUTION/ DROPS OPHTHALMIC
Status: ACTIVE | OUTPATIENT
Start: 2023-12-18 | End: 2023-12-18

## 2023-12-18 RX ORDER — TETRACAINE HYDROCHLORIDE 5 MG/ML
1 SOLUTION OPHTHALMIC ONCE
Status: COMPLETED | OUTPATIENT
Start: 2023-12-18 | End: 2023-12-18

## 2023-12-18 RX ORDER — MIDAZOLAM HYDROCHLORIDE 2 MG/2ML
INJECTION, SOLUTION INTRAMUSCULAR; INTRAVENOUS AS NEEDED
Status: DISCONTINUED | OUTPATIENT
Start: 2023-12-18 | End: 2023-12-18

## 2023-12-18 RX ORDER — BACLOFEN 10 MG/1
20 TABLET ORAL 3 TIMES DAILY
Qty: 540 TABLET | Refills: 1 | Status: SHIPPED | OUTPATIENT
Start: 2023-12-18

## 2023-12-18 RX ORDER — GATIFLOXACIN 5 MG/ML
1 SOLUTION/ DROPS OPHTHALMIC 2 TIMES DAILY
Start: 2023-12-18

## 2023-12-18 RX ORDER — LIDOCAINE HYDROCHLORIDE 10 MG/ML
INJECTION, SOLUTION EPIDURAL; INFILTRATION; INTRACAUDAL; PERINEURAL AS NEEDED
Status: DISCONTINUED | OUTPATIENT
Start: 2023-12-18 | End: 2023-12-18 | Stop reason: HOSPADM

## 2023-12-18 RX ORDER — GABAPENTIN 300 MG/1
300 CAPSULE ORAL 2 TIMES DAILY
Qty: 180 CAPSULE | Refills: 0 | Status: SHIPPED | OUTPATIENT
Start: 2023-12-18

## 2023-12-18 RX ORDER — LIDOCAINE HYDROCHLORIDE 20 MG/ML
1 JELLY TOPICAL
Status: ACTIVE | OUTPATIENT
Start: 2023-12-18 | End: 2023-12-18

## 2023-12-18 RX ORDER — KETOROLAC TROMETHAMINE 5 MG/ML
1 SOLUTION OPHTHALMIC 4 TIMES DAILY
Start: 2023-12-18

## 2023-12-18 RX ADMIN — PHENYLEPHRINE HYDROCHLORIDE 1 DROP: 25 SOLUTION/ DROPS OPHTHALMIC at 08:59

## 2023-12-18 RX ADMIN — LIDOCAINE HYDROCHLORIDE 1 APPLICATION: 20 JELLY TOPICAL at 08:30

## 2023-12-18 RX ADMIN — MIDAZOLAM 2 MG: 1 INJECTION INTRAMUSCULAR; INTRAVENOUS at 09:06

## 2023-12-18 RX ADMIN — KETOROLAC TROMETHAMINE 1 DROP: 5 SOLUTION OPHTHALMIC at 08:30

## 2023-12-18 RX ADMIN — TETRACAINE HYDROCHLORIDE 1 DROP: 5 SOLUTION OPHTHALMIC at 08:30

## 2023-12-18 RX ADMIN — CYCLOPENTOLATE HYDROCHLORIDE 1 DROP: 10 SOLUTION/ DROPS OPHTHALMIC at 08:30

## 2023-12-18 RX ADMIN — PHENYLEPHRINE HYDROCHLORIDE 1 DROP: 25 SOLUTION/ DROPS OPHTHALMIC at 08:30

## 2023-12-18 RX ADMIN — PHENYLEPHRINE HYDROCHLORIDE 1 DROP: 25 SOLUTION/ DROPS OPHTHALMIC at 08:44

## 2023-12-18 RX ADMIN — KETOROLAC TROMETHAMINE 1 DROP: 5 SOLUTION OPHTHALMIC at 08:59

## 2023-12-18 RX ADMIN — CYCLOPENTOLATE HYDROCHLORIDE 1 DROP: 10 SOLUTION/ DROPS OPHTHALMIC at 08:44

## 2023-12-18 RX ADMIN — LIDOCAINE HYDROCHLORIDE 1 APPLICATION: 20 JELLY TOPICAL at 08:59

## 2023-12-18 RX ADMIN — KETOROLAC TROMETHAMINE 1 DROP: 5 SOLUTION OPHTHALMIC at 08:44

## 2023-12-18 RX ADMIN — CYCLOPENTOLATE HYDROCHLORIDE 1 DROP: 10 SOLUTION/ DROPS OPHTHALMIC at 08:59

## 2023-12-18 RX ADMIN — LIDOCAINE HYDROCHLORIDE 1 APPLICATION: 20 JELLY TOPICAL at 08:44

## 2023-12-18 NOTE — OP NOTE
OPERATIVE REPORT    PATIENT NAME: Skylar Mobley    :  1960  MRN: [id number]  Pt Location: [facility] OR ROOM 01    Surgery Date: 2023    Surgeons and Role:     * Perry Luther MD - Primary    Age-related nuclear cataract, left eye [H25.12]    Post-Op Diagnosis Codes:     * Age-related nuclear cataract, left eye [H25.12]    Procedure(s):  EXTRACTION EXTRACAPSULAR CATARACT PHACO INTRAOCULAR LENS (IOL)    Anesthesia Type:   IV Sedation with Anesthesia    Operative Indications:  Age-related nuclear cataract, left eye [H25.12]  Decreased vision to count fingers. With problems reading.  Pt requested cataract sx the left eye    Procedure and Technique:    Procedure Details     The patient was brought in the OR in stable condition and placed on the operative table. The left eye was prepped and draped in the usual sterile manner. Attention was directed to the left eye where a lid speculum was placed. A 2.4 mm clear corneal incision was made temperally. 1/2 cc of 1% MPF Lidocaine was irrigated into the anterior chamber followed by vision blue dye. After 10 seconds, it was rinsed free with BSS. Then Viscoat was used to fill the AC. The side port incision was placed superiorly. The capsularrhexis was made and the nucleus was hydrodissected with BSS. The nucleus was then removed with the phaco handpiece followed by removal of the cortical material with the I/A handpiece. The capsular bag was then filled with Provisc. The IOL was folded and placed in to the capsular bag and centered well. The remaining Provisc was removed from the eye with the I/A. The wounds were hydrated with BSS and found to be water tight. The lid speculum was removed and 2 drops of Gatifloxicin were placed over the cornea. A protective eye shield was taped over the eye and the patient went to PACU in stable condition. I will see the patient in the office tomorrow and the expected post op period is a few weeks.       Complications: None         Disposition: PACU   Condition: Stable    SIGNATURE: Perry Luther MD  DATE: December 18, 2023  TIME: 9:30 AM

## 2023-12-18 NOTE — DISCHARGE INSTR - AVS FIRST PAGE
Dr. Luther Cataract Instructions    Activity:     1.  No Driving until instructed   2.  Keep shield on until seen tomorrow except when administering drops   3.  No heavy lifting   4.  No water in eye     Diet:     1.  Resume normal diet    Normal Symptoms:     1.  Mild Headache   2. Scratchy or picky feeling around eye    Call the office if:     1.  You have any questions or concerns   2.  If eye pain is not relieved by extra strength tylenol    Office phone number:  [phone]      Next appointment:     1.  See Dr Luther at his office tomorrow as scheduled   __________________________________________________________   2.  Bring blue eye kit with you and eyedrops to the office    A new set of comprehensive instructions will be given and reviewed with you during your office visit tomorrow.

## 2023-12-18 NOTE — ANESTHESIA PREPROCEDURE EVALUATION
Procedure:  EXTRACTION EXTRACAPSULAR CATARACT PHACO INTRAOCULAR LENS (IOL) (Left: Eye)    Relevant Problems   ANESTHESIA (within normal limits)      CARDIO   (+) Dysvascular foot (HCC)   (+) Embolism and thrombosis of arteries of the lower extremities (HCC)   (+) Hypertension   (+) Mixed hyperlipidemia   (+) PAD (peripheral artery disease) (HCC)   (+) Peripheral vascular disease (HCC)   (+) Popliteal artery stenosis, right (HCC)   (+) Type 2 myocardial infarction (HCC)      HEMATOLOGY   (+) Thrombocytopenia (HCC)      NEURO/PSYCH   (+) Depression      PULMONARY   (+) Smoker      Endocrine   (+) Multiple thyroid nodules      Nervous and Auditory   (+) Multiple sclerosis (HCC)      Other   (+) Phantom pain after amputation of lower extremity ([facility])   (+) S/P AKA (above knee amputation) unilateral, right ([facility])   (+) Sinus tachycardia        Physical Exam    Airway    Mallampati score: II  TM Distance: >3 FB  Neck ROM: full     Dental   No notable dental hx     Cardiovascular      Pulmonary      Other Findings  post-pubertal.      Anesthesia Plan  ASA Score- 3     Anesthesia Type- IV sedation with anesthesia with ASA Monitors.         Additional Monitors:     Airway Plan:            Plan Factors-Exercise tolerance (METS): >4 METS.    Chart reviewed. EKG reviewed.  Existing labs reviewed. Patient summary reviewed.    Patient is not a current smoker.              Induction-     Postoperative Plan-     Informed Consent- Anesthetic plan and risks discussed with patient.  I personally reviewed this patient with the CRNA. Discussed and agreed on the Anesthesia Plan with the CRNA..

## 2023-12-27 DIAGNOSIS — I21.A1 TYPE 2 MYOCARDIAL INFARCTION (HCC): ICD-10-CM

## 2023-12-27 DIAGNOSIS — I70.201 POPLITEAL ARTERY STENOSIS, RIGHT (HCC): ICD-10-CM

## 2023-12-28 RX ORDER — METOPROLOL TARTRATE 100 MG/1
100 TABLET ORAL EVERY 12 HOURS
Qty: 180 TABLET | Refills: 1 | Status: SHIPPED | OUTPATIENT
Start: 2023-12-28

## 2024-01-08 ENCOUNTER — TELEPHONE (OUTPATIENT)
Age: 64
End: 2024-01-08

## 2024-01-08 DIAGNOSIS — I10 PRIMARY HYPERTENSION: Primary | ICD-10-CM

## 2024-01-08 DIAGNOSIS — E78.2 MIXED HYPERLIPIDEMIA: ICD-10-CM

## 2024-01-08 RX ORDER — ROSUVASTATIN CALCIUM 20 MG/1
20 TABLET, COATED ORAL DAILY
Qty: 90 TABLET | Refills: 0 | Status: SHIPPED | OUTPATIENT
Start: 2024-01-08

## 2024-01-08 RX ORDER — AMLODIPINE AND OLMESARTAN MEDOXOMIL 5; 20 MG/1; MG/1
1 TABLET ORAL DAILY
Qty: 90 TABLET | Refills: 1 | Status: SHIPPED | OUTPATIENT
Start: 2024-01-08

## 2024-01-08 NOTE — TELEPHONE ENCOUNTER
Skylar called back. She called to request a refill of this.  (Sharan 5-20 mg) Someone previously removed this medication from her active list. and she never stopped taking it. She is asking for this to be e scribed to [facility] pharmacy. She tried to request this via [email or web address] and the medication was not there to request. That is the reason for the call Arnie

## 2024-01-08 NOTE — TELEPHONE ENCOUNTER
Patient calling in for medication not on active medication list. Please review and advise. Patient would like a [employer] message or call back if medication can be signed off and approved.    Refill must be reviewed and completed by the office or provider. The refill is unable to be approved by the medication management team.   Patient reports still taking      amlodipine-olmesartan (SELENE) 5-20 MG  Dose: 1 tablet Route: Oral Frequency: Daily  Dispense Quantity: 90 tablet  Sig: Take 1 tablet by mouth daily  Frank Lombardi, DO   Pharmacy  [facility]

## 2024-02-21 ENCOUNTER — TELEPHONE (OUTPATIENT)
Dept: NEUROLOGY | Facility: CLINIC | Age: 64
End: 2024-02-21

## 2024-02-21 NOTE — TELEPHONE ENCOUNTER
I received on my desk a fax from [facility] stating that her standing lab order will be expiring.  Asking for new orders to be signed.    See encounter 8/4/2023--same thing.    On chart review, the patient has not been seen since 6/2022 (Dr. Aly).  I personally have not seen this patient since March 2021 (nearly 3 years).    8/4/23 encounter says she is no longer on Aubagio and she was advised to see her PCP and hematology due to low platelets.  I do not see any visits with PCP or heme.    The last labs in the system are from 9/2023.  So, she has not been getting the routine labs done anyway (unless they are not sending them to us).    I will not be signing lab orders to continue with routine labs.    She has not been seen in >1.5 years,  She is no longer on DMT that requires frequent lab monitoring    She has an upcoming appt with Dr. Aly on 3/4/2024 (was scheduled to see her in Jan 2024 but cancelled).  She needs to keep this appt.    Will fax back lab order stating we will not be continuing at this time.  If Dr. Aly would like to order additional labs at upcoming visit, she will do that.

## 2024-02-29 ENCOUNTER — TELEPHONE (OUTPATIENT)
Dept: NEUROLOGY | Facility: CLINIC | Age: 64
End: 2024-02-29

## 2024-02-29 DIAGNOSIS — R10.9 ABDOMINAL CRAMPING: ICD-10-CM

## 2024-02-29 DIAGNOSIS — Z89.619 S/P AKA (ABOVE KNEE AMPUTATION) (HCC): ICD-10-CM

## 2024-02-29 DIAGNOSIS — K52.9 CHRONIC DIARRHEA: ICD-10-CM

## 2024-02-29 RX ORDER — DICYCLOMINE HYDROCHLORIDE 10 MG/1
10 CAPSULE ORAL
Qty: 270 CAPSULE | Refills: 1 | Status: SHIPPED | OUTPATIENT
Start: 2024-02-29

## 2024-02-29 NOTE — TELEPHONE ENCOUNTER
ELAINE to confirm your upcoming appt, 3/4/24 @9:30am (9:15am) at the [facility] office, to confirm/RS if you are unable to keep this appt please call [phone]

## 2024-03-04 ENCOUNTER — OFFICE VISIT (OUTPATIENT)
Dept: NEUROLOGY | Facility: CLINIC | Age: 64
End: 2024-03-04
Payer: COMMERCIAL

## 2024-03-04 ENCOUNTER — TELEPHONE (OUTPATIENT)
Dept: NEUROLOGY | Facility: CLINIC | Age: 64
End: 2024-03-04

## 2024-03-04 VITALS
WEIGHT: 139 LBS | HEIGHT: 66 IN | HEART RATE: 69 BPM | SYSTOLIC BLOOD PRESSURE: 134 MMHG | TEMPERATURE: 98 F | DIASTOLIC BLOOD PRESSURE: 82 MMHG | BODY MASS INDEX: 22.34 KG/M2

## 2024-03-04 DIAGNOSIS — G54.6 PHANTOM PAIN AFTER AMPUTATION OF LOWER EXTREMITY (HCC): ICD-10-CM

## 2024-03-04 DIAGNOSIS — I73.9 PAD (PERIPHERAL ARTERY DISEASE) (HCC): ICD-10-CM

## 2024-03-04 DIAGNOSIS — G35 MULTIPLE SCLEROSIS (HCC): Primary | ICD-10-CM

## 2024-03-04 PROCEDURE — 99215 OFFICE O/P EST HI 40 MIN: CPT | Performed by: PSYCHIATRY & NEUROLOGY

## 2024-03-04 NOTE — TELEPHONE ENCOUNTER
Pt seen in office today for routine neuro follow up. Please see full note.  Last visit over 21 months ago.   Extended appt. Concern that pt has not been followed up by vascular surgery team in light of her history as well as more recent issues with her left leg from May 2023 with multiple non healing foot wounds, PAD and distal superficial femoral artery high-grade stenosis status post shockwave balloon angioplasty, DCB. Moderate tibial peroneal trunk stenosis status post balloon angioplasty.  Strongly recommended for pt to follow up with vascular team outpt as well for continued surveillance.  Pt has not been seen in outpt vascular office since 2020.

## 2024-03-04 NOTE — PATIENT INSTRUCTIONS
Please get MRI of brain and neck before next visit    Please followup with Vascular Surgery to followup for 5/2023 hospital visit    Followup in 3 months

## 2024-03-04 NOTE — ASSESSMENT & PLAN NOTE
Patient is a 63 year old woman with hx of MS, phantom pain (R AKA 2020), left foot cellulitis 5/2023, chronic fatigue presenting for followup for MS. Patient currently off aubagio after 9/25/2022 after cholestyramine washout (needed to be done X2) due to thrombocytopenia. Patient then off DMARDs. Patient did have left leg cellulitis and PAD needing balloon angioplasty 5/2023 and underwent bilateral cataract removal 11 and 12/2023. Patient states having no new focal weakness, no new focal numbness, and no new focal visual deficits since 2022 when last visited, but noted on exam to have left arm and left leg weakness which was not reported previously on 6/2022. Patient otherwise denies new infections or urinary incontinence/retention.    Last MRI brain and cspine done in 2021 which showed stable mild disease burden and showed progression of spondylosis and osteoarthritis at the C4-C5 and C5-C6    Impression: Left arm and left leg weakness which may be gradually weakening (not seen on 2022) may be in setting of cervical stenosis, radiculopathy, or residual from previous hospitalization for left leg cellulitis/PAD. However, patient's last imaging was in 2021 and thus will need to monitor again for possible progressive MS.    Plan:  Ordered Mri brain and cspine wo contrast for monitoring- If new lesions seen, new DMARD need to be discussed (will need a sedating medication according to patient)  Patient follows with Dr. Hill for cataracts (patient had cataract surgery bilaterally 11 and 12/2023)- patient to continue to follow  Patient to followup in 3 months  Patient agreeable with above

## 2024-03-04 NOTE — ASSESSMENT & PLAN NOTE
Patient stable on gabapentin 300mg BID. No longer any phantom pain but only phantom sensation of the right side.

## 2024-03-04 NOTE — PROGRESS NOTES
Patient ID: Skylar Mobley is a 63 y.o. female.    Assessment/Plan:    Multiple sclerosis (HCC)  Patient is a 63 year old woman with hx of MS, phantom pain (R AKA 2020), left foot cellulitis 5/2023, chronic fatigue presenting for followup for MS. Patient currently off aubagio after 9/25/2022 after cholestyramine washout (needed to be done X2) due to thrombocytopenia. Patient then off DMARDs. Patient did have left leg cellulitis and PAD needing balloon angioplasty 5/2023 and underwent bilateral cataract removal 11 and 12/2023. Patient states having no new focal weakness, no new focal numbness, and no new focal visual deficits since 2022 when last visited, but noted on exam to have left arm and left leg weakness which was not reported previously on 6/2022. Patient otherwise denies new infections or urinary incontinence/retention.    Last MRI brain and cspine done in 2021 which showed stable mild disease burden and showed progression of spondylosis and osteoarthritis at the C4-C5 and C5-C6    Impression: Left arm and left leg weakness which may be gradually weakening (not seen on 2022) may be in setting of cervical stenosis, radiculopathy, or residual from previous hospitalization for left leg cellulitis/PAD. However, patient's last imaging was in 2021 and thus will need to monitor again for possible progressive MS.    Plan:  Ordered Mri brain and cspine wo contrast for monitoring- If new lesions seen, new DMARD need to be discussed (will need a sedating medication according to patient)  Patient follows with Dr. Hill for cataracts (patient had cataract surgery bilaterally 11 and 12/2023)- patient to continue to follow  Patient to followup in 3 months  Patient agreeable with above     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)  Patient stable on gabapentin 300mg BID. No longer any phantom pain but only phantom sensation of the right side.    PAD (peripheral artery disease) ([facility])  Patient has not yet  followed up with vascular surgery for 5/2023 admission. Patient last saw vascular surgery with Dr. Ibarra 2020.    Referral ordered for vascular surgery  Patient agreeable with above.       Diagnoses and all orders for this visit:    Multiple sclerosis (HCC)  -     Cancel: MRI brain MS wo and w contrast; Future  -     Cancel: MRI cervical spine with and without contrast; Future  -     MRI brain MS wo contrast; Future  -     MRI cervical spine wo contrast; Future    PAD (peripheral artery disease) (HCC)  -     Ambulatory Referral to Vascular Surgery; Future    Phantom pain after amputation of lower extremity ([facility])           Subjective:    Patient is a 63 year old woman with hx of MS, phantom pain (R AKA 2020), left foot cellulitis 5/2023, chronic fatigue presenting for followup for MS. Patient last seen by Neurology 6/7/2022 for which she continued aubagio. Then 9/25/2022, aubagio was stopped due to relatively low platelets fo 93. Patient was to start cholestyramine washout 11 days. Patient's platelets then improved. Patient had no focal weakness, numbness, visual deficits from her MS afterwards. Patient's phantom pain well controlled on gabapentin and only has odd sensations such as itchiness on her right phantom ankle. Patient has continued left hand weakness, no focal numbness, no visual deficits, no urinary incontinence/retention, no bowel incontinence. Patient is on a wheelchair.     Patient had cataract removal of both eyes in November and December of 2023. Patient follows Dr. Hill.     Patient also had hospital visit in 5/2023, because she had sores of left leg due to reduced circulation and had cellulitis. Patient discovered to have PAD and stenosis of the left femoral artery which was treated with balloon angioplasty. Patient went to wound center as well. Patient has not followed with vascular surgery.     In her previous MS symptoms, she had ambulatory dysfunction and unable to walk straight.        The  following portions of the patient's history were reviewed and updated as appropriate: She  has a past medical history of Chronic back pain, Depression, High cholesterol, Hypertension, MS (multiple sclerosis) ([facility]), and RLL pneumonia.  She   Patient Active Problem List    Diagnosis Date Noted    Cellulitis of foot     Peripheral vascular disease ([facility])     Dysvascular foot ([facility])     Ulcer of left foot, limited to breakdown of skin ([facility])     Abscess of left foot     Depression 2023    PAD (peripheral artery disease) ([facility]) 2023    Cellulitis of foot, left 2023    Embolism and thrombosis of arteries of the lower extremities ([facility]) 06/15/2022    Mixed hyperlipidemia 06/15/2022    Multiple open wounds 2022    Chronic fatigue 2022    Spasticity 2022    Phantom pain after amputation of lower extremity ([facility]) 2020    Venous insufficiency of left leg 2020    Incontinence of urine in female 2020    S/P AKA (above knee amputation) unilateral, right ([facility]) 2020    Popliteal artery stenosis, right ([facility]) 2020    Type 2 myocardial infarction ([facility]) 2019    Generalized weakness 2019    Sinus tachycardia 2019    Bilateral hip pain 2019    Leukocytosis 2019    Smoker 2019    Thrombocytopenia ([facility]) 2017    Ventricular hypertrophy 2017    Vitamin D deficiency 2015    Multiple thyroid nodules 2015    Hypertension 06/10/2014    Lumbar spinal stenosis 06/10/2014    Multiple sclerosis ([facility]) 06/10/2014     She  has a past surgical history that includes orthopedic surgery; Back surgery;  section; Laminectomy; Other surgical history; Knee arthroscopy; Ankle surgery (Right); AMPUTATION ABOVE KNEE (AKA) (Right, 2020); IR lower extremity angiogram (2023); pr xcapsl ctrc rmvl insj io lens prosth w/o ecp (Right, 2023); and pr xcapsl ctrc rmvl insj io lens prosth w/o ecp (Left, 2023).  Her family  history includes Heart attack in her father; Osteoporosis in her mother.  She  reports that she has been smoking cigarettes. She started smoking about 46 years ago. She has a 10.4 pack-year smoking history. She has never been exposed to tobacco smoke. She has never used smokeless tobacco. She reports that she does not currently use alcohol. She reports that she does not use drugs.  Current Outpatient Medications   Medication Sig Dispense Refill    amlodipine-olmesartan (Sharan) 5-20 MG Take 1 tablet by mouth daily 90 tablet 1    ascorbic acid (VITAMIN C) 250 mg tablet Take 250 mg by mouth daily      baclofen 10 mg tablet TAKE 2 TABLETS THREE TIMES A  tablet 1    buPROPion (Zyban) 150 MG 12 hr tablet Take 1 tablet (150 mg total) by mouth 2 (two) times a day 180 tablet 1    cholecalciferol (VITAMIN D3) 1,000 units tablet Take 1,000 Units by mouth daily      dicyclomine (BENTYL) 10 mg capsule Take 1 capsule (10 mg total) by mouth 3 (three) times a day before meals 270 capsule 1    gabapentin (NEURONTIN) 300 mg capsule Take 1 capsule (300 mg total) by mouth 2 (two) times a day 180 capsule 0    metoprolol tartrate (LOPRESSOR) 100 mg tablet Take 1 tablet (100 mg total) by mouth every 12 (twelve) hours 180 tablet 1    Multiple Vitamins-Minerals (Multivitamin Adult Extra C) CHEW Chew 1 tablet daily      rivaroxaban (XARELTO) 20 mg tablet Take 1 tablet (20 mg total) by mouth daily with breakfast 90 tablet 1    rosuvastatin (CRESTOR) 20 MG tablet Take 1 tablet (20 mg total) by mouth daily 90 tablet 0    docusate sodium (COLACE) 100 mg capsule Take 1 capsule (100 mg total) by mouth 2 (two) times a day 60 capsule 0    gatifloxacin (ZYMAXID) 0.5 % Administer 1 drop into the left eye 2 (two) times a day      ketorolac (ACULAR) 0.5 % ophthalmic solution Administer 1 drop into the left eye 4 (four) times a day      polyethylene glycol (MIRALAX) 17 g packet Take 17 g by mouth daily as needed (constipation) 20 each 0    senna  "(SENOKOT) 8.6 mg Take 1 tablet (8.6 mg total) by mouth daily at bedtime 30 tablet 0     No current facility-administered medications for this visit.     Current Outpatient Medications on File Prior to Visit   Medication Sig    amlodipine-olmesartan (Sharan) 5-20 MG Take 1 tablet by mouth daily    ascorbic acid (VITAMIN C) 250 mg tablet Take 250 mg by mouth daily    baclofen 10 mg tablet TAKE 2 TABLETS THREE TIMES A DAY    buPROPion (Zyban) 150 MG 12 hr tablet Take 1 tablet (150 mg total) by mouth 2 (two) times a day    cholecalciferol (VITAMIN D3) 1,000 units tablet Take 1,000 Units by mouth daily    dicyclomine (BENTYL) 10 mg capsule Take 1 capsule (10 mg total) by mouth 3 (three) times a day before meals    gabapentin (NEURONTIN) 300 mg capsule Take 1 capsule (300 mg total) by mouth 2 (two) times a day    metoprolol tartrate (LOPRESSOR) 100 mg tablet Take 1 tablet (100 mg total) by mouth every 12 (twelve) hours    Multiple Vitamins-Minerals (Multivitamin Adult Extra C) CHEW Chew 1 tablet daily    rivaroxaban (XARELTO) 20 mg tablet Take 1 tablet (20 mg total) by mouth daily with breakfast    rosuvastatin (CRESTOR) 20 MG tablet Take 1 tablet (20 mg total) by mouth daily    docusate sodium (COLACE) 100 mg capsule Take 1 capsule (100 mg total) by mouth 2 (two) times a day    gatifloxacin (ZYMAXID) 0.5 % Administer 1 drop into the left eye 2 (two) times a day    ketorolac (ACULAR) 0.5 % ophthalmic solution Administer 1 drop into the left eye 4 (four) times a day    polyethylene glycol (MIRALAX) 17 g packet Take 17 g by mouth daily as needed (constipation)    senna (SENOKOT) 8.6 mg Take 1 tablet (8.6 mg total) by mouth daily at bedtime     No current facility-administered medications on file prior to visit.     She has No Known Allergies..         Objective:    Blood pressure 134/82, pulse 69, temperature 98 °F (36.7 °C), height 5' 6\" (1.676 m), weight 63 kg (139 lb), not currently breastfeeding.    Physical Exam  Eyes: "      Extraocular Movements: Extraocular movements intact.   Neurological:      Deep Tendon Reflexes:      Reflex Scores:       Bicep reflexes are 1+ on the right side and 1+ on the left side.       Brachioradialis reflexes are 1+ on the right side and 1+ on the left side.       Patellar reflexes are 1+ on the left side.  Psychiatric:         Speech: Speech normal.         Neurological Exam  Mental Status  Awake, alert and oriented to person, place and time. Speech is normal.    Cranial Nerves  CN II: Visual fields full to confrontation.  CN III, IV, VI: Extraocular movements intact bilaterally.  CN V: Facial sensation is normal.  CN XI: Shoulder shrug strength is normal.    Motor                                               Right                     Left   Shoulder abduction               5                          4   Shoulder adduction               5                          4+  Elbow flexion                         5                          4  Elbow extension                    5                          4  Wrist flexion                           5                          4+  Wrist extension                      5                          4+  Hip flexion                                                       3  Knee flexion                                                    4  Ankle inversion                                               2  Ankle eversion                                                2  Plantarflexion                                                  0  Dorsiflexion                                                     4  Patient has Right AKA.    Sensory  Sensation is intact to light touch, pinprick, vibration and proprioception in all four extremities.    Reflexes                                            Right                      Left  Brachioradialis                    1+                         1+  Biceps                                 1+                         1+  Patellar                                                         1+  Patient has right AKA.    Coordination  Right: Finger-to-nose normal.Left: Finger-to-nose abnormality: Dysmetria but affected by weakness.    Gait    Patient has Right AKA and on wheelchair.        ROS:    Review of Systems   Constitutional:  Negative for appetite change, fatigue and fever.   HENT: Negative.  Negative for hearing loss, tinnitus, trouble swallowing and voice change.    Eyes: Negative.  Negative for photophobia, pain and visual disturbance.   Respiratory: Negative.  Negative for shortness of breath.    Cardiovascular: Negative.  Negative for palpitations.   Gastrointestinal: Negative.  Negative for nausea and vomiting.   Endocrine: Negative.  Negative for cold intolerance.   Genitourinary: Negative.  Negative for dysuria, frequency and urgency.   Musculoskeletal:  Negative for back pain, gait problem, myalgias, neck pain and neck stiffness.   Skin: Negative.  Negative for rash.   Allergic/Immunologic: Negative.    Neurological: Negative.  Negative for dizziness, tremors, seizures, syncope, facial asymmetry, speech difficulty, weakness, light-headedness, numbness and headaches.   Hematological: Negative.  Does not bruise/bleed easily.   Psychiatric/Behavioral: Negative.  Negative for confusion, hallucinations and sleep disturbance.    All other systems reviewed and are negative.    No issues to address

## 2024-03-04 NOTE — ASSESSMENT & PLAN NOTE
Patient has not yet followed up with vascular surgery for 5/2023 admission. Patient last saw vascular surgery with Dr. Ibarra 2020.    Referral ordered for vascular surgery  Patient agreeable with above.

## 2024-03-10 NOTE — TELEPHONE ENCOUNTER
Per 2/21/24 LOV note:   Due to longevity of missed time away from office and being off imd meds, strongly recommend updated mri head and c spine imaging. Pt also strongly rec to follow up with her vascular team as well. If any new lesions, to consider new imd meds. To consider tecfidera. Pt only on betaseron and aubagio to date.     MRI c-spine - scheduled for 4/1/24  MRI b - scheduled for 4/1/24  F/U appt 6/24/24 w/Dr. Aly      Per note below:  If Dr. Aly would like to order additional labs at upcoming visit, she will do that.       Dr. Aly - please place lab orders if needed.

## 2024-03-11 ENCOUNTER — TELEPHONE (OUTPATIENT)
Dept: NEUROLOGY | Facility: CLINIC | Age: 64
End: 2024-03-11

## 2024-03-11 NOTE — TELEPHONE ENCOUNTER
March 11, 2024  Skylar Mobley   to  Neurology [facility] (supporting Saskia Aly MD)   3/11/24  1:10 PM  I was in on Monday, March 4 and Dr. Aly was going to send prescriptions to [facility] for Amantadine and something to relax me for my MRI. As of today March 11, the prescriptions haven’t been sent. The pharmacist put in a request and I called Thursday, March 7 and Friday March 8 and haven’t received any response . If someone could look into this and let me know, I would appreciate it.. Thank you  ____________________________________________    Per 3/4/24 LOV note:  Plan:  Ordered Mri brain and cspine wo contrast for monitoring- If new lesions seen, new DMARD need to be discussed (will need a sedating medication according to patient)    Dr. Aly - please place order for MRI pre-med and amantadine if agreeable. Thank you.

## 2024-03-12 DIAGNOSIS — G60.3 IDIOPATHIC PROGRESSIVE NEUROPATHY: ICD-10-CM

## 2024-03-12 DIAGNOSIS — G35 MULTIPLE SCLEROSIS (HCC): Primary | ICD-10-CM

## 2024-03-12 RX ORDER — LORAZEPAM 0.5 MG/1
TABLET ORAL
Qty: 2 TABLET | Refills: 0 | Status: SHIPPED | OUTPATIENT
Start: 2024-03-12

## 2024-03-12 RX ORDER — AMANTADINE HYDROCHLORIDE 100 MG/1
100 CAPSULE, GELATIN COATED ORAL 2 TIMES DAILY
Qty: 60 CAPSULE | Refills: 2 | Status: SHIPPED | OUTPATIENT
Start: 2024-03-12

## 2024-03-12 NOTE — TELEPHONE ENCOUNTER
Called pt, no answer. No consent on file to leave detailed message. Will send mychart message instead.    Mychart message sent. See patient message encounter 3/11/24.

## 2024-03-12 NOTE — TELEPHONE ENCOUNTER
Skylar Mobley   to  Neurology [facility] (supporting Saskia Aly MD)         3/12/24 10:04 AM  Derick Reddy, I received your messages from Dr Aly. I have transportation to and from my MRI. As far as the labs, the office needs to fax the order to [facility] and they will make arrangements to come and Do the blood draw. The number for them is [phone], their Fax number is [phone]. If you need to contact me, my cell is [phone] thank you.

## 2024-03-12 NOTE — TELEPHONE ENCOUNTER
Called pt, no answer. LVM that this writer to send mychart message with Dr. Aly's message re: labs ordered.    Mychart message sent. See patient message encounter 3/11/24.

## 2024-03-12 NOTE — TELEPHONE ENCOUNTER
Amantadine and ativan rx sent. Please make sure pt has someone to take her for her mris due to sedation med.

## 2024-03-19 DIAGNOSIS — G35 MULTIPLE SCLEROSIS (HCC): ICD-10-CM

## 2024-03-19 RX ORDER — BACLOFEN 10 MG/1
20 TABLET ORAL 3 TIMES DAILY
Qty: 540 TABLET | Refills: 0 | Status: CANCELLED | OUTPATIENT
Start: 2024-03-19

## 2024-03-21 ENCOUNTER — TELEPHONE (OUTPATIENT)
Dept: FAMILY MEDICINE CLINIC | Facility: CLINIC | Age: 64
End: 2024-03-21

## 2024-03-21 DIAGNOSIS — I70.201 POPLITEAL ARTERY STENOSIS, RIGHT (HCC): ICD-10-CM

## 2024-03-21 DIAGNOSIS — I21.A1 TYPE 2 MYOCARDIAL INFARCTION (HCC): ICD-10-CM

## 2024-03-21 RX ORDER — GABAPENTIN 300 MG/1
300 CAPSULE ORAL 2 TIMES DAILY
Qty: 180 CAPSULE | Refills: 0 | Status: SHIPPED | OUTPATIENT
Start: 2024-03-21

## 2024-04-09 DIAGNOSIS — I21.A1 TYPE 2 MYOCARDIAL INFARCTION (HCC): ICD-10-CM

## 2024-04-09 DIAGNOSIS — I10 PRIMARY HYPERTENSION: ICD-10-CM

## 2024-04-09 DIAGNOSIS — I70.201 POPLITEAL ARTERY STENOSIS, RIGHT (HCC): ICD-10-CM

## 2024-04-15 DIAGNOSIS — E78.2 MIXED HYPERLIPIDEMIA: ICD-10-CM

## 2024-04-16 ENCOUNTER — HOSPITAL ENCOUNTER (OUTPATIENT)
Dept: RADIOLOGY | Facility: HOSPITAL | Age: 64
Discharge: HOME | End: 2024-04-16
Payer: COMMERCIAL

## 2024-04-16 DIAGNOSIS — G35 MULTIPLE SCLEROSIS (HCC): ICD-10-CM

## 2024-04-16 PROCEDURE — 70551 MRI BRAIN STEM W/O DYE: CPT

## 2024-04-16 PROCEDURE — 72141 MRI NECK SPINE W/O DYE: CPT

## 2024-04-16 RX ORDER — ROSUVASTATIN CALCIUM 20 MG/1
20 TABLET, COATED ORAL DAILY
Qty: 90 TABLET | Refills: 0 | Status: SHIPPED | OUTPATIENT
Start: 2024-04-16

## 2024-04-23 ENCOUNTER — TELEPHONE (OUTPATIENT)
Dept: NEUROLOGY | Facility: CLINIC | Age: 64
End: 2024-04-23

## 2024-04-23 NOTE — TELEPHONE ENCOUNTER
----- Message from Bertha Naranjo sent at 4/23/2024  2:35 PM EDT -----  Regarding: significant findings  Radiology called with significant findings on pts MRI done 4/16.

## 2024-04-23 NOTE — TELEPHONE ENCOUNTER
Please call pt, sched for AWV - we can review the MRI of the thyroid and the nodule.  PT will need an US of thyroid ordered

## 2024-04-23 NOTE — TELEPHONE ENCOUNTER
Radiology called w/significant findings on MRI  IMPRESSION:  1. Chronic myelomalacia with mild cord atrophy at the C3-C4 level is stable, correlating to history of multiple sclerosis. No evidence of progressive demyelination.  2. Spondylotic changes are stable most pronounced at C5-C6. No cord compression.  3. Right-sided thyroid nodule. Thyroid ultrasound suggested.    Significant finding pertaining to thyroid nodule. Pt will need to f/u w/PCP.    Report sent to PCP.     Left msg on house and cell phone requesting return call.       Dr. Lombardi/Dr. Alisa hansen

## 2024-04-25 RX ORDER — METOPROLOL TARTRATE 100 MG/1
100 TABLET ORAL EVERY 12 HOURS
Qty: 180 TABLET | Refills: 1 | Status: SHIPPED | OUTPATIENT
Start: 2024-04-25

## 2024-04-25 RX ORDER — AMLODIPINE AND OLMESARTAN MEDOXOMIL 5; 20 MG/1; MG/1
1 TABLET ORAL DAILY
Qty: 90 TABLET | Refills: 1 | Status: SHIPPED | OUTPATIENT
Start: 2024-04-25

## 2024-05-05 DIAGNOSIS — G35 MULTIPLE SCLEROSIS (HCC): ICD-10-CM

## 2024-05-06 RX ORDER — AMANTADINE HYDROCHLORIDE 100 MG/1
CAPSULE, GELATIN COATED ORAL
Qty: 60 CAPSULE | Refills: 2 | Status: SHIPPED | OUTPATIENT
Start: 2024-05-06

## 2024-05-30 DIAGNOSIS — K52.9 CHRONIC DIARRHEA: ICD-10-CM

## 2024-05-30 DIAGNOSIS — Z89.619 S/P AKA (ABOVE KNEE AMPUTATION) (HCC): ICD-10-CM

## 2024-05-30 DIAGNOSIS — R10.9 ABDOMINAL CRAMPING: ICD-10-CM

## 2024-05-31 RX ORDER — RIVAROXABAN 20 MG/1
20 TABLET, FILM COATED ORAL
Qty: 90 TABLET | Refills: 1 | Status: SHIPPED | OUTPATIENT
Start: 2024-05-31

## 2024-05-31 RX ORDER — DICYCLOMINE HYDROCHLORIDE 10 MG/1
CAPSULE ORAL
Qty: 270 CAPSULE | Refills: 1 | Status: SHIPPED | OUTPATIENT
Start: 2024-05-31

## 2024-06-01 DIAGNOSIS — F17.200 SMOKER: ICD-10-CM

## 2024-06-02 RX ORDER — BUPROPION HYDROCHLORIDE 150 MG/1
TABLET, EXTENDED RELEASE ORAL
Qty: 60 TABLET | Refills: 0 | Status: SHIPPED | OUTPATIENT
Start: 2024-06-02

## 2024-06-02 NOTE — TELEPHONE ENCOUNTER
Last seen 06.28.2023  Patient needs an appointment. Please contact the patient to schedule an appointment. 30D refill provided.

## 2024-06-19 DIAGNOSIS — G35 MULTIPLE SCLEROSIS (HCC): ICD-10-CM

## 2024-06-19 RX ORDER — BACLOFEN 10 MG/1
20 TABLET ORAL 3 TIMES DAILY
Qty: 540 TABLET | Refills: 1 | Status: SHIPPED | OUTPATIENT
Start: 2024-06-19

## 2024-06-24 ENCOUNTER — OFFICE VISIT (OUTPATIENT)
Dept: NEUROLOGY | Facility: CLINIC | Age: 64
End: 2024-06-24
Payer: COMMERCIAL

## 2024-06-24 ENCOUNTER — TELEPHONE (OUTPATIENT)
Dept: NEUROLOGY | Facility: CLINIC | Age: 64
End: 2024-06-24

## 2024-06-24 VITALS
TEMPERATURE: 97.6 F | DIASTOLIC BLOOD PRESSURE: 84 MMHG | HEIGHT: 66 IN | WEIGHT: 139 LBS | HEART RATE: 70 BPM | SYSTOLIC BLOOD PRESSURE: 124 MMHG | BODY MASS INDEX: 22.34 KG/M2

## 2024-06-24 DIAGNOSIS — G54.6 PHANTOM PAIN AFTER AMPUTATION OF LOWER EXTREMITY (HCC): ICD-10-CM

## 2024-06-24 DIAGNOSIS — G60.3 IDIOPATHIC PROGRESSIVE NEUROPATHY: ICD-10-CM

## 2024-06-24 DIAGNOSIS — I73.9 PAD (PERIPHERAL ARTERY DISEASE) (HCC): ICD-10-CM

## 2024-06-24 DIAGNOSIS — G35 MULTIPLE SCLEROSIS (HCC): Primary | ICD-10-CM

## 2024-06-24 PROCEDURE — 99215 OFFICE O/P EST HI 40 MIN: CPT | Performed by: PSYCHIATRY & NEUROLOGY

## 2024-06-24 NOTE — TELEPHONE ENCOUNTER
Pt seen today in office.  Pt with several issues.  Pt with amputation of lower ext. Pt with known PAD and also MS.  Pt currently with skin abrasion and break down left arm due to old scooter that she got from a friend and now scooter without cushion on arm rest rubbing on dependent left arm.   Please see if we can get new arm rest on current scooter to help in interim for cushioning.  Also need new scooter eval tailored to pt own individual issues.  Please see about getting her new scooter eval  Also pt is home bound and gets her labs drawn by [facility].  Pt with overdue labs from last appt still not done.  Can you please fax our 3/12/24 labs to [facility] to help with getting labs expedited?  Also pt overdue for appt with pcp as well as vascular surgery.  Can you please help with getting her appts and confirm transportation help? Pt has some friends and family to bring her but no specific consistency.  Many appts falling through.

## 2024-06-24 NOTE — PATIENT INSTRUCTIONS
Please call the office if you have any questions or concerns. The office number is [phone].

## 2024-06-24 NOTE — PROGRESS NOTES
Patient ID: Skylar Mobley is a 63 y.o. female.    Assessment/Plan:  Skylar Mobley is a 63 y.o. female with a PMHx of MS, phantom pain (R AKA 2020), left foot cellulitis 5/2023, chronic fatigue who presents today for follow-up. Patient currently off aubagio after 9/25/2022 after cholestyramine washout (needed to be done X2) due to thrombocytopenia. Pt is not having any new focal weakness, focal numbness, or focal visual deficits. Pts MRI brain and c-spine completed in 4/2024 did not show any new lesions. Pt still off DMT.    Plan  - Discussed that she needs to have her blood work ordered at the last visit as we could not consider starting DMT - Blood work will need to go to [facility]  - Recommend that she be evaluated by vascular  - Patient stable on gabapentin 300mg BID from PCP for phantom limb pain  - Recommended to the patient that she see her PCP this week about the LUE wounds that are from her wheelchair. Did discuss wound care as an option but she did not want to do this.  - Would likely benefit from a scooter evaluation, however she does not want a new one, the ones she currently uses were given to her for free  - Discussed the current impressions, and provided patient and family education  - Discussed proper use, possible side effects and risks of treatments  - Discussed instructions for management, importance of tx compliance, and risk factor reduction  - All questions were address and patient/guardian verbalized understanding  - Pt advised to call the office with any questions of concerns  - Pt is to follow up with us in 6 months  - Patient was seen and discussed with Dr. Aly       Diagnoses and all orders for this visit:    Multiple sclerosis (HCC)    PAD (peripheral artery disease) (HCC)    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)    Idiopathic progressive neuropathy         Subjective:  Skylar Mobley is a 63 y.o. female with a PMHx of MS, phantom pain (R AKA 2020), left foot cellulitis  5/2023, chronic fatigue who presents today for follow-up. The pt was last seen 3/2024. Since the last visit they report they are doing well. She is NOT on any MS medications at this time. She reports not having any new or worsening MS symptoms. She has not seen vascular surgery since the last visit. She has not had the blood work ordered at the last visit yet, she said there was an issue with having it done as a home draw and then she forgot about it. She had MRIs in April 2024 of her C spine and Brain which did not show any new lesions. Has not seen vascular surgery since the last visit, patient last saw vascular surgery with Dr. Ibarra 2020. Pt not reporting phantom pain sensation on R.       MRI Brain w/out contrast 4/2024:1. Stable white matter lesions correlating to the history of multiple sclerosis. No definite progressive signal abnormality. 2. No acute infarction, intracranial hemorrhage or mass effect.    MRI C Spine w/out contrast  4/2024: 1. Chronic myelomalacia with mild cord atrophy at the C3-C4 level is stable, correlating to history of multiple sclerosis. No evidence of progressive demyelination. 2. Spondylotic changes are stable most pronounced at C5-C6. No cord compression.    Prior Documentation:  Patient is a 63 year old woman with hx of MS, phantom pain (R AKA 2020), left foot cellulitis 5/2023, chronic fatigue presenting for followup for MS. Patient last seen by Neurology 6/7/2022 for which she continued aubagio. Then 9/25/2022, aubagio was stopped due to relatively low platelets fo 93. Patient was to start cholestyramine washout 11 days. Patient's platelets then improved. Patient had no focal weakness, numbness, visual deficits from her MS afterwards. Patient's phantom pain well controlled on gabapentin and only has odd sensations such as itchiness on her right phantom ankle. Patient has continued left hand weakness, no focal numbness, no visual deficits, no urinary incontinence/retention, no  "bowel incontinence. Patient is on a wheelchair.      Patient had cataract removal of both eyes in November and December of 2023. Patient follows Dr. Hill.      Patient also had hospital visit in 5/2023, because she had sores of left leg due to reduced circulation and had cellulitis. Patient discovered to have PAD and stenosis of the left femoral artery which was treated with balloon angioplasty. Patient went to wound center as well. Patient has not followed with vascular surgery.      In her previous MS symptoms, she had ambulatory dysfunction and unable to walk straight.    The following portions of the patient's history were reviewed and updated as appropriate: allergies, current medications, past family history, past medical history, past social history, past surgical history, and problem list.     Objective:    Blood pressure 124/84, pulse 70, temperature 97.6 °F (36.4 °C), height 5' 6\" (1.676 m), weight 63 kg (139 lb), not currently breastfeeding.    Physical Exam  Vitals and nursing note reviewed.  Constitutional: Alert. Not in acute distress. Not ill-appearing, toxic-appearing or diaphoretic.    HENT: Normocephalic and atraumatic. Nose and Ears normal.    Eyes: No scleral icterus. No discharge.    Neck: Neck Supple. ROM normal.   Cardiovascular: Distal extremities warm without palpable edema or tenderness, no observed significant swelling.    Pulmonary:  Pulmonary effort is normal. Not in respiratory distress   Abdominal: Abdomen is flat and not distended   Musculoskeletal: No swelling or deformity.   Skin: Warm and dry. Lesions/scabs LUE proximal and distal to elbow (from wheelchair she reports)   Psychiatric:  Normal behavior and appropriate affect      Neurological Exam  Mental Status  Awake and alert. Recent and remote memory are intact. Speech is normal. Language is fluent with no aphasia. Attention and concentration are normal.    Cranial Nerves  CN II: Visual fields full to confrontation.  CN III, " IV, VI: Extraocular movements intact bilaterally. Normal lids and orbits bilaterally. Pupils equal round and reactive to light bilaterally.  CN V: Facial sensation is normal.  CN VII: Full and symmetric facial movement.  CN VIII: Hearing is normal.  CN IX, X: Palate elevates symmetrically  CN XI: Shoulder shrug strength is normal.  CN XII: Tongue midline without atrophy or fasciculations.    Motor  Normal muscle bulk throughout. No fasciculations present. Normal muscle tone. No abnormal involuntary movements. Strength is 5/5 in all four extremities except as noted. LUE weakness 2-3/5. 1/5 LLE and remaining RLE.     Sensory  Light touch is normal in upper and lower extremities.     Reflexes  Deep tendon reflexes are 2+ and symmetric except as noted.    Coordination  Right: Finger-to-nose normal.Left: Finger-to-nose normal.    Gait  Unable to assess as pt is wheelchair bound 2/2 RLE amputation and LLE weakness    ROS:    Review of Systems   Constitutional:  Negative for appetite change, fatigue and fever.   HENT: Negative.  Negative for hearing loss, tinnitus, trouble swallowing and voice change.    Eyes: Negative.  Negative for photophobia, pain and visual disturbance.   Respiratory: Negative.  Negative for shortness of breath.    Cardiovascular: Negative.  Negative for palpitations.   Gastrointestinal: Negative.  Negative for nausea and vomiting.   Endocrine: Negative.  Negative for cold intolerance.   Genitourinary: Negative.  Negative for dysuria, frequency and urgency.   Musculoskeletal:  Negative for back pain, gait problem, myalgias, neck pain and neck stiffness.   Skin: Negative.  Negative for rash.   Allergic/Immunologic: Negative.    Neurological: Negative.  Negative for dizziness, tremors, seizures, syncope, facial asymmetry, speech difficulty, weakness, light-headedness, numbness and headaches.   Hematological: Negative.  Does not bruise/bleed easily.   Psychiatric/Behavioral: Negative.  Negative for  confusion, hallucinations and sleep disturbance.    All other systems reviewed and are negative.    No new issues to address

## 2024-06-24 NOTE — TELEPHONE ENCOUNTER
Pt seen in office today. Please see full consult.   Pt recommended to see you this week due to early start of skin breakdown on the left upper arm and around elbow from contact with non cushioned arm rest from her scooter. Pt with scooter that was from a friend who [facility].  I am going to work with SW to see about getting her at least improved arm cushioning for now on current scooter but also own scooter fitted to her individual needs.  Pt very reluctant to see wound care.  Need pcp to see current skin abrasions on left arm and further direction.

## 2024-06-25 NOTE — TELEPHONE ENCOUNTER
Brief message left as boxes not checked on communication consent.  SW will continue to try to get a hold of patient.

## 2024-07-01 NOTE — TELEPHONE ENCOUNTER
Patient returned call.  Patient provided SW [facility] for blood work ph# [phone], fax# [phone].  Patient gets home blood draws.  SW will help in faxing 3/12 labs.      ROSITA explained the process in getting a new mobility device, scooter vs power chair.  Patient expressed understanding but prefers to put this on hold for now.  Not sure she wants to go the route in getting a new device.  In reference to her old device, patient said she has a maureen who fixes scooters and has fixed hers in the past.  Patient said she will call him to see if he can fix the arm rest.  There is no way to determine what DME company patient's scooter is from to see about specific repairs.      Discussed patient needing to make appointments to see her vascular and PCP providers.  Patient said she is aware and will.  Offered to provide contact numbers for both but patient declined.  Stated she has the numbers already.      Discussed [facility] transportation through [facility] (ph# [phone]).  Informed of volunteer service for those 60+.  Patient said she has family to drive her and does not feel a transportation service is needed at this time.      ROSITA will follow up with patient regarding her current mobility device, see if apts were scheduled.  SW remains available.

## 2024-07-02 NOTE — TELEPHONE ENCOUNTER
ROSITA faxed blood work orders for patient to [facility] at fax# [phone].  Confirmation of successful fax received.  Placed orders with fax confirmation in to be scanned chart.   SW remains available.

## 2024-07-11 DIAGNOSIS — I21.A1 TYPE 2 MYOCARDIAL INFARCTION (HCC): ICD-10-CM

## 2024-07-11 DIAGNOSIS — I70.201 POPLITEAL ARTERY STENOSIS, RIGHT (HCC): ICD-10-CM

## 2024-07-11 DIAGNOSIS — E78.2 MIXED HYPERLIPIDEMIA: ICD-10-CM

## 2024-07-11 DIAGNOSIS — I10 PRIMARY HYPERTENSION: ICD-10-CM

## 2024-07-12 RX ORDER — AMLODIPINE AND OLMESARTAN MEDOXOMIL 5; 20 MG/1; MG/1
1 TABLET ORAL DAILY
Qty: 90 TABLET | Refills: 0 | Status: SHIPPED | OUTPATIENT
Start: 2024-07-12

## 2024-07-12 RX ORDER — METOPROLOL TARTRATE 100 MG/1
100 TABLET ORAL EVERY 12 HOURS
Qty: 180 TABLET | Refills: 0 | Status: SHIPPED | OUTPATIENT
Start: 2024-07-12

## 2024-07-12 RX ORDER — ROSUVASTATIN CALCIUM 20 MG/1
20 TABLET, COATED ORAL DAILY
Qty: 90 TABLET | Refills: 0 | Status: SHIPPED | OUTPATIENT
Start: 2024-07-12

## 2024-07-12 RX ORDER — GABAPENTIN 300 MG/1
300 CAPSULE ORAL 2 TIMES DAILY
Qty: 180 CAPSULE | Refills: 0 | Status: SHIPPED | OUTPATIENT
Start: 2024-07-12

## 2024-07-30 ENCOUNTER — TELEPHONE (OUTPATIENT)
Age: 64
End: 2024-07-30

## 2024-07-30 DIAGNOSIS — G35 MULTIPLE SCLEROSIS (HCC): Primary | ICD-10-CM

## 2024-07-30 NOTE — TELEPHONE ENCOUNTER
Anne from [facility] calling to inform that the lab test ordered for pt is no longer provided by [facility]. Rep states it is for this specific one, Lyme total AB W reflex to IGM/IGG.  What the do offer is a Lyme serolgy with reflex.  Code: 316012    May fax new order or call back at  [phone].  Fax: [phone]    Reference #: [id number] when calling or faxing new script.    Dr. Aly - Please advise.

## 2024-07-30 NOTE — TELEPHONE ENCOUNTER
I cannot find under lyme serology?  Same reflex rx coming up.  Please help locate in epic?   [3] : 2) Feeling down, depressed, or hopeless for nearly every day (3) [PHQ-2 Positive] : PHQ-2 Positive [Nearly Every Day (3)] : 5.) Poor appetite or overeating? Nearly every day [1/2 of Days or More (2)] : 6.) Feeling bad about yourself, or that you are a failure, or have let yourself or your family down? Half the days or more [Several Days (1)] : 7.) Trouble concentrating on things, such as reading a newspaper or watching television? Several days [Not at All (0)] : 8.) Moving or speaking so slowly that other people could have noticed, or the opposite, moving or speaking faster than usual? Not at all [Moderately Severe] : severity of depression is moderately severe [Very Difficult] : How difficult have these problems made it for you to do your work, take care of things at home, or get along with people? Very difficult [[id number]] : 6

## 2024-08-06 DIAGNOSIS — G35 MULTIPLE SCLEROSIS (HCC): ICD-10-CM

## 2024-08-06 NOTE — TELEPHONE ENCOUNTER
Called [facility] [phone]. Spoke w/Abril. She states there is no [facility] specific lab form for this test. Okay to send order for [facility] lab as long as lab name and CPT code are listed on order.     Per Anabel - lab specimen is no longer available (only good for 7 days). Pt will have to have this lab redrawn.    Lab order pended below.     Dr. Aly - please sign if agreeable. Thank you.

## 2024-08-06 NOTE — TELEPHONE ENCOUNTER
Called and Left a message on pt's answering machine for a call back    No consent to leave detailed msg.      Please advise pt she will need to go back to [facility] to have Lyme lab redrawn. They cannot use specimen drawn on 7/25/24. New lab order ([facility] lab) is available in pt's chart.

## 2024-08-09 RX ORDER — AMANTADINE HYDROCHLORIDE 100 MG/1
CAPSULE, GELATIN COATED ORAL
Qty: 60 CAPSULE | Refills: 0 | Status: SHIPPED | OUTPATIENT
Start: 2024-08-09

## 2024-08-12 ENCOUNTER — TELEPHONE (OUTPATIENT)
Dept: NEUROLOGY | Facility: CLINIC | Age: 64
End: 2024-08-12

## 2024-08-12 NOTE — TELEPHONE ENCOUNTER
From  Skylar Mobley To  [facility] (supporting Fabby Landon, RN) Sent  8/11/2024  8:52 PM       Derick Sequeira. I have home draws done. I need the lab order faxed to [facility]. Their fax number is [phone]. Once they get the lab order, they will schedule to come out and take the blood needed. Thank you.      Previous Messages    ----- Message -----       From:Fabby CALLES       Sent:8/8/2024  9:13 AM EDT         To:Skylar Mobley    Subject:Lyme Lab    Good morning Skylar,    Our office reached out to you on 8/6/24 and we had to leave a message for you. We received a message from [facility] [employer] stating they were not able to use the specimen drawn on 7/25/24 for your Lymes lab order. A new order was placed for Lymes and sent to [facility]. Please go and have this lab redrawn. We apologize for any inconvenience they cause. Please reach out if you have any questions.    Thank you,  Fabby BRENNAN     Lab order printed and faxed to [facility] at number provided above.

## 2024-08-20 ENCOUNTER — RA CDI HCC (OUTPATIENT)
Dept: OTHER | Facility: HOSPITAL | Age: 64
End: 2024-08-20

## 2024-08-28 ENCOUNTER — OFFICE VISIT (OUTPATIENT)
Dept: FAMILY MEDICINE CLINIC | Facility: CLINIC | Age: 64
End: 2024-08-28
Payer: COMMERCIAL

## 2024-08-28 VITALS
DIASTOLIC BLOOD PRESSURE: 64 MMHG | HEART RATE: 66 BPM | TEMPERATURE: 99.1 F | HEIGHT: 66 IN | SYSTOLIC BLOOD PRESSURE: 120 MMHG | BODY MASS INDEX: 22.44 KG/M2 | RESPIRATION RATE: 16 BRPM

## 2024-08-28 DIAGNOSIS — G35 MULTIPLE SCLEROSIS (HCC): ICD-10-CM

## 2024-08-28 DIAGNOSIS — Z12.11 SCREEN FOR COLON CANCER: ICD-10-CM

## 2024-08-28 DIAGNOSIS — L89.899: ICD-10-CM

## 2024-08-28 DIAGNOSIS — I10 PRIMARY HYPERTENSION: ICD-10-CM

## 2024-08-28 DIAGNOSIS — B35.3 TINEA PEDIS OF LEFT FOOT: ICD-10-CM

## 2024-08-28 DIAGNOSIS — Z78.0 POST-MENOPAUSAL: ICD-10-CM

## 2024-08-28 DIAGNOSIS — D69.6 THROMBOCYTOPENIA, UNSPECIFIED (HCC): ICD-10-CM

## 2024-08-28 DIAGNOSIS — Z89.619 S/P AKA (ABOVE KNEE AMPUTATION) (HCC): ICD-10-CM

## 2024-08-28 DIAGNOSIS — K52.9 CHRONIC DIARRHEA: ICD-10-CM

## 2024-08-28 DIAGNOSIS — I83.025: ICD-10-CM

## 2024-08-28 DIAGNOSIS — D69.6 THROMBOCYTOPENIA (HCC): ICD-10-CM

## 2024-08-28 DIAGNOSIS — L97.521 ULCER OF LEFT FOOT, LIMITED TO BREAKDOWN OF SKIN (HCC): ICD-10-CM

## 2024-08-28 DIAGNOSIS — E78.2 MIXED HYPERLIPIDEMIA: ICD-10-CM

## 2024-08-28 DIAGNOSIS — E04.2 MULTIPLE THYROID NODULES: ICD-10-CM

## 2024-08-28 DIAGNOSIS — R10.9 ABDOMINAL CRAMPING: ICD-10-CM

## 2024-08-28 DIAGNOSIS — Z13.6 SCREENING FOR CARDIOVASCULAR CONDITION: ICD-10-CM

## 2024-08-28 DIAGNOSIS — I74.3 EMBOLISM AND THROMBOSIS OF ARTERIES OF THE LOWER EXTREMITIES (HCC): ICD-10-CM

## 2024-08-28 DIAGNOSIS — Z12.31 SCREENING MAMMOGRAM FOR HIGH-RISK PATIENT: ICD-10-CM

## 2024-08-28 DIAGNOSIS — L97.521 NON-PRESSURE CHRONIC ULCER OF OTHER PART OF LEFT FOOT LIMITED TO BREAKDOWN OF SKIN (HCC): ICD-10-CM

## 2024-08-28 DIAGNOSIS — Z00.00 MEDICARE ANNUAL WELLNESS VISIT, SUBSEQUENT: Primary | ICD-10-CM

## 2024-08-28 PROCEDURE — G0439 PPPS, SUBSEQ VISIT: HCPCS | Performed by: FAMILY MEDICINE

## 2024-08-28 RX ORDER — DICYCLOMINE HYDROCHLORIDE 10 MG/1
10 CAPSULE ORAL
Qty: 270 CAPSULE | Refills: 3 | Status: SHIPPED | OUTPATIENT
Start: 2024-08-28 | End: 2025-08-28

## 2024-08-28 RX ORDER — CLOTRIMAZOLE AND BETAMETHASONE DIPROPIONATE 10; .64 MG/G; MG/G
CREAM TOPICAL 2 TIMES DAILY
Qty: 45 G | Refills: 0 | Status: SHIPPED | OUTPATIENT
Start: 2024-08-28

## 2024-08-28 NOTE — PATIENT INSTRUCTIONS
Medicare Preventive Visit Patient Instructions  Thank you for completing your Welcome to Medicare Visit or Medicare Annual Wellness Visit today. Your next wellness visit will be due in one year (8/29/2025).  The screening/preventive services that you may require over the next 5-10 years are detailed below. Some tests may not apply to you based off risk factors and/or age. Screening tests ordered at today's visit but not completed yet may show as past due. Also, please note that scanned in results may not display below.  Preventive Screenings:  Service Recommendations Previous Testing/Comments   Colorectal Cancer Screening  * Colonoscopy    * Fecal Occult Blood Test (FOBT)/Fecal Immunochemical Test (FIT)  * Fecal DNA/Cologuard Test  * Flexible Sigmoidoscopy Age: 45-75 years old   Colonoscopy: every 10 years (may be performed more frequently if at higher risk)  OR  FOBT/FIT: every 1 year  OR  Cologuard: every 3 years  OR  Sigmoidoscopy: every 5 years  Screening may be recommended earlier than age 45 if at higher risk for colorectal cancer. Also, an individualized decision between you and your healthcare provider will decide whether screening between the ages of 76-85 would be appropriate. Colonoscopy: Not on file  FOBT/FIT: Not on file  Cologuard: Not on file  Sigmoidoscopy: Not on file          Breast Cancer Screening Age: 40+ years old  Frequency: every 1-2 years  Not required if history of left and right mastectomy Mammogram: 07/30/2015        Cervical Cancer Screening Between the ages of 21-29, pap smear recommended once every 3 years.   Between the ages of 30-65, can perform pap smear with HPV co-testing every 5 years.   Recommendations may differ for women with a history of total hysterectomy, cervical cancer, or abnormal pap smears in past. Pap Smear: Not on file        Hepatitis C Screening Once for adults born between 1945 and 1965  More frequently in patients at high risk for Hepatitis C Hep C Antibody:  07/18/2019    Screening Current   Diabetes Screening 1-2 times per year if you're at risk for diabetes or have pre-diabetes Fasting glucose: No results in last 5 years (No results in last 5 years)  A1C: No results in last 5 years (No results in last 5 years)      Cholesterol Screening Once every 5 years if you don't have a lipid disorder. May order more often based on risk factors. Lipid panel: 07/27/2023    Screening Not Indicated  History Lipid Disorder     Other Preventive Screenings Covered by Medicare:  Abdominal Aortic Aneurysm (AAA) Screening: covered once if your at risk. You're considered to be at risk if you have a family history of AAA.  Lung Cancer Screening: covers low dose CT scan once per year if you meet all of the following conditions: (1) Age 55-77; (2) No signs or symptoms of lung cancer; (3) Current smoker or have quit smoking within the last 15 years; (4) You have a tobacco smoking history of at least 20 pack years (packs per day multiplied by number of years you smoked); (5) You get a written order from a healthcare provider.  Glaucoma Screening: covered annually if you're considered high risk: (1) You have diabetes OR (2) Family history of glaucoma OR (3)  aged 50 and older OR (4)  American aged 65 and older  Osteoporosis Screening: covered every 2 years if you meet one of the following conditions: (1) You're estrogen deficient and at risk for osteoporosis based off medical history and other findings; (2) Have a vertebral abnormality; (3) On glucocorticoid therapy for more than 3 months; (4) Have primary hyperparathyroidism; (5) On osteoporosis medications and need to assess response to drug therapy.   Last bone density test (DXA Scan): Not on file.  HIV Screening: covered annually if you're between the age of 15-65. Also covered annually if you are younger than 15 and older than 65 with risk factors for HIV infection. For pregnant patients, it is covered up to 3 times  per pregnancy.    Immunizations:  Immunization Recommendations   Influenza Vaccine Annual influenza vaccination during flu season is recommended for all persons aged >= 6 months who do not have contraindications   Pneumococcal Vaccine   * Pneumococcal conjugate vaccine = PCV13 (Prevnar 13), PCV15 (Vaxneuvance), PCV20 (Prevnar 20)  * Pneumococcal polysaccharide vaccine = PPSV23 (Pneumovax) Adults 19-65 yo with certain risk factors or if 65+ yo  If never received any pneumonia vaccine: recommend Prevnar 20 (PCV20)  Give PCV20 if previously received 1 dose of PCV13 or PPSV23   Hepatitis B Vaccine 3 dose series if at intermediate or high risk (ex: diabetes, end stage renal disease, liver disease)   Respiratory syncytial virus (RSV) Vaccine - COVERED BY MEDICARE PART D  * RSVPreF3 (Arexvy) CDC recommends that adults 60 years of age and older may receive a single dose of RSV vaccine using shared clinical decision-making (SCDM)   Tetanus (Td) Vaccine - COST NOT COVERED BY MEDICARE PART B Following completion of primary series, a booster dose should be given every 10 years to maintain immunity against tetanus. Td may also be given as tetanus wound prophylaxis.   Tdap Vaccine - COST NOT COVERED BY MEDICARE PART B Recommended at least once for all adults. For pregnant patients, recommended with each pregnancy.   Shingles Vaccine (Shingrix) - COST NOT COVERED BY MEDICARE PART B  2 shot series recommended in those 19 years and older who have or will have weakened immune systems or those 50 years and older     Health Maintenance Due:      Topic Date Due   • HIV Screening  Never done   • Cervical Cancer Screening  Never done   • Colorectal Cancer Screening  Never done   • Breast Cancer Screening: Mammogram  07/30/2016   • Hepatitis C Screening  Completed     Immunizations Due:      Topic Date Due   • Pneumococcal Vaccine: Pediatrics (0 to 5 Years) and At-Risk Patients (6 to 64 Years) (2 of 2 - PCV) 06/10/2015   • COVID-19 Vaccine  (1 - 2023-24 season) Never done   • Influenza Vaccine (1) 09/01/2024     Advance Directives   What are advance directives?  Advance directives are legal documents that state your wishes and plans for medical care. These plans are made ahead of time in case you lose your ability to make decisions for yourself. Advance directives can apply to any medical decision, such as the treatments you want, and if you want to donate organs.   What are the types of advance directives?  There are many types of advance directives, and each state has rules about how to use them. You may choose a combination of any of the following:  Living will:  This is a written record of the treatment you want. You can also choose which treatments you do not want, which to limit, and which to stop at a certain time. This includes surgery, medicine, IV fluid, and tube feedings.   Durable power of  for healthcare (DPAHC):  This is a written record that states who you want to make healthcare choices for you when you are unable to make them for yourself. This person, called a proxy, is usually a family member or a friend. You may choose more than 1 proxy.  Do not resuscitate (DNR) order:  A DNR order is used in case your heart stops beating or you stop breathing. It is a request not to have certain forms of treatment, such as CPR. A DNR order may be included in other types of advance directives.  Medical directive:  This covers the care that you want if you are in a coma, near death, or unable to make decisions for yourself. You can list the treatments you want for each condition. Treatment may include pain medicine, surgery, blood transfusions, dialysis, IV or tube feedings, and a ventilator (breathing machine).  Values history:  This document has questions about your views, beliefs, and how you feel and think about life. This information can help others choose the care that you would choose.  Why are advance directives important?  An  advance directive helps you control your care. Although spoken wishes may be used, it is better to have your wishes written down. Spoken wishes can be misunderstood, or not followed. Treatments may be given even if you do not want them. An advance directive may make it easier for your family to make difficult choices about your care.   Urinary Incontinence   Urinary incontinence (UI)  is when you lose control of your bladder. UI develops because your bladder cannot store or empty urine properly. The 3 most common types of UI are stress incontinence, urge incontinence, or both.  Medicines:   May be given to help strengthen your bladder control. Report any side effects of medication to your healthcare provider.  Do pelvic muscle exercises often:  Your pelvic muscles help you stop urinating. Squeeze these muscles tight for 5 seconds, then relax for 5 seconds. Gradually work up to squeezing for 10 seconds. Do 3 sets of 15 repetitions a day, or as directed. This will help strengthen your pelvic muscles and improve bladder control.  Train your bladder:  Go to the bathroom at set times, such as every 2 hours, even if you do not feel the urge to go. You can also try to hold your urine when you feel the urge to go. For example, hold your urine for 5 minutes when you feel the urge to go. As that becomes easier, hold your urine for 10 minutes.   Self-care:   Keep a UI record.  Write down how often you leak urine and how much you leak. Make a note of what you were doing when you leaked urine.  Drink liquids as directed. You may need to limit the amount of liquid you drink to help control your urine leakage. Do not drink any liquid right before you go to bed. Limit or do not have drinks that contain caffeine or alcohol.   Prevent constipation.  Eat a variety of high-fiber foods. Good examples are high-fiber cereals, beans, vegetables, and whole-grain breads. Walking is the best way to trigger your intestines to have a bowel  movement.  Exercise regularly and maintain a healthy weight.  Weight loss and exercise will decrease pressure on your bladder and help you control your leakage.   Use a catheter as directed  to help empty your bladder. A catheter is a tiny, plastic tube that is put into your bladder to drain your urine.   Go to behavior therapy as directed.  Behavior therapy may be used to help you learn to control your urge to urinate.    Cigarette Smoking and Your Health   Risks to your health if you smoke:  Nicotine and other chemicals found in tobacco damage every cell in your body. Even if you are a light smoker, you have an increased risk for cancer, heart disease, and lung disease. If you are pregnant or have diabetes, smoking increases your risk for complications.   Benefits to your health if you stop smoking:   You decrease respiratory symptoms such as coughing, wheezing, and shortness of breath.   You reduce your risk for cancers of the lung, mouth, throat, kidney, bladder, pancreas, stomach, and cervix. If you already have cancer, you increase the benefits of chemotherapy. You also reduce your risk for cancer returning or a second cancer from developing.   You reduce your risk for heart disease, blood clots, heart attack, and stroke.   You reduce your risk for lung infections, and diseases such as pneumonia, asthma, chronic bronchitis, and emphysema.  Your circulation improves. More oxygen can be delivered to your body. If you have diabetes, you lower your risk for complications, such as kidney, artery, and eye diseases. You also lower your risk for nerve damage. Nerve damage can lead to amputations, poor vision, and blindness.  You improve your body's ability to heal and to fight infections.  For more information and support to stop smoking:   Smokefree.gov  Phone: 5- 842 - 556-1744  Web Address: www.[employer]fr[employer]     © Copyright [employer] 2018 Information is for End User's use only and may not be sold,  redistributed or otherwise used for commercial purposes. All illustrations and images included in CareNotes® are the copyrighted property of [employer]M., Inc. or [employer]

## 2024-08-28 NOTE — ASSESSMENT & PLAN NOTE
Seems to be secondary to scooter arm - pt got a new one, new is doing same thing in a different spot.  Some improvement however.  Trying to replace it again to find one that does not do it.  Pt advised wool, gel cousin.

## 2024-08-28 NOTE — PROGRESS NOTES
Ambulatory Visit  Name: Skylar Mobley      : 1960      MRN: [id number]  Encounter Provider: Frank Lombardi, DO  Encounter Date: 2024   Encounter department: [facility]    Assessment & Plan   1. Medicare annual wellness visit, subsequent  2. Multiple thyroid nodules  Assessment & Plan:  MRI done earlier this year reports thyroid nosules - pt has had this for quite some time.  Radiology suggested thyroid US.  Pt states her prev doc did not see it as an issue  Orders:  -     US thyroid; Future; Expected date: 2024  3. 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 Embolism and thrombosis of arteries of the lower extremities (HCC)  5. Varicose veins of left lower extremity with ulcer other part of foot (CODE) (HCC)  6. Thrombocytopenia, unspecified (HCC)  7. Pressure injury of skin of upper extremity  Assessment & Plan:  Seems to be secondary to scooter arm - pt got a new one, new is doing same thing in a different spot.  Some improvement however.  Trying to replace it again to find one that does not do it.  Pt advised wool, gel cousin.    8. Thrombocytopenia (HCC)  9. Screening for cardiovascular condition  -     Lipid Panel with Direct LDL reflex; Future  10. Screen for colon cancer  -     Cologuard  11. Screening mammogram for high-risk patient  -     Mammo screening bilateral w 3d & cad; Future; Expected date: 2024  12. Primary hypertension  Assessment & Plan:  stable  13. Ulcer of left foot, limited to breakdown of skin (HCC)  Assessment & Plan:  Reported as resolved  14. Multiple sclerosis (HCC)  15. Post-menopausal  -     DXA bone density spine hip and pelvis; Future; Expected date: 2024  16. Chronic diarrhea  -     dicyclomine (BENTYL) 10 mg capsule; Take 1 capsule (10 mg total) by mouth 3 (three) times a day before meals  17. Abdominal cramping  -     dicyclomine (BENTYL) 10 mg capsule; Take 1 capsule (10 mg total) by  mouth 3 (three) times a day before meals  18. S/P AKA (above knee amputation) (HCC)  -     rivaroxaban (Xarelto) 20 mg tablet; Take 1 tablet (20 mg total) by mouth daily with breakfast  19. Tinea pedis of left foot  -     clotrimazole-betamethasone (LOTRISONE) 1-0.05 % cream; Apply topically 2 (two) times a day  20. Mixed hyperlipidemia  Assessment & Plan:  Will follow lipid panel       Preventive health issues were discussed with patient, and age appropriate screening tests were ordered as noted in patient's After Visit Summary. Personalized health advice and appropriate referrals for health education or preventive services given if needed, as noted in patient's After Visit Summary.    History of Present Illness     Pt is sched for an AWV    PT had an MRI - showed thryoid nodules    PT has a scooter - she destroyed her initial arm on the scooter - got a new arm on the scooter and that seems to be bothering the lower part of her arm.      Pt does homwe blood draws with [facility] we need to call to set up    Pt does not want pap pelvic exams etc - encourged to get an OBGYN       Patient Care Team:  Frank Lombardi, DO as PCP - General (Family Medicine)  Marck Sanchez MD as PCP - Wound (Wound Care)  Angella Piper MD as Consulting Physician (Vascular Surgery)  Saskia Aly MD as Consulting Physician (Neurology)  Kanwal Summers PA-C as Consulting Physician (Neurology)    Review of Systems  Medical History Reviewed by provider this encounter:  Tobacco  Allergies  Meds  Problems  Med Hx  Surg Hx  Fam Hx       Annual Wellness Visit Questionnaire   Skylar is here for her Subsequent Wellness visit. Last Medicare Wellness visit information reviewed, patient interviewed, no change since last AWV.     Health Risk Assessment:   Patient rates overall health as good. Patient feels that their physical health rating is same. Patient is satisfied with their life. Eyesight was rated as same. Hearing was rated as  same. Patient feels that their emotional and mental health rating is slightly better. Patients states they are never, rarely angry. Patient states they are never, rarely unusually tired/fatigued. Pain experienced in the last 7 days has been none. Patient states that she has experienced no weight loss or gain in last 6 months. The answer to the eyesight question is better because I had cataract surgery.    Depression Screening:   PHQ-9 Score: 0      Fall Risk Screening:   In the past year, patient has experienced: no history of falling in past year      Urinary Incontinence Screening:   Patient has leaked urine accidently in the last six months.     Home Safety:  Patient does not have trouble with stairs inside or outside of their home. Patient has working smoke alarms and has no working carbon monoxide detector. Home safety hazards include: none.     Nutrition:   Current diet is Regular.     Medications:   Patient is not currently taking any over-the-counter supplements. Patient is able to manage medications.     Activities of Daily Living (ADLs)/Instrumental Activities of Daily Living (IADLs):   Walk and transfer into and out of bed and chair?: Yes  Dress and groom yourself?: Yes    Bathe or shower yourself?: Yes    Feed yourself? Yes  Do your laundry/housekeeping?: Yes  Manage your money, pay your bills and track your expenses?: Yes  Make your own meals?: Yes    Do your own shopping?: Yes    Previous Hospitalizations:   Any hospitalizations or ED visits within the last 12 months?: No      Advance Care Planning:   Living will: No    Durable POA for healthcare: No    Advanced directive: No      Cognitive Screening:   Provider or family/friend/caregiver concerned regarding cognition?: No    PREVENTIVE SCREENINGS      Cardiovascular Screening:    General: Screening Not Indicated, History Lipid Disorder and Risks and Benefits Discussed    Due for: Lipid Panel      Diabetes Screening:     General: Risks and Benefits  Discussed and Screening Current    Due for: Blood Glucose      Colorectal Cancer Screening:     General: Risks and Benefits Discussed    Due for: Cologuard      Breast Cancer Screening:     General: Risks and Benefits Discussed    Due for: Mammogram        Cervical Cancer Screening:    General: Risks and Benefits Discussed and Patient Declines      Osteoporosis Screening:    General: Risks and Benefits Discussed      Abdominal Aortic Aneurysm (AAA) Screening:        General: Screening Not Indicated      Lung Cancer Screening:     General: Screening Not Indicated      Hepatitis C Screening:    General: Screening Current    Screening, Brief Intervention, and Referral to Treatment (SBIRT)    Screening  Typical number of drinks in a day: 0  Typical number of drinks in a week: 0  Interpretation: Low risk drinking behavior.    AUDIT-C Screenin) How often did you have a drink containing alcohol in the past year? monthly or less  2) How many drinks did you have on a typical day when you were drinking in the past year? 1 to 2  3) How often did you have 6 or more drinks on one occasion in the past year? never    AUDIT-C Score: 1  Interpretation: Score 0-2 (female): Negative screen for alcohol misuse    Single Item Drug Screening:  How often have you used an illegal drug (including marijuana) or a prescription medication for non-medical reasons in the past year? never    Single Item Drug Screen Score: 0  Interpretation: Negative screen for possible drug use disorder    Brief Intervention  Alcohol & drug use screenings were reviewed. No concerns regarding substance use disorder identified.     Social Determinants of Health     Financial Resource Strain: Low Risk  (2023)    Overall Financial Resource Strain (CARDIA)    • Difficulty of Paying Living Expenses: Not very hard   Food Insecurity: No Food Insecurity (2024)    Hunger Vital Sign    • Worried About Running Out of Food in the Last Year: Never true    • Ran  "Out of Food in the Last Year: Never true   Transportation Needs: No Transportation Needs (8/26/2024)    PRAPARE - Transportation    • Lack of Transportation (Medical): No    • Lack of Transportation (Non-Medical): No   Housing Stability: Low Risk  (8/26/2024)    Housing Stability Vital Sign    • Unable to Pay for Housing in the Last Year: No    • Number of Times Moved in the Last Year: 0    • Homeless in the Last Year: No   Utilities: Not At Risk (8/26/2024)    [facility] Utilities    • Threatened with loss of utilities: No     No results found.    Objective     /64   Pulse 66   Temp 99.1 °F (37.3 °C)   Resp 16   Ht 5' 6\" (1.676 m)   BMI 22.44 kg/m²     Physical Exam      "

## 2024-08-28 NOTE — ASSESSMENT & PLAN NOTE
Patient Seen in: BATON ROUGE BEHAVIORAL 

## 2024-08-28 NOTE — ASSESSMENT & PLAN NOTE
MRI done earlier this year reports thyroid nosules - pt has had this for quite some time.  Radiology suggested thyroid US.  Pt states her prev doc did not see it as an issue

## 2024-08-29 DIAGNOSIS — G35 MULTIPLE SCLEROSIS (HCC): ICD-10-CM

## 2024-08-29 RX ORDER — AMANTADINE HYDROCHLORIDE 100 MG/1
100 CAPSULE, GELATIN COATED ORAL 2 TIMES DAILY
Qty: 180 CAPSULE | Refills: 2 | Status: SHIPPED | OUTPATIENT
Start: 2024-08-29

## 2024-08-29 NOTE — TELEPHONE ENCOUNTER
Medication: amantadine (SYMMETREL)     Dose/Frequency: 100 mg capsule, table 1 capsule two times a day    Quantity: 180 capsules     Pharmacy: [facility]    Office:   [] PCP/Provider -   [x] Speciality/Provider - Neurology, Dr. Aly    Does the patient have enough for 3 days?   [x] Yes   [] No - Send as HP to POD       Dr. Aly: pt requested a 90 day supply. Rx reflects 90 day supply. Please sign if agreeable.

## 2024-08-29 NOTE — TELEPHONE ENCOUNTER
"Outbound call placed to [facility] to verify pt's current Rx for Amantadine 100 mg capsules.     Spoke with pharmacy tech. Tech stated that pt picked up the medication on 8/6/24 but the current script can be filled. Asked the tech if pt can receive a 90 day supply per pt request of a \"larger bottle\"    Current Rx on file only written for a 30 day supply. Tech unable to process the medication through and obtain a price for a 90 day supply.     RN tried contacted pt, but no answer. Will send an updated [employer] message to pt due to pt reading the message from earlier this AM.      "

## 2024-09-16 DIAGNOSIS — G35 MULTIPLE SCLEROSIS (HCC): ICD-10-CM

## 2024-09-16 RX ORDER — BACLOFEN 10 MG/1
20 TABLET ORAL 3 TIMES DAILY
Qty: 540 TABLET | Refills: 0 | Status: CANCELLED | OUTPATIENT
Start: 2024-09-16

## 2024-09-16 NOTE — TELEPHONE ENCOUNTER
Outbound call placed to [facility].    RN reviewed with the pharmacy that Baclofen 10 mg was sent to the pharmacy on 6/19/24 for a 90 day supply with 1 refill.    Pharmacy tech confirmed that the pt does have a refill and the pharmacy will fill the medication but not until tomorrow. Pharmacy has to order the medication. RN informed pharmacy that the office will reach out to the pt and let the pt know.     [employer] message sent to pt

## 2024-09-21 LAB — COLOGUARD RESULT REPORTABLE: NEGATIVE

## 2024-09-30 DIAGNOSIS — I10 PRIMARY HYPERTENSION: ICD-10-CM

## 2024-09-30 DIAGNOSIS — I21.A1 TYPE 2 MYOCARDIAL INFARCTION (HCC): ICD-10-CM

## 2024-09-30 DIAGNOSIS — I70.201 POPLITEAL ARTERY STENOSIS, RIGHT (HCC): ICD-10-CM

## 2024-09-30 DIAGNOSIS — E78.2 MIXED HYPERLIPIDEMIA: ICD-10-CM

## 2024-10-01 RX ORDER — ROSUVASTATIN CALCIUM 20 MG/1
20 TABLET, COATED ORAL DAILY
Qty: 90 TABLET | Refills: 0 | Status: SHIPPED | OUTPATIENT
Start: 2024-10-01

## 2024-10-01 RX ORDER — GABAPENTIN 300 MG/1
300 CAPSULE ORAL 2 TIMES DAILY
Qty: 180 CAPSULE | Refills: 0 | Status: SHIPPED | OUTPATIENT
Start: 2024-10-01

## 2024-10-01 RX ORDER — AMLODIPINE AND OLMESARTAN MEDOXOMIL 5; 20 MG/1; MG/1
1 TABLET ORAL DAILY
Qty: 90 TABLET | Refills: 0 | Status: SHIPPED | OUTPATIENT
Start: 2024-10-01

## 2024-10-01 RX ORDER — METOPROLOL TARTRATE 100 MG
100 TABLET ORAL EVERY 12 HOURS
Qty: 180 TABLET | Refills: 0 | Status: SHIPPED | OUTPATIENT
Start: 2024-10-01

## 2024-12-09 DIAGNOSIS — G35 MULTIPLE SCLEROSIS (HCC): ICD-10-CM

## 2024-12-10 RX ORDER — BACLOFEN 10 MG/1
20 TABLET ORAL 3 TIMES DAILY
Qty: 540 TABLET | Refills: 0 | Status: SHIPPED | OUTPATIENT
Start: 2024-12-10

## 2024-12-27 ENCOUNTER — TELEPHONE (OUTPATIENT)
Dept: NEUROLOGY | Facility: CLINIC | Age: 64
End: 2024-12-27

## 2024-12-27 NOTE — TELEPHONE ENCOUNTER
Called patient there is an error in Dr. Aly's schedule on 1/13/25. Offered same day appt in [place] with Kanwal Summers. Gave central # for return call to discuss scheduling, appt has been cancelled with Dr. Aly for 1/13/25.

## 2025-01-06 DIAGNOSIS — I10 PRIMARY HYPERTENSION: ICD-10-CM

## 2025-01-06 DIAGNOSIS — I70.201 POPLITEAL ARTERY STENOSIS, RIGHT (HCC): ICD-10-CM

## 2025-01-06 DIAGNOSIS — E78.2 MIXED HYPERLIPIDEMIA: ICD-10-CM

## 2025-01-06 DIAGNOSIS — I21.A1 TYPE 2 MYOCARDIAL INFARCTION (HCC): ICD-10-CM

## 2025-01-07 RX ORDER — METOPROLOL TARTRATE 100 MG/1
100 TABLET ORAL EVERY 12 HOURS
Qty: 180 TABLET | Refills: 1 | Status: SHIPPED | OUTPATIENT
Start: 2025-01-07

## 2025-01-08 RX ORDER — AMLODIPINE AND OLMESARTAN MEDOXOMIL 5; 20 MG/1; MG/1
1 TABLET ORAL DAILY
Qty: 90 TABLET | Refills: 0 | Status: SHIPPED | OUTPATIENT
Start: 2025-01-08

## 2025-01-08 RX ORDER — ROSUVASTATIN CALCIUM 20 MG/1
20 TABLET, COATED ORAL DAILY
Qty: 90 TABLET | Refills: 0 | Status: SHIPPED | OUTPATIENT
Start: 2025-01-08

## 2025-01-08 RX ORDER — GABAPENTIN 300 MG/1
300 CAPSULE ORAL 2 TIMES DAILY
Qty: 180 CAPSULE | Refills: 0 | Status: SHIPPED | OUTPATIENT
Start: 2025-01-08

## 2025-01-18 NOTE — PROGRESS NOTES
[facility]  Progress Note  Name: Neyda Mabry  MRN: [id number]  Unit/Bed#: [address] [address] I Date of Admission: 5/22/2023   Date of Service: 5/30/2023 I Hospital Day: 8    Assessment/Plan   * Multiple open wounds  Assessment & Plan  Patient presented with left foot tingling that started on Friday, new wounds on toes and sole of foot for about 1 week, patient concerned about clot in leg  Reports brushing her left foot to get rid of dead skin 2 weeks ago  Denies fever, chills  Reports chronic wound on top of left foot since 6/2022 and goes to wound care weekly  Reports history of right leg amputation due to blood clot  · Pt is s/p right AKA due to critical 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 on 2/6/9745, no source was found  Patient is on Xarelto at home  LEAD showed > 75% stenosis in the right popliteal artery at the time  · Left foot x-ray showed no acute osseous abnormality  · Wound care with Betadine paint twice daily  · On IV antibiotic as noted below      Cellulitis of foot, left  Assessment & Plan  Left foot/toes with multiple dry nonhealing wounds with mild redness and tenderness  · Possible cellulitis  · Patient given Floreen Nations in ED  · Patient was on vancomycin and Ancef initially  · Vancomycin was discontinued  Continue IV cefazolin as per ID  · Blood cultures have been negative  · Patient also had a left plantar foot abscess which was drained by podiatry at bedside on 5/24/2023-cultures growing MSSA and Streptococcus  PAD (peripheral artery disease) ([facility] [address] )  Assessment & Plan  · left LEAD in 8/2020 showed 50-75% stenosis at the distal SFA  · Continue statin, heparin drip  · Left Lower extremity arterial Doppler showed HAILEY index 0 47 which is in ischemic category    Compared to previous study there is drop in HAILEY with greater than 75 % stenosis at proximal popliteal artery  · IR was consulted for Angiogram-angiogram was tried on 5/26/2023 but patient was catheter removed  over the wire. unable to lay still  · Plan for angiogram under anesthesia tomm    Multiple sclerosis (HCC)  Assessment & Plan  Continue amantadine, baclofen  · Patient follows neurology as outpatient  · Reports using scooter at home, does not walk    Mixed hyperlipidemia  Assessment & Plan  · Continue statin    S/P AKA (above knee amputation) unilateral, right ([facility] [address] )  Assessment & Plan  · As above  · Patient did not follow-up with vascular since 2020    Thrombocytopenia ([facility] [address] )  Assessment & Plan  · Mild  Platelet count 725  · Resolved on repeat lab work  · Check a m  labs    Hypertension  Assessment & Plan  Continue metoprolol, amlodipine, Cozaar  · BP well controlled    Constipation-resolved as of 5/30/2023  Assessment & Plan  Continue current bowel regimen  Patient did have a good bowel movement         VTE Pharmacologic Prophylaxis:   heparin drip    Patient Centered Rounds: I performed bedside rounds with nursing staff today  Discussions with Specialists or Other Care Team Provider: yes    Education and Discussions with Family / Patient: Updated  (mother) at bedside  Total Time Spent on Date of Encounter in care of patient: 35 minutes This time was spent on one or more of the following: performing physical exam; counseling and coordination of care; obtaining or reviewing history; documenting in the medical record; reviewing/ordering tests, medications or procedures; communicating with other healthcare professionals and discussing with patient's family/caregivers  Current Length of Stay: 8 day(s)  Current Patient Status: Inpatient   Certification Statement: The patient will continue to require additional inpatient hospital stay due to Peripheral arterial disease requiring angiogram under anesthesia tomorrow  Discharge Plan: Anticipate discharge in 48-72 hrs to home with home services  Code Status: Level 1 - Full Code    Subjective:     Patient reports some tingling of her left foot    Denies any chest pain, shortness of breath    Objective:     Vitals:   Temp (24hrs), Av 1 °F (36 7 °C), Min:98 °F (36 7 °C), Max:98 3 °F (36 8 °C)    Temp:  [98 °F (36 7 °C)-98 3 °F (36 8 °C)] 98 °F (36 7 °C)  HR:  [71-85] 71  Resp:  [16-18] 16  BP: ()/(52-78) 113/72  SpO2:  [91 %-95 %] 95 %  Body mass index is 22 6 kg/m²  Input and Output Summary (last 24 hours): Intake/Output Summary (Last 24 hours) at 2023 1057  Last data filed at 2023 1217  Gross per 24 hour   Intake 300 ml   Output --   Net 300 ml       Physical Exam:   Physical Exam  Constitutional:       General: She is not in acute distress  HENT:      Head: Normocephalic and atraumatic  Eyes:      General: No scleral icterus  Cardiovascular:      Rate and Rhythm: Normal rate and regular rhythm  Pulmonary:      Effort: Pulmonary effort is normal  No respiratory distress  Breath sounds: Normal breath sounds  No wheezing or rales  Abdominal:      General: Bowel sounds are normal  There is no distension  Palpations: Abdomen is soft  Tenderness: There is no abdominal tenderness  Musculoskeletal:      Comments: Right above-knee amputation  Left lower extremity with dressing in place   Neurological:      Mental Status: She is alert and oriented to person, place, and time           Additional Data:     Labs:  Results from last 7 days   Lab Units 23  0550   EOS PCT % 2   HEMATOCRIT % 35 4   HEMOGLOBIN g/dL 11 4*   LYMPHS PCT % 31   MONOS PCT % 7   NEUTROS PCT % 58   PLATELETS Thousands/uL 149   WBC Thousand/uL 7 48     Results from last 7 days   Lab Units 23  0550   ANION GAP mmol/L 8   BUN mg/dL 17   CALCIUM mg/dL 9 4   CHLORIDE mmol/L 106   CO2 mmol/L 25   CREATININE mg/dL 0 74   GLUCOSE RANDOM mg/dL 100   POTASSIUM mmol/L 4 3   SODIUM mmol/L 139                       Lines/Drains:  Invasive Devices     Peripheral Intravenous Line  Duration           Peripheral IV 23 Proximal;Right;Ventral (anterior) Forearm <1 day          Drain  Duration           External Urinary Catheter 1 day                      Imaging: Reviewed radiology reports from this admission including: ultrasound(s)    Recent Cultures (last 7 days):   Results from last 7 days   Lab Units 05/24/23  0823   GRAM STAIN RESULT  No Polys or Bacteria seen   WOUND CULTURE  2+ Growth of Staphylococcus aureus*  2+ Growth of Beta Hemolytic Streptococcus Group C*       Last 24 Hours Medication List:   Current Facility-Administered Medications   Medication Dose Route Frequency Provider Last Rate   • acetaminophen  650 mg Oral Q6H PRN RACHAEL Loredo     • amantadine  100 mg Oral BID RACHAEL Loredo     • amLODIPine  5 mg Oral Daily Lanette Moon MD      And   • losartan  50 mg Oral Daily Lanette Moon MD     • ascorbic acid  250 mg Oral Daily RACHAEL Loredo     • baclofen  20 mg Oral TID RACHAEL Loredo     • bisacodyl  10 mg Rectal Daily PRN Lanette Moon MD     • cefazolin  1,000 mg Intravenous Q8H RACHAEL Loredo 1,000 mg (05/30/23 0502)   • cholecalciferol  1,000 Units Oral Daily RACHAEL Loredo     • dicyclomine  10 mg Oral TID AC RACHAEL Loredo     • docusate sodium  100 mg Oral BID Lanette Moon MD     • famotidine  20 mg Oral Daily RACHAEL Loredo     • gabapentin  300 mg Oral BID RACHAEL Loredo     • heparin (porcine)  12 Units/kg/hr (Order-Specific) Intravenous Titrated RACHAEL Loredo 19 Units/kg/hr (05/29/23 2129)   • HYDROmorphone  0 2 mg Intravenous Q3H PRN RACHAEL Loredo     • metoprolol tartrate  100 mg Oral Q12H RACHAEL Loredo     • multivitamin-minerals  1 tablet Oral Daily RACHAEL Loredo     • ondansetron  4 mg Intravenous Q6H PRN RACHAEL Loredo     • oxyCODONE  2 5 mg Oral Q4H PRN RACHAEL Loredo     • polyethylene glycol  17 g Oral Daily PRN Hakeem Tillman MD     • pravastatin  80 mg Oral Daily With RACHAEL Power     • senna  1 tablet Oral HS Lanette Moon, MD          Today, Patient Was Seen By: Toshia Thomason DO    **Please Note: This note may have been constructed using a voice recognition system  **

## 2025-02-25 ENCOUNTER — RA CDI HCC (OUTPATIENT)
Dept: OTHER | Facility: HOSPITAL | Age: 65
End: 2025-02-25

## 2025-03-16 DIAGNOSIS — G35 MULTIPLE SCLEROSIS (HCC): ICD-10-CM

## 2025-03-17 RX ORDER — BACLOFEN 10 MG/1
20 TABLET ORAL 3 TIMES DAILY
Qty: 540 TABLET | Refills: 0 | Status: SHIPPED | OUTPATIENT
Start: 2025-03-17

## 2025-03-25 NOTE — ANESTHESIA POSTPROCEDURE EVALUATION
Post-Op Assessment Note    CV Status:  Stable  Pain Score: 0    Pain management: adequate       Mental Status:  Alert and awake   Hydration Status:  Euvolemic   PONV Controlled:  Controlled   Airway Patency:  Patent     Post Op Vitals Reviewed: Yes      Staff: Anesthesiologist, CRNA               /72 (12/18/23 0926)    Temp      Pulse 63 (12/18/23 0926)   Resp 12 (12/18/23 0926)    SpO2 100 % (12/18/23 0926)      
No

## 2025-03-31 DIAGNOSIS — I70.201 POPLITEAL ARTERY STENOSIS, RIGHT (HCC): ICD-10-CM

## 2025-03-31 DIAGNOSIS — I21.A1 TYPE 2 MYOCARDIAL INFARCTION (HCC): ICD-10-CM

## 2025-03-31 DIAGNOSIS — I10 PRIMARY HYPERTENSION: ICD-10-CM

## 2025-04-01 DIAGNOSIS — G35 MULTIPLE SCLEROSIS (HCC): ICD-10-CM

## 2025-04-01 DIAGNOSIS — I21.A1 TYPE 2 MYOCARDIAL INFARCTION (HCC): ICD-10-CM

## 2025-04-01 DIAGNOSIS — I70.201 POPLITEAL ARTERY STENOSIS, RIGHT (HCC): ICD-10-CM

## 2025-04-01 RX ORDER — AMANTADINE HYDROCHLORIDE 100 MG/1
100 CAPSULE, GELATIN COATED ORAL 2 TIMES DAILY
Qty: 180 CAPSULE | Refills: 2 | Status: SHIPPED | OUTPATIENT
Start: 2025-04-01

## 2025-04-02 RX ORDER — GABAPENTIN 300 MG/1
300 CAPSULE ORAL 2 TIMES DAILY
Qty: 180 CAPSULE | Refills: 0 | Status: SHIPPED | OUTPATIENT
Start: 2025-04-02

## 2025-04-02 RX ORDER — GABAPENTIN 300 MG/1
300 CAPSULE ORAL 2 TIMES DAILY
Qty: 180 CAPSULE | Refills: 0 | OUTPATIENT
Start: 2025-04-02

## 2025-04-02 RX ORDER — METOPROLOL TARTRATE 100 MG/1
100 TABLET ORAL 2 TIMES DAILY
Qty: 180 TABLET | Refills: 1 | OUTPATIENT
Start: 2025-04-02

## 2025-04-02 RX ORDER — AMLODIPINE AND OLMESARTAN MEDOXOMIL 5; 20 MG/1; MG/1
1 TABLET ORAL DAILY
Qty: 90 TABLET | Refills: 0 | Status: SHIPPED | OUTPATIENT
Start: 2025-04-02

## 2025-04-15 NOTE — TELEPHONE ENCOUNTER
Detail Level: Detailed Med approved through 12/31/19  Add 81426 Cpt? (Important Note: In 2017 The Use Of 46224 Is Being Tracked By Cms To Determine Future Global Period Reimbursement For Global Periods): yes Quality 357: Surgical Site Infection (Ssi): No surgical site infection Wound Diameter In Cm(Optional): 0 Wound Crusting?: clean Sutures?: intact Wound Edema?: mild Wound Color?: pink Patient To Follow-Up With?: their primary dermatologist Follow Up Units (Optional): 3 Follow Up Time Frame (Optional): months

## 2025-04-16 DIAGNOSIS — E78.2 MIXED HYPERLIPIDEMIA: ICD-10-CM

## 2025-04-18 RX ORDER — ROSUVASTATIN CALCIUM 20 MG/1
20 TABLET, COATED ORAL DAILY
Qty: 90 TABLET | Refills: 0 | Status: SHIPPED | OUTPATIENT
Start: 2025-04-18

## 2025-05-30 DIAGNOSIS — Z89.619 S/P AKA (ABOVE KNEE AMPUTATION) (HCC): ICD-10-CM

## 2025-06-15 DIAGNOSIS — G35 MULTIPLE SCLEROSIS (HCC): ICD-10-CM

## 2025-06-16 RX ORDER — BACLOFEN 10 MG/1
20 TABLET ORAL 3 TIMES DAILY
Qty: 180 TABLET | Refills: 1 | Status: SHIPPED | OUTPATIENT
Start: 2025-06-16

## 2025-06-16 RX ORDER — AMANTADINE HYDROCHLORIDE 100 MG/1
100 CAPSULE, GELATIN COATED ORAL 2 TIMES DAILY
Qty: 180 CAPSULE | Refills: 0 | OUTPATIENT
Start: 2025-06-16

## 2025-06-16 NOTE — TELEPHONE ENCOUNTER
amantadine (SYMMETREL) 100 mg capsule, was sent to the [facility] - [place], [place] - [address] [place] [address] on 4/1/25 with a qty of 180 capsules with 2 refills.( Messaged patient via Panna)

## 2025-06-16 NOTE — TELEPHONE ENCOUNTER
Pt missed sherri appt.  Almost one year since last visit. I will refill once.  Pt needs to come in for appt with me or michelet.

## 2025-06-29 DIAGNOSIS — I70.201 POPLITEAL ARTERY STENOSIS, RIGHT (HCC): ICD-10-CM

## 2025-06-29 DIAGNOSIS — I10 PRIMARY HYPERTENSION: ICD-10-CM

## 2025-06-29 DIAGNOSIS — E78.2 MIXED HYPERLIPIDEMIA: ICD-10-CM

## 2025-06-29 DIAGNOSIS — I21.A1 TYPE 2 MYOCARDIAL INFARCTION (HCC): ICD-10-CM

## 2025-07-01 RX ORDER — RIVAROXABAN 20 MG/1
20 TABLET, FILM COATED ORAL
Qty: 90 TABLET | Refills: 3 | Status: SHIPPED | OUTPATIENT
Start: 2025-07-01

## 2025-07-02 ENCOUNTER — TELEPHONE (OUTPATIENT)
Age: 65
End: 2025-07-02

## 2025-07-03 RX ORDER — AMLODIPINE AND OLMESARTAN MEDOXOMIL 5; 20 MG/1; MG/1
1 TABLET ORAL DAILY
Qty: 90 TABLET | Refills: 0 | Status: SHIPPED | OUTPATIENT
Start: 2025-07-03

## 2025-07-03 RX ORDER — METOPROLOL TARTRATE 100 MG/1
100 TABLET ORAL EVERY 12 HOURS
Qty: 180 TABLET | Refills: 0 | Status: SHIPPED | OUTPATIENT
Start: 2025-07-03

## 2025-07-03 RX ORDER — GABAPENTIN 300 MG/1
300 CAPSULE ORAL 2 TIMES DAILY
Qty: 180 CAPSULE | Refills: 0 | Status: SHIPPED | OUTPATIENT
Start: 2025-07-03

## 2025-07-03 RX ORDER — ROSUVASTATIN CALCIUM 20 MG/1
20 TABLET, COATED ORAL DAILY
Qty: 90 TABLET | Refills: 0 | Status: SHIPPED | OUTPATIENT
Start: 2025-07-03

## 2025-07-16 DIAGNOSIS — G35 MULTIPLE SCLEROSIS (HCC): ICD-10-CM

## 2025-07-16 RX ORDER — BACLOFEN 10 MG/1
20 TABLET ORAL 3 TIMES DAILY
Qty: 180 TABLET | Refills: 0 | Status: SHIPPED | OUTPATIENT
Start: 2025-07-16

## 2025-07-16 NOTE — TELEPHONE ENCOUNTER
Dr. Aly advised last month that she would provide 1 refill and patient needed to be scheduled.  I do not see anything scheduled.    Needs appt.  Will refill x 1 only

## (undated) DEVICE — PAD GROUNDING ADULT

## (undated) DEVICE — GLOVE INDICATOR PI UNDERGLOVE SZ 6.5 BLUE

## (undated) DEVICE — AIR INJECT CANNULA 27GA: Brand: OPHTHALMIC CANNULA

## (undated) DEVICE — GLOVE SRG BIOGEL ECLIPSE 6

## (undated) DEVICE — BULB SYRINGE,IRRIGATION WITH PROTECTIVE CAP: Brand: DOVER

## (undated) DEVICE — EYE PACK CUSTOM -FINNEGAN

## (undated) DEVICE — SUT SILK 2-0 18 IN A185H

## (undated) DEVICE — B-H IRRIGATING CAN 19GA FLAT ANGLED 8MM: Brand: OPHTHALMIC CANNULA

## (undated) DEVICE — MICROSURGICAL INSTRUMENT IRR. CYSTITOME 25GA STRAIGHT-REVERSE CUTTING: Brand: ALCON

## (undated) DEVICE — INTREPID® TRANSFORMER IA HP: Brand: INTREPID®

## (undated) DEVICE — BASIC SINGLE BASIN 2-LF: Brand: MEDLINE INDUSTRIES, INC.

## (undated) DEVICE — ANTIBACTERIAL VIOLET BRAIDED (POLYGLACTIN 910), SYNTHETIC ABSORBABLE SUTURE: Brand: COATED VICRYL

## (undated) DEVICE — UNIVERSAL MAJOR EXTREMITY,KIT: Brand: CARDINAL HEALTH

## (undated) DEVICE — 3000CC GUARDIAN II: Brand: GUARDIAN

## (undated) DEVICE — GAUZE SPONGES,16 PLY: Brand: CURITY

## (undated) DEVICE — SUT SILK 2-0 SH 30 IN K833H

## (undated) DEVICE — ACTIVE FMS W/ INTREPID* ULTRA SLEEVES, 0.9MM 45° ABS* INTREPID* BALANCED TIP: Brand: ALCON

## (undated) DEVICE — CENTURION VISION SYSTEM FMS

## (undated) DEVICE — THE MONARCH® "D" CARTRIDGE IS A SINGLE-USE POLYPROPYLENE CARTRIDGE FOR POSTERIOR CHAMBER IOL DELIVERY: Brand: MONARCH® III

## (undated) DEVICE — SUT ETHILON 3-0 FS-1 18 IN 663G

## (undated) DEVICE — ANTIBACTERIAL UNDYED BRAIDED (POLYGLACTIN 910), SYNTHETIC ABSORBABLE SUTURE: Brand: COATED VICRYL

## (undated) DEVICE — 1840 FOAM BLOCK NEEDLE COUNTER: Brand: DEVON

## (undated) DEVICE — SPONGE LAP 18 X 18 IN

## (undated) DEVICE — DRESSING XEROFORM 5 X 9

## (undated) DEVICE — PENCIL ELECTROSURG E-Z CLEAN -0035H

## (undated) DEVICE — INTENDED FOR TISSUE SEPARATION, AND OTHER PROCEDURES THAT REQUIRE A SHARP SURGICAL BLADE TO PUNCTURE OR CUT.: Brand: BARD-PARKER SAFETY BLADES SIZE 10, STERILE

## (undated) DEVICE — INTENDED FOR TISSUE SEPARATION, AND OTHER PROCEDURES THAT REQUIRE A SHARP SURGICAL BLADE TO PUNCTURE OR CUT.: Brand: BARD-PARKER SAFETY BLADES SIZE 15, STERILE

## (undated) DEVICE — ACE WRAP 6 IN STERILE

## (undated) DEVICE — KERLIX BANDAGE ROLL: Brand: KERLIX

## (undated) DEVICE — GLOVE SRG BIOGEL 7.5

## (undated) DEVICE — CLEARCUT® SLIT KNIFE INTREPID MICRO-COAXIAL SYSTEM 2.4 SB: Brand: CLEARCUT®; INTREPID

## (undated) DEVICE — OCCLUSIVE GAUZE STRIP,3% BISMUTH TRIBROMOPHENATE IN PETROLATUM BLEND: Brand: XEROFORM

## (undated) DEVICE — NEEDLE HYPO 22G X 1-1/2 IN

## (undated) DEVICE — MEDI-VAC YANK SUCT HNDL W/TPRD BULBOUS TIP: Brand: CARDINAL HEALTH

## (undated) DEVICE — TRAY FOLEY 16FR URIMETER SURESTEP

## (undated) DEVICE — 2108 SERIES SAGITTAL BLADE (18.6 X 0.64 X 61.1MM)

## (undated) DEVICE — SUT SILK 3-0 18 IN A184H